# Patient Record
Sex: MALE | Race: WHITE | URBAN - METROPOLITAN AREA
[De-identification: names, ages, dates, MRNs, and addresses within clinical notes are randomized per-mention and may not be internally consistent; named-entity substitution may affect disease eponyms.]

---

## 2017-01-08 ENCOUNTER — EMERGENCY (EMERGENCY)
Facility: HOSPITAL | Age: 57
LOS: 0 days | Discharge: HOME | End: 2017-01-08
Admitting: FAMILY MEDICINE

## 2017-06-27 DIAGNOSIS — I10 ESSENTIAL (PRIMARY) HYPERTENSION: ICD-10-CM

## 2017-06-27 DIAGNOSIS — E78.00 PURE HYPERCHOLESTEROLEMIA, UNSPECIFIED: ICD-10-CM

## 2017-06-27 DIAGNOSIS — M79.671 PAIN IN RIGHT FOOT: ICD-10-CM
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
## 2017-06-27 DIAGNOSIS — Z79.84 LONG TERM (CURRENT) USE OF ORAL HYPOGLYCEMIC DRUGS: ICD-10-CM

## 2017-06-27 DIAGNOSIS — E11.9 TYPE 2 DIABETES MELLITUS WITHOUT COMPLICATIONS: ICD-10-CM

## 2017-06-27 DIAGNOSIS — Z79.82 LONG TERM (CURRENT) USE OF ASPIRIN: ICD-10-CM

## 2017-06-27 DIAGNOSIS — Z79.899 OTHER LONG TERM (CURRENT) DRUG THERAPY: ICD-10-CM
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
## 2019-04-02 ENCOUNTER — INPATIENT (INPATIENT)
Facility: HOSPITAL | Age: 59
LOS: 26 days | Discharge: NURSING, REHAB OR LONG-TERM CARE | End: 2019-04-29
Attending: INTERNAL MEDICINE | Admitting: INTERNAL MEDICINE
Payer: COMMERCIAL

## 2019-04-02 VITALS
RESPIRATION RATE: 19 BRPM | OXYGEN SATURATION: 96 % | SYSTOLIC BLOOD PRESSURE: 224 MMHG | DIASTOLIC BLOOD PRESSURE: 107 MMHG | HEART RATE: 60 BPM

## 2019-04-02 DIAGNOSIS — Z90.89 ACQUIRED ABSENCE OF OTHER ORGANS: Chronic | ICD-10-CM

## 2019-04-02 DIAGNOSIS — S06.360D TRAUMATIC HEMORRHAGE OF CEREBRUM, UNSPECIFIED, WITHOUT LOSS OF CONSCIOUSNESS, SUBSEQUENT ENCOUNTER: ICD-10-CM

## 2019-04-02 LAB
ALBUMIN SERPL ELPH-MCNC: 3.9 G/DL — SIGNIFICANT CHANGE UP (ref 3.5–5.2)
ALP SERPL-CCNC: 94 U/L — SIGNIFICANT CHANGE UP (ref 30–115)
ALT FLD-CCNC: 13 U/L — SIGNIFICANT CHANGE UP (ref 0–41)
ANION GAP SERPL CALC-SCNC: 12 MMOL/L — SIGNIFICANT CHANGE UP (ref 7–14)
APPEARANCE UR: CLEAR — SIGNIFICANT CHANGE UP
APTT BLD: 31.8 SEC — SIGNIFICANT CHANGE UP (ref 27–39.2)
AST SERPL-CCNC: 13 U/L — SIGNIFICANT CHANGE UP (ref 0–41)
BASE EXCESS BLDV CALC-SCNC: 2.2 MMOL/L — HIGH (ref -2–2)
BASOPHILS # BLD AUTO: 0.05 K/UL — SIGNIFICANT CHANGE UP (ref 0–0.2)
BASOPHILS NFR BLD AUTO: 0.7 % — SIGNIFICANT CHANGE UP (ref 0–1)
BILIRUB SERPL-MCNC: <0.2 MG/DL — SIGNIFICANT CHANGE UP (ref 0.2–1.2)
BILIRUB UR-MCNC: NEGATIVE — SIGNIFICANT CHANGE UP
BLD GP AB SCN SERPL QL: SIGNIFICANT CHANGE UP
BUN SERPL-MCNC: 46 MG/DL — HIGH (ref 10–20)
CA-I SERPL-SCNC: 1.11 MMOL/L — LOW (ref 1.12–1.3)
CALCIUM SERPL-MCNC: 8.3 MG/DL — LOW (ref 8.5–10.1)
CHLORIDE SERPL-SCNC: 105 MMOL/L — SIGNIFICANT CHANGE UP (ref 98–110)
CO2 SERPL-SCNC: 26 MMOL/L — SIGNIFICANT CHANGE UP (ref 17–32)
COLOR SPEC: YELLOW — SIGNIFICANT CHANGE UP
CREAT SERPL-MCNC: 2.5 MG/DL — HIGH (ref 0.7–1.5)
DIFF PNL FLD: ABNORMAL
EOSINOPHIL # BLD AUTO: 0.22 K/UL — SIGNIFICANT CHANGE UP (ref 0–0.7)
EOSINOPHIL NFR BLD AUTO: 3.1 % — SIGNIFICANT CHANGE UP (ref 0–8)
GAS PNL BLDV: 143 MMOL/L — SIGNIFICANT CHANGE UP (ref 136–145)
GAS PNL BLDV: SIGNIFICANT CHANGE UP
GLUCOSE SERPL-MCNC: 200 MG/DL — HIGH (ref 70–99)
GLUCOSE UR QL: 100 MG/DL
HCO3 BLDV-SCNC: 30 MMOL/L — HIGH (ref 22–29)
HCT VFR BLD CALC: 42.7 % — SIGNIFICANT CHANGE UP (ref 42–52)
HCT VFR BLDA CALC: 42.9 % — SIGNIFICANT CHANGE UP (ref 34–44)
HGB BLD CALC-MCNC: 14 G/DL — SIGNIFICANT CHANGE UP (ref 14–18)
HGB BLD-MCNC: 13.5 G/DL — LOW (ref 14–18)
HYALINE CASTS # UR AUTO: ABNORMAL /LPF
IMM GRANULOCYTES NFR BLD AUTO: 0.4 % — HIGH (ref 0.1–0.3)
INR BLD: 0.88 RATIO — SIGNIFICANT CHANGE UP (ref 0.65–1.3)
KETONES UR-MCNC: NEGATIVE — SIGNIFICANT CHANGE UP
LACTATE BLDV-MCNC: 1 MMOL/L — SIGNIFICANT CHANGE UP (ref 0.5–1.6)
LEUKOCYTE ESTERASE UR-ACNC: NEGATIVE — SIGNIFICANT CHANGE UP
LYMPHOCYTES # BLD AUTO: 1.23 K/UL — SIGNIFICANT CHANGE UP (ref 1.2–3.4)
LYMPHOCYTES # BLD AUTO: 17.5 % — LOW (ref 20.5–51.1)
MCHC RBC-ENTMCNC: 26.7 PG — LOW (ref 27–31)
MCHC RBC-ENTMCNC: 31.6 G/DL — LOW (ref 32–37)
MCV RBC AUTO: 84.4 FL — SIGNIFICANT CHANGE UP (ref 80–94)
MONOCYTES # BLD AUTO: 0.68 K/UL — HIGH (ref 0.1–0.6)
MONOCYTES NFR BLD AUTO: 9.7 % — HIGH (ref 1.7–9.3)
NEUTROPHILS # BLD AUTO: 4.81 K/UL — SIGNIFICANT CHANGE UP (ref 1.4–6.5)
NEUTROPHILS NFR BLD AUTO: 68.6 % — SIGNIFICANT CHANGE UP (ref 42.2–75.2)
NITRITE UR-MCNC: NEGATIVE — SIGNIFICANT CHANGE UP
NRBC # BLD: 0 /100 WBCS — SIGNIFICANT CHANGE UP (ref 0–0)
PCO2 BLDV: 60 MMHG — HIGH (ref 41–51)
PH BLDV: 7.31 — SIGNIFICANT CHANGE UP (ref 7.26–7.43)
PH UR: 6 — SIGNIFICANT CHANGE UP (ref 5–8)
PLATELET # BLD AUTO: 223 K/UL — SIGNIFICANT CHANGE UP (ref 130–400)
PO2 BLDV: 78 MMHG — HIGH (ref 20–40)
POTASSIUM BLDV-SCNC: 4 MMOL/L — SIGNIFICANT CHANGE UP (ref 3.3–5.6)
POTASSIUM SERPL-MCNC: 4.2 MMOL/L — SIGNIFICANT CHANGE UP (ref 3.5–5)
POTASSIUM SERPL-SCNC: 4.2 MMOL/L — SIGNIFICANT CHANGE UP (ref 3.5–5)
PROT SERPL-MCNC: 6.8 G/DL — SIGNIFICANT CHANGE UP (ref 6–8)
PROT UR-MCNC: >=300 MG/DL
PROTHROM AB SERPL-ACNC: 10.1 SEC — SIGNIFICANT CHANGE UP (ref 9.95–12.87)
RBC # BLD: 5.06 M/UL — SIGNIFICANT CHANGE UP (ref 4.7–6.1)
RBC # FLD: 14.1 % — SIGNIFICANT CHANGE UP (ref 11.5–14.5)
RBC CASTS # UR COMP ASSIST: SIGNIFICANT CHANGE UP /HPF
SAO2 % BLDV: 95 % — SIGNIFICANT CHANGE UP
SODIUM SERPL-SCNC: 143 MMOL/L — SIGNIFICANT CHANGE UP (ref 135–146)
SP GR SPEC: 1.02 — SIGNIFICANT CHANGE UP (ref 1.01–1.03)
TROPONIN T SERPL-MCNC: 0.02 NG/ML — HIGH
TYPE + AB SCN PNL BLD: SIGNIFICANT CHANGE UP
UROBILINOGEN FLD QL: 0.2 MG/DL — SIGNIFICANT CHANGE UP (ref 0.2–0.2)
WBC # BLD: 7.02 K/UL — SIGNIFICANT CHANGE UP (ref 4.8–10.8)
WBC # FLD AUTO: 7.02 K/UL — SIGNIFICANT CHANGE UP (ref 4.8–10.8)

## 2019-04-02 PROCEDURE — ZZZZZ: CPT

## 2019-04-02 PROCEDURE — 93010 ELECTROCARDIOGRAM REPORT: CPT

## 2019-04-02 PROCEDURE — 99291 CRITICAL CARE FIRST HOUR: CPT | Mod: 25

## 2019-04-02 PROCEDURE — 70450 CT HEAD/BRAIN W/O DYE: CPT | Mod: 26

## 2019-04-02 PROCEDURE — 71045 X-RAY EXAM CHEST 1 VIEW: CPT | Mod: 26

## 2019-04-02 PROCEDURE — 36620 INSERTION CATHETER ARTERY: CPT

## 2019-04-02 RX ORDER — CHLORHEXIDINE GLUCONATE 213 G/1000ML
1 SOLUTION TOPICAL EVERY 12 HOURS
Qty: 0 | Refills: 0 | Status: DISCONTINUED | OUTPATIENT
Start: 2019-04-02 | End: 2019-04-29

## 2019-04-02 RX ORDER — ATORVASTATIN CALCIUM 80 MG/1
40 TABLET, FILM COATED ORAL DAILY
Qty: 0 | Refills: 0 | Status: DISCONTINUED | OUTPATIENT
Start: 2019-04-02 | End: 2019-04-29

## 2019-04-02 RX ORDER — PANTOPRAZOLE SODIUM 20 MG/1
40 TABLET, DELAYED RELEASE ORAL
Qty: 0 | Refills: 0 | Status: DISCONTINUED | OUTPATIENT
Start: 2019-04-02 | End: 2019-04-29

## 2019-04-02 RX ORDER — DESMOPRESSIN ACETATE 0.1 MG/1
20 TABLET ORAL ONCE
Qty: 0 | Refills: 0 | Status: COMPLETED | OUTPATIENT
Start: 2019-04-02 | End: 2019-04-02

## 2019-04-02 RX ORDER — DESMOPRESSIN ACETATE 0.1 MG/1
36 TABLET ORAL ONCE
Qty: 0 | Refills: 0 | Status: DISCONTINUED | OUTPATIENT
Start: 2019-04-02 | End: 2019-04-02

## 2019-04-02 RX ORDER — CHLORHEXIDINE GLUCONATE 213 G/1000ML
1 SOLUTION TOPICAL DAILY
Qty: 0 | Refills: 0 | Status: DISCONTINUED | OUTPATIENT
Start: 2019-04-02 | End: 2019-04-13

## 2019-04-02 RX ORDER — NICARDIPINE HYDROCHLORIDE 30 MG/1
5 CAPSULE, EXTENDED RELEASE ORAL
Qty: 40 | Refills: 0 | Status: DISCONTINUED | OUTPATIENT
Start: 2019-04-02 | End: 2019-04-03

## 2019-04-02 RX ADMIN — DESMOPRESSIN ACETATE 220 MICROGRAM(S): 0.1 TABLET ORAL at 23:06

## 2019-04-02 RX ADMIN — NICARDIPINE HYDROCHLORIDE 25 MG/HR: 30 CAPSULE, EXTENDED RELEASE ORAL at 21:55

## 2019-04-02 NOTE — CONSULT NOTE ADULT - PROBLEM SELECTOR PROBLEM 1
Intraparenchymal hematoma of brain without loss of consciousness, unspecified laterality, subsequent encounter

## 2019-04-02 NOTE — ED PROVIDER NOTE - ATTENDING CONTRIBUTION TO CARE
59 year old male, pmhx of htn, dld, dm, comes as a pre arrival stroke code notification, patient hypertensive with rue weakness and speech difficulty, patient, was a stroke code, patient taken immediately to ct scan, patient is protecting airway, patient found to have hypertension and a + ICH on ct, patient on a daily asa. Neurology and NSX consulted, patient will be given platelets and ddavp, consented for blood products. Patient had A-Line placed, given cardene for BP monitoring,. Patient remains aphasic, not moving RUE. Patient endorsed to the ICU, patient accepted.

## 2019-04-02 NOTE — CONSULT NOTE ADULT - SUBJECTIVE AND OBJECTIVE BOX
Neurology Consult    Patient is a 59y old  Male who presents with a chief complaint of Aphasia and Right side weakness Stroke Code    HPI: This is a 60 y/o M with hx of HTN, DM, CAD, Sleep Apnea, last seen Normal at 3pm, was found at 7pm after wife heard a loud bang upstairs. At this time pt was unresponsive describe as Aphasic and had some right side weakness. EMS was called BS at that time was 224. Upon arrival to ED pt was HTN with SBP in the 200's, with Non rebreather on. Aphasic and not following commands.  NIH 15. HCT shows Hemmorhagic 2.5X3 component within  Left BG/Ext capsule with 2-3mm shift.      PAST MEDICAL & SURGICAL HISTORY:      FAMILY HISTORY:      Social History: (-) x 3    Allergies    No Known Allergies    Intolerances        MEDICATIONS  (STANDING):    MEDICATIONS  (PRN):      Review of systems:    Constitutional: No fever, weight loss or fatigue    Eyes: No eye pain or discharge  ENMT:  No difficulty hearing; No sinus or throat pain  Neck: No pain or stiffness  Respiratory: No cough, wheezing, chills or hemoptysis  Cardiovascular: No chest pain, palpitations, shortness of breath, dyspnea on exertion  Gastrointestinal: No abdominal pain, nausea, vomiting or hematemesis; No diarrhea or constipation.   Genitourinary: No dysuria, frequency, hematuria or incontinence  Neurological: As per HPI  Skin: No rashes or lesions   Endocrine: No heat or cold intolerance; No hair loss  Musculoskeletal: No joint pain or swelling  Psychiatric: No depression, anxiety, mood swings  Heme/Lymph: No easy bruising or bleeding gums    Vital Signs Last 24 Hrs  T(C): --  T(F): --  HR: 65 (2019 20:37) (60 - 75)  BP: 174/76 (2019 20:37) (154/82 - 224/107)  BP(mean): --  RR: 23 (2019 20:37) (18 - 26)  SpO2: 98% (2019 20:37) (96% - 99%)    Neurologic Examination: Awake Alert Focus on Left side Tracks examiners  Nonverbal NFC APhasic + right side Neglect Mild left gaze deviation at rest that is able to easily overcome  Spontaneously antigravity on the Left LUE5/5 LLE 5/5  R LE not antigravity to commands - withdraws RLE sponatneously due to neglect  No movement in RUE  + idania sensory lost to PP and lt touch on Right side      NIHSS  LOC:  0   QUES:  2   COMM:1     VF: 0    GAZE:  1   RUE: 3    RLE: 1-2    LUE: 0    LLE:  0   SENS: 2    LAN   SPEECH:  0   ATAXIA: 0    NEGLECT:  2   FACE:1    NIHSS on admission:          NIHSS yesterday:    0      NIHSS today:15      Current Functional status:          NIHSS:    15      m-RS:0          Labs:   CBC Full  -  ( 2019 19:45 )  WBC Count : 7.02 K/uL  RBC Count : 5.06 M/uL  Hemoglobin : 13.5 g/dL  Hematocrit : 42.7 %  Platelet Count - Automated : 223 K/uL  Mean Cell Volume : 84.4 fL  Mean Cell Hemoglobin : 26.7 pg  Mean Cell Hemoglobin Concentration : 31.6 g/dL  Auto Neutrophil # : 4.81 K/uL  Auto Lymphocyte # : 1.23 K/uL  Auto Monocyte # : 0.68 K/uL  Auto Eosinophil # : 0.22 K/uL  Auto Basophil # : 0.05 K/uL  Auto Neutrophil % : 68.6 %  Auto Lymphocyte % : 17.5 %  Auto Monocyte % : 9.7 %  Auto Eosinophil % : 3.1 %  Auto Basophil % : 0.7 %    04-    143  |  105  |  46<H>  ----------------------------<  200<H>  4.2   |  26  |  2.5<H>    Ca    8.3<L>      2019 19:45    TPro  6.8  /  Alb  3.9  /  TBili  <0.2  /  DBili  x   /  AST  13  /  ALT  13  /  AlkPhos  94  04-02    LIVER FUNCTIONS - ( 2019 19:45 )  Alb: 3.9 g/dL / Pro: 6.8 g/dL / ALK PHOS: 94 U/L / ALT: 13 U/L / AST: 13 U/L / GGT: x           PT/INR - ( 2019 19:45 )   PT: 10.10 sec;   INR: 0.88 ratio         PTT - ( 2019 19:45 )  PTT:31.8 sec                    Stroke Topography: Hemmorrhagic left basal ganglia/caudateext capsule stroke        CT Brain Stroke Protocol:   EXAM:  CT BRAIN STROKE PROTOCOL        PROCEDURE DATE:  2019      NTERPRETATION:  Clinical History / Reason for exam:  Stroke Code.   Aphasic and right-sided weakness.    Technique:  Multiple contiguous axial CT images of the brainwere   obtained from base to vertex without administration of intravenous   contrast.      Comparison:  No comparisons are available.    Findings:      Ventricular system: Age-appropriate.    Brain parenchyma: Acute left frontotemporal intraparenchymal hemorrhage   with associated effacement of the left lateral ventricle.    Mass effect/Midline shift: 2 mm right midline shift.     Paranasal sinuses and mastoid air cells: Unremarkable.    Osseous structures: Unremarkable.    Impression:      Acute left frontotemporal intraparenchymal hemorrhage with associated   effacement of the left lateral ventricle and 2 mm right midline shift.        Dr. Matt Campbell discussed preliminary findings with JANA CHÁVEZ PA   on 2019 8:12 PM with readback.              MATT CAMPBELL M.D., RESIDENT RADIOLOGIST  This document has been electronically signed.  ALEX WHIPPLE M.D., ATTENDING RADIOLOGIST  This document has been electronically signed. 2019  8:18PM             (19 @ 20:01)          Pertinent Medical History/Social History/Family History/other:     Social History: []  Drug Use: []   Alcohol Use: []   Tobacco Use:  [] Other:      Cerebrovascular Risk Factors:[] prior ischemic stroke  [] Afib  [x]CAD  [x]HTN  []DLD  [x]DM []PVD          MEDICATIONS  (STANDING):      Last 24 hour events:none Neurology Consult    Patient is a 59y old  Male who presents with a chief complaint of Aphasia and Right side weakness Stroke Code    HPI: This is a 58 y/o M with hx of HTN, DM, CAD, Sleep Apnea, last seen Normal at 3pm, was found at 7pm after wife heard a loud bang upstairs. At this time pt was unresponsive describe as Aphasic and had some right side weakness. EMS was called BS at that time was 224. Upon arrival to ED pt was HTN with SBP in the 200's, with Non rebreather on. Aphasic and not following commands.  NIH 15. HCT shows Hemmorhagic 2.5X3 component within  Left BG/Ext capsule with 2-3mm shift.  ICH score 1pt =13.5%  GCS12  Age less than 80  13.8 ml =ICH less than 30cc  No IVH Not infratentorial      PAST MEDICAL & SURGICAL HISTORY:      FAMILY HISTORY:      Social History: (-) x 3    Allergies    No Known Allergies    Intolerances        MEDICATIONS  (STANDING):    MEDICATIONS  (PRN):      Review of systems:    Constitutional: No fever, weight loss or fatigue    Eyes: No eye pain or discharge  ENMT:  No difficulty hearing; No sinus or throat pain  Neck: No pain or stiffness  Respiratory: No cough, wheezing, chills or hemoptysis  Cardiovascular: No chest pain, palpitations, shortness of breath, dyspnea on exertion  Gastrointestinal: No abdominal pain, nausea, vomiting or hematemesis; No diarrhea or constipation.   Genitourinary: No dysuria, frequency, hematuria or incontinence  Neurological: As per HPI  Skin: No rashes or lesions   Endocrine: No heat or cold intolerance; No hair loss  Musculoskeletal: No joint pain or swelling  Psychiatric: No depression, anxiety, mood swings  Heme/Lymph: No easy bruising or bleeding gums    Vital Signs Last 24 Hrs  T(C): --  T(F): --  HR: 65 (2019 20:37) (60 - 75)  BP: 174/76 (2019 20:37) (154/82 - 224/107)  BP(mean): --  RR: 23 (2019 20:37) (18 - 26)  SpO2: 98% (2019 20:37) (96% - 99%)    Neurologic Examination: Awake Alert Focus on Left side Tracks examiners  Nonverbal NFC APhasic + right side Neglect Mild left gaze deviation at rest that is able to easily overcome  Spontaneously antigravity on the Left LUE5/5 LLE 5/5  R LE not antigravity to commands - withdraws RLE sponatneously due to neglect  No movement in RUE  + idania sensory lost to PP and lt touch on Right side      NIHSS  LOC:  0   QUES:  2   COMM:1     VF: 0    GAZE:  1   RUE: 3    RLE: 1-2    LUE: 0    LLE:  0   SENS: 2    LAN   SPEECH:  0   ATAXIA: 0    NEGLECT:  2   FACE:1    NIHSS on admission:          NIHSS yesterday:    0      NIHSS today:15      Current Functional status:          NIHSS:    15      m-RS:0          Labs:   CBC Full  -  ( 2019 19:45 )  WBC Count : 7.02 K/uL  RBC Count : 5.06 M/uL  Hemoglobin : 13.5 g/dL  Hematocrit : 42.7 %  Platelet Count - Automated : 223 K/uL  Mean Cell Volume : 84.4 fL  Mean Cell Hemoglobin : 26.7 pg  Mean Cell Hemoglobin Concentration : 31.6 g/dL  Auto Neutrophil # : 4.81 K/uL  Auto Lymphocyte # : 1.23 K/uL  Auto Monocyte # : 0.68 K/uL  Auto Eosinophil # : 0.22 K/uL  Auto Basophil # : 0.05 K/uL  Auto Neutrophil % : 68.6 %  Auto Lymphocyte % : 17.5 %  Auto Monocyte % : 9.7 %  Auto Eosinophil % : 3.1 %  Auto Basophil % : 0.7 %        143  |  105  |  46<H>  ----------------------------<  200<H>  4.2   |  26  |  2.5<H>    Ca    8.3<L>      2019 19:45    TPro  6.8  /  Alb  3.9  /  TBili  <0.2  /  DBili  x   /  AST  13  /  ALT  13  /  AlkPhos  94  -    LIVER FUNCTIONS - ( 2019 19:45 )  Alb: 3.9 g/dL / Pro: 6.8 g/dL / ALK PHOS: 94 U/L / ALT: 13 U/L / AST: 13 U/L / GGT: x           PT/INR - ( 2019 19:45 )   PT: 10.10 sec;   INR: 0.88 ratio         PTT - ( 2019 19:45 )  PTT:31.8 sec                    Stroke Topography: Hemmorrhagic left basal ganglia/caudateext capsule stroke        CT Brain Stroke Protocol:   EXAM:  CT BRAIN STROKE PROTOCOL        PROCEDURE DATE:  2019      NTERPRETATION:  Clinical History / Reason for exam:  Stroke Code.   Aphasic and right-sided weakness.    Technique:  Multiple contiguous axial CT images of the brainwere   obtained from base to vertex without administration of intravenous   contrast.      Comparison:  No comparisons are available.    Findings:      Ventricular system: Age-appropriate.    Brain parenchyma: Acute left frontotemporal intraparenchymal hemorrhage   with associated effacement of the left lateral ventricle.    Mass effect/Midline shift: 2 mm right midline shift.     Paranasal sinuses and mastoid air cells: Unremarkable.    Osseous structures: Unremarkable.    Impression:      Acute left frontotemporal intraparenchymal hemorrhage with associated   effacement of the left lateral ventricle and 2 mm right midline shift.        Dr. Matt Campbell discussed preliminary findings with JANA CHÁVEZ PA   on 2019 8:12 PM with readback.              MATT CAMPBELL M.D., RESIDENT RADIOLOGIST  This document has been electronically signed.  ALEX WHIPPLE M.D., ATTENDING RADIOLOGIST  This document has been electronically signed. 2019  8:18PM             (19 @ 20:01)          Pertinent Medical History/Social History/Family History/other:     Social History: []  Drug Use: []   Alcohol Use: []   Tobacco Use:  [] Other:      Cerebrovascular Risk Factors:[] prior ischemic stroke  [] Afib  [x]CAD  [x]HTN  []DLD  [x]DM []PVD          MEDICATIONS  (STANDING):      Last 24 hour events:none

## 2019-04-02 NOTE — H&P ADULT - HISTORY OF PRESENT ILLNESS
58 y/o M with hx of HTN, DM, CAD, Sleep Apnea, BIBEMS for altered mental status ans aphasia. . At this time pt was unresponsive describe as Aphasic and had some right side weakness.    In the ED stroke code was called and NIH SS was 15. His BP was in 2 and right sided weakness. The CT scan showed the left intra parenchymal bleed with the extend into the ventricle associated with the midline shift. Pt received platelets and DDAVp for the aspirin reversal. The blood pressure was in 200s . Pt had jhonatan and on nicardin drip for the close bp control. 60 y/o M with hx of HTN, DM, CAD, Sleep Apnea BIBEMS for altered mental status and aphasia. . At this time pt was unresponsive describe as Aphasic and had some right side weakness. He was not following the commands so wife called the EMS.     In the ED stroke code was called and NIH SS was 15. His BP was in 2 and right sided weakness. The CT scan showed the left intra parenchymal bleed with the extend into the ventricle associated with the midline shift. Pt received platelets and DDAVP for the aspirin reversal. The blood pressure was in 200s . Pt had jhonatan and on nicardin drip for the close bp control. 60 y/o M with hx of HTN, DM, CAD, Sleep Apnea BIBEMS for altered mental status and aphasia. At presentation patient was found to be aphasic and some right side weakness. He was not following the commands so wife called the EMS. He was completely fine at 3:00 pm today. Wife herd the noise upstairs and found him on the ground unresponsive. So she called an EMS.     In the ED stroke code was called and NIH SS was 15 with right sided weakness. The CT scan showed the left intra parenchymal bleed with the extend into the ventricle associated with the midline shift. Pt received platelets and DDAVP for the aspirin reversal. The blood pressure was in 200s . Pt had jhonatan and on nicardin drip for the close bp control.

## 2019-04-02 NOTE — CONSULT NOTE ADULT - PROBLEM SELECTOR RECOMMENDATION 9
Admit to MICU with q 1 hour neuro checks. If pt has any neurological changes obtain repeat CT Brain immediately and notify Neurosurgery. Otherwise obtain a CT Brain in AM tomorrow  May give platelet transfusion and/or DDAVP  Keep pt SBP less than 140  HOB at 45 degrees  No Acute neurosurgical intervention at this time  Case d/w Attending, agrees with above plan

## 2019-04-02 NOTE — ED ADULT NURSE NOTE - OBJECTIVE STATEMENT
patient, BIBA for aphasia and right sided weakness onset today. Pts wife heard a "thud" upstairs-she found him on the ground aphasic and unable to move his right side.  pt was last seen normal around 3 pm. stroke coke called

## 2019-04-02 NOTE — ED PROVIDER NOTE - OBJECTIVE STATEMENT
60 y/o M, h/o Dm, HTN, CAD-on ASA, ZE, BIB EMS for aphasia and R sided weakness onset today. Pts wife heard a "thud" upstairs-she found him on the ground aphasic and unable to move his R side. upon EMS arrival pts finger stick was in the 200's. pt was last seen normal around 3 pm. pt was stroke code with neuro NP at bedside. 58 y/o M, h/o Dm, HTN, CAD-on ASA, ZE, BIB EMS for aphasia and R sided weakness onset today. Pts wife heard a "thud" upstairs-she found him on the ground aphasic and unable to move his R side around 7pm. upon EMS arrival pts finger stick was in the 200's. pt was last seen normal around 3 pm. pt was stroke code with neuro NP at bedside. history obtained from family and EMS

## 2019-04-02 NOTE — H&P ADULT - NSHPLABSRESULTS_GEN_ALL_CORE
13.5   7.02  )-----------( 223      ( 02 Apr 2019 19:45 )             42.7       04-02    143  |  105  |  46<H>  ----------------------------<  200<H>  4.2   |  26  |  2.5<H>    Ca    8.3<L>      02 Apr 2019 19:45    TPro  6.8  /  Alb  3.9  /  TBili  <0.2  /  DBili  x   /  AST  13  /  ALT  13  /  AlkPhos  94  04-02        < from: CT Brain Stroke Protocol (04.02.19 @ 20:01) >    Acuteleft frontotemporal intraparenchymal hemorrhage with associated   effacement of the left lateral ventricle and 2 mm right midline shift.    < end of copied text >

## 2019-04-02 NOTE — ED PROVIDER NOTE - PROGRESS NOTE DETAILS
neuro NP at bedside upon pts arrival. pt hypertensive per neuro-a-line, stabilize BP via cardizine drip, and consult neuro surg paged neuro surg per neuro surg PA- PA [place]-transfuse 2 units of platelets and DDAVP I have personally evaluated the patient myself and agree with fellow's plan.

## 2019-04-02 NOTE — ED PROVIDER NOTE - PHYSICAL EXAMINATION
VITAL SIGNS: I have reviewed nursing notes and confirm.  CONSTITUTIONAL: Well-developed; well-nourished; in no acute distress. obese  SKIN: Skin exam is warm and dry, <2 sec cap refill  HEAD: Normocephalic; atraumatic.  EYES: PERRL, EOM intact; normal conjunctiva.  ENT: MMM; airway clear.   NECK: Supple; non tender.  CARD: RRR, 2+ dp pulses  RESP: No wheezes, rales or rhonchi,   ABD: soft non tender.   EXT: Normal ROM. No edema.  NEURO: Alert, aphasic, R sided weakness, responds to pain,   PSYCH: Cooperative, appropriate.

## 2019-04-02 NOTE — H&P ADULT - NSHPPHYSICALEXAM_GEN_ALL_CORE
T(C): 36.1 (04-02-19 @ 23:06), Max: 36.1 (04-02-19 @ 21:56)  HR: 70 (04-02-19 @ 23:06) (60 - 97)  BP: 155/67 (04-02-19 @ 21:33) (154/82 - 224/107)  RR: 23 (04-02-19 @ 23:06) (18 - 26)  SpO2: 98% (04-02-19 @ 23:06) (95% - 99%)    PHYSICAL EXAM:  GENERAL: Morbidly obese   HEAD:  Atraumatic, Normocephalic  CHEST/LUNG: CTABL No wheezing or Rhonchi   HEART: Regular rate and rhythm; No murmurs, rubs, or gallops  ABDOMEN: Soft, Nontender, Nondistended; Bowel sounds present  EXTREMITIES:  Chronic lower extremity swelling   PSYCH: AAOx3  NEUROLOGY: 4/5 power on both extremities  Sensation loss on the right side   - Plantar upwards bilaterally   SKIN: No rashes or lesions

## 2019-04-02 NOTE — ED PROVIDER NOTE - CLINICAL SUMMARY MEDICAL DECISION MAKING FREE TEXT BOX
I personally evaluated the patient. I reviewed the Resident’s or Physician Assistant’s note (as assigned above), and agree with the findings and plan except as documented in my note. Endorsed to the icu, deterioration of neurological status in the ED.

## 2019-04-02 NOTE — H&P ADULT - ASSESSMENT
58 yo M with PMH of DM, HTN, comes to the ED for the stroke.    # Left sided Frontotemporal bleed with intraparenchymal hemorrhagic associated with left ventricular bleed   - Neurochecks q1h   - repeat CT scan tomorrow in the morning  - BP control with the Nicardin   - Keep SBP < 140   - Neuro surgery and neurology follow up   - NPO  - PT/OT  - Speech and swallow evaluation     # DM  - Insulin sliding scale    # DVT ppx  - SCD    # GI ppx  - Protonix 60 yo M with PMH of DM, HTN, comes to the ED for the stroke.    # Left sided Frontotemporal bleed with intraparenchymal hemorrhagic associated with left ventricular bleed   - Neurochecks q1h   - repeat CT scan tomorrow in the morning  - BP control with the Nicardin   - Keep SBP < 140   - Neuro surgery and neurology follow up   - NPO  - PT/OT  - Speech and swallow evaluation   - Hold Aspirin     # DM  - Lantus 15 units daily as pt is NPO  - Start Lispro once feeding start    # HTN  - On Nicardin drip   - Hold on Cardizem and Hyzaar     # DLD  - Lipitor     # DVT ppx  - SCD    # GI ppx  - Protonix

## 2019-04-02 NOTE — ED PROCEDURE NOTE - CPROC ED ARTER LINE DETAIL1
Using sterile technique, the correct location was identified, and a needle was inserted into the artery (specify in FT)./Line was sutured in place./Connected to a pressurized flush line./Positive blood return was obtained via the catheter.

## 2019-04-02 NOTE — CONSULT NOTE ADULT - ASSESSMENT
Assessment:  This is a 59y Male with h/o HTN,IDDM,CAD with new HTN left Intraparenchymal Hemorrhage of the Brain    Plan:  Please Start Cardene gtt at 5 - Titrate as need for goal SBP below 140         Pt Know to take ASA today _ please consult Neurosurgery  to determine need for Reversal agents or plts         Neuro Checks q 1H - will need ICU level Monitoring for the next 24-48H - monitor for progression of bleed/Hydro or worsening MS         IV tylenol for pain ( No narcotics) only         Keep HOB at 30 degrees         Repeat HCT in the Next 12-24H - sooner if Mental Status Changes... Ie increased lethargy, Change in NIH, Nausea/Vomiting,   -     Please call Neurology for any acute change in MS      04-02-19 @ 20:40

## 2019-04-03 LAB
ANION GAP SERPL CALC-SCNC: 13 MMOL/L — SIGNIFICANT CHANGE UP (ref 7–14)
BASE EXCESS BLDA CALC-SCNC: 1 MMOL/L — SIGNIFICANT CHANGE UP (ref -2–2)
BASOPHILS # BLD AUTO: 0.03 K/UL — SIGNIFICANT CHANGE UP (ref 0–0.2)
BASOPHILS NFR BLD AUTO: 0.4 % — SIGNIFICANT CHANGE UP (ref 0–1)
BUN SERPL-MCNC: 44 MG/DL — HIGH (ref 10–20)
CALCIUM SERPL-MCNC: 7.8 MG/DL — LOW (ref 8.5–10.1)
CHLORIDE SERPL-SCNC: 106 MMOL/L — SIGNIFICANT CHANGE UP (ref 98–110)
CHOLEST SERPL-MCNC: 184 MG/DL — SIGNIFICANT CHANGE UP (ref 100–200)
CO2 SERPL-SCNC: 25 MMOL/L — SIGNIFICANT CHANGE UP (ref 17–32)
CREAT SERPL-MCNC: 2.4 MG/DL — HIGH (ref 0.7–1.5)
CULTURE RESULTS: SIGNIFICANT CHANGE UP
EOSINOPHIL # BLD AUTO: 0.08 K/UL — SIGNIFICANT CHANGE UP (ref 0–0.7)
EOSINOPHIL NFR BLD AUTO: 1.2 % — SIGNIFICANT CHANGE UP (ref 0–8)
GAS PNL BLDA: SIGNIFICANT CHANGE UP
GLUCOSE BLDC GLUCOMTR-MCNC: 164 MG/DL — HIGH (ref 70–99)
GLUCOSE SERPL-MCNC: 204 MG/DL — HIGH (ref 70–99)
HCO3 BLDA-SCNC: 29 MMOL/L — HIGH (ref 23–27)
HCT VFR BLD CALC: 37.4 % — LOW (ref 42–52)
HDLC SERPL-MCNC: 38 MG/DL — LOW
HGB BLD-MCNC: 11.7 G/DL — LOW (ref 14–18)
HOROWITZ INDEX BLDA+IHG-RTO: 100 — SIGNIFICANT CHANGE UP
IMM GRANULOCYTES NFR BLD AUTO: 0.3 % — SIGNIFICANT CHANGE UP (ref 0.1–0.3)
LIPID PNL WITH DIRECT LDL SERPL: 116 MG/DL — SIGNIFICANT CHANGE UP (ref 4–129)
LYMPHOCYTES # BLD AUTO: 0.54 K/UL — LOW (ref 1.2–3.4)
LYMPHOCYTES # BLD AUTO: 7.9 % — LOW (ref 20.5–51.1)
MCHC RBC-ENTMCNC: 26.5 PG — LOW (ref 27–31)
MCHC RBC-ENTMCNC: 31.3 G/DL — LOW (ref 32–37)
MCV RBC AUTO: 84.8 FL — SIGNIFICANT CHANGE UP (ref 80–94)
MONOCYTES # BLD AUTO: 0.49 K/UL — SIGNIFICANT CHANGE UP (ref 0.1–0.6)
MONOCYTES NFR BLD AUTO: 7.1 % — SIGNIFICANT CHANGE UP (ref 1.7–9.3)
NEUTROPHILS # BLD AUTO: 5.71 K/UL — SIGNIFICANT CHANGE UP (ref 1.4–6.5)
NEUTROPHILS NFR BLD AUTO: 83.1 % — HIGH (ref 42.2–75.2)
NRBC # BLD: 0 /100 WBCS — SIGNIFICANT CHANGE UP (ref 0–0)
PCO2 BLDA: 64 MMHG — CRITICAL HIGH (ref 38–42)
PH BLDA: 7.27 — LOW (ref 7.38–7.42)
PLATELET # BLD AUTO: 238 K/UL — SIGNIFICANT CHANGE UP (ref 130–400)
PO2 BLDA: 207 MMHG — HIGH (ref 78–95)
POTASSIUM SERPL-MCNC: 4.6 MMOL/L — SIGNIFICANT CHANGE UP (ref 3.5–5)
POTASSIUM SERPL-SCNC: 4.6 MMOL/L — SIGNIFICANT CHANGE UP (ref 3.5–5)
RBC # BLD: 4.41 M/UL — LOW (ref 4.7–6.1)
RBC # FLD: 14.2 % — SIGNIFICANT CHANGE UP (ref 11.5–14.5)
SAO2 % BLDA: 99 % — HIGH (ref 94–98)
SODIUM SERPL-SCNC: 144 MMOL/L — SIGNIFICANT CHANGE UP (ref 135–146)
SPECIMEN SOURCE: SIGNIFICANT CHANGE UP
TOTAL CHOLESTEROL/HDL RATIO MEASUREMENT: 4.8 RATIO — SIGNIFICANT CHANGE UP (ref 4–5.5)
TRIGL SERPL-MCNC: 228 MG/DL — HIGH (ref 10–149)
TROPONIN T SERPL-MCNC: 0.03 NG/ML — CRITICAL HIGH
WBC # BLD: 6.87 K/UL — SIGNIFICANT CHANGE UP (ref 4.8–10.8)
WBC # FLD AUTO: 6.87 K/UL — SIGNIFICANT CHANGE UP (ref 4.8–10.8)

## 2019-04-03 PROCEDURE — 76775 US EXAM ABDO BACK WALL LIM: CPT | Mod: 26

## 2019-04-03 PROCEDURE — 71045 X-RAY EXAM CHEST 1 VIEW: CPT | Mod: 26

## 2019-04-03 PROCEDURE — 70450 CT HEAD/BRAIN W/O DYE: CPT | Mod: 26

## 2019-04-03 PROCEDURE — 99221 1ST HOSP IP/OBS SF/LOW 40: CPT

## 2019-04-03 RX ORDER — FUROSEMIDE 40 MG
40 TABLET ORAL ONCE
Qty: 0 | Refills: 0 | Status: DISCONTINUED | OUTPATIENT
Start: 2019-04-03 | End: 2019-04-03

## 2019-04-03 RX ORDER — LABETALOL HCL 100 MG
0.5 TABLET ORAL
Qty: 200 | Refills: 0 | Status: DISCONTINUED | OUTPATIENT
Start: 2019-04-03 | End: 2019-04-04

## 2019-04-03 RX ORDER — LABETALOL HCL 100 MG
0.5 TABLET ORAL
Qty: 200 | Refills: 0 | Status: DISCONTINUED | OUTPATIENT
Start: 2019-04-03 | End: 2019-04-03

## 2019-04-03 RX ORDER — NICARDIPINE HYDROCHLORIDE 30 MG/1
5 CAPSULE, EXTENDED RELEASE ORAL
Qty: 40 | Refills: 0 | Status: DISCONTINUED | OUTPATIENT
Start: 2019-04-03 | End: 2019-04-07

## 2019-04-03 RX ORDER — HYDRALAZINE HCL 50 MG
10 TABLET ORAL ONCE
Qty: 0 | Refills: 0 | Status: COMPLETED | OUTPATIENT
Start: 2019-04-03 | End: 2019-04-03

## 2019-04-03 RX ADMIN — CHLORHEXIDINE GLUCONATE 1 APPLICATION(S): 213 SOLUTION TOPICAL at 05:07

## 2019-04-03 RX ADMIN — Medication 10 MILLIGRAM(S): at 16:50

## 2019-04-03 RX ADMIN — NICARDIPINE HYDROCHLORIDE 25 MG/HR: 30 CAPSULE, EXTENDED RELEASE ORAL at 06:48

## 2019-04-03 RX ADMIN — NICARDIPINE HYDROCHLORIDE 25 MG/HR: 30 CAPSULE, EXTENDED RELEASE ORAL at 00:39

## 2019-04-03 RX ADMIN — Medication 30 MG/MIN: at 19:00

## 2019-04-03 NOTE — CONSULT NOTE ADULT - ASSESSMENT
IMPRESSION:    Hypertensive emergency  Intraparenchymal hemorrhage  Acute hypercapnic respiratory failure with hypoxemia  ZE not on CPAP  DESI? vs CKD      PLAN:    CNS: no sedatives, neurosurgery and neurology follow up    HEENT:  Oral care    PULMONARY:  HOB @ 45 degrees, oxygen to keep pulse ox>92%, lasix 40 IV x 1, BPAP 12/6 prn and qhs    CARDIOVASCULAR: I<O, cardene drip, keep SBP<140, echo, serial cardiac enzymes    GI: GI prophylaxis   NPO speech adn swallow eval    RENAL:  F/u  lytes.  Correct as needed. accurate I/O, renal sono, urine lytes    INFECTIOUS DISEASE: no abx, f/u cultures    HEMATOLOGICAL:  DVT prophylaxis - compression stocking, s/p DDAVP and 2unit platelet    ENDOCRINE:  Follow up FS.  Insulin protocol if needed.    MUSCULOSKELETAL: bedrest    CODE STATUS: FULL CODE    DISPOSITION: Pt requires monitoring in the MICU IMPRESSION:    Hypertensive emergency  Intraparenchymal hemorrhage  Acute hypercapnic respiratory failure with hypoxemia likely second to stroke/central sleep apnea  ZE not on CPAP  DESI? vs CKD      PLAN:    CNS: no sedatives, neurosurgery and neurology follow up, repeat CT head    HEENT:  Oral care    PULMONARY:  HOB @ 45 degrees, oxygen to keep pulse ox>92%, lasix 40 IV x 1, BPAP 12/6 prn and qhs    CARDIOVASCULAR: I<O, cardene drip, keep SBP<140, echo, serial cardiac enzymes    GI: GI prophylaxis   NPO speech adn swallow eval    RENAL:  F/u  lytes.  Correct as needed. accurate I/O, renal sono, urine lytes    INFECTIOUS DISEASE: no abx, f/u cultures    HEMATOLOGICAL:  DVT prophylaxis - compression stocking, s/p DDAVP and 2unit platelet    ENDOCRINE:  Follow up FS.  Insulin protocol if needed.    MUSCULOSKELETAL: bedrest    CODE STATUS: FULL CODE    DISPOSITION: Pt requires monitoring in the MICU IMPRESSION:    Intraparenchymal hemorrhage  Acute on chronic hypercapnic respiratory failure  Probable ZE +/- OHS  DESI? vs CKD      PLAN:    CNS: no sedatives, neurosurgery and neurology follow up, repeat CT head.  FU MS. [place] checks    HEENT:  Oral care    PULMONARY:  HOB @ 45 degrees, oxygen to keep pulse ox>90%, NIV for now and during sleep.  AVAPS  IPAP max 20 IPAP min 16 PEEP 8.      CARDIOVASCULAR:  I=O, BP control.      GI: GI prophylaxis   NPO speech and swallow eval    RENAL:  F/u  lytes.  Correct as needed. accurate I/O, renal Sono    INFECTIOUS DISEASE: no abx, f/u cultures    HEMATOLOGICAL:  DVT prophylaxis - compression stocking,    ENDOCRINE:  Follow up FS.  Insulin protocol if needed.    MUSCULOSKELETAL: bedrest    CODE STATUS: FULL CODE    DISPOSITION: Pt requires monitoring in the MICU

## 2019-04-03 NOTE — SWALLOW BEDSIDE ASSESSMENT ADULT - COMMENTS
dysphagia evaluation attempted. pt received lethargic, on venti mask. re consult upon increased arousal. SLP to follow up.

## 2019-04-03 NOTE — PROGRESS NOTE ADULT - SUBJECTIVE AND OBJECTIVE BOX
GRIS TIDWELL  MRN-[id number]      Current issues: 58y/o M BIBEMS last night, CT showing IPH. Pt seen and examined at bedside. Step-daughter at bedside, states that at time of incident, pt woke from sleep and went to the bathroom and was found down. States that pt was moving all extremities in the ED last night.     HPI:  60 y/o M with hx of HTN, DM, CAD, Sleep Apnea BIBEMS for altered mental status and aphasia. At presentation patient was found to be aphasic and some right side weakness. He was not following the commands so wife called the EMS. He was completely fine at 3:00 pm today. Wife herd the noise upstairs and found him on the ground unresponsive. So she called an EMS.     In the ED stroke code was called and NIH SS was 15 with right sided weakness. The CT scan showed the left intra parenchymal bleed with the extend into the ventricle associated with the midline shift. Pt received platelets and DDAVP for the aspirin reversal. The blood pressure was in 200s . Pt had jhonatan and on nicardin drip for the close bp control. (2019 22:24)      Allergies    No Known Allergies      MEDICATIONS  (STANDING):  atorvastatin Oral Tab/Cap - Peds 40 milliGRAM(s) Oral daily  chlorhexidine 2% Cloths 1 Application(s) Topical daily  chlorhexidine 4% Liquid 1 Application(s) Topical every 12 hours  niCARdipine Infusion 5 mG/Hr (25 mL/Hr) IV Continuous <Continuous>  pantoprazole    Tablet 40 milliGRAM(s) Oral before breakfast    MEDICATIONS  (PRN):    Vital Signs Last 24 Hrs  T(C): 36.1 (2019 04:23), Max: 36.1 (2019 21:56)  T(F): 97 (2019 04:23), Max: 97 (2019 21:56)  HR: 74 (2019 10:45) (60 - 97)  BP: 130/45 (2019 23:14) (130/45 - 224/107)  BP(mean): --  RR: 18 (2019 07:30) (17 - 26)  SpO2: 96% (2019 07:30) (94% - 99%)    I&O's Detail    04-    144  |  106  |  44<H>  ----------------------------<  204<H>  4.6   |  25  |  2.4<H>    Ca    7.8<L>      2019 08:04    TPro  6.8  /  Alb  3.9  /  TBili  <0.2  /  DBili  x   /  AST  13  /  ALT  13  /  AlkPhos  94  -                          11.7   6.87  )-----------( 238      ( 2019 08:04 )             37.4     RADIOLOGY    < from: CT Head No Cont (19 @ 08:31) >    IMPRESSION:     1.  When compared to previous study dated 2019 there is stable left   basal ganglia intraparenchymal hematoma likely hypertensive bleed.    2.  Follow-up as clinically indicated.      Exam:  AAO to Person, Place, . Verbal function intact,  tongue midline, facial motions symmetric  PERRLA, EOMI  Pronator Drift: slight right drift   Motor: MAEx4, 4/5 strength RUE, 5/5 strength LUE and b/l LE       Plan:  - Continue medical management  - Neuro checks q1h  - Maintain BP, keep normotensive   - Don't let pt become hyponatremic  - Will d/w attending.               Home Medications:  aspirin 81 mg oral tablet: 1 tab(s) orally once a day (2019 23:42)  Cardizem  mg/24 hours oral capsule, extended release: 1 cap(s) orally once a day (2019 23:42)  Flomax 0.4 mg oral capsule: 1 cap(s) orally once a day (2019 23:42)  glyBURIDE micronized 6 mg oral tablet: 1 tab(s) orally once a day (2019 23:42)  Hyzaar 100 mg-25 mg oral tablet: 1 tab(s) orally once a day (2019 23:42)  Lantus 100 units/mL subcutaneous solution: 30 milligram(s) subcutaneous once a day (at bedtime) (2019 23:42)  Lipitor 40 mg oral tablet: 1 tab(s) orally once a day (2019 23:42)  meloxicam 15 mg oral tablet: 1 tab(s) orally once a day (2019 23:42)    PAST MEDICAL & SURGICAL HISTORY:  Gout  Morbidly obese  Gout  Hypertension  Diabetes mellitus  S/P tonsillectomy

## 2019-04-03 NOTE — CONSULT NOTE ADULT - SUBJECTIVE AND OBJECTIVE BOX
Patient is a 59y old  Male who presents with a chief complaint of Right sided weakness (2019 22:24)      HPI:    60 y/o M with hx of HTN, DM, CAD, Sleep Apnea BIBEMS for altered mental status and aphasia. At presentation patient was found to be aphasic and some right side weakness. He was not following the commands so wife called the EMS. He was completely fine at 3:00 pm today. Wife herd the noise upstairs and found him on the ground unresponsive. So she called an EMS.     In the ED stroke code was called and NIH SS was 15 with right sided weakness. The CT scan showed the left intra parenchymal bleed with the extend into the ventricle associated with the midline shift. Pt received 2 unit platelets and DDAVP for the aspirin reversal. The blood pressure was in 200s . Pt had jhonatan and on nicardipine drip for the close bp control. (2019 22:24)      PAST MEDICAL & SURGICAL HISTORY:  Gout  Morbidly obese  Gout  Hypertension  Diabetes mellitus  S/P tonsillectomy      SOCIAL HX:   Smoking    no                     ETOH    no                           FAMILY HISTORY:  FH: CABG (coronary artery bypass surgery)  FH: stroke  :  No known cardiovacular family hisotry     ROS:  See HPI     Allergies    No Known Allergies    Intolerances    PHYSICAL EXAM    ICU Vital Signs Last 24 Hrs  T(C): 36.1 (2019 04:23), Max: 36.1 (2019 21:56)  T(F): 97 (2019 04:23), Max: 97 (2019 21:56)  HR: 69 (2019 06:21) (60 - 97)  BP: 130/45 (2019 23:14) (130/45 - 224/107)  ABP: 153/65 (2019 06:21) (120/60 - 212/76)  RR: 18 (2019 06:21) (18 - 26)  SpO2: 96% (2019 06:33) (94% - 99%)      General: In NAD   HEENT:  TIMMY              Lymphatic system: No cervical LN   Lungs: Bilateral BS CTA no wheeze no rhonchi  Cardiovascular: Regular  Gastrointestinal: Soft, Positive BS  Musculoskeletal: No clubbing.  Moves all extremities.  Full range of motion mild pitting edema b/l  Skin: Warm.  Intact  Neurological: dysarthria, 4/5 strength on right, 5/5 on left, alert and awake to verbal stimuli, a/ox2        LABS:                          13.5   7.02  )-----------( 223      ( 2019 19:45 )             42.7                                                   143  |  105  |  46<H>  ----------------------------<  200<H>  4.2   |  26  |  2.5<H>    Ca    8.3<L>      2019 19:45    TPro  6.8  /  Alb  3.9  /  TBili  <0.2  /  DBili  x   /  AST  13  /  ALT  13  /  AlkPhos  94        PT/INR - ( 2019 19:45 )   PT: 10.10 sec;   INR: 0.88 ratio       < from: CT Brain Stroke Protocol (19 @ 20:01) >  Impression:      Acuteleft frontotemporal intraparenchymal hemorrhage with associated   effacement of the left lateral ventricle and 2 mm right midline shift.        Dr. Matt Sandoval discussed preliminary findings with JANA CHÁVEZ PA   on 2019 8:12 PM with readback.      < end of copied text >    PTT - ( 2019 19:45 )  PTT:31.8 sec                                       Urinalysis Basic - ( 2019 21:57 )    Color: Yellow / Appearance: Clear / S.020 / pH: x  Gluc: x / Ketone: Negative  / Bili: Negative / Urobili: 0.2 mg/dL   Blood: x / Protein: >=300 mg/dL / Nitrite: Negative   Leuk Esterase: Negative / RBC: 1-2 /HPF / WBC x   Sq Epi: x / Non Sq Epi: x / Bacteria: x        CARDIAC MARKERS ( 2019 19:45 )  x     / 0.02 ng/mL / x     / x     / x                                                LIVER FUNCTIONS - ( 2019 19:45 )  Alb: 3.9 g/dL / Pro: 6.8 g/dL / ALK PHOS: 94 U/L / ALT: 13 U/L / AST: 13 U/L / GGT: x                                                                                                                                   ABG - ( 2019 06:05 )  pH, Arterial: 7.27  pH, Blood: x     /  pCO2: 64    /  pO2: 207   / HCO3: 29    / Base Excess: 1.0   /  SaO2: 99          MEDICATIONS  (STANDING):  atorvastatin Oral Tab/Cap - Peds 40 milliGRAM(s) Oral daily  chlorhexidine 2% Cloths 1 Application(s) Topical daily  chlorhexidine 4% Liquid 1 Application(s) Topical every 12 hours  niCARdipine Infusion 5 mG/Hr (25 mL/Hr) IV Continuous <Continuous>  pantoprazole    Tablet 40 milliGRAM(s) Oral before breakfast    MEDICATIONS  (PRN):

## 2019-04-03 NOTE — ED ADULT NURSE REASSESSMENT NOTE - NS ED NURSE REASSESS COMMENT FT1
patient at bedside has right sided weakness from stroke that improved. patient has no drift to right upper extremities at this time. patient has + ROM of all extremities at this time. However patient has periods of confusion. MD Khanna on call #[phone] made aware. as per md no intervention at this time, ct to be done in the morning. Vitals wnl. will continue to assess and monitor

## 2019-04-03 NOTE — PHYSICAL THERAPY INITIAL EVALUATION ADULT - SPECIFY REASON(S)
1119 am Will hold PT at this time , currently placed on BIPAP (50%)  due to desaturation; spoke at length with stepdaughter at bedside regarding PT plan of care, Will f/u.

## 2019-04-04 LAB
ALBUMIN SERPL ELPH-MCNC: 3.7 G/DL — SIGNIFICANT CHANGE UP (ref 3.5–5.2)
ALP SERPL-CCNC: 85 U/L — SIGNIFICANT CHANGE UP (ref 30–115)
ALT FLD-CCNC: 12 U/L — SIGNIFICANT CHANGE UP (ref 0–41)
ANION GAP SERPL CALC-SCNC: 14 MMOL/L — SIGNIFICANT CHANGE UP (ref 7–14)
ANION GAP SERPL CALC-SCNC: 16 MMOL/L — HIGH (ref 7–14)
APPEARANCE UR: ABNORMAL
AST SERPL-CCNC: 13 U/L — SIGNIFICANT CHANGE UP (ref 0–41)
BASE EXCESS BLDA CALC-SCNC: -0.1 MMOL/L — SIGNIFICANT CHANGE UP (ref -2–2)
BASE EXCESS BLDA CALC-SCNC: 1.4 MMOL/L — SIGNIFICANT CHANGE UP (ref -2–2)
BASOPHILS # BLD AUTO: 0.03 K/UL — SIGNIFICANT CHANGE UP (ref 0–0.2)
BASOPHILS NFR BLD AUTO: 0.3 % — SIGNIFICANT CHANGE UP (ref 0–1)
BILIRUB SERPL-MCNC: 0.4 MG/DL — SIGNIFICANT CHANGE UP (ref 0.2–1.2)
BILIRUB UR-MCNC: ABNORMAL
BUN SERPL-MCNC: 53 MG/DL — HIGH (ref 10–20)
BUN SERPL-MCNC: 62 MG/DL — CRITICAL HIGH (ref 10–20)
CALCIUM SERPL-MCNC: 8.2 MG/DL — LOW (ref 8.5–10.1)
CALCIUM SERPL-MCNC: 8.5 MG/DL — SIGNIFICANT CHANGE UP (ref 8.5–10.1)
CHLORIDE SERPL-SCNC: 103 MMOL/L — SIGNIFICANT CHANGE UP (ref 98–110)
CHLORIDE SERPL-SCNC: 105 MMOL/L — SIGNIFICANT CHANGE UP (ref 98–110)
CO2 SERPL-SCNC: 23 MMOL/L — SIGNIFICANT CHANGE UP (ref 17–32)
CO2 SERPL-SCNC: 25 MMOL/L — SIGNIFICANT CHANGE UP (ref 17–32)
COLOR SPEC: YELLOW — SIGNIFICANT CHANGE UP
CREAT SERPL-MCNC: 3.1 MG/DL — HIGH (ref 0.7–1.5)
CREAT SERPL-MCNC: 3.7 MG/DL — HIGH (ref 0.7–1.5)
DIFF PNL FLD: ABNORMAL
EOSINOPHIL # BLD AUTO: 0.07 K/UL — SIGNIFICANT CHANGE UP (ref 0–0.7)
EOSINOPHIL NFR BLD AUTO: 0.8 % — SIGNIFICANT CHANGE UP (ref 0–8)
EPI CELLS # UR: ABNORMAL /HPF
GAS PNL BLDA: SIGNIFICANT CHANGE UP
GLUCOSE BLDC GLUCOMTR-MCNC: 212 MG/DL — HIGH (ref 70–99)
GLUCOSE BLDC GLUCOMTR-MCNC: 229 MG/DL — HIGH (ref 70–99)
GLUCOSE BLDC GLUCOMTR-MCNC: 248 MG/DL — HIGH (ref 70–99)
GLUCOSE SERPL-MCNC: 221 MG/DL — HIGH (ref 70–99)
GLUCOSE SERPL-MCNC: 267 MG/DL — HIGH (ref 70–99)
GLUCOSE UR QL: 100 MG/DL
HCO3 BLDA-SCNC: 26 MMOL/L — SIGNIFICANT CHANGE UP (ref 21–29)
HCO3 BLDA-SCNC: 27 MMOL/L — SIGNIFICANT CHANGE UP (ref 23–27)
HCT VFR BLD CALC: 37.6 % — LOW (ref 42–52)
HGB BLD-MCNC: 11.6 G/DL — LOW (ref 14–18)
HOROWITZ INDEX BLDA+IHG-RTO: 50 — SIGNIFICANT CHANGE UP
HOROWITZ INDEX BLDA+IHG-RTO: 50 — SIGNIFICANT CHANGE UP
IMM GRANULOCYTES NFR BLD AUTO: 0.2 % — SIGNIFICANT CHANGE UP (ref 0.1–0.3)
KETONES UR-MCNC: NEGATIVE — SIGNIFICANT CHANGE UP
LEUKOCYTE ESTERASE UR-ACNC: NEGATIVE — SIGNIFICANT CHANGE UP
LYMPHOCYTES # BLD AUTO: 0.7 K/UL — LOW (ref 1.2–3.4)
LYMPHOCYTES # BLD AUTO: 8 % — LOW (ref 20.5–51.1)
MCHC RBC-ENTMCNC: 26.2 PG — LOW (ref 27–31)
MCHC RBC-ENTMCNC: 30.9 G/DL — LOW (ref 32–37)
MCV RBC AUTO: 84.9 FL — SIGNIFICANT CHANGE UP (ref 80–94)
MONOCYTES # BLD AUTO: 0.61 K/UL — HIGH (ref 0.1–0.6)
MONOCYTES NFR BLD AUTO: 6.9 % — SIGNIFICANT CHANGE UP (ref 1.7–9.3)
NEUTROPHILS # BLD AUTO: 7.35 K/UL — HIGH (ref 1.4–6.5)
NEUTROPHILS NFR BLD AUTO: 83.8 % — HIGH (ref 42.2–75.2)
NITRITE UR-MCNC: NEGATIVE — SIGNIFICANT CHANGE UP
NRBC # BLD: 0 /100 WBCS — SIGNIFICANT CHANGE UP (ref 0–0)
NT-PROBNP SERPL-SCNC: 751 PG/ML — HIGH (ref 0–300)
PCO2 BLDA: 46 MMHG — HIGH (ref 38–42)
PCO2 BLDA: 48 MMHG — HIGH (ref 38–42)
PH BLDA: 7.36 — LOW (ref 7.38–7.42)
PH BLDA: 7.36 — LOW (ref 7.38–7.42)
PH UR: 5.5 — SIGNIFICANT CHANGE UP (ref 5–8)
PLATELET # BLD AUTO: 235 K/UL — SIGNIFICANT CHANGE UP (ref 130–400)
PO2 BLDA: 58 MMHG — LOW (ref 78–95)
PO2 BLDA: 87 MMHG — SIGNIFICANT CHANGE UP (ref 78–95)
POTASSIUM SERPL-MCNC: 4.3 MMOL/L — SIGNIFICANT CHANGE UP (ref 3.5–5)
POTASSIUM SERPL-MCNC: 4.3 MMOL/L — SIGNIFICANT CHANGE UP (ref 3.5–5)
POTASSIUM SERPL-SCNC: 4.3 MMOL/L — SIGNIFICANT CHANGE UP (ref 3.5–5)
POTASSIUM SERPL-SCNC: 4.3 MMOL/L — SIGNIFICANT CHANGE UP (ref 3.5–5)
PROT SERPL-MCNC: 6.4 G/DL — SIGNIFICANT CHANGE UP (ref 6–8)
PROT UR-MCNC: >=300 MG/DL
RBC # BLD: 4.43 M/UL — LOW (ref 4.7–6.1)
RBC # FLD: 14.3 % — SIGNIFICANT CHANGE UP (ref 11.5–14.5)
SAO2 % BLDA: 92 % — LOW (ref 94–98)
SAO2 % BLDA: 96 % — SIGNIFICANT CHANGE UP (ref 92–96)
SODIUM SERPL-SCNC: 142 MMOL/L — SIGNIFICANT CHANGE UP (ref 135–146)
SODIUM SERPL-SCNC: 144 MMOL/L — SIGNIFICANT CHANGE UP (ref 135–146)
SP GR SPEC: 1.02 — SIGNIFICANT CHANGE UP (ref 1.01–1.03)
TROPONIN T SERPL-MCNC: 0.07 NG/ML — CRITICAL HIGH
UROBILINOGEN FLD QL: 1 MG/DL (ref 0.2–0.2)
WBC # BLD: 8.78 K/UL — SIGNIFICANT CHANGE UP (ref 4.8–10.8)
WBC # FLD AUTO: 8.78 K/UL — SIGNIFICANT CHANGE UP (ref 4.8–10.8)

## 2019-04-04 PROCEDURE — 71045 X-RAY EXAM CHEST 1 VIEW: CPT | Mod: 26

## 2019-04-04 PROCEDURE — 93306 TTE W/DOPPLER COMPLETE: CPT | Mod: 26

## 2019-04-04 PROCEDURE — 99231 SBSQ HOSP IP/OBS SF/LOW 25: CPT

## 2019-04-04 RX ORDER — INSULIN LISPRO 100/ML
5 VIAL (ML) SUBCUTANEOUS
Qty: 0 | Refills: 0 | Status: DISCONTINUED | OUTPATIENT
Start: 2019-04-04 | End: 2019-04-09

## 2019-04-04 RX ORDER — DEXTROSE 50 % IN WATER 50 %
25 SYRINGE (ML) INTRAVENOUS ONCE
Qty: 0 | Refills: 0 | Status: DISCONTINUED | OUTPATIENT
Start: 2019-04-04 | End: 2019-04-14

## 2019-04-04 RX ORDER — INSULIN GLARGINE 100 [IU]/ML
16 INJECTION, SOLUTION SUBCUTANEOUS EVERY MORNING
Qty: 0 | Refills: 0 | Status: DISCONTINUED | OUTPATIENT
Start: 2019-04-04 | End: 2019-04-06

## 2019-04-04 RX ORDER — INFLUENZA VIRUS VACCINE 15; 15; 15; 15 UG/.5ML; UG/.5ML; UG/.5ML; UG/.5ML
0.5 SUSPENSION INTRAMUSCULAR ONCE
Qty: 0 | Refills: 0 | Status: COMPLETED | OUTPATIENT
Start: 2019-04-04 | End: 2019-04-04

## 2019-04-04 RX ORDER — DEXTROSE 50 % IN WATER 50 %
12.5 SYRINGE (ML) INTRAVENOUS ONCE
Qty: 0 | Refills: 0 | Status: DISCONTINUED | OUTPATIENT
Start: 2019-04-04 | End: 2019-04-14

## 2019-04-04 RX ORDER — DEXTROSE 50 % IN WATER 50 %
15 SYRINGE (ML) INTRAVENOUS ONCE
Qty: 0 | Refills: 0 | Status: DISCONTINUED | OUTPATIENT
Start: 2019-04-04 | End: 2019-04-14

## 2019-04-04 RX ORDER — FUROSEMIDE 40 MG
60 TABLET ORAL ONCE
Qty: 0 | Refills: 0 | Status: COMPLETED | OUTPATIENT
Start: 2019-04-04 | End: 2019-04-04

## 2019-04-04 RX ORDER — HYDRALAZINE HCL 50 MG
25 TABLET ORAL EVERY 6 HOURS
Qty: 0 | Refills: 0 | Status: DISCONTINUED | OUTPATIENT
Start: 2019-04-04 | End: 2019-04-05

## 2019-04-04 RX ORDER — CHOLECALCIFEROL (VITAMIN D3) 125 MCG
2000 CAPSULE ORAL DAILY
Qty: 0 | Refills: 0 | Status: DISCONTINUED | OUTPATIENT
Start: 2019-04-04 | End: 2019-04-29

## 2019-04-04 RX ORDER — GLUCAGON INJECTION, SOLUTION 0.5 MG/.1ML
1 INJECTION, SOLUTION SUBCUTANEOUS ONCE
Qty: 0 | Refills: 0 | Status: DISCONTINUED | OUTPATIENT
Start: 2019-04-04 | End: 2019-04-14

## 2019-04-04 RX ORDER — FUROSEMIDE 40 MG
80 TABLET ORAL ONCE
Qty: 0 | Refills: 0 | Status: COMPLETED | OUTPATIENT
Start: 2019-04-04 | End: 2019-04-04

## 2019-04-04 RX ORDER — INSULIN LISPRO 100/ML
VIAL (ML) SUBCUTANEOUS
Qty: 0 | Refills: 0 | Status: DISCONTINUED | OUTPATIENT
Start: 2019-04-04 | End: 2019-04-12

## 2019-04-04 RX ORDER — SODIUM CHLORIDE 9 MG/ML
1000 INJECTION, SOLUTION INTRAVENOUS
Qty: 0 | Refills: 0 | Status: DISCONTINUED | OUTPATIENT
Start: 2019-04-04 | End: 2019-04-14

## 2019-04-04 RX ORDER — SODIUM CHLORIDE 9 MG/ML
250 INJECTION INTRAMUSCULAR; INTRAVENOUS; SUBCUTANEOUS ONCE
Qty: 0 | Refills: 0 | Status: COMPLETED | OUTPATIENT
Start: 2019-04-04 | End: 2019-04-04

## 2019-04-04 RX ORDER — ACETAMINOPHEN 500 MG
650 TABLET ORAL EVERY 6 HOURS
Qty: 0 | Refills: 0 | Status: DISCONTINUED | OUTPATIENT
Start: 2019-04-04 | End: 2019-04-29

## 2019-04-04 RX ORDER — LABETALOL HCL 100 MG
200 TABLET ORAL
Qty: 0 | Refills: 0 | Status: DISCONTINUED | OUTPATIENT
Start: 2019-04-04 | End: 2019-04-05

## 2019-04-04 RX ORDER — INSULIN LISPRO 100/ML
6 VIAL (ML) SUBCUTANEOUS ONCE
Qty: 0 | Refills: 0 | Status: COMPLETED | OUTPATIENT
Start: 2019-04-04 | End: 2019-04-04

## 2019-04-04 RX ORDER — HEPARIN SODIUM 5000 [USP'U]/ML
5000 INJECTION INTRAVENOUS; SUBCUTANEOUS EVERY 8 HOURS
Qty: 0 | Refills: 0 | Status: DISCONTINUED | OUTPATIENT
Start: 2019-04-04 | End: 2019-04-13

## 2019-04-04 RX ORDER — HYDRALAZINE HCL 50 MG
50 TABLET ORAL EVERY 8 HOURS
Qty: 0 | Refills: 0 | Status: DISCONTINUED | OUTPATIENT
Start: 2019-04-04 | End: 2019-04-04

## 2019-04-04 RX ADMIN — Medication 5 UNIT(S): at 17:32

## 2019-04-04 RX ADMIN — Medication 60 MILLIGRAM(S): at 11:15

## 2019-04-04 RX ADMIN — SODIUM CHLORIDE 500 MILLILITER(S): 9 INJECTION INTRAMUSCULAR; INTRAVENOUS; SUBCUTANEOUS at 17:33

## 2019-04-04 RX ADMIN — Medication 2000 UNIT(S): at 13:06

## 2019-04-04 RX ADMIN — ATORVASTATIN CALCIUM 40 MILLIGRAM(S): 80 TABLET, FILM COATED ORAL at 21:29

## 2019-04-04 RX ADMIN — Medication 200 MILLIGRAM(S): at 11:15

## 2019-04-04 RX ADMIN — Medication 80 MILLIGRAM(S): at 23:22

## 2019-04-04 RX ADMIN — PANTOPRAZOLE SODIUM 40 MILLIGRAM(S): 20 TABLET, DELAYED RELEASE ORAL at 06:44

## 2019-04-04 RX ADMIN — Medication 25 MILLIGRAM(S): at 17:32

## 2019-04-04 RX ADMIN — CHLORHEXIDINE GLUCONATE 1 APPLICATION(S): 213 SOLUTION TOPICAL at 06:44

## 2019-04-04 RX ADMIN — Medication 200 MILLIGRAM(S): at 17:32

## 2019-04-04 RX ADMIN — HEPARIN SODIUM 5000 UNIT(S): 5000 INJECTION INTRAVENOUS; SUBCUTANEOUS at 21:28

## 2019-04-04 RX ADMIN — Medication 2: at 17:31

## 2019-04-04 NOTE — SWALLOW BEDSIDE ASSESSMENT ADULT - CONSISTENCIES ADMINISTERED
3oz/nectar thick thiago/Alisonoz 2oz/soft solid thin not trialed 2' gen weakness, suspected delayed swallow

## 2019-04-04 NOTE — CONSULT NOTE ADULT - SUBJECTIVE AND OBJECTIVE BOX
NEPHROLOGY CONSULTATION NOTE    58 y/o M with hx of HTN, DM, CAD, Sleep Apnea BIBEMS on  for altered mental status and aphasia. At presentation patient was found to be aphasic and some right side weakness. He was not following the commands so wife called the EMS.     In the ED stroke code was called and NIH SS was 15 with right sided weakness. The CT scan showed the left intra parenchymal bleed with the extend into the ventricle associated with the midline shift. Pt received platelets and DDAVP for the aspirin reversal. The blood pressure was in 200s . Pt had a line and on nicardipine drip for the close bp control.     seen by neurosurg no surgical intervention  Cr when presented was 2.4. Baseline unavailable but family does report was told kidney number was elevated"  Has longstanding diabetes, proteinuria on UA,    Today Cr acutely lilian to 3.1 UOP has dropped  no response to 60 mg IV lasix    Hx obtained from records confirmed w/ family at bedside PT is a bt confused and has been since CVA     CXR w/  bilateral lower lobe   opacities. No evidence of pneumothorax.      PAST MEDICAL & SURGICAL HISTORY:  Gout  Morbidly obese  Gout  Hypertension  Diabetes mellitus  S/P tonsillectomy    Allergies:  No Known Allergies    Home Medications Reviewed  Hospital Medications:   MEDICATIONS  (STANDING):  atorvastatin Oral Tab/Cap - Peds 40 milliGRAM(s) Oral daily  chlorhexidine 2% Cloths 1 Application(s) Topical daily  chlorhexidine 4% Liquid 1 Application(s) Topical every 12 hours  cholecalciferol 2000 Unit(s) Oral daily  dextrose 5%. 1000 milliLiter(s) (50 mL/Hr) IV Continuous <Continuous>  dextrose 50% Injectable 12.5 Gram(s) IV Push once  dextrose 50% Injectable 25 Gram(s) IV Push once  dextrose 50% Injectable 25 Gram(s) IV Push once  insulin glargine Injectable (LANTUS) 16 Unit(s) SubCutaneous every morning  insulin lispro (HumaLOG) corrective regimen sliding scale   SubCutaneous three times a day before meals  insulin lispro Injectable (HumaLOG) 5 Unit(s) SubCutaneous three times a day before meals  labetalol 200 milliGRAM(s) Oral two times a day  niCARdipine Infusion 5 mG/Hr (25 mL/Hr) IV Continuous <Continuous>  pantoprazole    Tablet 40 milliGRAM(s) Oral before breakfast  sodium chloride 0.9% Bolus 250 milliLiter(s) IV Bolus once      SOCIAL HISTORY:  Denies ETOH,Smoking,   FAMILY HISTORY:  FH: CABG (coronary artery bypass surgery)  FH: stroke        REVIEW OF SYSTEMS:   unable to obtain    VITALS:  T(F): 97.6 (19 @ 04:00), Max: 99.1 (19 @ 20:00)  HR: 58 (19 @ 11:30)  BP: 122/57 (19 @ 11:00)  RR: 16 (19 @ 13:00)  SpO2: 93% (19 @ 11:30)     @ 07:  -   @ 07:00  --------------------------------------------------------  IN: 1179 mL / OUT: 650 mL / NET: 529 mL     @ 07: @ 12:36  --------------------------------------------------------  IN: 435 mL / OUT: 0 mL / NET: 435 mL            I&O's Detail    2019 07:  -  2019 07:00  --------------------------------------------------------  IN:    Labetalol Infusion: 54 mL    niCARdipine Infusion: 600 mL    niCARdipine Infusion: 525 mL  Total IN: 1179 mL    OUT:    Voided: 650 mL  Total OUT: 650 mL    Total NET: 529 mL      2019 07:  -  2019 12:36  --------------------------------------------------------  IN:    Labetalol Infusion: 60 mL    niCARdipine Infusion: 375 mL  Total IN: 435 mL    OUT:  Total OUT: 0 mL    Total NET: 435 mL            PHYSICAL EXAM:  Constitutional: mild resp distress,   HEENT: anicteric sclera, oropharynx clear, MMM  Neck: No JVD  Respiratory: CTAB, no wheezes, rales or rhonchi  Cardiovascular: S1, S2, RRR  Gastrointestinal: BS+, soft, NT/ND  Extremities: No cyanosis or clubbing. No peripheral edema  Neurological: awake and alert, but diffculty answering questions some speech slurring   Psychiatric: Normal mood, normal affect  : No CVA tenderness. No khan.   Skin: No rashes  Vascular Access:    LABS:      144  |  105  |  53<H>  ----------------------------<  221<H>  4.3   |  25  |  3.1<H>    Ca    8.2<L>      2019 04:20    TPro  6.4  /  Alb  3.7  /  TBili  0.4  /  DBili      /  AST  13  /  ALT  12  /  AlkPhos  85      Creatinine Trend: 3.1 <--, 2.4 <--, 2.5 <--                        11.6   8.78  )-----------( 235      ( 2019 04:20 )             37.6     Urine Studies:  Urinalysis Basic - ( 2019 21:57 )    Color: Yellow / Appearance: Clear / S.020 / pH:   Gluc:  / Ketone: Negative  / Bili: Negative / Urobili: 0.2 mg/dL   Blood:  / Protein: >=300 mg/dL / Nitrite: Negative   Leuk Esterase: Negative / RBC: 1-2 /HPF / WBC    Sq Epi:  / Non Sq Epi:  / Bacteria:                 RADIOLOGY & ADDITIONAL STUDIES:

## 2019-04-04 NOTE — PROGRESS NOTE ADULT - SUBJECTIVE AND OBJECTIVE BOX
Patient is a 59y old  Male who presents with a chief complaint of Right sided weakness (2019 04:48)        Over Night Events:  Improved MS.  Follows simple commands.  Tolerated NIV last night.  On 5 liters         ROS:  See HPI    PHYSICAL EXAM    ICU Vital Signs Last 24 Hrs  T(C): 36.4 (2019 04:00), Max: 37.3 (2019 20:00)  T(F): 97.6 (2019 04:00), Max: 99.1 (2019 20:00)  HR: 52 (2019 09:15) (52 - 76)  BP: 112/47 (2019 09:00) (92/49 - 153/54)  BP(mean): 60 (2019 09:00) (54 - 108)  ABP: 138/70 (2019 09:15) (106/60 - 180/66)  ABP(mean): 90 (2019 09:15) (68 - 104)  RR: 16 (2019 13:00) (16 - 16)  SpO2: 94% (2019 09:15) (90% - 100%)      General: In NAD   HEENT: TIMMY             Lymphatic system: No cervical LN   Lungs: Bilateral BS  Cardiovascular: Regular   Gastrointestinal: Soft, Positive BS  Extremities: No clubbing.  Moves extremities.  Full Range of motion   Skin: Warm, intact  Neurological: No motor deficit       19 @ 07:  -  19 @ 07:00  --------------------------------------------------------  IN:    Labetalol Infusion: 54 mL    niCARdipine Infusion: 600 mL    niCARdipine Infusion: 525 mL  Total IN: 1179 mL    OUT:    Voided: 650 mL  Total OUT: 650 mL    Total NET: 529 mL      19 @ 07:  -  19 @ 09:24  --------------------------------------------------------  IN:    Labetalol Infusion: 60 mL    niCARdipine Infusion: 150 mL  Total IN: 210 mL    OUT:  Total OUT: 0 mL    Total NET: 210 mL          LABS:                            11.6   8.78  )-----------( 235      ( 2019 04:20 )             37.6                                                   144  |  105  |  53<H>  ----------------------------<  221<H>  4.3   |  25  |  3.1<H>      2019 04:20    144    |  105    |  53<H>  ----------------------------<  221<H>  4.3     |  25     |  3.1<H>  2019 08:04    144    |  106    |  44<H>  ----------------------------<  204<H>  4.6     |  25     |  2.4<H>    Ca    8.2<L>      2019 04:20  Ca    7.8<L>      2019 08:04    TPro  6.4    /  Alb  3.7    /  TBili  0.4    /  DBili  x      /  AST  13     /  ALT  12     /  AlkPhos  85     2019 04:20  TPro  6.8    /  Alb  3.9    /  TBili  <0.2   /  DBili  x      /  AST  13     /  ALT  13     /  AlkPhos  94     2019 19:45      Ca    8.2<L>      2019 04:20    TPro  6.4  /  Alb  3.7  /  TBili  0.4  /  DBili  x   /  AST  13  /  ALT  12  /  AlkPhos  85  04-04      PT/INR - ( 2019 19:45 )   PT: 10.10 sec;   INR: 0.88 ratio         PTT - ( 2019 19:45 )  PTT:31.8 sec                                       Urinalysis Basic - ( 2019 21:57 )    Color: Yellow / Appearance: Clear / S.020 / pH: x  Gluc: x / Ketone: Negative  / Bili: Negative / Urobili: 0.2 mg/dL   Blood: x / Protein: >=300 mg/dL / Nitrite: Negative   Leuk Esterase: Negative / RBC: 1-2 /HPF / WBC x   Sq Epi: x / Non Sq Epi: x / Bacteria: x        CARDIAC MARKERS ( 2019 11:42 )  x     / 0.03 ng/mL / x     / x     / x      CARDIAC MARKERS ( 2019 19:45 )  x     / 0.02 ng/mL / x     / x     / x                                                LIVER FUNCTIONS - ( 2019 04:20 )  Alb: 3.7 g/dL / Pro: 6.4 g/dL / ALK PHOS: 85 U/L / ALT: 12 U/L / AST: 13 U/L / GGT: x                                                  Culture - Urine (collected 2019 21:57)  Source: .Urine Clean Catch (Midstream)  Final Report (2019 21:59):    <10,000 CFU/mL Normal Urogenital Natali                                                                                       ABG - ( 2019 07:30 )  pH, Arterial: 7.36  pH, Blood: x     /  pCO2: 48    /  pO2: 58    / HCO3: 27    / Base Excess: 1.4   /  SaO2: 92                  MEDICATIONS  (STANDING):  atorvastatin Oral Tab/Cap - Peds 40 milliGRAM(s) Oral daily  chlorhexidine 2% Cloths 1 Application(s) Topical daily  chlorhexidine 4% Liquid 1 Application(s) Topical every 12 hours  labetalol Infusion 0.5 mG/Min (30 mL/Hr) IV Continuous <Continuous>  niCARdipine Infusion 5 mG/Hr (25 mL/Hr) IV Continuous <Continuous>  pantoprazole    Tablet 40 milliGRAM(s) Oral before breakfast    MEDICATIONS  (PRN):      Xrays:   Vascular congestion                                                                                  ECHO

## 2019-04-04 NOTE — PROGRESS NOTE ADULT - SUBJECTIVE AND OBJECTIVE BOX
Subjective: 59yMale with a pmhx of INTRAPARENCHYMAL HEMORRHAGE OF BRAIN  ^CVA  FH: CABG (coronary artery bypass surgery)  FH: stroke  Handoff  MEWS Score  Gout  Morbidly obese  Gout  Hypertension  Diabetes mellitus  Intraparenchymal hemorrhage of brain  Intraparenchymal hematoma of brain without loss of consciousness, unspecified laterality, subsequent encounter  S/P tonsillectomy  CVA  90+    s/p right side weakness with aphasia, with left intraparenchymal hemorrhage    PT seen and examined at [facility].e PT  denies headache, dizziness or blurry vision. PT on cardene drip for HTN.    Allergies    No Known Allergies    Intolerances        Vital Signs Last 24 Hrs  T(C): 36.4 (2019 04:00), Max: 37.3 (2019 20:00)  T(F): 97.6 (2019 04:00), Max: 99.1 (2019 20:00)  HR: 52 (2019 09:15) (52 - 76)  BP: 112/47 (2019 09:00) (92/49 - 153/54)  BP(mean): 60 (2019 09:00) (54 - 108)  RR: 16 (2019 13:00) (16 - 16)  SpO2: 94% (2019 09:15) (90% - 100%)      atorvastatin Oral Tab/Cap - Peds 40 milliGRAM(s) Oral daily  chlorhexidine 2% Cloths 1 Application(s) Topical daily  chlorhexidine 4% Liquid 1 Application(s) Topical every 12 hours  cholecalciferol 2000 Unit(s) Oral daily  furosemide   Injectable 60 milliGRAM(s) IV Push once  labetalol 200 milliGRAM(s) Oral two times a day  niCARdipine Infusion 5 mG/Hr IV Continuous <Continuous>  pantoprazole    Tablet 40 milliGRAM(s) Oral before breakfast        19 @ 07:  -  19 @ 07:00  --------------------------------------------------------  IN: 1179 mL / OUT: 650 mL / NET: 529 mL    19 @ 07:01  -  19 @ 11:13  --------------------------------------------------------  IN: 210 mL / OUT: 0 mL / NET: 210 mL          Exam:  AAO to Person, Place, . Verbal function intact,  tongue midline, facial motions symmetric  PERRLA, EOMI  Pronator Drift: slight right drift   Motor: MAEx4, 4/5 strength RUE, 5/5 strength LUE and b/l LE           CBC Full  -  ( 2019 04:20 )  WBC Count : 8.78 K/uL  RBC Count : 4.43 M/uL  Hemoglobin : 11.6 g/dL  Hematocrit : 37.6 %  Platelet Count - Automated : 235 K/uL  Mean Cell Volume : 84.9 fL  Mean Cell Hemoglobin : 26.2 pg  Mean Cell Hemoglobin Concentration : 30.9 g/dL  Auto Neutrophil # : 7.35 K/uL  Auto Lymphocyte # : 0.70 K/uL  Auto Monocyte # : 0.61 K/uL  Auto Eosinophil # : 0.07 K/uL  Auto Basophil # : 0.03 K/uL  Auto Neutrophil % : 83.8 %  Auto Lymphocyte % : 8.0 %  Auto Monocyte % : 6.9 %  Auto Eosinophil % : 0.8 %  Auto Basophil % : 0.3 %        144  |  105  |  53<H>  ----------------------------<  221<H>  4.3   |  25  |  3.1<H>    Ca    8.2<L>      2019 04:20    TPro  6.4  /  Alb  3.7  /  TBili  0.4  /  DBili  x   /  AST  13  /  ALT  12  /  AlkPhos  85  04-04    PT/INR - ( 2019 19:45 )   PT: 10.10 sec;   INR: 0.88 ratio         PTT - ( 2019 19:45 )  PTT:31.8 sec      Imaging:  < from: Xray Chest 1 View- PORTABLE-Routine (19 @ 05:36) >  Impression:      No significant interval changes in the pulmonary findings.      JORDI CHURCHILL M.D., ATTENDING RADIOLOGIST  This document has been electronically signed. 2019 10:40AM    < end of copied text >      < from: CT Head No Cont (19 @ 08:31) >    IMPRESSION:     1.  When compared to previous study dated 2019 there is stable left   basal ganglia intraparenchymal hematoma likely hypertensive bleed.    2.  Follow-up as clinically indicated.        SARAH STEPHENS M.D., ATTENDING RADIOLOGIST  This document has been electronically signed. Apr  3 2019  9:47AM    < end of copied text >        < from: CT Head No Cont (19 @ 21:58) >    IMPRESSION:    1.  Overall unchanged left basal ganglia intraparenchymal hematoma,   measuring approximately 3.9 cm in longest dimension.     2.  Stable 2 mm left to right midline shift.           LAMONT DON M.D., RESIDENT RADIOLOGIST  This document has been electronically signed.  SARAH STEPHENS M.D., ATTENDING RADIOLOGIST  This document has been electronically signed. 2019  8:21AM        < end of copied text >      Assessment/Plan: as above:  ok for baby ASA in 7-10 days  no acute neurosurgical intervention  ok for hep sub q  rpeeat head ct if change in mental status  follow up with DR. Ruth as outpatient in 10-14 days   d/w attending Subjective: 59yMale with a pmhx of INTRAPARENCHYMAL HEMORRHAGE OF BRAIN  ^CVA  FH: CABG (coronary artery bypass surgery)  FH: stroke  Handoff  MEWS Score  Gout  Morbidly obese  Gout  Hypertension  Diabetes mellitus  Intraparenchymal hemorrhage of brain  Intraparenchymal hematoma of brain without loss of consciousness, unspecified laterality, subsequent encounter  S/P tonsillectomy  CVA  90+    s/p right side weakness with aphasia, with left intraparenchymal hemorrhage    PT seen and examined at [facility].e PT  denies headache, dizziness or blurry vision. PT on cardene drip for HTN.    Allergies    No Known Allergies    Intolerances        Vital Signs Last 24 Hrs  T(C): 36.4 (2019 04:00), Max: 37.3 (2019 20:00)  T(F): 97.6 (2019 04:00), Max: 99.1 (2019 20:00)  HR: 52 (2019 09:15) (52 - 76)  BP: 112/47 (2019 09:00) (92/49 - 153/54)  BP(mean): 60 (2019 09:00) (54 - 108)  RR: 16 (2019 13:00) (16 - 16)  SpO2: 94% (2019 09:15) (90% - 100%)      atorvastatin Oral Tab/Cap - Peds 40 milliGRAM(s) Oral daily  chlorhexidine 2% Cloths 1 Application(s) Topical daily  chlorhexidine 4% Liquid 1 Application(s) Topical every 12 hours  cholecalciferol 2000 Unit(s) Oral daily  furosemide   Injectable 60 milliGRAM(s) IV Push once  labetalol 200 milliGRAM(s) Oral two times a day  niCARdipine Infusion 5 mG/Hr IV Continuous <Continuous>  pantoprazole    Tablet 40 milliGRAM(s) Oral before breakfast        19 @ 07:  -  19 @ 07:00  --------------------------------------------------------  IN: 1179 mL / OUT: 650 mL / NET: 529 mL    19 @ 07:01  -  19 @ 11:13  --------------------------------------------------------  IN: 210 mL / OUT: 0 mL / NET: 210 mL          Exam:  AAO to Person, Place, . Verbal function intact,  tongue midline, facial motions symmetric  PERRLA, EOMI  Pronator Drift: slight right drift   Motor: MAEx4, 4/5 strength RUE, 5/5 strength LUE and b/l LE           CBC Full  -  ( 2019 04:20 )  WBC Count : 8.78 K/uL  RBC Count : 4.43 M/uL  Hemoglobin : 11.6 g/dL  Hematocrit : 37.6 %  Platelet Count - Automated : 235 K/uL  Mean Cell Volume : 84.9 fL  Mean Cell Hemoglobin : 26.2 pg  Mean Cell Hemoglobin Concentration : 30.9 g/dL  Auto Neutrophil # : 7.35 K/uL  Auto Lymphocyte # : 0.70 K/uL  Auto Monocyte # : 0.61 K/uL  Auto Eosinophil # : 0.07 K/uL  Auto Basophil # : 0.03 K/uL  Auto Neutrophil % : 83.8 %  Auto Lymphocyte % : 8.0 %  Auto Monocyte % : 6.9 %  Auto Eosinophil % : 0.8 %  Auto Basophil % : 0.3 %        144  |  105  |  53<H>  ----------------------------<  221<H>  4.3   |  25  |  3.1<H>    Ca    8.2<L>      2019 04:20    TPro  6.4  /  Alb  3.7  /  TBili  0.4  /  DBili  x   /  AST  13  /  ALT  12  /  AlkPhos  85  04-04    PT/INR - ( 2019 19:45 )   PT: 10.10 sec;   INR: 0.88 ratio         PTT - ( 2019 19:45 )  PTT:31.8 sec      Imaging:  < from: Xray Chest 1 View- PORTABLE-Routine (19 @ 05:36) >  Impression:      No significant interval changes in the pulmonary findings.      JORDI CHURCHILL M.D., ATTENDING RADIOLOGIST  This document has been electronically signed. 2019 10:40AM    < end of copied text >      < from: CT Head No Cont (19 @ 08:31) >    IMPRESSION:     1.  When compared to previous study dated 2019 there is stable left   basal ganglia intraparenchymal hematoma likely hypertensive bleed.    2.  Follow-up as clinically indicated.        SARAH STEPHENS M.D., ATTENDING RADIOLOGIST  This document has been electronically signed. Apr  3 2019  9:47AM    < end of copied text >        < from: CT Head No Cont (19 @ 21:58) >    IMPRESSION:    1.  Overall unchanged left basal ganglia intraparenchymal hematoma,   measuring approximately 3.9 cm in longest dimension.     2.  Stable 2 mm left to right midline shift.           LAMONT DON M.D., RESIDENT RADIOLOGIST  This document has been electronically signed.  SARAH STEPHENS M.D., ATTENDING RADIOLOGIST  This document has been electronically signed. 2019  8:21AM        < end of copied text >      Assessment/Plan: as above:  ok for baby ASA in 7 days  no acute neurosurgical intervention  ok for hep sub q  rpeeat head ct if change in mental status  follow up with DR. Ruth as outpatient in 10-14 days   d/w attending

## 2019-04-04 NOTE — CONSULT NOTE ADULT - SUBJECTIVE AND OBJECTIVE BOX
Neurology Follow up note      Name  GRIS TIDWELL    HPI:  58 y/o M with hx of HTN, DM, CAD, Sleep Apnea BIBEMS for altered mental status and aphasia. At presentation patient was found to be aphasic and some right side weakness. He was not following the commands so wife called the EMS. He was completely fine at 3:00 pm today. Wife herd the noise upstairs and found him on the ground unresponsive. So she called an EMS.     In the ED stroke code was called and NIH SS was 15 with right sided weakness. The CT scan showed the left intra parenchymal bleed with the extend into the ventricle associated with the midline shift. Pt received platelets and DDAVP for the aspirin reversal. The blood pressure was in 200s . Pt had jhonatan and on nicardin drip for the close bp control. (02 Apr 2019 22:24)      Interval History -          Vital Signs Last 24 Hrs  T(C): 36.7 (04 Apr 2019 00:00), Max: 37.3 (03 Apr 2019 20:00)  T(F): 98 (04 Apr 2019 00:00), Max: 99.1 (03 Apr 2019 20:00)  HR: 62 (04 Apr 2019 03:15) (56 - 76)  BP: 111/46 (04 Apr 2019 03:00) (92/49 - 153/54)  BP(mean): 59 (04 Apr 2019 03:00) (54 - 108)  RR: 16 (03 Apr 2019 13:00) (16 - 19)  SpO2: 96% (04 Apr 2019 03:15) (90% - 100%)          Neurological Exam:   Mental status; Pt on Cpap  Awakens to voice  PERRLA FSC   Motor: Antigravity in UE's RUE 4/5 LUE 5/5  RLE 5/5 LLE 5/5  Sensory; grossly intact to light touch                       Medications  atorvastatin Oral Tab/Cap - Peds 40 milliGRAM(s) Oral daily  chlorhexidine 2% Cloths 1 Application(s) Topical daily  chlorhexidine 4% Liquid 1 Application(s) Topical every 12 hours  labetalol Infusion 0.5 mG/Min IV Continuous <Continuous>  niCARdipine Infusion 5 mG/Hr IV Continuous <Continuous>  pantoprazole    Tablet 40 milliGRAM(s) Oral before breakfast      Lab      Radiology

## 2019-04-04 NOTE — PROGRESS NOTE ADULT - ASSESSMENT
Mr. Cannon is a 58 yo male patient with PMH of DM, HTN, comes to the EDaltered mental status and aphasia. At presentation patient was found to be aphasic and some right side weakness    # Left sided Frontotemporal bleed with intraparenchymal hemorrhagic associated with left ventricular bleed   - Neurochecks q1h   - repeat CT scan tomorrow in the morning  - BP control with the Nicardin   - Keep SBP < 140   - Neuro surgery and neurology follow up   - NPO  - PT/OT  - Speech and swallow evaluation   - Hold Aspirin     # DM  - Lantus 15 units daily as pt is NPO  - Start Lispro once feeding start    # HTN  - On Nicardin drip   - Hold on Cardizem and Hyzaar     # DLD  - Lipitor     # DVT ppx  - SCD    # GI ppx  - Protonix Mr. Cannon is a 60 yo male patient with PMH of DM, HTN, comes to the ED on 4/3 with altered mental status and aphasia. At presentation patient was found to be aphasic and minimal right side weakness. In ED NIHSS was 15    # Left sided Frontotemporal bleed with intraparenchymal hemorrhagic associated with left ventricular bleed   - Evaluated by neurology and neurosurgery: medical management, no acute neurosurgical intervention.  - Neurosurgery: ok for baby ASA in 7-10 days, ok for hep sub q  - CT scan on 4/3: Overall unchanged left basal ganglia intraparenchymal hematoma,   measuring approximately 3.9 cm in longest dimension with Stable 2 mm left to right midline shift.     # Hypertensive emergency  - SBP still > 140 with maximal dose of nicardipine drip  - Labetalol added overnight with control of BP  - Will discontinue labetalol drip today and start labetalol oral in addition to one dose of lasix 60 mg IV daily.  - TTE pending, and CHEETAH pending    # DESI versus CKD?  - No previous history of known kidney disease  - Creatinine 3.1, patient oligiuric  - Will insert khan catheter for accurate In/out.  - Will consult nephrology service    # DM  - Lantus 15 units daily as pt is NPO  - Will start Lispro as feeding started    # DLD  - Lipitor     # DVT ppx  - SCD    # GI ppx  - Protonix

## 2019-04-04 NOTE — SWALLOW BEDSIDE ASSESSMENT ADULT - COMMENTS
Pt seen by  yesterday 4/3 w/ reccs for NPO 2' compromised respiratory status. Pt on O2 nasal cannula today started on po diet per MD, RN reported coughing/choking w/ thin liquids +toleration w/o overt s/s of penetration/aspiration w/ puree +toleration w/o overt s/s of penetration/aspiration w/ soft

## 2019-04-04 NOTE — PROGRESS NOTE ADULT - ATTENDING COMMENTS
Pt seen, history reviewed. Follow up CT scan stable. Restart ASA in 1 week. Follow up with me as outpatient

## 2019-04-04 NOTE — PROGRESS NOTE ADULT - ASSESSMENT
IMPRESSION:    Intraparenchymal hemorrhage  Acute on chronic hypercapnic respiratory failure improved   Probable ZE +/- OHS  DESI      PLAN:    CNS: no sedatives, neurosurgery and neurology follow up,     HEENT:  Oral care    PULMONARY:  HOB @ 45 degrees, oxygen to keep pulse ox>90%, NIV for now and during sleep.  AVAPS  IPAP max 20 IPAP min 16 PEEP 8.      CARDIOVASCULAR:  I=O, BP control.  Cardene.  Labetolol oral.  LAsix 60 mg once.  BNP.  ECHO.  Cheetah HD monitoring     GI: GI prophylaxis   speech and swallow eval    RENAL:  F/u  lytes.  Correct as needed. accurate I/O, renal Sono.  Renal Eval.      INFECTIOUS DISEASE: no abx, f/u cultures    HEMATOLOGICAL:  DVT prophylaxis - compression stocking,    ENDOCRINE:  Follow up FS.  Insulin protocol if needed.    MUSCULOSKELETAL: bedrest    CODE STATUS: FULL CODE    DISPOSITION: Pt requires monitoring in the MICU

## 2019-04-04 NOTE — CONSULT NOTE ADULT - ASSESSMENT
58 yo male patient with PMH of DM, HTN, Likely underlying CKD (Cr was 2.4 when presented) presented on 4/2 with AMS  and aphasia.found w/ Left sided Frontotemporal bleed with intraparenchymal hemorrhagic associated with left ventricular bleed     - Evaluated by neurology and neurosurgery: medical management, no acute neurosurgical intervention.  - Neurosurgery: ok for baby ASA in 7-10 days, ok for hep sub q  - CT scan on 4/3: Overall unchanged left basal ganglia intraparenchymal hematoma,   measuring approximately 3.9 cm in longest dimension with Stable 2 mm left to right midline shift.      Hypertensive emergency  -  BP improved w/ nicardipine   - TTE pending, and CHEETAH pending    Now w /DESI on likely CKD    ? Hypertensive urgency vs acute drop in BP     - No acute need for RRT  - oliguria noted, minimal response to IV lasix  planned for fluid challenge   - UA when presented w/ minimal blood >300 protein (?due to diabetes)  - repeat UA check urine prot cr ratio  -send SPEP UPEP serum IF   - If +hematuria send ANCa, antiGBM, Domenica C3 C4         will follow

## 2019-04-04 NOTE — CONSULT NOTE ADULT - ASSESSMENT
IMP: This is a 59y Male with h/o HTN,IDDM,CAD with new HTN left Intraparenchymal Hemorrhage of the Brain            Plan: Neuro checks q 1  keep SBP below 140  follow up HCT in next 24-48H  Cont current care IMP: This is a 59y Male with h/o HTN,IDDM,CAD with new HTN left Intraparenchymal Hemorrhage of the Brain            Plan: Neuro checks q 1  keep SBP below 140  follow up HCT in next 24-48H  If worsening neurological status then repeat CTH STAT and recall neurosurgery  Cont current care

## 2019-04-04 NOTE — PROGRESS NOTE ADULT - SUBJECTIVE AND OBJECTIVE BOX
SUBJECTIVE:    Patient is a 59y old Male who presents with a chief complaint of Right sided weakness (2019 11:12)    Currently admitted to medicine with the primary diagnosis of Intraparenchymal hemorrhage of brain     Today is hospital day 2d. This morning he is resting comfortably in bed and reports no new issues or overnight events. On BiPaP overnight. On nasal canula today with prolonged expiratory phase. He is alert to place, and person but not to time. Patient was hypertensive overnight, with maximal dose of nicardipine, thus labetolol drips was added. Moving better his right side today.    PAST MEDICAL & SURGICAL HISTORY  Gout  Morbidly obese  Gout  Hypertension  Diabetes mellitus  S/P tonsillectomy    SOCIAL HISTORY:  Negative for smoking/alcohol/drug use.     ALLERGIES:  No Known Allergies    MEDICATIONS:  STANDING MEDICATIONS  atorvastatin Oral Tab/Cap - Peds 40 milliGRAM(s) Oral daily  chlorhexidine 2% Cloths 1 Application(s) Topical daily  chlorhexidine 4% Liquid 1 Application(s) Topical every 12 hours  cholecalciferol 2000 Unit(s) Oral daily  dextrose 5%. 1000 milliLiter(s) IV Continuous <Continuous>  dextrose 50% Injectable 12.5 Gram(s) IV Push once  dextrose 50% Injectable 25 Gram(s) IV Push once  dextrose 50% Injectable 25 Gram(s) IV Push once  insulin glargine Injectable (LANTUS) 16 Unit(s) SubCutaneous every morning  insulin lispro (HumaLOG) corrective regimen sliding scale   SubCutaneous three times a day before meals  insulin lispro Injectable (HumaLOG) 5 Unit(s) SubCutaneous three times a day before meals  labetalol 200 milliGRAM(s) Oral two times a day  niCARdipine Infusion 5 mG/Hr IV Continuous <Continuous>  pantoprazole    Tablet 40 milliGRAM(s) Oral before breakfast  sodium chloride 0.9% Bolus 250 milliLiter(s) IV Bolus once    PRN MEDICATIONS  acetaminophen   Tablet .. 650 milliGRAM(s) Oral every 6 hours PRN  dextrose 40% Gel 15 Gram(s) Oral once PRN  glucagon  Injectable 1 milliGRAM(s) IntraMuscular once PRN    VITALS:   T(F): 97.6  HR: 58  BP: 122/57  RR: 16  SpO2: 93%    LABS:                        11.6   8.78  )-----------( 235      ( 2019 04:20 )             37.6     04-04    144  |  105  |  53<H>  ----------------------------<  221<H>  4.3   |  25  |  3.1<H>    Ca    8.2<L>      2019 04:20    TPro  6.4  /  Alb  3.7  /  TBili  0.4  /  DBili  x   /  AST  13  /  ALT  12  /  AlkPhos  85      PT/INR - ( 2019 19:45 )   PT: 10.10 sec;   INR: 0.88 ratio         PTT - ( 2019 19:45 )  PTT:31.8 sec  Urinalysis Basic - ( 2019 21:57 )    Color: Yellow / Appearance: Clear / S.020 / pH: x  Gluc: x / Ketone: Negative  / Bili: Negative / Urobili: 0.2 mg/dL   Blood: x / Protein: >=300 mg/dL / Nitrite: Negative   Leuk Esterase: Negative / RBC: 1-2 /HPF / WBC x   Sq Epi: x / Non Sq Epi: x / Bacteria: x      ABG - ( 2019 07:30 )  pH, Arterial: 7.36  pH, Blood: x     /  pCO2: 48    /  pO2: 58    / HCO3: 27    / Base Excess: 1.4   /  SaO2: 92                Troponin T, Serum: 0.03 ng/mL <HH> (19 @ 11:42)      Culture - Urine (collected 2019 21:57)  Source: .Urine Clean Catch (Midstream)  Final Report (2019 21:59):    <10,000 CFU/mL Normal Urogenital Natali      CARDIAC MARKERS ( 2019 11:42 )  x     / 0.03 ng/mL / x     / x     / x      CARDIAC MARKERS ( 2019 19:45 )  x     / 0.02 ng/mL / x     / x     / x          RADIOLOGY:    PHYSICAL EXAM:  GEN: No acute distress, awake, obese patient, on nasal canula  LUNGS: Decrease bilateral air entry  HEART: S1/S2 present. RRR.   ABD: Soft, non-tender, non-distended. Bowel sounds present  EXT: Mild edema. skin Intact, onychomycosis  NEURO: AAOX3    Intravenous access:   NG tube:   [place] Catheter:   Indwelling Urethral Catheter:     Connect To:  Straight Drainage/Sears    Indication:  Urinary Retention / Obstruction (19 @ 09:37) (not performed) [active]  Indwelling Urethral Catheter:     Connect To:  Leg Bag    Indication:  Urine Output Monitoring in Critically Ill (19 @ 11:16) (not performed) [active]

## 2019-04-05 LAB
ALBUMIN SERPL ELPH-MCNC: 3.6 G/DL — SIGNIFICANT CHANGE UP (ref 3.5–5.2)
ALP SERPL-CCNC: 77 U/L — SIGNIFICANT CHANGE UP (ref 30–115)
ALT FLD-CCNC: 10 U/L — SIGNIFICANT CHANGE UP (ref 0–41)
ANION GAP SERPL CALC-SCNC: 12 MMOL/L — SIGNIFICANT CHANGE UP (ref 7–14)
ANION GAP SERPL CALC-SCNC: 14 MMOL/L — SIGNIFICANT CHANGE UP (ref 7–14)
AST SERPL-CCNC: 10 U/L — SIGNIFICANT CHANGE UP (ref 0–41)
BILIRUB DIRECT SERPL-MCNC: <0.2 MG/DL — SIGNIFICANT CHANGE UP (ref 0–0.2)
BILIRUB INDIRECT FLD-MCNC: >0.3 MG/DL — SIGNIFICANT CHANGE UP (ref 0.2–1.2)
BILIRUB SERPL-MCNC: 0.5 MG/DL — SIGNIFICANT CHANGE UP (ref 0.2–1.2)
BUN SERPL-MCNC: 64 MG/DL — CRITICAL HIGH (ref 10–20)
BUN SERPL-MCNC: 69 MG/DL — CRITICAL HIGH (ref 10–20)
CALCIUM SERPL-MCNC: 8.2 MG/DL — LOW (ref 8.5–10.1)
CALCIUM SERPL-MCNC: 8.4 MG/DL — LOW (ref 8.5–10.1)
CHLORIDE SERPL-SCNC: 103 MMOL/L — SIGNIFICANT CHANGE UP (ref 98–110)
CHLORIDE SERPL-SCNC: 105 MMOL/L — SIGNIFICANT CHANGE UP (ref 98–110)
CK SERPL-CCNC: 297 U/L — HIGH (ref 0–225)
CO2 SERPL-SCNC: 24 MMOL/L — SIGNIFICANT CHANGE UP (ref 17–32)
CO2 SERPL-SCNC: 24 MMOL/L — SIGNIFICANT CHANGE UP (ref 17–32)
CREAT ?TM UR-MCNC: 249 MG/DL — SIGNIFICANT CHANGE UP
CREAT ?TM UR-MCNC: 378 MG/DL — SIGNIFICANT CHANGE UP
CREAT SERPL-MCNC: 3.9 MG/DL — HIGH (ref 0.7–1.5)
CREAT SERPL-MCNC: 4 MG/DL — HIGH (ref 0.7–1.5)
GLUCOSE BLDC GLUCOMTR-MCNC: 167 MG/DL — HIGH (ref 70–99)
GLUCOSE BLDC GLUCOMTR-MCNC: 211 MG/DL — HIGH (ref 70–99)
GLUCOSE BLDC GLUCOMTR-MCNC: 230 MG/DL — HIGH (ref 70–99)
GLUCOSE BLDC GLUCOMTR-MCNC: 266 MG/DL — HIGH (ref 70–99)
GLUCOSE BLDC GLUCOMTR-MCNC: 271 MG/DL — HIGH (ref 70–99)
GLUCOSE SERPL-MCNC: 208 MG/DL — HIGH (ref 70–99)
GLUCOSE SERPL-MCNC: 260 MG/DL — HIGH (ref 70–99)
HCT VFR BLD CALC: 35.6 % — LOW (ref 42–52)
HGB BLD-MCNC: 11.1 G/DL — LOW (ref 14–18)
MAGNESIUM SERPL-MCNC: 2 MG/DL — SIGNIFICANT CHANGE UP (ref 1.8–2.4)
MCHC RBC-ENTMCNC: 26.3 PG — LOW (ref 27–31)
MCHC RBC-ENTMCNC: 31.2 G/DL — LOW (ref 32–37)
MCV RBC AUTO: 84.4 FL — SIGNIFICANT CHANGE UP (ref 80–94)
NRBC # BLD: 0 /100 WBCS — SIGNIFICANT CHANGE UP (ref 0–0)
PHOSPHATE SERPL-MCNC: 4.4 MG/DL — SIGNIFICANT CHANGE UP (ref 2.1–4.9)
PLATELET # BLD AUTO: 207 K/UL — SIGNIFICANT CHANGE UP (ref 130–400)
POTASSIUM SERPL-MCNC: 4.2 MMOL/L — SIGNIFICANT CHANGE UP (ref 3.5–5)
POTASSIUM SERPL-MCNC: 4.3 MMOL/L — SIGNIFICANT CHANGE UP (ref 3.5–5)
POTASSIUM SERPL-SCNC: 4.2 MMOL/L — SIGNIFICANT CHANGE UP (ref 3.5–5)
POTASSIUM SERPL-SCNC: 4.3 MMOL/L — SIGNIFICANT CHANGE UP (ref 3.5–5)
PROT ?TM UR-MCNC: >564 MG/DLG/24H — SIGNIFICANT CHANGE UP
PROT SERPL-MCNC: 6.2 G/DL — SIGNIFICANT CHANGE UP (ref 6–8)
PROT SERPL-MCNC: 6.4 G/DL — SIGNIFICANT CHANGE UP (ref 6–8.3)
PROT SERPL-MCNC: 6.4 G/DL — SIGNIFICANT CHANGE UP (ref 6–8.3)
PROT/CREAT UR-RTO: >1.5 RATIO — HIGH (ref 0–0.2)
RBC # BLD: 4.22 M/UL — LOW (ref 4.7–6.1)
RBC # FLD: 14.6 % — HIGH (ref 11.5–14.5)
SODIUM SERPL-SCNC: 139 MMOL/L — SIGNIFICANT CHANGE UP (ref 135–146)
SODIUM SERPL-SCNC: 143 MMOL/L — SIGNIFICANT CHANGE UP (ref 135–146)
TROPONIN T SERPL-MCNC: 0.16 NG/ML — CRITICAL HIGH
URATE UR-MCNC: 17.1 MG/DL — SIGNIFICANT CHANGE UP
UUN UR-MCNC: 596 MG/DL — SIGNIFICANT CHANGE UP
WBC # BLD: 9.52 K/UL — SIGNIFICANT CHANGE UP (ref 4.8–10.8)
WBC # FLD AUTO: 9.52 K/UL — SIGNIFICANT CHANGE UP (ref 4.8–10.8)

## 2019-04-05 PROCEDURE — 93010 ELECTROCARDIOGRAM REPORT: CPT

## 2019-04-05 RX ORDER — AMLODIPINE BESYLATE 2.5 MG/1
5 TABLET ORAL DAILY
Qty: 0 | Refills: 0 | Status: DISCONTINUED | OUTPATIENT
Start: 2019-04-05 | End: 2019-04-06

## 2019-04-05 RX ORDER — HYDRALAZINE HCL 50 MG
100 TABLET ORAL THREE TIMES A DAY
Qty: 0 | Refills: 0 | Status: DISCONTINUED | OUTPATIENT
Start: 2019-04-05 | End: 2019-04-06

## 2019-04-05 RX ORDER — HYDRALAZINE HCL 50 MG
50 TABLET ORAL THREE TIMES A DAY
Qty: 0 | Refills: 0 | Status: DISCONTINUED | OUTPATIENT
Start: 2019-04-05 | End: 2019-04-05

## 2019-04-05 RX ORDER — LABETALOL HCL 100 MG
200 TABLET ORAL THREE TIMES A DAY
Qty: 0 | Refills: 0 | Status: DISCONTINUED | OUTPATIENT
Start: 2019-04-05 | End: 2019-04-06

## 2019-04-05 RX ORDER — HYDRALAZINE HCL 50 MG
10 TABLET ORAL ONCE
Qty: 0 | Refills: 0 | Status: COMPLETED | OUTPATIENT
Start: 2019-04-05 | End: 2019-04-05

## 2019-04-05 RX ADMIN — Medication 10 MILLIGRAM(S): at 00:30

## 2019-04-05 RX ADMIN — Medication 6 UNIT(S): at 00:02

## 2019-04-05 RX ADMIN — PANTOPRAZOLE SODIUM 40 MILLIGRAM(S): 20 TABLET, DELAYED RELEASE ORAL at 06:35

## 2019-04-05 RX ADMIN — Medication 5 UNIT(S): at 08:50

## 2019-04-05 RX ADMIN — Medication 2: at 08:49

## 2019-04-05 RX ADMIN — Medication 200 MILLIGRAM(S): at 05:51

## 2019-04-05 RX ADMIN — Medication 100 MILLIGRAM(S): at 21:21

## 2019-04-05 RX ADMIN — CHLORHEXIDINE GLUCONATE 1 APPLICATION(S): 213 SOLUTION TOPICAL at 05:51

## 2019-04-05 RX ADMIN — Medication 25 MILLIGRAM(S): at 05:51

## 2019-04-05 RX ADMIN — Medication 3: at 16:22

## 2019-04-05 RX ADMIN — Medication 5 UNIT(S): at 12:00

## 2019-04-05 RX ADMIN — Medication 200 MILLIGRAM(S): at 13:26

## 2019-04-05 RX ADMIN — Medication 2000 UNIT(S): at 13:26

## 2019-04-05 RX ADMIN — HEPARIN SODIUM 5000 UNIT(S): 5000 INJECTION INTRAVENOUS; SUBCUTANEOUS at 21:23

## 2019-04-05 RX ADMIN — NICARDIPINE HYDROCHLORIDE 25 MG/HR: 30 CAPSULE, EXTENDED RELEASE ORAL at 21:19

## 2019-04-05 RX ADMIN — HEPARIN SODIUM 5000 UNIT(S): 5000 INJECTION INTRAVENOUS; SUBCUTANEOUS at 05:51

## 2019-04-05 RX ADMIN — Medication 200 MILLIGRAM(S): at 21:22

## 2019-04-05 RX ADMIN — Medication 50 MILLIGRAM(S): at 13:26

## 2019-04-05 RX ADMIN — INSULIN GLARGINE 16 UNIT(S): 100 INJECTION, SOLUTION SUBCUTANEOUS at 08:49

## 2019-04-05 RX ADMIN — NICARDIPINE HYDROCHLORIDE 25 MG/HR: 30 CAPSULE, EXTENDED RELEASE ORAL at 16:00

## 2019-04-05 RX ADMIN — ATORVASTATIN CALCIUM 40 MILLIGRAM(S): 80 TABLET, FILM COATED ORAL at 21:20

## 2019-04-05 RX ADMIN — Medication 5 UNIT(S): at 16:21

## 2019-04-05 RX ADMIN — HEPARIN SODIUM 5000 UNIT(S): 5000 INJECTION INTRAVENOUS; SUBCUTANEOUS at 13:27

## 2019-04-05 RX ADMIN — Medication 3: at 11:59

## 2019-04-05 NOTE — PROGRESS NOTE ADULT - ASSESSMENT
Mr. Cannon is a 58 yo male patient with PMH of DM, HTN, comes to the ED on 4/3 with altered mental status and aphasia. At presentation patient was found to be aphasic and minimal right side weakness. In ED NIHSS was 15    # Left sided Frontotemporal bleed with intraparenchymal hemorrhagic associated with left ventricular bleed   - Evaluated by neurology and neurosurgery: medical management, no acute neurosurgical intervention.  - Neurosurgery: ok for baby ASA in 7-10 days, ok for hep sub q  - CT scan on 4/3: Overall unchanged left basal ganglia intraparenchymal hematoma, measuring approximately 3.9 cm in longest dimension with Stable 2 mm left to right midline shift.     # Hypertensive emergency  - SBP still > 140 with maximal dose of nicardipine drip  - Labetalol added overnight with control of BP  - Labetalol drip stopped on 4/5 and labetalol oral was started in addition to one dose of lasix 60 mg IV daily. and hydralazine  - TTE: EF: 58%, mild pulmonary hypertension and CHEETAH showed no fluid responsiveness.   - On 4/5 will increase labetalol dose to 200 mg Q 8 hours and hydralazine dose to 50 mg PO Q 8 hours. Will also start amlodipine 5 mg PO daily. Will titrate down nicardipine dose.    # DESI on top of CKD  - Baseline creatinine 1.9 in October 2018  - Worsening creatinine of 4, with 700 cc urine output per 24 hours after 2 doses of lasix  - Proteinuria  - Nephrology service following, no need for urgent dialysis today  - Urine output 30cc/hour at this time  - Mild elevation of CK: 295    # DM  - Lantus 15 units daily as pt is NPO  - Will start Lispro as feeding started    # DLD  - Lipitor     # DVT ppx  - Heparin 5000 u sc Q 8 hours after neurosurgery approval    # GI ppx  - Protonix

## 2019-04-05 NOTE — PROGRESS NOTE ADULT - SUBJECTIVE AND OBJECTIVE BOX
SUBJECTIVE:    Patient is a 59y old Male who presents with a chief complaint of Right sided weakness (2019 11:12)    Currently admitted to medicine with the primary diagnosis of Intraparenchymal hemorrhage of brain     Today is hospital day 3d. This morning he is resting comfortably in bed and reports no new issues or overnight events. On BiPaP overnight. On nasal canula today with prolonged expiratory phase. He is alert to place, and person but not to time. Patient was still hypertensive overnight, on nicardipine, and oral labetolol and hydralazine.    PAST MEDICAL & SURGICAL HISTORY  Gout  Morbidly obese  Gout  Hypertension  Diabetes mellitus  S/P tonsillectomy    SOCIAL HISTORY:  Negative for smoking/alcohol/drug use.     ALLERGIES:  No Known Allergies    MEDICATIONS:  STANDING MEDICATIONS  atorvastatin Oral Tab/Cap - Peds 40 milliGRAM(s) Oral daily  chlorhexidine 2% Cloths 1 Application(s) Topical daily  chlorhexidine 4% Liquid 1 Application(s) Topical every 12 hours  cholecalciferol 2000 Unit(s) Oral daily  dextrose 5%. 1000 milliLiter(s) IV Continuous <Continuous>  dextrose 50% Injectable 12.5 Gram(s) IV Push once  dextrose 50% Injectable 25 Gram(s) IV Push once  dextrose 50% Injectable 25 Gram(s) IV Push once  insulin glargine Injectable (LANTUS) 16 Unit(s) SubCutaneous every morning  insulin lispro (HumaLOG) corrective regimen sliding scale   SubCutaneous three times a day before meals  insulin lispro Injectable (HumaLOG) 5 Unit(s) SubCutaneous three times a day before meals  labetalol 200 milliGRAM(s) Oral two times a day  niCARdipine Infusion 5 mG/Hr IV Continuous <Continuous>  pantoprazole    Tablet 40 milliGRAM(s) Oral before breakfast  sodium chloride 0.9% Bolus 250 milliLiter(s) IV Bolus once    PRN MEDICATIONS  acetaminophen   Tablet .. 650 milliGRAM(s) Oral every 6 hours PRN  dextrose 40% Gel 15 Gram(s) Oral once PRN  glucagon  Injectable 1 milliGRAM(s) IntraMuscular once PRN    VITALS:   T(F): 97.6  HR: 58  BP: 122/57  RR: 16  SpO2: 93%    LABS:                        11.6   8.78  )-----------( 235      ( 2019 04:20 )             37.6     04-04    144  |  105  |  53<H>  ----------------------------<  221<H>  4.3   |  25  |  3.1<H>    Ca    8.2<L>      2019 04:20    TPro  6.4  /  Alb  3.7  /  TBili  0.4  /  DBili  x   /  AST  13  /  ALT  12  /  AlkPhos  85  04-04    PT/INR - ( 2019 19:45 )   PT: 10.10 sec;   INR: 0.88 ratio         PTT - ( 2019 19:45 )  PTT:31.8 sec  Urinalysis Basic - ( 2019 21:57 )    Color: Yellow / Appearance: Clear / S.020 / pH: x  Gluc: x / Ketone: Negative  / Bili: Negative / Urobili: 0.2 mg/dL   Blood: x / Protein: >=300 mg/dL / Nitrite: Negative   Leuk Esterase: Negative / RBC: 1-2 /HPF / WBC x   Sq Epi: x / Non Sq Epi: x / Bacteria: x      ABG - ( 2019 07:30 )  pH, Arterial: 7.36  pH, Blood: x     /  pCO2: 48    /  pO2: 58    / HCO3: 27    / Base Excess: 1.4   /  SaO2: 92                Troponin T, Serum: 0.03 ng/mL <HH> (19 @ 11:42)      Culture - Urine (collected 2019 21:57)  Source: .Urine Clean Catch (Midstream)  Final Report (2019 21:59):    <10,000 CFU/mL Normal Urogenital Natali      CARDIAC MARKERS ( 2019 11:42 )  x     / 0.03 ng/mL / x     / x     / x      CARDIAC MARKERS ( 2019 19:45 )  x     / 0.02 ng/mL / x     / x     / x          RADIOLOGY:    PHYSICAL EXAM:  GEN: No acute distress, awake, obese patient, on nasal canula  LUNGS: Decrease bilateral air entry  HEART: S1/S2 present. RRR.   ABD: Soft, non-tender, non-distended. Bowel sounds present  EXT: Mild edema. skin Intact, onychomycosis  NEURO: AAOX3    Intravenous access:   NG tube:   [place] Catheter:   Indwelling Urethral Catheter:     Connect To:  Straight Drainage/[place]    Indication:  Urinary Retention / Obstruction (19 @ 09:37) (not performed) [active]  Indwelling Urethral Catheter:     Connect To:  Leg Bag    Indication:  Urine Output Monitoring in Critically Ill (19 @ 11:16) (not performed) [active]

## 2019-04-05 NOTE — PROGRESS NOTE ADULT - ASSESSMENT
58 yo male patient with PMH of DM, HTN, Likely underlying CKD (Cr was 2.4 when presented) presented on 4/2 with AMS and aphasia, found w/ Left sided Frontotemporal bleed with intraparenchymal hemorrhagic associated with left ventricular bleed     - Evaluated by neurology and neurosurgery: medical management, no acute neurosurgical intervention.  - Neurosurgery: ok for baby ASA in 7-10 days, ok for hep sub q  - CT scan on 4/3: Overall unchanged left basal ganglia intraparenchymal hematoma,   measuring approximately 3.9 cm in longest dimension with Stable 2 mm left to right midline shift.     # Hypertensive emergency  - BP improved w/ nicardipine   - EF 58% on TTE. CHEETAH pending      # DESI on likely CKD    ? Hypertensive urgency vs acute drop in BP   - serum creatinine trending up  - s/p lasix 80 mg last night, urine output improved post lasix with ~ 621 cc in last 12 hours.  - No acute need for RRT  - repeat UA noted.  - urine prot cr ratio = 1.5 noted (?due to diabetes)  - Repeat BMP, trend creatinine.  - check urine Na, creatinine, urea  - Avoid nephrotoxins and hypotension  - Trop elevated, can be due to DESI. check CK, trend CE.      will follow 58 yo male patient with PMH of DM, HTN, Likely underlying CKD (Cr was 2.4 when presented) presented on 4/2 with AMS and aphasia, found w/ Left sided Frontotemporal bleed with intraparenchymal hemorrhagic associated with left ventricular bleed     - Evaluated by neurology and neurosurgery: medical management, no acute neurosurgical intervention.  - Neurosurgery: ok for baby ASA in 7-10 days, ok for hep sub q  - CT scan on 4/3: Overall unchanged left basal ganglia intraparenchymal hematoma,   measuring approximately 3.9 cm in longest dimension with Stable 2 mm left to right midline shift.     # Hypertensive emergency  - BP improved w/ nicardipine   - EF 58% on TTE.     # DESI on likely CKD    ? Hypertensive urgency vs acute drop in BP   - serum creatinine trending up  - s/p lasix 80 mg last night, urine output improved post lasix with ~ 621 cc in last 12 hours.  - No acute need for RRT  - repeat UA noted.  - urine prot cr ratio = 1.5 noted (?due to diabetes)  - Repeat BMP, trend creatinine.  - check urine Na, creatinine, urea  - Avoid nephrotoxins and hypotension  - Trop elevated, can be due to DESI. check CK, trend CE.      will follow

## 2019-04-05 NOTE — SWALLOW BEDSIDE ASSESSMENT ADULT - ORAL PHASE
Delayed oral transit time Decreased anterior-posterior movement of the bolus/Delayed oral transit time Within functional limits

## 2019-04-05 NOTE — SWALLOW BEDSIDE ASSESSMENT ADULT - COMMENTS
dysphagia evaluation conducted bedside. pt received alert, responsive. nasal inner cannula in place. generalized weakness and tachypnea observed. RN Julio C gregory. mild oral dysphagia with no overt s/s of aspiration vs penetration moderate oral dysphagia with no overt s/s of aspiration vs penetration suspected pharyngeal dysphagia

## 2019-04-05 NOTE — PROGRESS NOTE ADULT - ASSESSMENT
IMPRESSION:    Intraparenchymal hemorrhage  Acute on chronic hypercapnic respiratory failure improved   Probable ZE +/- OHS  DESI      PLAN:    CNS: no sedatives, neurosurgery and neurology follow up,     HEENT:  Oral care    PULMONARY:  HOB @ 45 degrees, oxygen to keep pulse ox>90%, NIV for now and during sleep.  AVAPS  IPAP max 20 IPAP min 16 PEEP 8.      CARDIOVASCULAR:  I=O, BP control.  Cardene.  Labetolol oral.  LAsix 60 mg once.    GI: GI prophylaxis  Feeding per  speech and swallow eval    RENAL:  F/u  lytes.  Correct as needed. accurate I/O, FU with renal     INFECTIOUS DISEASE: no abx, f/u cultures    HEMATOLOGICAL:  DVT prophylaxis - compression stocking,    ENDOCRINE:  Follow up FS.  Insulin protocol if needed.    MUSCULOSKELETAL: bedrest    CODE STATUS: FULL CODE    DISPOSITION: Pt requires monitoring in the MICU    DW wife at the bed side

## 2019-04-05 NOTE — PROGRESS NOTE ADULT - ATTENDING COMMENTS
Pt seen and examined  More awake alert today  UOP improved  no longer oliguric  Cr still rising, but hopefully w/ increase UOP sign of DESI improvoment  no need for RRT  will follow

## 2019-04-05 NOTE — PROGRESS NOTE ADULT - SUBJECTIVE AND OBJECTIVE BOX
Patient is a 59y old  Male who presents with a chief complaint of Right sided weakness (2019 08:52)        Over Night Events:  On Cardene drip.  Improved MS.  Tolerated NIV         ROS:  See HPI    PHYSICAL EXAM    ICU Vital Signs Last 24 Hrs  T(C): 36.8 (2019 08:00), Max: 37.1 (2019 20:00)  T(F): 98.3 (2019 08:00), Max: 98.8 (2019 20:00)  HR: 64 (2019 10:00) (54 - 74)  BP: 119/58 (2019 08:00) (108/49 - 174/65)  BP(mean): 79 (2019 08:00) (67 - 98)  ABP: 150/58 (2019 10:00) (112/54 - 170/66)  ABP(mean): 78 (2019 10:00) (64 - 98)  RR: --  SpO2: 95% (2019 10:00) (91% - 100%)      General: In NAD   HEENT: TIMMY             Lymphatic system: No cervical LN   Lungs: Bilateral BS  Cardiovascular: Regular   Gastrointestinal: Soft, Positive BS  Extremities: No clubbing.  Moves extremities.  Full Range of motion   Skin: Warm, intact  Neurological: No motor deficit       19 @ 07:  -  19 @ 07:00  --------------------------------------------------------  IN:    Labetalol Infusion: 60 mL    niCARdipine Infusion: 1535 mL    Oral Fluid: 490 mL  Total IN: 2085 mL    OUT:    Ureteral Catheter: 776 mL  Total OUT: 776 mL    Total NET: 1309 mL      19 @ 07:01  -  19 @ 10:14  --------------------------------------------------------  IN:    niCARdipine Infusion: 175 mL  Total IN: 175 mL    OUT:    Ureteral Catheter: 125 mL  Total OUT: 125 mL    Total NET: 50 mL          LABS:                            11.1   9.52  )-----------( 207      ( 2019 04:00 )             35.6                                                   143  |  105  |  64<HH>  ----------------------------<  208<H>  4.2   |  24  |  4.0<H>    2019 04:00    143    |  105    |  64<HH>  ----------------------------<  208<H>  4.2     |  24     |  4.0<H>  2019 19:50    142    |  103    |  62<HH>  ----------------------------<  267<H>  4.3     |  23     |  3.7<H>    Ca    8.4<L>      2019 04:00  Ca    8.5        2019 19:50  Mg     2.0       2019 04:00    TPro  6.2    /  Alb  3.6    /  TBili  0.5    /  DBili  <0.2   /  AST  10     /  ALT  10     /  AlkPhos  77     2019 04:00  TPro  6.4    /  Alb  3.7    /  TBili  0.4    /  DBili  x      /  AST  13     /  ALT  12     /  AlkPhos  85     2019 04:20      Ca    8.4<L>      2019 04:00  Mg     2.0         TPro  6.2  /  Alb  3.6  /  TBili  0.5  /  DBili  <0.2  /  AST  10  /  ALT  10  /  AlkPhos  77                                               Urinalysis Basic - ( 2019 16:34 )    Color: Yellow / Appearance: Cloudy / S.025 / pH: x  Gluc: x / Ketone: Negative  / Bili: Small / Urobili: 1.0 mg/dL   Blood: x / Protein: >=300 mg/dL / Nitrite: Negative   Leuk Esterase: Negative / RBC: x / WBC x   Sq Epi: x / Non Sq Epi: Occasional /HPF / Bacteria: x        CARDIAC MARKERS ( 2019 04:00 )  x     / 0.16 ng/mL / x     / x     / x      CARDIAC MARKERS ( 2019 11:51 )  x     / 0.07 ng/mL / x     / x     / x      CARDIAC MARKERS ( 2019 11:42 )  x     / 0.03 ng/mL / x     / x     / x                                                LIVER FUNCTIONS - ( 2019 04:00 )  Alb: 3.6 g/dL / Pro: 6.2 g/dL / ALK PHOS: 77 U/L / ALT: 10 U/L / AST: 10 U/L / GGT: x                                                  Culture - Urine (collected 2019 21:57)  Source: .Urine Clean Catch (Midstream)  Final Report (2019 21:59):    <10,000 CFU/mL Normal Urogenital Natali                                                                                       ABG - ( 2019 23:36 )  pH, Arterial: 7.36  pH, Blood: x     /  pCO2: 46    /  pO2: 87    / HCO3: 26    / Base Excess: -0.1  /  SaO2: 96                  MEDICATIONS  (STANDING):  atorvastatin Oral Tab/Cap - Peds 40 milliGRAM(s) Oral daily  chlorhexidine 2% Cloths 1 Application(s) Topical daily  chlorhexidine 4% Liquid 1 Application(s) Topical every 12 hours  cholecalciferol 2000 Unit(s) Oral daily  dextrose 5%. 1000 milliLiter(s) (50 mL/Hr) IV Continuous <Continuous>  dextrose 50% Injectable 12.5 Gram(s) IV Push once  dextrose 50% Injectable 25 Gram(s) IV Push once  dextrose 50% Injectable 25 Gram(s) IV Push once  heparin  Injectable 5000 Unit(s) SubCutaneous every 8 hours  hydrALAZINE 25 milliGRAM(s) Oral every 6 hours  influenza   Vaccine 0.5 milliLiter(s) IntraMuscular once  insulin glargine Injectable (LANTUS) 16 Unit(s) SubCutaneous every morning  insulin lispro (HumaLOG) corrective regimen sliding scale   SubCutaneous three times a day before meals  insulin lispro Injectable (HumaLOG) 5 Unit(s) SubCutaneous three times a day before meals  labetalol 200 milliGRAM(s) Oral two times a day  niCARdipine Infusion 5 mG/Hr (25 mL/Hr) IV Continuous <Continuous>  pantoprazole    Tablet 40 milliGRAM(s) Oral before breakfast    MEDICATIONS  (PRN):  acetaminophen   Tablet .. 650 milliGRAM(s) Oral every 6 hours PRN Mild Pain (1 - 3)  dextrose 40% Gel 15 Gram(s) Oral once PRN Blood Glucose LESS THAN 70 milliGRAM(s)/deciliter  glucagon  Injectable 1 milliGRAM(s) IntraMuscular once PRN Glucose LESS THAN 70 milligrams/deciliter      Xrays:                                                                                     ECHO

## 2019-04-05 NOTE — PROGRESS NOTE ADULT - SUBJECTIVE AND OBJECTIVE BOX
Nephrology progress note    Patient is seen and examined, events over the last 24 h noted.  SOB improved.  No new complaints.    Allergies:  No Known Allergies    Hospital Medications:   MEDICATIONS  (STANDING):  atorvastatin Oral Tab/Cap - Peds 40 milliGRAM(s) Oral daily  chlorhexidine 2% Cloths 1 Application(s) Topical daily  chlorhexidine 4% Liquid 1 Application(s) Topical every 12 hours  cholecalciferol 2000 Unit(s) Oral daily  dextrose 5%. 1000 milliLiter(s) (50 mL/Hr) IV Continuous <Continuous>  heparin  Injectable 5000 Unit(s) SubCutaneous every 8 hours  hydrALAZINE 25 milliGRAM(s) Oral every 6 hours  influenza   Vaccine 0.5 milliLiter(s) IntraMuscular once  insulin glargine Injectable (LANTUS) 16 Unit(s) SubCutaneous every morning  insulin lispro (HumaLOG) corrective regimen sliding scale   SubCutaneous three times a day before meals  insulin lispro Injectable (HumaLOG) 5 Unit(s) SubCutaneous three times a day before meals  labetalol 200 milliGRAM(s) Oral two times a day  niCARdipine Infusion 5 mG/Hr (25 mL/Hr) IV Continuous <Continuous>  pantoprazole    Tablet 40 milliGRAM(s) Oral before breakfast        VITALS:  T(F): 98.8 (19 @ 04:00), Max: 98.8 (19 @ 20:00)  HR: 68 (19 @ 07:00)  BP: 121/60 (19 @ 07:00)  RR: --  SpO2: 93% (19 @ 07:00)  Wt(kg): --     @ 07:  -   @ 07:00  --------------------------------------------------------  IN: 1179 mL / OUT: 650 mL / NET: 529 mL     @ 07:01  -   @ 07:00  --------------------------------------------------------  IN: 2085 mL / OUT: 776 mL / NET: 1309 mL          PHYSICAL EXAM:  Constitutional: NAD  Respiratory: CTAB, fine b/l basal rhonchi  Cardiovascular: S1, S2, RRR  Gastrointestinal: BS+, soft, NT/ND  Extremities: No peripheral edema  :  + khan.       LABS:      143  |  105  |  64<HH>  ----------------------------<  208<H>  4.2   |  24  |  4.0<H>    Creatinine Trend: 4.0<--, 3.7<--, 3.1<--, 2.4<--, 2.5<--    Ca    8.4<L>      2019 04:00  Mg     2.0         TPro  6.2  /  Alb  3.6  /  TBili  0.5  /  DBili  <0.2  /  AST  10  /  ALT  10  /  AlkPhos  77                            11.1   9.52  )-----------( 207      ( 2019 04:00 )             35.6     Troponin T, Serum: 0.16: Critical value: ng/mL (19 @ 04:00)    Blood Gas Profile - Arterial (19 @ 23:36)    pH, Arterial: 7.36: PT ON AVAPS P MAX 20/ P MIN 16/ /RR 14/FIO2 50%/EPAP 8    pCO2, Arterial: 46 mmHg    pO2, Arterial: 87 mmHg    HCO3, Arterial: 26 mmoL/L    Base Excess, Arterial: -0.1 mmoL/L    Oxygen Saturation, Arterial: 96 %    FIO2, Arterial: 50      Urine Studies:  Urinalysis Basic - ( 2019 16:34 )    Color: Yellow / Appearance: Cloudy / S.025 / pH:   Gluc:  / Ketone: Negative  / Bili: Small / Urobili: 1.0 mg/dL   Blood:  / Protein: >=300 mg/dL / Nitrite: Negative   Leuk Esterase: Negative / RBC:  / WBC    Sq Epi:  / Non Sq Epi: Occasional /HPF / Bacteria:       Protein/Creatinine Ratio Calculation: >1.5 Ratio ( @ 16:34)  Creatinine, Random Urine: 378 mg/dL ( @ 16:34)    RADIOLOGY & ADDITIONAL STUDIES:  < from: Xray Chest 1 View- PORTABLE-Routine (19 @ 05:36) >  Impression:      No significant interval changes in the pulmonary findings.      < end of copied text >    < from: Transthoracic Echocardiogram (19 @ 11:34) >  Summary:   1. LV Ejection Fraction by Castro's Method with a biplane EF of 58 %.   2. Mildly increased LV wall thickness.   3. Mild tricuspid regurgitation.   4. Mildly thickened and calcified aortic leaflets. Mild aortic stenosis.   5. Estimated pulmonary artery systolic pressure is 42.0 mmHg assuming a   right atrial pressure of 5 mmHg, which is consistent with mild pulmonary   hypertension.    < end of copied text >    < from: US Renal (19 @ 10:59) >  Impression:     Limited examination secondary to patient's condition and body habitus.    No hydronephrosis or calculus on either side.    2.4 cm left renal hypodensity may reflect a cyst but is limited in   characterization on this study. Consideration can be given to a follow-up   examination or dedicated CT or MRI at later time.    < end of copied text >

## 2019-04-06 LAB
ALBUMIN SERPL ELPH-MCNC: 3.4 G/DL — LOW (ref 3.5–5.2)
ALP SERPL-CCNC: 73 U/L — SIGNIFICANT CHANGE UP (ref 30–115)
ALT FLD-CCNC: 10 U/L — SIGNIFICANT CHANGE UP (ref 0–41)
ANION GAP SERPL CALC-SCNC: 16 MMOL/L — HIGH (ref 7–14)
APPEARANCE UR: ABNORMAL
AST SERPL-CCNC: 9 U/L — SIGNIFICANT CHANGE UP (ref 0–41)
BILIRUB DIRECT SERPL-MCNC: 0.2 MG/DL — SIGNIFICANT CHANGE UP (ref 0–0.2)
BILIRUB INDIRECT FLD-MCNC: 0.3 MG/DL — SIGNIFICANT CHANGE UP (ref 0.2–1.2)
BILIRUB SERPL-MCNC: 0.5 MG/DL — SIGNIFICANT CHANGE UP (ref 0.2–1.2)
BILIRUB UR-MCNC: NEGATIVE — SIGNIFICANT CHANGE UP
BUN SERPL-MCNC: 75 MG/DL — CRITICAL HIGH (ref 10–20)
CALCIUM SERPL-MCNC: 8.4 MG/DL — LOW (ref 8.5–10.1)
CHLORIDE SERPL-SCNC: 104 MMOL/L — SIGNIFICANT CHANGE UP (ref 98–110)
CO2 SERPL-SCNC: 22 MMOL/L — SIGNIFICANT CHANGE UP (ref 17–32)
COLOR SPEC: YELLOW — SIGNIFICANT CHANGE UP
CREAT SERPL-MCNC: 3.5 MG/DL — HIGH (ref 0.7–1.5)
DIFF PNL FLD: NEGATIVE — SIGNIFICANT CHANGE UP
EPI CELLS # UR: ABNORMAL /HPF
GLUCOSE BLDC GLUCOMTR-MCNC: 227 MG/DL — HIGH (ref 70–99)
GLUCOSE BLDC GLUCOMTR-MCNC: 230 MG/DL — HIGH (ref 70–99)
GLUCOSE BLDC GLUCOMTR-MCNC: 235 MG/DL — HIGH (ref 70–99)
GLUCOSE BLDC GLUCOMTR-MCNC: 285 MG/DL — HIGH (ref 70–99)
GLUCOSE SERPL-MCNC: 248 MG/DL — HIGH (ref 70–99)
GLUCOSE UR QL: 100 MG/DL
HCT VFR BLD CALC: 35.5 % — LOW (ref 42–52)
HGB BLD-MCNC: 10.9 G/DL — LOW (ref 14–18)
KETONES UR-MCNC: NEGATIVE — SIGNIFICANT CHANGE UP
LEUKOCYTE ESTERASE UR-ACNC: ABNORMAL
MAGNESIUM SERPL-MCNC: 2.1 MG/DL — SIGNIFICANT CHANGE UP (ref 1.8–2.4)
MCHC RBC-ENTMCNC: 26.3 PG — LOW (ref 27–31)
MCHC RBC-ENTMCNC: 30.7 G/DL — LOW (ref 32–37)
MCV RBC AUTO: 85.5 FL — SIGNIFICANT CHANGE UP (ref 80–94)
NITRITE UR-MCNC: NEGATIVE — SIGNIFICANT CHANGE UP
NRBC # BLD: 0 /100 WBCS — SIGNIFICANT CHANGE UP (ref 0–0)
PH UR: 5.5 — SIGNIFICANT CHANGE UP (ref 5–8)
PHOSPHATE SERPL-MCNC: 4.4 MG/DL — SIGNIFICANT CHANGE UP (ref 2.1–4.9)
PLATELET # BLD AUTO: 204 K/UL — SIGNIFICANT CHANGE UP (ref 130–400)
POTASSIUM SERPL-MCNC: 4.5 MMOL/L — SIGNIFICANT CHANGE UP (ref 3.5–5)
POTASSIUM SERPL-SCNC: 4.5 MMOL/L — SIGNIFICANT CHANGE UP (ref 3.5–5)
PROT SERPL-MCNC: 5.9 G/DL — LOW (ref 6–8)
PROT UR-MCNC: 100 MG/DL
RBC # BLD: 4.15 M/UL — LOW (ref 4.7–6.1)
RBC # FLD: 14.6 % — HIGH (ref 11.5–14.5)
SODIUM SERPL-SCNC: 142 MMOL/L — SIGNIFICANT CHANGE UP (ref 135–146)
SP GR SPEC: 1.02 — SIGNIFICANT CHANGE UP (ref 1.01–1.03)
UROBILINOGEN FLD QL: 1 MG/DL (ref 0.2–0.2)
WBC # BLD: 9.45 K/UL — SIGNIFICANT CHANGE UP (ref 4.8–10.8)
WBC # FLD AUTO: 9.45 K/UL — SIGNIFICANT CHANGE UP (ref 4.8–10.8)
WBC UR QL: ABNORMAL /HPF

## 2019-04-06 PROCEDURE — 71045 X-RAY EXAM CHEST 1 VIEW: CPT | Mod: 26

## 2019-04-06 PROCEDURE — 93970 EXTREMITY STUDY: CPT | Mod: 26

## 2019-04-06 PROCEDURE — 70450 CT HEAD/BRAIN W/O DYE: CPT | Mod: 26

## 2019-04-06 PROCEDURE — 73610 X-RAY EXAM OF ANKLE: CPT | Mod: 26,RT

## 2019-04-06 RX ORDER — CEFEPIME 1 G/1
1000 INJECTION, POWDER, FOR SOLUTION INTRAMUSCULAR; INTRAVENOUS EVERY 24 HOURS
Qty: 0 | Refills: 0 | Status: DISCONTINUED | OUTPATIENT
Start: 2019-04-06 | End: 2019-04-17

## 2019-04-06 RX ORDER — AMLODIPINE BESYLATE 2.5 MG/1
10 TABLET ORAL DAILY
Qty: 0 | Refills: 0 | Status: DISCONTINUED | OUTPATIENT
Start: 2019-04-06 | End: 2019-04-23

## 2019-04-06 RX ORDER — HYDRALAZINE HCL 50 MG
10 TABLET ORAL ONCE
Qty: 0 | Refills: 0 | Status: COMPLETED | OUTPATIENT
Start: 2019-04-06 | End: 2019-04-06

## 2019-04-06 RX ORDER — INSULIN GLARGINE 100 [IU]/ML
18 INJECTION, SOLUTION SUBCUTANEOUS EVERY MORNING
Qty: 0 | Refills: 0 | Status: DISCONTINUED | OUTPATIENT
Start: 2019-04-07 | End: 2019-04-09

## 2019-04-06 RX ORDER — LABETALOL HCL 100 MG
300 TABLET ORAL EVERY 8 HOURS
Qty: 0 | Refills: 0 | Status: DISCONTINUED | OUTPATIENT
Start: 2019-04-06 | End: 2019-04-18

## 2019-04-06 RX ORDER — HYDRALAZINE HCL 50 MG
100 TABLET ORAL THREE TIMES A DAY
Qty: 0 | Refills: 0 | Status: DISCONTINUED | OUTPATIENT
Start: 2019-04-06 | End: 2019-04-07

## 2019-04-06 RX ORDER — FUROSEMIDE 40 MG
60 TABLET ORAL ONCE
Qty: 0 | Refills: 0 | Status: COMPLETED | OUTPATIENT
Start: 2019-04-06 | End: 2019-04-06

## 2019-04-06 RX ORDER — ACETAMINOPHEN 500 MG
650 TABLET ORAL EVERY 6 HOURS
Qty: 0 | Refills: 0 | Status: DISCONTINUED | OUTPATIENT
Start: 2019-04-06 | End: 2019-04-06

## 2019-04-06 RX ORDER — INSULIN GLARGINE 100 [IU]/ML
18 INJECTION, SOLUTION SUBCUTANEOUS EVERY MORNING
Qty: 0 | Refills: 0 | Status: DISCONTINUED | OUTPATIENT
Start: 2019-04-06 | End: 2019-04-06

## 2019-04-06 RX ADMIN — PANTOPRAZOLE SODIUM 40 MILLIGRAM(S): 20 TABLET, DELAYED RELEASE ORAL at 06:02

## 2019-04-06 RX ADMIN — Medication 100 MILLIGRAM(S): at 13:20

## 2019-04-06 RX ADMIN — CHLORHEXIDINE GLUCONATE 1 APPLICATION(S): 213 SOLUTION TOPICAL at 06:01

## 2019-04-06 RX ADMIN — HEPARIN SODIUM 5000 UNIT(S): 5000 INJECTION INTRAVENOUS; SUBCUTANEOUS at 06:01

## 2019-04-06 RX ADMIN — HEPARIN SODIUM 5000 UNIT(S): 5000 INJECTION INTRAVENOUS; SUBCUTANEOUS at 22:19

## 2019-04-06 RX ADMIN — Medication 100 MILLIGRAM(S): at 03:18

## 2019-04-06 RX ADMIN — Medication 3: at 16:59

## 2019-04-06 RX ADMIN — INSULIN GLARGINE 16 UNIT(S): 100 INJECTION, SOLUTION SUBCUTANEOUS at 10:09

## 2019-04-06 RX ADMIN — Medication 650 MILLIGRAM(S): at 22:20

## 2019-04-06 RX ADMIN — AMLODIPINE BESYLATE 10 MILLIGRAM(S): 2.5 TABLET ORAL at 04:16

## 2019-04-06 RX ADMIN — CEFEPIME 100 MILLIGRAM(S): 1 INJECTION, POWDER, FOR SOLUTION INTRAMUSCULAR; INTRAVENOUS at 22:17

## 2019-04-06 RX ADMIN — Medication 100 MILLIGRAM(S): at 22:19

## 2019-04-06 RX ADMIN — Medication 300 MILLIGRAM(S): at 22:20

## 2019-04-06 RX ADMIN — Medication 30 MILLILITER(S): at 04:16

## 2019-04-06 RX ADMIN — Medication 60 MILLIGRAM(S): at 14:18

## 2019-04-06 RX ADMIN — NICARDIPINE HYDROCHLORIDE 25 MG/HR: 30 CAPSULE, EXTENDED RELEASE ORAL at 17:03

## 2019-04-06 RX ADMIN — Medication 5 UNIT(S): at 11:54

## 2019-04-06 RX ADMIN — Medication 5 UNIT(S): at 06:01

## 2019-04-06 RX ADMIN — ATORVASTATIN CALCIUM 40 MILLIGRAM(S): 80 TABLET, FILM COATED ORAL at 22:18

## 2019-04-06 RX ADMIN — Medication 200 MILLIGRAM(S): at 03:18

## 2019-04-06 RX ADMIN — Medication 300 MILLIGRAM(S): at 14:10

## 2019-04-06 RX ADMIN — Medication 2000 UNIT(S): at 11:50

## 2019-04-06 RX ADMIN — Medication 5 UNIT(S): at 16:58

## 2019-04-06 RX ADMIN — Medication 0.1 MILLIGRAM(S): at 17:45

## 2019-04-06 RX ADMIN — Medication 10 MILLIGRAM(S): at 04:15

## 2019-04-06 RX ADMIN — Medication 2: at 06:01

## 2019-04-06 RX ADMIN — HEPARIN SODIUM 5000 UNIT(S): 5000 INJECTION INTRAVENOUS; SUBCUTANEOUS at 13:20

## 2019-04-06 RX ADMIN — Medication 2: at 11:54

## 2019-04-06 NOTE — PROGRESS NOTE ADULT - SUBJECTIVE AND OBJECTIVE BOX
seen and examined  no distress  on cardene drip       Standing Inpatient Medications  amLODIPine   Tablet 10 milliGRAM(s) Oral daily  atorvastatin Oral Tab/Cap - Peds 40 milliGRAM(s) Oral daily  chlorhexidine 2% Cloths 1 Application(s) Topical daily  chlorhexidine 4% Liquid 1 Application(s) Topical every 12 hours  cholecalciferol 2000 Unit(s) Oral daily  dextrose 5%. 1000 milliLiter(s) IV Continuous <Continuous>  dextrose 50% Injectable 12.5 Gram(s) IV Push once  dextrose 50% Injectable 25 Gram(s) IV Push once  dextrose 50% Injectable 25 Gram(s) IV Push once  heparin  Injectable 5000 Unit(s) SubCutaneous every 8 hours  hydrALAZINE 100 milliGRAM(s) Oral three times a day  influenza   Vaccine 0.5 milliLiter(s) IntraMuscular once  insulin glargine Injectable (LANTUS) 16 Unit(s) SubCutaneous every morning  insulin lispro (HumaLOG) corrective regimen sliding scale   SubCutaneous three times a day before meals  insulin lispro Injectable (HumaLOG) 5 Unit(s) SubCutaneous three times a day before meals  labetalol 200 milliGRAM(s) Oral three times a day  niCARdipine Infusion 5 mG/Hr IV Continuous <Continuous>  pantoprazole    Tablet 40 milliGRAM(s) Oral before breakfast    PRN Inpatient Medications  acetaminophen   Tablet .. 650 milliGRAM(s) Oral every 6 hours PRN  aluminum hydroxide/magnesium hydroxide/simethicone Suspension 30 milliLiter(s) Oral every 6 hours PRN  dextrose 40% Gel 15 Gram(s) Oral once PRN  glucagon  Injectable 1 milliGRAM(s) IntraMuscular once PRN          VITALS/PHYSICAL EXAM  --------------------------------------------------------------------------------  T(C): 38.3 (04-06-19 @ 04:00), Max: 38.3 (04-06-19 @ 04:00)  HR: 90 (04-06-19 @ 07:00) (62 - 102)  BP: 102/45 (04-05-19 @ 13:30) (102/45 - 119/58)  RR: 35 (04-06-19 @ 07:00) (21 - 37)  SpO2: 94% (04-06-19 @ 07:00) (88% - 99%)  Wt(kg): --        04-05-19 @ 07:01  -  04-06-19 @ 07:00  --------------------------------------------------------  IN: 1735 mL / OUT: 955 mL / NET: 780 mL      Physical Exam:  	Gen: NAD  	Pulm: decrease BS  B/L  	CV:  S1S2; no rub  	Abd: +distended  	: erin   	LE: edema      LABS/STUDIES  --------------------------------------------------------------------------------              10.9   9.45  >-----------<  204      [04-06-19 @ 04:20]              35.5     142  |  104  |  75  ----------------------------<  248      [04-06-19 @ 04:20]  4.5   |  22  |  3.5        Ca     8.4     [04-06-19 @ 04:20]      Mg     2.1     [04-06-19 @ 04:20]      Phos  4.4     [04-06-19 @ 04:20]    TPro  5.9  /  Alb  3.4  /  TBili  0.5  /  DBili  0.2  /  AST  9   /  ALT  10  /  AlkPhos  73  [04-06-19 @ 04:20]        Troponin 0.16      [04-05-19 @ 04:00]        [04-05-19 @ 11:00]    Creatinine Trend:  SCr 3.5 [04-06 @ 04:20]  SCr 3.9 [04-05 @ 11:00]  SCr 4.0 [04-05 @ 04:00]  SCr 3.7 [04-04 @ 19:50]  SCr 3.1 [04-04 @ 04:20]    Urinalysis - [04-04-19 @ 16:34]      Color Yellow / Appearance Cloudy / SG 1.025 / pH 5.5      Gluc 100 / Ketone Negative  / Bili Small / Urobili 1.0       Blood Trace / Protein >=300 / Leuk Est Negative / Nitrite Negative      RBC  / WBC  / Hyaline  / Gran  / Sq Epi  / Non Sq Epi Occasional / Bacteria     Urine Creatinine 249      [04-05-19 @ 11:42]  Urine Protein >564      [04-04-19 @ 16:34]  Urine Urea Nitrogen 596      [04-05-19 @ 11:42]    Lipid: chol 184, , HDL 38,       [04-03-19 @ 08:04]

## 2019-04-06 NOTE — PROGRESS NOTE ADULT - ASSESSMENT
60 yo male patient with PMH of DM, HTN, Likely underlying CKD (Cr was 2.4 when presented) presented on 4/2 with AMS and aphasia, found w/ Left sided Frontotemporal bleed with intraparenchymal hemorrhagic associated with left ventricular bleed  # creatinine trending down  # non oliguric  # taper cardene to d/c  # if BP not at goal, can increase labetalol to 300 q 8  # ph at goal  # No indication for RRT   # will follow

## 2019-04-06 NOTE — PROGRESS NOTE ADULT - SUBJECTIVE AND OBJECTIVE BOX
Patient is a 59y old  Male who presents with a chief complaint of Right sided weakness (2019 07:20)        Over Night Events: Improved MS.  ON Cardene 12.5         ROS:  See HPI    PHYSICAL EXAM    ICU Vital Signs Last 24 Hrs  T(C): 37.4 (2019 08:00), Max: 38.3 (2019 04:00)  T(F): 99.3 (2019 08:00), Max: 100.9 (2019 04:00)  HR: 90 (2019 09:15) (64 - 102)  BP: 98/52 (2019 08:30) (98/52 - 102/45)  BP(mean): 67 (2019 08:30) (59 - 67)  ABP: 142/60 (2019 09:15) (112/72 - 170/68)  ABP(mean): 82 (2019 09:15) (68 - 100)  RR: 35 (2019 09:15) (21 - 37)  SpO2: 94% (2019 09:15) (88% - 99%)      General: In NAD   HEENT: TIMMY             Lymphatic system: No cervical LN   Lungs: Bilateral BS  Cardiovascular: Regular   Gastrointestinal: Soft, Positive BS  Extremities: No clubbing.  Moves extremities.  Full Range of motion   Skin: Warm, intact  Neurological: No motor deficit       19 @ 07:01  -  19 @ 07:00  --------------------------------------------------------  IN:    niCARdipine Infusion: 997.5 mL    Oral Fluid: 800 mL  Total IN: 1797.5 mL    OUT:    Indwelling Catheter - Urethral: 430 mL    Ureteral Catheter: 560 mL  Total OUT: 990 mL    Total NET: 807.5 mL      19 @ 07:01  -  19 @ 09:21  --------------------------------------------------------  IN:    niCARdipine Infusion: 187.5 mL  Total IN: 187.5 mL    OUT:    Indwelling Catheter - Urethral: 95 mL  Total OUT: 95 mL    Total NET: 92.5 mL          LABS:                            10.9   9.45  )-----------( 204      ( 2019 04:20 )             35.5                                               04-    142  |  104  |  75<HH>  ----------------------------<  248<H>  4.5   |  22  |  3.5<H>    Ca    8.4<L>      2019 04:20  Phos  4.4       Mg     2.1     -    TPro  5.9<L>  /  Alb  3.4<L>  /  TBili  0.5  /  DBili  0.2  /  AST  9   /  ALT  10  /  AlkPhos  73                                               Urinalysis Basic - ( 2019 16:34 )    Color: Yellow / Appearance: Cloudy / S.025 / pH: x  Gluc: x / Ketone: Negative  / Bili: Small / Urobili: 1.0 mg/dL   Blood: x / Protein: >=300 mg/dL / Nitrite: Negative   Leuk Esterase: Negative / RBC: x / WBC x   Sq Epi: x / Non Sq Epi: Occasional /HPF / Bacteria: x        CARDIAC MARKERS ( 2019 11:00 )  x     / x     / 297 U/L / x     / x      CARDIAC MARKERS ( 2019 04:00 )  x     / 0.16 ng/mL / x     / x     / x      CARDIAC MARKERS ( 2019 11:51 )  x     / 0.07 ng/mL / x     / x     / x                                                LIVER FUNCTIONS - ( 2019 04:20 )  Alb: 3.4 g/dL / Pro: 5.9 g/dL / ALK PHOS: 73 U/L / ALT: 10 U/L / AST: 9 U/L / GGT: x                                                                                                                                   ABG - ( 2019 23:36 )  pH, Arterial: 7.36  pH, Blood: x     /  pCO2: 46    /  pO2: 87    / HCO3: 26    / Base Excess: -0.1  /  SaO2: 96                  MEDICATIONS  (STANDING):  amLODIPine   Tablet 10 milliGRAM(s) Oral daily  atorvastatin Oral Tab/Cap - Peds 40 milliGRAM(s) Oral daily  chlorhexidine 2% Cloths 1 Application(s) Topical daily  chlorhexidine 4% Liquid 1 Application(s) Topical every 12 hours  cholecalciferol 2000 Unit(s) Oral daily  dextrose 5%. 1000 milliLiter(s) (50 mL/Hr) IV Continuous <Continuous>  dextrose 50% Injectable 12.5 Gram(s) IV Push once  dextrose 50% Injectable 25 Gram(s) IV Push once  dextrose 50% Injectable 25 Gram(s) IV Push once  heparin  Injectable 5000 Unit(s) SubCutaneous every 8 hours  hydrALAZINE 100 milliGRAM(s) Oral three times a day  influenza   Vaccine 0.5 milliLiter(s) IntraMuscular once  insulin glargine Injectable (LANTUS) 16 Unit(s) SubCutaneous every morning  insulin lispro (HumaLOG) corrective regimen sliding scale   SubCutaneous three times a day before meals  insulin lispro Injectable (HumaLOG) 5 Unit(s) SubCutaneous three times a day before meals  labetalol 200 milliGRAM(s) Oral three times a day  niCARdipine Infusion 5 mG/Hr (25 mL/Hr) IV Continuous <Continuous>  pantoprazole    Tablet 40 milliGRAM(s) Oral before breakfast    MEDICATIONS  (PRN):  acetaminophen   Tablet .. 650 milliGRAM(s) Oral every 6 hours PRN Mild Pain (1 - 3)  aluminum hydroxide/magnesium hydroxide/simethicone Suspension 30 milliLiter(s) Oral every 6 hours PRN Dyspepsia  dextrose 40% Gel 15 Gram(s) Oral once PRN Blood Glucose LESS THAN 70 milliGRAM(s)/deciliter  glucagon  Injectable 1 milliGRAM(s) IntraMuscular once PRN Glucose LESS THAN 70 milligrams/deciliter      Xrays:                                                                                     ECHO

## 2019-04-06 NOTE — PROGRESS NOTE ADULT - SUBJECTIVE AND OBJECTIVE BOX
SUBJECTIVE:    Patient is a 59y old Male who presents with a chief complaint of Right sided weakness (2019 09:21)    Currently admitted to medicine with the primary diagnosis of Intraparenchymal hemorrhage of brain     Today is hospital day 4d.     This morning he is resting comfortably in bed and reports no new issues or overnight events. On BiPaP overnight. On nasal canula today with prolonged expiratory phase. He is alert to place, and person but not to time. Patient was still hypertensive overnight, on nicardipine drips, and oral labetolol and hydralazine. He is complaining of right knee and leg pain.     PAST MEDICAL & SURGICAL HISTORY  Gout  Morbidly obese  Gout  Hypertension  Diabetes mellitus  S/P tonsillectomy    SOCIAL HISTORY:  Negative for smoking/alcohol/drug use.     ALLERGIES:  No Known Allergies    MEDICATIONS:  STANDING MEDICATIONS  amLODIPine   Tablet 10 milliGRAM(s) Oral daily  atorvastatin Oral Tab/Cap - Peds 40 milliGRAM(s) Oral daily  chlorhexidine 2% Cloths 1 Application(s) Topical daily  chlorhexidine 4% Liquid 1 Application(s) Topical every 12 hours  cholecalciferol 2000 Unit(s) Oral daily  cloNIDine 0.1 milliGRAM(s) Oral two times a day  dextrose 5%. 1000 milliLiter(s) IV Continuous <Continuous>  dextrose 50% Injectable 12.5 Gram(s) IV Push once  dextrose 50% Injectable 25 Gram(s) IV Push once  dextrose 50% Injectable 25 Gram(s) IV Push once  furosemide   Injectable 60 milliGRAM(s) IV Push once  heparin  Injectable 5000 Unit(s) SubCutaneous every 8 hours  hydrALAZINE 100 milliGRAM(s) Oral three times a day  influenza   Vaccine 0.5 milliLiter(s) IntraMuscular once  insulin glargine Injectable (LANTUS) 16 Unit(s) SubCutaneous every morning  insulin lispro (HumaLOG) corrective regimen sliding scale   SubCutaneous three times a day before meals  insulin lispro Injectable (HumaLOG) 5 Unit(s) SubCutaneous three times a day before meals  labetalol 300 milliGRAM(s) Oral every 8 hours  niCARdipine Infusion 5 mG/Hr IV Continuous <Continuous>  pantoprazole    Tablet 40 milliGRAM(s) Oral before breakfast    PRN MEDICATIONS  acetaminophen   Tablet .. 650 milliGRAM(s) Oral every 6 hours PRN  aluminum hydroxide/magnesium hydroxide/simethicone Suspension 30 milliLiter(s) Oral every 6 hours PRN  dextrose 40% Gel 15 Gram(s) Oral once PRN  glucagon  Injectable 1 milliGRAM(s) IntraMuscular once PRN    VITALS:   T(F): 99.3  HR: 92  BP: 104/55  RR: 33  SpO2: 91%    LABS:                        10.9   9.45  )-----------( 204      ( 2019 04:20 )             35.5     04-06    142  |  104  |  75<HH>  ----------------------------<  248<H>  4.5   |  22  |  3.5<H>    Ca    8.4<L>      2019 04:20  Phos  4.4     04-06  Mg     2.1     04-06    TPro  5.9<L>  /  Alb  3.4<L>  /  TBili  0.5  /  DBili  0.2  /  AST  9   /  ALT  10  /  AlkPhos  73  04-06      Urinalysis Basic - ( 2019 16:34 )    Color: Yellow / Appearance: Cloudy / S.025 / pH: x  Gluc: x / Ketone: Negative  / Bili: Small / Urobili: 1.0 mg/dL   Blood: x / Protein: >=300 mg/dL / Nitrite: Negative   Leuk Esterase: Negative / RBC: x / WBC x   Sq Epi: x / Non Sq Epi: Occasional /HPF / Bacteria: x      ABG - ( 2019 23:36 )  pH, Arterial: 7.36  pH, Blood: x     /  pCO2: 46    /  pO2: 87    / HCO3: 26    / Base Excess: -0.1  /  SaO2: 96                    CARDIAC MARKERS ( 2019 11:00 )  x     / x     / 297 U/L / x     / x      CARDIAC MARKERS ( 2019 04:00 )  x     / 0.16 ng/mL / x     / x     / x          RADIOLOGY:    PHYSICAL EXAM:  GEN: No acute distress, awake, obese patient, on nasal canula  LUNGS: Decrease bilateral air entry  HEART: S1/S2 present. RRR.   ABD: Soft, non-tender, non-distended. Bowel sounds present  EXT: Mild edema. skin Intact, onychomycosis  NEURO: AAOX3

## 2019-04-06 NOTE — PROGRESS NOTE ADULT - ASSESSMENT
Assessment and Plan:   · Assessment		  Mr. Cannon is a 58 yo male patient with PMH of DM, HTN, comes to the ED on 4/3 with altered mental status and aphasia. At presentation patient was found to be aphasic and minimal right side weakness. In ED NIHSS was 15    # Left sided Frontotemporal bleed with intraparenchymal hemorrhagic associated with left ventricular bleed   - Evaluated by neurology and neurosurgery: medical management, no acute neurosurgical intervention.  - Neurosurgery: ok for baby ASA in 7-10 days, ok for hep sub q  - CT scan on 4/3: Overall unchanged left basal ganglia intraparenchymal hematoma, measuring approximately 3.9 cm in longest dimension with Stable 2 mm left to right midline shift.     # Hypertensive emergency  - SBP still > 140 with maximal dose of nicardipine drip  - Labetalol added with control of BP  - Labetalol drip stopped on 4/5 and labetalol oral was started in addition to one dose of lasix 60 mg IV daily. and hydralazine  - TTE: EF: 58%, mild pulmonary hypertension and CHEETAH showed no fluid responsiveness.   - On 4/5 labetalol dose increased to 200 mg Q 8 hours and hydralazine dose to 50 mg PO Q 8 hours. He was also started on amlodipine 10 mg PO daily.   - On 4/6 will increase labetalol dose o 300 mg Q 8 hours. Hydralazine dose increased to 100 mg Q hours. Will add lasix 60 mg IV once today. Will start clonidine 0.1 Q 12 hours. Will continue amlodipine 10 mg PO daily.    # DESI on top of CKD  - Baseline creatinine 1.9 in October 2018  - Worsening creatinine of 4, with 700 cc urine output per 24 hours after 2 doses of lasix  - On 4/6. serum creatinine improved to 3.5 with better urine output 900 cc in the last 24 hours  - Proteinuria  - Nephrology service following, no need for urgent dialysis  - Mild elevation of CK: 295    # Atrial fibrillation  - New onset, no prior history of Afib  - No RVR, rate 60-80  - On labetalol 300 mg PO Q 8 hours  - Full anticoagulation is contraindicated at this time    # Right ankle pain  - severe triny and edema  - will obtain US doppler of right LL  - Will check R ankle 3 view X rays    # DM  - Basal insulin with Lantus 18u s/c daily  - Will start Lispro as feeding started    # DLD  - Lipitor     # DVT ppx  - Heparin 5000 u sc Q 8 hours started after neurosurgery approval.    # GI ppx  - Protonix  - Diet dysphagia 3 as per speech and swallow    # Code status  Full code Assessment and Plan:   · Assessment		  Mr. Cannon is a 58 yo male patient with PMH of DM, HTN, comes to the ED on 4/3 with altered mental status and aphasia. At presentation patient was found to be aphasic and minimal right side weakness. In ED NIHSS was 15    # Left sided Frontotemporal bleed with intraparenchymal hemorrhagic associated with left ventricular bleed   - Evaluated by neurology and neurosurgery: medical management, no acute neurosurgical intervention.  - Neurosurgery: ok for baby ASA in 7-10 days, ok for hep sub q  - CT scan on 4/3: Overall unchanged left basal ganglia intraparenchymal hematoma, measuring approximately 3.9 cm in longest dimension with Stable 2 mm left to right midline shift.     # Hypertensive emergency  - SBP still > 140 with maximal dose of nicardipine drip  - Labetalol added with control of BP  - Labetalol drip stopped on 4/5 and labetalol oral was started in addition to one dose of lasix 60 mg IV daily. and hydralazine  - TTE: EF: 58%, mild pulmonary hypertension and CHEETAH showed no fluid responsiveness.   - On 4/5 labetalol dose increased to 200 mg Q 8 hours and hydralazine dose to 50 mg PO Q 8 hours. He was also started on amlodipine 10 mg PO daily.   - On 4/6 will increase labetalol dose o 300 mg Q 8 hours. Hydralazine dose increased to 100 mg Q hours. Will add lasix 60 mg IV once today. Will start clonidine 0.1 Q 12 hours. Will continue amlodipine 10 mg PO daily.    # DESI on top of CKD  - Baseline creatinine 1.9 in October 2018  - Worsening creatinine of 4, with 700 cc urine output per 24 hours after 2 doses of lasix  - On 4/6. serum creatinine improved to 3.5 with better urine output 900 cc in the last 24 hours  - Proteinuria  - Nephrology service following, no need for urgent dialysis  - Mild elevation of CK: 295    # Atrial fibrillation  - New onset, no prior history of Afib  - No RVR, rate 60-80  - On labetalol 300 mg PO Q 8 hours  - Full anticoagulation is contraindicated at this time    # Nonneutropenic fever  - Possible bibasilar pneumonia  - UA positive, urine culture ordered  - Possible gout attack with right ankle pain  - Pancultures ordered. Will start cefepime 1 g IV daily  # Right ankle pain  - severe triny and edema  - will obtain US doppler of right LL  - Will check R ankle 3 view X rays    # DM  - Basal insulin with Lantus 18u s/c daily  - Will start Lispro as feeding started    # DLD  - Lipitor     # DVT ppx  - Heparin 5000 u sc Q 8 hours started after neurosurgery approval.    # GI ppx  - Protonix  - Diet dysphagia 3 as per speech and swallow    # Code status  Full code

## 2019-04-06 NOTE — PROGRESS NOTE ADULT - ASSESSMENT
IMPRESSION:    Intraparenchymal hemorrhage  Acute on chronic hypercapnic respiratory failure improved   Probable ZE +/- OHS  DESI improving      PLAN:    CNS: no sedatives, neurosurgery and neurology follow up,     HEENT:  Oral care    PULMONARY:  HOB @ 45 degrees, oxygen to keep pulse ox>90%, NIV for now and during sleep.       CARDIOVASCULAR:  I=O, BP control.  Wean Cardene.  Add Clonidine    GI: GI prophylaxis  Feeding per  speech and swallow eval    RENAL:  F/u  lytes.  Correct as needed. accurate I/O, FU with renal     INFECTIOUS DISEASE: Repeat Cultures.  MILLY Austin     HEMATOLOGICAL:  DVT prophylaxis LE duplex stat     ENDOCRINE:  Follow up FS.  Insulin protocol if needed.    MUSCULOSKELETAL: bedrest    CODE STATUS: FULL CODE    DISPOSITION: Pt requires monitoring in the MICU    MILLY Noyola

## 2019-04-07 LAB
ALBUMIN SERPL ELPH-MCNC: 3.3 G/DL — LOW (ref 3.5–5.2)
ALP SERPL-CCNC: 78 U/L — SIGNIFICANT CHANGE UP (ref 30–115)
ALT FLD-CCNC: 12 U/L — SIGNIFICANT CHANGE UP (ref 0–41)
ANION GAP SERPL CALC-SCNC: 16 MMOL/L — HIGH (ref 7–14)
AST SERPL-CCNC: 10 U/L — SIGNIFICANT CHANGE UP (ref 0–41)
BILIRUB DIRECT SERPL-MCNC: 0.3 MG/DL — HIGH (ref 0–0.2)
BILIRUB INDIRECT FLD-MCNC: 0.4 MG/DL — SIGNIFICANT CHANGE UP (ref 0.2–1.2)
BILIRUB SERPL-MCNC: 0.7 MG/DL — SIGNIFICANT CHANGE UP (ref 0.2–1.2)
BUN SERPL-MCNC: 90 MG/DL — CRITICAL HIGH (ref 10–20)
CALCIUM SERPL-MCNC: 8.4 MG/DL — LOW (ref 8.5–10.1)
CHLORIDE SERPL-SCNC: 104 MMOL/L — SIGNIFICANT CHANGE UP (ref 98–110)
CK SERPL-CCNC: 188 U/L — SIGNIFICANT CHANGE UP (ref 0–225)
CO2 SERPL-SCNC: 20 MMOL/L — SIGNIFICANT CHANGE UP (ref 17–32)
CREAT SERPL-MCNC: 3.7 MG/DL — HIGH (ref 0.7–1.5)
CREATININE, URINE RESULT: 367 MG/DL — SIGNIFICANT CHANGE UP
GLUCOSE BLDC GLUCOMTR-MCNC: 248 MG/DL — HIGH (ref 70–99)
GLUCOSE BLDC GLUCOMTR-MCNC: 266 MG/DL — HIGH (ref 70–99)
GLUCOSE BLDC GLUCOMTR-MCNC: 293 MG/DL — HIGH (ref 70–99)
GLUCOSE BLDC GLUCOMTR-MCNC: 309 MG/DL — HIGH (ref 70–99)
GLUCOSE SERPL-MCNC: 262 MG/DL — HIGH (ref 70–99)
HCT VFR BLD CALC: 33.9 % — LOW (ref 42–52)
HGB BLD-MCNC: 10.5 G/DL — LOW (ref 14–18)
MAGNESIUM SERPL-MCNC: 2.3 MG/DL — SIGNIFICANT CHANGE UP (ref 1.8–2.4)
MCHC RBC-ENTMCNC: 26.4 PG — LOW (ref 27–31)
MCHC RBC-ENTMCNC: 31 G/DL — LOW (ref 32–37)
MCV RBC AUTO: 85.4 FL — SIGNIFICANT CHANGE UP (ref 80–94)
MYOGLOBIN UR-MCNC: 53 MCG/L — HIGH
NRBC # BLD: 0 /100 WBCS — SIGNIFICANT CHANGE UP (ref 0–0)
PHOSPHATE SERPL-MCNC: 4.9 MG/DL — SIGNIFICANT CHANGE UP (ref 2.1–4.9)
PLATELET # BLD AUTO: 202 K/UL — SIGNIFICANT CHANGE UP (ref 130–400)
POTASSIUM SERPL-MCNC: 4.5 MMOL/L — SIGNIFICANT CHANGE UP (ref 3.5–5)
POTASSIUM SERPL-SCNC: 4.5 MMOL/L — SIGNIFICANT CHANGE UP (ref 3.5–5)
PROT SERPL-MCNC: 6.3 G/DL — SIGNIFICANT CHANGE UP (ref 6–8)
RBC # BLD: 3.97 M/UL — LOW (ref 4.7–6.1)
RBC # FLD: 14.6 % — HIGH (ref 11.5–14.5)
SODIUM SERPL-SCNC: 140 MMOL/L — SIGNIFICANT CHANGE UP (ref 135–146)
URATE SERPL-MCNC: 9.9 MG/DL — HIGH (ref 3.4–8.8)
WBC # BLD: 8.52 K/UL — SIGNIFICANT CHANGE UP (ref 4.8–10.8)
WBC # FLD AUTO: 8.52 K/UL — SIGNIFICANT CHANGE UP (ref 4.8–10.8)

## 2019-04-07 PROCEDURE — 71045 X-RAY EXAM CHEST 1 VIEW: CPT | Mod: 26

## 2019-04-07 PROCEDURE — 73560 X-RAY EXAM OF KNEE 1 OR 2: CPT | Mod: 26,50

## 2019-04-07 RX ORDER — OXYCODONE AND ACETAMINOPHEN 5; 325 MG/1; MG/1
1 TABLET ORAL EVERY 6 HOURS
Qty: 0 | Refills: 0 | Status: DISCONTINUED | OUTPATIENT
Start: 2019-04-07 | End: 2019-04-09

## 2019-04-07 RX ORDER — HYDRALAZINE HCL 50 MG
100 TABLET ORAL EVERY 6 HOURS
Qty: 0 | Refills: 0 | Status: DISCONTINUED | OUTPATIENT
Start: 2019-04-07 | End: 2019-04-08

## 2019-04-07 RX ADMIN — ATORVASTATIN CALCIUM 40 MILLIGRAM(S): 80 TABLET, FILM COATED ORAL at 22:37

## 2019-04-07 RX ADMIN — Medication 300 MILLIGRAM(S): at 22:36

## 2019-04-07 RX ADMIN — CEFEPIME 100 MILLIGRAM(S): 1 INJECTION, POWDER, FOR SOLUTION INTRAMUSCULAR; INTRAVENOUS at 17:33

## 2019-04-07 RX ADMIN — Medication 2000 UNIT(S): at 12:54

## 2019-04-07 RX ADMIN — Medication 300 MILLIGRAM(S): at 06:01

## 2019-04-07 RX ADMIN — Medication 5 UNIT(S): at 12:51

## 2019-04-07 RX ADMIN — CHLORHEXIDINE GLUCONATE 1 APPLICATION(S): 213 SOLUTION TOPICAL at 06:00

## 2019-04-07 RX ADMIN — Medication 5 UNIT(S): at 10:29

## 2019-04-07 RX ADMIN — Medication 0.1 MILLIGRAM(S): at 06:00

## 2019-04-07 RX ADMIN — HEPARIN SODIUM 5000 UNIT(S): 5000 INJECTION INTRAVENOUS; SUBCUTANEOUS at 06:00

## 2019-04-07 RX ADMIN — Medication 5 UNIT(S): at 16:47

## 2019-04-07 RX ADMIN — Medication 3: at 16:48

## 2019-04-07 RX ADMIN — AMLODIPINE BESYLATE 10 MILLIGRAM(S): 2.5 TABLET ORAL at 06:00

## 2019-04-07 RX ADMIN — Medication 3: at 10:28

## 2019-04-07 RX ADMIN — Medication 100 MILLIGRAM(S): at 06:01

## 2019-04-07 RX ADMIN — PANTOPRAZOLE SODIUM 40 MILLIGRAM(S): 20 TABLET, DELAYED RELEASE ORAL at 06:02

## 2019-04-07 RX ADMIN — Medication 20 MILLIGRAM(S): at 12:54

## 2019-04-07 RX ADMIN — Medication 0.1 MILLIGRAM(S): at 13:32

## 2019-04-07 RX ADMIN — HEPARIN SODIUM 5000 UNIT(S): 5000 INJECTION INTRAVENOUS; SUBCUTANEOUS at 22:37

## 2019-04-07 RX ADMIN — INSULIN GLARGINE 18 UNIT(S): 100 INJECTION, SOLUTION SUBCUTANEOUS at 12:51

## 2019-04-07 RX ADMIN — HEPARIN SODIUM 5000 UNIT(S): 5000 INJECTION INTRAVENOUS; SUBCUTANEOUS at 13:32

## 2019-04-07 RX ADMIN — Medication 100 MILLIGRAM(S): at 22:36

## 2019-04-07 RX ADMIN — Medication 2: at 12:51

## 2019-04-07 RX ADMIN — Medication 0.1 MILLIGRAM(S): at 22:36

## 2019-04-07 RX ADMIN — Medication 300 MILLIGRAM(S): at 13:32

## 2019-04-07 NOTE — PROGRESS NOTE ADULT - SUBJECTIVE AND OBJECTIVE BOX
Nephrology progress note    Patient is seen and examined, events over the last 24 h noted.  No new complaints.    Allergies:  No Known Allergies    Hospital Medications:   MEDICATIONS  (STANDING):  amLODIPine   Tablet 10 milliGRAM(s) Oral daily  atorvastatin Oral Tab/Cap - Peds 40 milliGRAM(s) Oral daily  cefepime   IVPB 1000 milliGRAM(s) IV Intermittent every 24 hours  chlorhexidine 2% Cloths 1 Application(s) Topical daily  chlorhexidine 4% Liquid 1 Application(s) Topical every 12 hours  cholecalciferol 2000 Unit(s) Oral daily  cloNIDine 0.1 milliGRAM(s) Oral three times a day  dextrose 5%. 1000 milliLiter(s) (50 mL/Hr) IV Continuous <Continuous>  heparin  Injectable 5000 Unit(s) SubCutaneous every 8 hours  hydrALAZINE 100 milliGRAM(s) Oral three times a day  influenza   Vaccine 0.5 milliLiter(s) IntraMuscular once  insulin glargine Injectable (LANTUS) 18 Unit(s) SubCutaneous every morning  insulin lispro (HumaLOG) corrective regimen sliding scale   SubCutaneous three times a day before meals  insulin lispro Injectable (HumaLOG) 5 Unit(s) SubCutaneous three times a day before meals  labetalol 300 milliGRAM(s) Oral every 8 hours  pantoprazole    Tablet 40 milliGRAM(s) Oral before breakfast  predniSONE   Tablet 20 milliGRAM(s) Oral daily        VITALS:  T(F): 97.7 (19 @ 08:00), Max: 101.2 (19 @ 15:45)  HR: 80 (19 @ 08:00)  BP: 122/68 (19 @ 08:00)  RR: 30 (19 @ 08:00)  SpO2: 95% (19 @ 08:00)       @ 07:01  -   @ 07:00  --------------------------------------------------------  IN: 1797.5 mL / OUT: 990 mL / NET: 807.5 mL     @ 07:01  -   @ 07:00  --------------------------------------------------------  IN: 1020 mL / OUT: 830 mL / NET: 190 mL     @ 07:01  -   @ 13:17  --------------------------------------------------------  IN: 25 mL / OUT: 175 mL / NET: -150 mL          PHYSICAL EXAM:  Constitutional: NAD  Respiratory: CTAB, no wheezes, rales or rhonchi  Cardiovascular: S1, S2, RRR  Gastrointestinal: BS+, soft, NT/ND  Extremities: minimal peripheral edema  :  No khan.       LABS:      140  |  104  |  90<HH>  ----------------------------<  262<H>  4.5   |  20  |  3.7<H>    Ca    8.4<L>      2019 04:20  Phos  4.9       Mg     2.3         TPro  6.3  /  Alb  3.3<L>  /  TBili  0.7  /  DBili  0.3<H>  /  AST  10  /  ALT  12  /  AlkPhos  78                            10.5   8.52  )-----------( 202      ( 2019 04:20 )             33.9       Urine Studies:  Urinalysis Basic - ( 2019 17:00 )    Color: Yellow / Appearance: Turbid / S.020 / pH:   Gluc:  / Ketone: Negative  / Bili: Negative / Urobili: 1.0 mg/dL   Blood:  / Protein: 100 mg/dL / Nitrite: Negative   Leuk Esterase: Small / RBC:  / WBC 6-10 /HPF   Sq Epi:  / Non Sq Epi: Few /HPF / Bacteria:       Creatinine, Random Urine: 249 mg/dL ( @ 11:42)  Protein/Creatinine Ratio Calculation: >1.5 Ratio ( @ 16:34)  Creatinine, Random Urine: 378 mg/dL ( @ 16:34)    RADIOLOGY & ADDITIONAL STUDIES:

## 2019-04-07 NOTE — PROGRESS NOTE ADULT - ASSESSMENT
60 yo male patient with PMH of DM, HTN, Likely underlying CKD (Cr was 2.4 when presented) presented on 4/2 with AMS and aphasia, found w/ Left sided Frontotemporal bleed with intraparenchymal hemorrhagic associated with left ventricular bleed  # creatinine stabilizing around 3.7  # non oliguric  # off nicardip  # BP at goal, keep current  # ph at goal  # No indication for RRT   # will follow

## 2019-04-07 NOTE — PROVIDER CONTACT NOTE (OTHER) - ASSESSMENT
pt in Atrial fibrillation.modwerate ventricularresponse... asymptomatic..... no known hx of AF
Pt refusing to answer any questions regarding orientation, very nasty and belligerent. When asked his name, where he is, or what the situation is his response it "talk to my , I didn't do nothing wrong". Pt told he's not in trouble RN just wants to assess his mental status, pt still refusing. Very aggressive.

## 2019-04-07 NOTE — PROVIDER CONTACT NOTE (OTHER) - BACKGROUND
Pt was restrained at thie time and restraint check was just done, pt was calm at that time but know to have periods of awakening and restlessness where RN would explain situation and pt would nod

## 2019-04-07 NOTE — PROVIDER CONTACT NOTE (OTHER) - SITUATION
Pt found awake and alert with RTT on his chest. Fentanyl gtt immediately stopped, disconnected, and removed from room. Pt placed on 100%NRB.

## 2019-04-07 NOTE — PROGRESS NOTE ADULT - ASSESSMENT
60 yo M with PMHx of HTN, DM, CAD on aspirin, ZE who was BIBEMS for ams and aphasia. Stroked code called, had NIHSS 15. CTH showed L frontotemporal intraparenchymal bleed with extension into the ventricle and 2mm shift to the R. Admitted to ICU s/p platelets, DDAVP, nicardipine gtt.    #L frontotemporal intraparenchymal bleed with 2mm shift   - no acute neurosurgical intervention  - per neurosurgery: ok for baby ASA in 7-10 days, ok for heparin subQ  - CTH 4/2: L frontotemporal intraparenchymal bleed with extension into the ventricle and 2mm shift to the R  - CTH 4/3: no change  - CTH (repeat #2) 4/3: no change  - CTH 4/6: no change    #Hypertensive emergency  - goal BP < 160  - labetalol 300 mg TID, amlodipine 10 mg daily, hydralazine 100 mg TID  - wean nicardipine gtt  - increase clonidine to 0.1 mg TID    #DESI on CKD  - baseline Cr 1.9 in in 10/2018  - stable Cr 3.7 from 3.5 yesterday  - renal US 4/3: no hydronephrosis, L renal cyst  - per nephro: no need for urgent dialysis    #New onset afib  - rate controlled  - labetalol 300 mg TID  - full anticoagulation is contraindicated at this time given ICH    #Fever  - possible bibasilar pneumonia vs gout attack  - urine cx 4/2: negative  - uric acid 9.9  - f/u cx  - c/w cefepime  - start prednisone 20 mg x 5 days for empiric tx of gout    #R ankle pain/swelling  #Bilateral knee pain  - BLE duplex 4/6: negative  - will obtain xrays of bilateral knees  - ortho consult  - start prednisone 20 mg x 5 days for empiric tx of gout  - avoid NSAIDs for pain given DESI    #DM  - basal insulin with lantus 18u subQ  - lispro    #HLD  - lipitor    #DVT ppx  - heparin 5000u subQ Q8H    #GI ppx  - protonix  - diet: dysphagia 3 as per speech and swallow    #Dispo  - from home  - full code  - MICU

## 2019-04-07 NOTE — PROGRESS NOTE ADULT - SUBJECTIVE AND OBJECTIVE BOX
Patient is a 59y old  Male who presents with a chief complaint of Right sided weakness (2019 13:23)        Over Night Events: NO events.  Follows commands.  LE duplex negative.  Knee pain         ROS:  See HPI    PHYSICAL EXAM    ICU Vital Signs Last 24 Hrs  T(C): 37.3 (2019 00:00), Max: 38.4 (2019 15:45)  T(F): 99.2 (2019 00:00), Max: 101.2 (2019 15:45)  HR: 84 (2019 07:30) (80 - 100)  BP: 133/60 (2019 07:30) (80/59 - 158/67)  BP(mean): 82 (2019 07:30) (61 - 114)  ABP: 138/66 (2019 17:00) (130/66 - 156/74)  ABP(mean): 86 (2019 16:45) (74 - 104)  RR: 31 (2019 07:30) (23 - 41)  SpO2: 93% (2019 07:30) (85% - 99%)      General: In NAD   HEENT: No thrush        Lymphatic system: No cervical LN   Lungs: Bilateral BS  Cardiovascular: Regular   Gastrointestinal: Soft, Positive BS  Extremities: No clubbing.  Moves upper extremities.  Full Range of motion   Skin: Warm, intact  Neurological: MOves UE       19 @ 07:  -  19 @ 07:00  --------------------------------------------------------  IN:    niCARdipine Infusion: 1020 mL  Total IN: 1020 mL    OUT:    Indwelling Catheter - Urethral: 180 mL    Voided: 650 mL  Total OUT: 830 mL    Total NET: 190 mL      19 @ 07:  -  19 @ 09:08  --------------------------------------------------------  IN:    niCARdipine Infusion: 25 mL  Total IN: 25 mL    OUT:    Voided: 175 mL  Total OUT: 175 mL    Total NET: -150 mL          LABS:                            10.5   8.52  )-----------( 202      ( 2019 04:20 )             33.9                                               04-07    140  |  104  |  90<HH>  ----------------------------<  262<H>  4.5   |  20  |  3.7<H>    2019 04:20    140    |  104    |  90<HH>  ----------------------------<  262<H>  4.5     |  20     |  3.7<H>  2019 04:20    142    |  104    |  75<HH>  ----------------------------<  248<H>  4.5     |  22     |  3.5<H>    Ca    8.4<L>      2019 04:20  Ca    8.4<L>      2019 04:20  Phos  4.9       2019 04:20  Phos  4.4       2019 04:20  Mg     2.3       2019 04:20  Mg     2.1       2019 04:20    TPro  6.3    /  Alb  3.3<L>  /  TBili  0.7    /  DBili  0.3<H>  /  AST  10     /  ALT  12     /  AlkPhos  78     2019 04:20  TPro  5.9<L>  /  Alb  3.4<L>  /  TBili  0.5    /  DBili  0.2    /  AST  9      /  ALT  10     /  AlkPhos  73     2019 04:20      Ca    8.4<L>      2019 04:20  Phos  4.9     -  Mg     2.3     -    TPro  6.3  /  Alb  3.3<L>  /  TBili  0.7  /  DBili  0.3<H>  /  AST  10  /  ALT  12  /  AlkPhos  78  04-07                                             Urinalysis Basic - ( 2019 17:00 )    Color: Yellow / Appearance: Turbid / S.020 / pH: x  Gluc: x / Ketone: Negative  / Bili: Negative / Urobili: 1.0 mg/dL   Blood: x / Protein: 100 mg/dL / Nitrite: Negative   Leuk Esterase: Small / RBC: x / WBC 6-10 /HPF   Sq Epi: x / Non Sq Epi: Few /HPF / Bacteria: x        CARDIAC MARKERS ( 2019 04:20 )  x     / x     / 188 U/L / x     / x      CARDIAC MARKERS ( 2019 11:00 )  x     / x     / 297 U/L / x     / x                                                LIVER FUNCTIONS - ( 2019 04:20 )  Alb: 3.3 g/dL / Pro: 6.3 g/dL / ALK PHOS: 78 U/L / ALT: 12 U/L / AST: 10 U/L / GGT: x                                                                                                                                       MEDICATIONS  (STANDING):  amLODIPine   Tablet 10 milliGRAM(s) Oral daily  atorvastatin Oral Tab/Cap - Peds 40 milliGRAM(s) Oral daily  cefepime   IVPB 1000 milliGRAM(s) IV Intermittent every 24 hours  chlorhexidine 2% Cloths 1 Application(s) Topical daily  chlorhexidine 4% Liquid 1 Application(s) Topical every 12 hours  cholecalciferol 2000 Unit(s) Oral daily  cloNIDine 0.1 milliGRAM(s) Oral two times a day  dextrose 5%. 1000 milliLiter(s) (50 mL/Hr) IV Continuous <Continuous>  dextrose 50% Injectable 12.5 Gram(s) IV Push once  dextrose 50% Injectable 25 Gram(s) IV Push once  dextrose 50% Injectable 25 Gram(s) IV Push once  heparin  Injectable 5000 Unit(s) SubCutaneous every 8 hours  hydrALAZINE 100 milliGRAM(s) Oral three times a day  influenza   Vaccine 0.5 milliLiter(s) IntraMuscular once  insulin glargine Injectable (LANTUS) 18 Unit(s) SubCutaneous every morning  insulin lispro (HumaLOG) corrective regimen sliding scale   SubCutaneous three times a day before meals  insulin lispro Injectable (HumaLOG) 5 Unit(s) SubCutaneous three times a day before meals  labetalol 300 milliGRAM(s) Oral every 8 hours  pantoprazole    Tablet 40 milliGRAM(s) Oral before breakfast    MEDICATIONS  (PRN):  acetaminophen   Tablet .. 650 milliGRAM(s) Oral every 6 hours PRN Mild Pain (1 - 3)  aluminum hydroxide/magnesium hydroxide/simethicone Suspension 30 milliLiter(s) Oral every 6 hours PRN Dyspepsia  dextrose 40% Gel 15 Gram(s) Oral once PRN Blood Glucose LESS THAN 70 milliGRAM(s)/deciliter  glucagon  Injectable 1 milliGRAM(s) IntraMuscular once PRN Glucose LESS THAN 70 milligrams/deciliter      Xrays:                                                                                     ECHO

## 2019-04-07 NOTE — PROGRESS NOTE ADULT - SUBJECTIVE AND OBJECTIVE BOX
LENGTH OF HOSPITAL STAY: 5d    CHIEF COMPLAINT:   Patient is a 58 yo M who presents with a chief complaint of R sided weakness.    Overnight became agitated so was given haldol. Yesterday had R ankle pain so had duplex which was negative. Today complaining of bilateral knee pain.    HISTORY OF PRESENTING ILLNESS:   58 yo M with PMHx of HTN, DM, CAD on aspirin, ZE who was BIBEMS for ams and aphasia. He was completely fine at 3pm on day of presentation until the wife heard a nose upstairs and found him on the ground. He was aphasic, R sided weakness, and was not following commands so the wife called EMS. In the ED, stroke code was called and he had NIHSS 15. CTH showed L frontotemporal intraparenchymal bleed with extension into the ventricle and 2mm shift to the R. He received platelets and DDAVP for aspirin reversal. Initial BP was in 200s so a-line was placed and pt started on nicardipine gtt. Admitted to the ICU.     PAST MEDICAL & SURGICAL HISTORY  PAST MEDICAL & SURGICAL HISTORY:  Gout  Morbidly obese  Gout  Hypertension  Diabetes mellitus  S/P tonsillectomy    SOCIAL HISTORY:    ALLERGIES:  No Known Allergies    MEDICATIONS:  STANDING MEDICATIONS  amLODIPine   Tablet 10 milliGRAM(s) Oral daily  atorvastatin Oral Tab/Cap - Peds 40 milliGRAM(s) Oral daily  cefepime   IVPB 1000 milliGRAM(s) IV Intermittent every 24 hours  chlorhexidine 2% Cloths 1 Application(s) Topical daily  chlorhexidine 4% Liquid 1 Application(s) Topical every 12 hours  cholecalciferol 2000 Unit(s) Oral daily  cloNIDine 0.1 milliGRAM(s) Oral three times a day  dextrose 5%. 1000 milliLiter(s) IV Continuous <Continuous>  dextrose 50% Injectable 12.5 Gram(s) IV Push once  dextrose 50% Injectable 25 Gram(s) IV Push once  dextrose 50% Injectable 25 Gram(s) IV Push once  heparin  Injectable 5000 Unit(s) SubCutaneous every 8 hours  hydrALAZINE 100 milliGRAM(s) Oral three times a day  influenza   Vaccine 0.5 milliLiter(s) IntraMuscular once  insulin glargine Injectable (LANTUS) 18 Unit(s) SubCutaneous every morning  insulin lispro (HumaLOG) corrective regimen sliding scale   SubCutaneous three times a day before meals  insulin lispro Injectable (HumaLOG) 5 Unit(s) SubCutaneous three times a day before meals  labetalol 300 milliGRAM(s) Oral every 8 hours  pantoprazole    Tablet 40 milliGRAM(s) Oral before breakfast  predniSONE   Tablet 20 milliGRAM(s) Oral daily    PRN MEDICATIONS  acetaminophen   Tablet .. 650 milliGRAM(s) Oral every 6 hours PRN  aluminum hydroxide/magnesium hydroxide/simethicone Suspension 30 milliLiter(s) Oral every 6 hours PRN  dextrose 40% Gel 15 Gram(s) Oral once PRN  glucagon  Injectable 1 milliGRAM(s) IntraMuscular once PRN  oxyCODONE    5 mG/acetaminophen 325 mG 1 Tablet(s) Oral every 6 hours PRN    VITALS:   T(F): 97.7  HR: 76  BP: 121/66  RR: 29  SpO2: 95%    LABS:                        10.5   8.52  )-----------( 202      ( 2019 04:20 )             33.9     04-07    140  |  104  |  90<HH>  ----------------------------<  262<H>  4.5   |  20  |  3.7<H>    Ca    8.4<L>      2019 04:20  Phos  4.9     -  Mg     2.3     -    TPro  6.3  /  Alb  3.3<L>  /  TBili  0.7  /  DBili  0.3<H>  /  AST  10  /  ALT  12  /  AlkPhos  78  -07      Urinalysis Basic - ( 2019 17:00 )    Color: Yellow / Appearance: Turbid / S.020 / pH: x  Gluc: x / Ketone: Negative  / Bili: Negative / Urobili: 1.0 mg/dL   Blood: x / Protein: 100 mg/dL / Nitrite: Negative   Leuk Esterase: Small / RBC: x / WBC 6-10 /HPF   Sq Epi: x / Non Sq Epi: Few /HPF / Bacteria: x        Creatine Kinase, Serum: 188 U/L (19 @ 04:20)      CARDIAC MARKERS ( 2019 04:20 )  x     / x     / 188 U/L / x     / x          RADIOLOGY:    PHYSICAL EXAM:  GEN: no acute distress  HEENT: moist mucous membranes   LUNGS: clear to auscultation bilaterally   HEART: S1/S2 present, RRR  ABD: soft, nontender, nondistended, bowel sounds present  EXT:  swelling of R ankle  NEURO: AAOX3, moves all extremities

## 2019-04-07 NOTE — PROGRESS NOTE ADULT - ASSESSMENT
IMPRESSION:    Intraparenchymal hemorrhage  Acute on chronic hypercapnic respiratory failure improved   Probable ZE +/- OHS  DESI improving      PLAN:    CNS: no sedatives, neurosurgery and neurology follow up,     HEENT:  Oral care    PULMONARY:  HOB @ 45 degrees, oxygen to keep pulse ox>90%, NIV during sleep.       CARDIOVASCULAR:  I=O, BP control.  Wean Cardene.  Increase Clonidine    GI: GI prophylaxis  Feeding per  speech and swallow eval    RENAL:  F/u  lytes.  Correct as needed. accurate I/O, FU with renal     INFECTIOUS DISEASE: Repeat Cultures.  MILLY Austin     HEMATOLOGICAL:  DVT prophylaxis    ENDOCRINE:  Follow up FS.  Insulin protocol if needed.    MUSCULOSKELETAL: bedrest    CODE STATUS: FULL CODE    DISPOSITION: Pt requires monitoring in the MICU    DW family at bed side

## 2019-04-08 LAB
% ALBUMIN: 52.4 % — SIGNIFICANT CHANGE UP
% ALPHA 1: 6.5 % — SIGNIFICANT CHANGE UP
% ALPHA 2: 13 % — SIGNIFICANT CHANGE UP
% BETA: 14.7 % — SIGNIFICANT CHANGE UP
% GAMMA: 13.4 % — SIGNIFICANT CHANGE UP
ALBUMIN SERPL ELPH-MCNC: 3.1 G/DL — LOW (ref 3.5–5.2)
ALBUMIN SERPL ELPH-MCNC: 3.4 G/DL — LOW (ref 3.6–5.5)
ALBUMIN/GLOB SERPL ELPH: 1.1 RATIO — SIGNIFICANT CHANGE UP
ALP SERPL-CCNC: 81 U/L — SIGNIFICANT CHANGE UP (ref 30–115)
ALPHA1 GLOB SERPL ELPH-MCNC: 0.4 G/DL — SIGNIFICANT CHANGE UP (ref 0.1–0.4)
ALPHA2 GLOB SERPL ELPH-MCNC: 0.8 G/DL — SIGNIFICANT CHANGE UP (ref 0.5–1)
ALT FLD-CCNC: 12 U/L — SIGNIFICANT CHANGE UP (ref 0–41)
ANION GAP SERPL CALC-SCNC: 15 MMOL/L — HIGH (ref 7–14)
AST SERPL-CCNC: 9 U/L — SIGNIFICANT CHANGE UP (ref 0–41)
B-GLOBULIN SERPL ELPH-MCNC: 0.9 G/DL — SIGNIFICANT CHANGE UP (ref 0.5–1)
BILIRUB SERPL-MCNC: 0.4 MG/DL — SIGNIFICANT CHANGE UP (ref 0.2–1.2)
BUN SERPL-MCNC: 99 MG/DL — CRITICAL HIGH (ref 10–20)
CALCIUM SERPL-MCNC: 8.2 MG/DL — LOW (ref 8.5–10.1)
CHLORIDE SERPL-SCNC: 105 MMOL/L — SIGNIFICANT CHANGE UP (ref 98–110)
CO2 SERPL-SCNC: 23 MMOL/L — SIGNIFICANT CHANGE UP (ref 17–32)
CREAT SERPL-MCNC: 3.6 MG/DL — HIGH (ref 0.7–1.5)
ESTIMATED AVERAGE GLUCOSE: 203 MG/DL — HIGH (ref 68–114)
GAMMA GLOBULIN: 0.9 G/DL — SIGNIFICANT CHANGE UP (ref 0.6–1.6)
GLUCOSE BLDC GLUCOMTR-MCNC: 179 MG/DL — HIGH (ref 70–99)
GLUCOSE BLDC GLUCOMTR-MCNC: 225 MG/DL — HIGH (ref 70–99)
GLUCOSE BLDC GLUCOMTR-MCNC: 235 MG/DL — HIGH (ref 70–99)
GLUCOSE BLDC GLUCOMTR-MCNC: 249 MG/DL — HIGH (ref 70–99)
GLUCOSE BLDC GLUCOMTR-MCNC: 251 MG/DL — HIGH (ref 70–99)
GLUCOSE SERPL-MCNC: 259 MG/DL — HIGH (ref 70–99)
HBA1C BLD-MCNC: 8.7 % — HIGH (ref 4–5.6)
HCT VFR BLD CALC: 33.6 % — LOW (ref 42–52)
HGB BLD-MCNC: 10.1 G/DL — LOW (ref 14–18)
INTERPRETATION 24H UR IFE-IMP: SIGNIFICANT CHANGE UP
INTERPRETATION SERPL IFE-IMP: SIGNIFICANT CHANGE UP
M PROTEIN 24H UR ELPH-MRATE: SIGNIFICANT CHANGE UP
MAGNESIUM SERPL-MCNC: 2.6 MG/DL — HIGH (ref 1.8–2.4)
MCHC RBC-ENTMCNC: 26 PG — LOW (ref 27–31)
MCHC RBC-ENTMCNC: 30.1 G/DL — LOW (ref 32–37)
MCV RBC AUTO: 86.6 FL — SIGNIFICANT CHANGE UP (ref 80–94)
NRBC # BLD: 0 /100 WBCS — SIGNIFICANT CHANGE UP (ref 0–0)
PHOSPHATE SERPL-MCNC: 5.8 MG/DL — HIGH (ref 2.1–4.9)
PLATELET # BLD AUTO: 214 K/UL — SIGNIFICANT CHANGE UP (ref 130–400)
POTASSIUM SERPL-MCNC: 4.9 MMOL/L — SIGNIFICANT CHANGE UP (ref 3.5–5)
POTASSIUM SERPL-SCNC: 4.9 MMOL/L — SIGNIFICANT CHANGE UP (ref 3.5–5)
PROT ?TM UR-MCNC: SIGNIFICANT CHANGE UP (ref 0–12)
PROT ?TM UR-MCNC: SIGNIFICANT CHANGE UP MG/DL (ref 0–12)
PROT PATTERN 24H UR ELPH-IMP: SIGNIFICANT CHANGE UP
PROT PATTERN SERPL ELPH-IMP: SIGNIFICANT CHANGE UP
PROT SERPL-MCNC: 6.1 G/DL — SIGNIFICANT CHANGE UP (ref 6–8)
RBC # BLD: 3.88 M/UL — LOW (ref 4.7–6.1)
RBC # FLD: 14.6 % — HIGH (ref 11.5–14.5)
SODIUM SERPL-SCNC: 143 MMOL/L — SIGNIFICANT CHANGE UP (ref 135–146)
URINE CREATININE CALCULATION: SIGNIFICANT CHANGE UP G/24 H (ref 1–2)
WBC # BLD: 7.8 K/UL — SIGNIFICANT CHANGE UP (ref 4.8–10.8)
WBC # FLD AUTO: 7.8 K/UL — SIGNIFICANT CHANGE UP (ref 4.8–10.8)

## 2019-04-08 PROCEDURE — 71045 X-RAY EXAM CHEST 1 VIEW: CPT | Mod: 26

## 2019-04-08 RX ORDER — CALCIUM ACETATE 667 MG
667 TABLET ORAL
Qty: 0 | Refills: 0 | Status: DISCONTINUED | OUTPATIENT
Start: 2019-04-08 | End: 2019-04-29

## 2019-04-08 RX ORDER — FUROSEMIDE 40 MG
40 TABLET ORAL DAILY
Qty: 0 | Refills: 0 | Status: DISCONTINUED | OUTPATIENT
Start: 2019-04-08 | End: 2019-04-13

## 2019-04-08 RX ORDER — HYDRALAZINE HCL 50 MG
100 TABLET ORAL EVERY 8 HOURS
Qty: 0 | Refills: 0 | Status: DISCONTINUED | OUTPATIENT
Start: 2019-04-08 | End: 2019-04-29

## 2019-04-08 RX ADMIN — HEPARIN SODIUM 5000 UNIT(S): 5000 INJECTION INTRAVENOUS; SUBCUTANEOUS at 22:27

## 2019-04-08 RX ADMIN — Medication 5 UNIT(S): at 13:03

## 2019-04-08 RX ADMIN — Medication 0.1 MILLIGRAM(S): at 06:09

## 2019-04-08 RX ADMIN — AMLODIPINE BESYLATE 10 MILLIGRAM(S): 2.5 TABLET ORAL at 06:09

## 2019-04-08 RX ADMIN — CHLORHEXIDINE GLUCONATE 1 APPLICATION(S): 213 SOLUTION TOPICAL at 17:07

## 2019-04-08 RX ADMIN — Medication 5 UNIT(S): at 06:45

## 2019-04-08 RX ADMIN — Medication 5 UNIT(S): at 16:50

## 2019-04-08 RX ADMIN — Medication 667 MILLIGRAM(S): at 17:25

## 2019-04-08 RX ADMIN — INSULIN GLARGINE 18 UNIT(S): 100 INJECTION, SOLUTION SUBCUTANEOUS at 09:17

## 2019-04-08 RX ADMIN — Medication 100 MILLIGRAM(S): at 13:15

## 2019-04-08 RX ADMIN — Medication 2000 UNIT(S): at 13:02

## 2019-04-08 RX ADMIN — Medication 100 MILLIGRAM(S): at 22:30

## 2019-04-08 RX ADMIN — Medication 300 MILLIGRAM(S): at 06:10

## 2019-04-08 RX ADMIN — Medication 0.1 MILLIGRAM(S): at 22:26

## 2019-04-08 RX ADMIN — HEPARIN SODIUM 5000 UNIT(S): 5000 INJECTION INTRAVENOUS; SUBCUTANEOUS at 06:11

## 2019-04-08 RX ADMIN — Medication 20 MILLIGRAM(S): at 06:09

## 2019-04-08 RX ADMIN — Medication 2: at 06:37

## 2019-04-08 RX ADMIN — CEFEPIME 100 MILLIGRAM(S): 1 INJECTION, POWDER, FOR SOLUTION INTRAMUSCULAR; INTRAVENOUS at 17:07

## 2019-04-08 RX ADMIN — Medication 2: at 13:03

## 2019-04-08 RX ADMIN — Medication 300 MILLIGRAM(S): at 13:14

## 2019-04-08 RX ADMIN — Medication 100 MILLIGRAM(S): at 06:09

## 2019-04-08 RX ADMIN — Medication 2: at 16:49

## 2019-04-08 RX ADMIN — ATORVASTATIN CALCIUM 40 MILLIGRAM(S): 80 TABLET, FILM COATED ORAL at 22:26

## 2019-04-08 RX ADMIN — Medication 0.1 MILLIGRAM(S): at 13:15

## 2019-04-08 RX ADMIN — CHLORHEXIDINE GLUCONATE 1 APPLICATION(S): 213 SOLUTION TOPICAL at 06:10

## 2019-04-08 RX ADMIN — PANTOPRAZOLE SODIUM 40 MILLIGRAM(S): 20 TABLET, DELAYED RELEASE ORAL at 06:10

## 2019-04-08 RX ADMIN — Medication 667 MILLIGRAM(S): at 13:14

## 2019-04-08 RX ADMIN — Medication 300 MILLIGRAM(S): at 22:25

## 2019-04-08 RX ADMIN — HEPARIN SODIUM 5000 UNIT(S): 5000 INJECTION INTRAVENOUS; SUBCUTANEOUS at 13:15

## 2019-04-08 RX ADMIN — Medication 40 MILLIGRAM(S): at 13:14

## 2019-04-08 NOTE — PHYSICAL THERAPY INITIAL EVALUATION ADULT - ACTIVE RANGE OF MOTION EXAMINATION, REHAB EVAL
R hip 1/4 AROM, R knee 1/4 AROM 2* to pain / edema, R ankle 1/4 AROM with edema, L hip 1/4 AROM , L knee 1/2 AROM , L ankle 1/4 AROM, B shoulders ~ 90 degrees, elbows 3/4 AROM, digits with edema unable to fully flex

## 2019-04-08 NOTE — CONSULT NOTE ADULT - SUBJECTIVE AND OBJECTIVE BOX
HPI:  60 y/o M with hx of HTN, DM, CAD, Sleep Apnea BIBEMS for altered mental status and aphasia. At presentation patient was found to be aphasic and some right side weakness. He was not following the commands so wife called the EMS. He was completely fine at 3:00 pm today. Wife herd the noise upstairs and found him on the ground unresponsive. So she called an EMS.     In the ED stroke code was called and NIH SS was 15 with right sided weakness. The CT scan showed the left intra parenchymal bleed with the extend into the ventricle associated with the midline shift. Pt received platelets and DDAVP for the aspirin reversal. The blood pressure was in 200s . Pt had jhonatan and on nicardin drip for the close bp control. (2019 22:24)      PAST MEDICAL & SURGICAL HISTORY:  Gout  Morbidly obese  Gout  Hypertension  Diabetes mellitus  S/P tonsillectomy      Hospital Course:    TODAY'S SUBJECTIVE & REVIEW OF SYMPTOMS:     Constitutional WNL   Cardio WNL   Resp WNL   GI WNL  Heme WNL  Endo WNL  Skin WNL  MSK sharon feet pain  Neuro right side weakness  Cognitive WNL  Psych WNL      MEDICATIONS  (STANDING):  amLODIPine   Tablet 10 milliGRAM(s) Oral daily  atorvastatin Oral Tab/Cap - Peds 40 milliGRAM(s) Oral daily  calcium acetate 667 milliGRAM(s) Oral three times a day with meals  cefepime   IVPB 1000 milliGRAM(s) IV Intermittent every 24 hours  chlorhexidine 2% Cloths 1 Application(s) Topical daily  chlorhexidine 4% Liquid 1 Application(s) Topical every 12 hours  cholecalciferol 2000 Unit(s) Oral daily  cloNIDine 0.1 milliGRAM(s) Oral three times a day  dextrose 5%. 1000 milliLiter(s) (50 mL/Hr) IV Continuous <Continuous>  dextrose 50% Injectable 12.5 Gram(s) IV Push once  dextrose 50% Injectable 25 Gram(s) IV Push once  dextrose 50% Injectable 25 Gram(s) IV Push once  furosemide    Tablet 40 milliGRAM(s) Oral daily  heparin  Injectable 5000 Unit(s) SubCutaneous every 8 hours  hydrALAZINE 100 milliGRAM(s) Oral every 8 hours  influenza   Vaccine 0.5 milliLiter(s) IntraMuscular once  insulin glargine Injectable (LANTUS) 18 Unit(s) SubCutaneous every morning  insulin lispro (HumaLOG) corrective regimen sliding scale   SubCutaneous three times a day before meals  insulin lispro Injectable (HumaLOG) 5 Unit(s) SubCutaneous three times a day before meals  labetalol 300 milliGRAM(s) Oral every 8 hours  pantoprazole    Tablet 40 milliGRAM(s) Oral before breakfast  predniSONE   Tablet 20 milliGRAM(s) Oral daily    MEDICATIONS  (PRN):  acetaminophen   Tablet .. 650 milliGRAM(s) Oral every 6 hours PRN Mild Pain (1 - 3)  aluminum hydroxide/magnesium hydroxide/simethicone Suspension 30 milliLiter(s) Oral every 6 hours PRN Dyspepsia  dextrose 40% Gel 15 Gram(s) Oral once PRN Blood Glucose LESS THAN 70 milliGRAM(s)/deciliter  glucagon  Injectable 1 milliGRAM(s) IntraMuscular once PRN Glucose LESS THAN 70 milligrams/deciliter  oxyCODONE    5 mG/acetaminophen 325 mG 1 Tablet(s) Oral every 6 hours PRN Severe Pain (7 - 10)      FAMILY HISTORY:  FH: CABG (coronary artery bypass surgery)  FH: stroke      Allergies    No Known Allergies    Intolerances        SOCIAL HISTORY:    [  ] Etoh  [  ] Smoking  [  ] Substance abuse     Home Environment:  [  ] Home Alone  [ x ] Lives with Family  [  ] Home Health Aid    Dwelling:  [  ] Apartment  [x  ] Private House  [  ] Adult Home  [  ] Skilled Nursing Facility      [  ] Short Term  [  ] Long Term  [ x ] Stairs       Elevator [  ]    FUNCTIONAL STATUS PTA: (Check all that apply)  Ambulation: [ x  ]Independent    [  ] Dependent     [  ] Non-Ambulatory  Assistive Device: [  ] SA Cane  [  ]  Q Cane  [  ] Walker  [  ]  Wheelchair  ADL : [x  ] Independent  [  ]  Dependent       Vital Signs Last 24 Hrs  T(C): 36.7 (2019 15:25), Max: 37.1 (2019 20:00)  T(F): 98 (2019 15:25), Max: 98.8 (2019 20:00)  HR: 71 (2019 15:25) (71 - 108)  BP: 130/72 (2019 15:25) (121/73 - 163/75)  BP(mean): 104 (2019 13:00) (86 - 112)  RR: 27 (2019 15:25) (19 - 37)  SpO2: 98% (2019 15:25) (79% - 100%)      PHYSICAL EXAM: Arousable  GENERAL: NAD, well-groomed, well-developed  HEAD:  Atraumatic, Normocephalic  CHEST/LUNG: Clear   HEART: S1S2+  ABDOMEN: Soft, Nontender  EXTREMITIES:  + tenderness sharon feet    NERVOUS SYSTEM:  Cranial Nerves 2-12 intact [  ] Abnormal  [  ]  ROM: WFL all extremities [  ]  Abnormal [ x ]limited right side / left foot  Motor Strength: WFL all extremities  [  ]  Abnormal [ x ]limited right side / left foot  Sensation: intact to light touch [x  ] Abnormal [  ]  Reflexes: Symmetric [  ]  Abnormal [  ]    FUNCTIONAL STATUS:  Bed Mobility: Independent [  ]  Supervision [  ]  Needs Assistance [ x ]  N/A [  ]  Transfers: Independent [  ]  Supervision [  ]  Needs Assistance [ x ]  N/A [  ]   Ambulation: Independent [  ]  Supervision [  ]  Needs Assistance [  ]  N/A [  ]  ADL: Independent [  ] Requires Assistance [  ] N/A [  ]      LABS:                        10.1   7.80  )-----------( 214      ( 2019 04:49 )             33.6     04-08    143  |  105  |  99<HH>  ----------------------------<  259<H>  4.9   |  23  |  3.6<H>    Ca    8.2<L>      2019 04:49  Phos  5.8     04-08  Mg     2.6     -08    TPro  6.1  /  Alb  3.1<L>  /  TBili  0.4  /  DBili  x   /  AST  9   /  ALT  12  /  AlkPhos  81  04-08      Urinalysis Basic - ( 2019 17:00 )    Color: Yellow / Appearance: Turbid / S.020 / pH: x  Gluc: x / Ketone: Negative  / Bili: Negative / Urobili: 1.0 mg/dL   Blood: x / Protein: 100 mg/dL / Nitrite: Negative   Leuk Esterase: Small / RBC: x / WBC 6-10 /HPF   Sq Epi: x / Non Sq Epi: Few /HPF / Bacteria: x        RADIOLOGY & ADDITIONAL STUDIES:    Assesment:

## 2019-04-08 NOTE — PROGRESS NOTE ADULT - ASSESSMENT
60 yo male patient with PMH of DM, HTN, Likely underlying CKD (Cr was 2.4 when presented) presented on 4/2 with AMS and aphasia, found w/ Left sided Frontotemporal bleed with intraparenchymal hemorrhagic associated with left ventricular bleed  # creatinine stable  # non oliguric  # off cardene   #start lasix 40 q 24   # ph noted, start phoslo 1 tablet po q 8 with meals  # No indication for RRT   # will follow

## 2019-04-08 NOTE — PROGRESS NOTE ADULT - ASSESSMENT
IMPRESSION:    Intraparenchymal hemorrhage  Acute on chronic hypercapnic respiratory failure resolved   Probable ZE +/- OHS  DESI improving      PLAN:    CNS: no sedatives, neurosurgery and neurology follow up,     HEENT:  Oral care    PULMONARY:  HOB @ 45 degrees, oxygen to keep pulse ox>90%, NIV during sleep.       CARDIOVASCULAR:  I=O, BP control.      GI: GI prophylaxis  Feeding per  speech and swallow eval    RENAL:  F/u  lytes.  Correct as needed. accurate I/O, FU with renal     INFECTIOUS DISEASE: Repeat Cultures.      HEMATOLOGICAL:  DVT prophylaxis    ENDOCRINE:  Follow up FS.      MUSCULOSKELETAL: OOB to chair.     CODE STATUS: FULL CODE    DISPOSITION: Transfer to floor

## 2019-04-08 NOTE — CONSULT NOTE ADULT - ASSESSMENT
IMPRESSION: Rehab of L ICH / left hemiparesis / sharon feet gout    PRECAUTIONS: [  ] Cardiac  [  ] Respiratory  [  ] Seizures [  ] Contact Isolation  [  ] Droplet Isolation  [  ] Other    Weight Bearing Status:     RECOMMENDATION:    Out of Bed to Chair     DVT/Decubiti Prophylaxis    REHAB PLAN:     [ x  ] Bedside P/T 3-5 times a week   [ x  ]   Bedside O/T  2-3 times a week             [   ] No Rehab Therapy Indicated                   [ x  ]  Speech Therapy   Conditioning/ROM                                    ADL  Bed Mobility                                               Conditioning/ROM  Transfers                                                     Bed Mobility  Sitting /Standing Balance                         Transfers                                        Gait Training                                               Sitting/Standing Balance  Stair Training [   ]Applicable                    Home equipment Eval                                                                        Splinting  [   ] Only      GOALS:   ADL   [x   ]   Independent                    Transfers  [ x  ] Independent                          Ambulation  [ x  ] Independent     [  x  ] With device                            [   ]  CG                                                         [   ]  CG                                                                  [   ] CG                            [    ] Min A                                                   [   ] Min A                                                              [   ] Min  A          DISCHARGE PLAN:   [ x  ]  Good candidate for Intensive Rehabilitation/Hospital based                                             Will tolerate 3hrs Intensive Rehab Daily                                       [    ]  Short Term Rehab in Skilled Nursing Facility                                       [    ]  Home with Outpatient or  services                                         [    ]  Possible Candidate for Intensive Hospital based Rehab Statement Selected

## 2019-04-08 NOTE — PHYSICAL THERAPY INITIAL EVALUATION ADULT - MANUAL MUSCLE TESTING RESULTS, REHAB EVAL
BLE 2-/5 throughout , BUE: shoulder flexion 4/5, elbow flexion 4/5 decreased  strength B hands 2* to edema

## 2019-04-08 NOTE — PHYSICAL THERAPY INITIAL EVALUATION ADULT - GENERAL OBSERVATIONS, REHAB EVAL
Pt approached for initial PT IE. As per nurse, Pt is on Bipap for respiratory distress. still on hold, PT will follow up.
Pt encountered sitting position at B/S. Hyperventilated, lobar breathing was noted. Pt in hold as per nurse secondary to respiratory distress. PT will follow up.
11:45-12:10 Pt encountered in bed to chair mode in ICU, + tele, family at bedside, condom catheter. Pt with edema to OANH MARIE. RN at bedside. Pt agreeable for PT.. Pt with BLE pain R>L at least 8-9/10 on pain scale. Pt reports improved since yesterday.

## 2019-04-08 NOTE — PROGRESS NOTE ADULT - SUBJECTIVE AND OBJECTIVE BOX
seen and examined  no distress  off cardene drip       Standing Inpatient Medications  amLODIPine   Tablet 10 milliGRAM(s) Oral daily  atorvastatin Oral Tab/Cap - Peds 40 milliGRAM(s) Oral daily  cefepime   IVPB 1000 milliGRAM(s) IV Intermittent every 24 hours  chlorhexidine 2% Cloths 1 Application(s) Topical daily  chlorhexidine 4% Liquid 1 Application(s) Topical every 12 hours  cholecalciferol 2000 Unit(s) Oral daily  cloNIDine 0.1 milliGRAM(s) Oral three times a day  dextrose 5%. 1000 milliLiter(s) IV Continuous <Continuous>  dextrose 50% Injectable 12.5 Gram(s) IV Push once  dextrose 50% Injectable 25 Gram(s) IV Push once  dextrose 50% Injectable 25 Gram(s) IV Push once  heparin  Injectable 5000 Unit(s) SubCutaneous every 8 hours  hydrALAZINE 100 milliGRAM(s) Oral every 8 hours  influenza   Vaccine 0.5 milliLiter(s) IntraMuscular once  insulin glargine Injectable (LANTUS) 18 Unit(s) SubCutaneous every morning  insulin lispro (HumaLOG) corrective regimen sliding scale   SubCutaneous three times a day before meals  insulin lispro Injectable (HumaLOG) 5 Unit(s) SubCutaneous three times a day before meals  labetalol 300 milliGRAM(s) Oral every 8 hours  pantoprazole    Tablet 40 milliGRAM(s) Oral before breakfast  predniSONE   Tablet 20 milliGRAM(s) Oral daily    PRN Inpatient Medications  acetaminophen   Tablet .. 650 milliGRAM(s) Oral every 6 hours PRN  aluminum hydroxide/magnesium hydroxide/simethicone Suspension 30 milliLiter(s) Oral every 6 hours PRN  dextrose 40% Gel 15 Gram(s) Oral once PRN  glucagon  Injectable 1 milliGRAM(s) IntraMuscular once PRN  oxyCODONE    5 mG/acetaminophen 325 mG 1 Tablet(s) Oral every 6 hours PRN        VITALS/PHYSICAL EXAM  --------------------------------------------------------------------------------  T(C): 37.1 (04-07-19 @ 20:00), Max: 37.1 (04-07-19 @ 20:00)  HR: 78 (04-08-19 @ 08:00) (72 - 108)  BP: 143/78 (04-08-19 @ 08:00) (113/56 - 163/75)  RR: 30 (04-08-19 @ 08:00) (20 - 40)  SpO2: 94% (04-08-19 @ 08:00) (79% - 100%)  Wt(kg): --        04-07-19 @ 07:01  -  04-08-19 @ 07:00  --------------------------------------------------------  IN: 25 mL / OUT: 1400 mL / NET: -1375 mL      Physical Exam:  	Gen: NAD  	Pulm: decrease BS  B/L  	CV:  S1S2; no rub  	Abd: +distended  	LE: edema:    LABS/STUDIES  --------------------------------------------------------------------------------              10.1   7.80  >-----------<  214      [04-08-19 @ 04:49]              33.6     143  |  105  |  99  ----------------------------<  259      [04-08-19 @ 04:49]  4.9   |  23  |  3.6        Ca     8.2     [04-08-19 @ 04:49]      Mg     2.6     [04-08-19 @ 04:49]      Phos  5.8     [04-08-19 @ 04:49]    TPro  6.1  /  Alb  3.1  /  TBili  0.4  /  DBili  x   /  AST  9   /  ALT  12  /  AlkPhos  81  [04-08-19 @ 04:49]        Uric acid 9.9      [04-07-19 @ 04:20]        [04-07-19 @ 04:20]    Creatinine Trend:  SCr 3.6 [04-08 @ 04:49]  SCr 3.7 [04-07 @ 04:20]  SCr 3.5 [04-06 @ 04:20]  SCr 3.9 [04-05 @ 11:00]  SCr 4.0 [04-05 @ 04:00]    Urinalysis - [04-06-19 @ 17:00]      Color Yellow / Appearance Turbid / SG 1.020 / pH 5.5      Gluc 100 / Ketone Negative  / Bili Negative / Urobili 1.0       Blood Negative / Protein 100 / Leuk Est Small / Nitrite Negative      RBC  / WBC 6-10 / Hyaline  / Gran  / Sq Epi  / Non Sq Epi Few / Bacteria     Urine Creatinine 249      [04-05-19 @ 11:42]  Urine Protein >564      [04-04-19 @ 16:34]  Urine Urea Nitrogen 596      [04-05-19 @ 11:42]    Lipid: chol 184, , HDL 38,       [04-03-19 @ 08:04]

## 2019-04-08 NOTE — CHART NOTE - NSCHARTNOTEFT_GEN_A_CORE
60 yo M with PMHx of HTN, DM, CAD on aspirin, ZE who was BIBEMS for ams and aphasia. Stroked code called, had NIHSS 15. CTH showed L frontotemporal intraparenchymal bleed with extension into the ventricle and 2mm shift to the R. Admitted to ICU s/p platelets, DDAVP, nicardipine gtt.    #L frontotemporal intraparenchymal bleed with 2mm shift   - no acute neurosurgical intervention  - per neurosurgery: ok for baby ASA in 7-10 days, ok for heparin subQ  - CTH 4/2: L frontotemporal intraparenchymal bleed with extension into the ventricle and 2mm shift to the R  - CTH 4/3: no change  - CTH (repeat #2) 4/3: no change  - CTH 4/6: no change    #Hypertensive emergency  - goal BP < 160  - labetalol 300 mg TID, amlodipine 10 mg daily, hydralazine 100 mg TID  - wean nicardipine gtt  - increase clonidine to 0.1 mg TID    #DESI on CKD  - baseline Cr 1.9 in in 10/2018  - stable Cr 3.7 from 3.5 yesterday  - renal US 4/3: no hydronephrosis, L renal cyst  - per nephro: no need for urgent dialysis  -phoslo per nephro    #New onset afib  - rate controlled  - labetalol 300 mg TID  - full anticoagulation is contraindicated at this time given ICH    #Fever  - possible bibasilar pneumonia vs gout attack  - urine cx 4/2: negative  - uric acid 9.9  - f/u cx  - c/w cefepime  - start prednisone 20 mg x 5 days for empiric tx of gout    #R ankle pain/swelling  #Bilateral knee pain  - BLE duplex 4/6: negative  - will obtain xrays of bilateral knees  - ortho consulted , ankle moderate swelling  - start prednisone 20 mg x 5 days for empiric tx of gout  - avoid NSAIDs for pain given DESI    #DM  - basal insulin with lantus 18u subQ  - lispro    #HLD  - lipitor    #DVT ppx  - heparin 5000u subQ Q8H    #GI ppx  - protonix  - diet: dysphagia 3 as per speech and swallow    #Dispo  - from home  - full code  - transfer to medical floor

## 2019-04-08 NOTE — PROGRESS NOTE ADULT - SUBJECTIVE AND OBJECTIVE BOX
Patient is a 59y old  Male who presents with a chief complaint of Right sided weakness (2019 08:45)        Over Night Events:  LOoks and feels much better.  Tolerated NIV last night        ROS:  See HPI    PHYSICAL EXAM    ICU Vital Signs Last 24 Hrs  T(C): 37.1 (2019 20:00), Max: 37.1 (2019 20:00)  T(F): 98.8 (2019 20:00), Max: 98.8 (2019 20:00)  HR: 78 (2019 08:00) (72 - 108)  BP: 143/78 (2019 08:00) (113/56 - 163/75)  BP(mean): 97 (2019 08:00) (67 - 112)  ABP: --  ABP(mean): --  RR: 30 (2019 08:00) (20 - 40)  SpO2: 94% (2019 08:00) (79% - 100%)      General: In NAD   HEENT: TIMMY             Lymphatic system: No cervical LN   Lungs: Bilateral BS  Cardiovascular: Irregular   Gastrointestinal: Soft, Positive BS  Extremities: No clubbing.  Moves extremities.  Full Range of motion   Skin: Warm, intact  Neurological: No motor deficit       19 @ 07:01  -  19 @ 07:00  --------------------------------------------------------  IN:    niCARdipine Infusion: 25 mL  Total IN: 25 mL    OUT:    Voided: 1400 mL  Total OUT: 1400 mL    Total NET: -1375 mL          LABS:                            10.1   7.80  )-----------( 214      ( 2019 04:49 )             33.6                                               04-08    143  |  105  |  99<HH>  ----------------------------<  259<H>  4.9   |  23  |  3.6<H>    Ca    8.2<L>      2019 04:49  Phos  5.8     04-08  Mg     2.6     04-08    TPro  6.1  /  Alb  3.1<L>  /  TBili  0.4  /  DBili  x   /  AST  9   /  ALT  12  /  AlkPhos  81  04-08                                             Urinalysis Basic - ( 2019 17:00 )    Color: Yellow / Appearance: Turbid / S.020 / pH: x  Gluc: x / Ketone: Negative  / Bili: Negative / Urobili: 1.0 mg/dL   Blood: x / Protein: 100 mg/dL / Nitrite: Negative   Leuk Esterase: Small / RBC: x / WBC 6-10 /HPF   Sq Epi: x / Non Sq Epi: Few /HPF / Bacteria: x        CARDIAC MARKERS ( 2019 04:20 )  x     / x     / 188 U/L / x     / x                                                LIVER FUNCTIONS - ( 2019 04:49 )  Alb: 3.1 g/dL / Pro: 6.1 g/dL / ALK PHOS: 81 U/L / ALT: 12 U/L / AST: 9 U/L / GGT: x                                                  Culture - Blood (collected 2019 16:54)  Source: .Blood None  Preliminary Report (2019 03:01):    No growth to date.                                                                                           MEDICATIONS  (STANDING):  amLODIPine   Tablet 10 milliGRAM(s) Oral daily  atorvastatin Oral Tab/Cap - Peds 40 milliGRAM(s) Oral daily  cefepime   IVPB 1000 milliGRAM(s) IV Intermittent every 24 hours  chlorhexidine 2% Cloths 1 Application(s) Topical daily  chlorhexidine 4% Liquid 1 Application(s) Topical every 12 hours  cholecalciferol 2000 Unit(s) Oral daily  cloNIDine 0.1 milliGRAM(s) Oral three times a day  dextrose 5%. 1000 milliLiter(s) (50 mL/Hr) IV Continuous <Continuous>  dextrose 50% Injectable 12.5 Gram(s) IV Push once  dextrose 50% Injectable 25 Gram(s) IV Push once  dextrose 50% Injectable 25 Gram(s) IV Push once  heparin  Injectable 5000 Unit(s) SubCutaneous every 8 hours  hydrALAZINE 100 milliGRAM(s) Oral every 8 hours  influenza   Vaccine 0.5 milliLiter(s) IntraMuscular once  insulin glargine Injectable (LANTUS) 18 Unit(s) SubCutaneous every morning  insulin lispro (HumaLOG) corrective regimen sliding scale   SubCutaneous three times a day before meals  insulin lispro Injectable (HumaLOG) 5 Unit(s) SubCutaneous three times a day before meals  labetalol 300 milliGRAM(s) Oral every 8 hours  pantoprazole    Tablet 40 milliGRAM(s) Oral before breakfast  predniSONE   Tablet 20 milliGRAM(s) Oral daily    MEDICATIONS  (PRN):  acetaminophen   Tablet .. 650 milliGRAM(s) Oral every 6 hours PRN Mild Pain (1 - 3)  aluminum hydroxide/magnesium hydroxide/simethicone Suspension 30 milliLiter(s) Oral every 6 hours PRN Dyspepsia  dextrose 40% Gel 15 Gram(s) Oral once PRN Blood Glucose LESS THAN 70 milliGRAM(s)/deciliter  glucagon  Injectable 1 milliGRAM(s) IntraMuscular once PRN Glucose LESS THAN 70 milligrams/deciliter  oxyCODONE    5 mG/acetaminophen 325 mG 1 Tablet(s) Oral every 6 hours PRN Severe Pain (7 - 10)      Xrays:                No infiltrate                                                                      ECHO

## 2019-04-08 NOTE — PROVIDER CONTACT NOTE (OTHER) - SITUATION
MD Smalls aware pt has no IV access. RN attempted to gain access but pt is swollen. Requested MD to come to bedside and place with sonosite. Aware pt transfer pending obtaining IV access.

## 2019-04-09 LAB
GLUCOSE BLDC GLUCOMTR-MCNC: 250 MG/DL — HIGH (ref 70–99)
GLUCOSE BLDC GLUCOMTR-MCNC: 277 MG/DL — HIGH (ref 70–99)
GLUCOSE BLDC GLUCOMTR-MCNC: 348 MG/DL — HIGH (ref 70–99)
GLUCOSE BLDC GLUCOMTR-MCNC: 354 MG/DL — HIGH (ref 70–99)

## 2019-04-09 RX ORDER — INSULIN GLARGINE 100 [IU]/ML
21 INJECTION, SOLUTION SUBCUTANEOUS AT BEDTIME
Qty: 0 | Refills: 0 | Status: DISCONTINUED | OUTPATIENT
Start: 2019-04-09 | End: 2019-04-10

## 2019-04-09 RX ORDER — DOCUSATE SODIUM 100 MG
100 CAPSULE ORAL THREE TIMES A DAY
Qty: 0 | Refills: 0 | Status: DISCONTINUED | OUTPATIENT
Start: 2019-04-09 | End: 2019-04-10

## 2019-04-09 RX ORDER — LACTULOSE 10 G/15ML
10 SOLUTION ORAL ONCE
Qty: 0 | Refills: 0 | Status: COMPLETED | OUTPATIENT
Start: 2019-04-09 | End: 2019-04-09

## 2019-04-09 RX ORDER — POLYETHYLENE GLYCOL 3350 17 G/17G
17 POWDER, FOR SOLUTION ORAL ONCE
Qty: 0 | Refills: 0 | Status: COMPLETED | OUTPATIENT
Start: 2019-04-09 | End: 2019-04-09

## 2019-04-09 RX ORDER — INSULIN LISPRO 100/ML
7 VIAL (ML) SUBCUTANEOUS
Qty: 0 | Refills: 0 | Status: DISCONTINUED | OUTPATIENT
Start: 2019-04-09 | End: 2019-04-10

## 2019-04-09 RX ORDER — SENNA PLUS 8.6 MG/1
2 TABLET ORAL AT BEDTIME
Qty: 0 | Refills: 0 | Status: DISCONTINUED | OUTPATIENT
Start: 2019-04-09 | End: 2019-04-29

## 2019-04-09 RX ADMIN — CHLORHEXIDINE GLUCONATE 1 APPLICATION(S): 213 SOLUTION TOPICAL at 17:04

## 2019-04-09 RX ADMIN — Medication 5 UNIT(S): at 07:50

## 2019-04-09 RX ADMIN — LACTULOSE 10 GRAM(S): 10 SOLUTION ORAL at 23:49

## 2019-04-09 RX ADMIN — SENNA PLUS 2 TABLET(S): 8.6 TABLET ORAL at 22:02

## 2019-04-09 RX ADMIN — Medication 2: at 07:49

## 2019-04-09 RX ADMIN — Medication 20 MILLIGRAM(S): at 05:45

## 2019-04-09 RX ADMIN — Medication 100 MILLIGRAM(S): at 05:45

## 2019-04-09 RX ADMIN — Medication 667 MILLIGRAM(S): at 11:59

## 2019-04-09 RX ADMIN — Medication 4: at 17:03

## 2019-04-09 RX ADMIN — Medication 100 MILLIGRAM(S): at 13:36

## 2019-04-09 RX ADMIN — HEPARIN SODIUM 5000 UNIT(S): 5000 INJECTION INTRAVENOUS; SUBCUTANEOUS at 22:01

## 2019-04-09 RX ADMIN — Medication 0.1 MILLIGRAM(S): at 13:37

## 2019-04-09 RX ADMIN — Medication 0.1 MILLIGRAM(S): at 05:36

## 2019-04-09 RX ADMIN — Medication 5: at 11:59

## 2019-04-09 RX ADMIN — Medication 100 MILLIGRAM(S): at 22:02

## 2019-04-09 RX ADMIN — Medication 300 MILLIGRAM(S): at 22:02

## 2019-04-09 RX ADMIN — Medication 2000 UNIT(S): at 11:59

## 2019-04-09 RX ADMIN — ATORVASTATIN CALCIUM 40 MILLIGRAM(S): 80 TABLET, FILM COATED ORAL at 22:02

## 2019-04-09 RX ADMIN — OXYCODONE AND ACETAMINOPHEN 1 TABLET(S): 5; 325 TABLET ORAL at 05:37

## 2019-04-09 RX ADMIN — OXYCODONE AND ACETAMINOPHEN 1 TABLET(S): 5; 325 TABLET ORAL at 06:07

## 2019-04-09 RX ADMIN — Medication 300 MILLIGRAM(S): at 13:37

## 2019-04-09 RX ADMIN — PANTOPRAZOLE SODIUM 40 MILLIGRAM(S): 20 TABLET, DELAYED RELEASE ORAL at 06:28

## 2019-04-09 RX ADMIN — Medication 100 MILLIGRAM(S): at 13:37

## 2019-04-09 RX ADMIN — Medication 0.1 MILLIGRAM(S): at 22:02

## 2019-04-09 RX ADMIN — Medication 100 MILLIGRAM(S): at 05:38

## 2019-04-09 RX ADMIN — INSULIN GLARGINE 21 UNIT(S): 100 INJECTION, SOLUTION SUBCUTANEOUS at 22:02

## 2019-04-09 RX ADMIN — Medication 7 UNIT(S): at 17:04

## 2019-04-09 RX ADMIN — AMLODIPINE BESYLATE 10 MILLIGRAM(S): 2.5 TABLET ORAL at 05:35

## 2019-04-09 RX ADMIN — Medication 5 UNIT(S): at 11:56

## 2019-04-09 RX ADMIN — Medication 300 MILLIGRAM(S): at 05:37

## 2019-04-09 RX ADMIN — Medication 667 MILLIGRAM(S): at 17:04

## 2019-04-09 RX ADMIN — CHLORHEXIDINE GLUCONATE 1 APPLICATION(S): 213 SOLUTION TOPICAL at 05:38

## 2019-04-09 RX ADMIN — Medication 40 MILLIGRAM(S): at 05:36

## 2019-04-09 RX ADMIN — HEPARIN SODIUM 5000 UNIT(S): 5000 INJECTION INTRAVENOUS; SUBCUTANEOUS at 05:36

## 2019-04-09 RX ADMIN — HEPARIN SODIUM 5000 UNIT(S): 5000 INJECTION INTRAVENOUS; SUBCUTANEOUS at 13:37

## 2019-04-09 RX ADMIN — INSULIN GLARGINE 18 UNIT(S): 100 INJECTION, SOLUTION SUBCUTANEOUS at 09:02

## 2019-04-09 RX ADMIN — Medication 667 MILLIGRAM(S): at 07:51

## 2019-04-09 RX ADMIN — POLYETHYLENE GLYCOL 3350 17 GRAM(S): 17 POWDER, FOR SOLUTION ORAL at 05:46

## 2019-04-09 RX ADMIN — CEFEPIME 100 MILLIGRAM(S): 1 INJECTION, POWDER, FOR SOLUTION INTRAMUSCULAR; INTRAVENOUS at 17:01

## 2019-04-09 NOTE — DIETITIAN INITIAL EVALUATION ADULT. - FACTORS AFF FOOD INTAKE
no chewing/swallowing issues at baseline, however now on cut up diet with nectar thick liquids per SLP recs

## 2019-04-09 NOTE — DIETITIAN INITIAL EVALUATION ADULT. - OTHER INFO
RD assessment as LOS. altered mental status and aphasia. CT scan showed the left intra parenchymal bleed with the extend into the ventricle associated with the midline shift. Pt on cut up with nectar thick liquids per SLP recs since 4/4. Pt tolerating diet with reported good appetite/PO intake per stepdaughter and PCA reports. CKD noted- renal following, no need for RRT at this time. Provided step daughter with renal, DM diet ed. NKFA. Step daughter reports pt has not had BM- team made aware.

## 2019-04-09 NOTE — OCCUPATIONAL THERAPY INITIAL EVALUATION ADULT - GENERAL OBSERVATIONS, REHAB EVAL
Pt seen 13:08-13:56 Pt encountered semi saavedra in bed in NAD +O2 via NC, IV lock. Pt spouse and daughter present. Pt agreeable to OT mony.

## 2019-04-09 NOTE — SWALLOW FEES ASSESSMENT ADULT - DIAGNOSTIC IMPRESSIONS
moderate pharyngeal dysphagia for thins. mild pharyngeal dysphagia for Nectar-thickened liquids, puree, and soft consistency. mild oral dysphagia for soft. as pt became more fatigued and dyspneic his pharyngeal function declined. pharyngeal dysphagia is exacerbated by discoordination of respirations and swallows

## 2019-04-09 NOTE — SWALLOW FEES ASSESSMENT ADULT - SLP PERTINENT HISTORY OF CURRENT PROBLEM
58 y/o M with hx of HTN, DM, CAD, Sleep Apnea BIBEMS for altered mental status and aphasia. CT scan showed the left intra parenchymal bleed with the extend into the ventricle associated with the midline shift.

## 2019-04-09 NOTE — SWALLOW FEES ASSESSMENT ADULT - ROSENBEK'S PENETRATION ASPIRATION SCALE
(5) material contacts vocal cords, visible residue remains (penetration) (3) material remains above the vocal cords, visible residue remains (penetration)

## 2019-04-09 NOTE — DIETITIAN INITIAL EVALUATION ADULT. - ENERGY NEEDS
Calories: 1885 kcals/day (25 kcals/kg IBW) for morbidly obese pt w/ CKD  Protein: 68-75 g/day (0.9-1.0 g/kg IBW)- will monitor renal fx and adust prn  Fluids: per LIP

## 2019-04-09 NOTE — PROGRESS NOTE ADULT - ASSESSMENT
58 yo M with PMHx of HTN, DM, CAD on aspirin, ZE who was BIBEMS for ams and aphasia. Stroked code called, had NIHSS 15. CTH showed L frontotemporal intraparenchymal bleed with extension into the ventricle and 2mm shift to the R. Admitted to ICU s/p platelets, DDAVP, nicardipine gtt.    #L frontotemporal intraparenchymal bleed with 2mm shift   - no acute neurosurgical intervention  - per neurosurgery: ok for baby ASA in 7-10 days, ok for heparin subQ  - CTH 4/2: L frontotemporal intraparenchymal bleed with extension into the ventricle and 2mm shift to the R  - CTH 4/3: no change  - CTH (repeat #2) 4/3: no change  - CTH 4/6: no change    #Hypertensive emergency  - goal BP < 160  - labetalol 300 mg TID, amlodipine 10 mg daily, hydralazine 100 mg TID  - wean nicardipine gtt  - increase clonidine to 0.1 mg TID    #DESI on CKD  - baseline Cr 1.9 in in 10/2018  - stable Cr ~3.5  - renal US 4/3: no hydronephrosis, L renal cyst  - per nephro: no need for urgent dialysis  - phoslo per nephro    #New onset afib  - rate controlled  - labetalol 300 mg TID  - full anticoagulation is contraindicated at this time given ICH    #Fever  - possible bibasilar pneumonia vs gout attack  - urine cx 4/2: negative  - uric acid 9.9  - f/u bcx  - c/w cefepime  - start prednisone 20 mg x 5 days for empiric tx of gout    #R ankle pain/swelling  #Bilateral knee pain  - BLE duplex 4/6: negative  - will obtain xrays of bilateral knees  - ortho consulted , ankle moderate swelling  - start prednisone 20 mg x 5 days for empiric tx of gout  - avoid NSAIDs for pain given DESI    #DM  - basal insulin with lantus 18u subQ  - lispro    #HLD  - lipitor    #DVT ppx  - heparin 5000u subQ Q8H    #GI ppx  - protonix  - diet: dysphagia 3 nectar thick w/ 1:1 feed as per speech and swallow    #Dispo  - from home  - full code  - plan for 4A

## 2019-04-09 NOTE — PROGRESS NOTE ADULT - ASSESSMENT
60 yo male patient with PMH of DM, HTN, Likely underlying CKD (Cr was 2.4 when presented) presented on 4/2 with AMS and aphasia, found w/ Left sided Frontotemporal bleed with intraparenchymal hemorrhagic associated with left ventricular bleed  # creatinine stable  # non oliguric  # off cardene   #start lasix 40 q 24   # ph noted, start phoslo 1 tablet po q 8 with meals  # No indication for RRT   # will follow 58 yo male patient with PMH of DM, HTN, Likely underlying CKD (Cr was 2.4 when presented) presented on 4/2 with AMS and aphasia, found w/ Left sided Frontotemporal bleed with intraparenchymal hemorrhagic associated with left ventricular bleed  # creatinine stable  # non oliguric  # off cardene   #continue lasix current follow with volume status / edema lower ext   # ph noted, started on phoslo 1 tablet po q 8 with meals  # No indication for RRT   # will follow

## 2019-04-09 NOTE — SWALLOW FEES ASSESSMENT ADULT - NS SWALLOW FEES REC ASPIR MON
fever/throat clearing/position upright (90Y)/cough/upper respiratory infection/change of breathing pattern/oral hygiene/gurgly voice/pneumonia

## 2019-04-09 NOTE — OCCUPATIONAL THERAPY INITIAL EVALUATION ADULT - PLANNED THERAPY INTERVENTIONS, OT EVAL
fine motor coordination training/balance training/cognitive, visual perceptual/neuromuscular re-education/transfer training/ADL retraining/strengthening

## 2019-04-09 NOTE — SWALLOW FEES ASSESSMENT ADULT - PHARYNGEAL PHASE COMMENTS
moderate pharyngeal dysphagia; pt's respiratory status negatively impacts ability to manage thin liquids mild pharyngeal dysphagia mod pharyngeal dysphagia. improved toleration with small sips mild phayrngeal dysphagia

## 2019-04-09 NOTE — PROGRESS NOTE ADULT - SUBJECTIVE AND OBJECTIVE BOX
Nephrology progress note    Patient is seen and examined, events over the last 24 h noted .    Allergies:  No Known Allergies    Hospital Medications:   MEDICATIONS  (STANDING):  amLODIPine   Tablet 10 milliGRAM(s) Oral daily  atorvastatin Oral Tab/Cap - Peds 40 milliGRAM(s) Oral daily  calcium acetate 667 milliGRAM(s) Oral three times a day with meals  cefepime   IVPB 1000 milliGRAM(s) IV Intermittent every 24 hours  chlorhexidine 2% Cloths 1 Application(s) Topical daily  chlorhexidine 4% Liquid 1 Application(s) Topical every 12 hours  cholecalciferol 2000 Unit(s) Oral daily  cloNIDine 0.1 milliGRAM(s) Oral three times a day  dextrose 5%. 1000 milliLiter(s) (50 mL/Hr) IV Continuous <Continuous>  dextrose 50% Injectable 12.5 Gram(s) IV Push once  dextrose 50% Injectable 25 Gram(s) IV Push once  dextrose 50% Injectable 25 Gram(s) IV Push once  docusate sodium 100 milliGRAM(s) Oral three times a day  furosemide    Tablet 40 milliGRAM(s) Oral daily  heparin  Injectable 5000 Unit(s) SubCutaneous every 8 hours  hydrALAZINE 100 milliGRAM(s) Oral every 8 hours  influenza   Vaccine 0.5 milliLiter(s) IntraMuscular once  insulin glargine Injectable (LANTUS) 21 Unit(s) SubCutaneous at bedtime  insulin lispro (HumaLOG) corrective regimen sliding scale   SubCutaneous three times a day before meals  insulin lispro Injectable (HumaLOG) 7 Unit(s) SubCutaneous three times a day before meals  labetalol 300 milliGRAM(s) Oral every 8 hours  pantoprazole    Tablet 40 milliGRAM(s) Oral before breakfast  predniSONE   Tablet 20 milliGRAM(s) Oral daily  senna 2 Tablet(s) Oral at bedtime        VITALS:  T(F): 97.2 (19 @ 06:38), Max: 98.9 (19 @ 21:22)  HR: 86 (19 @ 06:38)  BP: 138/86 (19 @ 06:38)  RR: 24 (19 @ 06:38)  SpO2: 94% (19 @ 21:22)  Wt(kg): --     @ 07:01  -   @ 07:00  --------------------------------------------------------  IN: 25 mL / OUT: 1400 mL / NET: -1375 mL     @ 07:01  -   @ 07:00  --------------------------------------------------------  IN: 440 mL / OUT: 900 mL / NET: -460 mL     @ 07: @ 12:57  --------------------------------------------------------  IN: 480 mL / OUT: 250 mL / NET: 230 mL      Height (cm): 177.8 ( @ 15:25)    PHYSICAL EXAM:  Constitutional: NAD  HEENT: anicteric sclera, oropharynx clear, MMM  Neck: No JVD  Respiratory: CTAB, no wheezes, rales or rhonchi  Cardiovascular: S1, S2, RRR  Gastrointestinal: BS+, soft, NT/ND  Extremities: No cyanosis or clubbing. No peripheral edema  :  No khan.   Skin: No rashes    LABS:      143  |  105  |  99<HH>  ----------------------------<  259<H>  4.9   |  23  |  3.6<H>  Creatinine Trend: 3.6<--, 3.7<--, 3.5<--, 3.9<--, 4.0<--, 3.7<--  Ca    8.2<L>      2019 04:49  Phos  5.8       Mg     2.6         TPro  6.1  /  Alb  3.1<L>  /  TBili  0.4  /  DBili      /  AST  9   /  ALT  12  /  AlkPhos  81                            10.1   7.80  )-----------( 214      ( 2019 04:49 )             33.6       Urine Studies:  Urinalysis Basic - ( 2019 17:00 )    Color: Yellow / Appearance: Turbid / S.020 / pH:   Gluc:  / Ketone: Negative  / Bili: Negative / Urobili: 1.0 mg/dL   Blood:  / Protein: 100 mg/dL / Nitrite: Negative   Leuk Esterase: Small / RBC:  / WBC 6-10 /HPF   Sq Epi:  / Non Sq Epi: Few /HPF / Bacteria:       Creatinine, Random Urine: 249 mg/dL ( @ 11:42)  Protein/Creatinine Ratio Calculation: >1.5 Ratio ( @ 16:34)  Creatinine, Random Urine: 378 mg/dL ( @ 16:34)    RADIOLOGY & ADDITIONAL STUDIES: Nephrology progress note    Patient is seen and examined, events over the last 24 h noted .  still on oxygen nasal canula     Allergies:  No Known Allergies    Hospital Medications:   MEDICATIONS  (STANDING):  amLODIPine   Tablet 10 milliGRAM(s) Oral daily  atorvastatin Oral Tab/Cap - Peds 40 milliGRAM(s) Oral daily  calcium acetate 667 milliGRAM(s) Oral three times a day with meals  cefepime   IVPB 1000 milliGRAM(s) IV Intermittent every 24 hours  cholecalciferol 2000 Unit(s) Oral daily  cloNIDine 0.1 milliGRAM(s) Oral three times a day  docusate sodium 100 milliGRAM(s) Oral three times a day  furosemide    Tablet 40 milliGRAM(s) Oral daily  heparin  Injectable 5000 Unit(s) SubCutaneous every 8 hours  hydrALAZINE 100 milliGRAM(s) Oral every 8 hours  influenza   Vaccine 0.5 milliLiter(s) IntraMuscular once  insulin glargine Injectable (LANTUS) 21 Unit(s) SubCutaneous at bedtime  insulin lispro (HumaLOG) corrective regimen sliding scale   SubCutaneous three times a day before meals  insulin lispro Injectable (HumaLOG) 7 Unit(s) SubCutaneous three times a day before meals  labetalol 300 milliGRAM(s) Oral every 8 hours  pantoprazole    Tablet 40 milliGRAM(s) Oral before breakfast  predniSONE   Tablet 20 milliGRAM(s) Oral daily  senna 2 Tablet(s) Oral at bedtime        VITALS:  T(F): 97.2 (19 @ 06:38), Max: 98.9 (19 @ 21:22)  HR: 86 (19 @ 06:38)  BP: 138/86 (19 @ 06:38)  RR: 24 (19 @ 06:38)  SpO2: 94% (19 @ 21:22)       @ 07:01  -   @ 07:00  --------------------------------------------------------  IN: 25 mL / OUT: 1400 mL / NET: -1375 mL     @ 07:01  -   @ 07:00  --------------------------------------------------------  IN: 440 mL / OUT: 900 mL / NET: -460 mL     @ 07:01  -   @ 12:57  --------------------------------------------------------  IN: 480 mL / OUT: 250 mL / NET: 230 mL      Height (cm): 177.8 ( @ 15:25)    PHYSICAL EXAM:  Constitutional: NAD, O2 NC   HEENT: anicteric sclera, oropharynx clear, MMM  Neck: No JVD  Respiratory: Crackles at base   Cardiovascular: S1, S2, RRR  Gastrointestinal: BS+, soft, NT/ND  Extremities: No cyanosis or clubbing. plus one edema lower ext   :  No khan.   Skin: No rashes    LABS:      143  |  105  |  99<HH>  ----------------------------<  259<H>  4.9   |  23  |  3.6<H>  Creatinine Trend: 3.6<--, 3.7<--, 3.5<--, 3.9<--, 4.0<--, 3.7<--  Ca    8.2<L>      2019 04:49  Phos  5.8       Mg     2.6         TPro  6.1  /  Alb  3.1<L>  /  TBili  0.4  /  DBili      /  AST  9   /  ALT  12  /  AlkPhos  81                            10.1   7.80  )-----------( 214      ( 2019 04:49 )             33.6       Urine Studies:  Urinalysis Basic - ( 2019 17:00 )    Color: Yellow / Appearance: Turbid / S.020 / pH:   Gluc:  / Ketone: Negative  / Bili: Negative / Urobili: 1.0 mg/dL   Blood:  / Protein: 100 mg/dL / Nitrite: Negative   Leuk Esterase: Small / RBC:  / WBC 6-10 /HPF   Sq Epi:  / Non Sq Epi: Few /HPF / Bacteria:       Creatinine, Random Urine: 249 mg/dL ( @ 11:42)  Protein/Creatinine Ratio Calculation: >1.5 Ratio ( @ 16:34)  Creatinine, Random Urine: 378 mg/dL ( @ 16:34)    RADIOLOGY & ADDITIONAL STUDIES:

## 2019-04-09 NOTE — PROGRESS NOTE ADULT - SUBJECTIVE AND OBJECTIVE BOX
CHIEF COMPLAINT:    Patient is a 59y old Male who presents with a chief complaint of Right sided weakness (09 Apr 2019 12:57)    Currently admitted to medicine with the primary diagnosis of Intraparenchymal hemorrhage of brain     Today is hospital day 7d. This morning he is resting comfortably in bed and reports no new issues or overnight events.     PAST MEDICAL & SURGICAL HISTORY  Gout  Morbidly obese  Gout  Hypertension  Diabetes mellitus  S/P tonsillectomy      ALLERGIES:  No Known Allergies    MEDICATIONS:  STANDING MEDICATIONS  amLODIPine   Tablet 10 milliGRAM(s) Oral daily  atorvastatin Oral Tab/Cap - Peds 40 milliGRAM(s) Oral daily  calcium acetate 667 milliGRAM(s) Oral three times a day with meals  cefepime   IVPB 1000 milliGRAM(s) IV Intermittent every 24 hours  chlorhexidine 2% Cloths 1 Application(s) Topical daily  chlorhexidine 4% Liquid 1 Application(s) Topical every 12 hours  cholecalciferol 2000 Unit(s) Oral daily  cloNIDine 0.1 milliGRAM(s) Oral three times a day  dextrose 5%. 1000 milliLiter(s) IV Continuous <Continuous>  dextrose 50% Injectable 12.5 Gram(s) IV Push once  dextrose 50% Injectable 25 Gram(s) IV Push once  dextrose 50% Injectable 25 Gram(s) IV Push once  docusate sodium 100 milliGRAM(s) Oral three times a day  furosemide    Tablet 40 milliGRAM(s) Oral daily  heparin  Injectable 5000 Unit(s) SubCutaneous every 8 hours  hydrALAZINE 100 milliGRAM(s) Oral every 8 hours  influenza   Vaccine 0.5 milliLiter(s) IntraMuscular once  insulin glargine Injectable (LANTUS) 21 Unit(s) SubCutaneous at bedtime  insulin lispro (HumaLOG) corrective regimen sliding scale   SubCutaneous three times a day before meals  insulin lispro Injectable (HumaLOG) 7 Unit(s) SubCutaneous three times a day before meals  labetalol 300 milliGRAM(s) Oral every 8 hours  pantoprazole    Tablet 40 milliGRAM(s) Oral before breakfast  predniSONE   Tablet 20 milliGRAM(s) Oral daily  senna 2 Tablet(s) Oral at bedtime    PRN MEDICATIONS  acetaminophen   Tablet .. 650 milliGRAM(s) Oral every 6 hours PRN  aluminum hydroxide/magnesium hydroxide/simethicone Suspension 30 milliLiter(s) Oral every 6 hours PRN  dextrose 40% Gel 15 Gram(s) Oral once PRN  glucagon  Injectable 1 milliGRAM(s) IntraMuscular once PRN  oxyCODONE    5 mG/acetaminophen 325 mG 1 Tablet(s) Oral every 6 hours PRN    VITALS:   T(F): 98.3  HR: 83  BP: 146/67  RR: 24  SpO2: 94%    LABS:                        10.1   7.80  )-----------( 214      ( 08 Apr 2019 04:49 )             33.6     04-08    143  |  105  |  99<HH>  ----------------------------<  259<H>  4.9   |  23  |  3.6<H>    Ca    8.2<L>      08 Apr 2019 04:49  Phos  5.8     04-08  Mg     2.6     04-08    TPro  6.1  /  Alb  3.1<L>  /  TBili  0.4  /  DBili  x   /  AST  9   /  ALT  12  /  AlkPhos  81  04-08    Culture - Blood (collected 06 Apr 2019 16:54)  Source: .Blood None  Preliminary Report (08 Apr 2019 03:01):    No growth to date.    RADIOLOGY:    PHYSICAL EXAM:  GEN: no acute distress  HEENT: moist mucous membranes   LUNGS: clear to auscultation bilaterally   HEART: S1/S2 present, RRR  ABD: soft, nontender, nondistended, bowel sounds present  EXT:  swelling of R ankle  NEURO: AAOX3, moves all extremities

## 2019-04-09 NOTE — OCCUPATIONAL THERAPY INITIAL EVALUATION ADULT - RANGE OF MOTION EXAMINATION, UPPER EXTREMITY
AROM right shoulder 0-90, PROM 3/4 range. AROM left shoulder 3/4 range, sharon elbows 3/4 range, sharon wrists 3/4 range, digits unable to fully flex 2* mod edema present

## 2019-04-09 NOTE — SWALLOW FEES ASSESSMENT ADULT - SLP GENERAL OBSERVATIONS
pt awake however lethargic and weak. pt denies SOB however shallow rapid breathing. pt appeared more dyspneic after po trials as well. + cognitive linguistic deficits. limited verbal output

## 2019-04-09 NOTE — OCCUPATIONAL THERAPY INITIAL EVALUATION ADULT - SPECIFY REASON(S)
Pt currently presents as very fatigued with SOB. PT recently completed eval and reports that pt would benefit from rest before OT eval completed. Will follow up in PM today.

## 2019-04-09 NOTE — DIETITIAN INITIAL EVALUATION ADULT. - PERTINENT MEDS FT
heparin, abx, insulin lispro, insulin glargine, docusate, senna, calcium acetate, furosemide, prednisone, amlodipine, cholecalciferol

## 2019-04-10 LAB
ANION GAP SERPL CALC-SCNC: 16 MMOL/L — HIGH (ref 7–14)
BASOPHILS # BLD AUTO: 0.04 K/UL — SIGNIFICANT CHANGE UP (ref 0–0.2)
BASOPHILS NFR BLD AUTO: 0.5 % — SIGNIFICANT CHANGE UP (ref 0–1)
BUN SERPL-MCNC: 107 MG/DL — CRITICAL HIGH (ref 10–20)
CALCIUM SERPL-MCNC: 8.7 MG/DL — SIGNIFICANT CHANGE UP (ref 8.5–10.1)
CHLORIDE SERPL-SCNC: 107 MMOL/L — SIGNIFICANT CHANGE UP (ref 98–110)
CO2 SERPL-SCNC: 23 MMOL/L — SIGNIFICANT CHANGE UP (ref 17–32)
CREAT SERPL-MCNC: 3 MG/DL — HIGH (ref 0.7–1.5)
EOSINOPHIL # BLD AUTO: 0.24 K/UL — SIGNIFICANT CHANGE UP (ref 0–0.7)
EOSINOPHIL NFR BLD AUTO: 3.1 % — SIGNIFICANT CHANGE UP (ref 0–8)
GLUCOSE BLDC GLUCOMTR-MCNC: 230 MG/DL — HIGH (ref 70–99)
GLUCOSE BLDC GLUCOMTR-MCNC: 329 MG/DL — HIGH (ref 70–99)
GLUCOSE BLDC GLUCOMTR-MCNC: 348 MG/DL — HIGH (ref 70–99)
GLUCOSE BLDC GLUCOMTR-MCNC: 381 MG/DL — HIGH (ref 70–99)
GLUCOSE SERPL-MCNC: 240 MG/DL — HIGH (ref 70–99)
HCT VFR BLD CALC: 35.9 % — LOW (ref 42–52)
HGB BLD-MCNC: 10.9 G/DL — LOW (ref 14–18)
IMM GRANULOCYTES NFR BLD AUTO: 0.4 % — HIGH (ref 0.1–0.3)
LYMPHOCYTES # BLD AUTO: 0.58 K/UL — LOW (ref 1.2–3.4)
LYMPHOCYTES # BLD AUTO: 7.4 % — LOW (ref 20.5–51.1)
MAGNESIUM SERPL-MCNC: 2.8 MG/DL — HIGH (ref 1.8–2.4)
MCHC RBC-ENTMCNC: 26.5 PG — LOW (ref 27–31)
MCHC RBC-ENTMCNC: 30.4 G/DL — LOW (ref 32–37)
MCV RBC AUTO: 87.1 FL — SIGNIFICANT CHANGE UP (ref 80–94)
MONOCYTES # BLD AUTO: 0.72 K/UL — HIGH (ref 0.1–0.6)
MONOCYTES NFR BLD AUTO: 9.2 % — SIGNIFICANT CHANGE UP (ref 1.7–9.3)
NEUTROPHILS # BLD AUTO: 6.24 K/UL — SIGNIFICANT CHANGE UP (ref 1.4–6.5)
NEUTROPHILS NFR BLD AUTO: 79.4 % — HIGH (ref 42.2–75.2)
NRBC # BLD: 0 /100 WBCS — SIGNIFICANT CHANGE UP (ref 0–0)
PHOSPHATE SERPL-MCNC: 5.4 MG/DL — HIGH (ref 2.1–4.9)
PLATELET # BLD AUTO: 235 K/UL — SIGNIFICANT CHANGE UP (ref 130–400)
POTASSIUM SERPL-MCNC: 5.3 MMOL/L — HIGH (ref 3.5–5)
POTASSIUM SERPL-SCNC: 5.3 MMOL/L — HIGH (ref 3.5–5)
RBC # BLD: 4.12 M/UL — LOW (ref 4.7–6.1)
RBC # FLD: 14.5 % — SIGNIFICANT CHANGE UP (ref 11.5–14.5)
SODIUM SERPL-SCNC: 146 MMOL/L — SIGNIFICANT CHANGE UP (ref 135–146)
WBC # BLD: 7.85 K/UL — SIGNIFICANT CHANGE UP (ref 4.8–10.8)
WBC # FLD AUTO: 7.85 K/UL — SIGNIFICANT CHANGE UP (ref 4.8–10.8)

## 2019-04-10 PROCEDURE — 71045 X-RAY EXAM CHEST 1 VIEW: CPT | Mod: 26

## 2019-04-10 RX ORDER — INSULIN LISPRO 100/ML
8 VIAL (ML) SUBCUTANEOUS
Qty: 0 | Refills: 0 | Status: DISCONTINUED | OUTPATIENT
Start: 2019-04-10 | End: 2019-04-12

## 2019-04-10 RX ORDER — FUROSEMIDE 40 MG
40 TABLET ORAL ONCE
Qty: 0 | Refills: 0 | Status: COMPLETED | OUTPATIENT
Start: 2019-04-10 | End: 2019-04-10

## 2019-04-10 RX ORDER — INSULIN GLARGINE 100 [IU]/ML
24 INJECTION, SOLUTION SUBCUTANEOUS AT BEDTIME
Qty: 0 | Refills: 0 | Status: DISCONTINUED | OUTPATIENT
Start: 2019-04-10 | End: 2019-04-12

## 2019-04-10 RX ORDER — INSULIN LISPRO 100/ML
8 VIAL (ML) SUBCUTANEOUS ONCE
Qty: 0 | Refills: 0 | Status: COMPLETED | OUTPATIENT
Start: 2019-04-10 | End: 2019-04-10

## 2019-04-10 RX ADMIN — Medication 40 MILLIGRAM(S): at 06:41

## 2019-04-10 RX ADMIN — Medication 20 MILLIGRAM(S): at 06:41

## 2019-04-10 RX ADMIN — Medication 40 MILLIGRAM(S): at 11:06

## 2019-04-10 RX ADMIN — Medication 667 MILLIGRAM(S): at 17:20

## 2019-04-10 RX ADMIN — Medication 50 MILLIGRAM(S): at 21:26

## 2019-04-10 RX ADMIN — ATORVASTATIN CALCIUM 40 MILLIGRAM(S): 80 TABLET, FILM COATED ORAL at 21:27

## 2019-04-10 RX ADMIN — HEPARIN SODIUM 5000 UNIT(S): 5000 INJECTION INTRAVENOUS; SUBCUTANEOUS at 13:33

## 2019-04-10 RX ADMIN — PANTOPRAZOLE SODIUM 40 MILLIGRAM(S): 20 TABLET, DELAYED RELEASE ORAL at 06:41

## 2019-04-10 RX ADMIN — HEPARIN SODIUM 5000 UNIT(S): 5000 INJECTION INTRAVENOUS; SUBCUTANEOUS at 06:42

## 2019-04-10 RX ADMIN — Medication 8 UNIT(S): at 22:28

## 2019-04-10 RX ADMIN — Medication 4: at 12:06

## 2019-04-10 RX ADMIN — Medication 0.1 MILLIGRAM(S): at 06:41

## 2019-04-10 RX ADMIN — Medication 300 MILLIGRAM(S): at 06:41

## 2019-04-10 RX ADMIN — Medication 300 MILLIGRAM(S): at 21:27

## 2019-04-10 RX ADMIN — Medication 667 MILLIGRAM(S): at 08:18

## 2019-04-10 RX ADMIN — Medication 300 MILLIGRAM(S): at 13:33

## 2019-04-10 RX ADMIN — Medication 0.1 MILLIGRAM(S): at 13:33

## 2019-04-10 RX ADMIN — HEPARIN SODIUM 5000 UNIT(S): 5000 INJECTION INTRAVENOUS; SUBCUTANEOUS at 21:28

## 2019-04-10 RX ADMIN — Medication 7 UNIT(S): at 07:54

## 2019-04-10 RX ADMIN — CHLORHEXIDINE GLUCONATE 1 APPLICATION(S): 213 SOLUTION TOPICAL at 06:42

## 2019-04-10 RX ADMIN — CEFEPIME 100 MILLIGRAM(S): 1 INJECTION, POWDER, FOR SOLUTION INTRAMUSCULAR; INTRAVENOUS at 17:20

## 2019-04-10 RX ADMIN — Medication 5: at 17:21

## 2019-04-10 RX ADMIN — INSULIN GLARGINE 24 UNIT(S): 100 INJECTION, SOLUTION SUBCUTANEOUS at 21:41

## 2019-04-10 RX ADMIN — Medication 100 MILLIGRAM(S): at 06:41

## 2019-04-10 RX ADMIN — Medication 100 MILLIGRAM(S): at 13:33

## 2019-04-10 RX ADMIN — AMLODIPINE BESYLATE 10 MILLIGRAM(S): 2.5 TABLET ORAL at 06:41

## 2019-04-10 RX ADMIN — Medication 8 UNIT(S): at 17:20

## 2019-04-10 RX ADMIN — Medication 0.1 MILLIGRAM(S): at 21:28

## 2019-04-10 RX ADMIN — CHLORHEXIDINE GLUCONATE 1 APPLICATION(S): 213 SOLUTION TOPICAL at 17:17

## 2019-04-10 RX ADMIN — Medication 667 MILLIGRAM(S): at 11:18

## 2019-04-10 RX ADMIN — Medication 2: at 07:55

## 2019-04-10 RX ADMIN — SENNA PLUS 2 TABLET(S): 8.6 TABLET ORAL at 21:28

## 2019-04-10 RX ADMIN — Medication 2000 UNIT(S): at 11:16

## 2019-04-10 RX ADMIN — Medication 7 UNIT(S): at 13:02

## 2019-04-10 RX ADMIN — CHLORHEXIDINE GLUCONATE 1 APPLICATION(S): 213 SOLUTION TOPICAL at 11:13

## 2019-04-10 NOTE — PROGRESS NOTE ADULT - SUBJECTIVE AND OBJECTIVE BOX
CHIEF COMPLAINT:    Patient is a 59y old Male who presents with a chief complaint of Right sided weakness (09 Apr 2019 12:57)    Currently admitted to medicine with the primary diagnosis of Intraparenchymal hemorrhage of brain     Today is hospital day 8d. This morning he is resting comfortably in bed and reports no new issues or overnight events.     PAST MEDICAL & SURGICAL HISTORY  Gout  Morbidly obese  Gout  Hypertension  Diabetes mellitus  S/P tonsillectomy      ALLERGIES:  No Known Allergies    MEDICATIONS:  STANDING MEDICATIONS  amLODIPine   Tablet 10 milliGRAM(s) Oral daily  atorvastatin Oral Tab/Cap - Peds 40 milliGRAM(s) Oral daily  calcium acetate 667 milliGRAM(s) Oral three times a day with meals  cefepime   IVPB 1000 milliGRAM(s) IV Intermittent every 24 hours  chlorhexidine 2% Cloths 1 Application(s) Topical daily  chlorhexidine 4% Liquid 1 Application(s) Topical every 12 hours  cholecalciferol 2000 Unit(s) Oral daily  cloNIDine 0.1 milliGRAM(s) Oral three times a day  dextrose 5%. 1000 milliLiter(s) IV Continuous <Continuous>  dextrose 50% Injectable 12.5 Gram(s) IV Push once  dextrose 50% Injectable 25 Gram(s) IV Push once  dextrose 50% Injectable 25 Gram(s) IV Push once  docusate sodium 100 milliGRAM(s) Oral three times a day  furosemide    Tablet 40 milliGRAM(s) Oral daily  heparin  Injectable 5000 Unit(s) SubCutaneous every 8 hours  hydrALAZINE 100 milliGRAM(s) Oral every 8 hours  influenza   Vaccine 0.5 milliLiter(s) IntraMuscular once  insulin glargine Injectable (LANTUS) 21 Unit(s) SubCutaneous at bedtime  insulin lispro (HumaLOG) corrective regimen sliding scale   SubCutaneous three times a day before meals  insulin lispro Injectable (HumaLOG) 7 Unit(s) SubCutaneous three times a day before meals  labetalol 300 milliGRAM(s) Oral every 8 hours  pantoprazole    Tablet 40 milliGRAM(s) Oral before breakfast  predniSONE   Tablet 20 milliGRAM(s) Oral daily  senna 2 Tablet(s) Oral at bedtime    PRN MEDICATIONS  acetaminophen   Tablet .. 650 milliGRAM(s) Oral every 6 hours PRN  aluminum hydroxide/magnesium hydroxide/simethicone Suspension 30 milliLiter(s) Oral every 6 hours PRN  dextrose 40% Gel 15 Gram(s) Oral once PRN  glucagon  Injectable 1 milliGRAM(s) IntraMuscular once PRN  oxyCODONE    5 mG/acetaminophen 325 mG 1 Tablet(s) Oral every 6 hours PRN    VITALS:   T(F): 98  HR: 88  BP: 132/69  RR: 24  SpO2: --    LABS:                        10.9   7.85  )-----------( 235      ( 10 Apr 2019 07:12 )             35.9     04-10    146  |  107  |  107<HH>  ----------------------------<  240<H>  5.3<H>   |  23  |  3.0<H>    Ca    8.7      10 Apr 2019 07:12  Phos  5.4     04-10  Mg     2.8     04-10    Culture - Blood (collected 08 Apr 2019 17:49)  Source: .Blood None  Preliminary Report (10 Apr 2019 02:01):    No growth to date.    RADIOLOGY:      PHYSICAL EXAM:  GEN: No acute distress  PULM: Clear to auscultation bilaterally   CARD: S1/S2 present. RRR.   GI: Soft, non-tender, non-distended. Bowel sounds present  MSK: NC/NC/NE/2+PP/DIAZ  NEURO: AAOX3 CHIEF COMPLAINT:    Patient is a 59y old Male who presents with a chief complaint of Right sided weakness (09 Apr 2019 12:57)    Currently admitted to medicine with the primary diagnosis of Intraparenchymal hemorrhage of brain     Today is hospital day 8d. This morning he is resting comfortably in bed and reports no new issues or overnight events.     PAST MEDICAL & SURGICAL HISTORY  Gout  Morbidly obese  Gout  Hypertension  Diabetes mellitus  S/P tonsillectomy    ALLERGIES:  No Known Allergies    MEDICATIONS:  STANDING MEDICATIONS  amLODIPine   Tablet 10 milliGRAM(s) Oral daily  atorvastatin Oral Tab/Cap - Peds 40 milliGRAM(s) Oral daily  calcium acetate 667 milliGRAM(s) Oral three times a day with meals  cefepime   IVPB 1000 milliGRAM(s) IV Intermittent every 24 hours  chlorhexidine 2% Cloths 1 Application(s) Topical daily  chlorhexidine 4% Liquid 1 Application(s) Topical every 12 hours  cholecalciferol 2000 Unit(s) Oral daily  cloNIDine 0.1 milliGRAM(s) Oral three times a day  dextrose 5%. 1000 milliLiter(s) IV Continuous <Continuous>  dextrose 50% Injectable 12.5 Gram(s) IV Push once  dextrose 50% Injectable 25 Gram(s) IV Push once  dextrose 50% Injectable 25 Gram(s) IV Push once  docusate sodium 100 milliGRAM(s) Oral three times a day  furosemide    Tablet 40 milliGRAM(s) Oral daily  heparin  Injectable 5000 Unit(s) SubCutaneous every 8 hours  hydrALAZINE 100 milliGRAM(s) Oral every 8 hours  influenza   Vaccine 0.5 milliLiter(s) IntraMuscular once  insulin glargine Injectable (LANTUS) 21 Unit(s) SubCutaneous at bedtime  insulin lispro (HumaLOG) corrective regimen sliding scale   SubCutaneous three times a day before meals  insulin lispro Injectable (HumaLOG) 7 Unit(s) SubCutaneous three times a day before meals  labetalol 300 milliGRAM(s) Oral every 8 hours  pantoprazole    Tablet 40 milliGRAM(s) Oral before breakfast  predniSONE   Tablet 20 milliGRAM(s) Oral daily  senna 2 Tablet(s) Oral at bedtime    PRN MEDICATIONS  acetaminophen   Tablet .. 650 milliGRAM(s) Oral every 6 hours PRN  aluminum hydroxide/magnesium hydroxide/simethicone Suspension 30 milliLiter(s) Oral every 6 hours PRN  dextrose 40% Gel 15 Gram(s) Oral once PRN  glucagon  Injectable 1 milliGRAM(s) IntraMuscular once PRN  oxyCODONE    5 mG/acetaminophen 325 mG 1 Tablet(s) Oral every 6 hours PRN    VITALS:   T(F): 98  HR: 88  BP: 132/69  RR: 24  SpO2: --    LABS:                        10.9   7.85  )-----------( 235      ( 10 Apr 2019 07:12 )             35.9     04-10    146  |  107  |  107<HH>  ----------------------------<  240<H>  5.3<H>   |  23  |  3.0<H>    Ca    8.7      10 Apr 2019 07:12  Phos  5.4     04-10  Mg     2.8     04-10    Culture - Blood (collected 08 Apr 2019 17:49)  Source: .Blood None  Preliminary Report (10 Apr 2019 02:01):    No growth to date.    RADIOLOGY:      PHYSICAL EXAM:  GEN: no acute distress. somnolent  HEENT: moist mucous membranes   LUNGS: clear to auscultation bilaterally   HEART: S1/S2 present, RRR  ABD: soft, nontender, nondistended, bowel sounds present  EXT:  swelling of R ankle, 2+   NEURO: AAOX2, moves all extremities CHIEF COMPLAINT:    Patient is a 59y old Male who presents with a chief complaint of Right sided weakness (09 Apr 2019 12:57)    Currently admitted to medicine with the primary diagnosis of Intraparenchymal hemorrhage of brain     Today is hospital day 8d. This morning he is resting comfortably in bed and reports no new issues or overnight events.     PAST MEDICAL & SURGICAL HISTORY  Gout  Morbidly obese  Gout  Hypertension  Diabetes mellitus  S/P tonsillectomy    ALLERGIES:  No Known Allergies    MEDICATIONS:  STANDING MEDICATIONS  amLODIPine   Tablet 10 milliGRAM(s) Oral daily  atorvastatin Oral Tab/Cap - Peds 40 milliGRAM(s) Oral daily  calcium acetate 667 milliGRAM(s) Oral three times a day with meals  cefepime   IVPB 1000 milliGRAM(s) IV Intermittent every 24 hours  chlorhexidine 2% Cloths 1 Application(s) Topical daily  chlorhexidine 4% Liquid 1 Application(s) Topical every 12 hours  cholecalciferol 2000 Unit(s) Oral daily  cloNIDine 0.1 milliGRAM(s) Oral three times a day  dextrose 5%. 1000 milliLiter(s) IV Continuous <Continuous>  dextrose 50% Injectable 12.5 Gram(s) IV Push once  dextrose 50% Injectable 25 Gram(s) IV Push once  dextrose 50% Injectable 25 Gram(s) IV Push once  docusate sodium 100 milliGRAM(s) Oral three times a day  furosemide    Tablet 40 milliGRAM(s) Oral daily  heparin  Injectable 5000 Unit(s) SubCutaneous every 8 hours  hydrALAZINE 100 milliGRAM(s) Oral every 8 hours  influenza   Vaccine 0.5 milliLiter(s) IntraMuscular once  insulin glargine Injectable (LANTUS) 21 Unit(s) SubCutaneous at bedtime  insulin lispro (HumaLOG) corrective regimen sliding scale   SubCutaneous three times a day before meals  insulin lispro Injectable (HumaLOG) 7 Unit(s) SubCutaneous three times a day before meals  labetalol 300 milliGRAM(s) Oral every 8 hours  pantoprazole    Tablet 40 milliGRAM(s) Oral before breakfast  predniSONE   Tablet 20 milliGRAM(s) Oral daily  senna 2 Tablet(s) Oral at bedtime    PRN MEDICATIONS  acetaminophen   Tablet .. 650 milliGRAM(s) Oral every 6 hours PRN  aluminum hydroxide/magnesium hydroxide/simethicone Suspension 30 milliLiter(s) Oral every 6 hours PRN  dextrose 40% Gel 15 Gram(s) Oral once PRN  glucagon  Injectable 1 milliGRAM(s) IntraMuscular once PRN  oxyCODONE    5 mG/acetaminophen 325 mG 1 Tablet(s) Oral every 6 hours PRN    VITALS:   T(F): 98  HR: 88  BP: 132/69  RR: 24  SpO2: --    LABS:                        10.9   7.85  )-----------( 235      ( 10 Apr 2019 07:12 )             35.9     04-10    146  |  107  |  107<HH>  ----------------------------<  240<H>  5.3<H>   |  23  |  3.0<H>    Ca    8.7      10 Apr 2019 07:12  Phos  5.4     04-10  Mg     2.8     04-10    Culture - Blood (collected 08 Apr 2019 17:49)  Source: .Blood None  Preliminary Report (10 Apr 2019 02:01):    No growth to date.    RADIOLOGY:  < from: Transthoracic Echocardiogram (04.04.19 @ 11:34) >   1. LV Ejection Fraction by Castro's Method with a biplane EF of 58 %.   2. Mildly increased LV wall thickness.   3. Mild tricuspid regurgitation.   4. Mildly thickened and calcified aortic leaflets. Mild aortic stenosis.   5. Estimated pulmonary artery systolic pressure is 42.0 mmHg assuming a right atrial pressure of 5 mmHg, which is consistent with mild pulmonary hypertension.  < end of copied text >    < from: CT Head No Cont (04.06.19 @ 22:53) >  1.  Stable left basal ganglia intraparenchymal hematoma measuring up to 3.9 cm. No new areas of hemorrhage.    2.  Unchanged 2 mm left to right midline shift.  < end of copied text >    PHYSICAL EXAM:  GEN: no acute distress. somnolent  HEENT: moist mucous membranes   LUNGS: decreased breath sounds bilaterally  HEART: S1/S2 present, RRR  ABD: soft, nontender, nondistended, bowel sounds present  EXT:  swelling of R ankle, 2+ pitting edema  NEURO: AAOX2, moves all extremities

## 2019-04-10 NOTE — PROGRESS NOTE ADULT - ATTENDING COMMENTS
Pending (specify):  Consults - Cardiology and pulmonary   Pulmonary edema and pleural effusion- S/P Extra Lasix IV today   Family discussion: D/W family and family wanted to be evaluated with pulmonary. Since patient has been following with pulmonary for the ZE management   Disposition: Unknown at this time

## 2019-04-10 NOTE — PROGRESS NOTE ADULT - SUBJECTIVE AND OBJECTIVE BOX
Neurology Follow up note      Name  GRIS TIDWELL    HPI:  58 y/o M with hx of HTN, DM, CAD, Sleep Apnea BIBEMS for altered mental status and aphasia. At presentation patient was found to be aphasic and some right side weakness. He was not following the commands so wife called the EMS. He was completely fine at 3:00 pm today. Wife herd the noise upstairs and found him on the ground unresponsive. So she called an EMS.  In the ED stroke code was called and NIH SS was 15 with right sided weakness. The CT scan showed the left intra parenchymal bleed with the extend into the ventricle associated with the midline shift. Pt received platelets and DDAVP for the aspirin reversal. The blood pressure was in 200s . Pt had jhonatan and started on nicardipine drip for the close bp control. (02 Apr 2019 22:24)  Patient was initially  lethargic in ICU  and has right hemiplegia of RUE and some withdrawal in RLE  requiring vigorous stimulation to arouse. ICH 2; NIH 15, MsR 0. Pt appeared to [facility]; on ASA.       Interval History - Pt just transfered from ICU to floor. Pt currently in stable condition for resolving hemorrhage. Neurosurgery Holding anticoagulation. Several episodes of SOB , found to be in fluid overload. LAsix given with uptrending creatine- Nephrology on board - Cr now improving. HCT 4/6 shows stable hemmorhage- no acute changes.            Vital Signs Last 24 Hrs  T(C): 37.1 (10 Apr 2019 21:41), Max: 37.1 (10 Apr 2019 21:41)  T(F): 98.8 (10 Apr 2019 21:41), Max: 98.8 (10 Apr 2019 21:41)  HR: 88 (10 Apr 2019 21:41) (83 - 88)  BP: 138/83 (10 Apr 2019 21:41) (132/69 - 143/79)  BP(mean): --  RR: 20 (10 Apr 2019 21:41) (20 - 24)  SpO2: --          Neurological Exam:   Awake Alert FSC dysarthric  +mild right drift of RUE and RLE  Sensory; grossly intact  DIAZ X 4  Sensory; grossly intact                  Medications  acetaminophen   Tablet .. 650 milliGRAM(s) Oral every 6 hours PRN  aluminum hydroxide/magnesium hydroxide/simethicone Suspension 30 milliLiter(s) Oral every 6 hours PRN  amLODIPine   Tablet 10 milliGRAM(s) Oral daily  atorvastatin Oral Tab/Cap - Peds 40 milliGRAM(s) Oral daily  calcium acetate 667 milliGRAM(s) Oral three times a day with meals  cefepime   IVPB 1000 milliGRAM(s) IV Intermittent every 24 hours  chlorhexidine 2% Cloths 1 Application(s) Topical daily  chlorhexidine 4% Liquid 1 Application(s) Topical every 12 hours  cholecalciferol 2000 Unit(s) Oral daily  cloNIDine 0.1 milliGRAM(s) Oral three times a day  dextrose 40% Gel 15 Gram(s) Oral once PRN  dextrose 5%. 1000 milliLiter(s) IV Continuous <Continuous>  dextrose 50% Injectable 12.5 Gram(s) IV Push once  dextrose 50% Injectable 25 Gram(s) IV Push once  dextrose 50% Injectable 25 Gram(s) IV Push once  furosemide    Tablet 40 milliGRAM(s) Oral daily  glucagon  Injectable 1 milliGRAM(s) IntraMuscular once PRN  heparin  Injectable 5000 Unit(s) SubCutaneous every 8 hours  hydrALAZINE 100 milliGRAM(s) Oral every 8 hours  influenza   Vaccine 0.5 milliLiter(s) IntraMuscular once  insulin glargine Injectable (LANTUS) 24 Unit(s) SubCutaneous at bedtime  insulin lispro (HumaLOG) corrective regimen sliding scale   SubCutaneous three times a day before meals  insulin lispro Injectable (HumaLOG) 8 Unit(s) SubCutaneous three times a day before meals  labetalol 300 milliGRAM(s) Oral every 8 hours  oxyCODONE    5 mG/acetaminophen 325 mG 1 Tablet(s) Oral every 6 hours PRN  pantoprazole    Tablet 40 milliGRAM(s) Oral before breakfast  predniSONE   Tablet 20 milliGRAM(s) Oral daily  senna 2 Tablet(s) Oral at bedtime      Lab      Radiology Neurology Follow up note      Name  GRIS TIDWELL    HPI:  60 y/o M with hx of HTN, DM, CAD, Sleep Apnea BIBEMS for altered mental status and aphasia. At presentation patient was found to be aphasic and some right side weakness. He was not following the commands so wife called the EMS. He was completely fine at 3:00 pm today. Wife herd the noise upstairs and found him on the ground unresponsive. So she called an EMS.  In the ED stroke code was called and NIH SS was 15 with right sided weakness. The CT scan showed the left intra parenchymal bleed with the extend into the ventricle associated with the midline shift. Pt received platelets and DDAVP for the aspirin reversal. The blood pressure was in 200s . Pt had jhonatan and started on nicardipine drip for the close bp control. (02 Apr 2019 22:24)  Patient was initially  lethargic in ICU  and has right hemiplegia of RUE and some withdrawal in RLE  requiring vigorous stimulation to arouse. ICH 2; NIH 15, MsR 0. Pt appeared to [facility]; on ASA.       Interval History - Pt just transfered from ICU to floor. Pt currently in stable condition for resolving hemorrhage. Neurosurgery Holding anticoagulation. Several episodes of SOB , found to be in fluid overload. LAsix given with uptrending creatine- Nephrology on board - Cr now improving. HCT 4/6 shows stable hemmorhage- no acute changes.            Vital Signs Last 24 Hrs  T(C): 37.1 (10 Apr 2019 21:41), Max: 37.1 (10 Apr 2019 21:41)  T(F): 98.8 (10 Apr 2019 21:41), Max: 98.8 (10 Apr 2019 21:41)  HR: 88 (10 Apr 2019 21:41) (83 - 88)  BP: 138/83 (10 Apr 2019 21:41) (132/69 - 143/79)  BP(mean): --  RR: 20 (10 Apr 2019 21:41) (20 - 24)  SpO2: --          Neurological Exam:   Awake Alert FSC dysarthric  +mild right drift of RUE and RLE  Sensory; grossly intact  DIAZ X 4 Antigravity in UE's b/l  LE's RLE 2/5 can move within plane of bed  LLE 2/5 + contraction seen  Sensory; grossly intact                  Medications  acetaminophen   Tablet .. 650 milliGRAM(s) Oral every 6 hours PRN  aluminum hydroxide/magnesium hydroxide/simethicone Suspension 30 milliLiter(s) Oral every 6 hours PRN  amLODIPine   Tablet 10 milliGRAM(s) Oral daily  atorvastatin Oral Tab/Cap - Peds 40 milliGRAM(s) Oral daily  calcium acetate 667 milliGRAM(s) Oral three times a day with meals  cefepime   IVPB 1000 milliGRAM(s) IV Intermittent every 24 hours  chlorhexidine 2% Cloths 1 Application(s) Topical daily  chlorhexidine 4% Liquid 1 Application(s) Topical every 12 hours  cholecalciferol 2000 Unit(s) Oral daily  cloNIDine 0.1 milliGRAM(s) Oral three times a day  dextrose 40% Gel 15 Gram(s) Oral once PRN  dextrose 5%. 1000 milliLiter(s) IV Continuous <Continuous>  dextrose 50% Injectable 12.5 Gram(s) IV Push once  dextrose 50% Injectable 25 Gram(s) IV Push once  dextrose 50% Injectable 25 Gram(s) IV Push once  furosemide    Tablet 40 milliGRAM(s) Oral daily  glucagon  Injectable 1 milliGRAM(s) IntraMuscular once PRN  heparin  Injectable 5000 Unit(s) SubCutaneous every 8 hours  hydrALAZINE 100 milliGRAM(s) Oral every 8 hours  influenza   Vaccine 0.5 milliLiter(s) IntraMuscular once  insulin glargine Injectable (LANTUS) 24 Unit(s) SubCutaneous at bedtime  insulin lispro (HumaLOG) corrective regimen sliding scale   SubCutaneous three times a day before meals  insulin lispro Injectable (HumaLOG) 8 Unit(s) SubCutaneous three times a day before meals  labetalol 300 milliGRAM(s) Oral every 8 hours  oxyCODONE    5 mG/acetaminophen 325 mG 1 Tablet(s) Oral every 6 hours PRN  pantoprazole    Tablet 40 milliGRAM(s) Oral before breakfast  predniSONE   Tablet 20 milliGRAM(s) Oral daily  senna 2 Tablet(s) Oral at bedtime      Lab      Radiology Neurology Follow up note      Name  GRIS TIDWELL    HPI:  60 y/o M with hx of HTN, DM, CAD, Sleep Apnea BIBEMS for altered mental status and aphasia. At presentation patient was found to be aphasic and some right side weakness. He was not following the commands so wife called the EMS. He was completely fine at 3:00 pm today. Wife herd the noise upstairs and found him on the ground unresponsive. So she called an EMS.  In the ED stroke code was called and NIH SS was 15 with right sided weakness. The CT scan showed the left intra parenchymal bleed with the extend into the ventricle associated with the midline shift. Pt received platelets and DDAVP for the aspirin reversal. The blood pressure was in 200s . Pt had jhonatan and started on nicardipine drip for the close bp control. (02 Apr 2019 22:24)  Patient was initially  lethargic in ICU  and has right hemiplegia of RUE and some withdrawal in RLE  requiring vigorous stimulation to arouse. ICH 2; NIH 15, MsR 0. Pt appeared to have IPH; on ASA.       Interval History - Pt just transfered from ICU to floor. Pt currently in stable condition for resolving hemorrhage. Neurosurgery Holding anticoagulation. Several episodes of SOB , found to be in fluid overload. LAsix given with uptrending creatine- Nephrology on board - Cr now improving. HCT 4/6 shows stable hemmorhage- no acute changes.            Vital Signs Last 24 Hrs  T(C): 37.1 (10 Apr 2019 21:41), Max: 37.1 (10 Apr 2019 21:41)  T(F): 98.8 (10 Apr 2019 21:41), Max: 98.8 (10 Apr 2019 21:41)  HR: 88 (10 Apr 2019 21:41) (83 - 88)  BP: 138/83 (10 Apr 2019 21:41) (132/69 - 143/79)  BP(mean): --  RR: 20 (10 Apr 2019 21:41) (20 - 24)  SpO2: --          Neurological Exam:   Awake Alert FSC dysarthric  +mild right drift of RUE and RLE  Sensory; grossly intact  DIAZ X 4 Antigravity in UE's b/l  LE's RLE 2/5 can move within plane of bed  LLE 2/5 + contraction seen  Sensory; grossly intact                  Medications  acetaminophen   Tablet .. 650 milliGRAM(s) Oral every 6 hours PRN  aluminum hydroxide/magnesium hydroxide/simethicone Suspension 30 milliLiter(s) Oral every 6 hours PRN  amLODIPine   Tablet 10 milliGRAM(s) Oral daily  atorvastatin Oral Tab/Cap - Peds 40 milliGRAM(s) Oral daily  calcium acetate 667 milliGRAM(s) Oral three times a day with meals  cefepime   IVPB 1000 milliGRAM(s) IV Intermittent every 24 hours  chlorhexidine 2% Cloths 1 Application(s) Topical daily  chlorhexidine 4% Liquid 1 Application(s) Topical every 12 hours  cholecalciferol 2000 Unit(s) Oral daily  cloNIDine 0.1 milliGRAM(s) Oral three times a day  dextrose 40% Gel 15 Gram(s) Oral once PRN  dextrose 5%. 1000 milliLiter(s) IV Continuous <Continuous>  dextrose 50% Injectable 12.5 Gram(s) IV Push once  dextrose 50% Injectable 25 Gram(s) IV Push once  dextrose 50% Injectable 25 Gram(s) IV Push once  furosemide    Tablet 40 milliGRAM(s) Oral daily  glucagon  Injectable 1 milliGRAM(s) IntraMuscular once PRN  heparin  Injectable 5000 Unit(s) SubCutaneous every 8 hours  hydrALAZINE 100 milliGRAM(s) Oral every 8 hours  influenza   Vaccine 0.5 milliLiter(s) IntraMuscular once  insulin glargine Injectable (LANTUS) 24 Unit(s) SubCutaneous at bedtime  insulin lispro (HumaLOG) corrective regimen sliding scale   SubCutaneous three times a day before meals  insulin lispro Injectable (HumaLOG) 8 Unit(s) SubCutaneous three times a day before meals  labetalol 300 milliGRAM(s) Oral every 8 hours  oxyCODONE    5 mG/acetaminophen 325 mG 1 Tablet(s) Oral every 6 hours PRN  pantoprazole    Tablet 40 milliGRAM(s) Oral before breakfast  predniSONE   Tablet 20 milliGRAM(s) Oral daily  senna 2 Tablet(s) Oral at bedtime      Lab      Radiology

## 2019-04-10 NOTE — PROGRESS NOTE ADULT - ATTENDING COMMENTS
Patient seen and examined and agree with above except as noted.  Patient stable with no new complaints.    Plan as above

## 2019-04-10 NOTE — PROGRESS NOTE ADULT - ASSESSMENT
58 yo M with PMHx of HTN, DM, CAD on aspirin, ZE who was BIBEMS for ams and aphasia. Stroked code called, had NIHSS 15. CTH showed L frontotemporal intraparenchymal bleed with extension into the ventricle and 2mm shift to the R. Admitted to ICU s/p platelets, DDAVP, nicardipine gtt.    #L frontotemporal intraparenchymal bleed with 2mm shift   - no acute neurosurgical intervention  - per neurosurgery: ok for baby ASA in 7-10 days, ok for heparin subQ  - CTH 4/2: L frontotemporal intraparenchymal bleed with extension into the ventricle and 2mm shift to the R  - CTH 4/3: no change  - CTH (repeat #2) 4/3: no change  - CTH 4/6: no change    #Hypertensive emergency  - goal BP < 160  - labetalol 300 mg TID, amlodipine 10 mg daily, hydralazine 100 mg TID  - wean nicardipine gtt  - increase clonidine to 0.1 mg TID    #HFrEF  - cxr w/ interval blunting of costophrenic angle, hazy opacity  - lasix 40 qd  - will give extra dose of lasix 40 iv and observe  - follow w/ Dr. Man    #DESI on CKD  - baseline Cr 1.9 in in 10/2018  - improving cr  - renal US 4/3: no hydronephrosis, L renal cyst  - per nephro: no need for urgent dialysis  - phoslo per nephro    #New onset afib  - rate controlled  - labetalol 300 mg TID  - full anticoagulation is contraindicated at this time given ICH    #Fever  - possible bibasilar pneumonia vs gout attack  - urine cx 4/2: negative  - blood cx: NTD  - uric acid 9.9  - c/w cefepime  - start prednisone 20 mg x 5 days for empiric tx of gout    #R ankle pain/swelling  #Bilateral knee pain  - BLE duplex 4/6: negative  - will obtain xrays of bilateral knees  - ortho consulted , ankle moderate swelling  - start prednisone 20 mg x 5 days for empiric tx of gout  - avoid NSAIDs for pain given DESI    #DM  - basal insulin with lantus 18u subQ  - lispro    #HLD  - lipitor    #DVT ppx  - heparin 5000u subQ Q8H    #GI ppx  - protonix  - diet: dysphagia 3 nectar thick w/ 1:1 feed as per speech and swallow    #Dispo  - from home  - full code  - plan for 4A

## 2019-04-10 NOTE — PROGRESS NOTE ADULT - SUBJECTIVE AND OBJECTIVE BOX
Nephrology progress note    Patient is seen and examined, events over the last 24 h noted .    Allergies:  No Known Allergies    Hospital Medications:   MEDICATIONS  (STANDING):  amLODIPine   Tablet 10 milliGRAM(s) Oral daily  atorvastatin Oral Tab/Cap - Peds 40 milliGRAM(s) Oral daily  calcium acetate 667 milliGRAM(s) Oral three times a day with meals  cefepime   IVPB 1000 milliGRAM(s) IV Intermittent every 24 hours  chlorhexidine 2% Cloths 1 Application(s) Topical daily  chlorhexidine 4% Liquid 1 Application(s) Topical every 12 hours  cholecalciferol 2000 Unit(s) Oral daily  cloNIDine 0.1 milliGRAM(s) Oral three times a day  docusate sodium 100 milliGRAM(s) Oral three times a day  furosemide    Tablet 40 milliGRAM(s) Oral daily  heparin  Injectable 5000 Unit(s) SubCutaneous every 8 hours  hydrALAZINE 100 milliGRAM(s) Oral every 8 hours  influenza   Vaccine 0.5 milliLiter(s) IntraMuscular once  insulin glargine Injectable (LANTUS) 21 Unit(s) SubCutaneous at bedtime  insulin lispro (HumaLOG) corrective regimen sliding scale   SubCutaneous three times a day before meals  insulin lispro Injectable (HumaLOG) 7 Unit(s) SubCutaneous three times a day before meals  labetalol 300 milliGRAM(s) Oral every 8 hours  pantoprazole    Tablet 40 milliGRAM(s) Oral before breakfast  predniSONE   Tablet 20 milliGRAM(s) Oral daily  senna 2 Tablet(s) Oral at bedtime        VITALS:  T(F): 98 (04-10-19 @ 05:35), Max: 99.3 (19 @ 22:12)  HR: 88 (04-10-19 @ 05:35)  BP: 132/69 (04-10-19 @ 05:35)  RR: 24 (04-10-19 @ 05:35)       @ 07:  -   @ 07:00  --------------------------------------------------------  IN: 440 mL / OUT: 900 mL / NET: -460 mL     @ 07:01  -  04-10 @ 07:00  --------------------------------------------------------  IN: 1570 mL / OUT: 1670 mL / NET: -100 mL    04-10 @ 07:01  -  04-10 @ 10:38  --------------------------------------------------------  IN: 360 mL / OUT: 0 mL / NET: 360 mL          PHYSICAL EXAM:  Constitutional: NAD  HEENT: anicteric sclera, oropharynx clear, MMM  Neck: No JVD  Respiratory: CTAB, no wheezes, rales or rhonchi  Cardiovascular: S1, S2, RRR  Gastrointestinal: BS+, soft, NT/ND  Extremities: No cyanosis or clubbing. No peripheral edema  :  No khan.   Skin: No rashes    LABS:  04-10    146  |  107  |  107<HH>  ----------------------------<  240<H>  5.3<H>   |  23  |  3.0<H>  Creatinine Trend: 3.0<--, 3.6<--, 3.7<--, 3.5<--, 3.9<--, 4.0<--  Ca    8.7      10 Apr 2019 07:12  Phos  5.4     04-10  Mg     2.8     04-10                            10.9   7.85  )-----------( 235      ( 10 Apr 2019 07:12 )             35.9       Urine Studies:  Urinalysis Basic - ( 2019 17:00 )    Color: Yellow / Appearance: Turbid / S.020 / pH:   Gluc:  / Ketone: Negative  / Bili: Negative / Urobili: 1.0 mg/dL   Blood:  / Protein: 100 mg/dL / Nitrite: Negative   Leuk Esterase: Small / RBC:  / WBC 6-10 /HPF   Sq Epi:  / Non Sq Epi: Few /HPF / Bacteria:       Creatinine, Random Urine: 249 mg/dL ( @ 11:42)  Protein/Creatinine Ratio Calculation: >1.5 Ratio ( @ 16:34)  Creatinine, Random Urine: 378 mg/dL ( @ 16:34)    RADIOLOGY & ADDITIONAL STUDIES: Nephrology progress note    Patient is seen and examined, events over the last 24 h noted .  denied SOB   said he was urinating well  No khan catheter     Allergies:  No Known Allergies    Hospital Medications:   MEDICATIONS  (STANDING):  amLODIPine   Tablet 10 milliGRAM(s) Oral daily  atorvastatin Oral Tab/Cap - Peds 40 milliGRAM(s) Oral daily  calcium acetate 667 milliGRAM(s) Oral three times a day with meals  cefepime   IVPB 1000 milliGRAM(s) IV Intermittent every 24 hours  cholecalciferol 2000 Unit(s) Oral daily  cloNIDine 0.1 milliGRAM(s) Oral three times a day  docusate sodium 100 milliGRAM(s) Oral three times a day  furosemide    Tablet 40 milliGRAM(s) Oral daily  heparin  Injectable 5000 Unit(s) SubCutaneous every 8 hours  hydrALAZINE 100 milliGRAM(s) Oral every 8 hours  influenza   Vaccine 0.5 milliLiter(s) IntraMuscular once  insulin glargine Injectable (LANTUS) 21 Unit(s) SubCutaneous at bedtime  insulin lispro (HumaLOG) corrective regimen sliding scale   SubCutaneous three times a day before meals  insulin lispro Injectable (HumaLOG) 7 Unit(s) SubCutaneous three times a day before meals  labetalol 300 milliGRAM(s) Oral every 8 hours  pantoprazole    Tablet 40 milliGRAM(s) Oral before breakfast  predniSONE   Tablet 20 milliGRAM(s) Oral daily  senna 2 Tablet(s) Oral at bedtime        VITALS:  T(F): 98 (04-10-19 @ 05:35), Max: 99.3 (19 @ 22:12)  HR: 88 (04-10-19 @ 05:35)  BP: 132/69 (04-10-19 @ 05:35)  RR: 24 (04-10-19 @ 05:35)       @ 07:  -   @ 07:00  --------------------------------------------------------  IN: 440 mL / OUT: 900 mL / NET: -460 mL     @ 07:01  -  04-10 @ 07:00  --------------------------------------------------------  IN: 1570 mL / OUT: 1670 mL / NET: -100 mL    04-10 @ 07:01  -  04-10 @ 10:38  --------------------------------------------------------  IN: 360 mL / OUT: 0 mL / NET: 360 mL          PHYSICAL EXAM:  Constitutional: NAD  HEENT: anicteric sclera, oropharynx clear, MMM  Neck: No JVD  Respiratory: CTAB, no wheezes, rales or rhonchi  Cardiovascular: S1, S2, RRR  Gastrointestinal: BS+, soft, NT/ND  Extremities: No cyanosis or clubbing. No peripheral edema  :  No khan.   Skin: No rashes    LABS:  04-10    146  |  107  |  107<HH>  ----------------------------<  240<H>  5.3<H>   |  23  |  3.0<H>  Creatinine Trend: 3.0<--, 3.6<--, 3.7<--, 3.5<--, 3.9<--, 4.0<--  Ca    8.7      10 Apr 2019 07:12  Phos  5.4     04-10  Mg     2.8     04-10                            10.9   7.85  )-----------( 235      ( 10 Apr 2019 07:12 )             35.9       Urine Studies:  Urinalysis Basic - ( 2019 17:00 )    Color: Yellow / Appearance: Turbid / S.020 / pH:   Gluc:  / Ketone: Negative  / Bili: Negative / Urobili: 1.0 mg/dL   Blood:  / Protein: 100 mg/dL / Nitrite: Negative   Leuk Esterase: Small / RBC:  / WBC 6-10 /HPF   Sq Epi:  / Non Sq Epi: Few /HPF / Bacteria:       Creatinine, Random Urine: 249 mg/dL ( @ 11:42)  Protein/Creatinine Ratio Calculation: >1.5 Ratio ( @ 16:34)  Creatinine, Random Urine: 378 mg/dL ( @ 16:34)    RADIOLOGY & ADDITIONAL STUDIES:

## 2019-04-10 NOTE — PROGRESS NOTE ADULT - ASSESSMENT
ImP    Plan: Continue Neuro checks q 4H    Keep HOB at 30  Keep SBP below 160  Agree with Neurosurgery to hold anticoagulation - but defer to them for futher management  Can follow up with Dr SMITHA Hong  as outpatient ImP: 58 y/o M with IPH with IVH extension    Plan: Continue Neuro checks q 4H    Keep HOB at 30  Keep SBP below 160  Continue OT/PT/Speech  Agree with Neurosurgery to hold anticoagulation - but defer to them for futher management  Can follow up with Dr SMITHA Hong  as outpatient

## 2019-04-10 NOTE — PROGRESS NOTE ADULT - ASSESSMENT
60 yo male patient with PMH of DM, HTN, Likely underlying CKD (Cr was 2.4 when presented) presented on 4/2 with AMS and aphasia, found w/ Left sided Frontotemporal bleed with intraparenchymal hemorrhagic associated with left ventricular bleed  # creatinine stable  # non oliguric  # off cardene   #continue lasix current follow with volume status / edema lower ext   # ph noted, started on phoslo 1 tablet po q 8 with meals  # No indication for RRT   # will follow 60 yo male patient with PMH of DM, HTN, Likely underlying CKD (Cr was 2.4 when presented) presented on 4/2 with AMS and aphasia, found w/ Left sided Frontotemporal bleed with intraparenchymal hemorrhagic associated with left ventricular bleed  # creatinine better  # non oliguric  # off cardene   #continue lasix current follow with volume status / edema lower ext better  # high BUN due to setroids   # ph noted, started on phoslo 1 tablet po q 8 with meals/ repeat better   # No indication for RRT   # will follow

## 2019-04-11 LAB
ANION GAP SERPL CALC-SCNC: 15 MMOL/L — HIGH (ref 7–14)
BASOPHILS # BLD AUTO: 0.03 K/UL — SIGNIFICANT CHANGE UP (ref 0–0.2)
BASOPHILS NFR BLD AUTO: 0.4 % — SIGNIFICANT CHANGE UP (ref 0–1)
BUN SERPL-MCNC: 110 MG/DL — CRITICAL HIGH (ref 10–20)
CALCIUM SERPL-MCNC: 9 MG/DL — SIGNIFICANT CHANGE UP (ref 8.5–10.1)
CHLORIDE SERPL-SCNC: 106 MMOL/L — SIGNIFICANT CHANGE UP (ref 98–110)
CO2 SERPL-SCNC: 25 MMOL/L — SIGNIFICANT CHANGE UP (ref 17–32)
CREAT SERPL-MCNC: 2.9 MG/DL — HIGH (ref 0.7–1.5)
EOSINOPHIL # BLD AUTO: 0.2 K/UL — SIGNIFICANT CHANGE UP (ref 0–0.7)
EOSINOPHIL NFR BLD AUTO: 2.8 % — SIGNIFICANT CHANGE UP (ref 0–8)
GLUCOSE BLDC GLUCOMTR-MCNC: 264 MG/DL — HIGH (ref 70–99)
GLUCOSE BLDC GLUCOMTR-MCNC: 290 MG/DL — HIGH (ref 70–99)
GLUCOSE BLDC GLUCOMTR-MCNC: 311 MG/DL — HIGH (ref 70–99)
GLUCOSE BLDC GLUCOMTR-MCNC: 314 MG/DL — HIGH (ref 70–99)
GLUCOSE BLDC GLUCOMTR-MCNC: 328 MG/DL — HIGH (ref 70–99)
GLUCOSE SERPL-MCNC: 284 MG/DL — HIGH (ref 70–99)
HCT VFR BLD CALC: 35.2 % — LOW (ref 42–52)
HGB BLD-MCNC: 10.7 G/DL — LOW (ref 14–18)
IMM GRANULOCYTES NFR BLD AUTO: 0.6 % — HIGH (ref 0.1–0.3)
LYMPHOCYTES # BLD AUTO: 0.61 K/UL — LOW (ref 1.2–3.4)
LYMPHOCYTES # BLD AUTO: 8.5 % — LOW (ref 20.5–51.1)
MAGNESIUM SERPL-MCNC: 2.6 MG/DL — HIGH (ref 1.8–2.4)
MCHC RBC-ENTMCNC: 26.7 PG — LOW (ref 27–31)
MCHC RBC-ENTMCNC: 30.4 G/DL — LOW (ref 32–37)
MCV RBC AUTO: 87.8 FL — SIGNIFICANT CHANGE UP (ref 80–94)
MONOCYTES # BLD AUTO: 0.67 K/UL — HIGH (ref 0.1–0.6)
MONOCYTES NFR BLD AUTO: 9.3 % — SIGNIFICANT CHANGE UP (ref 1.7–9.3)
NEUTROPHILS # BLD AUTO: 5.64 K/UL — SIGNIFICANT CHANGE UP (ref 1.4–6.5)
NEUTROPHILS NFR BLD AUTO: 78.4 % — HIGH (ref 42.2–75.2)
NRBC # BLD: 0 /100 WBCS — SIGNIFICANT CHANGE UP (ref 0–0)
PHOSPHATE SERPL-MCNC: 5.5 MG/DL — HIGH (ref 2.1–4.9)
PLATELET # BLD AUTO: 249 K/UL — SIGNIFICANT CHANGE UP (ref 130–400)
POTASSIUM SERPL-MCNC: 5.4 MMOL/L — HIGH (ref 3.5–5)
POTASSIUM SERPL-SCNC: 5.4 MMOL/L — HIGH (ref 3.5–5)
RBC # BLD: 4.01 M/UL — LOW (ref 4.7–6.1)
RBC # FLD: 14.6 % — HIGH (ref 11.5–14.5)
SODIUM SERPL-SCNC: 146 MMOL/L — SIGNIFICANT CHANGE UP (ref 135–146)
WBC # BLD: 7.19 K/UL — SIGNIFICANT CHANGE UP (ref 4.8–10.8)
WBC # FLD AUTO: 7.19 K/UL — SIGNIFICANT CHANGE UP (ref 4.8–10.8)

## 2019-04-11 PROCEDURE — 71045 X-RAY EXAM CHEST 1 VIEW: CPT | Mod: 26

## 2019-04-11 RX ORDER — INSULIN LISPRO 100/ML
12 VIAL (ML) SUBCUTANEOUS ONCE
Qty: 0 | Refills: 0 | Status: COMPLETED | OUTPATIENT
Start: 2019-04-11 | End: 2019-04-11

## 2019-04-11 RX ADMIN — Medication 300 MILLIGRAM(S): at 13:02

## 2019-04-11 RX ADMIN — CHLORHEXIDINE GLUCONATE 1 APPLICATION(S): 213 SOLUTION TOPICAL at 17:17

## 2019-04-11 RX ADMIN — CHLORHEXIDINE GLUCONATE 1 APPLICATION(S): 213 SOLUTION TOPICAL at 06:33

## 2019-04-11 RX ADMIN — Medication 12 UNIT(S): at 21:42

## 2019-04-11 RX ADMIN — Medication 20 MILLIGRAM(S): at 06:32

## 2019-04-11 RX ADMIN — Medication 667 MILLIGRAM(S): at 08:06

## 2019-04-11 RX ADMIN — AMLODIPINE BESYLATE 10 MILLIGRAM(S): 2.5 TABLET ORAL at 06:33

## 2019-04-11 RX ADMIN — CEFEPIME 100 MILLIGRAM(S): 1 INJECTION, POWDER, FOR SOLUTION INTRAMUSCULAR; INTRAVENOUS at 17:16

## 2019-04-11 RX ADMIN — Medication 100 MILLIGRAM(S): at 21:18

## 2019-04-11 RX ADMIN — Medication 40 MILLIGRAM(S): at 06:32

## 2019-04-11 RX ADMIN — SENNA PLUS 2 TABLET(S): 8.6 TABLET ORAL at 21:19

## 2019-04-11 RX ADMIN — Medication 2000 UNIT(S): at 12:34

## 2019-04-11 RX ADMIN — Medication 0.1 MILLIGRAM(S): at 21:18

## 2019-04-11 RX ADMIN — Medication 300 MILLIGRAM(S): at 21:19

## 2019-04-11 RX ADMIN — Medication 3: at 07:55

## 2019-04-11 RX ADMIN — Medication 4: at 12:38

## 2019-04-11 RX ADMIN — Medication 0.1 MILLIGRAM(S): at 06:33

## 2019-04-11 RX ADMIN — Medication 100 MILLIGRAM(S): at 06:32

## 2019-04-11 RX ADMIN — Medication 300 MILLIGRAM(S): at 06:32

## 2019-04-11 RX ADMIN — Medication 8 UNIT(S): at 16:43

## 2019-04-11 RX ADMIN — HEPARIN SODIUM 5000 UNIT(S): 5000 INJECTION INTRAVENOUS; SUBCUTANEOUS at 13:03

## 2019-04-11 RX ADMIN — HEPARIN SODIUM 5000 UNIT(S): 5000 INJECTION INTRAVENOUS; SUBCUTANEOUS at 06:33

## 2019-04-11 RX ADMIN — Medication 667 MILLIGRAM(S): at 12:34

## 2019-04-11 RX ADMIN — Medication 100 MILLIGRAM(S): at 13:02

## 2019-04-11 RX ADMIN — Medication 667 MILLIGRAM(S): at 17:16

## 2019-04-11 RX ADMIN — Medication 4: at 16:43

## 2019-04-11 RX ADMIN — ATORVASTATIN CALCIUM 40 MILLIGRAM(S): 80 TABLET, FILM COATED ORAL at 21:18

## 2019-04-11 RX ADMIN — PANTOPRAZOLE SODIUM 40 MILLIGRAM(S): 20 TABLET, DELAYED RELEASE ORAL at 06:32

## 2019-04-11 RX ADMIN — Medication 8 UNIT(S): at 12:37

## 2019-04-11 RX ADMIN — Medication 0.1 MILLIGRAM(S): at 13:02

## 2019-04-11 RX ADMIN — Medication 8 UNIT(S): at 07:55

## 2019-04-11 RX ADMIN — INSULIN GLARGINE 24 UNIT(S): 100 INJECTION, SOLUTION SUBCUTANEOUS at 21:36

## 2019-04-11 RX ADMIN — HEPARIN SODIUM 5000 UNIT(S): 5000 INJECTION INTRAVENOUS; SUBCUTANEOUS at 21:20

## 2019-04-11 NOTE — CHART NOTE - NSCHARTNOTEFT_GEN_A_CORE
Registered Dietitian Follow-Up     Patient Profile Reviewed                           Yes [X]   No []     Nutrition History Previously Obtained        Yes [X]  No []       Pertinent Subjective Information: Pt on cut up diet with nectar thick liquids, continues with good isabella/PO intake, consuming % of meals per EMR and per stepdaughter. Renal and carb consistent diet added, renal fx stable. Glucose elevated, particularly high while on steroids-  educated step daughter on holding juice.      Pertinent Medical Interventions: 60 y/o M with hx of HTN, DM, CAD, Sleep Apnea BIBEMS for altered mental status and aphasia. CT scan showed the left intra parenchymal bleed with the extend into the ventricle associated with the midline shift.     Diet order: Cut up diet, nectar thick liquids, renal diet, consistent carb diet.     Anthropometrics:  - Ht.  - Wt. no new wt  - %wt change  - BMI  - IBW     Pertinent Lab Data: 4/11: Na 146, K 5.4, , Cr 2.9, Glucose 284, mag 2.6, phos 5.5   - creatinine stable/trending down  - BUN elevated from steroids     Pertinent Meds: heparin, insulin glargine, insulin lispro, senna, furosemide, calcium acetate, furosemide, hydralazine, oxycodone, prednisone, amlodipine, cholecalciferol     Physical Findings:  - Appearance: alert/oriented; 2+ edema to b/l feet and ankles  - GI function: +BM 4/10   - Tubes:  - Oral/Mouth cavity: continues on cut up with nectar thick per SLP recs 4/11   - Skin: intact      Nutrition Requirements  Weight Used: 120 kg/ 75.5 kg IBW     Estimated Needs: Ongoing  Calories: 1885 kcals/day (25 kcals/kg IBW) for morbidly obese pt w/ CKD  Protein: 68-75 g/day (0.9-1.0 g/kg IBW)- will monitor renal fx and adust prn  Fluids: per LIP     Nutrient Intake: PO % meeting needs        [] Previous Nutrition Diagnosis: Altered nutrition related lab values- stable no further interventions            [] Ongoing          [] Resolved     Nutrition Intervention: Meals and snacks     Goal/Expected Outcome: PO 75% over next 7 days, glucose <200 over next 7 days     Indicator/Monitoring: RD to monitor energy intake, nfpf (tolerance), renal profile/lytes, glucose/endocrine    RECS:  1) Continue current diet order

## 2019-04-11 NOTE — PROGRESS NOTE ADULT - ATTENDING COMMENTS
Patient seen and examined independently earlier today. Case discussed with housestaff, nursing, social work, patient, daughter. I agree with most of the resident's note, physical exam, and plan except as below. Patient feels better. No new complaints. On my exam, morbidly obese, AA0x2, +LE edema. Encouraged BiPAP use QHS as pt likely with OHS, ZE. DESI better. Gentle diuresis. Improving gout. D/w daughter in details. High risk pt. Taper off O2.    #Progress Note Handoff  Pending (specify):  PT, medical stability  Pt/Family discussion: Pt/daughter informed and agrees with the current plan  Disposition: ?4A Patient seen and examined independently earlier today. Case discussed with housestaff, nursing, social work, patient, daughter. I agree with most of the resident's note, physical exam, and plan except as below. Patient feels better. No new complaints. On my exam, morbidly obese, AA0x2, +dysarthria, +LE edema, DIAZ, generalized weakness. Encouraged BiPAP use QHS as pt likely with OHS, ZE. DESI better. Gentle diuresis. Improving gout. D/w daughter in details. High risk pt. Taper off O2.    #Progress Note Handoff  Pending (specify):  PT, medical stability  Pt/Family discussion: Pt/daughter informed and agrees with the current plan  Disposition: ?4A

## 2019-04-11 NOTE — PROGRESS NOTE ADULT - SUBJECTIVE AND OBJECTIVE BOX
CHIEF COMPLAINT:    Patient is a 59y old Male who presents with a chief complaint of Right sided weakness (11 Apr 2019 10:58)    Currently admitted to medicine with the primary diagnosis of Intraparenchymal hemorrhage of brain     Today is hospital day 9d. This morning he is resting comfortably in bed and reports no new issues or overnight events.     PAST MEDICAL & SURGICAL HISTORY  Gout  Morbidly obese  Gout  Hypertension  Diabetes mellitus  S/P tonsillectomy      ALLERGIES:  No Known Allergies    MEDICATIONS:  STANDING MEDICATIONS  amLODIPine   Tablet 10 milliGRAM(s) Oral daily  atorvastatin Oral Tab/Cap - Peds 40 milliGRAM(s) Oral daily  calcium acetate 667 milliGRAM(s) Oral three times a day with meals  cefepime   IVPB 1000 milliGRAM(s) IV Intermittent every 24 hours  chlorhexidine 2% Cloths 1 Application(s) Topical daily  chlorhexidine 4% Liquid 1 Application(s) Topical every 12 hours  cholecalciferol 2000 Unit(s) Oral daily  cloNIDine 0.1 milliGRAM(s) Oral three times a day  dextrose 5%. 1000 milliLiter(s) IV Continuous <Continuous>  dextrose 50% Injectable 12.5 Gram(s) IV Push once  dextrose 50% Injectable 25 Gram(s) IV Push once  dextrose 50% Injectable 25 Gram(s) IV Push once  furosemide    Tablet 40 milliGRAM(s) Oral daily  heparin  Injectable 5000 Unit(s) SubCutaneous every 8 hours  hydrALAZINE 100 milliGRAM(s) Oral every 8 hours  influenza   Vaccine 0.5 milliLiter(s) IntraMuscular once  insulin glargine Injectable (LANTUS) 24 Unit(s) SubCutaneous at bedtime  insulin lispro (HumaLOG) corrective regimen sliding scale   SubCutaneous three times a day before meals  insulin lispro Injectable (HumaLOG) 8 Unit(s) SubCutaneous three times a day before meals  labetalol 300 milliGRAM(s) Oral every 8 hours  pantoprazole    Tablet 40 milliGRAM(s) Oral before breakfast  predniSONE   Tablet 20 milliGRAM(s) Oral daily  senna 2 Tablet(s) Oral at bedtime    PRN MEDICATIONS  acetaminophen   Tablet .. 650 milliGRAM(s) Oral every 6 hours PRN  aluminum hydroxide/magnesium hydroxide/simethicone Suspension 30 milliLiter(s) Oral every 6 hours PRN  dextrose 40% Gel 15 Gram(s) Oral once PRN  glucagon  Injectable 1 milliGRAM(s) IntraMuscular once PRN  oxyCODONE    5 mG/acetaminophen 325 mG 1 Tablet(s) Oral every 6 hours PRN    VITALS:   T(F): 97.6  HR: 80  BP: 141/87  RR: 24  SpO2: --    LABS:             10.7   7.19  )-----------( 249      ( 11 Apr 2019 05:46 )             35.2     04-11    146  |  106  |  110<HH>  ----------------------------<  284<H>  5.4<H>   |  25  |  2.9<H>    Ca    9.0      11 Apr 2019 05:46  Phos  5.5     04-11  Mg     2.6     04-11    Culture - Blood (collected 08 Apr 2019 17:49)  Source: .Blood None  Preliminary Report (10 Apr 2019 02:01):    No growth to date.    RADIOLOGY:  < from: Transthoracic Echocardiogram (04.04.19 @ 11:34) >   1. LV Ejection Fraction by Castro's Method with a biplane EF of 58 %.   2. Mildly increased LV wall thickness.   3. Mild tricuspid regurgitation.   4. Mildly thickened and calcified aortic leaflets. Mild aortic stenosis.   5. Estimated pulmonary artery systolic pressure is 42.0 mmHg assuming a right atrial pressure of 5 mmHg, which is consistent with mild pulmonary hypertension.  < end of copied text >    < from: CT Head No Cont (04.06.19 @ 22:53) >  1.  Stable left basal ganglia intraparenchymal hematoma measuring up to 3.9 cm. No new areas of hemorrhage.    2.  Unchanged 2 mm left to right midline shift.  < end of copied text >      PHYSICAL EXAM:  GEN: no acute distress. less somnolent in interval  HEENT: moist mucous membranes   LUNGS: decreased breath sounds bilaterally  HEART: S1/S2 present, RRR  ABD: soft, nontender, nondistended, bowel sounds present  EXT:  swelling of R ankle, improving lower extremity edema  NEURO: AAOX2 (said year is 2018), moves all extremities

## 2019-04-11 NOTE — PROGRESS NOTE ADULT - ASSESSMENT
60 yo male patient with PMH of DM, HTN, Likely underlying CKD (Cr was 2.4 when presented) presented on 4/2 with AMS and aphasia, found w/ Left sided Frontotemporal bleed with intraparenchymal hemorrhagic associated with left ventricular bleed  # creatinine better  # non oliguric  # off cardene   #continue lasix current follow with volume status / edema lower ext better  # high BUN due to setroids   # ph noted, started on phoslo 1 tablet po q 8 with meals/ repeat better   # No indication for RRT   # will follow/ discussed with family and housestaff

## 2019-04-11 NOTE — PROGRESS NOTE ADULT - ASSESSMENT
58 yo M with PMHx of HTN, DM, CAD on aspirin, ZE who was BIBEMS for ams and aphasia. Stroked code called, had NIHSS 15. CTH showed L frontotemporal intraparenchymal bleed with extension into the ventricle and 2mm shift to the R. Admitted to ICU s/p platelets, DDAVP, nicardipine gtt.    #L frontotemporal intraparenchymal bleed with 2mm shift   - no acute neurosurgical intervention  - per neurosurgery: ok for baby ASA in 7-10 days, ok for heparin subQ  - CTH 4/2: L frontotemporal intraparenchymal bleed with extension into the ventricle and 2mm shift to the R  - CTH 4/3: no change  - CTH (repeat #2) 4/3: no change  - CTH 4/6: no change    #Hypertensive emergency  - goal BP < 160  - labetalol 300 mg TID, amlodipine 10 mg daily, hydralazine 100 mg TID  - wean nicardipine gtt  - increase clonidine to 0.1 mg TID    #HFrEF  - cxr w/ interval blunting of costophrenic angle, hazy opacity  - lasix 40 qd  - follows w/ Dr. Man    #DESI on CKD  - baseline Cr 1.9 in in 10/2018  - improving cr  - renal US 4/3: no hydronephrosis, L renal cyst  - per nephro: no need for urgent dialysis  - phoslo per nephro    #New onset afib  - rate controlled  - labetalol 300 mg TID  - full anticoagulation is contraindicated at this time given ICH    #Fever  - possible bibasilar pneumonia vs gout attack  - urine cx 4/2: negative  - blood cx: NTD  - uric acid 9.9  - c/w cefepime  - start prednisone 20 mg x 5 days for empiric tx of gout    #R ankle pain/swelling  #Bilateral knee pain  - BLE duplex 4/6: negative  - will obtain xrays of bilateral knees  - ortho consulted , ankle moderate swelling  - start prednisone 20 mg x 5 days for empiric tx of gout  - avoid NSAIDs for pain given DESI    #DM  - basal insulin with lantus 18u subQ  - lispro    #HLD  - lipitor    #DVT ppx  - heparin 5000u subQ Q8H    #GI ppx  - protonix  - diet: dysphagia 3 nectar thick w/ 1:1 feed as per speech and swallow    #Dispo  - from home  - full code  - plan for 4A 60 yo M with PMHx of HTN, DM, CAD on aspirin, ZE who was BIBEMS for ams and aphasia. Stroked code called, had NIHSS 15. CTH showed L frontotemporal intraparenchymal bleed with extension into the ventricle and 2mm shift to the R. Admitted to ICU s/p platelets, DDAVP, nicardipine gtt.    #L frontotemporal intraparenchymal bleed with 2mm shift   - no acute neurosurgical intervention  - per neurosurgery: ok for baby ASA in 7-10 days, ok for heparin subQ  - CTH 4/2: L frontotemporal intraparenchymal bleed with extension into the ventricle and 2mm shift to the R  - CTH 4/3: no change  - CTH (repeat #2) 4/3: no change  - CTH 4/6: no change    #Hypertensive emergency  - goal BP < 160  - labetalol 300 mg TID, amlodipine 10 mg daily, hydralazine 100 mg TID  - wean nicardipine gtt  - increase clonidine to 0.1 mg TID    #HFpEF  - cxr w/ interval blunting of costophrenic angle, hazy opacity  - lasix 40 qd  - follows w/ Dr. Man    #DESI on CKD3  - baseline Cr 1.9 in in 10/2018  - improving cr  - renal US 4/3: no hydronephrosis, L renal cyst  - per nephro: no need for urgent dialysis  - phoslo per nephro    #New onset afib  - rate controlled  - labetalol 300 mg TID  - full anticoagulation is contraindicated at this time given ICH    #Fever  - possible bibasilar pneumonia vs gout attack  - urine cx 4/2: negative  - blood cx: NTD  - uric acid 9.9  - c/w cefepime  - start prednisone 20 mg x 5 days for empiric tx of gout    #R ankle pain/swelling  #Bilateral knee pain  - BLE duplex 4/6: negative  - will obtain xrays of bilateral knees  - ortho consulted , ankle moderate swelling  - start prednisone 20 mg x 5 days for empiric tx of gout  - avoid NSAIDs for pain given DESI    #DM  - basal insulin with lantus 18u subQ  - lispro    #HLD  - lipitor    #DVT ppx  - heparin 5000u subQ Q8H    #GI ppx  - protonix  - diet: dysphagia 3 nectar thick w/ 1:1 feed as per speech and swallow    #Dispo  - from home  - full code  - plan for 4A

## 2019-04-12 LAB
ANION GAP SERPL CALC-SCNC: 16 MMOL/L — HIGH (ref 7–14)
BASE EXCESS BLDA CALC-SCNC: 0.4 MMOL/L — SIGNIFICANT CHANGE UP (ref -2–2)
BASE EXCESS BLDA CALC-SCNC: 0.8 MMOL/L — SIGNIFICANT CHANGE UP (ref -2–2)
BASE EXCESS BLDA CALC-SCNC: 4.3 MMOL/L — HIGH (ref -2–2)
BASOPHILS # BLD AUTO: 0.03 K/UL — SIGNIFICANT CHANGE UP (ref 0–0.2)
BASOPHILS NFR BLD AUTO: 0.3 % — SIGNIFICANT CHANGE UP (ref 0–1)
BUN SERPL-MCNC: 102 MG/DL — CRITICAL HIGH (ref 10–20)
CALCIUM SERPL-MCNC: 9.3 MG/DL — SIGNIFICANT CHANGE UP (ref 8.5–10.1)
CHLORIDE SERPL-SCNC: 102 MMOL/L — SIGNIFICANT CHANGE UP (ref 98–110)
CO2 SERPL-SCNC: 25 MMOL/L — SIGNIFICANT CHANGE UP (ref 17–32)
CREAT SERPL-MCNC: 2.5 MG/DL — HIGH (ref 0.7–1.5)
CULTURE RESULTS: SIGNIFICANT CHANGE UP
EOSINOPHIL # BLD AUTO: 0.24 K/UL — SIGNIFICANT CHANGE UP (ref 0–0.7)
EOSINOPHIL NFR BLD AUTO: 2.6 % — SIGNIFICANT CHANGE UP (ref 0–8)
GAS PNL BLDA: SIGNIFICANT CHANGE UP
GAS PNL BLDA: SIGNIFICANT CHANGE UP
GLUCOSE BLDC GLUCOMTR-MCNC: 227 MG/DL — HIGH (ref 70–99)
GLUCOSE BLDC GLUCOMTR-MCNC: 229 MG/DL — HIGH (ref 70–99)
GLUCOSE BLDC GLUCOMTR-MCNC: 279 MG/DL — HIGH (ref 70–99)
GLUCOSE BLDC GLUCOMTR-MCNC: 287 MG/DL — HIGH (ref 70–99)
GLUCOSE BLDC GLUCOMTR-MCNC: 299 MG/DL — HIGH (ref 70–99)
GLUCOSE BLDC GLUCOMTR-MCNC: 334 MG/DL — HIGH (ref 70–99)
GLUCOSE BLDC GLUCOMTR-MCNC: 339 MG/DL — HIGH (ref 70–99)
GLUCOSE SERPL-MCNC: 301 MG/DL — HIGH (ref 70–99)
HCO3 BLDA-SCNC: 31 MMOL/L — HIGH (ref 23–27)
HCT VFR BLD CALC: 38.4 % — LOW (ref 42–52)
HGB BLD-MCNC: 11.3 G/DL — LOW (ref 14–18)
HOROWITZ INDEX BLDA+IHG-RTO: 35 — SIGNIFICANT CHANGE UP
HOROWITZ INDEX BLDA+IHG-RTO: 40 — SIGNIFICANT CHANGE UP
IMM GRANULOCYTES NFR BLD AUTO: 0.3 % — SIGNIFICANT CHANGE UP (ref 0.1–0.3)
LYMPHOCYTES # BLD AUTO: 0.39 K/UL — LOW (ref 1.2–3.4)
LYMPHOCYTES # BLD AUTO: 4.2 % — LOW (ref 20.5–51.1)
MAGNESIUM SERPL-MCNC: 2.5 MG/DL — HIGH (ref 1.8–2.4)
MCHC RBC-ENTMCNC: 26.3 PG — LOW (ref 27–31)
MCHC RBC-ENTMCNC: 29.4 G/DL — LOW (ref 32–37)
MCV RBC AUTO: 89.3 FL — SIGNIFICANT CHANGE UP (ref 80–94)
MONOCYTES # BLD AUTO: 0.38 K/UL — SIGNIFICANT CHANGE UP (ref 0.1–0.6)
MONOCYTES NFR BLD AUTO: 4.1 % — SIGNIFICANT CHANGE UP (ref 1.7–9.3)
NEUTROPHILS # BLD AUTO: 8.24 K/UL — HIGH (ref 1.4–6.5)
NEUTROPHILS NFR BLD AUTO: 88.5 % — HIGH (ref 42.2–75.2)
NRBC # BLD: 0 /100 WBCS — SIGNIFICANT CHANGE UP (ref 0–0)
NT-PROBNP SERPL-SCNC: 1203 PG/ML — HIGH (ref 0–300)
PCO2 BLDA: 54 MMHG — HIGH (ref 38–42)
PCO2 BLDA: 54 MMHG — HIGH (ref 38–42)
PCO2 BLDA: 57 MMHG — HIGH (ref 38–42)
PH BLDA: 7.3 — LOW (ref 7.38–7.42)
PH BLDA: 7.32 — LOW (ref 7.38–7.42)
PH BLDA: 7.36 — LOW (ref 7.38–7.42)
PHOSPHATE SERPL-MCNC: 5.5 MG/DL — HIGH (ref 2.1–4.9)
PLATELET # BLD AUTO: 273 K/UL — SIGNIFICANT CHANGE UP (ref 130–400)
PO2 BLDA: 74 MMHG — LOW (ref 78–95)
PO2 BLDA: 79 MMHG — SIGNIFICANT CHANGE UP (ref 78–95)
PO2 BLDA: 86 MMHG — SIGNIFICANT CHANGE UP (ref 78–95)
POTASSIUM SERPL-MCNC: 5.1 MMOL/L — HIGH (ref 3.5–5)
POTASSIUM SERPL-SCNC: 5.1 MMOL/L — HIGH (ref 3.5–5)
RBC # BLD: 4.3 M/UL — LOW (ref 4.7–6.1)
RBC # FLD: 14.3 % — SIGNIFICANT CHANGE UP (ref 11.5–14.5)
SAO2 % BLDA: 94 % — SIGNIFICANT CHANGE UP (ref 94–98)
SAO2 % BLDA: 95 % — SIGNIFICANT CHANGE UP (ref 94–98)
SAO2 % BLDA: 96 % — SIGNIFICANT CHANGE UP (ref 94–98)
SODIUM SERPL-SCNC: 143 MMOL/L — SIGNIFICANT CHANGE UP (ref 135–146)
SPECIMEN SOURCE: SIGNIFICANT CHANGE UP
WBC # BLD: 9.31 K/UL — SIGNIFICANT CHANGE UP (ref 4.8–10.8)
WBC # FLD AUTO: 9.31 K/UL — SIGNIFICANT CHANGE UP (ref 4.8–10.8)

## 2019-04-12 PROCEDURE — 74018 RADEX ABDOMEN 1 VIEW: CPT | Mod: 26

## 2019-04-12 PROCEDURE — 70450 CT HEAD/BRAIN W/O DYE: CPT | Mod: 26

## 2019-04-12 PROCEDURE — 93970 EXTREMITY STUDY: CPT | Mod: 26

## 2019-04-12 PROCEDURE — 71045 X-RAY EXAM CHEST 1 VIEW: CPT | Mod: 26

## 2019-04-12 RX ORDER — HYDRALAZINE HCL 50 MG
10 TABLET ORAL ONCE
Qty: 0 | Refills: 0 | Status: COMPLETED | OUTPATIENT
Start: 2019-04-12 | End: 2019-04-12

## 2019-04-12 RX ORDER — INSULIN LISPRO 100/ML
4 VIAL (ML) SUBCUTANEOUS ONCE
Qty: 0 | Refills: 0 | Status: COMPLETED | OUTPATIENT
Start: 2019-04-12 | End: 2019-04-12

## 2019-04-12 RX ORDER — INSULIN GLARGINE 100 [IU]/ML
10 INJECTION, SOLUTION SUBCUTANEOUS EVERY MORNING
Qty: 0 | Refills: 0 | Status: COMPLETED | OUTPATIENT
Start: 2019-04-12 | End: 2019-04-12

## 2019-04-12 RX ORDER — INSULIN LISPRO 100/ML
10 VIAL (ML) SUBCUTANEOUS
Qty: 0 | Refills: 0 | Status: DISCONTINUED | OUTPATIENT
Start: 2019-04-12 | End: 2019-04-12

## 2019-04-12 RX ORDER — INSULIN LISPRO 100/ML
VIAL (ML) SUBCUTANEOUS
Qty: 0 | Refills: 0 | Status: DISCONTINUED | OUTPATIENT
Start: 2019-04-12 | End: 2019-04-14

## 2019-04-12 RX ORDER — HEPARIN SODIUM 5000 [USP'U]/ML
2200 INJECTION INTRAVENOUS; SUBCUTANEOUS
Qty: 25000 | Refills: 0 | Status: DISCONTINUED | OUTPATIENT
Start: 2019-04-12 | End: 2019-04-14

## 2019-04-12 RX ORDER — INSULIN GLARGINE 100 [IU]/ML
30 INJECTION, SOLUTION SUBCUTANEOUS AT BEDTIME
Qty: 0 | Refills: 0 | Status: DISCONTINUED | OUTPATIENT
Start: 2019-04-12 | End: 2019-04-12

## 2019-04-12 RX ORDER — INSULIN LISPRO 100/ML
11 VIAL (ML) SUBCUTANEOUS
Qty: 0 | Refills: 0 | Status: DISCONTINUED | OUTPATIENT
Start: 2019-04-12 | End: 2019-04-14

## 2019-04-12 RX ORDER — FUROSEMIDE 40 MG
40 TABLET ORAL ONCE
Qty: 0 | Refills: 0 | Status: COMPLETED | OUTPATIENT
Start: 2019-04-12 | End: 2019-04-12

## 2019-04-12 RX ORDER — INSULIN GLARGINE 100 [IU]/ML
33 INJECTION, SOLUTION SUBCUTANEOUS AT BEDTIME
Qty: 0 | Refills: 0 | Status: DISCONTINUED | OUTPATIENT
Start: 2019-04-12 | End: 2019-04-14

## 2019-04-12 RX ADMIN — HEPARIN SODIUM 22 UNIT(S)/HR: 5000 INJECTION INTRAVENOUS; SUBCUTANEOUS at 23:10

## 2019-04-12 RX ADMIN — HEPARIN SODIUM 5000 UNIT(S): 5000 INJECTION INTRAVENOUS; SUBCUTANEOUS at 22:14

## 2019-04-12 RX ADMIN — Medication 4 UNIT(S): at 19:03

## 2019-04-12 RX ADMIN — Medication 8: at 16:31

## 2019-04-12 RX ADMIN — CEFEPIME 100 MILLIGRAM(S): 1 INJECTION, POWDER, FOR SOLUTION INTRAMUSCULAR; INTRAVENOUS at 22:13

## 2019-04-12 RX ADMIN — CHLORHEXIDINE GLUCONATE 1 APPLICATION(S): 213 SOLUTION TOPICAL at 21:12

## 2019-04-12 RX ADMIN — Medication 40 MILLIGRAM(S): at 15:15

## 2019-04-12 RX ADMIN — CHLORHEXIDINE GLUCONATE 1 APPLICATION(S): 213 SOLUTION TOPICAL at 06:12

## 2019-04-12 RX ADMIN — INSULIN GLARGINE 10 UNIT(S): 100 INJECTION, SOLUTION SUBCUTANEOUS at 07:50

## 2019-04-12 RX ADMIN — Medication 40 MILLIGRAM(S): at 06:13

## 2019-04-12 RX ADMIN — Medication 8: at 12:23

## 2019-04-12 RX ADMIN — AMLODIPINE BESYLATE 10 MILLIGRAM(S): 2.5 TABLET ORAL at 06:12

## 2019-04-12 RX ADMIN — Medication 10 UNIT(S): at 07:49

## 2019-04-12 RX ADMIN — CHLORHEXIDINE GLUCONATE 1 APPLICATION(S): 213 SOLUTION TOPICAL at 14:00

## 2019-04-12 RX ADMIN — HEPARIN SODIUM 5000 UNIT(S): 5000 INJECTION INTRAVENOUS; SUBCUTANEOUS at 14:42

## 2019-04-12 RX ADMIN — HEPARIN SODIUM 5000 UNIT(S): 5000 INJECTION INTRAVENOUS; SUBCUTANEOUS at 06:14

## 2019-04-12 RX ADMIN — Medication 300 MILLIGRAM(S): at 06:13

## 2019-04-12 RX ADMIN — Medication 100 MILLIGRAM(S): at 06:13

## 2019-04-12 RX ADMIN — Medication 10 MILLIGRAM(S): at 20:23

## 2019-04-12 RX ADMIN — Medication 650 MILLIGRAM(S): at 19:38

## 2019-04-12 RX ADMIN — Medication 20 MILLIGRAM(S): at 06:12

## 2019-04-12 RX ADMIN — Medication 2000 UNIT(S): at 12:21

## 2019-04-12 RX ADMIN — Medication 4: at 07:49

## 2019-04-12 RX ADMIN — Medication 667 MILLIGRAM(S): at 12:21

## 2019-04-12 RX ADMIN — Medication 667 MILLIGRAM(S): at 07:51

## 2019-04-12 RX ADMIN — Medication 10 MILLIGRAM(S): at 23:11

## 2019-04-12 RX ADMIN — INSULIN GLARGINE 33 UNIT(S): 100 INJECTION, SOLUTION SUBCUTANEOUS at 22:14

## 2019-04-12 RX ADMIN — Medication 10 UNIT(S): at 12:21

## 2019-04-12 RX ADMIN — Medication 0.1 MILLIGRAM(S): at 06:12

## 2019-04-12 RX ADMIN — PANTOPRAZOLE SODIUM 40 MILLIGRAM(S): 20 TABLET, DELAYED RELEASE ORAL at 06:12

## 2019-04-12 NOTE — PROGRESS NOTE ADULT - SUBJECTIVE AND OBJECTIVE BOX
CHIEF COMPLAINT:    Patient is a 59y old Male who presents with a chief complaint of Right sided weakness (12 Apr 2019 10:05)    Currently admitted to medicine with the primary diagnosis of Intraparenchymal hemorrhage of brain     Today is hospital day 10d. This morning he is resting comfortably in bed and reports no new issues or overnight events.     PAST MEDICAL & SURGICAL HISTORY  Gout  Morbidly obese  Gout  Hypertension  Diabetes mellitus  S/P tonsillectomy    ALLERGIES:  No Known Allergies    MEDICATIONS:  STANDING MEDICATIONS  amLODIPine   Tablet 10 milliGRAM(s) Oral daily  atorvastatin Oral Tab/Cap - Peds 40 milliGRAM(s) Oral daily  calcium acetate 667 milliGRAM(s) Oral three times a day with meals  cefepime   IVPB 1000 milliGRAM(s) IV Intermittent every 24 hours  chlorhexidine 2% Cloths 1 Application(s) Topical daily  chlorhexidine 4% Liquid 1 Application(s) Topical every 12 hours  cholecalciferol 2000 Unit(s) Oral daily  cloNIDine 0.1 milliGRAM(s) Oral three times a day  dextrose 5%. 1000 milliLiter(s) IV Continuous <Continuous>  dextrose 50% Injectable 12.5 Gram(s) IV Push once  dextrose 50% Injectable 25 Gram(s) IV Push once  dextrose 50% Injectable 25 Gram(s) IV Push once  furosemide    Tablet 40 milliGRAM(s) Oral daily  heparin  Injectable 5000 Unit(s) SubCutaneous every 8 hours  hydrALAZINE 100 milliGRAM(s) Oral every 8 hours  insulin glargine Injectable (LANTUS) 33 Unit(s) SubCutaneous at bedtime  insulin lispro (HumaLOG) corrective regimen sliding scale   SubCutaneous three times a day before meals  insulin lispro Injectable (HumaLOG) 11 Unit(s) SubCutaneous three times a day before meals  labetalol 300 milliGRAM(s) Oral every 8 hours  pantoprazole    Tablet 40 milliGRAM(s) Oral before breakfast  senna 2 Tablet(s) Oral at bedtime    PRN MEDICATIONS  acetaminophen   Tablet .. 650 milliGRAM(s) Oral every 6 hours PRN  aluminum hydroxide/magnesium hydroxide/simethicone Suspension 30 milliLiter(s) Oral every 6 hours PRN  dextrose 40% Gel 15 Gram(s) Oral once PRN  glucagon  Injectable 1 milliGRAM(s) IntraMuscular once PRN  oxyCODONE    5 mG/acetaminophen 325 mG 1 Tablet(s) Oral every 6 hours PRN    VITALS:   T(F): 97.1  HR: 67  BP: 158/75  RR: 19  SpO2: 95%    LABS:                        11.3   9.31  )-----------( 273      ( 12 Apr 2019 08:52 )             38.4     04-12    143  |  102  |  102<HH>  ----------------------------<  301<H>  5.1<H>   |  25  |  2.5<H>    Ca    9.3      12 Apr 2019 08:52  Phos  5.5     04-12  Mg     2.5     04-12    ABG - ( 12 Apr 2019 12:16 )  pH, Arterial: 7.30  pH, Blood: x     /  pCO2: 57    /  pO2: 74    / HCO3: x     / Base Excess: 0.4   /  SaO2: 94        RADIOLOGY:  < from: Xray Chest 1 View- PORTABLE-Routine (04.12.19 @ 06:18) >  Cardiomegaly. Bilateral opacifications.  Without difference.  < end of copied text >    < from: Transthoracic Echocardiogram (04.04.19 @ 11:34) >   1. LV Ejection Fraction by Castro's Method with a biplane EF of 58 %.   2. Mildly increased LV wall thickness.   3. Mild tricuspid regurgitation.   4. Mildly thickened and calcified aortic leaflets. Mild aortic stenosis.   5. Estimated pulmonary artery systolic pressure is 42.0 mmHg assuming a right atrial pressure of 5 mmHg, which is consistent with mild pulmonary hypertension.  < end of copied text >    < from: CT Head No Cont (04.06.19 @ 22:53) >  1.  Stable left basal ganglia intraparenchymal hematoma measuring up to 3.9 cm. No new areas of hemorrhage.    2.  Unchanged 2 mm left to right midline shift.  < end of copied text >    PHYSICAL EXAM:  GEN: No acute distress. More awake in am, but with episodes of somnolence in interim.  PULM: Clear to auscultation bilaterally   CARD: S1/S2 present. RRR.   GI: Soft, non-tender, non-distended. Bowel sounds present  NEURO: AAOX2 CHIEF COMPLAINT:    Patient is a 59y old Male who presents with a chief complaint of Right sided weakness (12 Apr 2019 10:05)    Currently admitted to medicine with the primary diagnosis of Intraparenchymal hemorrhage of brain     Today is hospital day 10d. This morning he is resting comfortably in bed and reports no new issues or overnight events.     PAST MEDICAL & SURGICAL HISTORY  Gout  Morbidly obese  Gout  Hypertension  Diabetes mellitus  S/P tonsillectomy    ALLERGIES:  No Known Allergies    MEDICATIONS:  STANDING MEDICATIONS  amLODIPine   Tablet 10 milliGRAM(s) Oral daily  atorvastatin Oral Tab/Cap - Peds 40 milliGRAM(s) Oral daily  calcium acetate 667 milliGRAM(s) Oral three times a day with meals  cefepime   IVPB 1000 milliGRAM(s) IV Intermittent every 24 hours  chlorhexidine 2% Cloths 1 Application(s) Topical daily  chlorhexidine 4% Liquid 1 Application(s) Topical every 12 hours  cholecalciferol 2000 Unit(s) Oral daily  cloNIDine 0.1 milliGRAM(s) Oral three times a day  dextrose 5%. 1000 milliLiter(s) IV Continuous <Continuous>  dextrose 50% Injectable 12.5 Gram(s) IV Push once  dextrose 50% Injectable 25 Gram(s) IV Push once  dextrose 50% Injectable 25 Gram(s) IV Push once  furosemide    Tablet 40 milliGRAM(s) Oral daily  heparin  Injectable 5000 Unit(s) SubCutaneous every 8 hours  hydrALAZINE 100 milliGRAM(s) Oral every 8 hours  insulin glargine Injectable (LANTUS) 33 Unit(s) SubCutaneous at bedtime  insulin lispro (HumaLOG) corrective regimen sliding scale   SubCutaneous three times a day before meals  insulin lispro Injectable (HumaLOG) 11 Unit(s) SubCutaneous three times a day before meals  labetalol 300 milliGRAM(s) Oral every 8 hours  pantoprazole    Tablet 40 milliGRAM(s) Oral before breakfast  senna 2 Tablet(s) Oral at bedtime    PRN MEDICATIONS  acetaminophen   Tablet .. 650 milliGRAM(s) Oral every 6 hours PRN  aluminum hydroxide/magnesium hydroxide/simethicone Suspension 30 milliLiter(s) Oral every 6 hours PRN  dextrose 40% Gel 15 Gram(s) Oral once PRN  glucagon  Injectable 1 milliGRAM(s) IntraMuscular once PRN  oxyCODONE    5 mG/acetaminophen 325 mG 1 Tablet(s) Oral every 6 hours PRN    VITALS:   T(F): 97.1  HR: 67  BP: 158/75  RR: 19  SpO2: 95%    LABS:                        11.3   9.31  )-----------( 273      ( 12 Apr 2019 08:52 )             38.4     04-12    143  |  102  |  102<HH>  ----------------------------<  301<H>  5.1<H>   |  25  |  2.5<H>    Ca    9.3      12 Apr 2019 08:52  Phos  5.5     04-12  Mg     2.5     04-12    ABG - ( 12 Apr 2019 12:16 )  pH, Arterial: 7.30  pH, Blood: x     /  pCO2: 57    /  pO2: 74    / HCO3: x     / Base Excess: 0.4   /  SaO2: 94        RADIOLOGY:  < from: Xray Chest 1 View- PORTABLE-Routine (04.12.19 @ 06:18) >  Cardiomegaly. Bilateral opacifications.  Without difference.  < end of copied text >    < from: Transthoracic Echocardiogram (04.04.19 @ 11:34) >   1. LV Ejection Fraction by Castro's Method with a biplane EF of 58 %.   2. Mildly increased LV wall thickness.   3. Mild tricuspid regurgitation.   4. Mildly thickened and calcified aortic leaflets. Mild aortic stenosis.   5. Estimated pulmonary artery systolic pressure is 42.0 mmHg assuming a right atrial pressure of 5 mmHg, which is consistent with mild pulmonary hypertension.  < end of copied text >    < from: CT Head No Cont (04.06.19 @ 22:53) >  1.  Stable left basal ganglia intraparenchymal hematoma measuring up to 3.9 cm. No new areas of hemorrhage.    2.  Unchanged 2 mm left to right midline shift.  < end of copied text >    PHYSICAL EXAM:  GEN: No acute distress. More awake in am, but with episodes of somnolence in interim.  PULM: Decreased BS bilaterally   CARD: S1/S2 present. RRR.   GI: Soft, non-tender, non-distended. Bowel sounds present  MS: b/l edema 2+  NEURO: AAOX2

## 2019-04-12 NOTE — PROGRESS NOTE ADULT - SUBJECTIVE AND OBJECTIVE BOX
Patient is a 59y old  Male who presents with a chief complaint of Right sided weakness (11 Apr 2019 12:20)    SUBJECTIVE:    Recalled per family request   C/o SOB with exertion  Used Biapap HS    REVIEW OF SYSTEMS:  See HPI    PHYSICAL EXAM  Vital Signs Last 24 Hrs  T(C): 35.6 (12 Apr 2019 05:18), Max: 36.6 (11 Apr 2019 13:15)  T(F): 96.1 (12 Apr 2019 05:18), Max: 97.9 (11 Apr 2019 13:15)  HR: 65 (12 Apr 2019 08:10) (64 - 76)  BP: 156/75 (12 Apr 2019 05:18) (156/75 - 159/83)  BP(mean): --  RR: 20 (12 Apr 2019 05:18) (20 - 24)  SpO2: 95% (12 Apr 2019 08:10) (95% - 97%)    General: In NAD, obese   HEENT: TIMMY               Lymphatic system: No Cervical LN    Respiratory: Patrick BS, reduced bilateral   Cardiovascular: Regular  Gastrointestinal: Soft. + BS  Musculoskeletal: No clubbing.  moves all extremities.  Full range of motion    Skin: Warm.  Intact, 3 + pedal edema   Neurological: No motor or sensory deficit      04-11-19 @ 07:01  -  04-12-19 @ 07:00  --------------------------------------------------------  IN:    Oral Fluid: 1080 mL  Total IN: 1080 mL    OUT:    Voided: 1450 mL  Total OUT: 1450 mL    Total NET: -370 mL          LABS:                          10.7   7.19  )-----------( 249      ( 11 Apr 2019 05:46 )             35.2                                               04-11    146  |  106  |  110<HH>  ----------------------------<  284<H>  5.4<H>   |  25  |  2.9<H>    Ca    9.0      11 Apr 2019 05:46  Phos  5.5     04-11  Mg     2.6     04-11                                                                                                                                                                                    MEDICATIONS  (STANDING):  amLODIPine   Tablet 10 milliGRAM(s) Oral daily  atorvastatin Oral Tab/Cap - Peds 40 milliGRAM(s) Oral daily  calcium acetate 667 milliGRAM(s) Oral three times a day with meals  cefepime   IVPB 1000 milliGRAM(s) IV Intermittent every 24 hours  chlorhexidine 2% Cloths 1 Application(s) Topical daily  chlorhexidine 4% Liquid 1 Application(s) Topical every 12 hours  cholecalciferol 2000 Unit(s) Oral daily  cloNIDine 0.1 milliGRAM(s) Oral three times a day  dextrose 5%. 1000 milliLiter(s) (50 mL/Hr) IV Continuous <Continuous>  dextrose 50% Injectable 12.5 Gram(s) IV Push once  dextrose 50% Injectable 25 Gram(s) IV Push once  dextrose 50% Injectable 25 Gram(s) IV Push once  furosemide    Tablet 40 milliGRAM(s) Oral daily  heparin  Injectable 5000 Unit(s) SubCutaneous every 8 hours  hydrALAZINE 100 milliGRAM(s) Oral every 8 hours  insulin glargine Injectable (LANTUS) 30 Unit(s) SubCutaneous at bedtime  insulin lispro (HumaLOG) corrective regimen sliding scale   SubCutaneous three times a day before meals  insulin lispro Injectable (HumaLOG) 10 Unit(s) SubCutaneous three times a day before meals  labetalol 300 milliGRAM(s) Oral every 8 hours  pantoprazole    Tablet 40 milliGRAM(s) Oral before breakfast  predniSONE   Tablet 20 milliGRAM(s) Oral daily  senna 2 Tablet(s) Oral at bedtime    MEDICATIONS  (PRN):  acetaminophen   Tablet .. 650 milliGRAM(s) Oral every 6 hours PRN Mild Pain (1 - 3)  aluminum hydroxide/magnesium hydroxide/simethicone Suspension 30 milliLiter(s) Oral every 6 hours PRN Dyspepsia  dextrose 40% Gel 15 Gram(s) Oral once PRN Blood Glucose LESS THAN 70 milliGRAM(s)/deciliter  glucagon  Injectable 1 milliGRAM(s) IntraMuscular once PRN Glucose LESS THAN 70 milligrams/deciliter  oxyCODONE    5 mG/acetaminophen 325 mG 1 Tablet(s) Oral every 6 hours PRN Severe Pain (7 - 10)    CXR : unchanged, no infiltrate, no effusion

## 2019-04-12 NOTE — PROGRESS NOTE ADULT - ATTENDING COMMENTS
Patient seen and examined independently earlier today. Case discussed with housestaff, nursing, social work, patient, pulm. I agree with most of the resident's note, physical exam, and plan except as below. This am was very somnolent, therefore placed on BiPAP. ABG with some C02 retension. CXR unchanged. DESI better. Pt was following all commands and answering questions appropriately in am while on BiPAP. Around 12 pm, pt himself removed BiPAP. As per RN, was hypoxic, so she put him back on BiPAP. When evaluated by me again, not as responsive as earlier in am. D/w pulm - will repeat ABG, CXR, CTH, LE doppler and possible upgrade to ICU. PGY 1 updated the family. High risk pt. Full code. Based on CTH results might need neuro, NS f/u, ?EEG.    #Progress Note Handoff  Pending (specify):  Clinical improvement_  Pt/Family discussion: Pt/family informed and agrees with the current plan  Disposition: Home______/SNF_______/4A________/To be determined_x_______/Waiting for Auth_____

## 2019-04-12 NOTE — PROGRESS NOTE ADULT - ASSESSMENT
60 yo M with PMHx of HTN, DM, CAD on aspirin, ZE who was BIBEMS for ams and aphasia. Stroked code called, had NIHSS 15. CTH showed L frontotemporal intraparenchymal bleed with extension into the ventricle and 2mm shift to the R. Admitted to ICU s/p platelets, DDAVP, nicardipine gtt.    # Declining respiratory status  - BiPAP qhs and prn  - f/u pulm consult => ordered repeat CTH, repeat CXR, repeat LE duplex    #L frontotemporal intraparenchymal bleed with 2mm shift   - no acute neurosurgical intervention  - per neurosurgery: ok for baby ASA in 7-10 days, ok for heparin subQ  - CTH 4/2: L frontotemporal intraparenchymal bleed with extension into the ventricle and 2mm shift to the R  - CTH 4/3: no change  - CTH (repeat #2) 4/3: no change  - CTH 4/6: no change    #Hypertensive emergency  - goal BP < 160  - labetalol 300 mg TID, amlodipine 10 mg daily, hydralazine 100 mg TID  - weaned off nicardipine gtt  - increase clonidine to 0.1 mg TID    #HFpEF  - cxr w/ interval blunting of costophrenic angle, hazy opacity  - lasix 40 qd; given extra dose today given crackles on exam and declining respiratory status  - follows w/ Dr. Man    #DESI on CKD3  - baseline Cr 1.9 in in 10/2018  - improving cr  - renal US 4/3: no hydronephrosis, L renal cyst  - per nephro: no need for urgent dialysis  - phoslo per nephro    #New onset afib  - rate controlled  - labetalol 300 mg TID  - full anticoagulation is contraindicated at this time given ICH    #Fever. Resolved.  - possible bibasilar pneumonia vs gout attack  - urine cx 4/2: negative  - blood cx: NTD  - uric acid 9.9  - c/w cefepime    #R ankle pain/swelling. resolved.  #Bilateral knee pain. resolved.  - BLE duplex 4/6: negative  - ortho consulted , ankle moderate swelling  - avoid NSAIDs for pain given DESI  - f/u duplex US 4/12    #DM  - uncontrolled, will adjust lantus/lispro accordingly  - lantus/lispro/LSS    #HLD  - lipitor    #DVT ppx  - heparin 5000u subQ Q8H    #GI ppx  - protonix  - diet: dysphagia 3 nectar thick w/ 1:1 feed as per speech and swallow    #Dispo  - from home  - full code  - plan for 4A

## 2019-04-12 NOTE — PROGRESS NOTE ADULT - SUBJECTIVE AND OBJECTIVE BOX
Nephrology progress note    Patient is seen and examined, events over the last 24 h noted.  No new complaints.  On BiPAP    Allergies:  No Known Allergies    Hospital Medications:   MEDICATIONS  (STANDING):  amLODIPine   Tablet 10 milliGRAM(s) Oral daily  atorvastatin Oral Tab/Cap - Peds 40 milliGRAM(s) Oral daily  calcium acetate 667 milliGRAM(s) Oral three times a day with meals  cefepime   IVPB 1000 milliGRAM(s) IV Intermittent every 24 hours  chlorhexidine 2% Cloths 1 Application(s) Topical daily  chlorhexidine 4% Liquid 1 Application(s) Topical every 12 hours  cholecalciferol 2000 Unit(s) Oral daily  cloNIDine 0.1 milliGRAM(s) Oral three times a day  dextrose 5%. 1000 milliLiter(s) (50 mL/Hr) IV Continuous <Continuous>  furosemide    Tablet 40 milliGRAM(s) Oral daily  heparin  Injectable 5000 Unit(s) SubCutaneous every 8 hours  hydrALAZINE 100 milliGRAM(s) Oral every 8 hours  insulin glargine Injectable (LANTUS) 30 Unit(s) SubCutaneous at bedtime  insulin lispro (HumaLOG) corrective regimen sliding scale   SubCutaneous three times a day before meals  insulin lispro Injectable (HumaLOG) 10 Unit(s) SubCutaneous three times a day before meals  labetalol 300 milliGRAM(s) Oral every 8 hours  pantoprazole    Tablet 40 milliGRAM(s) Oral before breakfast  predniSONE   Tablet 20 milliGRAM(s) Oral daily  senna 2 Tablet(s) Oral at bedtime        VITALS:  T(F): 96.1 (19 @ 05:18), Max: 97.9 (19 @ 13:15)  HR: 65 (19 @ 08:10)  BP: 156/75 (19 @ 05:18)  RR: 20 (19 @ 05:18)  SpO2: 95% (19 @ 08:10)      04-10 @ 07:01  -   @ 07:00  --------------------------------------------------------  IN: 840 mL / OUT: 1350 mL / NET: -510 mL    04-11 @ 07:01  -   @ 07:00  --------------------------------------------------------  IN: 1080 mL / OUT: 1450 mL / NET: -370 mL          PHYSICAL EXAM:  Constitutional: NAD  Respiratory: CTAB, no wheezes, rales or rhonchi  Cardiovascular: S1, S2, RRR  Gastrointestinal: BS+, soft, NT/ND  Extremities: 1+ peripheral edema  :  No khan.       LABS:      146  |  106  |  110<HH>  ----------------------------<  284<H>  5.4<H>   |  25  |  2.9<H>    Ca    9.0      2019 05:46  Phos  5.5       Mg     2.5         Creatinine Trend: 2.9<--, 3.0<--, 3.6<--, 3.7<--, 3.5<--, 3.9<--                          11.3   9.31  )-----------( 273      ( 2019 08:52 )             38.4       Urine Studies:  Urinalysis Basic - ( 2019 17:00 )    Color: Yellow / Appearance: Turbid / S.020 / pH:   Gluc:  / Ketone: Negative  / Bili: Negative / Urobili: 1.0 mg/dL   Blood:  / Protein: 100 mg/dL / Nitrite: Negative   Leuk Esterase: Small / RBC:  / WBC 6-10 /HPF   Sq Epi:  / Non Sq Epi: Few /HPF / Bacteria:       Creatinine, Random Urine: 249 mg/dL ( @ 11:42)    RADIOLOGY & ADDITIONAL STUDIES:  < from: Xray Chest 1 View- PORTABLE-Routine (19 @ 06:18) >  Impression:      Cardiomegaly. Bilateral opacifications.    Without difference.    < end of copied text > Nephrology progress note    Patient is seen and examined, events over the last 24 h noted.  No new complaints.  On BiPAP    Allergies:  No Known Allergies    Hospital Medications:   MEDICATIONS  (STANDING):  amLODIPine   Tablet 10 milliGRAM(s) Oral daily  atorvastatin Oral Tab/Cap - Peds 40 milliGRAM(s) Oral daily  calcium acetate 667 milliGRAM(s) Oral three times a day with meals  cefepime   IVPB 1000 milliGRAM(s) IV Intermittent every 24 hours  chlorhexidine 2% Cloths 1 Application(s) Topical daily  chlorhexidine 4% Liquid 1 Application(s) Topical every 12 hours  cholecalciferol 2000 Unit(s) Oral daily  cloNIDine 0.1 milliGRAM(s) Oral three times a day  dextrose 5%. 1000 milliLiter(s) (50 mL/Hr) IV Continuous <Continuous>  furosemide    Tablet 40 milliGRAM(s) Oral daily  heparin  Injectable 5000 Unit(s) SubCutaneous every 8 hours  hydrALAZINE 100 milliGRAM(s) Oral every 8 hours  insulin glargine Injectable (LANTUS) 30 Unit(s) SubCutaneous at bedtime  insulin lispro (HumaLOG) corrective regimen sliding scale   SubCutaneous three times a day before meals  insulin lispro Injectable (HumaLOG) 10 Unit(s) SubCutaneous three times a day before meals  labetalol 300 milliGRAM(s) Oral every 8 hours  pantoprazole    Tablet 40 milliGRAM(s) Oral before breakfast  predniSONE   Tablet 20 milliGRAM(s) Oral daily  senna 2 Tablet(s) Oral at bedtime        VITALS:  T(F): 96.1 (19 @ 05:18), Max: 97.9 (19 @ 13:15)  HR: 65 (19 @ 08:10)  BP: 156/75 (19 @ 05:18)  RR: 20 (19 @ 05:18)  SpO2: 95% (19 @ 08:10)      04-10 @ 07:01  -   @ 07:00  --------------------------------------------------------  IN: 840 mL / OUT: 1350 mL / NET: -510 mL    04-11 @ 07:01  -   @ 07:00  --------------------------------------------------------  IN: 1080 mL / OUT: 1450 mL / NET: -370 mL          PHYSICAL EXAM:  Constitutional: NAD  Respiratory: CTAB, no wheezes, rales or rhonchi  Cardiovascular: S1, S2, RRR  Gastrointestinal: BS+, soft, NT/ND  Extremities: 1+ peripheral edema  :  No khan.       LABS:      146  |  106  |  110<HH>  ----------------------------<  284<H>  5.4<H>   |  25  |  2.9<H>    Ca    9.0      2019 05:46  Phos  5.5       Mg     2.5         Creatinine Trend: 2.9<--, 3.0<--, 3.6<--, 3.7<--, 3.5<--, 3.9<--                          11.3   9.31  )-----------( 273      ( 2019 08:52 )             38.4       Urine Studies:  Urinalysis Basic - ( 2019 17:00 )    Color: Yellow / Appearance: Turbid / S.020 / pH:   Gluc:  / Ketone: Negative  / Bili: Negative / Urobili: 1.0 mg/dL   Blood:  / Protein: 100 mg/dL / Nitrite: Negative   Leuk Esterase: Small / RBC:  / WBC 6-10 /HPF   Sq Epi:  / Non Sq Epi: Few /HPF / Bacteria:       Creatinine, Random Urine: 249 mg/dL ( @ 11:42)    RADIOLOGY & ADDITIONAL STUDIES:  < from: Xray Chest 1 View- PORTABLE-Routine (19 @ 06:18) >  Impression:      Cardiomegaly. Bilateral opacifications.    Without difference.    < end of copied text >    < from: US Renal (19 @ 10:59) >  Limited examination secondary to patient's condition and body habitus.    No hydronephrosis or calculus on either side.    2.4 cm left renal hypodensity may reflect a cyst but is limited in   characterization on this study. Consideration can be given to a follow-up   examination or dedicated CT or MRI at later time.    < end of copied text >

## 2019-04-12 NOTE — PROGRESS NOTE ADULT - ATTENDING COMMENTS
Pt seen examined  Pt with CKD 4, HTN, DM, ICH, COPD/obesity  -creat trending down  - hyperkalemia - low K diet, cont LAsix  - protein 100 SPC ratio likely DN  _ BP needs better control    will follow

## 2019-04-12 NOTE — PROGRESS NOTE ADULT - ASSESSMENT
58 yo male patient with PMH of DM, HTN, Likely underlying CKD (Cr was 2.4 when presented) presented on 4/2 with AMS and aphasia, found w/ Left sided Frontotemporal bleed with intraparenchymal hemorrhagic associated with left ventricular bleed  # creatinine trending down  # non oliguric  # hyperK noted, follow strict low K diet.  # off cardene   # continue lasix current follow with volume status / edema lower ext better  # high BUN due to steroids   # ph noted, on phoslo 1 tablet po q 8 with meals/ repeat better   # No indication for RRT   # BP elevated, increase clonidine to 0.2 q 8hr, monitor BP.  # will follow 60 yo male patient with PMH of DM, HTN, Likely underlying CKD (Cr was 2.4 when presented) presented on 4/2 with AMS and aphasia, found w/ Left sided Frontotemporal bleed with intraparenchymal hemorrhagic associated with left ventricular bleed  # creatinine trending down  # non oliguric  # hyperK noted, follow strict low K diet, 5.1 today  # continue lasix current follow with volume status / still with LE edema, but less  # high BUN due to steroids + diuretics  # ph noted, on phoslo 1 tablet po q 8 with meals/ repeat better   # kidney sono - no hydro, 2.4 cm left renal hypodensity-> will need CT as an OP  # proteinuria 100, check protein/creat ratio-> likely due to diabetes  # No indication for RRT   # BP elevated, increase clonidine to 0.2 q 8hr, monitor BP.  # will follow

## 2019-04-12 NOTE — SWALLOW BEDSIDE ASSESSMENT ADULT - COMMENTS
DAYNA Burger reported pt on CPAP all night and requiring CPAP during the day to maintain adequate O2 sats. DAYNA Burger placed pt on 3L O2 via NC for dysphagia reassessment. Pt sats 92-95% on 3L O2 NC throughout session. +toleration w/o overt s/s penetration/aspiration w/ soft solids.

## 2019-04-12 NOTE — PROGRESS NOTE ADULT - ASSESSMENT
IMPRESSION:    Intraparenchymal hemorrhage  Acute on chronic hypercapnic respiratory failure  Probable ZE +/- OHS  DESI improving  Fluid overload Diastolic CHF     PLAN:    CNS: no sedatives, neurosurgery and neurology follow up,     HEENT:  Oral care    PULMONARY:  HOB @ 45 degrees, oxygen to keep pulse ox>90%, NIV during sleep.       CARDIOVASCULAR:  I=O, BP control.      GI: GI prophylaxis  Feeding per  speech and swallow eval    RENAL:  F/u  lytes.  Correct as needed. accurate I/O, FU with renal, consider increasing diuretics    INFECTIOUS DISEASE: Repeat Cultures.      HEMATOLOGICAL:  DVT prophylaxis    ENDOCRINE:  Follow up FS.      MUSCULOSKELETAL: OOB to chair.     CODE STATUS: FULL CODE    D/w Family at bedside on 4/11 IMPRESSION:    Intraparenchymal hemorrhage  Acute on chronic hypercapnic respiratory failure  Probable ZE +/- OHS  DESI improving      PLAN:    CNS: no sedatives, neurosurgery and neurology follow up,     HEENT:  Oral care    PULMONARY:  HOB @ 45 degrees, oxygen to keep pulse ox>90%, NIV during sleep.       CARDIOVASCULAR:  I=O, BP control.      GI: GI prophylaxis  Feeding per  speech and swallow eval    RENAL:  F/u  lytes.  Correct as needed. accurate I/O, FU with renal, consider increasing diuretics    INFECTIOUS DISEASE: Repeat Cultures.      HEMATOLOGICAL:  DVT prophylaxis    ENDOCRINE:  Follow up FS.      MUSCULOSKELETAL: OOB to chair.     CODE STATUS: FULL CODE    Addendum:  Patient hd some change in MS and worsenign O2.  Was placed on biPAP.  Now better but more lethargic than this am.  Recom:  BiPAP.  CTH stat, CXR and LE duplex STAT.  ICU upgrade  FU with Neuro and NEuroSX  DW house staff

## 2019-04-12 NOTE — CHART NOTE - NSCHARTNOTEFT_GEN_A_CORE
58 yo M with PMHx of HTN, DM, CAD on aspirin, ZE who was BIBEMS for ams and aphasia. Stroked code called, had NIHSS 15. CTH showed L frontotemporal intraparenchymal bleed with extension into the ventricle and 2mm shift to the R. Admitted to ICU s/p platelets, DDAVP, nicardipine. On 3E, this morning patient was less somnolent and able to participate in exam. However, as day progressed, pt respiratory status worsened, requiring BiPAP. In the setting of AMS and acute hypoxic respiratory failure, decision was made to upgrade patient to critical care unit.    # Declining respiratory status  - BiPAP qhs and prn  - CXR stable  - f/u repeat CTH, repeat LE duplex as per pulm  - pt upgraded for critical care    #L frontotemporal intraparenchymal bleed with 2mm shift   - no acute neurosurgical intervention  - per neurosurgery: ok for baby ASA in 7-10 days, ok for heparin subQ  - CTH 4/2: L frontotemporal intraparenchymal bleed with extension into the ventricle and 2mm shift to the R  - CTH 4/3: no change  - CTH (repeat #2) 4/3: no change  - CTH 4/6: no change    #Hypertensive emergency  - goal BP < 160  - labetalol 300 mg TID, amlodipine 10 mg daily, hydralazine 100 mg TID  - weaned off nicardipine gtt  - increase clonidine to 0.1 mg TID    #HFpEF  - cxr w/ interval blunting of costophrenic angle, hazy opacity  - lasix 40 qd; given extra dose today given crackles on exam and declining respiratory status  - follows w/ Dr. Man    #DESI on CKD3  - baseline Cr 1.9 in in 10/2018  - improving cr  - renal US 4/3: no hydronephrosis, L renal cyst  - per nephro: no need for urgent dialysis  - phoslo per nephro    #New onset afib  - rate controlled  - labetalol 300 mg TID  - full anticoagulation is contraindicated at this time given ICH    #Fever. Resolved.  - possible bibasilar pneumonia vs gout attack  - urine cx 4/2: negative  - blood cx: NTD  - uric acid 9.9  - c/w cefepime    #R ankle pain/swelling. resolved.  #Bilateral knee pain. resolved.  - BLE duplex 4/6: negative  - ortho consulted , ankle moderate swelling  - avoid NSAIDs for pain given DESI  - f/u duplex US 4/12    #DM  - uncontrolled, will adjust lantus/lispro accordingly  - lantus/lispro/LSS    #HLD  - lipitor    #DVT ppx  - heparin 5000u subQ Q8H    #GI ppx  - protonix  - diet: dysphagia 3 nectar thick w/ 1:1 feed as per speech and swallow    #Dispo  - from home  - full code

## 2019-04-13 LAB
ANION GAP SERPL CALC-SCNC: 16 MMOL/L — HIGH (ref 7–14)
APTT BLD: 28.6 SEC — SIGNIFICANT CHANGE UP (ref 27–39.2)
APTT BLD: 61.6 SEC — HIGH (ref 27–39.2)
APTT BLD: 78.5 SEC — CRITICAL HIGH (ref 27–39.2)
APTT BLD: 80.3 SEC — CRITICAL HIGH (ref 27–39.2)
BASOPHILS # BLD AUTO: 0.05 K/UL — SIGNIFICANT CHANGE UP (ref 0–0.2)
BASOPHILS NFR BLD AUTO: 0.5 % — SIGNIFICANT CHANGE UP (ref 0–1)
BLD GP AB SCN SERPL QL: SIGNIFICANT CHANGE UP
BUN SERPL-MCNC: 96 MG/DL — CRITICAL HIGH (ref 10–20)
CALCIUM SERPL-MCNC: 9.4 MG/DL — SIGNIFICANT CHANGE UP (ref 8.5–10.1)
CHLORIDE SERPL-SCNC: 105 MMOL/L — SIGNIFICANT CHANGE UP (ref 98–110)
CK MB CFR SERPL CALC: 2.4 NG/ML — SIGNIFICANT CHANGE UP (ref 0.6–6.3)
CK MB CFR SERPL CALC: 2.8 NG/ML — SIGNIFICANT CHANGE UP (ref 0.6–6.3)
CK MB CFR SERPL CALC: 2.8 NG/ML — SIGNIFICANT CHANGE UP (ref 0.6–6.3)
CK SERPL-CCNC: 105 U/L — SIGNIFICANT CHANGE UP (ref 0–225)
CO2 SERPL-SCNC: 26 MMOL/L — SIGNIFICANT CHANGE UP (ref 17–32)
CREAT SERPL-MCNC: 2.1 MG/DL — HIGH (ref 0.7–1.5)
EOSINOPHIL # BLD AUTO: 0.29 K/UL — SIGNIFICANT CHANGE UP (ref 0–0.7)
EOSINOPHIL NFR BLD AUTO: 2.7 % — SIGNIFICANT CHANGE UP (ref 0–8)
GLUCOSE BLDC GLUCOMTR-MCNC: 232 MG/DL — HIGH (ref 70–99)
GLUCOSE BLDC GLUCOMTR-MCNC: 272 MG/DL — HIGH (ref 70–99)
GLUCOSE BLDC GLUCOMTR-MCNC: 279 MG/DL — HIGH (ref 70–99)
GLUCOSE SERPL-MCNC: 276 MG/DL — HIGH (ref 70–99)
HCT VFR BLD CALC: 38.7 % — LOW (ref 42–52)
HCT VFR BLD CALC: 38.8 % — LOW (ref 42–52)
HGB BLD-MCNC: 11.7 G/DL — LOW (ref 14–18)
HGB BLD-MCNC: 11.8 G/DL — LOW (ref 14–18)
IMM GRANULOCYTES NFR BLD AUTO: 0.6 % — HIGH (ref 0.1–0.3)
INR BLD: 1.09 RATIO — SIGNIFICANT CHANGE UP (ref 0.65–1.3)
LYMPHOCYTES # BLD AUTO: 0.69 K/UL — LOW (ref 1.2–3.4)
LYMPHOCYTES # BLD AUTO: 6.4 % — LOW (ref 20.5–51.1)
MAGNESIUM SERPL-MCNC: 2.2 MG/DL — SIGNIFICANT CHANGE UP (ref 1.8–2.4)
MCHC RBC-ENTMCNC: 26.3 PG — LOW (ref 27–31)
MCHC RBC-ENTMCNC: 26.6 PG — LOW (ref 27–31)
MCHC RBC-ENTMCNC: 30.2 G/DL — LOW (ref 32–37)
MCHC RBC-ENTMCNC: 30.4 G/DL — LOW (ref 32–37)
MCV RBC AUTO: 87 FL — SIGNIFICANT CHANGE UP (ref 80–94)
MCV RBC AUTO: 87.6 FL — SIGNIFICANT CHANGE UP (ref 80–94)
MONOCYTES # BLD AUTO: 0.92 K/UL — HIGH (ref 0.1–0.6)
MONOCYTES NFR BLD AUTO: 8.5 % — SIGNIFICANT CHANGE UP (ref 1.7–9.3)
NEUTROPHILS # BLD AUTO: 8.8 K/UL — HIGH (ref 1.4–6.5)
NEUTROPHILS NFR BLD AUTO: 81.3 % — HIGH (ref 42.2–75.2)
NRBC # BLD: 0 /100 WBCS — SIGNIFICANT CHANGE UP (ref 0–0)
NRBC # BLD: 0 /100 WBCS — SIGNIFICANT CHANGE UP (ref 0–0)
NT-PROBNP SERPL-SCNC: 857 PG/ML — HIGH (ref 0–300)
PHOSPHATE SERPL-MCNC: 4.7 MG/DL — SIGNIFICANT CHANGE UP (ref 2.1–4.9)
PLATELET # BLD AUTO: 310 K/UL — SIGNIFICANT CHANGE UP (ref 130–400)
PLATELET # BLD AUTO: 312 K/UL — SIGNIFICANT CHANGE UP (ref 130–400)
POTASSIUM SERPL-MCNC: 5 MMOL/L — SIGNIFICANT CHANGE UP (ref 3.5–5)
POTASSIUM SERPL-SCNC: 5 MMOL/L — SIGNIFICANT CHANGE UP (ref 3.5–5)
PROTHROM AB SERPL-ACNC: 12.5 SEC — SIGNIFICANT CHANGE UP (ref 9.95–12.87)
RBC # BLD: 4.43 M/UL — LOW (ref 4.7–6.1)
RBC # BLD: 4.45 M/UL — LOW (ref 4.7–6.1)
RBC # FLD: 14 % — SIGNIFICANT CHANGE UP (ref 11.5–14.5)
RBC # FLD: 14.1 % — SIGNIFICANT CHANGE UP (ref 11.5–14.5)
SODIUM SERPL-SCNC: 147 MMOL/L — HIGH (ref 135–146)
TROPONIN T SERPL-MCNC: 0.03 NG/ML — CRITICAL HIGH
TROPONIN T SERPL-MCNC: 0.04 NG/ML — CRITICAL HIGH
TYPE + AB SCN PNL BLD: SIGNIFICANT CHANGE UP
WBC # BLD: 10.63 K/UL — SIGNIFICANT CHANGE UP (ref 4.8–10.8)
WBC # BLD: 10.81 K/UL — HIGH (ref 4.8–10.8)
WBC # FLD AUTO: 10.63 K/UL — SIGNIFICANT CHANGE UP (ref 4.8–10.8)
WBC # FLD AUTO: 10.81 K/UL — HIGH (ref 4.8–10.8)

## 2019-04-13 PROCEDURE — 70450 CT HEAD/BRAIN W/O DYE: CPT | Mod: 26

## 2019-04-13 PROCEDURE — 99253 IP/OBS CNSLTJ NEW/EST LOW 45: CPT

## 2019-04-13 PROCEDURE — 71045 X-RAY EXAM CHEST 1 VIEW: CPT | Mod: 26

## 2019-04-13 RX ORDER — FUROSEMIDE 40 MG
40 TABLET ORAL
Qty: 0 | Refills: 0 | Status: DISCONTINUED | OUTPATIENT
Start: 2019-04-13 | End: 2019-04-17

## 2019-04-13 RX ORDER — HYDRALAZINE HCL 50 MG
10 TABLET ORAL ONCE
Qty: 0 | Refills: 0 | Status: COMPLETED | OUTPATIENT
Start: 2019-04-13 | End: 2019-04-13

## 2019-04-13 RX ORDER — NIFEDIPINE 30 MG
30 TABLET, EXTENDED RELEASE 24 HR ORAL ONCE
Qty: 0 | Refills: 0 | Status: COMPLETED | OUTPATIENT
Start: 2019-04-13 | End: 2019-04-13

## 2019-04-13 RX ORDER — LABETALOL HCL 100 MG
10 TABLET ORAL ONCE
Qty: 0 | Refills: 0 | Status: COMPLETED | OUTPATIENT
Start: 2019-04-13 | End: 2019-04-13

## 2019-04-13 RX ORDER — FUROSEMIDE 40 MG
40 TABLET ORAL DAILY
Qty: 0 | Refills: 0 | Status: DISCONTINUED | OUTPATIENT
Start: 2019-04-13 | End: 2019-04-13

## 2019-04-13 RX ADMIN — Medication 300 MILLIGRAM(S): at 21:34

## 2019-04-13 RX ADMIN — CHLORHEXIDINE GLUCONATE 1 APPLICATION(S): 213 SOLUTION TOPICAL at 05:06

## 2019-04-13 RX ADMIN — HEPARIN SODIUM 19 UNIT(S)/HR: 5000 INJECTION INTRAVENOUS; SUBCUTANEOUS at 20:16

## 2019-04-13 RX ADMIN — Medication 11 UNIT(S): at 16:53

## 2019-04-13 RX ADMIN — Medication 11 UNIT(S): at 09:29

## 2019-04-13 RX ADMIN — Medication 6: at 09:29

## 2019-04-13 RX ADMIN — Medication 10 MILLIGRAM(S): at 02:14

## 2019-04-13 RX ADMIN — Medication 100 MILLIGRAM(S): at 14:46

## 2019-04-13 RX ADMIN — Medication 667 MILLIGRAM(S): at 09:39

## 2019-04-13 RX ADMIN — CHLORHEXIDINE GLUCONATE 1 APPLICATION(S): 213 SOLUTION TOPICAL at 18:33

## 2019-04-13 RX ADMIN — Medication 4: at 16:53

## 2019-04-13 RX ADMIN — INSULIN GLARGINE 33 UNIT(S): 100 INJECTION, SOLUTION SUBCUTANEOUS at 21:33

## 2019-04-13 RX ADMIN — Medication 40 MILLIGRAM(S): at 05:06

## 2019-04-13 RX ADMIN — Medication 40 MILLIGRAM(S): at 18:33

## 2019-04-13 RX ADMIN — Medication 10 MILLIGRAM(S): at 03:44

## 2019-04-13 RX ADMIN — Medication 667 MILLIGRAM(S): at 16:53

## 2019-04-13 RX ADMIN — Medication 11 UNIT(S): at 12:27

## 2019-04-13 RX ADMIN — Medication 0.2 MILLIGRAM(S): at 14:46

## 2019-04-13 RX ADMIN — AMLODIPINE BESYLATE 10 MILLIGRAM(S): 2.5 TABLET ORAL at 09:33

## 2019-04-13 RX ADMIN — Medication 667 MILLIGRAM(S): at 12:25

## 2019-04-13 RX ADMIN — Medication 0.1 MILLIGRAM(S): at 09:23

## 2019-04-13 RX ADMIN — Medication 100 MILLIGRAM(S): at 21:32

## 2019-04-13 RX ADMIN — ATORVASTATIN CALCIUM 40 MILLIGRAM(S): 80 TABLET, FILM COATED ORAL at 21:33

## 2019-04-13 RX ADMIN — Medication 6: at 12:27

## 2019-04-13 RX ADMIN — Medication 2000 UNIT(S): at 12:25

## 2019-04-13 RX ADMIN — SENNA PLUS 2 TABLET(S): 8.6 TABLET ORAL at 21:31

## 2019-04-13 RX ADMIN — Medication 0.2 MILLIGRAM(S): at 21:33

## 2019-04-13 RX ADMIN — CEFEPIME 100 MILLIGRAM(S): 1 INJECTION, POWDER, FOR SOLUTION INTRAMUSCULAR; INTRAVENOUS at 16:53

## 2019-04-13 RX ADMIN — Medication 300 MILLIGRAM(S): at 14:46

## 2019-04-13 RX ADMIN — Medication 30 MILLIGRAM(S): at 16:53

## 2019-04-13 NOTE — PROGRESS NOTE ADULT - ASSESSMENT
58 yo male patient with PMH of DM, HTN, Likely underlying CKD (Cr was 2.4 when presented) presented on 4/2 with AMS and aphasia, found w/ Left sided Frontotemporal bleed with intraparenchymal hemorrhagic associated with left ventricular bleed, upgraded to the unit for respiratory failure:  #creatinine trending down   # non oliguric  # increase lasix to 40 q 12   # BP not well controlled, lasix to be increased, if can not take po meds and remains uncontrolled, start cardene drip  # ph at goal, on binders  # will follow

## 2019-04-13 NOTE — PROGRESS NOTE ADULT - ASSESSMENT
IMPRESSION:    Intraparenchymal hemorrhage  Acute on chronic hypercapnic respiratory failure  Probable ZE +/- OHS  DESI improving  DVT ON IV HEPARIN      PLAN:    CNS: no sedatives, neurosurgery and neurology follow up, repeat head ct    HEENT:  Oral care    PULMONARY:  HOB @ 45 degrees, oxygen to keep pulse ox>90%, NIV during sleep.       CARDIOVASCULAR:  I=O, BP control restart po meds    GI: GI prophylaxis  Feeding per  speech and swallow eval    RENAL:  F/u  lytes.  Correct as needed. accurate I/O, FU with renal, lasix    INFECTIOUS DISEASE: Repeat Cultures.  finish course of abx    HEMATOLOGICAL:  DVT prophylaxis    ENDOCRINE:  Follow up FS.      MUSCULOSKELETAL: OOB to chair.     CODE STATUS: FULL CODE    will speak with family

## 2019-04-13 NOTE — PROGRESS NOTE ADULT - ASSESSMENT
60 yo M with PMHx of HTN, DM, CAD on aspirin, ZE who was BIBEMS for ams and aphasia. Stroked code called, had NIHSS 15. CTH showed L frontotemporal intraparenchymal bleed with extension into the ventricle and 2mm shift to the R. Admitted to ICU s/p platelets, DDAVP, nicardipine. On 3E, pt respiratory status worsened, requiring BiPAP. Repeat duplex 4/12 showed new right posterior tibial and peroneal and left gastrocnemius and branch of posterior tibial thrombus. Duplex venous 4/6 was neg for DVT. Neurosurgery cleared pt for anticoagulation. CTA was not performed due to CKD and would not  to anticoagulate.     #Acute on chronic hypercapnic respiratory failure, possible PE  - Venous duplex 4/12 showed new right posterior tibial and peroneal and left gastrocnemius and branch of posterior tibial thrombus.  - CTA lungs (not done due to CKD) and V/Q scan would not  to anticoagulate  - Neurosurgery cleared pt for anticoagulation ([id number] or [id number])  - Vascular rec anticoag ([id number])  - heparin drip, monitor PTT (goal 60-70 due to intraparenchymal bleed)  - Repeat CT head 24 hr after or sooner if neuro change  - Neuro check q1-2 hr  - BiPAP qhs and prn  - CXR perihilar opacity improved. f/u official read  -    #L frontotemporal intraparenchymal bleed with 2mm shift   - no acute neurosurgical intervention  - CTH 4/2: L frontotemporal intraparenchymal bleed with extension into the ventricle and 2mm shift to the R  - CTH 4/3: no change  - CTH (repeat #2) 4/3: no change  - CTH 4/6: no change  - CTH 4/12- Unchanged L basal ganglia intraparenchymal hematoma with mass effect on the left lateral ventricle. Unchanged 2 mm left to right midline shift.  - CTH 4/13 f/u    #Abd distension  Had small stool yesterday and passing gas, no n/v, sometimes epigastric pain  f/u abd XR    #Hypertensive emergency  - goal BP < 160  - labetalol 300 mg TID, amlodipine 10 mg daily, hydralazine 100 mg TID  - weaned off nicardipine gtt  - clonidine to 0.1 mg TID    #HFpEF  - Echo 4/4- EF 58%, mild inc LV wall thick, mild TR, mild AS, mild pulm HTN  - Repeat echo  - cxr w/ interval blunting of costophrenic angle, hazy opacity  - Inc lasix 40 q12h IV for now due to generalized swelling  - follows w/ Dr. Man    #DESI on CKD3  - baseline Cr 1.9 in in 10/2018  - improving cr 2.1  - renal US 4/3: no hydronephrosis, L renal cyst  - per nephro: no need for urgent dialysis  - phoslo per nephro    #New onset afib  - rate controlled  - labetalol 300 mg TID  - heparin drip    #Fever. Resolved.  - possible bibasilar pneumonia vs gout attack  - urine cx 4/2: negative  - blood cx: NTD  - uric acid 9.9  - c/w cefepime (start 4/6)    #DM  - lantus/lispro/LSS    #HLD  - lipitor    #DVT ppx  - heparin drip    #GI ppx  - protonix  - diet: dysphagia 3 nectar thick w/ 1:1 feed as per speech and swallow    #Dispo  - from home  - full code.    Requires ICU monitoring

## 2019-04-13 NOTE — PROGRESS NOTE ADULT - SUBJECTIVE AND OBJECTIVE BOX
seen and examined  upgraded to the unit for respiratory failure  on BIPAP      Standing Inpatient Medications  amLODIPine   Tablet 10 milliGRAM(s) Oral daily  atorvastatin Oral Tab/Cap - Peds 40 milliGRAM(s) Oral daily  calcium acetate 667 milliGRAM(s) Oral three times a day with meals  cefepime   IVPB 1000 milliGRAM(s) IV Intermittent every 24 hours  chlorhexidine 2% Cloths 1 Application(s) Topical daily  chlorhexidine 4% Liquid 1 Application(s) Topical every 12 hours  cholecalciferol 2000 Unit(s) Oral daily  cloNIDine 0.1 milliGRAM(s) Oral three times a day  dextrose 5%. 1000 milliLiter(s) IV Continuous <Continuous>  dextrose 50% Injectable 12.5 Gram(s) IV Push once  dextrose 50% Injectable 25 Gram(s) IV Push once  dextrose 50% Injectable 25 Gram(s) IV Push once  furosemide   Injectable 40 milliGRAM(s) IV Push daily  heparin  Infusion 2200 Unit(s)/Hr IV Continuous <Continuous>  hydrALAZINE 100 milliGRAM(s) Oral every 8 hours  insulin glargine Injectable (LANTUS) 33 Unit(s) SubCutaneous at bedtime  insulin lispro (HumaLOG) corrective regimen sliding scale   SubCutaneous three times a day before meals  insulin lispro Injectable (HumaLOG) 11 Unit(s) SubCutaneous three times a day before meals  labetalol 300 milliGRAM(s) Oral every 8 hours  pantoprazole    Tablet 40 milliGRAM(s) Oral before breakfast  senna 2 Tablet(s) Oral at bedtime    PRN Inpatient Medications  acetaminophen   Tablet .. 650 milliGRAM(s) Oral every 6 hours PRN  aluminum hydroxide/magnesium hydroxide/simethicone Suspension 30 milliLiter(s) Oral every 6 hours PRN  dextrose 40% Gel 15 Gram(s) Oral once PRN  glucagon  Injectable 1 milliGRAM(s) IntraMuscular once PRN  oxyCODONE    5 mG/acetaminophen 325 mG 1 Tablet(s) Oral every 6 hours PRN          VITALS/PHYSICAL EXAM  --------------------------------------------------------------------------------  T(C): 36.2 (04-13-19 @ 04:00), Max: 36.7 (04-13-19 @ 00:00)  HR: 78 (04-13-19 @ 07:00) (64 - 78)  BP: 183/83 (04-13-19 @ 07:00) (158/75 - 183/83)  RR: 20 (04-13-19 @ 07:00) (14 - 27)  SpO2: 97% (04-13-19 @ 07:00) (94% - 99%)  Wt(kg): --  Height (cm): 170.18 (04-12-19 @ 22:00)  Weight (kg): 150.5 (04-12-19 @ 22:00)  BMI (kg/m2): 52 (04-12-19 @ 22:00)  BSA (m2): 2.51 (04-12-19 @ 22:00)      04-12-19 @ 07:01  -  04-13-19 @ 07:00  --------------------------------------------------------  IN: 176 mL / OUT: 5925 mL / NET: -5749 mL      Physical Exam:  	Gen:on bipap  	Pulm:B/L  	CV:  S1S2; no rub  	Abd: +distended  	: shruti   	LE:  edema      LABS/STUDIES  --------------------------------------------------------------------------------              11.7   10.81 >-----------<  310      [04-13-19 @ 05:19]              38.7     147  |  105  |  96  ----------------------------<  276      [04-13-19 @ 05:19]  5.0   |  26  |  2.1        Ca     9.4     [04-13-19 @ 05:19]      Mg     2.2     [04-13-19 @ 05:19]      Phos  4.7     [04-13-19 @ 05:19]      PT/INR: PT 12.50, INR 1.09       [04-12-19 @ 23:20]  PTT: 61.6       [04-13-19 @ 05:19]    Troponin 0.04      [04-13-19 @ 01:23]        [04-13-19 @ 01:23]    Creatinine Trend:  SCr 2.1 [04-13 @ 05:19]  SCr 2.5 [04-12 @ 08:52]  SCr 2.9 [04-11 @ 05:46]  SCr 3.0 [04-10 @ 07:12]  SCr 3.6 [04-08 @ 04:49]    Urinalysis - [04-06-19 @ 17:00]      Color Yellow / Appearance Turbid / SG 1.020 / pH 5.5      Gluc 100 / Ketone Negative  / Bili Negative / Urobili 1.0       Blood Negative / Protein 100 / Leuk Est Small / Nitrite Negative      RBC  / WBC 6-10 / Hyaline  / Gran  / Sq Epi  / Non Sq Epi Few / Bacteria       HbA1c 8.7      [04-08-19 @ 04:49]  Lipid: chol 184, , HDL 38,       [04-03-19 @ 08:04]      Immunofixation Serum:   No Monoclonal Band Identified    Reference Range: None Detected      [04-05-19 @ 04:00]  SPEP Interpretation: Normal Electrophoresis Pattern      [04-05-19 @ 04:00]  Immunofixation Urine: See Note      [04-04-19 @ 16:34]  UPEP Interpretation: See Note      [04-04-19 @ 16:34]

## 2019-04-13 NOTE — PROGRESS NOTE ADULT - SUBJECTIVE AND OBJECTIVE BOX
OVERNIGHT EVENTS: transferred to ccu for worsening SOB, S/P LE doppler DVT, s/p vascular recommend AC, ON 2 L NC, on BIPAP at night    Vital Signs Last 24 Hrs  T(C): 36.2 (13 Apr 2019 04:00), Max: 36.7 (13 Apr 2019 00:00)  T(F): 97.2 (13 Apr 2019 04:00), Max: 98 (13 Apr 2019 00:00)  HR: 78 (13 Apr 2019 07:00) (64 - 78)  BP: 183/83 (13 Apr 2019 07:00) (158/75 - 183/83)  BP(mean): 124 (13 Apr 2019 07:00) (102 - 135)  RR: 20 (13 Apr 2019 07:00) (14 - 27)  SpO2: 97% (13 Apr 2019 07:00) (94% - 99%)    PHYSICAL EXAMINATION:    GENERAL: axox1    HEENT: Head is normocephalic and atraumatic. Extraocular muscles are intact. Mucous membranes are moist.    NECK: Supple.    LUNGS: b/l rhocnhi    HEART: Regular rate and rhythm without murmur.    ABDOMEN: Soft, nontender, and nondistended.      EXTREMITIES: swelling +    NEUROLOGIC: Grossly intact.    SKIN: No ulceration or induration present.      LABS:                        11.7   10.81 )-----------( 310      ( 13 Apr 2019 05:19 )             38.7     04-13    147<H>  |  105  |  96<HH>  ----------------------------<  276<H>  5.0   |  26  |  2.1<H>    Ca    9.4      13 Apr 2019 05:19  Phos  4.7     04-13  Mg     2.2     04-13      PT/INR - ( 12 Apr 2019 23:20 )   PT: 12.50 sec;   INR: 1.09 ratio         PTT - ( 13 Apr 2019 05:19 )  PTT:61.6 sec    ABG - ( 12 Apr 2019 20:27 )  pH, Arterial: 7.36  pH, Blood: x     /  pCO2: 54    /  pO2: 79    / HCO3: 31    / Base Excess: 4.3   /  SaO2: 95                CARDIAC MARKERS ( 13 Apr 2019 01:23 )  x     / 0.04 ng/mL / 105 U/L / x     / 2.8 ng/mL        Serum Pro-Brain Natriuretic Peptide: 857 pg/mL (04-13-19 @ 01:23)  Serum Pro-Brain Natriuretic Peptide: 1203 pg/mL (04-12-19 @ 08:52)            04-12-19 @ 07:01  -  04-13-19 @ 07:00  --------------------------------------------------------  IN: 176 mL / OUT: 5925 mL / NET: -5749 mL        MICROBIOLOGY:      MEDICATIONS  (STANDING):  amLODIPine   Tablet 10 milliGRAM(s) Oral daily  atorvastatin Oral Tab/Cap - Peds 40 milliGRAM(s) Oral daily  calcium acetate 667 milliGRAM(s) Oral three times a day with meals  cefepime   IVPB 1000 milliGRAM(s) IV Intermittent every 24 hours  chlorhexidine 2% Cloths 1 Application(s) Topical daily  chlorhexidine 4% Liquid 1 Application(s) Topical every 12 hours  cholecalciferol 2000 Unit(s) Oral daily  cloNIDine 0.1 milliGRAM(s) Oral three times a day  dextrose 5%. 1000 milliLiter(s) (50 mL/Hr) IV Continuous <Continuous>  dextrose 50% Injectable 12.5 Gram(s) IV Push once  dextrose 50% Injectable 25 Gram(s) IV Push once  dextrose 50% Injectable 25 Gram(s) IV Push once  furosemide   Injectable 40 milliGRAM(s) IV Push daily  heparin  Infusion 2200 Unit(s)/Hr (22 mL/Hr) IV Continuous <Continuous>  hydrALAZINE 100 milliGRAM(s) Oral every 8 hours  insulin glargine Injectable (LANTUS) 33 Unit(s) SubCutaneous at bedtime  insulin lispro (HumaLOG) corrective regimen sliding scale   SubCutaneous three times a day before meals  insulin lispro Injectable (HumaLOG) 11 Unit(s) SubCutaneous three times a day before meals  labetalol 300 milliGRAM(s) Oral every 8 hours  pantoprazole    Tablet 40 milliGRAM(s) Oral before breakfast  senna 2 Tablet(s) Oral at bedtime    MEDICATIONS  (PRN):  acetaminophen   Tablet .. 650 milliGRAM(s) Oral every 6 hours PRN Mild Pain (1 - 3)  aluminum hydroxide/magnesium hydroxide/simethicone Suspension 30 milliLiter(s) Oral every 6 hours PRN Dyspepsia  dextrose 40% Gel 15 Gram(s) Oral once PRN Blood Glucose LESS THAN 70 milliGRAM(s)/deciliter  glucagon  Injectable 1 milliGRAM(s) IntraMuscular once PRN Glucose LESS THAN 70 milligrams/deciliter  oxyCODONE    5 mG/acetaminophen 325 mG 1 Tablet(s) Oral every 6 hours PRN Severe Pain (7 - 10)      RADIOLOGY & ADDITIONAL STUDIES:

## 2019-04-13 NOTE — CONSULT NOTE ADULT - ATTENDING COMMENTS
Seen and examined.  Symptomatic calf vein DVT.  No contraindications to AC.  Rec AC x 3 months with outpt FU
Patient seen and examined and agree with above except as noted.  Patient lethargic this morning and has right hemiplegia of RUE and some withdrawal in RLE  requires vigorous stimulation to arouse    NIHSS 15  ICH Score 2  mrankin 0    Plan as above
Patient seen and examined and agree with above.  Patients initial consult done yesterday this is a followup    Plan as above

## 2019-04-13 NOTE — CONSULT NOTE ADULT - SUBJECTIVE AND OBJECTIVE BOX
HPI:  58 y/o M with hx of HTN, DM, CAD, Sleep Apnea BIBEMS for altered mental status and aphasia. At presentation patient was found to be aphasic and some right side weakness. In the ED stroke code was called and NIH SS was 15 with right sided weakness. The CT scan showed the left intra parenchymal bleed with the extend into the ventricle. Today patient was upgraded to CCU for respiratory distress. Venous duplex was done (report pending) showing right posterior tibial and peroneal and left gastrocnemius and branch of posterior tibial thrombus.      PAST MEDICAL & SURGICAL HISTORY:  Gout  Morbidly obese  Gout  Hypertension  Diabetes mellitus  S/P tonsillectomy        MEDICATIONS  (STANDING):  amLODIPine   Tablet 10 milliGRAM(s) Oral daily  atorvastatin Oral Tab/Cap - Peds 40 milliGRAM(s) Oral daily  calcium acetate 667 milliGRAM(s) Oral three times a day with meals  cefepime   IVPB 1000 milliGRAM(s) IV Intermittent every 24 hours  chlorhexidine 2% Cloths 1 Application(s) Topical daily  chlorhexidine 4% Liquid 1 Application(s) Topical every 12 hours  cholecalciferol 2000 Unit(s) Oral daily  cloNIDine 0.1 milliGRAM(s) Oral three times a day  dextrose 5%. 1000 milliLiter(s) (50 mL/Hr) IV Continuous <Continuous>  dextrose 50% Injectable 12.5 Gram(s) IV Push once  dextrose 50% Injectable 25 Gram(s) IV Push once  dextrose 50% Injectable 25 Gram(s) IV Push once  furosemide    Tablet 40 milliGRAM(s) Oral daily  heparin  Infusion 2200 Unit(s)/Hr (22 mL/Hr) IV Continuous <Continuous>  hydrALAZINE 100 milliGRAM(s) Oral every 8 hours  insulin glargine Injectable (LANTUS) 33 Unit(s) SubCutaneous at bedtime  insulin lispro (HumaLOG) corrective regimen sliding scale   SubCutaneous three times a day before meals  insulin lispro Injectable (HumaLOG) 11 Unit(s) SubCutaneous three times a day before meals  labetalol 300 milliGRAM(s) Oral every 8 hours  pantoprazole    Tablet 40 milliGRAM(s) Oral before breakfast  senna 2 Tablet(s) Oral at bedtime    MEDICATIONS  (PRN):  acetaminophen   Tablet .. 650 milliGRAM(s) Oral every 6 hours PRN Mild Pain (1 - 3)  aluminum hydroxide/magnesium hydroxide/simethicone Suspension 30 milliLiter(s) Oral every 6 hours PRN Dyspepsia  dextrose 40% Gel 15 Gram(s) Oral once PRN Blood Glucose LESS THAN 70 milliGRAM(s)/deciliter  glucagon  Injectable 1 milliGRAM(s) IntraMuscular once PRN Glucose LESS THAN 70 milligrams/deciliter  oxyCODONE    5 mG/acetaminophen 325 mG 1 Tablet(s) Oral every 6 hours PRN Severe Pain (7 - 10)      Allergies  No Known Allergies      REVIEW OF SYSTEMS    [X] A ten-point review of systems was otherwise negative except as noted.  [ ] Due to altered mental status/intubation, subjective information were not able to be obtained from the patient. History was obtained, to the extent possible, from review of the chart and collateral sources of information.      Vital Signs Last 24 Hrs  T(C): 36.7 (13 Apr 2019 00:00), Max: 36.7 (13 Apr 2019 00:00)  T(F): 98 (13 Apr 2019 00:00), Max: 98 (13 Apr 2019 00:00)  HR: 70 (13 Apr 2019 00:00) (64 - 70)  BP: 176/79 (13 Apr 2019 00:00) (156/75 - 180/91)  BP(mean): 122 (13 Apr 2019 00:00) (102 - 135)  RR: 24 (13 Apr 2019 00:00) (14 - 26)  SpO2: 98% (13 Apr 2019 00:00) (94% - 99%)    PHYSICAL EXAM:  GENERAL: A&O, currently on bipap  CHEST/LUNG: b/l breath sounds  HEART: s1, s2  ABDOMEN: Soft, Nontender, Nondistended  EXTREMITIES:  No clubbing or cyanosis, 2+ pitting edema b/l extremities, b/l pulses      LABS:  POCT Blood Glucose.: 227 mg/dL (12 Apr 2019 21:39)  POCT Blood Glucose.: 279 mg/dL (12 Apr 2019 19:35)  POCT Blood Glucose.: 287 mg/dL (12 Apr 2019 18:46)  POCT Blood Glucose.: 339 mg/dL (12 Apr 2019 16:24)  POCT Blood Glucose.: 334 mg/dL (12 Apr 2019 11:43)  POCT Blood Glucose.: 229 mg/dL (12 Apr 2019 07:28)  POCT Blood Glucose.: 299 mg/dL (12 Apr 2019 00:22)                          11.3   9.31  )-----------( 273      ( 12 Apr 2019 08:52 )             38.4       Auto Neutrophil %: 88.5 % (04-12-19 @ 08:52)  Auto Immature Granulocyte %: 0.3 % (04-12-19 @ 08:52)    04-12    143  |  102  |  102<HH>  ----------------------------<  301<H>  5.1<H>   |  25  |  2.5<H>      Calcium, Total Serum: 9.3 mg/dL (04-12-19 @ 08:52)      LFTs:     Blood Gas Arterial, Lactate: 0.6 mmoL/L (04-12-19 @ 20:27)    ABG - ( 12 Apr 2019 20:27 )  pH: 7.36  /  pCO2: 54    /  pO2: 79    / HCO3: 31    / Base Excess: 4.3   /  SaO2: 95              ABG - ( 12 Apr 2019 12:16 )  pH: 7.30  /  pCO2: 57    /  pO2: 74    / HCO3: x     / Base Excess: 0.4   /  SaO2: 94              ABG - ( 12 Apr 2019 09:37 )  pH: 7.32  /  pCO2: 54    /  pO2: 86    / HCO3: x     / Base Excess: 0.8   /  SaO2: 96          Serum Pro-Brain Natriuretic Peptide: 1203 pg/mL (04-12-19 @ 08:52)  Serum Pro-Brain Natriuretic Peptide: 751 pg/mL (04-04-19 @ 11:51)          RADIOLOGY & ADDITIONAL STUDIES:    Venous duplex pending report

## 2019-04-13 NOTE — PROGRESS NOTE ADULT - SUBJECTIVE AND OBJECTIVE BOX
GRIS TIDWELL MRN-[id number]    Hospitalist Note  60yo M with Past Medical History HTN, DM, CAD, and Sleep Apnea admitted for altered mental status and aphasia secondary to an acute intraparenchymal hemorrhage. At presentation patient was found to be aphasic and some right side weakness.     Overnight events/Updates: His clinical course was complicated by acute respiratory failure rule out pulmonary embolism, uncontrolled hypertension, and acute kidney failure, which has recovered from admission.    Vital Signs Last 24 Hrs  T(C): 36.9 (13 Apr 2019 12:00), Max: 37.2 (13 Apr 2019 08:00)  T(F): 98.5 (13 Apr 2019 12:00), Max: 99 (13 Apr 2019 08:00)  HR: 70 (13 Apr 2019 12:00) (64 - 84)  BP: 169/74 (13 Apr 2019 12:00) (158/75 - 183/83)  BP(mean): 112 (13 Apr 2019 12:00) (102 - 147)   RR: 21 (13 Apr 2019 12:00) (14 - 34)  SpO2: 97% (13 Apr 2019 12:00) (93% - 99%)    Physical Examination:  General: AAO x 3  HEENT: PERRLA, EOMI, NC  CV= S1 & S2 appreciated  Lungs= CTA BL  Abdominal Examination= Obese, + BS, Soft, NT/ND  Extremity Examination= No C/C/E    ROS: No chest pain, no shortness of breath.  All other systems reviewed and are within normal limits except for the complaints in the HPI.    MEDICATIONS  (STANDING):  amLODIPine   Tablet 10 milliGRAM(s) Oral daily  atorvastatin Oral Tab/Cap - Peds 40 milliGRAM(s) Oral daily  calcium acetate 667 milliGRAM(s) Oral three times a day with meals  cefepime   IVPB 1000 milliGRAM(s) IV Intermittent every 24 hours  chlorhexidine 2% Cloths 1 Application(s) Topical daily  chlorhexidine 4% Liquid 1 Application(s) Topical every 12 hours  cholecalciferol 2000 Unit(s) Oral daily  cloNIDine 0.2 milliGRAM(s) Oral every 8 hours  dextrose 5%. 1000 milliLiter(s) (50 mL/Hr) IV Continuous <Continuous>  dextrose 50% Injectable 12.5 Gram(s) IV Push once  dextrose 50% Injectable 25 Gram(s) IV Push once  dextrose 50% Injectable 25 Gram(s) IV Push once  furosemide   Injectable 40 milliGRAM(s) IV Push two times a day  heparin  Infusion 2200 Unit(s)/Hr (22 mL/Hr) IV Continuous <Continuous>  hydrALAZINE 100 milliGRAM(s) Oral every 8 hours  insulin glargine Injectable (LANTUS) 33 Unit(s) SubCutaneous at bedtime  insulin lispro (HumaLOG) corrective regimen sliding scale   SubCutaneous three times a day before meals  insulin lispro Injectable (HumaLOG) 11 Unit(s) SubCutaneous three times a day before meals  labetalol 300 milliGRAM(s) Oral every 8 hours  pantoprazole    Tablet 40 milliGRAM(s) Oral before breakfast  senna 2 Tablet(s) Oral at bedtime    MEDICATIONS  (PRN):  acetaminophen   Tablet .. 650 milliGRAM(s) Oral every 6 hours PRN Mild Pain (1 - 3)  aluminum hydroxide/magnesium hydroxide/simethicone Suspension 30 milliLiter(s) Oral every 6 hours PRN Dyspepsia  dextrose 40% Gel 15 Gram(s) Oral once PRN Blood Glucose LESS THAN 70 milliGRAM(s)/deciliter  glucagon  Injectable 1 milliGRAM(s) IntraMuscular once PRN Glucose LESS THAN 70 milligrams/deciliter  oxyCODONE    5 mG/acetaminophen 325 mG 1 Tablet(s) Oral every 6 hours PRN Severe Pain (7 - 10)                            11.7   10.81 )-----------( 310      ( 13 Apr 2019 05:19 )             38.7     04-13    147<H>  |  105  |  96<HH>  ----------------------------<  276<H>  5.0   |  26  |  2.1<H>    Ca    9.4      13 Apr 2019 05:19  Phos  4.7     04-13  Mg     2.2     04-13        Case discussed with housestaff & family  SIRENA Patel [id number]

## 2019-04-13 NOTE — PROGRESS NOTE ADULT - SUBJECTIVE AND OBJECTIVE BOX
SUBJECTIVE:    Patient is a 59y old Male who presents with a chief complaint of Right sided weakness (13 Apr 2019 08:50)    Currently admitted to medicine with the primary diagnosis of Intraparenchymal hemorrhage of brain     Today is hospital day 11d. Overnight, NS cleared pt for anticoagulation and heparin drip started. Pt denied CP, current abd pain, dysuria. had BM yest and passing gas. Sometimes epigastric pain. Not SOB on bipap    PAST MEDICAL & SURGICAL HISTORY  Gout  Morbidly obese  Gout  Hypertension  Diabetes mellitus  S/P tonsillectomy        ALLERGIES:  No Known Allergies    MEDICATIONS:  STANDING MEDICATIONS  amLODIPine   Tablet 10 milliGRAM(s) Oral daily  atorvastatin Oral Tab/Cap - Peds 40 milliGRAM(s) Oral daily  calcium acetate 667 milliGRAM(s) Oral three times a day with meals  cefepime   IVPB 1000 milliGRAM(s) IV Intermittent every 24 hours  chlorhexidine 2% Cloths 1 Application(s) Topical daily  chlorhexidine 4% Liquid 1 Application(s) Topical every 12 hours  cholecalciferol 2000 Unit(s) Oral daily  cloNIDine 0.1 milliGRAM(s) Oral three times a day  dextrose 5%. 1000 milliLiter(s) IV Continuous <Continuous>  dextrose 50% Injectable 12.5 Gram(s) IV Push once  dextrose 50% Injectable 25 Gram(s) IV Push once  dextrose 50% Injectable 25 Gram(s) IV Push once  furosemide   Injectable 40 milliGRAM(s) IV Push two times a day  heparin  Infusion 2200 Unit(s)/Hr IV Continuous <Continuous>  hydrALAZINE 100 milliGRAM(s) Oral every 8 hours  insulin glargine Injectable (LANTUS) 33 Unit(s) SubCutaneous at bedtime  insulin lispro (HumaLOG) corrective regimen sliding scale   SubCutaneous three times a day before meals  insulin lispro Injectable (HumaLOG) 11 Unit(s) SubCutaneous three times a day before meals  labetalol 300 milliGRAM(s) Oral every 8 hours  pantoprazole    Tablet 40 milliGRAM(s) Oral before breakfast  senna 2 Tablet(s) Oral at bedtime    PRN MEDICATIONS  acetaminophen   Tablet .. 650 milliGRAM(s) Oral every 6 hours PRN  aluminum hydroxide/magnesium hydroxide/simethicone Suspension 30 milliLiter(s) Oral every 6 hours PRN  dextrose 40% Gel 15 Gram(s) Oral once PRN  glucagon  Injectable 1 milliGRAM(s) IntraMuscular once PRN  oxyCODONE    5 mG/acetaminophen 325 mG 1 Tablet(s) Oral every 6 hours PRN      Intraparenchymal hemorrhage of brain      VITALS:   T(F): 99  HR: 84  BP: 180/87  RR: 21  SpO2: 96%    LABS:                        11.7   10.81 )-----------( 310      ( 13 Apr 2019 05:19 )             38.7     04-13    147<H>  |  105  |  96<HH>  ----------------------------<  276<H>  5.0   |  26  |  2.1<H>    Ca    9.4      13 Apr 2019 05:19  Phos  4.7     04-13  Mg     2.2     04-13      PT/INR - ( 12 Apr 2019 23:20 )   PT: 12.50 sec;   INR: 1.09 ratio         PTT - ( 13 Apr 2019 05:19 )  PTT:61.6 sec    ABG - ( 12 Apr 2019 20:27 )  pH, Arterial: 7.36  pH, Blood: x     /  pCO2: 54    /  pO2: 79    / HCO3: 31    / Base Excess: 4.3   /  SaO2: 95                Creatine Kinase, Serum: 105 U/L (04-13-19 @ 01:23)  Troponin T, Serum: 0.04 ng/mL <HH> (04-13-19 @ 01:23)      CARDIAC MARKERS ( 13 Apr 2019 01:23 )  x     / 0.04 ng/mL / 105 U/L / x     / 2.8 ng/mL      Culture - Blood (collected 08 Apr 2019 17:49)  Source: .Blood None  Preliminary Report (10 Apr 2019 02:01):    No growth to date.    Culture - Blood (collected 06 Apr 2019 16:54)  Source: .Blood None  Final Report (12 Apr 2019 03:00):    No growth at 5 days.    Culture - Urine (collected 02 Apr 2019 21:57)  Source: .Urine Clean Catch (Midstream)  Final Report (03 Apr 2019 21:59):    <10,000 CFU/mL Normal Urogenital Natali      RADIOLOGY:    < from: CT Head No Cont (04.12.19 @ 17:56) >  Relatively unchanged left basal ganglia intraparenchymal hematoma with   mass effect on the left lateral ventricle.    Stable 2 mm left to right midline shift.    No evidence of new sites of intracranial hemorrhage.    Grey-white differentiation is preserved.    The calvarium is intact.    The visualized paranasal sinuses and bilateral mastoid complexes are   unremarkable. The globes and orbits are unremarkable.    Beam hardening artifact is noted overlying the brain stem and posterior   fossa which is inherent to CT in this location.    Impression:     1.Relatively unchanged left basal ganglia intraparenchymal hematoma with   mass effect on the left lateral ventricle.    2. Unchanged 2 mm left to right midline shift.    < end of copied text >    < from: Transthoracic Echocardiogram (04.04.19 @ 11:34) >   1. LV Ejection Fraction by Castro's Method with a biplane EF of 58 %.   2. Mildly increased LV wall thickness.   3. Mild tricuspid regurgitation.   4. Mildly thickened and calcified aortic leaflets. Mild aortic stenosis.   5. Estimated pulmonary artery systolic pressure is 42.0 mmHg assuming a   right atrial pressure of 5 mmHg, which is consistent with mild pulmonary   hypertension.    < end of copied text >    PHYSICAL EXAM:  GEN: No acute distress.   PULM: Decreased BS bilaterally   CARD: S1/S2 present. RRR.   GI: Soft, non-tender, mildly distended. Bowel sounds present  MS: b/l edema 2+  NEURO: AAOX2 to name and place. R side weaker than left.

## 2019-04-13 NOTE — PROGRESS NOTE ADULT - ASSESSMENT
60yo M with Past Medical History HTN, DM, CAD, and Sleep Apnea admitted for altered mental status and aphasia secondary to an acute intraparenchymal hemorrhage.  His clinical course was complicated by acute respiratory failure rule out pulmonary embolism, uncontrolled hypertension, and acute kidney failure.    Acute respiratory failure rule out PE: continue heparin gtt with target PTT 60-80.  Patient breathing comfortably on supplemental O2 this AM.  AMS secondary to intraparenchymal hemorrhage: Neurosurgery recommendations were reviewed; no contraindications to therapeutic AC.  Continue hourly neuro checks  Uncontrolled Hypertension: Hyzaar was discontinued on admission, which may partially contribute to uncontrolled hypertension.  Continue Labetalol 300mg PO q8h, Hydralazine 100mg PO q8h, Norvasc 10mg PO q24, and increase Clonidine to 0.2mg q8h.  Check renal arterial duplex to exclude JUAN PABLO (accelerated hypertension with DESI).   Acute on chronic kidney failure: renal function has recovered to near baseline (2.1) from peak 4.  Mild hypernatremia and hyperkalemia noted.  Encourage free water intake.  Repeat BMP for tomorrow morning.  DMII: continue Lantus/Lispro and monitor FS with meals.  GI prophylaxis    #Progress Note Handoff    Pending:  Consults:  Tests: Check Renal Arterial Duplex to exclude JUAN PABLO  Test Results:  Other:    Family Discussion: case discussed with brother at bedside  Future Disposition: guarded prognosis on AC

## 2019-04-13 NOTE — CONSULT NOTE ADULT - ASSESSMENT
59 year old male with new right posterior tibial and peroneal and left gastrocnemius and branch of posterior tibial thrombus, admitted for intraparenchymal bleed 4/2, concern for PE, per medical team patient is unstable secondary to respiratory status and DESI so cannot under CT at this time to rule out PE, heparin drip has been started   -continue empiric heparin for suspected PE  -If PE ruled out by CT, okay to stop heparin drip  discussed with fellow Dr. Tabor

## 2019-04-14 LAB
ALBUMIN SERPL ELPH-MCNC: 3.3 G/DL — LOW (ref 3.5–5.2)
ALP SERPL-CCNC: 116 U/L — HIGH (ref 30–115)
ALT FLD-CCNC: 39 U/L — SIGNIFICANT CHANGE UP (ref 0–41)
ANION GAP SERPL CALC-SCNC: 17 MMOL/L — HIGH (ref 7–14)
APTT BLD: 38.8 SEC — SIGNIFICANT CHANGE UP (ref 27–39.2)
APTT BLD: 57.9 SEC — HIGH (ref 27–39.2)
APTT BLD: 64.8 SEC — HIGH (ref 27–39.2)
AST SERPL-CCNC: 19 U/L — SIGNIFICANT CHANGE UP (ref 0–41)
BASOPHILS # BLD AUTO: 0.04 K/UL — SIGNIFICANT CHANGE UP (ref 0–0.2)
BASOPHILS NFR BLD AUTO: 0.4 % — SIGNIFICANT CHANGE UP (ref 0–1)
BILIRUB SERPL-MCNC: 0.4 MG/DL — SIGNIFICANT CHANGE UP (ref 0.2–1.2)
BUN SERPL-MCNC: 93 MG/DL — CRITICAL HIGH (ref 10–20)
CALCIUM SERPL-MCNC: 8.9 MG/DL — SIGNIFICANT CHANGE UP (ref 8.5–10.1)
CHLORIDE SERPL-SCNC: 103 MMOL/L — SIGNIFICANT CHANGE UP (ref 98–110)
CO2 SERPL-SCNC: 26 MMOL/L — SIGNIFICANT CHANGE UP (ref 17–32)
CREAT SERPL-MCNC: 2.2 MG/DL — HIGH (ref 0.7–1.5)
CULTURE RESULTS: SIGNIFICANT CHANGE UP
EOSINOPHIL # BLD AUTO: 0.25 K/UL — SIGNIFICANT CHANGE UP (ref 0–0.7)
EOSINOPHIL NFR BLD AUTO: 2.2 % — SIGNIFICANT CHANGE UP (ref 0–8)
GLUCOSE BLDC GLUCOMTR-MCNC: 213 MG/DL — HIGH (ref 70–99)
GLUCOSE BLDC GLUCOMTR-MCNC: 246 MG/DL — HIGH (ref 70–99)
GLUCOSE BLDC GLUCOMTR-MCNC: 278 MG/DL — HIGH (ref 70–99)
GLUCOSE BLDC GLUCOMTR-MCNC: 307 MG/DL — HIGH (ref 70–99)
GLUCOSE BLDC GLUCOMTR-MCNC: 595 MG/DL — CRITICAL HIGH (ref 70–99)
GLUCOSE SERPL-MCNC: 271 MG/DL — HIGH (ref 70–99)
HCT VFR BLD CALC: 39.9 % — LOW (ref 42–52)
HGB BLD-MCNC: 12.1 G/DL — LOW (ref 14–18)
IMM GRANULOCYTES NFR BLD AUTO: 0.9 % — HIGH (ref 0.1–0.3)
INR BLD: 1.17 RATIO — SIGNIFICANT CHANGE UP (ref 0.65–1.3)
LYMPHOCYTES # BLD AUTO: 0.64 K/UL — LOW (ref 1.2–3.4)
LYMPHOCYTES # BLD AUTO: 5.6 % — LOW (ref 20.5–51.1)
MAGNESIUM SERPL-MCNC: 2.2 MG/DL — SIGNIFICANT CHANGE UP (ref 1.8–2.4)
MCHC RBC-ENTMCNC: 26.5 PG — LOW (ref 27–31)
MCHC RBC-ENTMCNC: 30.3 G/DL — LOW (ref 32–37)
MCV RBC AUTO: 87.3 FL — SIGNIFICANT CHANGE UP (ref 80–94)
MONOCYTES # BLD AUTO: 0.8 K/UL — HIGH (ref 0.1–0.6)
MONOCYTES NFR BLD AUTO: 7 % — SIGNIFICANT CHANGE UP (ref 1.7–9.3)
NEUTROPHILS # BLD AUTO: 9.59 K/UL — HIGH (ref 1.4–6.5)
NEUTROPHILS NFR BLD AUTO: 83.9 % — HIGH (ref 42.2–75.2)
NRBC # BLD: 0 /100 WBCS — SIGNIFICANT CHANGE UP (ref 0–0)
PHOSPHATE SERPL-MCNC: 5.1 MG/DL — HIGH (ref 2.1–4.9)
PLATELET # BLD AUTO: 315 K/UL — SIGNIFICANT CHANGE UP (ref 130–400)
POTASSIUM SERPL-MCNC: 4.9 MMOL/L — SIGNIFICANT CHANGE UP (ref 3.5–5)
POTASSIUM SERPL-SCNC: 4.9 MMOL/L — SIGNIFICANT CHANGE UP (ref 3.5–5)
PROT SERPL-MCNC: 6.7 G/DL — SIGNIFICANT CHANGE UP (ref 6–8)
PROTHROM AB SERPL-ACNC: 13.4 SEC — HIGH (ref 9.95–12.87)
RBC # BLD: 4.57 M/UL — LOW (ref 4.7–6.1)
RBC # FLD: 14.2 % — SIGNIFICANT CHANGE UP (ref 11.5–14.5)
SODIUM SERPL-SCNC: 146 MMOL/L — SIGNIFICANT CHANGE UP (ref 135–146)
SPECIMEN SOURCE: SIGNIFICANT CHANGE UP
URATE SERPL-MCNC: 10.5 MG/DL — HIGH (ref 3.4–8.8)
WBC # BLD: 11.42 K/UL — HIGH (ref 4.8–10.8)
WBC # FLD AUTO: 11.42 K/UL — HIGH (ref 4.8–10.8)

## 2019-04-14 PROCEDURE — 71045 X-RAY EXAM CHEST 1 VIEW: CPT | Mod: 26

## 2019-04-14 PROCEDURE — 93979 VASCULAR STUDY: CPT | Mod: 26

## 2019-04-14 RX ORDER — DEXTROSE 50 % IN WATER 50 %
25 SYRINGE (ML) INTRAVENOUS ONCE
Qty: 0 | Refills: 0 | Status: DISCONTINUED | OUTPATIENT
Start: 2019-04-14 | End: 2019-04-29

## 2019-04-14 RX ORDER — INSULIN LISPRO 100/ML
VIAL (ML) SUBCUTANEOUS
Qty: 0 | Refills: 0 | Status: DISCONTINUED | OUTPATIENT
Start: 2019-04-14 | End: 2019-04-29

## 2019-04-14 RX ORDER — SODIUM CHLORIDE 9 MG/ML
1000 INJECTION, SOLUTION INTRAVENOUS
Qty: 0 | Refills: 0 | Status: DISCONTINUED | OUTPATIENT
Start: 2019-04-14 | End: 2019-04-29

## 2019-04-14 RX ORDER — INSULIN LISPRO 100/ML
15 VIAL (ML) SUBCUTANEOUS
Qty: 0 | Refills: 0 | Status: DISCONTINUED | OUTPATIENT
Start: 2019-04-14 | End: 2019-04-16

## 2019-04-14 RX ORDER — DEXTROSE 50 % IN WATER 50 %
12.5 SYRINGE (ML) INTRAVENOUS ONCE
Qty: 0 | Refills: 0 | Status: DISCONTINUED | OUTPATIENT
Start: 2019-04-14 | End: 2019-04-29

## 2019-04-14 RX ORDER — GLUCAGON INJECTION, SOLUTION 0.5 MG/.1ML
1 INJECTION, SOLUTION SUBCUTANEOUS ONCE
Qty: 0 | Refills: 0 | Status: DISCONTINUED | OUTPATIENT
Start: 2019-04-14 | End: 2019-04-29

## 2019-04-14 RX ORDER — INSULIN GLARGINE 100 [IU]/ML
45 INJECTION, SOLUTION SUBCUTANEOUS AT BEDTIME
Qty: 0 | Refills: 0 | Status: DISCONTINUED | OUTPATIENT
Start: 2019-04-14 | End: 2019-04-16

## 2019-04-14 RX ORDER — DEXTROSE 50 % IN WATER 50 %
15 SYRINGE (ML) INTRAVENOUS ONCE
Qty: 0 | Refills: 0 | Status: DISCONTINUED | OUTPATIENT
Start: 2019-04-14 | End: 2019-04-29

## 2019-04-14 RX ORDER — HEPARIN SODIUM 5000 [USP'U]/ML
1700 INJECTION INTRAVENOUS; SUBCUTANEOUS
Qty: 25000 | Refills: 0 | Status: DISCONTINUED | OUTPATIENT
Start: 2019-04-14 | End: 2019-04-16

## 2019-04-14 RX ADMIN — CEFEPIME 100 MILLIGRAM(S): 1 INJECTION, POWDER, FOR SOLUTION INTRAMUSCULAR; INTRAVENOUS at 17:10

## 2019-04-14 RX ADMIN — PANTOPRAZOLE SODIUM 40 MILLIGRAM(S): 20 TABLET, DELAYED RELEASE ORAL at 06:10

## 2019-04-14 RX ADMIN — CHLORHEXIDINE GLUCONATE 1 APPLICATION(S): 213 SOLUTION TOPICAL at 17:03

## 2019-04-14 RX ADMIN — Medication 667 MILLIGRAM(S): at 17:03

## 2019-04-14 RX ADMIN — CHLORHEXIDINE GLUCONATE 1 APPLICATION(S): 213 SOLUTION TOPICAL at 06:05

## 2019-04-14 RX ADMIN — Medication 300 MILLIGRAM(S): at 21:52

## 2019-04-14 RX ADMIN — Medication 100 MILLIGRAM(S): at 14:47

## 2019-04-14 RX ADMIN — Medication 15 UNIT(S): at 16:37

## 2019-04-14 RX ADMIN — Medication 40 MILLIGRAM(S): at 17:47

## 2019-04-14 RX ADMIN — INSULIN GLARGINE 45 UNIT(S): 100 INJECTION, SOLUTION SUBCUTANEOUS at 21:53

## 2019-04-14 RX ADMIN — AMLODIPINE BESYLATE 10 MILLIGRAM(S): 2.5 TABLET ORAL at 06:05

## 2019-04-14 RX ADMIN — Medication 2000 UNIT(S): at 12:34

## 2019-04-14 RX ADMIN — Medication 11 UNIT(S): at 08:07

## 2019-04-14 RX ADMIN — Medication 300 MILLIGRAM(S): at 14:47

## 2019-04-14 RX ADMIN — HEPARIN SODIUM 17 UNIT(S)/HR: 5000 INJECTION INTRAVENOUS; SUBCUTANEOUS at 19:19

## 2019-04-14 RX ADMIN — Medication 100 MILLIGRAM(S): at 21:52

## 2019-04-14 RX ADMIN — Medication 11 UNIT(S): at 12:31

## 2019-04-14 RX ADMIN — Medication 0.2 MILLIGRAM(S): at 22:39

## 2019-04-14 RX ADMIN — Medication 0.2 MILLIGRAM(S): at 14:47

## 2019-04-14 RX ADMIN — Medication 100 MILLIGRAM(S): at 06:07

## 2019-04-14 RX ADMIN — Medication 2: at 16:36

## 2019-04-14 RX ADMIN — Medication 667 MILLIGRAM(S): at 08:30

## 2019-04-14 RX ADMIN — Medication 300 MILLIGRAM(S): at 06:07

## 2019-04-14 RX ADMIN — Medication 667 MILLIGRAM(S): at 12:35

## 2019-04-14 RX ADMIN — Medication 0.2 MILLIGRAM(S): at 06:06

## 2019-04-14 RX ADMIN — ATORVASTATIN CALCIUM 40 MILLIGRAM(S): 80 TABLET, FILM COATED ORAL at 21:52

## 2019-04-14 RX ADMIN — Medication 8: at 08:06

## 2019-04-14 RX ADMIN — Medication 40 MILLIGRAM(S): at 06:09

## 2019-04-14 RX ADMIN — Medication 12: at 12:28

## 2019-04-14 NOTE — PROGRESS NOTE ADULT - SUBJECTIVE AND OBJECTIVE BOX
OVERNIGHT EVENTS: events noted, AVAPS overnight, NC during day, 2 l nc    Vital Signs Last 24 Hrs  T(C): 36.9 (14 Apr 2019 04:00), Max: 37.1 (13 Apr 2019 20:00)  T(F): 98.4 (14 Apr 2019 04:00), Max: 98.7 (13 Apr 2019 20:00)  HR: 64 (14 Apr 2019 06:00) (60 - 84)  BP: 173/77 (14 Apr 2019 06:00) (142/67 - 183/78)  BP(mean): 123 (14 Apr 2019 06:00) (93 - 123)  RR: 20 (14 Apr 2019 06:00) (20 - 34)  SpO2: 97% (14 Apr 2019 06:00) (93% - 98%)    PHYSICAL EXAMINATION:    GENERAL: AXOX2    HEENT: Head is normocephalic and atraumatic. Extraocular muscles are intact. Mucous membranes are moist.    NECK: Supple.    LUNGS: DEC BS BOTH BASES    HEART: Regular rate and rhythm without murmur.    ABDOMEN: Soft, nontender, and nondistended.      EXTREMITIES: SWELLING +    NEUROLOGIC: Grossly intact.    SKIN: No ulceration or induration present.      LABS:                        12.1   11.42 )-----------( 315      ( 14 Apr 2019 04:45 )             39.9     04-14    146  |  103  |  93<HH>  ----------------------------<  271<H>  4.9   |  26  |  2.2<H>    Ca    8.9      14 Apr 2019 04:45  Phos  5.1     04-14  Mg     2.2     04-14    TPro  6.7  /  Alb  3.3<L>  /  TBili  0.4  /  DBili  x   /  AST  19  /  ALT  39  /  AlkPhos  116<H>  04-14    PT/INR - ( 14 Apr 2019 04:45 )   PT: 13.40 sec;   INR: 1.17 ratio         PTT - ( 14 Apr 2019 04:45 )  PTT:57.9 sec    ABG - ( 12 Apr 2019 20:27 )  pH, Arterial: 7.36  pH, Blood: x     /  pCO2: 54    /  pO2: 79    / HCO3: 31    / Base Excess: 4.3   /  SaO2: 95                CARDIAC MARKERS ( 13 Apr 2019 11:22 )  x     / 0.03 ng/mL / x     / x     / 2.4 ng/mL  CARDIAC MARKERS ( 13 Apr 2019 01:23 )  x     / 0.04 ng/mL / 105 U/L / x     / 2.8 ng/mL        Serum Pro-Brain Natriuretic Peptide: 857 pg/mL (04-13-19 @ 01:23)  Serum Pro-Brain Natriuretic Peptide: 1203 pg/mL (04-12-19 @ 08:52)            04-13-19 @ 07:01  -  04-14-19 @ 07:00  --------------------------------------------------------  IN: 1371 mL / OUT: 5010 mL / NET: -3639 mL        MICROBIOLOGY:      MEDICATIONS  (STANDING):  amLODIPine   Tablet 10 milliGRAM(s) Oral daily  atorvastatin Oral Tab/Cap - Peds 40 milliGRAM(s) Oral daily  calcium acetate 667 milliGRAM(s) Oral three times a day with meals  cefepime   IVPB 1000 milliGRAM(s) IV Intermittent every 24 hours  chlorhexidine 4% Liquid 1 Application(s) Topical every 12 hours  cholecalciferol 2000 Unit(s) Oral daily  cloNIDine 0.2 milliGRAM(s) Oral every 8 hours  dextrose 5%. 1000 milliLiter(s) (50 mL/Hr) IV Continuous <Continuous>  dextrose 50% Injectable 12.5 Gram(s) IV Push once  dextrose 50% Injectable 25 Gram(s) IV Push once  dextrose 50% Injectable 25 Gram(s) IV Push once  furosemide   Injectable 40 milliGRAM(s) IV Push two times a day  heparin  Infusion 1700 Unit(s)/Hr (17 mL/Hr) IV Continuous <Continuous>  hydrALAZINE 100 milliGRAM(s) Oral every 8 hours  insulin glargine Injectable (LANTUS) 33 Unit(s) SubCutaneous at bedtime  insulin lispro (HumaLOG) corrective regimen sliding scale   SubCutaneous three times a day before meals  insulin lispro Injectable (HumaLOG) 11 Unit(s) SubCutaneous three times a day before meals  labetalol 300 milliGRAM(s) Oral every 8 hours  pantoprazole    Tablet 40 milliGRAM(s) Oral before breakfast  senna 2 Tablet(s) Oral at bedtime    MEDICATIONS  (PRN):  acetaminophen   Tablet .. 650 milliGRAM(s) Oral every 6 hours PRN Mild Pain (1 - 3)  aluminum hydroxide/magnesium hydroxide/simethicone Suspension 30 milliLiter(s) Oral every 6 hours PRN Dyspepsia  dextrose 40% Gel 15 Gram(s) Oral once PRN Blood Glucose LESS THAN 70 milliGRAM(s)/deciliter  glucagon  Injectable 1 milliGRAM(s) IntraMuscular once PRN Glucose LESS THAN 70 milligrams/deciliter  oxyCODONE    5 mG/acetaminophen 325 mG 1 Tablet(s) Oral every 6 hours PRN Severe Pain (7 - 10)      RADIOLOGY & ADDITIONAL STUDIES:

## 2019-04-14 NOTE — PROGRESS NOTE ADULT - SUBJECTIVE AND OBJECTIVE BOX
SUBJECTIVE:    Patient is a 59y old Male who presents with a chief complaint of Right sided weakness (13 Apr 2019 12:59)    Overnight Eevents:  Patient was seen at bed side this morning  no acute events during the night. Patient tolerating NC.  He denies worsening sob, fever chest pain or chills.     PAST MEDICAL & SURGICAL HISTORY  Gout  Morbidly obese  Gout  Hypertension  Diabetes mellitus  S/P tonsillectomy    SOCIAL HISTORY:  Negative for smoking/alcohol/drug use.     ALLERGIES:  No Known Allergies    MEDICATIONS:  STANDING MEDICATIONS  amLODIPine   Tablet 10 milliGRAM(s) Oral daily  atorvastatin Oral Tab/Cap - Peds 40 milliGRAM(s) Oral daily  calcium acetate 667 milliGRAM(s) Oral three times a day with meals  cefepime   IVPB 1000 milliGRAM(s) IV Intermittent every 24 hours  chlorhexidine 4% Liquid 1 Application(s) Topical every 12 hours  cholecalciferol 2000 Unit(s) Oral daily  cloNIDine 0.2 milliGRAM(s) Oral every 8 hours  heparin  Infusion 1700 Unit(s)/Hr IV Continuous <Continuous>  hydrALAZINE 100 milliGRAM(s) Oral every 8 hours  insulin glargine Injectable (LANTUS) 33 Unit(s) SubCutaneous at bedtime  insulin lispro (HumaLOG) corrective regimen sliding scale   SubCutaneous three times a day before meals  insulin lispro Injectable (HumaLOG) 11 Unit(s) SubCutaneous three times a day before meals  labetalol 300 milliGRAM(s) Oral every 8 hours  pantoprazole    Tablet 40 milliGRAM(s) Oral before breakfast  senna 2 Tablet(s) Oral at bedtime    PRN MEDICATIONS  acetaminophen   Tablet .. 650 milliGRAM(s) Oral every 6 hours PRN  aluminum hydroxide/magnesium hydroxide/simethicone Suspension 30 milliLiter(s) Oral every 6 hours PRN  dextrose 40% Gel 15 Gram(s) Oral once PRN  glucagon  Injectable 1 milliGRAM(s) IntraMuscular once PRN  oxyCODONE    5 mG/acetaminophen 325 mG 1 Tablet(s) Oral every 6 hours PRN    VITALS:   T(F): 98.5  HR: 72  BP: 153/75  RR: 27  SpO2: 98%    PHYSICAL EXAM:  . General: NAD Obese  Male   . HEENT  · Respiratory: Breath Sounds equal & clear to  auscultation, no accessory muscle use  · Cardiovascular: Regular rate & rhythm, normal S1, S2; no murmurs, gallops or rubs; no S3, S4  · Gastrointestinal	Soft, non-tender, no hepatosplenomegaly, normal bowel sounds  · Genitourinary: Normal genitalia; no lesions; no discharge  · Extremities:	No cyanosis, clubbing or edema  · Skin no macular rashs, no lacerations.   . Neuro: Residual right sided defects from hemorraghic stroke     LABS:                        11.7   10.81 )-----------( 310      ( 13 Apr 2019 05:19 )             38.7     04-13    147<H>  |  105  |  96<HH>  ----------------------------<  276<H>  5.0   |  26  |  2.1<H>    Ca    9.4      13 Apr 2019 05:19  Phos  4.7     04-13  Mg     2.2     04-13      PT/INR - ( 12 Apr 2019 23:20 )   PT: 12.50 sec;   INR: 1.09 ratio         PTT - ( 14 Apr 2019 01:01 )  PTT:64.8 sec    ABG - ( 12 Apr 2019 20:27 )  pH, Arterial: 7.36  pH, Blood: x     /  pCO2: 54    /  pO2: 79    / HCO3: 31    / Base Excess: 4.3   /  SaO2: 95                Troponin T, Serum: 0.03 ng/mL <HH> (04-13-19 @ 11:22)      CARDIAC MARKERS ( 13 Apr 2019 11:22 )  x     / 0.03 ng/mL / x     / x     / 2.4 ng/mL  CARDIAC MARKERS ( 13 Apr 2019 01:23 )  x     / 0.04 ng/mL / 105 U/L / x     / 2.8 ng/mL          04-12-19 @ 07:01  -  04-13-19 @ 07:00  --------------------------------------------------------  IN: 176 mL / OUT: 5925 mL / NET: -5749 mL    04-13-19 @ 07:01  -  04-14-19 @ 02:53  --------------------------------------------------------  IN: 1252 mL / OUT: 3610 mL / NET: -2358 mL          Radiology:    Repeat CT - official Read pending       < from: Transthoracic Echocardiogram (04.13.19 @ 08:42) >  Summary:   1. Left ventricular ejection fraction, by visual estimation, is 55 to   60%.   2. Normal global left ventricular systolic function.   3. Mild mitral valve regurgitation.   4. Mild thickening and calcification of the anterior mitral valve   leaflet.   5. Moderate aortic valve thickening with normal leaflet opening.    < end of copied text >

## 2019-04-14 NOTE — PROGRESS NOTE ADULT - ASSESSMENT
58yo M with Past Medical History HTN, DM, CAD, and Sleep Apnea admitted for altered mental status and aphasia secondary to an acute intraparenchymal hemorrhage.  His clinical course was complicated by acute respiratory failure rule out pulmonary embolism, uncontrolled hypertension, and acute kidney failure.    Acute respiratory failure rule out PE: continue heparin gtt with target PTT 60-70.  Continue supplemental O2 via NC  AMS secondary to intraparenchymal hemorrhage: Follow-up results from repeat Head CT (completed 4/13). Continue hourly neuro checks  Uncontrolled Hypertension: Hyzaar was discontinued on admission, which may partially contribute to uncontrolled hypertension.  Continue Labetalol 300mg PO q8h, Hydralazine 100mg PO q8h, Norvasc 10mg PO q24, and Clonidine 0.2mg q8h.  Blood pressure has stabilized over the past 24hrs.  Follow-up results from renal AA duplex  Acute on chronic kidney failure: renal function stabilized at 2.2.  Hyperkalemia has resolved.  Hypernatremia remains unchanged.  Continue to trend BMP  DMII: continue Lantus/Lispro and monitor FS with meals.  GI prophylaxis    #Progress Note Handoff    Pending:  Consults:  Tests: Follow-up results from CT head and renal AA duplex  Test Results:  Other:    Family Discussion:   Future Disposition: guarded prognosis/full code

## 2019-04-14 NOTE — PROGRESS NOTE ADULT - SUBJECTIVE AND OBJECTIVE BOX
GRIS TIDWELL MRN-[id number]    Hospitalist Note  60yo M with Past Medical History HTN, DM, CAD, and Sleep Apnea admitted for altered mental status and aphasia secondary to an acute intraparenchymal hemorrhage.    Overnight events/Updates: His clinical course was complicated by acute respiratory failure rule out pulmonary embolism, uncontrolled hypertension, and acute kidney failure (resolved).  The patient was upgraded to the CCU for acute dyspnea secondary to suspected pulmonary embolism.  CTA was not performed due to acute renal failure; the pt was started on empiric treatment with IV heparin (approved by neurosurgery).  Repeat CT Head results are pending.    Vital Signs Last 24 Hrs  T(C): 36.9 (14 Apr 2019 08:00), Max: 37.1 (13 Apr 2019 20:00)  T(F): 98.4 (14 Apr 2019 08:00), Max: 98.7 (13 Apr 2019 20:00)  HR: 66 (14 Apr 2019 08:00) (60 - 78)  BP: 119/76 (14 Apr 2019 08:00) (119/76 - 183/78)  BP(mean): 104 (14 Apr 2019 08:00) (93 - 123)  RR: 18 (14 Apr 2019 08:00) (18 - 34)  SpO2: 97% (14 Apr 2019 08:00) (93% - 98%)    Physical Examination:  General: AAO x 3; more interactive  HEENT: PERRLA, EOMI  CV= S1 & S2 appreciated  Lungs= No wheezes or rales  Abdominal Examination= + BS, Obese, Soft, NT/ND  Extremity Examination= mild peripheral edema    ROS: No chest pain, no shortness of breath.  All other systems reviewed and are within normal limits except for the complaints in the HPI.    MEDICATIONS  (STANDING):  amLODIPine   Tablet 10 milliGRAM(s) Oral daily  atorvastatin Oral Tab/Cap - Peds 40 milliGRAM(s) Oral daily  calcium acetate 667 milliGRAM(s) Oral three times a day with meals  cefepime   IVPB 1000 milliGRAM(s) IV Intermittent every 24 hours  chlorhexidine 4% Liquid 1 Application(s) Topical every 12 hours  cholecalciferol 2000 Unit(s) Oral daily  cloNIDine 0.2 milliGRAM(s) Oral every 8 hours  dextrose 5%. 1000 milliLiter(s) (50 mL/Hr) IV Continuous <Continuous>  dextrose 50% Injectable 12.5 Gram(s) IV Push once  dextrose 50% Injectable 25 Gram(s) IV Push once  dextrose 50% Injectable 25 Gram(s) IV Push once  furosemide   Injectable 40 milliGRAM(s) IV Push two times a day  heparin  Infusion 1700 Unit(s)/Hr (17 mL/Hr) IV Continuous <Continuous>  hydrALAZINE 100 milliGRAM(s) Oral every 8 hours  insulin glargine Injectable (LANTUS) 33 Unit(s) SubCutaneous at bedtime  insulin lispro (HumaLOG) corrective regimen sliding scale   SubCutaneous three times a day before meals  insulin lispro Injectable (HumaLOG) 11 Unit(s) SubCutaneous three times a day before meals  labetalol 300 milliGRAM(s) Oral every 8 hours  pantoprazole    Tablet 40 milliGRAM(s) Oral before breakfast  senna 2 Tablet(s) Oral at bedtime    MEDICATIONS  (PRN):  acetaminophen   Tablet .. 650 milliGRAM(s) Oral every 6 hours PRN Mild Pain (1 - 3)  aluminum hydroxide/magnesium hydroxide/simethicone Suspension 30 milliLiter(s) Oral every 6 hours PRN Dyspepsia  dextrose 40% Gel 15 Gram(s) Oral once PRN Blood Glucose LESS THAN 70 milliGRAM(s)/deciliter  glucagon  Injectable 1 milliGRAM(s) IntraMuscular once PRN Glucose LESS THAN 70 milligrams/deciliter  oxyCODONE    5 mG/acetaminophen 325 mG 1 Tablet(s) Oral every 6 hours PRN Severe Pain (7 - 10)                            12.1   11.42 )-----------( 315      ( 14 Apr 2019 04:45 )             39.9     04-14    146  |  103  |  93<HH>  ----------------------------<  271<H>  4.9   |  26  |  2.2<H>    Ca    8.9      14 Apr 2019 04:45  Phos  5.1     04-14  Mg     2.2     04-14    TPro  6.7  /  Alb  3.3<L>  /  TBili  0.4  /  DBili  x   /  AST  19  /  ALT  39  /  AlkPhos  116<H>  04-14      Case discussed with housestaff & family  MARIELLECed Patel [id number]

## 2019-04-14 NOTE — PROGRESS NOTE ADULT - ASSESSMENT
58 yo M with PMHx of HTN, DM, CAD on aspirin, ZE who was BIBEMS for ams and aphasia. Stroked code called, had NIHSS 15. CTH showed L frontotemporal intraparenchymal bleed with extension into the ventricle and 2mm shift to the R. Admitted to ICU s/p platelets, DDAVP, nicardipine. On 3E, pt respiratory status worsened, requiring BiPAP. Repeat duplex 4/12 showed new right posterior tibial and peroneal and left gastrocnemius and branch of posterior tibial thrombus. Duplex venous 4/6 was neg for DVT. Neurosurgery cleared pt for anticoagulation. CTA was not performed due to CKD and would not  to anticoagulate.     Today's Events: C/w current treatment      IMPRESSION   Intraparenchymal hemorrhage- currently Stable   Acute on chronic hypercapnic respiratory failure  Probable ZE +/- OHS  DESI improving  DVT ON IV HEPARIN      Plan   #Acute on chronic hypercapnic respiratory failure, possible PE  - Venous duplex 4/12 showed new right posterior tibial and peroneal and left gastrocnemius and branch of posterior tibial thrombus.  - CTA lungs (not done due to CKD) and V/Q scan would not  to anticoagulate  - Neurosurgery cleared pt for anticoagulation ([id number] or [id number])  - Vascular rec anticoag ([id number])  - heparin drip, monitor PTT (goal 60-70 due to intraparenchymal bleed) currently at goal   - f/u Repeat CT done on 04/13 for official read   - Neuro check q1-2 hr  - BiPAP qhs and prn  - CXR perihilar opacity improved. f/u official read      #L frontotemporal intraparenchymal bleed with 2mm shift   - no acute neurosurgical intervention  - CTH 4/2: L frontotemporal intraparenchymal bleed with extension into the ventricle and 2mm shift to the R  - CTH 4/3: no change  - CTH (repeat #2) 4/3: no change  - CTH 4/6: no change  - CTH 4/12- Unchanged L basal ganglia intraparenchymal hematoma with mass effect on the left lateral ventricle. Unchanged 2 mm left to right midline shift.  - CTH 4/13 f/u    #Abd distension  Had small stool yesterday and passing gas, no n/v, sometimes epigastric pain  - f/u abd XR - f/u official read     #Hypertensive emergency  - goal BP < 160  - labetalol 300 mg TID, amlodipine 10 mg daily, hydralazine 100 mg TID  - clonidine to 0.1 mg TID    #HFpEF  - Echo 4/4- EF 58%, mild inc LV wall thick, mild TR, mild AS, mild pulm HTN  - Repeat echo EF 55% to 60% EF MR, Mild Aortic valve  thinking   - cxr w/ interval blunting of costophrenic angle, hazy opacity  -c/w  40 q12h IV for now due to generalized swelling  - follows w/ Dr. Man    #DESI on CKD3  - baseline Cr 1.9 in in 10/2018  - improving cr 2.1  - renal US 4/3: no hydronephrosis, L renal cyst  - per nephro: no need for urgent dialysis  - phoslo per nephro    #New onset afib  - rate controlled  - labetalol 300 mg TID  - Heparin drip    #Fever. Resolved.  - Possible bibasilar pneumonia vs gout attack  - urine cx 4/2: negative  - blood cx: NTD  - uric acid 9.9  - c/w cefepime (start 4/6)    #DM  - lantus/lispro/LSS    #HLD  - lipitor      Electrolyte Imbalances: Correct as needed  []  Hyponatremia   /   Hypernatremia  []   []  Hypokalemia   /   Hyperkalemia  []   []  Hypochlorhydria   /    Hypochlorhydria  []   []  Hypomagnesemia   /   Hypermagnesemia  []   []  Hypophosphatemia   /   Hyperphosphatemia  []       GI ppx:                                   [] Not indicated   /   [x] Pantoprazole 40mg PO Daily    DVT ppx:  [] Not indicated / [] Heparin 5000mg SubQ / [x] Lovenox 40mg SubQ / [] SCDs    Fluids:  Dysphasia 3   [x] PO  |  [] IVF    Activity:  [X] Assisatnce needed   [X] Increase as Tolerated  /  [] OOB w/ assist  /  [] Bed Rest    BMI:  Height (cm): 170.18 (04-12)  Weight (kg): 150.5 (04-12)  BMI (kg/m2): 52 (04-12)        DISPO:  Patient to be discharged when condition(s) optimized.  [ x] From Home     [ ] NH/SNF   [ ] 4A Rehab  [ ] Detox Clinic  [X] Plan Discussion with patient and/or family.  [X] Discussed Case and Plan with the Medical Attending.    CODE STATUS  [X] FULL   /    [] DNR 60 yo M with PMHx of HTN, DM, CAD on aspirin, ZE who was BIBEMS for ams and aphasia. Stroked code called, had NIHSS 15. CTH showed L frontotemporal intraparenchymal bleed with extension into the ventricle and 2mm shift to the R. Admitted to ICU s/p platelets, DDAVP, nicardipine. On 3E, pt respiratory status worsened, requiring BiPAP. Repeat duplex 4/12 showed new right posterior tibial and peroneal and left gastrocnemius and branch of posterior tibial thrombus. Duplex venous 4/6 was neg for DVT. Neurosurgery cleared pt for anticoagulation. CTA was not performed due to CKD and would not  to anticoagulate.     Today's Events: C/w current treatment, 2 more days of cefepime, OOOBC, f/u NS for DG, f/u CTH official read     IMPRESSION   Intraparenchymal hemorrhage- currently Stable   Acute on chronic hypercapnic respiratory failure  Probable ZE +/- OHS  DESI improving  DVT ON IV HEPARIN      Plan   #Acute on chronic hypercapnic respiratory failure, possible PE  - Venous duplex 4/12 showed new right posterior tibial and peroneal and left gastrocnemius and branch of posterior tibial thrombus.  - CTA lungs (not done due to CKD) and V/Q scan would not  to anticoagulate  - Neurosurgery cleared pt for anticoagulation ([id number] or [id number])  - Vascular rec anticoag ([id number])  - two more days ot cefepime   - heparin drip, monitor PTT (goal 60-70 due to intraparenchymal bleed) currently at goal   - f/u Repeat CT done on 04/13 for official read   - Neuro check q1-2 hr  - BiPAP qhs and prn  - CXR perihilar opacity improved. f/u official read  - f/u End tidal CO2           #L frontotemporal intraparenchymal bleed with 2mm shift   - no acute neurosurgical intervention  - CTH 4/2: L frontotemporal intraparenchymal bleed with extension into the ventricle and 2mm shift to the R  - CTH 4/3: no change  - CTH (repeat #2) 4/3: no change  - CTH 4/6: no change  - CTH 4/12- Unchanged L basal ganglia intraparenchymal hematoma with mass effect on the left lateral ventricle. Unchanged 2 mm left to right midline shift.  - CTH 4/13 f/u  - f/u Neurosurgery  for neruo q checks  if cleared downgrade      #Abd distension  Had small stool yesterday and passing gas, no n/v, sometimes epigastric pain  - f/u abd XR - f/u official read     #Hypertensive emergency  - goal BP < 160  - labetalol 300 mg TID, amlodipine 10 mg daily, hydralazine 100 mg TID  - clonidine to 0.1 mg TID    #HFpEF  - Echo 4/4- EF 58%, mild inc LV wall thick, mild TR, mild AS, mild pulm HTN  - Repeat echo EF 55% to 60% EF MR, Mild Aortic valve  thinking   - cxr w/ interval blunting of costophrenic angle, hazy opacity  -c/w  40 q12h IV for now due to generalized swelling  - follows w/ Dr. Man    #DESI on CKD3  - baseline Cr 1.9 in in 10/2018  - improving cr 2.1  - renal US 4/3: no hydronephrosis, L renal cyst  - per nephro: no need for urgent dialysis  - phoslo per nephro    #New onset afib  - rate controlled  - labetalol 300 mg TID  - Heparin drip    #Fever. Resolved.  - Possible bibasilar pneumonia vs gout attack  - urine cx 4/2: negative  - blood cx: NTD  - uric acid 9.9  - c/w cefepime (start 4/6)    #DM  - lantus/lispro/LSS    #HLD  - lipitor      Electrolyte Imbalances: Correct as needed  []  Hyponatremia   /   Hypernatremia  []   []  Hypokalemia   /   Hyperkalemia  []   []  Hypochlorhydria   /    Hypochlorhydria  []   []  Hypomagnesemia   /   Hypermagnesemia  []   []  Hypophosphatemia   /   Hyperphosphatemia  []       GI ppx:                                   [] Not indicated   /   [x] Pantoprazole 40mg PO Daily    DVT ppx:  [] Not indicated / [] Heparin 5000mg SubQ / [x] Lovenox 40mg SubQ / [] SCDs    Fluids:  Dysphasia 3   [x] PO  |  [] IVF    Activity:  [X] Assisatnce needed   [X] Increase as Tolerated  /  [] OOB w/ assist  /  [] Bed Rest    BMI:  Height (cm): 170.18 (04-12)  Weight (kg): 150.5 (04-12)  BMI (kg/m2): 52 (04-12)        DISPO:  Patient to be discharged when condition(s) optimized.  [ x] From Home     [ ] NH/SNF   [ ] 4A Rehab  [ ] Detox Clinic  [X] Plan Discussion with patient and/or family.  [X] Discussed Case and Plan with the Medical Attending.    CODE STATUS  [X] FULL   /    [] DNR

## 2019-04-14 NOTE — PROGRESS NOTE ADULT - ASSESSMENT
IMPRESSION:    Intraparenchymal hemorrhage  Acute on chronic hypercapnic respiratory failure  Probable ZE +/- OHS  DESI improving  DVT ON IV HEPARIN      PLAN:    CNS: f/up head ct result, neuro f/up    HEENT:  Oral care    PULMONARY:  HOB @ 45 degrees, oxygen to keep pulse ox>90%, ET CO2    CARDIOVASCULAR:  I=O, BP control restart po meds, change lasix to once daily    GI: GI prophylaxis  Feeding per  speech and swallow eval    RENAL:  F/u  lytes.  Correct as needed. accurate I/O, FU with renal, lasix    INFECTIOUS DISEASE: Repeat Cultures.  finish course of abx    HEMATOLOGICAL:  DVT prophylaxis    ENDOCRINE:  Follow up FS.      MUSCULOSKELETAL: OOB to chair.     CODE STATUS: FULL CODE    transfer to floor if cleared by neuro

## 2019-04-14 NOTE — PROGRESS NOTE ADULT - ASSESSMENT
58 yo male patient with PMH of DM, HTN, Likely underlying CKD (Cr was 2.4 when presented) presented on 4/2 with AMS and aphasia, found w/ Left sided Frontotemporal bleed with intraparenchymal hemorrhagic associated with left ventricular bleed, upgraded to the unit for respiratory failure:  # creatinine trending down   # non oliguric  # cont. lasix to 40 q 12  # SOB improved.   # BP noted, keep current for now.  # ph at goal, on binders  # will follow

## 2019-04-14 NOTE — PROGRESS NOTE ADULT - SUBJECTIVE AND OBJECTIVE BOX
Nephrology progress note    Patient is seen and examined, events over the last 24 h noted.  No new complaints.    Allergies:  No Known Allergies    Hospital Medications:   MEDICATIONS  (STANDING):  amLODIPine   Tablet 10 milliGRAM(s) Oral daily  atorvastatin Oral Tab/Cap - Peds 40 milliGRAM(s) Oral daily  calcium acetate 667 milliGRAM(s) Oral three times a day with meals  cefepime   IVPB 1000 milliGRAM(s) IV Intermittent every 24 hours  chlorhexidine 4% Liquid 1 Application(s) Topical every 12 hours  cholecalciferol 2000 Unit(s) Oral daily  cloNIDine 0.2 milliGRAM(s) Oral every 8 hours  dextrose 5%. 1000 milliLiter(s) (50 mL/Hr) IV Continuous <Continuous>  furosemide   Injectable 40 milliGRAM(s) IV Push two times a day  heparin  Infusion 1700 Unit(s)/Hr (17 mL/Hr) IV Continuous <Continuous>  hydrALAZINE 100 milliGRAM(s) Oral every 8 hours  insulin glargine Injectable (LANTUS) 33 Unit(s) SubCutaneous at bedtime  insulin lispro (HumaLOG) corrective regimen sliding scale   SubCutaneous three times a day before meals  insulin lispro Injectable (HumaLOG) 11 Unit(s) SubCutaneous three times a day before meals  labetalol 300 milliGRAM(s) Oral every 8 hours  pantoprazole    Tablet 40 milliGRAM(s) Oral before breakfast  senna 2 Tablet(s) Oral at bedtime        VITALS:  T(F): 98.4 (04-14-19 @ 08:00), Max: 98.7 (04-13-19 @ 20:00)  HR: 66 (04-14-19 @ 14:00)  BP: 158/78 (04-14-19 @ 14:00)  RR: 18 (04-14-19 @ 14:00)  SpO2: 96% (04-14-19 @ 12:00)      04-12 @ 07:01  -  04-13 @ 07:00  --------------------------------------------------------  IN: 176 mL / OUT: 5925 mL / NET: -5749 mL    04-13 @ 07:01 - 04-14 @ 07:00  --------------------------------------------------------  IN: 1371 mL / OUT: 5010 mL / NET: -3639 mL          PHYSICAL EXAM:  Constitutional: NAD  HEENT: anicteric sclera, oropharynx clear, MMM  Neck: No JVD  Respiratory: CTAB, no wheezes, rales or rhonchi  Cardiovascular: S1, S2, RRR  Gastrointestinal: BS+, soft, NT/ND  Extremities: No cyanosis or clubbing. No peripheral edema  :  No khan.   Skin: No rashes    LABS:  04-14    146  |  103  |  93<HH>  ----------------------------<  271<H>  4.9   |  26  |  2.2<H>    Ca    8.9      14 Apr 2019 04:45  Phos  5.1     04-14  Mg     2.2     04-14    TPro  6.7  /  Alb  3.3<L>  /  TBili  0.4  /  DBili      /  AST  19  /  ALT  39  /  AlkPhos  116<H>  04-14                          12.1   11.42 )-----------( 315      ( 14 Apr 2019 04:45 )             39.9       Urine Studies:      RADIOLOGY & ADDITIONAL STUDIES:

## 2019-04-15 LAB
ANION GAP SERPL CALC-SCNC: 16 MMOL/L — HIGH (ref 7–14)
APTT BLD: 28.8 SEC — SIGNIFICANT CHANGE UP (ref 27–39.2)
APTT BLD: 31.1 SEC — SIGNIFICANT CHANGE UP (ref 27–39.2)
APTT BLD: 37.5 SEC — SIGNIFICANT CHANGE UP (ref 27–39.2)
APTT BLD: 44.5 SEC — HIGH (ref 27–39.2)
APTT BLD: 45.1 SEC — HIGH (ref 27–39.2)
BASOPHILS # BLD AUTO: 0.04 K/UL — SIGNIFICANT CHANGE UP (ref 0–0.2)
BASOPHILS NFR BLD AUTO: 0.4 % — SIGNIFICANT CHANGE UP (ref 0–1)
BLD GP AB SCN SERPL QL: SIGNIFICANT CHANGE UP
BUN SERPL-MCNC: 88 MG/DL — CRITICAL HIGH (ref 10–20)
CALCIUM SERPL-MCNC: 8.9 MG/DL — SIGNIFICANT CHANGE UP (ref 8.5–10.1)
CHLORIDE SERPL-SCNC: 100 MMOL/L — SIGNIFICANT CHANGE UP (ref 98–110)
CO2 SERPL-SCNC: 28 MMOL/L — SIGNIFICANT CHANGE UP (ref 17–32)
CREAT SERPL-MCNC: 2.2 MG/DL — HIGH (ref 0.7–1.5)
EOSINOPHIL # BLD AUTO: 0.23 K/UL — SIGNIFICANT CHANGE UP (ref 0–0.7)
EOSINOPHIL NFR BLD AUTO: 2.3 % — SIGNIFICANT CHANGE UP (ref 0–8)
GLUCOSE BLDC GLUCOMTR-MCNC: 221 MG/DL — HIGH (ref 70–99)
GLUCOSE BLDC GLUCOMTR-MCNC: 248 MG/DL — HIGH (ref 70–99)
GLUCOSE BLDC GLUCOMTR-MCNC: 250 MG/DL — HIGH (ref 70–99)
GLUCOSE BLDC GLUCOMTR-MCNC: 309 MG/DL — HIGH (ref 70–99)
GLUCOSE SERPL-MCNC: 227 MG/DL — HIGH (ref 70–99)
HCT VFR BLD CALC: 37.9 % — LOW (ref 42–52)
HGB BLD-MCNC: 11.3 G/DL — LOW (ref 14–18)
IMM GRANULOCYTES NFR BLD AUTO: 0.7 % — HIGH (ref 0.1–0.3)
INR BLD: 1.19 RATIO — SIGNIFICANT CHANGE UP (ref 0.65–1.3)
INR BLD: 1.21 RATIO — SIGNIFICANT CHANGE UP (ref 0.65–1.3)
LYMPHOCYTES # BLD AUTO: 0.78 K/UL — LOW (ref 1.2–3.4)
LYMPHOCYTES # BLD AUTO: 8 % — LOW (ref 20.5–51.1)
MAGNESIUM SERPL-MCNC: 2 MG/DL — SIGNIFICANT CHANGE UP (ref 1.8–2.4)
MCHC RBC-ENTMCNC: 26.1 PG — LOW (ref 27–31)
MCHC RBC-ENTMCNC: 29.8 G/DL — LOW (ref 32–37)
MCV RBC AUTO: 87.5 FL — SIGNIFICANT CHANGE UP (ref 80–94)
MONOCYTES # BLD AUTO: 0.58 K/UL — SIGNIFICANT CHANGE UP (ref 0.1–0.6)
MONOCYTES NFR BLD AUTO: 5.9 % — SIGNIFICANT CHANGE UP (ref 1.7–9.3)
NEUTROPHILS # BLD AUTO: 8.11 K/UL — HIGH (ref 1.4–6.5)
NEUTROPHILS NFR BLD AUTO: 82.7 % — HIGH (ref 42.2–75.2)
NRBC # BLD: 0 /100 WBCS — SIGNIFICANT CHANGE UP (ref 0–0)
PHOSPHATE SERPL-MCNC: 5.3 MG/DL — HIGH (ref 2.1–4.9)
PLATELET # BLD AUTO: 303 K/UL — SIGNIFICANT CHANGE UP (ref 130–400)
POTASSIUM SERPL-MCNC: 4.7 MMOL/L — SIGNIFICANT CHANGE UP (ref 3.5–5)
POTASSIUM SERPL-SCNC: 4.7 MMOL/L — SIGNIFICANT CHANGE UP (ref 3.5–5)
PROTHROM AB SERPL-ACNC: 13.7 SEC — HIGH (ref 9.95–12.87)
PROTHROM AB SERPL-ACNC: 13.9 SEC — HIGH (ref 9.95–12.87)
RBC # BLD: 4.33 M/UL — LOW (ref 4.7–6.1)
RBC # FLD: 14 % — SIGNIFICANT CHANGE UP (ref 11.5–14.5)
SODIUM SERPL-SCNC: 144 MMOL/L — SIGNIFICANT CHANGE UP (ref 135–146)
TYPE + AB SCN PNL BLD: SIGNIFICANT CHANGE UP
WBC # BLD: 9.81 K/UL — SIGNIFICANT CHANGE UP (ref 4.8–10.8)
WBC # FLD AUTO: 9.81 K/UL — SIGNIFICANT CHANGE UP (ref 4.8–10.8)

## 2019-04-15 RX ADMIN — Medication 100 MILLIGRAM(S): at 06:13

## 2019-04-15 RX ADMIN — CEFEPIME 100 MILLIGRAM(S): 1 INJECTION, POWDER, FOR SOLUTION INTRAMUSCULAR; INTRAVENOUS at 18:04

## 2019-04-15 RX ADMIN — AMLODIPINE BESYLATE 10 MILLIGRAM(S): 2.5 TABLET ORAL at 06:13

## 2019-04-15 RX ADMIN — Medication 40 MILLIGRAM(S): at 18:07

## 2019-04-15 RX ADMIN — Medication 2000 UNIT(S): at 12:20

## 2019-04-15 RX ADMIN — PANTOPRAZOLE SODIUM 40 MILLIGRAM(S): 20 TABLET, DELAYED RELEASE ORAL at 09:00

## 2019-04-15 RX ADMIN — Medication 40 MILLIGRAM(S): at 06:14

## 2019-04-15 RX ADMIN — Medication 300 MILLIGRAM(S): at 14:40

## 2019-04-15 RX ADMIN — CHLORHEXIDINE GLUCONATE 1 APPLICATION(S): 213 SOLUTION TOPICAL at 18:08

## 2019-04-15 RX ADMIN — Medication 667 MILLIGRAM(S): at 18:04

## 2019-04-15 RX ADMIN — Medication 2: at 08:59

## 2019-04-15 RX ADMIN — Medication 100 MILLIGRAM(S): at 14:40

## 2019-04-15 RX ADMIN — SENNA PLUS 2 TABLET(S): 8.6 TABLET ORAL at 21:27

## 2019-04-15 RX ADMIN — Medication 0.2 MILLIGRAM(S): at 21:27

## 2019-04-15 RX ADMIN — Medication 15 UNIT(S): at 08:59

## 2019-04-15 RX ADMIN — Medication 300 MILLIGRAM(S): at 06:13

## 2019-04-15 RX ADMIN — ATORVASTATIN CALCIUM 40 MILLIGRAM(S): 80 TABLET, FILM COATED ORAL at 21:26

## 2019-04-15 RX ADMIN — INSULIN GLARGINE 45 UNIT(S): 100 INJECTION, SOLUTION SUBCUTANEOUS at 21:43

## 2019-04-15 RX ADMIN — Medication 15 UNIT(S): at 12:24

## 2019-04-15 RX ADMIN — Medication 20 MILLIGRAM(S): at 18:26

## 2019-04-15 RX ADMIN — Medication 3: at 18:24

## 2019-04-15 RX ADMIN — Medication 4: at 12:24

## 2019-04-15 RX ADMIN — Medication 15 UNIT(S): at 18:24

## 2019-04-15 RX ADMIN — Medication 667 MILLIGRAM(S): at 08:56

## 2019-04-15 RX ADMIN — CHLORHEXIDINE GLUCONATE 1 APPLICATION(S): 213 SOLUTION TOPICAL at 06:13

## 2019-04-15 RX ADMIN — Medication 300 MILLIGRAM(S): at 21:26

## 2019-04-15 RX ADMIN — Medication 0.2 MILLIGRAM(S): at 06:13

## 2019-04-15 RX ADMIN — Medication 0.2 MILLIGRAM(S): at 14:40

## 2019-04-15 RX ADMIN — Medication 100 MILLIGRAM(S): at 21:27

## 2019-04-15 RX ADMIN — HEPARIN SODIUM 19 UNIT(S)/HR: 5000 INJECTION INTRAVENOUS; SUBCUTANEOUS at 01:26

## 2019-04-15 NOTE — PROGRESS NOTE ADULT - SUBJECTIVE AND OBJECTIVE BOX
SUBJECTIVE:    Patient is a 59y old Male who presents with a chief complaint of Right sided weakness (15 Apr 2019 10:22)    Overnight Eevents:  Patient was seen at bed side this morning  he complain of wrist and right toe pain. States that he has a HO of gout.  SOB has improved.  He denies fever  or chills or chest pain.  AAOX2    PAST MEDICAL & SURGICAL HISTORY  Gout  Morbidly obese  Gout  Hypertension  Diabetes mellitus  S/P tonsillectomy    SOCIAL HISTORY:  Negative for smoking/alcohol/drug use.     ALLERGIES:  No Known Allergies    MEDICATIONS:  STANDING MEDICATIONS  amLODIPine   Tablet 10 milliGRAM(s) Oral daily  atorvastatin Oral Tab/Cap - Peds 40 milliGRAM(s) Oral daily  calcium acetate 667 milliGRAM(s) Oral three times a day with meals  cefepime   IVPB 1000 milliGRAM(s) IV Intermittent every 24 hours  chlorhexidine 4% Liquid 1 Application(s) Topical every 12 hours  cholecalciferol 2000 Unit(s) Oral daily  cloNIDine 0.2 milliGRAM(s) Oral every 8 hours  dextrose 5%. 1000 milliLiter(s) IV Continuous <Continuous>  dextrose 50% Injectable 12.5 Gram(s) IV Push once  dextrose 50% Injectable 25 Gram(s) IV Push once  dextrose 50% Injectable 25 Gram(s) IV Push once  furosemide   Injectable 40 milliGRAM(s) IV Push two times a day  heparin  Infusion 1700 Unit(s)/Hr IV Continuous <Continuous>  hydrALAZINE 100 milliGRAM(s) Oral every 8 hours  insulin glargine Injectable (LANTUS) 45 Unit(s) SubCutaneous at bedtime  insulin lispro (HumaLOG) corrective regimen sliding scale   SubCutaneous three times a day before meals  insulin lispro Injectable (HumaLOG) 15 Unit(s) SubCutaneous three times a day before meals  labetalol 300 milliGRAM(s) Oral every 8 hours  pantoprazole    Tablet 40 milliGRAM(s) Oral before breakfast  senna 2 Tablet(s) Oral at bedtime    PRN MEDICATIONS  acetaminophen   Tablet .. 650 milliGRAM(s) Oral every 6 hours PRN  aluminum hydroxide/magnesium hydroxide/simethicone Suspension 30 milliLiter(s) Oral every 6 hours PRN  dextrose 40% Gel 15 Gram(s) Oral once PRN  glucagon  Injectable 1 milliGRAM(s) IntraMuscular once PRN    VITALS:   T(F): 97.9  HR: 64  BP: 128/58  RR: 34  SpO2: 95%    PHYSICAL EXAM:  . General: NAD  . HEENT  · Respiratory: Breath Sounds equal & clear to  auscultation, no accessory muscle use  · Cardiovascular: Regular rate & rhythm, normal S1, S2; no murmurs, gallops or rubs; no S3, S4  · Gastrointestinal	Soft, non-tender, no hepatosplenomegaly, normal bowel sounds  · Genitourinary: Normal genitalia; no lesions; no discharge  · Extremities:	No cyanosis, clubbing, Has +LE edema  · Skin no macular rashs, no lacerations.       Neurologic:  A&O x2  	-- CN: unimpaired visual acuity.  Blink to threat intact B/L.  No gaze palsy.;  Pupils reactive b/l. Face right lower facial droop.  Hearing intact b/L. no tongue deviation; soft palette elevation symmetric; shoulder shrug symmetric b/l, mild word finding deficit.  	-- Motor: Normal bulk and tone throughout. Moving B/L upper extremities:  drift in right Upper extremity  and  minimal movement against gravity in right lower  extremity., in left extremities  can resist against pushing, grossly normal.   	-- Sensory: sensation in tact b/l to light touch  	-- Coordination: no finger to nose dysmetria; no limb ataxia noted during cerebellar exam.  	--  -- Gait: can not stand up unassisted;    LABS:                        11.3   9.81  )-----------( 303      ( 15 Apr 2019 04:34 )             37.9     04-15    144  |  100  |  88<HH>  ----------------------------<  227<H>  4.7   |  28  |  2.2<H>    Ca    8.9      15 Apr 2019 04:34  Phos  5.3     04-15  Mg     2.0     04-15    TPro  6.7  /  Alb  3.3<L>  /  TBili  0.4  /  DBili  x   /  AST  19  /  ALT  39  /  AlkPhos  116<H>  04-14    PT/INR - ( 15 Apr 2019 04:34 )   PT: 13.70 sec;   INR: 1.19 ratio         PTT - ( 15 Apr 2019 04:34 )  PTT:45.1 sec                  04-14-19 @ 07:01  -  04-15-19 @ 07:00  --------------------------------------------------------  IN: 1423 mL / OUT: 3415 mL / NET: -1992 mL    04-15-19 @ 07:01  -  04-15-19 @ 11:36  --------------------------------------------------------  IN: 520 mL / OUT: 450 mL / NET: 70 mL          Radiology:  < from: CT Head No Cont (04.13.19 @ 16:07) >  impression:    No significant changes in the acute/subacute parenchymal hemorrhage   involving the left basal ganglia with small amount of surrounding edema.   Well-demarcated area of decreased attenuation extending to the left   caudate may represent associated acute/subacute infarction. Attention on   follow-up imaging recommended.    Mass effect on the left lateral ventricle without significant ventricular   enlargement. Minimal left-to-right midline shift.    No new acute intracranial hemorrhage.    < end of copied text >      < from: Xray Chest 1 View- PORTABLE-Routine (04.14.19 @ 05:05) >  Impression:    Less prominent perihilar opacities. Stable enlarged cardiac silhouette    < end of copied text > SUBJECTIVE:    Patient is a 59y old Male who presents with a chief complaint of Right sided weakness (15 Apr 2019 10:22)    Overnight Eevents:  Patient was seen at bed side this morning  he complain of wrist and right toe pain. States that he has a HO of gout.  SOB has improved.  He denies fever  or chills or chest pain.  AAOX2    PAST MEDICAL & SURGICAL HISTORY  Gout  Morbidly obese  Gout  Hypertension  Diabetes mellitus  S/P tonsillectomy    SOCIAL HISTORY:  Negative for smoking/alcohol/drug use.     ALLERGIES:  No Known Allergies    MEDICATIONS:  STANDING MEDICATIONS  amLODIPine   Tablet 10 milliGRAM(s) Oral daily  atorvastatin Oral Tab/Cap - Peds 40 milliGRAM(s) Oral daily  calcium acetate 667 milliGRAM(s) Oral three times a day with meals  cefepime   IVPB 1000 milliGRAM(s) IV Intermittent every 24 hours  chlorhexidine 4% Liquid 1 Application(s) Topical every 12 hours  cholecalciferol 2000 Unit(s) Oral daily  cloNIDine 0.2 milliGRAM(s) Oral every 8 hours  dextrose 5%. 1000 milliLiter(s) IV Continuous <Continuous>  dextrose 50% Injectable 12.5 Gram(s) IV Push once  dextrose 50% Injectable 25 Gram(s) IV Push once  dextrose 50% Injectable 25 Gram(s) IV Push once  furosemide   Injectable 40 milliGRAM(s) IV Push two times a day  heparin  Infusion 1700 Unit(s)/Hr IV Continuous <Continuous>  hydrALAZINE 100 milliGRAM(s) Oral every 8 hours  insulin glargine Injectable (LANTUS) 45 Unit(s) SubCutaneous at bedtime  insulin lispro (HumaLOG) corrective regimen sliding scale   SubCutaneous three times a day before meals  insulin lispro Injectable (HumaLOG) 15 Unit(s) SubCutaneous three times a day before meals  labetalol 300 milliGRAM(s) Oral every 8 hours  pantoprazole    Tablet 40 milliGRAM(s) Oral before breakfast  senna 2 Tablet(s) Oral at bedtime    PRN MEDICATIONS  acetaminophen   Tablet .. 650 milliGRAM(s) Oral every 6 hours PRN  aluminum hydroxide/magnesium hydroxide/simethicone Suspension 30 milliLiter(s) Oral every 6 hours PRN  dextrose 40% Gel 15 Gram(s) Oral once PRN  glucagon  Injectable 1 milliGRAM(s) IntraMuscular once PRN    VITALS:   T(F): 97.9  HR: 64  BP: 128/58  RR: 34  SpO2: 95%    PHYSICAL EXAM:  . General: NAD  . HEENT  · Respiratory: Breath Sounds equal & clear to  auscultation, no accessory muscle use  · Cardiovascular: Regular rate & rhythm, normal S1, S2; no murmurs, gallops or rubs; no S3, S4  · Gastrointestinal	Soft, non-tender, no hepatosplenomegaly, normal bowel sounds  · Genitourinary: Normal genitalia; no lesions; no discharge  · Extremities:	No cyanosis, clubbing, Has +LE edema, mild wrist pain  · Skin no macular rashs, no lacerations.       Neurologic:  A&O x2  	-- CN: unimpaired visual acuity.  Blink to threat intact B/L.  No gaze palsy.;  Pupils reactive b/l. Face right lower facial droop.  Hearing intact b/L. no tongue deviation; soft palette elevation symmetric; shoulder shrug symmetric b/l, mild word finding deficit.  	-- Motor: Normal bulk and tone throughout. Moving B/L upper extremities:  drift in right Upper extremity  and  minimal movement against gravity in right lower  extremity., in left extremities  can resist against pushing, grossly normal.   	-- Sensory: sensation in tact b/l to light touch  	-- Coordination: no finger to nose dysmetria; no limb ataxia noted during cerebellar exam.  	--  -- Gait: can not stand up unassisted;    LABS:                        11.3   9.81  )-----------( 303      ( 15 Apr 2019 04:34 )             37.9     04-15    144  |  100  |  88<HH>  ----------------------------<  227<H>  4.7   |  28  |  2.2<H>    Ca    8.9      15 Apr 2019 04:34  Phos  5.3     04-15  Mg     2.0     04-15    TPro  6.7  /  Alb  3.3<L>  /  TBili  0.4  /  DBili  x   /  AST  19  /  ALT  39  /  AlkPhos  116<H>  04-14    PT/INR - ( 15 Apr 2019 04:34 )   PT: 13.70 sec;   INR: 1.19 ratio         PTT - ( 15 Apr 2019 04:34 )  PTT:45.1 sec                  04-14-19 @ 07:01  -  04-15-19 @ 07:00  --------------------------------------------------------  IN: 1423 mL / OUT: 3415 mL / NET: -1992 mL    04-15-19 @ 07:01  -  04-15-19 @ 11:36  --------------------------------------------------------  IN: 520 mL / OUT: 450 mL / NET: 70 mL          Radiology:  < from: CT Head No Cont (04.13.19 @ 16:07) >  impression:    No significant changes in the acute/subacute parenchymal hemorrhage   involving the left basal ganglia with small amount of surrounding edema.   Well-demarcated area of decreased attenuation extending to the left   caudate may represent associated acute/subacute infarction. Attention on   follow-up imaging recommended.    Mass effect on the left lateral ventricle without significant ventricular   enlargement. Minimal left-to-right midline shift.    No new acute intracranial hemorrhage.    < end of copied text >      < from: Xray Chest 1 View- PORTABLE-Routine (04.14.19 @ 05:05) >  Impression:    Less prominent perihilar opacities. Stable enlarged cardiac silhouette    < end of copied text >

## 2019-04-15 NOTE — PROGRESS NOTE ADULT - SUBJECTIVE AND OBJECTIVE BOX
GRIS TIDWELL  59y Male    CHIEF COMPLAINT:    Patient is a 59y old  Male who presents with a chief complaint of Right sided weakness (15 Apr 2019 11:33)      INTERVAL HPI/OVERNIGHT EVENTS:    Patient seen and examined.    ROS: All other systems are negative.    Vital Signs:    T(F): 98.9 (04-15-19 @ 12:00), Max: 98.9 (04-15-19 @ 00:25)  HR: 62 (04-15-19 @ 12:00) (56 - 64)  BP: 143/69 (04-15-19 @ 12:00) (128/58 - 171/82)  RR: 27 (04-15-19 @ 12:00) (18 - 34)  SpO2: 95% (04-15-19 @ 12:00) (94% - 99%)  I&O's Summary    2019 07:01  -  15 Apr 2019 07:00  --------------------------------------------------------  IN: 1423 mL / OUT: 3415 mL / NET: -1992 mL    15 Apr 2019 07:01  -  15 Apr 2019 14:01  --------------------------------------------------------  IN: 520 mL / OUT: 450 mL / NET: 70 mL      Daily     Daily Weight in k.3 (15 Apr 2019 06:00)  CAPILLARY BLOOD GLUCOSE      POCT Blood Glucose.: 309 mg/dL (15 Apr 2019 12:22)  POCT Blood Glucose.: 248 mg/dL (15 Apr 2019 08:26)  POCT Blood Glucose.: 213 mg/dL (2019 21:35)  POCT Blood Glucose.: 246 mg/dL (2019 16:32)  POCT Blood Glucose.: 278 mg/dL (2019 14:28)      PHYSICAL EXAM:    GENERAL:  NAD  SKIN: No rashes or lesions  HENT: Atrumatic. Normocephalic. PERRL. Moist membranes.  NECK: Supple, No JVD. No lymphadenopathy.  PULMONARY: CTA B/L. No wheezing. No rales  CVS: Normal S1, S2. Rate and Rythm are regular. No murmurs.  ABDOMEN/GI: Soft, Nontender, Nondistended; BS present  EXTREMITIES: Peripheral pulses intact. No edema B/L LE.  NEUROLOGIC:  No motor or sensory deficit.  PSYCH: Alert & oriented x 3    Consultant(s) Notes Reviewed:  [x ] YES  [ ] NO  Care Discussed with Consultants/Other Providers [ x] YES  [ ] NO    EKG reviewed  Telemetry reviewed    LABS:                        11.3   9.81  )-----------( 303      ( 15 Apr 2019 04:34 )             37.9     04-15    144  |  100  |  88<HH>  ----------------------------<  227<H>  4.7   |  28  |  2.2<H>    Ca    8.9      15 Apr 2019 04:34  Phos  5.3     04-15  Mg     2.0     04-15    TPro  6.7  /  Alb  3.3<L>  /  TBili  0.4  /  DBili  x   /  AST  19  /  ALT  39  /  AlkPhos  116<H>  -14    PT/INR - ( 15 Apr 2019 04:34 )   PT: 13.70 sec;   INR: 1.19 ratio         PTT - ( 15 Apr 2019 11:42 )  PTT:31.1 sec  Serum Pro-Brain Natriuretic Peptide: 857 pg/mL (19 @ 01:23)  Serum Pro-Brain Natriuretic Peptide: 1203 pg/mL (19 @ 08:52)    Trop 0.03, CKMB 2.4, CK --, 19 @ 11:22  Trop 0.04, CKMB 2.8, , 19 @ 01:23        RADIOLOGY & ADDITIONAL TESTS:      Imaging or report Personally Reviewed:  [ ] YES  [ ] NO    Medications:  Standing  amLODIPine   Tablet 10 milliGRAM(s) Oral daily  atorvastatin Oral Tab/Cap - Peds 40 milliGRAM(s) Oral daily  calcium acetate 667 milliGRAM(s) Oral three times a day with meals  cefepime   IVPB 1000 milliGRAM(s) IV Intermittent every 24 hours  chlorhexidine 4% Liquid 1 Application(s) Topical every 12 hours  cholecalciferol 2000 Unit(s) Oral daily  cloNIDine 0.2 milliGRAM(s) Oral every 8 hours  dextrose 5%. 1000 milliLiter(s) IV Continuous <Continuous>  dextrose 50% Injectable 12.5 Gram(s) IV Push once  dextrose 50% Injectable 25 Gram(s) IV Push once  dextrose 50% Injectable 25 Gram(s) IV Push once  furosemide   Injectable 40 milliGRAM(s) IV Push two times a day  heparin  Infusion 1700 Unit(s)/Hr IV Continuous <Continuous>  hydrALAZINE 100 milliGRAM(s) Oral every 8 hours  insulin glargine Injectable (LANTUS) 45 Unit(s) SubCutaneous at bedtime  insulin lispro (HumaLOG) corrective regimen sliding scale   SubCutaneous three times a day before meals  insulin lispro Injectable (HumaLOG) 15 Unit(s) SubCutaneous three times a day before meals  labetalol 300 milliGRAM(s) Oral every 8 hours  pantoprazole    Tablet 40 milliGRAM(s) Oral before breakfast  senna 2 Tablet(s) Oral at bedtime    PRN Meds  acetaminophen   Tablet .. 650 milliGRAM(s) Oral every 6 hours PRN  aluminum hydroxide/magnesium hydroxide/simethicone Suspension 30 milliLiter(s) Oral every 6 hours PRN  dextrose 40% Gel 15 Gram(s) Oral once PRN  glucagon  Injectable 1 milliGRAM(s) IntraMuscular once PRN      Case discussed with resident    Care discussed with pt/family GRIS TIDWELL  59y Male    CHIEF COMPLAINT:    Patient is a 59y old  Male who presents with a chief complaint of Right sided weakness (15 Apr 2019 11:33)      INTERVAL HPI/OVERNIGHT EVENTS:    Patient seen and examined. C/O pain in the Rt. wrist. No headache. No dizziness. No sob    ROS: All other systems are negative.    Vital Signs:    T(F): 98.9 (04-15-19 @ 12:00), Max: 98.9 (04-15-19 @ 00:25)  HR: 62 (04-15-19 @ 12:00) (56 - 64)  BP: 143/69 (04-15-19 @ 12:00) (128/58 - 171/82)  RR: 27 (04-15-19 @ 12:00) (18 - 34)  SpO2: 95% (04-15-19 @ 12:00) (94% - 99%)  I&O's Summary    2019 07:  -  15 Apr 2019 07:00  --------------------------------------------------------  IN: 1423 mL / OUT: 3415 mL / NET: -1992 mL    15 Apr 2019 07:01  -  15 Apr 2019 14:01  --------------------------------------------------------  IN: 520 mL / OUT: 450 mL / NET: 70 mL      Daily     Daily Weight in k.3 (15 Apr 2019 06:00)  CAPILLARY BLOOD GLUCOSE      POCT Blood Glucose.: 309 mg/dL (15 Apr 2019 12:22)  POCT Blood Glucose.: 248 mg/dL (15 Apr 2019 08:26)  POCT Blood Glucose.: 213 mg/dL (2019 21:35)  POCT Blood Glucose.: 246 mg/dL (2019 16:32)  POCT Blood Glucose.: 278 mg/dL (2019 14:28)      PHYSICAL EXAM:    GENERAL:  NAD  SKIN: No rashes or lesions  HENT: Atrumatic. Normocephalic. PERRL. Moist membranes.  NECK: Supple, No JVD. No lymphadenopathy.  PULMONARY: CTA B/L. No wheezing. No rales  CVS: Normal S1, S2. Rate and Rythm are regular. No murmurs.  ABDOMEN/GI: Soft, Nontender, Nondistended; BS present  EXTREMITIES: Peripheral pulses intact. 1+ pitting edema B/L LE.  NEUROLOGIC:  Rt. sided hemiparesis.   PSYCH: Alert & oriented x 3    Consultant(s) Notes Reviewed:  [x ] YES  [ ] NO  Care Discussed with Consultants/Other Providers [ x] YES  [ ] NO    EKG reviewed  Telemetry reviewed    LABS:                        11.3   9.81  )-----------( 303      ( 15 Apr 2019 04:34 )             37.9     -15    144  |  100  |  88<HH>  ----------------------------<  227<H>   Creatinine Trend: 2.2<--, 2.2<--, 2.1<--, 2.5<--, 2.9<--, 3.0<--  4.7   |  28  |  2.2<H>    Ca    8.9      15 Apr 2019 04:34  Phos  5.3     -15  Mg     2.0     -15    TPro  6.7  /  Alb  3.3<L>  /  TBili  0.4  /  DBili  x   /  AST  19  /  ALT  39  /  AlkPhos  116<H>  14    PT/INR - ( 15 Apr 2019 04:34 )   PT: 13.70 sec;   INR: 1.19 ratio         PTT - ( 15 Apr 2019 11:42 )  PTT:31.1 sec  Serum Pro-Brain Natriuretic Peptide: 857 pg/mL (19 @ 01:23)  Serum Pro-Brain Natriuretic Peptide: 1203 pg/mL (19 @ 08:52)    Trop 0.03, CKMB 2.4, CK --, 19 @ 11:22  Trop 0.04, CKMB 2.8, , 19 @ 01:23        RADIOLOGY & ADDITIONAL TESTS:      Imaging or report Personally Reviewed:  [ ] YES  [ ] NO    Medications:  Standing  amLODIPine   Tablet 10 milliGRAM(s) Oral daily  atorvastatin Oral Tab/Cap - Peds 40 milliGRAM(s) Oral daily  calcium acetate 667 milliGRAM(s) Oral three times a day with meals  cefepime   IVPB 1000 milliGRAM(s) IV Intermittent every 24 hours  chlorhexidine 4% Liquid 1 Application(s) Topical every 12 hours  cholecalciferol 2000 Unit(s) Oral daily  cloNIDine 0.2 milliGRAM(s) Oral every 8 hours  dextrose 5%. 1000 milliLiter(s) IV Continuous <Continuous>  dextrose 50% Injectable 12.5 Gram(s) IV Push once  dextrose 50% Injectable 25 Gram(s) IV Push once  dextrose 50% Injectable 25 Gram(s) IV Push once  furosemide   Injectable 40 milliGRAM(s) IV Push two times a day  heparin  Infusion 1700 Unit(s)/Hr IV Continuous <Continuous>  hydrALAZINE 100 milliGRAM(s) Oral every 8 hours  insulin glargine Injectable (LANTUS) 45 Unit(s) SubCutaneous at bedtime  insulin lispro (HumaLOG) corrective regimen sliding scale   SubCutaneous three times a day before meals  insulin lispro Injectable (HumaLOG) 15 Unit(s) SubCutaneous three times a day before meals  labetalol 300 milliGRAM(s) Oral every 8 hours  pantoprazole    Tablet 40 milliGRAM(s) Oral before breakfast  senna 2 Tablet(s) Oral at bedtime    PRN Meds  acetaminophen   Tablet .. 650 milliGRAM(s) Oral every 6 hours PRN  aluminum hydroxide/magnesium hydroxide/simethicone Suspension 30 milliLiter(s) Oral every 6 hours PRN  dextrose 40% Gel 15 Gram(s) Oral once PRN  glucagon  Injectable 1 milliGRAM(s) IntraMuscular once PRN      Case discussed with resident    Care discussed with pt/family

## 2019-04-15 NOTE — PROGRESS NOTE ADULT - ASSESSMENT
Impression:  Left IPH with right sided deficits. Question of infarct (acute-subacute in left caudate)    Plan:  MRI brain without mandeep when medically stable  anticoag per Neruosurgery/CCU team  continue Statin  keep SBP <160  PT/OT if medically stable Impression:  Left IPH with right sided deficits. Question of infarct (acute-subacute in left caudate)    Plan:  MRI brain without mandeep when medically stable if no contraindication  anticoag per Neruosurgery/CCU team  continue Statin  keep SBP <160  PT/OT if medically stable Impression:  Left IPH with right sided deficits. Question of infarct (acute-subacute in left caudate).  This is likely related to the initial event which was obscured partly by the mass effect from the original IPH.    Plan:  MRI brain without mandeep when medically stable if no contraindication  anticoag per Neruosurgery/CCU team  continue Statin  keep SBP <160  PT/OT if medically stable

## 2019-04-15 NOTE — PROGRESS NOTE ADULT - ASSESSMENT
60yo M with Past Medical History HTN, DM, CAD, and Sleep Apnea admitted for altered mental status and aphasia secondary to an acute intraparenchymal hemorrhage.  His clinical course was complicated by acute respiratory failure rule out pulmonary embolism, uncontrolled hypertension, and acute kidney failure. Repeat duplex 4/12 showed new right posterior tibial and peroneal and left gastrocnemius and branch of posterior tibial thrombus. Duplex venous 4/6 was neg for DVT.     1.	Acute on chronic hypercapnic respiratory failure, possible PE  2.	Lt. Frontotemporal intraparenchymal bleed with 2mm shift   3.	B/L LE DVT  4.	Hypertensive Emergency on admission  5.	Morbid obesity / Possible ZE & OHS  6.	DESI on CKD-3  7.	Ac. HFpEF  8.	New onset A. Fib  9.	DM-2 / DL          PLAN:    ·	Pulm/CC F/U noted. BiPAP at night. Keep POX >90  ·	Repeat head CT on 4/13 was stable  ·	Pt was cleared by Neurosurgery to start on IV heparin. Follow PTT  ·	BP stable on current meds  ·	Cr. stable at 2.2  ·	Cont Lasix 40 mg IVP q 12h  ·	Check I'S and O'S and daily wt.  ·	Monitor FS. Cont Insuline  ·	Cont his other meds. 58yo M with Past Medical History HTN, DM, CAD, and Sleep Apnea admitted for altered mental status and aphasia secondary to an acute intraparenchymal hemorrhage.  His clinical course was complicated by acute respiratory failure rule out pulmonary embolism, uncontrolled hypertension, and acute kidney failure. Repeat duplex 4/12 showed new right posterior tibial and peroneal and left gastrocnemius and branch of posterior tibial thrombus. Duplex venous 4/6 was neg for DVT.     1.	Acute on chronic hypercapnic respiratory failure, possible PE  2.	Lt. Frontotemporal intraparenchymal bleed with 2mm shift   3.	B/L LE DVT  4.	Hypertensive Emergency on admission  5.	Morbid obesity / Possible ZE & OHS  6.	DESI on CKD-3  7.	Ac. HFpEF  8.	New onset A. Fib  9.	DM-2 / DL  10.	Ac. Gout          PLAN:    ·	Pulm/CC F/U noted. BiPAP at night. Keep POX >90  ·	Repeat head CT on 4/13 was stable  ·	Pt was cleared by Neurosurgery to start on IV heparin. Follow PTT  ·	BP stable on current meds  ·	Cr. stable at 2.2  ·	Cont Lasix 40 mg IVP q 12h  ·	Check I'S and O'S and daily wt.  ·	Monitor FS. Cont Insuline  ·	Will cont prednisone 20 mg po daily for gout.  ·	Cont his other meds.  ·	Plan of care D/W the wife on bedside.

## 2019-04-15 NOTE — PROGRESS NOTE ADULT - SUBJECTIVE AND OBJECTIVE BOX
Nephrology progress note    Patient is seen and examined, events over the last 24 h noted .    Allergies:  No Known Allergies    Hospital Medications:   MEDICATIONS  (STANDING):  amLODIPine   Tablet 10 milliGRAM(s) Oral daily  atorvastatin Oral Tab/Cap - Peds 40 milliGRAM(s) Oral daily  calcium acetate 667 milliGRAM(s) Oral three times a day with meals  cefepime   IVPB 1000 milliGRAM(s) IV Intermittent every 24 hours  chlorhexidine 4% Liquid 1 Application(s) Topical every 12 hours  cholecalciferol 2000 Unit(s) Oral daily  cloNIDine 0.2 milliGRAM(s) Oral every 8 hours  dextrose 5%. 1000 milliLiter(s) (50 mL/Hr) IV Continuous <Continuous>  dextrose 50% Injectable 12.5 Gram(s) IV Push once  dextrose 50% Injectable 25 Gram(s) IV Push once  dextrose 50% Injectable 25 Gram(s) IV Push once  furosemide   Injectable 40 milliGRAM(s) IV Push two times a day  heparin  Infusion 1700 Unit(s)/Hr (19 mL/Hr) IV Continuous <Continuous>  hydrALAZINE 100 milliGRAM(s) Oral every 8 hours  insulin glargine Injectable (LANTUS) 45 Unit(s) SubCutaneous at bedtime  insulin lispro (HumaLOG) corrective regimen sliding scale   SubCutaneous three times a day before meals  insulin lispro Injectable (HumaLOG) 15 Unit(s) SubCutaneous three times a day before meals  labetalol 300 milliGRAM(s) Oral every 8 hours  pantoprazole    Tablet 40 milliGRAM(s) Oral before breakfast  senna 2 Tablet(s) Oral at bedtime        VITALS:  T(F): 97.9 (04-15-19 @ 08:00), Max: 98.9 (04-15-19 @ 00:25)  HR: 60 (04-15-19 @ 08:00)  BP: 140/68 (04-15-19 @ 08:00)  RR: 23 (04-15-19 @ 08:00)  SpO2: 95% (04-15-19 @ 08:00)  Wt(kg): --    04-13 @ 07:01  -  04-14 @ 07:00  --------------------------------------------------------  IN: 1371 mL / OUT: 5010 mL / NET: -3639 mL    04-14 @ 07:01  -  04-15 @ 07:00  --------------------------------------------------------  IN: 1423 mL / OUT: 3415 mL / NET: -1992 mL    04-15 @ 07:01  -  04-15 @ 10:22  --------------------------------------------------------  IN: 468 mL / OUT: 125 mL / NET: 343 mL          PHYSICAL EXAM:  Constitutional: NAD  HEENT: anicteric sclera, oropharynx clear, MMM  Neck: No JVD  Respiratory: CTAB, no wheezes, rales or rhonchi  Cardiovascular: S1, S2, RRR  Gastrointestinal: BS+, soft, NT/ND  Extremities: No cyanosis or clubbing. No peripheral edema  :  No khan.   Skin: No rashes    LABS:  04-15    144  |  100  |  88<HH>  ----------------------------<  227<H>  4.7   |  28  |  2.2<H>    Ca    8.9      15 Apr 2019 04:34  Phos  5.3     04-15  Mg     2.0     04-15    TPro  6.7  /  Alb  3.3<L>  /  TBili  0.4  /  DBili      /  AST  19  /  ALT  39  /  AlkPhos  116<H>  04-14                          11.3   9.81  )-----------( 303      ( 15 Apr 2019 04:34 )             37.9       Urine Studies:      RADIOLOGY & ADDITIONAL STUDIES: Nephrology progress note    Patient is seen and examined, events over the last 24 h noted .  no major clinical events    Allergies:  No Known Allergies    Hospital Medications:   MEDICATIONS  (STANDING):  amLODIPine   Tablet 10 milliGRAM(s) Oral daily  atorvastatin Oral Tab/Cap - Peds 40 milliGRAM(s) Oral daily  calcium acetate 667 milliGRAM(s) Oral three times a day with meals  cefepime   IVPB 1000 milliGRAM(s) IV Intermittent every 24 hours  cholecalciferol 2000 Unit(s) Oral daily  cloNIDine 0.2 milliGRAM(s) Oral every 8 hours  furosemide   Injectable 40 milliGRAM(s) IV Push two times a day  heparin  Infusion 1700 Unit(s)/Hr (19 mL/Hr) IV Continuous <Continuous>  hydrALAZINE 100 milliGRAM(s) Oral every 8 hours  insulin glargine Injectable (LANTUS) 45 Unit(s) SubCutaneous at bedtime  insulin lispro (HumaLOG) corrective regimen sliding scale   SubCutaneous three times a day before meals  insulin lispro Injectable (HumaLOG) 15 Unit(s) SubCutaneous three times a day before meals  labetalol 300 milliGRAM(s) Oral every 8 hours  pantoprazole    Tablet 40 milliGRAM(s) Oral before breakfast  senna 2 Tablet(s) Oral at bedtime        VITALS:  T(F): 97.9 (04-15-19 @ 08:00), Max: 98.9 (04-15-19 @ 00:25)  HR: 60 (04-15-19 @ 08:00)  BP: 140/68 (04-15-19 @ 08:00)  RR: 23 (04-15-19 @ 08:00)  SpO2: 95% (04-15-19 @ 08:00)      04-13 @ 07:01  -  04-14 @ 07:00  --------------------------------------------------------  IN: 1371 mL / OUT: 5010 mL / NET: -3639 mL    04-14 @ 07:01  -  04-15 @ 07:00  --------------------------------------------------------  IN: 1423 mL / OUT: 3415 mL / NET: -1992 mL    04-15 @ 07:01  -  04-15 @ 10:22  --------------------------------------------------------  IN: 468 mL / OUT: 125 mL / NET: 343 mL          PHYSICAL EXAM:  Constitutional: lethargic lying in bed   HEENT: anicteric sclera, oropharynx clear, MMM  Neck: No JVD  Respiratory: CTAB, no wheezes, rales or rhonchi  Cardiovascular: S1, S2, RRR  Gastrointestinal: BS+, soft, NT/ND  Extremities: No cyanosis or clubbing. trace peripheral edema   Skin: No rashes    LABS:  04-15    144  |  100  |  88<HH>  ----------------------------<  227<H>  4.7   |  28  |  2.2<H>    Ca    8.9      15 Apr 2019 04:34  Phos  5.3     04-15  Mg     2.0     04-15    TPro  6.7  /  Alb  3.3<L>  /  TBili  0.4  /  DBili      /  AST  19  /  ALT  39  /  AlkPhos  116<H>  04-14                          11.3   9.81  )-----------( 303      ( 15 Apr 2019 04:34 )             37.9       Urine Studies:      RADIOLOGY & ADDITIONAL STUDIES:

## 2019-04-15 NOTE — PROGRESS NOTE ADULT - ATTENDING COMMENTS
Patient seen and examined and agree with above except as noted.  Patient had no new events.  NIHSS 7    Plan and assessment as above

## 2019-04-15 NOTE — SWALLOW BEDSIDE ASSESSMENT ADULT - COMMENTS
Pt became fatigued w/ continuous feeding. Small meals recommended. +toleration w/o overt s/s penetration/aspiration

## 2019-04-15 NOTE — PROGRESS NOTE ADULT - ASSESSMENT
IMPRESSION:    Intraparenchymal hemorrhage  Acute on chronic hypercapnic respiratory failure  Probable ZE +/- OHS  DESI improving  DVT ON IV HEPARIN      PLAN:    CNS: neurosurgery f/u, neurochecks    HEENT:  Oral care    PULMONARY:  HOB @ 45 degrees, oxygen to keep pulse ox>90%, ET CO2    CARDIOVASCULAR:  I=O, BP control restart po meds    GI: GI prophylaxis  Feeding per speech and swallow     RENAL:  F/u  lytes.  Correct as needed. d/c khan     INFECTIOUS DISEASE: Repeat Cultures.  finish course of abx    HEMATOLOGICAL:  DVT prophylaxis, heparin gtt, f/u ptt    ENDOCRINE:  Follow up FS.      MUSCULOSKELETAL: OOB to chair.     CODE STATUS: FULL CODE    transfer to floor if cleared by neuro IMPRESSION:    Intraparenchymal hemorrhage  Acute on chronic hypercapnic respiratory failure/ fluid overload  Probable ZE +/- OHS  DESI improving  DVT ON IV HEPARIN      PLAN:    CNS: neurosurgery f/u, neurochecks    HEENT:  Oral care    PULMONARY:  HOB @ 45 degrees, oxygen to keep pulse ox>90%, ET CO2, repeat cxr, bipap at night    CARDIOVASCULAR:  I=O, change lasix to once daily    GI: GI prophylaxis  Feeding per speech and swallow     RENAL:  F/u  lytes.  Correct as needed. d/c khan     INFECTIOUS DISEASE: Repeat Cultures.  finish course of abx    HEMATOLOGICAL:  DVT prophylaxis, heparin gtt, f/u ptt    ENDOCRINE:  Follow up FS.      MUSCULOSKELETAL: OOB to chair.     CODE STATUS: FULL CODE    transfer to floor if cleared by neuro

## 2019-04-15 NOTE — PROGRESS NOTE ADULT - ASSESSMENT
58 yo male patient with PMH of DM, HTN, Likely underlying CKD (Cr was 2.4 when presented) presented on 4/2 with AMS and aphasia, found w/ Left sided Frontotemporal bleed with intraparenchymal hemorrhagic associated with left ventricular bleed, upgraded to the unit for respiratory failure: 60 yo male patient with PMH of DM, HTN, Likely underlying CKD (Cr was 2.4 when presented) presented on 4/2 with AMS and aphasia, found w/ Left sided Frontotemporal bleed with intraparenchymal hemorrhagic associated with left ventricular bleed, upgraded to the unit for respiratory failure:  #creatinine is at baseline now   # non oliguric  # continue lasix current dose switch to po when able to tolerate  # BP better controlled on labetalol / hydralazine/ lasix  # ph at goal, on binders  # will follow

## 2019-04-15 NOTE — PROGRESS NOTE ADULT - SUBJECTIVE AND OBJECTIVE BOX
CC:HPI: HPI:  58 y/o M with hx of HTN, DM, CAD, Sleep Apnea BIBEMS for altered mental status and aphasia. At presentation patient was found to be aphasic and some right side weakness. He was not following the commands so wife called the EMS. He was completely fine at 3:00 pm today. Wife herd the noise upstairs and found him on the ground unresponsive. So she called an EMS.     In the ED stroke code was called and NIH SS was 15 with right sided weakness. The CT scan showed the left intra parenchymal bleed with the extend into the ventricle associated with the midline shift. Pt received platelets and DDAVP for the aspirin reversal. The blood pressure was in 200s . Pt had jhonatan and on nicardin drip for the close bp control. (02 Apr 2019 22:24)      Neuro Follow-Up;  Pt in CCU for presumed PE, on heparin.   Neuro recalled at family's request due to right sided deficits    Question of left caudate infarct on last CTH    Vital Signs Last 24 Hrs  T(C): 37.2 (15 Apr 2019 16:00), Max: 37.2 (15 Apr 2019 00:25)  T(F): 99 (15 Apr 2019 16:00), Max: 99 (15 Apr 2019 16:00)  HR: 58 (15 Apr 2019 18:00) (56 - 64)  BP: 153/76 (15 Apr 2019 18:00) (118/60 - 171/82)  BP(mean): 110 (15 Apr 2019 18:00) (85 - 113)  RR: 26 (15 Apr 2019 18:00) (20 - 34)  SpO2: 95% (15 Apr 2019 18:00) (95% - 99%)    NIHSS:     LOC a 0b 2c0  Gaze 0  Facial 0  Visual 0  Motor Arm 1 right  Motor Leg 2 right 1 left  Sensory 0  Ataxia 0  Language 0  Dysarthria 1  Extinction 0  Total: 7    Allergies    No Known Allergies    Intolerances      MEDICATIONS  (STANDING):  amLODIPine   Tablet 10 milliGRAM(s) Oral daily  atorvastatin Oral Tab/Cap - Peds 40 milliGRAM(s) Oral daily  calcium acetate 667 milliGRAM(s) Oral three times a day with meals  cefepime   IVPB 1000 milliGRAM(s) IV Intermittent every 24 hours  chlorhexidine 4% Liquid 1 Application(s) Topical every 12 hours  cholecalciferol 2000 Unit(s) Oral daily  cloNIDine 0.2 milliGRAM(s) Oral every 8 hours  dextrose 5%. 1000 milliLiter(s) (50 mL/Hr) IV Continuous <Continuous>  dextrose 50% Injectable 12.5 Gram(s) IV Push once  dextrose 50% Injectable 25 Gram(s) IV Push once  dextrose 50% Injectable 25 Gram(s) IV Push once  furosemide   Injectable 40 milliGRAM(s) IV Push two times a day  heparin  Infusion 1700 Unit(s)/Hr (22 mL/Hr) IV Continuous <Continuous>  hydrALAZINE 100 milliGRAM(s) Oral every 8 hours  insulin glargine Injectable (LANTUS) 45 Unit(s) SubCutaneous at bedtime  insulin lispro (HumaLOG) corrective regimen sliding scale   SubCutaneous three times a day before meals  insulin lispro Injectable (HumaLOG) 15 Unit(s) SubCutaneous three times a day before meals  labetalol 300 milliGRAM(s) Oral every 8 hours  pantoprazole    Tablet 40 milliGRAM(s) Oral before breakfast  predniSONE   Tablet 20 milliGRAM(s) Oral daily  senna 2 Tablet(s) Oral at bedtime    MEDICATIONS  (PRN):  acetaminophen   Tablet .. 650 milliGRAM(s) Oral every 6 hours PRN Mild Pain (1 - 3)  aluminum hydroxide/magnesium hydroxide/simethicone Suspension 30 milliLiter(s) Oral every 6 hours PRN Dyspepsia  dextrose 40% Gel 15 Gram(s) Oral once PRN Blood Glucose LESS THAN 70 milliGRAM(s)/deciliter  glucagon  Injectable 1 milliGRAM(s) IntraMuscular once PRN Glucose LESS THAN 70 milligrams/deciliter      LABS:                        11.3   9.81  )-----------( 303      ( 15 Apr 2019 04:34 )             37.9     04-15    144  |  100  |  88<HH>  ----------------------------<  227<H>  4.7   |  28  |  2.2<H>    Ca    8.9      15 Apr 2019 04:34  Phos  5.3     04-15  Mg     2.0     04-15    TPro  6.7  /  Alb  3.3<L>  /  TBili  0.4  /  DBili  x   /  AST  19  /  ALT  39  /  AlkPhos  116<H>  04-14    PT/INR - ( 15 Apr 2019 04:34 )   PT: 13.70 sec;   INR: 1.19 ratio         PTT - ( 15 Apr 2019 16:30 )  PTT:28.8 sec  Hemoglobin A1C, Whole Blood: 8.7 % (04-08 @ 04:49)        Neuro Imaging:    < from: CT Head No Cont (04.13.19 @ 16:07) >  Final/  impression:    No significant changes in the acute/subacute parenchymal hemorrhage   involving the left basal ganglia with small amount of surrounding edema.   Well-demarcated area of decreased attenuation extending to the left   caudate may represent associated acute/subacute infarction. Attention on   follow-up imaging recommended.    Mass effect on the left lateral ventricle without significant ventricular   enlargement. Minimal left-to-right midline shift.    No new acute intracranial hemorrhage.    < end of copied text >

## 2019-04-15 NOTE — PROGRESS NOTE ADULT - SUBJECTIVE AND OBJECTIVE BOX
Patient is a 59y old  Male who presents with a chief complaint of Right sided weakness (14 Apr 2019 14:41)        SUBJECTIVE: none, stable neuro exam, on heparin gtt      REVIEW OF SYSTEMS:  See HPI    PHYSICAL EXAM  Vital Signs Last 24 Hrs  T(C): 36.6 (15 Apr 2019 08:00), Max: 37.2 (15 Apr 2019 00:25)  T(F): 97.9 (15 Apr 2019 08:00), Max: 98.9 (15 Apr 2019 00:25)  HR: 60 (15 Apr 2019 08:00) (56 - 68)  BP: 140/68 (15 Apr 2019 08:00) (135/74 - 171/82)  BP(mean): 103 (15 Apr 2019 06:00) (91 - 115)  RR: 23 (15 Apr 2019 08:00) (18 - 25)  SpO2: 95% (15 Apr 2019 08:00) (94% - 99%)    General: In NAD  HEENT: TIMMY               Lymphatic system: No Cervical LN    Respiratory: Patrick BS  Cardiovascular: Regular  Gastrointestinal: Soft. + BS  Musculoskeletal: No clubbing.  moves all extremities.  Full range of motion    Skin: Warm.  Intact  Neurological: RUE/RLE weakness       04-14-19 @ 07:01  -  04-15-19 @ 07:00  --------------------------------------------------------  IN:    heparin Infusion: 403 mL    Oral Fluid: 1020 mL  Total IN: 1423 mL    OUT:    Indwelling Catheter - Urethral: 3415 mL  Total OUT: 3415 mL    Total NET: -1992 mL          LABS:                          11.3   9.81  )-----------( 303      ( 15 Apr 2019 04:34 )             37.9                                               04-15    144  |  100  |  88<HH>  ----------------------------<  227<H>  4.7   |  28  |  2.2<H>    Ca    8.9      15 Apr 2019 04:34  Phos  5.3     04-15  Mg     2.0     04-15    TPro  6.7  /  Alb  3.3<L>  /  TBili  0.4  /  DBili  x   /  AST  19  /  ALT  39  /  AlkPhos  116<H>  04-14      PT/INR - ( 15 Apr 2019 04:34 )   PT: 13.70 sec;   INR: 1.19 ratio         PTT - ( 15 Apr 2019 04:34 )  PTT:45.1 sec                                           CARDIAC MARKERS ( 13 Apr 2019 11:22 )  x     / 0.03 ng/mL / x     / x     / 2.4 ng/mL                                            LIVER FUNCTIONS - ( 14 Apr 2019 04:45 )  Alb: 3.3 g/dL / Pro: 6.7 g/dL / ALK PHOS: 116 U/L / ALT: 39 U/L / AST: 19 U/L / GGT: x                                                                                                MEDICATIONS  (STANDING):  amLODIPine   Tablet 10 milliGRAM(s) Oral daily  atorvastatin Oral Tab/Cap - Peds 40 milliGRAM(s) Oral daily  calcium acetate 667 milliGRAM(s) Oral three times a day with meals  cefepime   IVPB 1000 milliGRAM(s) IV Intermittent every 24 hours  chlorhexidine 4% Liquid 1 Application(s) Topical every 12 hours  cholecalciferol 2000 Unit(s) Oral daily  cloNIDine 0.2 milliGRAM(s) Oral every 8 hours  dextrose 5%. 1000 milliLiter(s) (50 mL/Hr) IV Continuous <Continuous>  dextrose 50% Injectable 12.5 Gram(s) IV Push once  dextrose 50% Injectable 25 Gram(s) IV Push once  dextrose 50% Injectable 25 Gram(s) IV Push once  furosemide   Injectable 40 milliGRAM(s) IV Push two times a day  heparin  Infusion 1700 Unit(s)/Hr (19 mL/Hr) IV Continuous <Continuous>  hydrALAZINE 100 milliGRAM(s) Oral every 8 hours  insulin glargine Injectable (LANTUS) 45 Unit(s) SubCutaneous at bedtime  insulin lispro (HumaLOG) corrective regimen sliding scale   SubCutaneous three times a day before meals  insulin lispro Injectable (HumaLOG) 15 Unit(s) SubCutaneous three times a day before meals  labetalol 300 milliGRAM(s) Oral every 8 hours  pantoprazole    Tablet 40 milliGRAM(s) Oral before breakfast  senna 2 Tablet(s) Oral at bedtime    MEDICATIONS  (PRN):  acetaminophen   Tablet .. 650 milliGRAM(s) Oral every 6 hours PRN Mild Pain (1 - 3)  aluminum hydroxide/magnesium hydroxide/simethicone Suspension 30 milliLiter(s) Oral every 6 hours PRN Dyspepsia  dextrose 40% Gel 15 Gram(s) Oral once PRN Blood Glucose LESS THAN 70 milliGRAM(s)/deciliter  glucagon  Injectable 1 milliGRAM(s) IntraMuscular once PRN Glucose LESS THAN 70 milligrams/deciliter Patient is a 59y old  Male who presents with a chief complaint of Right sided weakness (14 Apr 2019 14:41)        SUBJECTIVE: none, stable neuro exam, on heparin gtt, feels better more awake      REVIEW OF SYSTEMS:  See HPI    PHYSICAL EXAM  Vital Signs Last 24 Hrs  T(C): 36.6 (15 Apr 2019 08:00), Max: 37.2 (15 Apr 2019 00:25)  T(F): 97.9 (15 Apr 2019 08:00), Max: 98.9 (15 Apr 2019 00:25)  HR: 60 (15 Apr 2019 08:00) (56 - 68)  BP: 140/68 (15 Apr 2019 08:00) (135/74 - 171/82)  BP(mean): 103 (15 Apr 2019 06:00) (91 - 115)  RR: 23 (15 Apr 2019 08:00) (18 - 25)  SpO2: 95% (15 Apr 2019 08:00) (94% - 99%)    General: In NAD  HEENT: TIMMY               Lymphatic system: No Cervical LN    Respiratory: Patrick BS, dec both abses  Cardiovascular: Regular  Gastrointestinal: Soft. + BS  Musculoskeletal: No clubbing.  moves all extremities.  Full range of motion    Skin: Warm.  Intact  Neurological: RUE/RLE weakness       04-14-19 @ 07:01  -  04-15-19 @ 07:00  --------------------------------------------------------  IN:    heparin Infusion: 403 mL    Oral Fluid: 1020 mL  Total IN: 1423 mL    OUT:    Indwelling Catheter - Urethral: 3415 mL  Total OUT: 3415 mL    Total NET: -1992 mL          LABS:                          11.3   9.81  )-----------( 303      ( 15 Apr 2019 04:34 )             37.9                                               04-15    144  |  100  |  88<HH>  ----------------------------<  227<H>  4.7   |  28  |  2.2<H>    Ca    8.9      15 Apr 2019 04:34  Phos  5.3     04-15  Mg     2.0     04-15    TPro  6.7  /  Alb  3.3<L>  /  TBili  0.4  /  DBili  x   /  AST  19  /  ALT  39  /  AlkPhos  116<H>  04-14      PT/INR - ( 15 Apr 2019 04:34 )   PT: 13.70 sec;   INR: 1.19 ratio         PTT - ( 15 Apr 2019 04:34 )  PTT:45.1 sec                                           CARDIAC MARKERS ( 13 Apr 2019 11:22 )  x     / 0.03 ng/mL / x     / x     / 2.4 ng/mL                                            LIVER FUNCTIONS - ( 14 Apr 2019 04:45 )  Alb: 3.3 g/dL / Pro: 6.7 g/dL / ALK PHOS: 116 U/L / ALT: 39 U/L / AST: 19 U/L / GGT: x                                                                                                MEDICATIONS  (STANDING):  amLODIPine   Tablet 10 milliGRAM(s) Oral daily  atorvastatin Oral Tab/Cap - Peds 40 milliGRAM(s) Oral daily  calcium acetate 667 milliGRAM(s) Oral three times a day with meals  cefepime   IVPB 1000 milliGRAM(s) IV Intermittent every 24 hours  chlorhexidine 4% Liquid 1 Application(s) Topical every 12 hours  cholecalciferol 2000 Unit(s) Oral daily  cloNIDine 0.2 milliGRAM(s) Oral every 8 hours  dextrose 5%. 1000 milliLiter(s) (50 mL/Hr) IV Continuous <Continuous>  dextrose 50% Injectable 12.5 Gram(s) IV Push once  dextrose 50% Injectable 25 Gram(s) IV Push once  dextrose 50% Injectable 25 Gram(s) IV Push once  furosemide   Injectable 40 milliGRAM(s) IV Push two times a day  heparin  Infusion 1700 Unit(s)/Hr (19 mL/Hr) IV Continuous <Continuous>  hydrALAZINE 100 milliGRAM(s) Oral every 8 hours  insulin glargine Injectable (LANTUS) 45 Unit(s) SubCutaneous at bedtime  insulin lispro (HumaLOG) corrective regimen sliding scale   SubCutaneous three times a day before meals  insulin lispro Injectable (HumaLOG) 15 Unit(s) SubCutaneous three times a day before meals  labetalol 300 milliGRAM(s) Oral every 8 hours  pantoprazole    Tablet 40 milliGRAM(s) Oral before breakfast  senna 2 Tablet(s) Oral at bedtime    MEDICATIONS  (PRN):  acetaminophen   Tablet .. 650 milliGRAM(s) Oral every 6 hours PRN Mild Pain (1 - 3)  aluminum hydroxide/magnesium hydroxide/simethicone Suspension 30 milliLiter(s) Oral every 6 hours PRN Dyspepsia  dextrose 40% Gel 15 Gram(s) Oral once PRN Blood Glucose LESS THAN 70 milliGRAM(s)/deciliter  glucagon  Injectable 1 milliGRAM(s) IntraMuscular once PRN Glucose LESS THAN 70 milligrams/deciliter

## 2019-04-15 NOTE — PROGRESS NOTE ADULT - ASSESSMENT
58 yo M with PMHx of HTN, DM, CAD on aspirin, ZE who was BIBEMS for ams and aphasia. Stroked code called, had NIHSS 15. CTH showed L frontotemporal intraparenchymal bleed with extension into the ventricle and 2mm shift to the R. Admitted to ICU s/p platelets, DDAVP, nicardipine. On 3E, pt respiratory status worsened, requiring BiPAP. Repeat duplex 4/12 showed new right posterior tibial and peroneal and left gastrocnemius and branch of posterior tibial thrombus. Duplex venous 4/6 was neg for DVT. Neurosurgery cleared pt for anticoagulation. CTA was not performed due to CKD and would not  to anticoagulate.     Today's Events: C/w current treatment, 2 more days of cefepime, OOBTC, , f/u NS for  1 repeat CT head CTH, 2 Oral AC, 3 decreasing nuero checks.  PT/Rehab. End titdal CO2 stable, f/u ptt 11am    IMPRESSION   Intraparenchymal hemorrhage- currently Stable   Acute on chronic hypercapnic respiratory failure  Probable ZE +/- OHS  DESI improving  DVT ON IV HEPARIN      Plan   #Acute on chronic hypercapnic respiratory failure, possible PE  - Venous duplex 4/12 showed new right posterior tibial and peroneal and left gastrocnemius and branch of posterior tibial thrombus.  - CTA lungs (not done due to CKD) and V/Q scan would not  to anticoagulate  - Neurosurgery cleared pt for anticoagulation (x5885 or [id number]) 04/12  - Vascular rec noted ([id number])  - Last day of cefepime   - heparin drip, monitor PTT (goal 60-70 due to intraparenchymal bleed)   -  Repeat CT done on 04/13  stable   - Neuro check q1-2 hr  - BiPAP qhs and prn  - CXR opacity improved  - c/w  End tidal CO2 monitoring           #L frontotemporal intraparenchymal bleed with 2mm shift   - no acute neurosurgical intervention  - CTH 4/2: L frontotemporal intraparenchymal bleed with extension into the ventricle and 2mm shift to the R  - CTH 4/3: no change  - CTH (repeat #2) 4/3: no change  - CTH 4/6: no change  - CTH 4/12- Unchanged L basal ganglia intraparenchymal hematoma with mass effect on the left lateral ventricle. Unchanged 2 mm left to right midline shift.  - CTH 4/13  Stable   - f/u Neurosurgery  for neruo q checks  if cleared downgrade      #Abd distension  Had small stool yesterday and passing gas, no n/v, sometimes epigastric pain    #Hypertensive emergency  - goal BP < 160  - labetalol 300 mg TID, amlodipine 10 mg daily, hydralazine 100 mg TID  - clonidine to 0.1 mg TID    #HFpEF  - Echo 4/4- EF 58%, mild inc LV wall thick, mild TR, mild AS, mild pulm HTN  - Repeat echo EF 55% to 60% EF MR, Mild Aortic valve  thinking   - cxr w/ interval blunting of costophrenic angle, hazy opacity  -c/w  40 q12h IV for now due to generalized swelling  - follows w/ Dr. Man    #DESI on CKD3  - baseline Cr 1.9 in in 10/2018  - renal US 4/3: no hydronephrosis, L renal cyst  - per nephro: no need for urgent dialysis  - nephro following     #New onset afib  - rate controlled  - labetalol 300 mg TID  - Heparin drip    #Fever. Resolved.  - Possible bibasilar pneumonia vs gout attack  - urine cx 4/2: negative  - blood cx: NTD  - uric acid 9.9  - c/w cefepime (start 4/6) last day today     #DM  - lantus/lispro/LSS    #HLD  - lipitor      Electrolyte Imbalances: Correct as needed  []  Hyponatremia   /   Hypernatremia  []   []  Hypokalemia   /   Hyperkalemia  []   []  Hypochlorhydria   /    Hypochlorhydria  []   []  Hypomagnesemia   /   Hypermagnesemia  []   []  Hypophosphatemia   /   Hyperphosphatemia  []       GI ppx:                                   [] Not indicated   /   [x] Pantoprazole 40mg PO Daily    DVT ppx:  [] Not indicated / [] Heparin 5000mg SubQ / [x] Lovenox 40mg SubQ / [] SCDs    Fluids:  Dysphasia 3   [x] PO  |  [] IVF    Activity:  [X] Assisatnce needed   [X] Increase as Tolerated  /  [] OOB w/ assist  /  [] Bed Rest    BMI:  Height (cm): 170.18 (04-12)  Weight (kg): 150.5 (04-12)  BMI (kg/m2): 52 (04-12)        DISPO:  Patient to be discharged when condition(s) optimized.  [ x] From Home     [ ] NH/SNF   [ ] 4A Rehab  [ ] Detox Clinic  [X] Plan Discussion with patient and/or family.  [X] Discussed Case and Plan with the Medical Attending.    CODE STATUS  [X] FULL   /    [] DNR 60 yo M with PMHx of HTN, DM, CAD on aspirin, ZE who was BIBEMS for ams and aphasia. Stroked code called, had NIHSS 15. CTH showed L frontotemporal intraparenchymal bleed with extension into the ventricle and 2mm shift to the R. Admitted to ICU s/p platelets, DDAVP, nicardipine. On 3E, pt respiratory status worsened, requiring BiPAP. Repeat duplex 4/12 showed new right posterior tibial and peroneal and left gastrocnemius and branch of posterior tibial thrombus. Duplex venous 4/6 was neg for DVT. Neurosurgery cleared pt for anticoagulation. CTA was not performed due to CKD and would not  to anticoagulate.     Today's Events: C/w current treatment, 2 more days of cefepime, OOBTC, , f/u NS for  1 repeat CT head CTH, 2 Oral AC, 3 decreasing nuero checks.  PT/Rehab. End titdal CO2 stable, f/u ptt 11am    IMPRESSION   Intraparenchymal hemorrhage- currently Stable   Acute on chronic hypercapnic respiratory failure  Probable ZE +/- OHS  DESI improving  DVT ON IV HEPARIN  GOUT Flare       Plan   #Acute on chronic hypercapnic respiratory failure, possible PE  - Venous duplex 4/12 showed new right posterior tibial and peroneal and left gastrocnemius and branch of posterior tibial thrombus.  - CTA lungs (not done due to CKD) and V/Q scan would not  to anticoagulate  - Neurosurgery cleared pt for anticoagulation (x5885 or 8580) 04/12  - Vascular rec noted ([id number])  - Last day of cefepime   - heparin drip, monitor PTT (goal 60-70 due to intraparenchymal bleed)   -  Repeat CT done on 04/13  stable   - Neuro check q1-2 hr  - BiPAP qhs and prn  - CXR opacity improved  - c/w  End tidal CO2 monitoring       GOUT FLARE   - starting PO steroid 20mg daily           #L frontotemporal intraparenchymal bleed with 2mm shift   - no acute neurosurgical intervention  - CTH 4/2: L frontotemporal intraparenchymal bleed with extension into the ventricle and 2mm shift to the R  - CTH 4/3: no change  - CTH (repeat #2) 4/3: no change  - CTH 4/6: no change  - CTH 4/12- Unchanged L basal ganglia intraparenchymal hematoma with mass effect on the left lateral ventricle. Unchanged 2 mm left to right midline shift.  - CTH 4/13  Stable   - f/u Neurosurgery  for neruo q checks  if cleared downgrade      #Abd distension  Had small stool yesterday and passing gas, no n/v, sometimes epigastric pain    #Hypertensive emergency  - goal BP < 160  - labetalol 300 mg TID, amlodipine 10 mg daily, hydralazine 100 mg TID  - clonidine to 0.1 mg TID    #HFpEF  - Echo 4/4- EF 58%, mild inc LV wall thick, mild TR, mild AS, mild pulm HTN  - Repeat echo EF 55% to 60% EF MR, Mild Aortic valve  thinking   - cxr w/ interval blunting of costophrenic angle, hazy opacity  -c/w  40 q12h IV for now due to generalized swelling  - follows w/ Dr. Man    #DESI on CKD3  - baseline Cr 1.9 in in 10/2018  - renal US 4/3: no hydronephrosis, L renal cyst  - per nephro: no need for urgent dialysis  - nephro following     #New onset afib  - rate controlled  - labetalol 300 mg TID  - Heparin drip    #Fever. Resolved.  - Possible bibasilar pneumonia vs gout attack  - urine cx 4/2: negative  - blood cx: NTD  - uric acid 9.9  - c/w cefepime (start 4/6) last day today     #DM  - lantus/lispro/LSS    #HLD  - lipitor      Electrolyte Imbalances: Correct as needed  []  Hyponatremia   /   Hypernatremia  []   []  Hypokalemia   /   Hyperkalemia  []   []  Hypochlorhydria   /    Hypochlorhydria  []   []  Hypomagnesemia   /   Hypermagnesemia  []   []  Hypophosphatemia   /   Hyperphosphatemia  []       GI ppx:                                   [] Not indicated   /   [x] Pantoprazole 40mg PO Daily    DVT ppx:  [] Not indicated / [] Heparin 5000mg SubQ / [x] Lovenox 40mg SubQ / [] SCDs    Fluids:  Dysphasia 3   [x] PO  |  [] IVF    Activity:  [X] Assisatnce needed   [X] Increase as Tolerated  /  [] OOB w/ assist  /  [] Bed Rest    BMI:  Height (cm): 170.18 (04-12)  Weight (kg): 150.5 (04-12)  BMI (kg/m2): 52 (04-12)        DISPO:  Patient to be discharged when condition(s) optimized.  [ x] From Home     [ ] NH/SNF   [ ] 4A Rehab  [ ] Detox Clinic  [X] Plan Discussion with patient and/or family.  [X] Discussed Case and Plan with the Medical Attending.    CODE STATUS  [X] FULL   /    [] DNR 60 yo M with PMHx of HTN, DM, CAD on aspirin, ZE who was BIBEMS for ams and aphasia. Stroked code called, had NIHSS 15. CTH showed L frontotemporal intraparenchymal bleed with extension into the ventricle and 2mm shift to the R. Admitted to ICU s/p platelets, DDAVP, nicardipine. On 3E, pt respiratory status worsened, requiring BiPAP. Repeat duplex 4/12 showed new right posterior tibial and peroneal and left gastrocnemius and branch of posterior tibial thrombus. Duplex venous 4/6 was neg for DVT. Neurosurgery cleared pt for anticoagulation. CTA was not performed due to CKD and would not  to anticoagulate.     Today's Events: C/w current treatment, 2 more days of cefepime, OOBTC, , NS   1 no need for repeat CT head CTH, 2 can start Oral AC if benefit outweigh risk of bleed, decreasing nuero checks Q4.  PT/Rehab. End titdal CO2 stable, f/u ptt     IMPRESSION   Intraparenchymal hemorrhage- currently Stable   Acute on chronic hypercapnic respiratory failure  Probable ZE +/- OHS  DESI improving  DVT ON IV HEPARIN  GOUT Flare       Plan   #Acute on chronic hypercapnic respiratory failure, possible PE  - Venous duplex 4/12 showed new right posterior tibial and peroneal and left gastrocnemius and branch of posterior tibial thrombus.  - CTA lungs (not done due to CKD) and V/Q scan would not  to anticoagulate  - Neurosurgery cleared pt for anticoagulation (x5885 or [id number]) 04/12  - Vascular rec noted ([id number])  - Last day of cefepime   - heparin drip, monitor PTT (goal 60-70 due to intraparenchymal bleed)   -  Repeat CT done on 04/13  stable   - Neuro check q1-2 hr  - BiPAP qhs and prn  - CXR opacity improved  - c/w  End tidal CO2 monitoring       GOUT FLARE   - starting PO steroid 20mg daily           #L frontotemporal intraparenchymal bleed with 2mm shift   - no acute neurosurgical intervention  - CTH 4/2: L frontotemporal intraparenchymal bleed with extension into the ventricle and 2mm shift to the R  - CTH 4/3: no change  - CTH (repeat #2) 4/3: no change  - CTH 4/6: no change  - CTH 4/12- Unchanged L basal ganglia intraparenchymal hematoma with mass effect on the left lateral ventricle. Unchanged 2 mm left to right midline shift.  - CTH 4/13  Stable   - f/u Neurosurgery  for neruo q checks  if cleared downgrade      #Abd distension  Had small stool yesterday and passing gas, no n/v, sometimes epigastric pain    #Hypertensive emergency  - goal BP < 160  - labetalol 300 mg TID, amlodipine 10 mg daily, hydralazine 100 mg TID  - clonidine to 0.1 mg TID    #HFpEF  - Echo 4/4- EF 58%, mild inc LV wall thick, mild TR, mild AS, mild pulm HTN  - Repeat echo EF 55% to 60% EF MR, Mild Aortic valve  thinking   - cxr w/ interval blunting of costophrenic angle, hazy opacity  -c/w  40 q12h IV for now due to generalized swelling  - follows w/ Dr. Man    #DESI on CKD3  - baseline Cr 1.9 in in 10/2018  - renal US 4/3: no hydronephrosis, L renal cyst  - per nephro: no need for urgent dialysis  - nephro following     #New onset afib  - rate controlled  - labetalol 300 mg TID  - Heparin drip    #Fever. Resolved.  - Possible bibasilar pneumonia vs gout attack  - urine cx 4/2: negative  - blood cx: NTD  - uric acid 9.9  - c/w cefepime (start 4/6) last day today     #DM  - lantus/lispro/LSS    #HLD  - lipitor      Electrolyte Imbalances: Correct as needed  []  Hyponatremia   /   Hypernatremia  []   []  Hypokalemia   /   Hyperkalemia  []   []  Hypochlorhydria   /    Hypochlorhydria  []   []  Hypomagnesemia   /   Hypermagnesemia  []   []  Hypophosphatemia   /   Hyperphosphatemia  []       GI ppx:                                   [] Not indicated   /   [x] Pantoprazole 40mg PO Daily    DVT ppx:  [] Not indicated / [] Heparin 5000mg SubQ / [x] Lovenox 40mg SubQ / [] SCDs    Fluids:  Dysphasia 3   [x] PO  |  [] IVF    Activity:  [X] Assisatnce needed   [X] Increase as Tolerated  /  [] OOB w/ assist  /  [] Bed Rest    BMI:  Height (cm): 170.18 (04-12)  Weight (kg): 150.5 (04-12)  BMI (kg/m2): 52 (04-12)        DISPO:  Patient to be discharged when condition(s) optimized.  [ x] From Home     [ ] NH/SNF   [ ] 4A Rehab  [ ] Detox Clinic  [X] Plan Discussion with patient and/or family.  [X] Discussed Case and Plan with the Medical Attending.    CODE STATUS  [X] FULL   /    [] DNR

## 2019-04-15 NOTE — SWALLOW BEDSIDE ASSESSMENT ADULT - SWALLOW EVAL: FUNCTIONAL LEVEL AT TIME OF EVAL
Pt transferred to CCU over the weekend d/t acute dyspnea. Repeat CTH showed no changes from previous.

## 2019-04-16 LAB
ANION GAP SERPL CALC-SCNC: 16 MMOL/L — HIGH (ref 7–14)
ANION GAP SERPL CALC-SCNC: 16 MMOL/L — HIGH (ref 7–14)
APTT BLD: 36.3 SEC — SIGNIFICANT CHANGE UP (ref 27–39.2)
APTT BLD: 60.9 SEC — HIGH (ref 27–39.2)
APTT BLD: 73.7 SEC — CRITICAL HIGH (ref 27–39.2)
APTT BLD: 78.7 SEC — CRITICAL HIGH (ref 27–39.2)
B-OH-BUTYR SERPL-SCNC: <0.2 MMOL/L — SIGNIFICANT CHANGE UP
BASOPHILS # BLD AUTO: 0.03 K/UL — SIGNIFICANT CHANGE UP (ref 0–0.2)
BASOPHILS NFR BLD AUTO: 0.2 % — SIGNIFICANT CHANGE UP (ref 0–1)
BUN SERPL-MCNC: 88 MG/DL — CRITICAL HIGH (ref 10–20)
BUN SERPL-MCNC: 89 MG/DL — CRITICAL HIGH (ref 10–20)
CALCIUM SERPL-MCNC: 8.9 MG/DL — SIGNIFICANT CHANGE UP (ref 8.5–10.1)
CALCIUM SERPL-MCNC: 9 MG/DL — SIGNIFICANT CHANGE UP (ref 8.5–10.1)
CHLORIDE SERPL-SCNC: 96 MMOL/L — LOW (ref 98–110)
CHLORIDE SERPL-SCNC: 98 MMOL/L — SIGNIFICANT CHANGE UP (ref 98–110)
CO2 SERPL-SCNC: 28 MMOL/L — SIGNIFICANT CHANGE UP (ref 17–32)
CO2 SERPL-SCNC: 29 MMOL/L — SIGNIFICANT CHANGE UP (ref 17–32)
CREAT SERPL-MCNC: 2.1 MG/DL — HIGH (ref 0.7–1.5)
CREAT SERPL-MCNC: 2.3 MG/DL — HIGH (ref 0.7–1.5)
EOSINOPHIL # BLD AUTO: 0.09 K/UL — SIGNIFICANT CHANGE UP (ref 0–0.7)
EOSINOPHIL NFR BLD AUTO: 0.7 % — SIGNIFICANT CHANGE UP (ref 0–8)
GLUCOSE BLDC GLUCOMTR-MCNC: 251 MG/DL — HIGH (ref 70–99)
GLUCOSE BLDC GLUCOMTR-MCNC: 263 MG/DL — HIGH (ref 70–99)
GLUCOSE BLDC GLUCOMTR-MCNC: 294 MG/DL — HIGH (ref 70–99)
GLUCOSE BLDC GLUCOMTR-MCNC: 315 MG/DL — HIGH (ref 70–99)
GLUCOSE BLDC GLUCOMTR-MCNC: 403 MG/DL — HIGH (ref 70–99)
GLUCOSE BLDC GLUCOMTR-MCNC: 431 MG/DL — HIGH (ref 70–99)
GLUCOSE BLDC GLUCOMTR-MCNC: 455 MG/DL — CRITICAL HIGH (ref 70–99)
GLUCOSE SERPL-MCNC: 356 MG/DL — HIGH (ref 70–99)
GLUCOSE SERPL-MCNC: 459 MG/DL — CRITICAL HIGH (ref 70–99)
HCT VFR BLD CALC: 36.4 % — LOW (ref 42–52)
HGB BLD-MCNC: 11.1 G/DL — LOW (ref 14–18)
IMM GRANULOCYTES NFR BLD AUTO: 0.7 % — HIGH (ref 0.1–0.3)
INR BLD: 1.18 RATIO — SIGNIFICANT CHANGE UP (ref 0.65–1.3)
LYMPHOCYTES # BLD AUTO: 0.74 K/UL — LOW (ref 1.2–3.4)
LYMPHOCYTES # BLD AUTO: 6.1 % — LOW (ref 20.5–51.1)
MAGNESIUM SERPL-MCNC: 2 MG/DL — SIGNIFICANT CHANGE UP (ref 1.8–2.4)
MCHC RBC-ENTMCNC: 26.5 PG — LOW (ref 27–31)
MCHC RBC-ENTMCNC: 30.5 G/DL — LOW (ref 32–37)
MCV RBC AUTO: 86.9 FL — SIGNIFICANT CHANGE UP (ref 80–94)
MONOCYTES # BLD AUTO: 0.53 K/UL — SIGNIFICANT CHANGE UP (ref 0.1–0.6)
MONOCYTES NFR BLD AUTO: 4.4 % — SIGNIFICANT CHANGE UP (ref 1.7–9.3)
NEUTROPHILS # BLD AUTO: 10.64 K/UL — HIGH (ref 1.4–6.5)
NEUTROPHILS NFR BLD AUTO: 87.9 % — HIGH (ref 42.2–75.2)
NRBC # BLD: 0 /100 WBCS — SIGNIFICANT CHANGE UP (ref 0–0)
PHOSPHATE SERPL-MCNC: 5.2 MG/DL — HIGH (ref 2.1–4.9)
PLATELET # BLD AUTO: 325 K/UL — SIGNIFICANT CHANGE UP (ref 130–400)
POTASSIUM SERPL-MCNC: 4.9 MMOL/L — SIGNIFICANT CHANGE UP (ref 3.5–5)
POTASSIUM SERPL-MCNC: 5 MMOL/L — SIGNIFICANT CHANGE UP (ref 3.5–5)
POTASSIUM SERPL-SCNC: 4.9 MMOL/L — SIGNIFICANT CHANGE UP (ref 3.5–5)
POTASSIUM SERPL-SCNC: 5 MMOL/L — SIGNIFICANT CHANGE UP (ref 3.5–5)
PROTHROM AB SERPL-ACNC: 13.6 SEC — HIGH (ref 9.95–12.87)
RBC # BLD: 4.19 M/UL — LOW (ref 4.7–6.1)
RBC # FLD: 13.8 % — SIGNIFICANT CHANGE UP (ref 11.5–14.5)
SODIUM SERPL-SCNC: 141 MMOL/L — SIGNIFICANT CHANGE UP (ref 135–146)
SODIUM SERPL-SCNC: 142 MMOL/L — SIGNIFICANT CHANGE UP (ref 135–146)
WBC # BLD: 12.12 K/UL — HIGH (ref 4.8–10.8)
WBC # FLD AUTO: 12.12 K/UL — HIGH (ref 4.8–10.8)

## 2019-04-16 PROCEDURE — 71045 X-RAY EXAM CHEST 1 VIEW: CPT | Mod: 26

## 2019-04-16 RX ORDER — LACTULOSE 10 G/15ML
15 SOLUTION ORAL
Qty: 0 | Refills: 0 | Status: DISCONTINUED | OUTPATIENT
Start: 2019-04-16 | End: 2019-04-22

## 2019-04-16 RX ORDER — INSULIN GLARGINE 100 [IU]/ML
50 INJECTION, SOLUTION SUBCUTANEOUS AT BEDTIME
Qty: 0 | Refills: 0 | Status: DISCONTINUED | OUTPATIENT
Start: 2019-04-16 | End: 2019-04-17

## 2019-04-16 RX ORDER — INSULIN LISPRO 100/ML
17 VIAL (ML) SUBCUTANEOUS
Qty: 0 | Refills: 0 | Status: DISCONTINUED | OUTPATIENT
Start: 2019-04-16 | End: 2019-04-17

## 2019-04-16 RX ORDER — INSULIN LISPRO 100/ML
15 VIAL (ML) SUBCUTANEOUS ONCE
Qty: 0 | Refills: 0 | Status: COMPLETED | OUTPATIENT
Start: 2019-04-16 | End: 2019-04-16

## 2019-04-16 RX ORDER — FUROSEMIDE 40 MG
40 TABLET ORAL ONCE
Qty: 0 | Refills: 0 | Status: COMPLETED | OUTPATIENT
Start: 2019-04-16 | End: 2019-04-16

## 2019-04-16 RX ORDER — HEPARIN SODIUM 5000 [USP'U]/ML
2000 INJECTION INTRAVENOUS; SUBCUTANEOUS
Qty: 25000 | Refills: 0 | Status: DISCONTINUED | OUTPATIENT
Start: 2019-04-16 | End: 2019-04-16

## 2019-04-16 RX ORDER — HEPARIN SODIUM 5000 [USP'U]/ML
2000 INJECTION INTRAVENOUS; SUBCUTANEOUS
Qty: 25000 | Refills: 0 | Status: DISCONTINUED | OUTPATIENT
Start: 2019-04-16 | End: 2019-04-17

## 2019-04-16 RX ADMIN — Medication 6: at 11:20

## 2019-04-16 RX ADMIN — PANTOPRAZOLE SODIUM 40 MILLIGRAM(S): 20 TABLET, DELAYED RELEASE ORAL at 06:25

## 2019-04-16 RX ADMIN — LACTULOSE 15 GRAM(S): 10 SOLUTION ORAL at 17:39

## 2019-04-16 RX ADMIN — HEPARIN SODIUM 20 UNIT(S)/HR: 5000 INJECTION INTRAVENOUS; SUBCUTANEOUS at 23:14

## 2019-04-16 RX ADMIN — Medication 40 MILLIGRAM(S): at 06:25

## 2019-04-16 RX ADMIN — Medication 4: at 17:48

## 2019-04-16 RX ADMIN — Medication 100 MILLIGRAM(S): at 15:41

## 2019-04-16 RX ADMIN — ATORVASTATIN CALCIUM 40 MILLIGRAM(S): 80 TABLET, FILM COATED ORAL at 21:11

## 2019-04-16 RX ADMIN — CHLORHEXIDINE GLUCONATE 1 APPLICATION(S): 213 SOLUTION TOPICAL at 06:24

## 2019-04-16 RX ADMIN — Medication 667 MILLIGRAM(S): at 17:38

## 2019-04-16 RX ADMIN — Medication 300 MILLIGRAM(S): at 06:25

## 2019-04-16 RX ADMIN — Medication 15 UNIT(S): at 12:45

## 2019-04-16 RX ADMIN — Medication 300 MILLIGRAM(S): at 15:41

## 2019-04-16 RX ADMIN — Medication 0.2 MILLIGRAM(S): at 21:10

## 2019-04-16 RX ADMIN — SENNA PLUS 2 TABLET(S): 8.6 TABLET ORAL at 21:10

## 2019-04-16 RX ADMIN — Medication 40 MILLIGRAM(S): at 11:35

## 2019-04-16 RX ADMIN — Medication 17 UNIT(S): at 17:47

## 2019-04-16 RX ADMIN — Medication 20 MILLIGRAM(S): at 06:25

## 2019-04-16 RX ADMIN — Medication 15 UNIT(S): at 11:19

## 2019-04-16 RX ADMIN — CEFEPIME 100 MILLIGRAM(S): 1 INJECTION, POWDER, FOR SOLUTION INTRAMUSCULAR; INTRAVENOUS at 17:37

## 2019-04-16 RX ADMIN — Medication 0.2 MILLIGRAM(S): at 06:25

## 2019-04-16 RX ADMIN — Medication 2000 UNIT(S): at 11:21

## 2019-04-16 RX ADMIN — HEPARIN SODIUM 20 UNIT(S)/HR: 5000 INJECTION INTRAVENOUS; SUBCUTANEOUS at 17:39

## 2019-04-16 RX ADMIN — Medication 100 MILLIGRAM(S): at 06:25

## 2019-04-16 RX ADMIN — Medication 0.2 MILLIGRAM(S): at 15:41

## 2019-04-16 RX ADMIN — AMLODIPINE BESYLATE 10 MILLIGRAM(S): 2.5 TABLET ORAL at 06:25

## 2019-04-16 RX ADMIN — Medication 100 MILLIGRAM(S): at 21:12

## 2019-04-16 RX ADMIN — Medication 40 MILLIGRAM(S): at 17:38

## 2019-04-16 RX ADMIN — Medication 300 MILLIGRAM(S): at 21:10

## 2019-04-16 RX ADMIN — INSULIN GLARGINE 50 UNIT(S): 100 INJECTION, SOLUTION SUBCUTANEOUS at 23:16

## 2019-04-16 NOTE — CHART NOTE - NSCHARTNOTEFT_GEN_A_CORE
CCU DOWNGRADE NOTE:    59y Male transferred to floor from CCU    Patient is a 59y old Male who presents with a chief complaint of Right sided weakness (16 Apr 2019 13:46)    The patient is currently admitted for the primary diagnosis of Intraparenchymal hemorrhage of brain complicated by acute hypoxic/hypercapnic respiratory failure with Newly found B/l DVT.     The patient was admitted to the unit for (6) Days.        Indwelling vascular catheters: IV     Urinary Catheter:  NONE    Disposition: Guarded     Code Status: FULL    ICU COURSE OF EVENTS:  -------------------------------------------------------------------------------------------  60 yo M with PMHx of HTN, DM, CAD on aspirin, ZE who was BIBEMS for ams and aphasia. Stroked code called, had NIHSS 15. CTH showed L frontotemporal intraparenchymal bleed with extension into the ventricle and 2mm shift to the R. Admitted to ICU s/p platelets, DDAVP, nicardipine. On 3E, pt respiratory status worsened, requiring BiPAP. Repeat duplex 4/12 showed new right posterior tibial and peroneal and left gastrocnemius and branch of posterior tibial thrombus. Duplex venous 4/6 was neg for DVT. Neurosurgery cleared pt for anticoagulation. CTA was not performed due to CKD and would not  to anticoagulate.     Today's Events: Increased Lantus and  lispro,  downgrade to neuro floor.   , DC cefepime, OOBTC, , NS   1 no need for repeat CT head CTH, 2 can start Oral AC if benefit outweigh risk of bleed, decreasing nuero checks Q4.  PT/Rehab f/u ptt     IMPRESSION   Intraparenchymal hemorrhage- currently Stable   Acute on chronic hypercapnic respiratory failure  Probable ZE +/- OHS  DESI improving  DVT ON IV HEPARIN  GOUT Flare       Plan   #Acute on chronic hypercapnic respiratory failure, possible PE  - Venous duplex 4/12 showed new right posterior tibial and peroneal and left gastrocnemius and branch of posterior tibial thrombus.  - CTA lungs (not done due to CKD) and V/Q scan would not  to anticoagulate  - Neurosurgery cleared pt for anticoagulation ([id number] or [id number]) 04/12  - Vascular rec noted ([id number])  - Last day of cefepime   - heparin drip, monitor PTT (goal 60-70 due to intraparenchymal bleed)   -  Repeat CT done on 04/13  stable   - Neuro check q4 hr  - BiPAP qhs and prn  - CXR opacity - likely vascular congestion  Gave 1 extra dose of lasix today.   - DC End tidal CO2 monitoring       GOUT FLARE   - c/w  PO steroid 20mg daily           #L frontotemporal intraparenchymal bleed with 2mm shift   - no acute neurosurgical intervention  - CTH 4/2: L frontotemporal intraparenchymal bleed with extension into the ventricle and 2mm shift to the R  - CTH 4/3: no change  - CTH (repeat #2) 4/3: no change  - CTH 4/6: no change  - CTH 4/12- Unchanged L basal ganglia intraparenchymal hematoma with mass effect on the left lateral ventricle. Unchanged 2 mm left to right midline shift.  - CTH 4/13  Stable   - neuro chest q4   - DG ro neuro Floor or stroke unit   -  NS cleared for AC       #Abd distension  had large bowel Movement today     #Hypertensive emergency  - goal BP < 160  - labetalol 300 mg TID, amlodipine 10 mg daily, hydralazine 100 mg TID  - clonidine to 0.1 mg TID    #HFpEF  - Echo 4/4- EF 58%, mild inc LV wall thick, mild TR, mild AS, mild pulm HTN  - Repeat echo EF 55% to 60% EF MR, Mild Aortic valve  thinking   - cxr w/ interval blunting of costophrenic angle, hazy opacity  -c/w  40 q12h IV for now due to generalized swelling  - follows w/ Dr. Man    #DESI on CKD3  - baseline Cr 1.9 in in 10/2018  - renal US 4/3: no hydronephrosis, L renal cyst  - per nephro: no need for urgent dialysis  - nephro following     #New onset afib  - rate controlled  - labetalol 300 mg TID  - Heparin drip    #Fever. Resolved.  - Possible bibasilar pneumonia vs gout attack  - urine cx 4/2: negative  - blood cx: NTD  - uric acid 9.9  - c/w cefepime (start 4/6) last day today     #DM glucose uncontrolled   - lantus/lispro/LSS    #HLD  - lipitor      Electrolyte Imbalances: Correct as needed  []  Hyponatremia   /   Hypernatremia  []   []  Hypokalemia   /   Hyperkalemia  []   []  Hypochlorhydria   /    Hypochlorhydria  []   []  Hypomagnesemia   /   Hypermagnesemia  []   []  Hypophosphatemia   /   Hyperphosphatemia  []       GI ppx:                                   [] Not indicated   /   [x] Pantoprazole 40mg PO Daily    DVT ppx:  [] Not indicated / [] Heparin 5000mg SubQ / [x] Lovenox 40mg SubQ / [] SCDs    Fluids:  Dysphasia 3   [x] PO  |  [] IVF    Activity:  [X] Assisatnce needed   [X] Increase as Tolerated  /  [] OOB w/ assist  /  [] Bed Rest    BMI:  Height (cm): 170.18 (04-12)  Weight (kg): 150.5 (04-12)  BMI (kg/m2): 52 (04-12)        DISPO:  Patient to be discharged when condition(s) optimized.  [ x] From Home     [ ] NH/SNF   [ ] 4A Rehab  [ ] Detox Clinic  [X] Plan Discussion with patient and/or family.  [X] Discussed Case and Plan with the Medical Attending.    CODE STATUS  [X] FULL   /    [] DNR          -------------------------------------------------------------------------------------------    Current workup in progress:  f/u PTT AT 4PM AND 1130PM      SIGN OUT AT 04-16-19 @ 15:50 GIVEN TO:

## 2019-04-16 NOTE — PROGRESS NOTE ADULT - ASSESSMENT
58yo M with Past Medical History HTN, DM, CAD, and Sleep Apnea admitted for altered mental status and aphasia secondary to an acute intraparenchymal hemorrhage.  His clinical course was complicated by acute respiratory failure rule out pulmonary embolism, uncontrolled hypertension, and acute kidney failure. Repeat duplex 4/12 showed new right posterior tibial and peroneal and left gastrocnemius and branch of posterior tibial thrombus. Duplex venous 4/6 was neg for DVT.     1.	Acute on chronic hypercapnic respiratory failure, possible PE  2.	Lt. Frontotemporal intraparenchymal bleed with 2mm shift   3.	B/L LE DVT  4.	Hypertensive Emergency on admission  5.	Morbid obesity / Possible ZE & OHS  6.	DESI on CKD-3  7.	Ac. HFpEF  8.	New onset A. Fib  9.	DM-2 / DL  10.	Ac. Gout          PLAN:    ·	Neuro F/U noted. Recommended brain MRI  ·	Repeat head CT on 4/13 was stable  ·	Pt was cleared by Neurosurgery to start on IV heparin. Cont IV heparin and follow PTT  ·	BP stable on current meds  ·	Cr. improving. 2.1 today  ·	CXR shows bibasilar opacities and congestion. Cont Lasix 40 mg IVP q 12h  ·	Check I'S and O'S and daily wt.  ·	Monitor FS. Cont Insuline  ·	Will cont prednisone 20 mg po daily for gout.  ·	Cont his other meds.  ·	Plan of care D/W the wife on bedside. 58yo M with Past Medical History HTN, DM, CAD, and Sleep Apnea admitted for altered mental status and aphasia secondary to an acute intraparenchymal hemorrhage.  His clinical course was complicated by acute respiratory failure rule out pulmonary embolism, uncontrolled hypertension, and acute kidney failure. Repeat duplex 4/12 showed new right posterior tibial and peroneal and left gastrocnemius and branch of posterior tibial thrombus. Duplex venous 4/6 was neg for DVT.     1.	Acute on chronic hypercapnic respiratory failure, possible PE  2.	Lt. Frontotemporal intraparenchymal bleed with 2mm shift   3.	B/L LE DVT  4.	Hypertensive Emergency on admission  5.	Morbid obesity / Possible ZE & OHS  6.	DESI on CKD-3  7.	Ac. HFpEF  8.	New onset A. Fib  9.	DM-2 / DL  10.	Ac. Gout          PLAN:    ·	Neuro F/U noted. Recommended brain MRI  ·	Repeat head CT on 4/13 was stable  ·	Pt was cleared by Neurosurgery to start on IV heparin. Cont IV heparin and follow PTT  ·	BP stable on current meds  ·	Cr. improving. 2.1 today  ·	CXR shows bibasilar opacities and congestion. Cont Lasix 40 mg IVP q 12h  ·	Check I'S and O'S and daily wt.  ·	Monitor FS. Increase Lantus to 50 units qhs and Humalog to 17 units pre meals.    ·	Will cont prednisone 20 mg po daily for gout.  ·	Cont his other meds.  ·	Plan of care D/W the daughter on bedside.

## 2019-04-16 NOTE — PROGRESS NOTE ADULT - SUBJECTIVE AND OBJECTIVE BOX
GRIS TIDWELL  59y Male    CHIEF COMPLAINT:    Patient is a 59y old  Male who presents with a chief complaint of Right sided weakness (2019 09:00)      INTERVAL HPI/OVERNIGHT EVENTS:    Patient seen and examined.    ROS: All other systems are negative.    Vital Signs:    T(F): 97.9 (19 @ 12:00), Max: 99 (04-15-19 @ 16:00)  HR: 62 (19 @ 12:00) (56 - 64)  BP: 154/71 (19 @ 12:00) (118/60 - 172/86)  RR: 25 (19 @ 12:00) (20 - 30)  SpO2: 93% (19 @ 12:00) (93% - 97%)  I&O's Summary    15 Apr 2019 07:  -  2019 07:00  --------------------------------------------------------  IN: 1220 mL / OUT: 1965 mL / NET: -745 mL    2019 07:  -  2019 12:31  --------------------------------------------------------  IN: 88 mL / OUT: 650 mL / NET: -562 mL      Daily     Daily Weight in k.9 (2019 06:00)  CAPILLARY BLOOD GLUCOSE      POCT Blood Glucose.: 455 mg/dL (2019 11:16)  POCT Blood Glucose.: 294 mg/dL (2019 07:58)  POCT Blood Glucose.: 250 mg/dL (15 Apr 2019 21:24)  POCT Blood Glucose.: 263 mg/dL (15 Apr 2019 17:13)      PHYSICAL EXAM:    GENERAL:  NAD  SKIN: No rashes or lesions  HENT: Atrumatic. Normocephalic. PERRL. Moist membranes.  NECK: Supple, No JVD. No lymphadenopathy.  PULMONARY: CTA B/L. No wheezing. No rales  CVS: Normal S1, S2. Rate and Rythm are regular. No murmurs.  ABDOMEN/GI: Soft, Nontender, Nondistended; BS present  EXTREMITIES: Peripheral pulses intact. No edema B/L LE.  NEUROLOGIC:  No motor or sensory deficit.  PSYCH: Alert & oriented x 3    Consultant(s) Notes Reviewed:  [x ] YES  [ ] NO  Care Discussed with Consultants/Other Providers [ x] YES  [ ] NO    EKG reviewed  Telemetry reviewed    LABS:                        11.1   12.12 )-----------( 325      ( 2019 04:24 )             36.4     04-16    142  |  98  |  88<HH>  ----------------------------<  356<H>  Creatinine Trend: 2.1<--, 2.2<--, 2.2<--, 2.1<--, 2.5<--, 2.9<--  4.9   |  28  |  2.1<H>    Ca    9.0      2019 04:24  Phos  5.2     04-16  Mg     2.0     04-16      PT/INR - ( 2019 04:24 )   PT: 13.60 sec;   INR: 1.18 ratio         PTT - ( 2019 11:09 )  PTT:73.7 sec  Serum Pro-Brain Natriuretic Peptide: 857 pg/mL (19 @ 01:23)  Serum Pro-Brain Natriuretic Peptide: 1203 pg/mL (19 @ 08:52)          RADIOLOGY & ADDITIONAL TESTS:      Imaging or report Personally Reviewed:  [ ] YES  [ ] NO    Medications:  Standing  amLODIPine   Tablet 10 milliGRAM(s) Oral daily  atorvastatin Oral Tab/Cap - Peds 40 milliGRAM(s) Oral daily  calcium acetate 667 milliGRAM(s) Oral three times a day with meals  cefepime   IVPB 1000 milliGRAM(s) IV Intermittent every 24 hours  chlorhexidine 4% Liquid 1 Application(s) Topical every 12 hours  cholecalciferol 2000 Unit(s) Oral daily  cloNIDine 0.2 milliGRAM(s) Oral every 8 hours  dextrose 5%. 1000 milliLiter(s) IV Continuous <Continuous>  dextrose 50% Injectable 12.5 Gram(s) IV Push once  dextrose 50% Injectable 25 Gram(s) IV Push once  dextrose 50% Injectable 25 Gram(s) IV Push once  furosemide   Injectable 40 milliGRAM(s) IV Push two times a day  heparin  Infusion 1700 Unit(s)/Hr IV Continuous <Continuous>  hydrALAZINE 100 milliGRAM(s) Oral every 8 hours  insulin glargine Injectable (LANTUS) 45 Unit(s) SubCutaneous at bedtime  insulin lispro (HumaLOG) corrective regimen sliding scale   SubCutaneous three times a day before meals  insulin lispro Injectable (HumaLOG) 15 Unit(s) SubCutaneous three times a day before meals  labetalol 300 milliGRAM(s) Oral every 8 hours  lactulose Syrup 15 Gram(s) Oral two times a day  pantoprazole    Tablet 40 milliGRAM(s) Oral before breakfast  predniSONE   Tablet 20 milliGRAM(s) Oral daily  senna 2 Tablet(s) Oral at bedtime    PRN Meds  acetaminophen   Tablet .. 650 milliGRAM(s) Oral every 6 hours PRN  aluminum hydroxide/magnesium hydroxide/simethicone Suspension 30 milliLiter(s) Oral every 6 hours PRN  dextrose 40% Gel 15 Gram(s) Oral once PRN  glucagon  Injectable 1 milliGRAM(s) IntraMuscular once PRN      Case discussed with resident    Care discussed with pt/family DIANN GRIS  59y Male    CHIEF COMPLAINT:    Patient is a 59y old  Male who presents with a chief complaint of Right sided weakness (2019 09:00)      INTERVAL HPI/OVERNIGHT EVENTS:    Patient seen and examined. No headache. No N/V. No dizziness.     ROS: All other systems are negative.    Vital Signs:    T(F): 97.9 (19 @ 12:00), Max: 99 (04-15-19 @ 16:00)  HR: 62 (19 @ 12:00) (56 - 64)  BP: 154/71 (19 @ 12:00) (118/60 - 172/86)  RR: 25 (19 @ 12:00) (20 - 30)  SpO2: 93% (19 @ 12:00) (93% - 97%)  I&O's Summary    15 Apr 2019 07:  -  2019 07:00  --------------------------------------------------------  IN: 1220 mL / OUT: 1965 mL / NET: -745 mL    2019 07:01  -  2019 12:31  --------------------------------------------------------  IN: 88 mL / OUT: 650 mL / NET: -562 mL      Daily     Daily Weight in k.9 (2019 06:00)  CAPILLARY BLOOD GLUCOSE      POCT Blood Glucose.: 455 mg/dL (2019 11:16)  POCT Blood Glucose.: 294 mg/dL (2019 07:58)  POCT Blood Glucose.: 250 mg/dL (15 Apr 2019 21:24)  POCT Blood Glucose.: 263 mg/dL (15 Apr 2019 17:13)      PHYSICAL EXAM:    GENERAL:  NAD  SKIN: No rashes or lesions  HENT: Atraumatic Normocephalic. PERRL. Moist membranes.  NECK: Supple, No JVD. No lymphadenopathy.  PULMONARY: CTA B/L. No wheezing. No rales  CVS: Normal S1, S2. Rate and Rhythm are regular. No murmurs.  ABDOMEN/GI: Soft, Nontender, Nondistended; BS present  EXTREMITIES: Peripheral pulses intact. 1+ pitting edema B/L LE.  NEUROLOGIC:  Rt. sided hemiparesis  PSYCH: Alert & oriented x 3    Consultant(s) Notes Reviewed:  [x ] YES  [ ] NO  Care Discussed with Consultants/Other Providers [ x] YES  [ ] NO    EKG reviewed  Telemetry reviewed    LABS:                        11.1   12.12 )-----------( 325      ( 2019 04:24 )             36.4     04-16    142  |  98  |  88<HH>  ----------------------------<  356<H>  Creatinine Trend: 2.1<--, 2.2<--, 2.2<--, 2.1<--, 2.5<--, 2.9<--  4.9   |  28  |  2.1<H>    Ca    9.0      2019 04:24  Phos  5.2     04-16  Mg     2.0     04-16      PT/INR - ( 2019 04:24 )   PT: 13.60 sec;   INR: 1.18 ratio         PTT - ( 2019 11:09 )  PTT:73.7 sec  Serum Pro-Brain Natriuretic Peptide: 857 pg/mL (19 @ 01:23)  Serum Pro-Brain Natriuretic Peptide: 1203 pg/mL (19 @ 08:52)          RADIOLOGY & ADDITIONAL TESTS:      Imaging or report Personally Reviewed:  [ ] YES  [ ] NO    Medications:  Standing  amLODIPine   Tablet 10 milliGRAM(s) Oral daily  atorvastatin Oral Tab/Cap - Peds 40 milliGRAM(s) Oral daily  calcium acetate 667 milliGRAM(s) Oral three times a day with meals  cefepime   IVPB 1000 milliGRAM(s) IV Intermittent every 24 hours  chlorhexidine 4% Liquid 1 Application(s) Topical every 12 hours  cholecalciferol 2000 Unit(s) Oral daily  cloNIDine 0.2 milliGRAM(s) Oral every 8 hours  dextrose 5%. 1000 milliLiter(s) IV Continuous <Continuous>  dextrose 50% Injectable 12.5 Gram(s) IV Push once  dextrose 50% Injectable 25 Gram(s) IV Push once  dextrose 50% Injectable 25 Gram(s) IV Push once  furosemide   Injectable 40 milliGRAM(s) IV Push two times a day  heparin  Infusion 1700 Unit(s)/Hr IV Continuous <Continuous>  hydrALAZINE 100 milliGRAM(s) Oral every 8 hours  insulin glargine Injectable (LANTUS) 45 Unit(s) SubCutaneous at bedtime  insulin lispro (HumaLOG) corrective regimen sliding scale   SubCutaneous three times a day before meals  insulin lispro Injectable (HumaLOG) 15 Unit(s) SubCutaneous three times a day before meals  labetalol 300 milliGRAM(s) Oral every 8 hours  lactulose Syrup 15 Gram(s) Oral two times a day  pantoprazole    Tablet 40 milliGRAM(s) Oral before breakfast  predniSONE   Tablet 20 milliGRAM(s) Oral daily  senna 2 Tablet(s) Oral at bedtime    PRN Meds  acetaminophen   Tablet .. 650 milliGRAM(s) Oral every 6 hours PRN  aluminum hydroxide/magnesium hydroxide/simethicone Suspension 30 milliLiter(s) Oral every 6 hours PRN  dextrose 40% Gel 15 Gram(s) Oral once PRN  glucagon  Injectable 1 milliGRAM(s) IntraMuscular once PRN      Case discussed with resident    Care discussed with pt/family

## 2019-04-16 NOTE — PROGRESS NOTE ADULT - SUBJECTIVE AND OBJECTIVE BOX
Patient is a 59y old  Male who presents with a chief complaint of Right sided weakness (15 Apr 2019 20:12)        Over Night Events: none. more responsive. tolerating PO diet        ROS:  See HPI    PHYSICAL EXAM    ICU Vital Signs Last 24 Hrs  T(C): 36 (16 Apr 2019 08:00), Max: 37.2 (15 Apr 2019 12:00)  T(F): 96.8 (16 Apr 2019 08:00), Max: 99 (15 Apr 2019 16:00)  HR: 56 (16 Apr 2019 08:00) (56 - 64)  BP: 145/71 (16 Apr 2019 08:00) (118/60 - 172/86)  BP(mean): 102 (16 Apr 2019 08:00) (85 - 135)  ABP: --  ABP(mean): --  RR: 21 (16 Apr 2019 08:00) (20 - 34)  SpO2: 93% (16 Apr 2019 08:00) (93% - 97%)      General: NAD   HEENT: TIMMY             Lymphatic system: No cervical LN   Lungs: Bilateral BS, CTBA   Cardiovascular: Regular   Gastrointestinal: Soft, Positive BS  Extremities: No clubbing.  Moves extremities.  residual RU/lE weakness  Skin: Warm, intact  Neurological: expressive aphasia      04-15-19 @ 07:01  -  04-16-19 @ 07:00  --------------------------------------------------------  IN:    heparin Infusion: 500 mL    Oral Fluid: 720 mL  Total IN: 1220 mL    OUT:    Indwelling Catheter - Urethral: 975 mL    Voided: 990 mL  Total OUT: 1965 mL    Total NET: -745 mL          LABS:                            11.1   12.12 )-----------( 325      ( 16 Apr 2019 04:24 )             36.4                                               04-16    142  |  98  |  88<HH>  ----------------------------<  356<H>  4.9   |  28  |  2.1<H>    Ca    9.0      16 Apr 2019 04:24  Phos  5.2     04-16  Mg     2.0     04-16        PT/INR - ( 16 Apr 2019 04:24 )   PT: 13.60 sec;   INR: 1.18 ratio         PTT - ( 16 Apr 2019 04:24 )  PTT:78.7 sec                                                                                                                                                                                                                   MEDICATIONS  (STANDING):  amLODIPine   Tablet 10 milliGRAM(s) Oral daily  atorvastatin Oral Tab/Cap - Peds 40 milliGRAM(s) Oral daily  calcium acetate 667 milliGRAM(s) Oral three times a day with meals  cefepime   IVPB 1000 milliGRAM(s) IV Intermittent every 24 hours  chlorhexidine 4% Liquid 1 Application(s) Topical every 12 hours  cholecalciferol 2000 Unit(s) Oral daily  cloNIDine 0.2 milliGRAM(s) Oral every 8 hours  dextrose 5%. 1000 milliLiter(s) (50 mL/Hr) IV Continuous <Continuous>  dextrose 50% Injectable 12.5 Gram(s) IV Push once  dextrose 50% Injectable 25 Gram(s) IV Push once  dextrose 50% Injectable 25 Gram(s) IV Push once  furosemide   Injectable 40 milliGRAM(s) IV Push two times a day  heparin  Infusion 1700 Unit(s)/Hr (22 mL/Hr) IV Continuous <Continuous>  hydrALAZINE 100 milliGRAM(s) Oral every 8 hours  insulin glargine Injectable (LANTUS) 45 Unit(s) SubCutaneous at bedtime  insulin lispro (HumaLOG) corrective regimen sliding scale   SubCutaneous three times a day before meals  insulin lispro Injectable (HumaLOG) 15 Unit(s) SubCutaneous three times a day before meals  labetalol 300 milliGRAM(s) Oral every 8 hours  pantoprazole    Tablet 40 milliGRAM(s) Oral before breakfast  predniSONE   Tablet 20 milliGRAM(s) Oral daily  senna 2 Tablet(s) Oral at bedtime    MEDICATIONS  (PRN):  acetaminophen   Tablet .. 650 milliGRAM(s) Oral every 6 hours PRN Mild Pain (1 - 3)  aluminum hydroxide/magnesium hydroxide/simethicone Suspension 30 milliLiter(s) Oral every 6 hours PRN Dyspepsia  dextrose 40% Gel 15 Gram(s) Oral once PRN Blood Glucose LESS THAN 70 milliGRAM(s)/deciliter  glucagon  Injectable 1 milliGRAM(s) IntraMuscular once PRN Glucose LESS THAN 70 milligrams/deciliter      Xrays:     b/l opacities, b/l effusions Patient is a 59y old  Male who presents with a chief complaint of Right sided weakness (15 Apr 2019 20:12)        Over Night Events: none. more responsive. tolerating PO diet, s/p neuro eval        ROS:  See HPI    PHYSICAL EXAM    ICU Vital Signs Last 24 Hrs  T(C): 36 (16 Apr 2019 08:00), Max: 37.2 (15 Apr 2019 12:00)  T(F): 96.8 (16 Apr 2019 08:00), Max: 99 (15 Apr 2019 16:00)  HR: 56 (16 Apr 2019 08:00) (56 - 64)  BP: 145/71 (16 Apr 2019 08:00) (118/60 - 172/86)  BP(mean): 102 (16 Apr 2019 08:00) (85 - 135)  RR: 21 (16 Apr 2019 08:00) (20 - 34)  SpO2: 93% (16 Apr 2019 08:00) (93% - 97%)      General: NAD   HEENT: TIMMY             Lymphatic system: No cervical LN   Lungs: Bilateral BS, CTBA   Cardiovascular: Regular   Gastrointestinal: Soft, Positive BS  Extremities: No clubbing.  Moves extremities.  residual RU/lE weakness  Skin: Warm, intact  Neurological: expressive aphasia      04-15-19 @ 07:01  -  04-16-19 @ 07:00  --------------------------------------------------------  IN:    heparin Infusion: 500 mL    Oral Fluid: 720 mL  Total IN: 1220 mL    OUT:    Indwelling Catheter - Urethral: 975 mL    Voided: 990 mL  Total OUT: 1965 mL    Total NET: -745 mL          LABS:                            11.1   12.12 )-----------( 325      ( 16 Apr 2019 04:24 )             36.4                                               04-16    142  |  98  |  88<HH>  ----------------------------<  356<H>  4.9   |  28  |  2.1<H>    Ca    9.0      16 Apr 2019 04:24  Phos  5.2     04-16  Mg     2.0     04-16        PT/INR - ( 16 Apr 2019 04:24 )   PT: 13.60 sec;   INR: 1.18 ratio         PTT - ( 16 Apr 2019 04:24 )  PTT:78.7 sec                                                                                                                                                                                                                   MEDICATIONS  (STANDING):  amLODIPine   Tablet 10 milliGRAM(s) Oral daily  atorvastatin Oral Tab/Cap - Peds 40 milliGRAM(s) Oral daily  calcium acetate 667 milliGRAM(s) Oral three times a day with meals  cefepime   IVPB 1000 milliGRAM(s) IV Intermittent every 24 hours  chlorhexidine 4% Liquid 1 Application(s) Topical every 12 hours  cholecalciferol 2000 Unit(s) Oral daily  cloNIDine 0.2 milliGRAM(s) Oral every 8 hours  dextrose 5%. 1000 milliLiter(s) (50 mL/Hr) IV Continuous <Continuous>  dextrose 50% Injectable 12.5 Gram(s) IV Push once  dextrose 50% Injectable 25 Gram(s) IV Push once  dextrose 50% Injectable 25 Gram(s) IV Push once  furosemide   Injectable 40 milliGRAM(s) IV Push two times a day  heparin  Infusion 1700 Unit(s)/Hr (22 mL/Hr) IV Continuous <Continuous>  hydrALAZINE 100 milliGRAM(s) Oral every 8 hours  insulin glargine Injectable (LANTUS) 45 Unit(s) SubCutaneous at bedtime  insulin lispro (HumaLOG) corrective regimen sliding scale   SubCutaneous three times a day before meals  insulin lispro Injectable (HumaLOG) 15 Unit(s) SubCutaneous three times a day before meals  labetalol 300 milliGRAM(s) Oral every 8 hours  pantoprazole    Tablet 40 milliGRAM(s) Oral before breakfast  predniSONE   Tablet 20 milliGRAM(s) Oral daily  senna 2 Tablet(s) Oral at bedtime    MEDICATIONS  (PRN):  acetaminophen   Tablet .. 650 milliGRAM(s) Oral every 6 hours PRN Mild Pain (1 - 3)  aluminum hydroxide/magnesium hydroxide/simethicone Suspension 30 milliLiter(s) Oral every 6 hours PRN Dyspepsia  dextrose 40% Gel 15 Gram(s) Oral once PRN Blood Glucose LESS THAN 70 milliGRAM(s)/deciliter  glucagon  Injectable 1 milliGRAM(s) IntraMuscular once PRN Glucose LESS THAN 70 milligrams/deciliter      Xrays:    reviewed

## 2019-04-16 NOTE — PROGRESS NOTE ADULT - SUBJECTIVE AND OBJECTIVE BOX
SUBJECTIVE:    Patient is a 59y old Male who presents with a chief complaint of Right sided weakness (16 Apr 2019 12:30)    Overnight Eevents:  Patient was seen at bed side this morning  there were no acute events during the night. Patient deneis fevers chills  worsening sob or chest pain. CAM ICU negative.             PAST MEDICAL & SURGICAL HISTORY  Gout  Morbidly obese  Gout  Hypertension  Diabetes mellitus  S/P tonsillectomy    SOCIAL HISTORY:  Negative for smoking/alcohol/drug use.     ALLERGIES:  No Known Allergies    MEDICATIONS:  STANDING MEDICATIONS  amLODIPine   Tablet 10 milliGRAM(s) Oral daily  atorvastatin Oral Tab/Cap - Peds 40 milliGRAM(s) Oral daily  calcium acetate 667 milliGRAM(s) Oral three times a day with meals  cefepime   IVPB 1000 milliGRAM(s) IV Intermittent every 24 hours  chlorhexidine 4% Liquid 1 Application(s) Topical every 12 hours  cholecalciferol 2000 Unit(s) Oral daily  cloNIDine 0.2 milliGRAM(s) Oral every 8 hours  dextrose 5%. 1000 milliLiter(s) IV Continuous <Continuous>  dextrose 50% Injectable 12.5 Gram(s) IV Push once  dextrose 50% Injectable 25 Gram(s) IV Push once  dextrose 50% Injectable 25 Gram(s) IV Push once  furosemide   Injectable 40 milliGRAM(s) IV Push two times a day  heparin  Infusion 1700 Unit(s)/Hr IV Continuous <Continuous>  hydrALAZINE 100 milliGRAM(s) Oral every 8 hours  insulin glargine Injectable (LANTUS) 50 Unit(s) SubCutaneous at bedtime  insulin lispro (HumaLOG) corrective regimen sliding scale   SubCutaneous three times a day before meals  insulin lispro Injectable (HumaLOG) 17 Unit(s) SubCutaneous three times a day before meals  labetalol 300 milliGRAM(s) Oral every 8 hours  lactulose Syrup 15 Gram(s) Oral two times a day  pantoprazole    Tablet 40 milliGRAM(s) Oral before breakfast  predniSONE   Tablet 20 milliGRAM(s) Oral daily  senna 2 Tablet(s) Oral at bedtime    PRN MEDICATIONS  acetaminophen   Tablet .. 650 milliGRAM(s) Oral every 6 hours PRN  aluminum hydroxide/magnesium hydroxide/simethicone Suspension 30 milliLiter(s) Oral every 6 hours PRN  dextrose 40% Gel 15 Gram(s) Oral once PRN  glucagon  Injectable 1 milliGRAM(s) IntraMuscular once PRN    VITALS:   T(F): 97.9  HR: 62  BP: 154/71  RR: 25  SpO2: 93%    PHYSICAL EXAM:  . General: NAD  . HEENT  · Respiratory: Breath Sounds equal & clear to  auscultation, no accessory muscle use  · Cardiovascular: Regular rate & rhythm, normal S1, S2; no murmurs, gallops or rubs; no S3, S4  · Gastrointestinal Soft, non-tender, no hepatosplenomegaly, normal bowel sounds  · Genitourinary: Normal genitalia; no lesions; no discharge  · Extremities:	No cyanosis, clubbing, Has +LE edema, mild wrist pain  · Skin no macular rashs, no lacerations.       Neurologic:  A&O x2  	-- CN: unimpaired visual acuity.  Blink to threat intact B/L.  No gaze palsy.;  Pupils reactive b/l. Face right lower facial droop.  Hearing intact b/L. no tongue deviation; soft palette elevation symmetric; shoulder shrug symmetric b/l, mild word finding deficit.  	-- Motor: Normal bulk and tone throughout. Moving B/L upper extremities:  drift in right Upper extremity  and  minimal movement against gravity in right lower  extremity., in left extremities  can resist against pushing, grossly normal.   	-- Sensory: sensation in tact b/l to light touch  	-- Coordination: no finger to nose dysmetria; no limb ataxia noted during cerebellar exam.  	--  -- Gait: can not stand up unassisted;      LABS:                        11.1   12.12 )-----------( 325      ( 16 Apr 2019 04:24 )             36.4     04-16    142  |  98  |  88<HH>  ----------------------------<  356<H>  4.9   |  28  |  2.1<H>    Ca    9.0      16 Apr 2019 04:24  Phos  5.2     04-16  Mg     2.0     04-16      PT/INR - ( 16 Apr 2019 04:24 )   PT: 13.60 sec;   INR: 1.18 ratio         PTT - ( 16 Apr 2019 11:09 )  PTT:73.7 sec                  04-15-19 @ 07:01  -  04-16-19 @ 07:00  --------------------------------------------------------  IN: 1220 mL / OUT: 1965 mL / NET: -745 mL    04-16-19 @ 07:01  - 04-16-19 @ 13:46  --------------------------------------------------------  IN: 132 mL / OUT: 1050 mL / NET: -918 mL          Radiology:  CXR - shows mild vascular Congestion

## 2019-04-16 NOTE — PROGRESS NOTE ADULT - ASSESSMENT
IMPRESSION:    Intraparenchymal hemorrhage  Acute on chronic hypercapnic respiratory failure/ fluid overload  Probable ZE +/- OHS  DESI improving  DVT ON IV HEPARIN      PLAN:    CNS: neurosurgery f/u, neurochecks q4h    HEENT:  Oral care    PULMONARY:  HOB @ 45 degrees, oxygen to keep pulse ox>90%, bipap at night    CARDIOVASCULAR:  I < O    GI: GI prophylaxis  Feeding per speech and swallow     RENAL:  F/u  lytes.  Correct as needed.     INFECTIOUS DISEASE: Repeat Cultures. d/c abx     HEMATOLOGICAL:  DVT prophylaxis, heparin gtt, f/u ptt    ENDOCRINE:  Follow up FS.      MUSCULOSKELETAL: OOB to chair.     CODE STATUS: FULL CODE    transfer to floor.     PT/rehab c/s IMPRESSION:    Intraparenchymal hemorrhage  Acute on chronic hypercapnic respiratory failure/ fluid overload  Probable ZE +/- OHS  DESI improving  DVT ON IV HEPARIN      PLAN:    CNS: neurosurgery f/u, neurochecks q4h    HEENT:  Oral care    PULMONARY:  HOB @ 45 degrees, oxygen to keep pulse ox>90%, bipap at night, taper prednisone    CARDIOVASCULAR:  I < O    GI: GI prophylaxis  Feeding per speech and swallow     RENAL:  F/u  lytes.  Correct as needed. change lasix to po twice daily    INFECTIOUS DISEASE: Repeat Cultures. d/c abx     HEMATOLOGICAL:  DVT prophylaxis, heparin gtt, f/u ptt    ENDOCRINE:  Follow up FS.      MUSCULOSKELETAL: OOB to chair.     CODE STATUS: FULL CODE    transfer to floor.     PT/rehab c/s

## 2019-04-16 NOTE — PROGRESS NOTE ADULT - ASSESSMENT
58 yo M with PMHx of HTN, DM, CAD on aspirin, ZE who was BIBEMS for ams and aphasia. Stroked code called, had NIHSS 15. CTH showed L frontotemporal intraparenchymal bleed with extension into the ventricle and 2mm shift to the R. Admitted to ICU s/p platelets, DDAVP, nicardipine. On 3E, pt respiratory status worsened, requiring BiPAP. Repeat duplex 4/12 showed new right posterior tibial and peroneal and left gastrocnemius and branch of posterior tibial thrombus. Duplex venous 4/6 was neg for DVT. Neurosurgery cleared pt for anticoagulation. CTA was not performed due to CKD and would not  to anticoagulate.     Today's Events: Increased Lantus and  lispro,  downgrade to neuro floor.   , DC cefepime, OOBTC, , NS   1 no need for repeat CT head CTH, 2 can start Oral AC if benefit outweigh risk of bleed, decreasing nuero checks Q4.  PT/Rehab f/u ptt     IMPRESSION   Intraparenchymal hemorrhage- currently Stable   Acute on chronic hypercapnic respiratory failure  Probable ZE +/- OHS  DESI improving  DVT ON IV HEPARIN  GOUT Flare       Plan   #Acute on chronic hypercapnic respiratory failure, possible PE  - Venous duplex 4/12 showed new right posterior tibial and peroneal and left gastrocnemius and branch of posterior tibial thrombus.  - CTA lungs (not done due to CKD) and V/Q scan would not  to anticoagulate  - Neurosurgery cleared pt for anticoagulation ([id number] or [id number]) 04/12  - Vascular rec noted ([id number])  - Last day of cefepime   - heparin drip, monitor PTT (goal 60-70 due to intraparenchymal bleed)   -  Repeat CT done on 04/13  stable   - Neuro check q4 hr  - BiPAP qhs and prn  - CXR opacity - likely vascular congestion  Gave 1 extra dose of lasix today.   - DC End tidal CO2 monitoring       GOUT FLARE   - c/w  PO steroid 20mg daily           #L frontotemporal intraparenchymal bleed with 2mm shift   - no acute neurosurgical intervention  - CTH 4/2: L frontotemporal intraparenchymal bleed with extension into the ventricle and 2mm shift to the R  - CTH 4/3: no change  - CTH (repeat #2) 4/3: no change  - CTH 4/6: no change  - CTH 4/12- Unchanged L basal ganglia intraparenchymal hematoma with mass effect on the left lateral ventricle. Unchanged 2 mm left to right midline shift.  - CTH 4/13  Stable   - neuro chest q4   - DG ro neuro Floor or stroke unit   -  NS cleared for AC       #Abd distension  had large bowel Movement today     #Hypertensive emergency  - goal BP < 160  - labetalol 300 mg TID, amlodipine 10 mg daily, hydralazine 100 mg TID  - clonidine to 0.1 mg TID    #HFpEF  - Echo 4/4- EF 58%, mild inc LV wall thick, mild TR, mild AS, mild pulm HTN  - Repeat echo EF 55% to 60% EF MR, Mild Aortic valve  thinking   - cxr w/ interval blunting of costophrenic angle, hazy opacity  -c/w  40 q12h IV for now due to generalized swelling  - follows w/ Dr. Man    #DESI on CKD3  - baseline Cr 1.9 in in 10/2018  - renal US 4/3: no hydronephrosis, L renal cyst  - per nephro: no need for urgent dialysis  - nephro following     #New onset afib  - rate controlled  - labetalol 300 mg TID  - Heparin drip    #Fever. Resolved.  - Possible bibasilar pneumonia vs gout attack  - urine cx 4/2: negative  - blood cx: NTD  - uric acid 9.9  - c/w cefepime (start 4/6) last day today     #DM glucose uncontrolled   - lantus/lispro/LSS    #HLD  - lipitor      Electrolyte Imbalances: Correct as needed  []  Hyponatremia   /   Hypernatremia  []   []  Hypokalemia   /   Hyperkalemia  []   []  Hypochlorhydria   /    Hypochlorhydria  []   []  Hypomagnesemia   /   Hypermagnesemia  []   []  Hypophosphatemia   /   Hyperphosphatemia  []       GI ppx:                                   [] Not indicated   /   [x] Pantoprazole 40mg PO Daily    DVT ppx:  [] Not indicated / [] Heparin 5000mg SubQ / [x] Lovenox 40mg SubQ / [] SCDs    Fluids:  Dysphasia 3   [x] PO  |  [] IVF    Activity:  [X] Assisatnce needed   [X] Increase as Tolerated  /  [] OOB w/ assist  /  [] Bed Rest    BMI:  Height (cm): 170.18 (04-12)  Weight (kg): 150.5 (04-12)  BMI (kg/m2): 52 (04-12)        DISPO:  Patient to be discharged when condition(s) optimized.  [ x] From Home     [ ] NH/SNF   [ ] 4A Rehab  [ ] Detox Clinic  [X] Plan Discussion with patient and/or family.  [X] Discussed Case and Plan with the Medical Attending.    CODE STATUS  [X] FULL   /    [] DNR

## 2019-04-17 LAB
ANION GAP SERPL CALC-SCNC: 15 MMOL/L — HIGH (ref 7–14)
APTT BLD: 40.7 SEC — HIGH (ref 27–39.2)
APTT BLD: 45.3 SEC — HIGH (ref 27–39.2)
APTT BLD: 67.7 SEC — HIGH (ref 27–39.2)
APTT BLD: 89 SEC — CRITICAL HIGH (ref 27–39.2)
BASOPHILS # BLD AUTO: 0.03 K/UL — SIGNIFICANT CHANGE UP (ref 0–0.2)
BASOPHILS NFR BLD AUTO: 0.3 % — SIGNIFICANT CHANGE UP (ref 0–1)
BUN SERPL-MCNC: 92 MG/DL — CRITICAL HIGH (ref 10–20)
CALCIUM SERPL-MCNC: 9.1 MG/DL — SIGNIFICANT CHANGE UP (ref 8.5–10.1)
CHLORIDE SERPL-SCNC: 98 MMOL/L — SIGNIFICANT CHANGE UP (ref 98–110)
CO2 SERPL-SCNC: 30 MMOL/L — SIGNIFICANT CHANGE UP (ref 17–32)
CREAT SERPL-MCNC: 2.4 MG/DL — HIGH (ref 0.7–1.5)
EOSINOPHIL # BLD AUTO: 0.33 K/UL — SIGNIFICANT CHANGE UP (ref 0–0.7)
EOSINOPHIL NFR BLD AUTO: 3.1 % — SIGNIFICANT CHANGE UP (ref 0–8)
GLUCOSE BLDC GLUCOMTR-MCNC: 187 MG/DL — HIGH (ref 70–99)
GLUCOSE BLDC GLUCOMTR-MCNC: 248 MG/DL — HIGH (ref 70–99)
GLUCOSE BLDC GLUCOMTR-MCNC: 309 MG/DL — HIGH (ref 70–99)
GLUCOSE BLDC GLUCOMTR-MCNC: 348 MG/DL — HIGH (ref 70–99)
GLUCOSE SERPL-MCNC: 274 MG/DL — HIGH (ref 70–99)
HCT VFR BLD CALC: 37.3 % — LOW (ref 42–52)
HGB BLD-MCNC: 11.3 G/DL — LOW (ref 14–18)
IMM GRANULOCYTES NFR BLD AUTO: 0.7 % — HIGH (ref 0.1–0.3)
INR BLD: 1.17 RATIO — SIGNIFICANT CHANGE UP (ref 0.65–1.3)
LYMPHOCYTES # BLD AUTO: 0.87 K/UL — LOW (ref 1.2–3.4)
LYMPHOCYTES # BLD AUTO: 8.1 % — LOW (ref 20.5–51.1)
MAGNESIUM SERPL-MCNC: 2 MG/DL — SIGNIFICANT CHANGE UP (ref 1.8–2.4)
MCHC RBC-ENTMCNC: 26.3 PG — LOW (ref 27–31)
MCHC RBC-ENTMCNC: 30.3 G/DL — LOW (ref 32–37)
MCV RBC AUTO: 86.9 FL — SIGNIFICANT CHANGE UP (ref 80–94)
MONOCYTES # BLD AUTO: 0.53 K/UL — SIGNIFICANT CHANGE UP (ref 0.1–0.6)
MONOCYTES NFR BLD AUTO: 4.9 % — SIGNIFICANT CHANGE UP (ref 1.7–9.3)
NEUTROPHILS # BLD AUTO: 8.9 K/UL — HIGH (ref 1.4–6.5)
NEUTROPHILS NFR BLD AUTO: 82.9 % — HIGH (ref 42.2–75.2)
NRBC # BLD: 0 /100 WBCS — SIGNIFICANT CHANGE UP (ref 0–0)
PHOSPHATE SERPL-MCNC: 5.6 MG/DL — HIGH (ref 2.1–4.9)
PLATELET # BLD AUTO: 331 K/UL — SIGNIFICANT CHANGE UP (ref 130–400)
POTASSIUM SERPL-MCNC: 5 MMOL/L — SIGNIFICANT CHANGE UP (ref 3.5–5)
POTASSIUM SERPL-SCNC: 5 MMOL/L — SIGNIFICANT CHANGE UP (ref 3.5–5)
PROTHROM AB SERPL-ACNC: 13.4 SEC — HIGH (ref 9.95–12.87)
RBC # BLD: 4.29 M/UL — LOW (ref 4.7–6.1)
RBC # FLD: 14 % — SIGNIFICANT CHANGE UP (ref 11.5–14.5)
SODIUM SERPL-SCNC: 143 MMOL/L — SIGNIFICANT CHANGE UP (ref 135–146)
WBC # BLD: 10.74 K/UL — SIGNIFICANT CHANGE UP (ref 4.8–10.8)
WBC # FLD AUTO: 10.74 K/UL — SIGNIFICANT CHANGE UP (ref 4.8–10.8)

## 2019-04-17 RX ORDER — WARFARIN SODIUM 2.5 MG/1
5 TABLET ORAL ONCE
Qty: 0 | Refills: 0 | Status: COMPLETED | OUTPATIENT
Start: 2019-04-17 | End: 2019-04-17

## 2019-04-17 RX ORDER — FUROSEMIDE 40 MG
40 TABLET ORAL
Qty: 0 | Refills: 0 | Status: DISCONTINUED | OUTPATIENT
Start: 2019-04-18 | End: 2019-04-22

## 2019-04-17 RX ORDER — HEPARIN SODIUM 5000 [USP'U]/ML
2400 INJECTION INTRAVENOUS; SUBCUTANEOUS
Qty: 25000 | Refills: 0 | Status: DISCONTINUED | OUTPATIENT
Start: 2019-04-17 | End: 2019-04-17

## 2019-04-17 RX ORDER — APIXABAN 2.5 MG/1
10 TABLET, FILM COATED ORAL EVERY 12 HOURS
Qty: 0 | Refills: 0 | Status: DISCONTINUED | OUTPATIENT
Start: 2019-04-17 | End: 2019-04-17

## 2019-04-17 RX ORDER — HEPARIN SODIUM 5000 [USP'U]/ML
2400 INJECTION INTRAVENOUS; SUBCUTANEOUS
Qty: 25000 | Refills: 0 | Status: DISCONTINUED | OUTPATIENT
Start: 2019-04-17 | End: 2019-04-18

## 2019-04-17 RX ORDER — WARFARIN SODIUM 2.5 MG/1
5 TABLET ORAL AT BEDTIME
Qty: 0 | Refills: 0 | Status: DISCONTINUED | OUTPATIENT
Start: 2019-04-17 | End: 2019-04-17

## 2019-04-17 RX ORDER — INSULIN LISPRO 100/ML
19 VIAL (ML) SUBCUTANEOUS
Qty: 0 | Refills: 0 | Status: DISCONTINUED | OUTPATIENT
Start: 2019-04-17 | End: 2019-04-19

## 2019-04-17 RX ORDER — HEPARIN SODIUM 5000 [USP'U]/ML
2300 INJECTION INTRAVENOUS; SUBCUTANEOUS
Qty: 25000 | Refills: 0 | Status: DISCONTINUED | OUTPATIENT
Start: 2019-04-17 | End: 2019-04-17

## 2019-04-17 RX ORDER — INSULIN GLARGINE 100 [IU]/ML
56 INJECTION, SOLUTION SUBCUTANEOUS AT BEDTIME
Qty: 0 | Refills: 0 | Status: DISCONTINUED | OUTPATIENT
Start: 2019-04-17 | End: 2019-04-19

## 2019-04-17 RX ADMIN — WARFARIN SODIUM 5 MILLIGRAM(S): 2.5 TABLET ORAL at 21:49

## 2019-04-17 RX ADMIN — Medication 4: at 12:56

## 2019-04-17 RX ADMIN — Medication 100 MILLIGRAM(S): at 21:47

## 2019-04-17 RX ADMIN — Medication 667 MILLIGRAM(S): at 17:26

## 2019-04-17 RX ADMIN — AMLODIPINE BESYLATE 10 MILLIGRAM(S): 2.5 TABLET ORAL at 06:02

## 2019-04-17 RX ADMIN — HEPARIN SODIUM 24 UNIT(S)/HR: 5000 INJECTION INTRAVENOUS; SUBCUTANEOUS at 15:53

## 2019-04-17 RX ADMIN — Medication 0.2 MILLIGRAM(S): at 13:06

## 2019-04-17 RX ADMIN — Medication 2: at 08:29

## 2019-04-17 RX ADMIN — Medication 0.2 MILLIGRAM(S): at 21:47

## 2019-04-17 RX ADMIN — PANTOPRAZOLE SODIUM 40 MILLIGRAM(S): 20 TABLET, DELAYED RELEASE ORAL at 06:02

## 2019-04-17 RX ADMIN — Medication 19 UNIT(S): at 17:24

## 2019-04-17 RX ADMIN — Medication 100 MILLIGRAM(S): at 13:07

## 2019-04-17 RX ADMIN — ATORVASTATIN CALCIUM 40 MILLIGRAM(S): 80 TABLET, FILM COATED ORAL at 21:48

## 2019-04-17 RX ADMIN — Medication 300 MILLIGRAM(S): at 06:02

## 2019-04-17 RX ADMIN — Medication 2000 UNIT(S): at 13:06

## 2019-04-17 RX ADMIN — Medication 0.2 MILLIGRAM(S): at 06:03

## 2019-04-17 RX ADMIN — Medication 40 MILLIGRAM(S): at 06:02

## 2019-04-17 RX ADMIN — LACTULOSE 15 GRAM(S): 10 SOLUTION ORAL at 06:04

## 2019-04-17 RX ADMIN — Medication 17 UNIT(S): at 12:55

## 2019-04-17 RX ADMIN — Medication 100 MILLIGRAM(S): at 06:03

## 2019-04-17 RX ADMIN — Medication 300 MILLIGRAM(S): at 21:50

## 2019-04-17 RX ADMIN — Medication 667 MILLIGRAM(S): at 08:30

## 2019-04-17 RX ADMIN — Medication 667 MILLIGRAM(S): at 13:05

## 2019-04-17 RX ADMIN — SENNA PLUS 2 TABLET(S): 8.6 TABLET ORAL at 21:47

## 2019-04-17 RX ADMIN — Medication 17 UNIT(S): at 08:29

## 2019-04-17 RX ADMIN — INSULIN GLARGINE 56 UNIT(S): 100 INJECTION, SOLUTION SUBCUTANEOUS at 21:48

## 2019-04-17 RX ADMIN — Medication 20 MILLIGRAM(S): at 06:02

## 2019-04-17 RX ADMIN — Medication 300 MILLIGRAM(S): at 13:08

## 2019-04-17 RX ADMIN — CHLORHEXIDINE GLUCONATE 1 APPLICATION(S): 213 SOLUTION TOPICAL at 17:27

## 2019-04-17 RX ADMIN — Medication 4: at 17:24

## 2019-04-17 RX ADMIN — CHLORHEXIDINE GLUCONATE 1 APPLICATION(S): 213 SOLUTION TOPICAL at 06:02

## 2019-04-17 RX ADMIN — HEPARIN SODIUM 24 UNIT(S)/HR: 5000 INJECTION INTRAVENOUS; SUBCUTANEOUS at 10:18

## 2019-04-17 NOTE — PROGRESS NOTE ADULT - SUBJECTIVE AND OBJECTIVE BOX
GRIS TIDWELL 59y Male  MRN#: [id number]     SUBJECTIVE  Patient is a 59y old Male who presents with a chief complaint of Right sided weakness   Currently admitted to medicine with the primary diagnosis of Intraparenchymal hemorrhage of brain  Today is hospital day 15d, and this morning he reports no overnight events.     OBJECTIVE  PAST MEDICAL & SURGICAL HISTORY  Gout  Morbidly obese  Gout  Hypertension  Diabetes mellitus  S/P tonsillectomy    ALLERGIES:  No Known Allergies    MEDICATIONS:  STANDING MEDICATIONS  amLODIPine   Tablet 10 milliGRAM(s) Oral daily  atorvastatin Oral Tab/Cap - Peds 40 milliGRAM(s) Oral daily  calcium acetate 667 milliGRAM(s) Oral three times a day with meals  chlorhexidine 4% Liquid 1 Application(s) Topical every 12 hours  cholecalciferol 2000 Unit(s) Oral daily  cloNIDine 0.2 milliGRAM(s) Oral every 8 hours  dextrose 5%. 1000 milliLiter(s) IV Continuous <Continuous>  dextrose 50% Injectable 12.5 Gram(s) IV Push once  dextrose 50% Injectable 25 Gram(s) IV Push once  dextrose 50% Injectable 25 Gram(s) IV Push once  heparin  Infusion 2400 Unit(s)/Hr IV Continuous <Continuous>  hydrALAZINE 100 milliGRAM(s) Oral every 8 hours  insulin glargine Injectable (LANTUS) 50 Unit(s) SubCutaneous at bedtime  insulin lispro (HumaLOG) corrective regimen sliding scale   SubCutaneous three times a day before meals  insulin lispro Injectable (HumaLOG) 17 Unit(s) SubCutaneous three times a day before meals  labetalol 300 milliGRAM(s) Oral every 8 hours  lactulose Syrup 15 Gram(s) Oral two times a day  pantoprazole    Tablet 40 milliGRAM(s) Oral before breakfast  predniSONE   Tablet 20 milliGRAM(s) Oral daily  senna 2 Tablet(s) Oral at bedtime  warfarin 5 milliGRAM(s) Oral at bedtime    PRN MEDICATIONS  acetaminophen   Tablet .. 650 milliGRAM(s) Oral every 6 hours PRN  aluminum hydroxide/magnesium hydroxide/simethicone Suspension 30 milliLiter(s) Oral every 6 hours PRN  dextrose 40% Gel 15 Gram(s) Oral once PRN  glucagon  Injectable 1 milliGRAM(s) IntraMuscular once PRN      VITAL SIGNS: Last 24 Hours  T(C): 36.4 (17 Apr 2019 14:18), Max: 37 (17 Apr 2019 10:44)  T(F): 97.6 (17 Apr 2019 14:18), Max: 98.6 (17 Apr 2019 10:44)  HR: 65 (17 Apr 2019 14:18) (60 - 68)  BP: 146/73 (17 Apr 2019 14:18) (133/71 - 156/76)  BP(mean): --  RR: 20 (17 Apr 2019 14:18) (18 - 20)  SpO2: 97% (17 Apr 2019 14:18) (97% - 99%)    LABS:                        11.3   10.74 )-----------( 331      ( 17 Apr 2019 05:55 )             37.3     04-17    143  |  98  |  92<HH>  ----------------------------<  274<H>  5.0   |  30  |  2.4<H>    Ca    9.1      17 Apr 2019 05:55  Phos  5.6     04-17  Mg     2.0     04-17      PT/INR - ( 17 Apr 2019 05:55 )   PT: 13.40 sec;   INR: 1.17 ratio         PTT - ( 17 Apr 2019 05:55 )  PTT:45.3 sec    RADIOLOGY:  < from: CT Head No Cont (04.13.19 @ 16:07) >  impression:    No significant changes in the acute/subacute parenchymal hemorrhage   involving the left basal ganglia with small amount of surrounding edema.   Well-demarcated area of decreased attenuation extending to the left   caudate may represent associated acute/subacute infarction. Attention on   follow-up imaging recommended.    Mass effect on the left lateral ventricle without significant ventricular   enlargement. Minimal left-to-right midline shift.    No new acute intracranial hemorrhage.    < end of copied text >    < from: Transthoracic Echocardiogram (04.13.19 @ 08:42) >  Summary:   1. Left ventricular ejection fraction, by visual estimation, is 55 to   60%.   2. Normal global left ventricular systolic function.   3. Mild mitral valve regurgitation.   4. Mild thickening and calcification of the anterior mitral valve   leaflet.   5. Moderate aortic valve thickening with normal leaflet opening.      < end of copied text >    < from: [facility] Duplex Lower Ext Vein Scan, Bilat (04.12.19 @ 19:40) >  Impression:    Thromboses present in bilateral calf veins: rightposterior tibial and   peroneal vein branches; left gastrocnemius and posterior tibial vein   branches.    These are new findings compared to duplex of 4/7/2019.    < end of copied text >    < from: US Renal (04.03.19 @ 10:59) >  Impression:     Limited examination secondary to patient's condition and body habitus.    No hydronephrosis or calculus on either side.    2.4 cm left renal hypodensity may reflect a cyst but is limited in   characterization on this study. Consideration can be given to a follow-up   examination or dedicated CT or MRI at later time.    < end of copied text >      PHYSICAL EXAM:    GENERAL: NAD, well-developed, AAOx3  HEENT:  Atraumatic, Normocephalic. EOMI, PERRLA, conjunctiva and sclera clear, No JVD  PULMONARY: Clear to auscultation bilaterally; No wheeze  CARDIOVASCULAR: Regular rate and rhythm; No murmurs, rubs, or gallops  GASTROINTESTINAL: Soft, Nontender, Nondistended; Bowel sounds present  MUSCULOSKELETAL:  2+ Peripheral Pulses, No clubbing, cyanosis, or edema  NEUROLOGY: non-focal  SKIN: No rashes or lesions      ADMISSION SUMMARY  Patient is a 59y old Male who presents with a chief complaint of Right sided weakness   Currently admitted to medicine with the primary diagnosis of Intraparenchymal hemorrhage of brain     ASSESSMENT & PLAN    #Acute on chronic hypercapnic respiratory failure, possible PE  - Venous duplex 4/12 showed new right posterior tibial and peroneal and left gastrocnemius and branch of posterior tibial thrombus.  - CTA lungs (not done due to CKD) and V/Q scan would not  to anticoagulate  - Neurosurgery cleared pt for anticoagulation/ 4/12  - Vascular rec noted ([id number])  - Last day of cefepime   - heparin drip, monitor PTT (goal 60-70 due to intraparenchymal bleed)   -  Repeat CT done on 04/13  stable   - Neuro check q4 hr  - BiPAP qhs and prn  - CXR opacity - likely vascular congestion  Gave 1 extra dose of lasix today.   - DC End tidal CO2 monitoring     # Gout Flare  - c/w  PO steroid 20mg daily     #L frontotemporal intraparenchymal bleed with 2mm shift   - no acute neurosurgical intervention  - CTH 4/2: L frontotemporal intraparenchymal bleed with extension into the ventricle and 2mm shift to the R  - CTH 4/3: no change  - CTH (repeat #2) 4/3: no change  - CTH 4/6: no change  - CTH 4/12- Unchanged L basal ganglia intraparenchymal hematoma with mass effect on the left lateral ventricle. Unchanged 2 mm left to right midline shift.  - CTH 4/13  Stable   - neuro chest q4   - DG ro neuro Floor or stroke unit   -  NS cleared for AC       #Abd distension  had large bowel Movement today     #Hypertensive emergency  - goal BP < 160  - labetalol 300 mg TID, amlodipine 10 mg daily, hydralazine 100 mg TID  - clonidine to 0.1 mg TID    #HFpEF  - Echo 4/4- EF 58%, mild inc LV wall thick, mild TR, mild AS, mild pulm HTN  - Repeat echo EF 55% to 60% EF MR, Mild Aortic valve  thinking   - cxr w/ interval blunting of costophrenic angle, hazy opacity  -c/w  40 q12h IV for now due to generalized swelling  - follows w/ Dr. Man    #DESI on CKD3  - baseline Cr 1.9 in in 10/2018  - renal US 4/3: no hydronephrosis, L renal cyst  - per nephro: no need for urgent dialysis  - nephro following     #New onset afib  - rate controlled  - labetalol 300 mg TID  - Heparin drip    #Fever. Resolved.  - Possible bibasilar pneumonia vs gout attack  - urine cx 4/2: negative  - blood cx: NTD  - uric acid 9.9  - c/w cefepime (start 4/6) last day today     #DM glucose uncontrolled   - lantus/lispro/LSS    #HLD  - lipitor GRIS TIDWELL 59y Male  MRN#: [id number]     SUBJECTIVE  Patient is a 59y old Male who presents with a chief complaint of Right sided weakness   Currently admitted to medicine with the primary diagnosis of Intraparenchymal hemorrhage of brain  Today is hospital day 15d, and this morning he reports no overnight events.     OBJECTIVE  PAST MEDICAL & SURGICAL HISTORY  Gout  Morbidly obese  Gout  Hypertension  Diabetes mellitus  S/P tonsillectomy    ALLERGIES:  No Known Allergies    MEDICATIONS:  STANDING MEDICATIONS  amLODIPine   Tablet 10 milliGRAM(s) Oral daily  atorvastatin Oral Tab/Cap - Peds 40 milliGRAM(s) Oral daily  calcium acetate 667 milliGRAM(s) Oral three times a day with meals  chlorhexidine 4% Liquid 1 Application(s) Topical every 12 hours  cholecalciferol 2000 Unit(s) Oral daily  cloNIDine 0.2 milliGRAM(s) Oral every 8 hours  dextrose 5%. 1000 milliLiter(s) IV Continuous <Continuous>  dextrose 50% Injectable 12.5 Gram(s) IV Push once  dextrose 50% Injectable 25 Gram(s) IV Push once  dextrose 50% Injectable 25 Gram(s) IV Push once  heparin  Infusion 2400 Unit(s)/Hr IV Continuous <Continuous>  hydrALAZINE 100 milliGRAM(s) Oral every 8 hours  insulin glargine Injectable (LANTUS) 50 Unit(s) SubCutaneous at bedtime  insulin lispro (HumaLOG) corrective regimen sliding scale   SubCutaneous three times a day before meals  insulin lispro Injectable (HumaLOG) 17 Unit(s) SubCutaneous three times a day before meals  labetalol 300 milliGRAM(s) Oral every 8 hours  lactulose Syrup 15 Gram(s) Oral two times a day  pantoprazole    Tablet 40 milliGRAM(s) Oral before breakfast  predniSONE   Tablet 20 milliGRAM(s) Oral daily  senna 2 Tablet(s) Oral at bedtime  warfarin 5 milliGRAM(s) Oral at bedtime    PRN MEDICATIONS  acetaminophen   Tablet .. 650 milliGRAM(s) Oral every 6 hours PRN  aluminum hydroxide/magnesium hydroxide/simethicone Suspension 30 milliLiter(s) Oral every 6 hours PRN  dextrose 40% Gel 15 Gram(s) Oral once PRN  glucagon  Injectable 1 milliGRAM(s) IntraMuscular once PRN      VITAL SIGNS: Last 24 Hours  T(C): 36.4 (17 Apr 2019 14:18), Max: 37 (17 Apr 2019 10:44)  T(F): 97.6 (17 Apr 2019 14:18), Max: 98.6 (17 Apr 2019 10:44)  HR: 65 (17 Apr 2019 14:18) (60 - 68)  BP: 146/73 (17 Apr 2019 14:18) (133/71 - 156/76)  BP(mean): --  RR: 20 (17 Apr 2019 14:18) (18 - 20)  SpO2: 97% (17 Apr 2019 14:18) (97% - 99%)    LABS:                        11.3   10.74 )-----------( 331      ( 17 Apr 2019 05:55 )             37.3     04-17    143  |  98  |  92<HH>  ----------------------------<  274<H>  5.0   |  30  |  2.4<H>    Ca    9.1      17 Apr 2019 05:55  Phos  5.6     04-17  Mg     2.0     04-17      PT/INR - ( 17 Apr 2019 05:55 )   PT: 13.40 sec;   INR: 1.17 ratio         PTT - ( 17 Apr 2019 05:55 )  PTT:45.3 sec    RADIOLOGY:  < from: CT Head No Cont (04.13.19 @ 16:07) >  impression:    No significant changes in the acute/subacute parenchymal hemorrhage   involving the left basal ganglia with small amount of surrounding edema.   Well-demarcated area of decreased attenuation extending to the left   caudate may represent associated acute/subacute infarction. Attention on   follow-up imaging recommended.    Mass effect on the left lateral ventricle without significant ventricular   enlargement. Minimal left-to-right midline shift.    No new acute intracranial hemorrhage.    < end of copied text >    < from: Transthoracic Echocardiogram (04.13.19 @ 08:42) >  Summary:   1. Left ventricular ejection fraction, by visual estimation, is 55 to   60%.   2. Normal global left ventricular systolic function.   3. Mild mitral valve regurgitation.   4. Mild thickening and calcification of the anterior mitral valve   leaflet.   5. Moderate aortic valve thickening with normal leaflet opening.      < end of copied text >    < from: [facility] Duplex Lower Ext Vein Scan, Bilat (04.12.19 @ 19:40) >  Impression:    Thromboses present in bilateral calf veins: rightposterior tibial and   peroneal vein branches; left gastrocnemius and posterior tibial vein   branches.    These are new findings compared to duplex of 4/7/2019.    < end of copied text >    < from: US Renal (04.03.19 @ 10:59) >  Impression:     Limited examination secondary to patient's condition and body habitus.    No hydronephrosis or calculus on either side.    2.4 cm left renal hypodensity may reflect a cyst but is limited in   characterization on this study. Consideration can be given to a follow-up   examination or dedicated CT or MRI at later time.    < end of copied text >      PHYSICAL EXAM:    GENERAL: NAD, well-developed, AAOx3  HEENT:  Atraumatic, Normocephalic. EOMI, PERRLA, conjunctiva and sclera clear, No JVD  PULMONARY: Clear to auscultation bilaterally; No wheeze  CARDIOVASCULAR: Regular rate and rhythm; No murmurs, rubs, or gallops  GASTROINTESTINAL: Soft, Nontender, Nondistended; Bowel sounds present  MUSCULOSKELETAL:  2+ Peripheral Pulses, b/l LE Edema present  NEUROLOGY: Right UE power 3/5, Right LE 2/5, LLE 2/5. No sensory deficits. Speech is slurred  SKIN: No rashes or lesions      ADMISSION SUMMARY  Patient is a 59y old Male who presents with a chief complaint of Right sided weakness   Currently admitted to medicine with the primary diagnosis of Intraparenchymal hemorrhage of brain     ASSESSMENT & PLAN    #LLE DVT and Acute on chronic hypercapnic respiratory failure, possible PE  - Venous duplex 4/12 showed new right posterior tibial and peroneal and left gastrocnemius and branch of posterior tibial thrombus.  - CTA lungs (not done due to CKD) and V/Q scan would not  to anticoagulate  - Neurosurgery cleared pt for anticoagulation 4/12  - heparin drip, monitor PTT (goal 60-70 due to intraparenchymal bleed). Coumadin 5mg ordered for tonight.   - Not started on eliquis because of DESI, creatinine 2.4 today  - BiPAP qhs and prn. Saturating at 97 on 3l    #L basal ganglia intraparenchymal bleed   - no acute neurosurgical intervention  - CTH 4/2: L frontotemporal intraparenchymal bleed with extension into the ventricle and 2mm shift to the R  - Repeat CTH on 4/3, 4/6, 4/12, 4/13 stable   - Having right UE and LE weakness  - Neurology Suspecting caudate nucleus infarct on top of bleed. MR brain performed today. f/u read  - Maintain SBP<160   - Neuro checks q4h  - PT to evaluate the patient  -Speech and swallow eval done. Recommended dysphagia 3 diet. Need speech therapy for expressive aphasia    #Hypertensive emergency  - goal BP < 160  - labetalol 300 mg TID, amlodipine 10 mg daily, hydralazine 100 mg TID, clonidine 0.2mg q8, lasix 40mg PO bid    #Acute on chronic HFpEF  - Echo EF 55% to 60% EF MR, Mild Aortic valve  thinking   - cxr w/ interval blunting of costophrenic angle, hazy opacity  - follows w/ Dr. Man  - On lasix 40mg PO q12h    #DESI on CKD3  -Improving  - baseline Cr 1.9 in 10/2018  - renal US 4/3: no hydronephrosis, L renal cyst  - per nephro: no need for urgent dialysis  - nephro following     #New onset afib  - rate controlled  - labetalol 300 mg TID  - Heparin drip bridging to coumadin    #Fever. Resolved.  - Possible bibasilar pneumonia vs gout attack  - urine cx 4/2: negative  - blood cx: NTD  - uric acid 9.9  - Completed 10 days course of cefepime    # Gout Flare  - c/w  PO steroid 20mg daily     #DM glucose uncontrolled   -Increased lantus to 56u, lispro 19u TID    #Dyslipidemia  - lipitor    #DVT ppx: heparin gtt  #GI ppx: protonix   #Dispo: Need PT eval. Still acute  #Full code

## 2019-04-17 NOTE — PROGRESS NOTE ADULT - SUBJECTIVE AND OBJECTIVE BOX
Patient seen and examined at bedside , patient on BiPAP overnight is resting comfortably. He denies any new complaints. Right sided weakness is persistent. No acute overnight events. No acute events on cardiac monitor        PMH  Gout  Morbidly obese  Gout  Hypertension  Diabetes mellitus        Pertinent Medical History/Social History/Family History/other:     Social History: []  Drug Use: []   Alcohol Use: []   Tobacco Use:  [] Other:      Cerebrovascular Risk Factors:[] prior ischemic stroke  [] Afib  []CAD  [X]HTN  []DLD  [X]DM []PVD      Stroke Workup:    Cardiac Rhythm(Tele/Holter) & Duration:  16 days                 Events: none    Cardiac Structure:(TTE/ISMA +/-): < from: Transthoracic Echocardiogram (19 @ 08:42) >  Summary:   1. Left ventricular ejection fraction, by visual estimation, is 55 to   60%.   2. Normal global left ventricular systolic function.   3. Mild mitral valve regurgitation.   4. Mild thickening and calcification of the anterior mitral valve   leaflet.   5. Moderate aortic valve thickening with normal leaflet opening.    < end of copied text >    LAB  Lipid Profile in AM (19 @ 08:04)    Total Cholesterol/HDL Ratio Measurement: 4.8 Ratio    Cholesterol, Serum: 184 mg/dL    Triglycerides, Serum: 228 mg/dL    HDL Cholesterol, Serum: 38: HDL Levels >/= 60 mg/dL are considered beneficial and a "negative" risk  factor.  Effective 08/15/2018: New reference range and interpretive comment. mg/dL    Direct LDL: 116: LDL Cholesterol (mg/dL) --- Interpretive Comment (for adults 18 and over)      Home Medications:  aspirin 81 mg oral tablet: 1 tab(s) orally once a day (2019 23:42)  Cardizem  mg/24 hours oral capsule, extended release: 1 cap(s) orally once a day (2019 23:42)  Flomax 0.4 mg oral capsule: 1 cap(s) orally once a day (2019 23:42)  glyBURIDE micronized 6 mg oral tablet: 1 tab(s) orally once a day (2019 23:42)  Hyzaar 100 mg-25 mg oral tablet: 1 tab(s) orally once a day (2019 23:42)  Lantus 100 units/mL subcutaneous solution: 30 milligram(s) subcutaneous once a day (at bedtime) (2019 23:42)  Lipitor 40 mg oral tablet: 1 tab(s) orally once a day (2019 23:42)  meloxicam 15 mg oral tablet: 1 tab(s) orally once a day (2019 23:42)      MEDICATIONS  (STANDING):  amLODIPine   Tablet 10 milliGRAM(s) Oral daily  atorvastatin Oral Tab/Cap - Peds 40 milliGRAM(s) Oral daily  calcium acetate 667 milliGRAM(s) Oral three times a day with meals  cefepime   IVPB 1000 milliGRAM(s) IV Intermittent every 24 hours  chlorhexidine 4% Liquid 1 Application(s) Topical every 12 hours  cholecalciferol 2000 Unit(s) Oral daily  cloNIDine 0.2 milliGRAM(s) Oral every 8 hours  dextrose 5%. 1000 milliLiter(s) (50 mL/Hr) IV Continuous <Continuous>  dextrose 50% Injectable 12.5 Gram(s) IV Push once  dextrose 50% Injectable 25 Gram(s) IV Push once  dextrose 50% Injectable 25 Gram(s) IV Push once  furosemide   Injectable 40 milliGRAM(s) IV Push two times a day  heparin  Infusion 2300 Unit(s)/Hr (23 mL/Hr) IV Continuous <Continuous>  hydrALAZINE 100 milliGRAM(s) Oral every 8 hours  insulin glargine Injectable (LANTUS) 50 Unit(s) SubCutaneous at bedtime  insulin lispro (HumaLOG) corrective regimen sliding scale   SubCutaneous three times a day before meals  insulin lispro Injectable (HumaLOG) 17 Unit(s) SubCutaneous three times a day before meals  labetalol 300 milliGRAM(s) Oral every 8 hours  lactulose Syrup 15 Gram(s) Oral two times a day  pantoprazole    Tablet 40 milliGRAM(s) Oral before breakfast  predniSONE   Tablet 20 milliGRAM(s) Oral daily  senna 2 Tablet(s) Oral at bedtime      Last 24 hour events:none    Physical Exam:  Patient a/o x 3 speech is fluent  cn: intact  power: LUE 5/5  LLE 4+/5             RUE 4+/5 prox 4-/5 distal  RLE 3/5 (right arm and leg drift)  ftn hks: unable on the right intact on the left  sensory: intact  gait: deferred    Vital Signs Last 24 Hrs  T(C): 36.2 (2019 02:00), Max: 36.6 (2019 12:00)  T(F): 97.2 (2019 02:00), Max: 97.9 (2019 12:00)  HR: 68 (2019 02:00) (56 - 68)  BP: 146/68 (2019 02:00) (140/68 - 172/86)  BP(mean): 106 (2019 14:00) (88 - 135)  RR: 20 (2019 02:00) (18 - 30)  SpO2: 98% (2019 02:00) (93% - 99%)    NIHSS  LOC:  0   QUES: 0    COMM: 0     VF:  0   GAZE: 0    RUE: 2    RLE:  2   LUE:  0   LLE:  0   SENS:  0   LAN    SPEECH: 1    ATAXIA:0     NEGLECT:  0   FACE:0    NIHSS today: 5      PT/OT/Speech/Rehab/other: pending     m-RS: 4

## 2019-04-17 NOTE — SPEECH LANGUAGE PATHOLOGY EVALUATION - SLP ORIENTATION
person, , state, city independently; utilized white board independently to be oriented to date and year; pt unaware of his situation person, , state, city independently; independently referred to calendar in the room to be oriented to date and year; pt not oriented to event.

## 2019-04-17 NOTE — SPEECH LANGUAGE PATHOLOGY EVALUATION - SLP CONVERSATIONAL SPEECH
pt presented w/ increased difficulty w/ word finding during connected speech tasks, w/ no more than 2-3 utterances per idea. pt required extra time to convey ideas during cookie theft picture.

## 2019-04-17 NOTE — PROGRESS NOTE ADULT - ASSESSMENT
IMPRESSION:    Intraparenchymal hemorrhage  Acute on chronic hypercapnic respiratory failure/ fluid overload  Probable ZE +/- OHS  DESI improving  DVT ON IV HEPARIN      PLAN:    CNS: neurosurgery f/u, neurochecks     HEENT:  Oral care    PULMONARY:  HOB @ 45 degrees, oxygen to keep pulse ox>90%, bipap at nigh and as needed    CARDIOVASCULAR:  I < O    GI: GI prophylaxis  Feeding per speech and swallow     RENAL:  F/u  lytes.  Correct as needed. change lasix to po twice daily    INFECTIOUS DISEASE: Repeat Cultures. d/c abx     HEMATOLOGICAL:  DVT prophylaxis, heparin gtt, f/u ptt switch to eliquis if cleared by neurosx    ENDOCRINE:  Follow up FS.      MUSCULOSKELETAL: OOB to chair.       PT/rehab c/s

## 2019-04-17 NOTE — SPEECH LANGUAGE PATHOLOGY EVALUATION - SLP GENERAL OBSERVATIONS
Pt received laying down in bed, alert and oriented to self w/ O2 via NC in place. Pt repositioned upright for evaluation.

## 2019-04-17 NOTE — PROGRESS NOTE ADULT - ASSESSMENT
60 y/o M with hx of HTN, DM, CAD, Sleep Apnea BIBEMS for altered mental status and aphasia, initial CT scan showed the left intra parenchymal bleed with the extension into the ventricle associated with the midline shift. He was then noted to have  right sided weakness and ? left caudate infarct.    Plan  Follow up pending MRI brain  continue Lipitor 80mg  keep SBP <160  PT/OT /rehab  Neuro checks q 4 hrs 60 y/o M with hx of HTN, DM, CAD, Sleep Apnea BIBEMS for altered mental status and aphasia, initial CT scan showed the left intra parenchymal bleed with the extension into the ventricle associated with the midline shift. He was then noted to have  right sided weakness and ? left caudate infarct  and had right leg DVT and is currently on heparin drip for that.        Plan  Follow up pending MRI brain  continue Lipitor 80mg  keep SBP <160  PT/OT /rehab  Neuro checks q 4 hrs  Monitor PTT closely  Call for acute change in neuro status. 60 y/o M with hx of HTN, DM, CAD, Sleep Apnea BIBEMS for altered mental status and aphasia, initial CT scan showed the left intra parenchymal bleed with the extension into the ventricle associated with the midline shift. He was then noted to have  right sided weakness and ? left caudate infarct  and had right leg DVT and is currently on heparin drip for that.        Plan  Follow up pending MRI brain  continue Lipitor 80mg  keep SBP <160  PT/OT /rehab  Neuro checks q 4 hrs  Monitor PTT closely while on heparin drip  Call for acute change in neuro status.

## 2019-04-17 NOTE — SPEECH LANGUAGE PATHOLOGY EVALUATION - SLP PERTINENT HISTORY OF CURRENT PROBLEM
60 y/o M with hx of HTN, DM, CAD, Sleep Apnea BIBEMS for altered mental status and aphasia. CT scan showed the left intra parenchymal bleed with the extend into the ventricle associated with the midline shift.

## 2019-04-17 NOTE — SPEECH LANGUAGE PATHOLOGY EVALUATION - SLP PATIENT/FAMILY GOALS STATEMENT
Pt reported having "trouble getting the words out sometimes." to improve word finding skills; pt reported having "trouble getting his words out."

## 2019-04-17 NOTE — SPEECH LANGUAGE PATHOLOGY EVALUATION - DESCRIBE:
pt provided w/ 2 objects and was able to identify one object out of the two properly pt provided w/ 2 pictures and was able to identify one object out of the two properly basic and complex

## 2019-04-17 NOTE — PROGRESS NOTE ADULT - SUBJECTIVE AND OBJECTIVE BOX
OVERNIGHT EVENTS: events noted, transferred to stroke unit    Vital Signs Last 24 Hrs  T(C): 37 (17 Apr 2019 10:44), Max: 37 (17 Apr 2019 10:44)  T(F): 98.6 (17 Apr 2019 10:44), Max: 98.6 (17 Apr 2019 10:44)  HR: 65 (17 Apr 2019 10:44) (60 - 68)  BP: 133/71 (17 Apr 2019 10:44) (133/71 - 156/76)  BP(mean): 106 (16 Apr 2019 14:00) (96 - 106)  RR: 20 (17 Apr 2019 10:44) (18 - 25)  SpO2: 97% (17 Apr 2019 10:44) (93% - 99%)    PHYSICAL EXAMINATION:    GENERAL: The patient is awake and alert in no apparent distress.     HEENT: Head is normocephalic and atraumatic. Extraocular muscles are intact. Mucous membranes are moist.    NECK: Supple.    LUNGS: dec bs both base    HEART: Regular rate and rhythm without murmur.    ABDOMEN: Soft, nontender, and nondistended.      EXTREMITIES: Without any cyanosis, clubbing, rash, lesions or edema.    NEUROLOGIC: Grossly intact.    SKIN: No ulceration or induration present.      LABS:                        11.3   10.74 )-----------( 331      ( 17 Apr 2019 05:55 )             37.3     04-17    143  |  98  |  92<HH>  ----------------------------<  274<H>  5.0   |  30  |  2.4<H>    Ca    9.1      17 Apr 2019 05:55  Phos  5.6     04-17  Mg     2.0     04-17      PT/INR - ( 17 Apr 2019 05:55 )   PT: 13.40 sec;   INR: 1.17 ratio         PTT - ( 17 Apr 2019 05:55 )  PTT:45.3 sec                      04-16-19 @ 07:01  -  04-17-19 @ 07:00  --------------------------------------------------------  IN: 132 mL / OUT: 1551 mL / NET: -1419 mL        MICROBIOLOGY:      MEDICATIONS  (STANDING):  amLODIPine   Tablet 10 milliGRAM(s) Oral daily  atorvastatin Oral Tab/Cap - Peds 40 milliGRAM(s) Oral daily  calcium acetate 667 milliGRAM(s) Oral three times a day with meals  cefepime   IVPB 1000 milliGRAM(s) IV Intermittent every 24 hours  chlorhexidine 4% Liquid 1 Application(s) Topical every 12 hours  cholecalciferol 2000 Unit(s) Oral daily  cloNIDine 0.2 milliGRAM(s) Oral every 8 hours  dextrose 5%. 1000 milliLiter(s) (50 mL/Hr) IV Continuous <Continuous>  dextrose 50% Injectable 12.5 Gram(s) IV Push once  dextrose 50% Injectable 25 Gram(s) IV Push once  dextrose 50% Injectable 25 Gram(s) IV Push once  furosemide   Injectable 40 milliGRAM(s) IV Push two times a day  heparin  Infusion 2400 Unit(s)/Hr (24 mL/Hr) IV Continuous <Continuous>  hydrALAZINE 100 milliGRAM(s) Oral every 8 hours  insulin glargine Injectable (LANTUS) 50 Unit(s) SubCutaneous at bedtime  insulin lispro (HumaLOG) corrective regimen sliding scale   SubCutaneous three times a day before meals  insulin lispro Injectable (HumaLOG) 17 Unit(s) SubCutaneous three times a day before meals  labetalol 300 milliGRAM(s) Oral every 8 hours  lactulose Syrup 15 Gram(s) Oral two times a day  pantoprazole    Tablet 40 milliGRAM(s) Oral before breakfast  predniSONE   Tablet 20 milliGRAM(s) Oral daily  senna 2 Tablet(s) Oral at bedtime  warfarin 5 milliGRAM(s) Oral at bedtime    MEDICATIONS  (PRN):  acetaminophen   Tablet .. 650 milliGRAM(s) Oral every 6 hours PRN Mild Pain (1 - 3)  aluminum hydroxide/magnesium hydroxide/simethicone Suspension 30 milliLiter(s) Oral every 6 hours PRN Dyspepsia  dextrose 40% Gel 15 Gram(s) Oral once PRN Blood Glucose LESS THAN 70 milliGRAM(s)/deciliter  glucagon  Injectable 1 milliGRAM(s) IntraMuscular once PRN Glucose LESS THAN 70 milligrams/deciliter      RADIOLOGY & ADDITIONAL STUDIES:

## 2019-04-17 NOTE — PROGRESS NOTE ADULT - ATTENDING COMMENTS
Patient seen and examined and agree with above except as noted.  Patient is doing well, only issue is patient is depressed.  Denies headache dizziness, difficulty swallowing  Exam shows left hemiparesis which is slightly improved.  No dysmetria out of proportion to weakness.    Plan  1. Anticoagulation as per neurosurgery recommendations  2. PT/OT/Rehab  3. Will follow up MRI   4. Call for any questions or changes in exam

## 2019-04-18 LAB
ANION GAP SERPL CALC-SCNC: 17 MMOL/L — HIGH (ref 7–14)
APTT BLD: 82.4 SEC — CRITICAL HIGH (ref 27–39.2)
APTT BLD: 96.9 SEC — CRITICAL HIGH (ref 27–39.2)
BASOPHILS # BLD AUTO: 0.03 K/UL — SIGNIFICANT CHANGE UP (ref 0–0.2)
BASOPHILS NFR BLD AUTO: 0.2 % — SIGNIFICANT CHANGE UP (ref 0–1)
BUN SERPL-MCNC: 87 MG/DL — CRITICAL HIGH (ref 10–20)
CALCIUM SERPL-MCNC: 8.9 MG/DL — SIGNIFICANT CHANGE UP (ref 8.5–10.1)
CHLORIDE SERPL-SCNC: 93 MMOL/L — LOW (ref 98–110)
CK MB CFR SERPL CALC: 3.2 NG/ML — SIGNIFICANT CHANGE UP (ref 0.6–6.3)
CO2 SERPL-SCNC: 28 MMOL/L — SIGNIFICANT CHANGE UP (ref 17–32)
CREAT SERPL-MCNC: 2.2 MG/DL — HIGH (ref 0.7–1.5)
EOSINOPHIL # BLD AUTO: 0.35 K/UL — SIGNIFICANT CHANGE UP (ref 0–0.7)
EOSINOPHIL NFR BLD AUTO: 2.9 % — SIGNIFICANT CHANGE UP (ref 0–8)
GLUCOSE BLDC GLUCOMTR-MCNC: 254 MG/DL — HIGH (ref 70–99)
GLUCOSE BLDC GLUCOMTR-MCNC: 264 MG/DL — HIGH (ref 70–99)
GLUCOSE BLDC GLUCOMTR-MCNC: 309 MG/DL — HIGH (ref 70–99)
GLUCOSE BLDC GLUCOMTR-MCNC: 338 MG/DL — HIGH (ref 70–99)
GLUCOSE SERPL-MCNC: 239 MG/DL — HIGH (ref 70–99)
HCT VFR BLD CALC: 36.5 % — LOW (ref 42–52)
HGB BLD-MCNC: 11.2 G/DL — LOW (ref 14–18)
IMM GRANULOCYTES NFR BLD AUTO: 0.7 % — HIGH (ref 0.1–0.3)
INR BLD: 1.1 RATIO — SIGNIFICANT CHANGE UP (ref 0.65–1.3)
LYMPHOCYTES # BLD AUTO: 0.79 K/UL — LOW (ref 1.2–3.4)
LYMPHOCYTES # BLD AUTO: 6.5 % — LOW (ref 20.5–51.1)
MAGNESIUM SERPL-MCNC: 1.9 MG/DL — SIGNIFICANT CHANGE UP (ref 1.8–2.4)
MCHC RBC-ENTMCNC: 26.7 PG — LOW (ref 27–31)
MCHC RBC-ENTMCNC: 30.7 G/DL — LOW (ref 32–37)
MCV RBC AUTO: 87.1 FL — SIGNIFICANT CHANGE UP (ref 80–94)
MONOCYTES # BLD AUTO: 0.76 K/UL — HIGH (ref 0.1–0.6)
MONOCYTES NFR BLD AUTO: 6.3 % — SIGNIFICANT CHANGE UP (ref 1.7–9.3)
NEUTROPHILS # BLD AUTO: 10.11 K/UL — HIGH (ref 1.4–6.5)
NEUTROPHILS NFR BLD AUTO: 83.4 % — HIGH (ref 42.2–75.2)
NRBC # BLD: 0 /100 WBCS — SIGNIFICANT CHANGE UP (ref 0–0)
PHOSPHATE SERPL-MCNC: 5.1 MG/DL — HIGH (ref 2.1–4.9)
PLATELET # BLD AUTO: 224 K/UL — SIGNIFICANT CHANGE UP (ref 130–400)
POTASSIUM SERPL-MCNC: 4.5 MMOL/L — SIGNIFICANT CHANGE UP (ref 3.5–5)
POTASSIUM SERPL-SCNC: 4.5 MMOL/L — SIGNIFICANT CHANGE UP (ref 3.5–5)
PROTHROM AB SERPL-ACNC: 12.6 SEC — SIGNIFICANT CHANGE UP (ref 9.95–12.87)
RBC # BLD: 4.19 M/UL — LOW (ref 4.7–6.1)
RBC # FLD: 13.9 % — SIGNIFICANT CHANGE UP (ref 11.5–14.5)
SODIUM SERPL-SCNC: 138 MMOL/L — SIGNIFICANT CHANGE UP (ref 135–146)
TROPONIN T SERPL-MCNC: 0.02 NG/ML — HIGH
WBC # BLD: 12.12 K/UL — HIGH (ref 4.8–10.8)
WBC # FLD AUTO: 12.12 K/UL — HIGH (ref 4.8–10.8)

## 2019-04-18 PROCEDURE — 70551 MRI BRAIN STEM W/O DYE: CPT | Mod: 26

## 2019-04-18 PROCEDURE — 71045 X-RAY EXAM CHEST 1 VIEW: CPT | Mod: 26

## 2019-04-18 PROCEDURE — 93010 ELECTROCARDIOGRAM REPORT: CPT

## 2019-04-18 RX ORDER — APIXABAN 2.5 MG/1
10 TABLET, FILM COATED ORAL EVERY 12 HOURS
Qty: 0 | Refills: 0 | Status: COMPLETED | OUTPATIENT
Start: 2019-04-18 | End: 2019-04-25

## 2019-04-18 RX ORDER — LABETALOL HCL 100 MG
200 TABLET ORAL THREE TIMES A DAY
Qty: 0 | Refills: 0 | Status: DISCONTINUED | OUTPATIENT
Start: 2019-04-18 | End: 2019-04-29

## 2019-04-18 RX ORDER — ASPIRIN/CALCIUM CARB/MAGNESIUM 324 MG
81 TABLET ORAL DAILY
Qty: 0 | Refills: 0 | Status: DISCONTINUED | OUTPATIENT
Start: 2019-04-18 | End: 2019-04-29

## 2019-04-18 RX ADMIN — Medication 19 UNIT(S): at 12:04

## 2019-04-18 RX ADMIN — Medication 4: at 12:05

## 2019-04-18 RX ADMIN — CHLORHEXIDINE GLUCONATE 1 APPLICATION(S): 213 SOLUTION TOPICAL at 05:54

## 2019-04-18 RX ADMIN — Medication 40 MILLIGRAM(S): at 17:20

## 2019-04-18 RX ADMIN — Medication 667 MILLIGRAM(S): at 08:16

## 2019-04-18 RX ADMIN — Medication 100 MILLIGRAM(S): at 05:57

## 2019-04-18 RX ADMIN — Medication 0.2 MILLIGRAM(S): at 05:58

## 2019-04-18 RX ADMIN — SENNA PLUS 2 TABLET(S): 8.6 TABLET ORAL at 21:46

## 2019-04-18 RX ADMIN — Medication 300 MILLIGRAM(S): at 05:58

## 2019-04-18 RX ADMIN — CHLORHEXIDINE GLUCONATE 1 APPLICATION(S): 213 SOLUTION TOPICAL at 17:20

## 2019-04-18 RX ADMIN — Medication 100 MILLIGRAM(S): at 13:51

## 2019-04-18 RX ADMIN — Medication 19 UNIT(S): at 17:16

## 2019-04-18 RX ADMIN — LACTULOSE 15 GRAM(S): 10 SOLUTION ORAL at 17:21

## 2019-04-18 RX ADMIN — Medication 81 MILLIGRAM(S): at 13:57

## 2019-04-18 RX ADMIN — APIXABAN 10 MILLIGRAM(S): 2.5 TABLET, FILM COATED ORAL at 13:57

## 2019-04-18 RX ADMIN — Medication 667 MILLIGRAM(S): at 17:18

## 2019-04-18 RX ADMIN — Medication 0.3 MILLIGRAM(S): at 13:52

## 2019-04-18 RX ADMIN — PANTOPRAZOLE SODIUM 40 MILLIGRAM(S): 20 TABLET, DELAYED RELEASE ORAL at 06:00

## 2019-04-18 RX ADMIN — Medication 2000 UNIT(S): at 12:07

## 2019-04-18 RX ADMIN — Medication 0.3 MILLIGRAM(S): at 21:48

## 2019-04-18 RX ADMIN — Medication 3: at 08:15

## 2019-04-18 RX ADMIN — Medication 100 MILLIGRAM(S): at 21:46

## 2019-04-18 RX ADMIN — Medication 200 MILLIGRAM(S): at 21:46

## 2019-04-18 RX ADMIN — AMLODIPINE BESYLATE 10 MILLIGRAM(S): 2.5 TABLET ORAL at 05:58

## 2019-04-18 RX ADMIN — INSULIN GLARGINE 56 UNIT(S): 100 INJECTION, SOLUTION SUBCUTANEOUS at 21:47

## 2019-04-18 RX ADMIN — Medication 20 MILLIGRAM(S): at 05:58

## 2019-04-18 RX ADMIN — Medication 667 MILLIGRAM(S): at 12:06

## 2019-04-18 RX ADMIN — ATORVASTATIN CALCIUM 40 MILLIGRAM(S): 80 TABLET, FILM COATED ORAL at 21:46

## 2019-04-18 RX ADMIN — LACTULOSE 15 GRAM(S): 10 SOLUTION ORAL at 05:58

## 2019-04-18 RX ADMIN — Medication 19 UNIT(S): at 08:15

## 2019-04-18 RX ADMIN — Medication 3: at 17:16

## 2019-04-18 RX ADMIN — Medication 40 MILLIGRAM(S): at 06:00

## 2019-04-18 NOTE — PROGRESS NOTE ADULT - ATTENDING COMMENTS
Patient seen and examined and agree with above except as noted.  Patient having some SOB this am and heart burn.  He has some weakness in RLE and some myoclonic movements in the RUE.  NIHSS 4    Plan as above (order REEG and if any worsening repeat CTH STAT and call Stroke team STAT).

## 2019-04-18 NOTE — PROGRESS NOTE ADULT - SUBJECTIVE AND OBJECTIVE BOX
Neurology Follow up note      Name  GRIS TIDWELL    HPI:  60 y/o M with hx of HTN, DM, CAD, Sleep Apnea BIBEMS for altered mental status and aphasia. At presentation patient was found to be aphasic and some right side weakness. He was not following the commands so wife called the EMS. He was completely fine at 3:00 pm today. Wife herd the noise upstairs and found him on the ground unresponsive. So she called an EMS.     In the ED stroke code was called and NIH SS was 15 with right sided weakness. The CT scan showed the left intra parenchymal bleed with the extend into the ventricle associated with the midline shift. Pt received platelets and DDAVP for the aspirin reversal. The blood pressure was in 200s . Pt had jhonatan and on nicardin drip for the close bp control. (2019 22:24)      Interval History - Now with LLE DVT on heparin gtt.  Now on Bipap overnight. Not therapeurtic as of yet.          Vital Signs Last 24 Hrs  T(C): 36.2 (2019 05:24), Max: 37 (2019 10:44)  T(F): 97.1 (2019 05:24), Max: 98.6 (2019 10:44)  HR: 55 (2019 05:24) (55 - 65)  BP: 169/81 (2019 05:24) (133/71 - 174/80)  BP(mean): --  RR: 20 (2019 05:24) (20 - 20)  SpO2: 97% (2019 05:24) (97% - 97%)          Neurological Exam:   Mental status: Awake Alert Oriented Mildly Slurred Speech   Right UE power 3/5, Right LE 2/5, LLE 2/5.   No sensory deficits.                 NIHSS  LOC:  0   QUES: 0    COMM: 0     VF:  0   GAZE: 0    RUE: 2    RLE:  2   LUE:  0   LLE:  0   SENS:  0   LAN    SPEECH: 1    ATAXIA:0     NEGLECT:  0   FACE:0    NIHSS today: 5              Medications  acetaminophen   Tablet .. 650 milliGRAM(s) Oral every 6 hours PRN  aluminum hydroxide/magnesium hydroxide/simethicone Suspension 30 milliLiter(s) Oral every 6 hours PRN  amLODIPine   Tablet 10 milliGRAM(s) Oral daily  atorvastatin Oral Tab/Cap - Peds 40 milliGRAM(s) Oral daily  calcium acetate 667 milliGRAM(s) Oral three times a day with meals  chlorhexidine 4% Liquid 1 Application(s) Topical every 12 hours  cholecalciferol 2000 Unit(s) Oral daily  cloNIDine 0.2 milliGRAM(s) Oral every 8 hours  dextrose 40% Gel 15 Gram(s) Oral once PRN  dextrose 5%. 1000 milliLiter(s) IV Continuous <Continuous>  dextrose 50% Injectable 12.5 Gram(s) IV Push once  dextrose 50% Injectable 25 Gram(s) IV Push once  dextrose 50% Injectable 25 Gram(s) IV Push once  furosemide    Tablet 40 milliGRAM(s) Oral two times a day  glucagon  Injectable 1 milliGRAM(s) IntraMuscular once PRN  heparin  Infusion 2400 Unit(s)/Hr IV Continuous <Continuous>  hydrALAZINE 100 milliGRAM(s) Oral every 8 hours  insulin glargine Injectable (LANTUS) 56 Unit(s) SubCutaneous at bedtime  insulin lispro (HumaLOG) corrective regimen sliding scale   SubCutaneous three times a day before meals  insulin lispro Injectable (HumaLOG) 19 Unit(s) SubCutaneous three times a day with meals  labetalol 300 milliGRAM(s) Oral every 8 hours  lactulose Syrup 15 Gram(s) Oral two times a day  pantoprazole    Tablet 40 milliGRAM(s) Oral before breakfast  predniSONE   Tablet 20 milliGRAM(s) Oral daily  senna 2 Tablet(s) Oral at bedtime      Lab      Radiology

## 2019-04-18 NOTE — CHART NOTE - NSCHARTNOTEFT_GEN_A_CORE
Pt continues on cut up diet/nectar thick, consistent carb, renal diet with good PO intake per RN (consuming % of meals today per EMR). DESI on CKD improving/stable. Glucose elevated, insulin regimen being adjusted. +BM 4/17. Skin intact. No nutrition interventions. RD to f/u in 7 days

## 2019-04-18 NOTE — PROGRESS NOTE ADULT - ASSESSMENT
IMP: 60 y/o M s/p ICH at left BG with IVH ext - right side weakness now with LLE DVT on heaparin gtt    Plan:  1. Anticoagulation as per neurosurgery recommendations  2. PT/OT/Rehab  3. Will follow up with imaging - MRI still pendiing  Neuro checks PRN  ANy acute changes in MS - Obtain STAT HCT IMP: 58 y/o M s/p ICH at left BG with IVH ext - right side weakness now with LLE DVT on heaparin gtt    Plan:  1. Anticoagulation as per neurosurgery recommendations  2. PT/OT/Rehab  3. Will follow up with imaging - MRI still pendiing  Neuro checks q4 hours  ANy acute changes in MS - Obtain STAT HCT

## 2019-04-18 NOTE — PROGRESS NOTE ADULT - SUBJECTIVE AND OBJECTIVE BOX
GRIS TIDWELL 59y Male  MRN#: [id number]     SUBJECTIVE  Patient is a 59y old Male who presents with a chief complaint of Right sided weakness   Currently admitted to medicine with the primary diagnosis of Intraparenchymal hemorrhage of brain  Today is hospital day 16d, and this morning he reports no overnight events.     OBJECTIVE  PAST MEDICAL & SURGICAL HISTORY  Gout  Morbidly obese  Gout  Hypertension  Diabetes mellitus  S/P tonsillectomy    ALLERGIES:  No Known Allergies    MEDICATIONS:  STANDING MEDICATIONS  amLODIPine   Tablet 10 milliGRAM(s) Oral daily  apixaban 10 milliGRAM(s) Oral every 12 hours  aspirin  chewable 81 milliGRAM(s) Oral daily  atorvastatin Oral Tab/Cap - Peds 40 milliGRAM(s) Oral daily  calcium acetate 667 milliGRAM(s) Oral three times a day with meals  chlorhexidine 4% Liquid 1 Application(s) Topical every 12 hours  cholecalciferol 2000 Unit(s) Oral daily  cloNIDine 0.3 milliGRAM(s) Oral every 8 hours  dextrose 5%. 1000 milliLiter(s) IV Continuous <Continuous>  dextrose 50% Injectable 12.5 Gram(s) IV Push once  dextrose 50% Injectable 25 Gram(s) IV Push once  dextrose 50% Injectable 25 Gram(s) IV Push once  furosemide    Tablet 40 milliGRAM(s) Oral two times a day  hydrALAZINE 100 milliGRAM(s) Oral every 8 hours  insulin glargine Injectable (LANTUS) 56 Unit(s) SubCutaneous at bedtime  insulin lispro (HumaLOG) corrective regimen sliding scale   SubCutaneous three times a day before meals  insulin lispro Injectable (HumaLOG) 19 Unit(s) SubCutaneous three times a day with meals  lactulose Syrup 15 Gram(s) Oral two times a day  pantoprazole    Tablet 40 milliGRAM(s) Oral before breakfast  senna 2 Tablet(s) Oral at bedtime    PRN MEDICATIONS  acetaminophen   Tablet .. 650 milliGRAM(s) Oral every 6 hours PRN  aluminum hydroxide/magnesium hydroxide/simethicone Suspension 30 milliLiter(s) Oral every 6 hours PRN  dextrose 40% Gel 15 Gram(s) Oral once PRN  glucagon  Injectable 1 milliGRAM(s) IntraMuscular once PRN      VITAL SIGNS: Last 24 Hours  T(C): 36.2 (18 Apr 2019 10:27), Max: 36.4 (17 Apr 2019 14:18)  T(F): 97.2 (18 Apr 2019 10:27), Max: 97.6 (17 Apr 2019 14:18)  HR: 58 (18 Apr 2019 10:27) (55 - 65)  BP: 139/88 (18 Apr 2019 10:27) (138/63 - 174/80)  BP(mean): --  RR: 20 (18 Apr 2019 10:27) (20 - 20)  SpO2: 98% (18 Apr 2019 10:27) (97% - 98%)    LABS:                        11.2   12.12 )-----------( 224      ( 18 Apr 2019 05:18 )             36.5     04-18    138  |  93<L>  |  87<HH>  ----------------------------<  239<H>  4.5   |  28  |  2.2<H>    Ca    8.9      18 Apr 2019 05:18  Phos  5.1     04-18  Mg     1.9     04-18      PT/INR - ( 18 Apr 2019 05:18 )   PT: 12.60 sec;   INR: 1.10 ratio         PTT - ( 18 Apr 2019 05:18 )  PTT:96.9 sec    RADIOLOGY:  < from: CT Head No Cont (04.13.19 @ 16:07) >  impression:    No significant changes in the acute/subacute parenchymal hemorrhage   involving the left basal ganglia with small amount of surrounding edema.   Well-demarcated area of decreased attenuation extending to the left   caudate may represent associated acute/subacute infarction. Attention on   follow-up imaging recommended.    Mass effect on the left lateral ventricle without significant ventricular   enlargement. Minimal left-to-right midline shift.    No new acute intracranial hemorrhage.    < end of copied text >    < from: Transthoracic Echocardiogram (04.13.19 @ 08:42) >  Summary:   1. Left ventricular ejection fraction, by visual estimation, is 55 to   60%.   2. Normal global left ventricular systolic function.   3. Mild mitral valve regurgitation.   4. Mild thickening and calcification of the anterior mitral valve   leaflet.   5. Moderate aortic valve thickening with normal leaflet opening.      < end of copied text >    < from: [facility] Duplex Lower Ext Vein Scan, Bilat (04.12.19 @ 19:40) >  Impression:    Thromboses present in bilateral calf veins: right posterior tibial and   peroneal vein branches; left gastrocnemius and posterior tibial vein   branches.    These are new findings compared to duplex of 4/7/2019.    < end of copied text >    < from: US Renal (04.03.19 @ 10:59) >  Impression:     Limited examination secondary to patient's condition and body habitus.    No hydronephrosis or calculus on either side.    2.4 cm left renal hypodensity may reflect a cyst but is limited in   characterization on this study. Consideration can be given to a follow-up   examination or dedicated CT or MRI at later time.    < end of copied text >    PHYSICAL EXAM:    GENERAL: NAD, well-developed, AAOx3  HEENT:  Atraumatic, Normocephalic. EOMI, PERRLA, conjunctiva and sclera clear, No JVD  PULMONARY: Clear to auscultation bilaterally; No wheeze  CARDIOVASCULAR: Regular rate and rhythm; No murmurs, rubs, or gallops  GASTROINTESTINAL: Soft, Nontender, Nondistended; Bowel sounds present  MUSCULOSKELETAL:  2+ Peripheral Pulses, No clubbing, cyanosis, or edema  NEUROLOGY: non-focal  SKIN: No rashes or lesions      ADMISSION SUMMARY  Patient is a 59y old Male who presents with a chief complaint of Right sided weakness   Currently admitted to medicine with the primary diagnosis of Intraparenchymal hemorrhage of brain    ASSESSMENT & PLAN    #LLE and RLE DVT and Acute on chronic hypercapnic respiratory failure, possible PE  - Venous duplex 4/12 showed new right posterior tibial and peroneal and left gastrocnemius and branch of posterior tibial thrombus.  - CTA lungs (not done due to CKD) and V/Q scan would not  to anticoagulate  - Neurosurgery cleared pt for anticoagulation 4/12  - Started on aspirin and eliquis  - BiPAP qhs and prn. Patient was in respiratory distress this am, was saturating at 95%on 3l. Tachypnea improved with BiPAP    #L basal ganglia intraparenchymal bleed   - no acute neurosurgical intervention  - CTH 4/2: L frontotemporal intraparenchymal bleed with extension into the ventricle and 2mm shift to the R  - Repeat CTH on 4/3, 4/6, 4/12, 4/13 stable   - Having right UE and LE weakness  - Neurology Suspecting caudate nucleus infarct on top of bleed. MR brain to be performed today  - Maintain SBP<160   - Neuro checks q4h  - PT to evaluate the patient  - Speech and swallow eval done. Recommended dysphagia 3 diet. Need speech therapy for expressive aphasia    #Hypertensive emergency  - goal BP < 160  - amlodipine 10 mg daily, hydralazine 100 mg TID, lasix 40mg PO bid, increased clonidine to 0.3mg q8h, decreased labetalol to 200mg q8h     #Acute on chronic HFpEF  - Echo EF 55% to 60% EF MR, Mild Aortic valve  thinking   - cxr w/ interval blunting of costophrenic angle, hazy opacity  - follows w/ Dr. Man  - On lasix 40mg PO q12h    #DESI on CKD3  -Improving  - baseline Cr 1.9 in 10/2018  - renal US 4/3: no hydronephrosis, L renal cyst  - nephro following     #New onset afib  - rate controlled  - labetalol 200 mg TID  - On eliquis    #Fever. Resolved.  - Possible bibasilar pneumonia vs gout attack  - urine cx 4/2: negative  - blood cx: NTD  - uric acid 9.9  - Completed 10 days course of cefepime    # Gout Flare  - d/c steroid today    #DM glucose uncontrolled   -Increased lantus to 56u, lispro 19u TID    #Dyslipidemia  - lipitor    #DVT ppx: Eliquis  #GI ppx: protonix   #Dispo: Need PT eval. Still acute  #Full code GRIS TIDWELL 59y Male  MRN#: [id number]     SUBJECTIVE  Patient is a 59y old Male who presents with a chief complaint of Right sided weakness   Currently admitted to medicine with the primary diagnosis of Intraparenchymal hemorrhage of brain  Today is hospital day 16d, and this morning he reports no overnight events.     OBJECTIVE  PAST MEDICAL & SURGICAL HISTORY  Gout  Morbidly obese  Gout  Hypertension  Diabetes mellitus  S/P tonsillectomy    ALLERGIES:  No Known Allergies    MEDICATIONS:  STANDING MEDICATIONS  amLODIPine   Tablet 10 milliGRAM(s) Oral daily  apixaban 10 milliGRAM(s) Oral every 12 hours  aspirin  chewable 81 milliGRAM(s) Oral daily  atorvastatin Oral Tab/Cap - Peds 40 milliGRAM(s) Oral daily  calcium acetate 667 milliGRAM(s) Oral three times a day with meals  chlorhexidine 4% Liquid 1 Application(s) Topical every 12 hours  cholecalciferol 2000 Unit(s) Oral daily  cloNIDine 0.3 milliGRAM(s) Oral every 8 hours  dextrose 5%. 1000 milliLiter(s) IV Continuous <Continuous>  dextrose 50% Injectable 12.5 Gram(s) IV Push once  dextrose 50% Injectable 25 Gram(s) IV Push once  dextrose 50% Injectable 25 Gram(s) IV Push once  furosemide    Tablet 40 milliGRAM(s) Oral two times a day  hydrALAZINE 100 milliGRAM(s) Oral every 8 hours  insulin glargine Injectable (LANTUS) 56 Unit(s) SubCutaneous at bedtime  insulin lispro (HumaLOG) corrective regimen sliding scale   SubCutaneous three times a day before meals  insulin lispro Injectable (HumaLOG) 19 Unit(s) SubCutaneous three times a day with meals  lactulose Syrup 15 Gram(s) Oral two times a day  pantoprazole    Tablet 40 milliGRAM(s) Oral before breakfast  senna 2 Tablet(s) Oral at bedtime    PRN MEDICATIONS  acetaminophen   Tablet .. 650 milliGRAM(s) Oral every 6 hours PRN  aluminum hydroxide/magnesium hydroxide/simethicone Suspension 30 milliLiter(s) Oral every 6 hours PRN  dextrose 40% Gel 15 Gram(s) Oral once PRN  glucagon  Injectable 1 milliGRAM(s) IntraMuscular once PRN      VITAL SIGNS: Last 24 Hours  T(C): 36.2 (18 Apr 2019 10:27), Max: 36.4 (17 Apr 2019 14:18)  T(F): 97.2 (18 Apr 2019 10:27), Max: 97.6 (17 Apr 2019 14:18)  HR: 58 (18 Apr 2019 10:27) (55 - 65)  BP: 139/88 (18 Apr 2019 10:27) (138/63 - 174/80)  BP(mean): --  RR: 20 (18 Apr 2019 10:27) (20 - 20)  SpO2: 98% (18 Apr 2019 10:27) (97% - 98%)    LABS:                        11.2   12.12 )-----------( 224      ( 18 Apr 2019 05:18 )             36.5     04-18    138  |  93<L>  |  87<HH>  ----------------------------<  239<H>  4.5   |  28  |  2.2<H>    Ca    8.9      18 Apr 2019 05:18  Phos  5.1     04-18  Mg     1.9     04-18      PT/INR - ( 18 Apr 2019 05:18 )   PT: 12.60 sec;   INR: 1.10 ratio         PTT - ( 18 Apr 2019 05:18 )  PTT:96.9 sec    RADIOLOGY:  < from: CT Head No Cont (04.13.19 @ 16:07) >  impression:    No significant changes in the acute/subacute parenchymal hemorrhage   involving the left basal ganglia with small amount of surrounding edema.   Well-demarcated area of decreased attenuation extending to the left   caudate may represent associated acute/subacute infarction. Attention on   follow-up imaging recommended.    Mass effect on the left lateral ventricle without significant ventricular   enlargement. Minimal left-to-right midline shift.    No new acute intracranial hemorrhage.    < end of copied text >    < from: Transthoracic Echocardiogram (04.13.19 @ 08:42) >  Summary:   1. Left ventricular ejection fraction, by visual estimation, is 55 to   60%.   2. Normal global left ventricular systolic function.   3. Mild mitral valve regurgitation.   4. Mild thickening and calcification of the anterior mitral valve   leaflet.   5. Moderate aortic valve thickening with normal leaflet opening.      < end of copied text >    < from: [facility] Duplex Lower Ext Vein Scan, Bilat (04.12.19 @ 19:40) >  Impression:    Thromboses present in bilateral calf veins: right posterior tibial and   peroneal vein branches; left gastrocnemius and posterior tibial vein   branches.    These are new findings compared to duplex of 4/7/2019.    < end of copied text >    < from: US Renal (04.03.19 @ 10:59) >  Impression:     Limited examination secondary to patient's condition and body habitus.    No hydronephrosis or calculus on either side.    2.4 cm left renal hypodensity may reflect a cyst but is limited in   characterization on this study. Consideration can be given to a follow-up   examination or dedicated CT or MRI at later time.    < end of copied text >    PHYSICAL EXAM:    GENERAL: NAD, well-developed, AAOx3  HEENT:  Atraumatic, Normocephalic. EOMI, PERRLA, conjunctiva and sclera clear, No JVD  PULMONARY: Clear to auscultation bilaterally; Crackles at b/l bases  CARDIOVASCULAR: Regular rate and rhythm; No murmurs, rubs, or gallops  GASTROINTESTINAL: Soft, Nontender, Nondistended; Bowel sounds present  MUSCULOSKELETAL:  2+ Peripheral Pulses, No clubbing, cyanosis, or edema  NEUROLOGY: Speech is slurred. RUE and RLE weakness, 2/5 in RLE and 3/5 in RUE. Intact sensation.   SKIN: No rashes or lesions      ADMISSION SUMMARY  Patient is a 59y old Male who presents with a chief complaint of Right sided weakness   Currently admitted to medicine with the primary diagnosis of Intraparenchymal hemorrhage of brain    ASSESSMENT & PLAN  #Chest pain  EKG normal  f/u cardiac enzymes for 11am     #LLE and RLE DVT and Acute on chronic hypercapnic respiratory failure, possible PE  - Venous duplex 4/12 showed new right posterior tibial and peroneal and left gastrocnemius and branch of posterior tibial thrombus.  - CTA lungs (not done due to CKD) and V/Q scan would not  to anticoagulate  - Neurosurgery cleared pt for anticoagulation 4/12  - Started on aspirin and eliquis  - BiPAP qhs and prn. Patient was in respiratory distress this am, was saturating at 95%on 3l. Tachypnea improved with BiPAP    #L basal ganglia intraparenchymal bleed   - no acute neurosurgical intervention  - CTH 4/2: L frontotemporal intraparenchymal bleed with extension into the ventricle and 2mm shift to the R  - Repeat CTH on 4/3, 4/6, 4/12, 4/13 stable   - Having right UE and LE weakness. Power 2/5 in RUE, 3/5 in RLE  - Neurology Suspecting caudate nucleus infarct on top of bleed. MR brain to be performed today  - Maintain SBP<160   - Neuro checks q4h  - PT to evaluate the patient  - Speech and swallow eval done. Recommended dysphagia 3 diet. Need speech therapy for expressive aphasia    #Hypertensive emergency  - goal SBP < 160  - amlodipine 10 mg daily, hydralazine 100 mg TID, lasix 40mg PO bid, increased clonidine to 0.3mg q8h, decreased labetalol to 200mg q8h     #Acute on chronic HFpEF  - Echo EF 55% to 60% EF MR, Mild Aortic valve  thinking   - cxr w/ interval blunting of costophrenic angle, hazy opacity  - follows w/ Dr. Man  - On lasix 40mg PO q12h    #DESI on CKD3  - Improving  - baseline Cr 1.9 in 10/2018  - renal US 4/3: no hydronephrosis, L renal cyst  - nephro following     #New onset afib  - rate controlled  - labetalol 200 mg TID  - On eliquis    #Fever. Resolved.  - Possible bibasilar pneumonia vs gout attack  - urine cx 4/2: negative  - blood cx: NTD  - uric acid 9.9  - Completed 10 days course of cefepime    # Gout Flare  - d/c steroid today    #DM glucose uncontrolled   -Increased lantus to 56u, lispro 19u TID    #Dyslipidemia  - lipitor    #DVT ppx: Eliquis  #GI ppx: protonix   #Dispo: Need PT eval. Still acute  #Full code GRIS TIDWELL 59y Male  MRN#: [id number]     SUBJECTIVE  Patient is a 59y old Male who presents with a chief complaint of Right sided weakness   Currently admitted to medicine with the primary diagnosis of Intraparenchymal hemorrhage of brain  Today is hospital day 16d, and this morning he reports no overnight events.     OBJECTIVE  PAST MEDICAL & SURGICAL HISTORY  Gout  Morbidly obese  Gout  Hypertension  Diabetes mellitus  S/P tonsillectomy    ALLERGIES:  No Known Allergies    MEDICATIONS:  STANDING MEDICATIONS  amLODIPine   Tablet 10 milliGRAM(s) Oral daily  apixaban 10 milliGRAM(s) Oral every 12 hours  aspirin  chewable 81 milliGRAM(s) Oral daily  atorvastatin Oral Tab/Cap - Peds 40 milliGRAM(s) Oral daily  calcium acetate 667 milliGRAM(s) Oral three times a day with meals  chlorhexidine 4% Liquid 1 Application(s) Topical every 12 hours  cholecalciferol 2000 Unit(s) Oral daily  cloNIDine 0.3 milliGRAM(s) Oral every 8 hours  dextrose 5%. 1000 milliLiter(s) IV Continuous <Continuous>  dextrose 50% Injectable 12.5 Gram(s) IV Push once  dextrose 50% Injectable 25 Gram(s) IV Push once  dextrose 50% Injectable 25 Gram(s) IV Push once  furosemide    Tablet 40 milliGRAM(s) Oral two times a day  hydrALAZINE 100 milliGRAM(s) Oral every 8 hours  insulin glargine Injectable (LANTUS) 56 Unit(s) SubCutaneous at bedtime  insulin lispro (HumaLOG) corrective regimen sliding scale   SubCutaneous three times a day before meals  insulin lispro Injectable (HumaLOG) 19 Unit(s) SubCutaneous three times a day with meals  lactulose Syrup 15 Gram(s) Oral two times a day  pantoprazole    Tablet 40 milliGRAM(s) Oral before breakfast  senna 2 Tablet(s) Oral at bedtime    PRN MEDICATIONS  acetaminophen   Tablet .. 650 milliGRAM(s) Oral every 6 hours PRN  aluminum hydroxide/magnesium hydroxide/simethicone Suspension 30 milliLiter(s) Oral every 6 hours PRN  dextrose 40% Gel 15 Gram(s) Oral once PRN  glucagon  Injectable 1 milliGRAM(s) IntraMuscular once PRN      VITAL SIGNS: Last 24 Hours  T(C): 36.2 (18 Apr 2019 10:27), Max: 36.4 (17 Apr 2019 14:18)  T(F): 97.2 (18 Apr 2019 10:27), Max: 97.6 (17 Apr 2019 14:18)  HR: 58 (18 Apr 2019 10:27) (55 - 65)  BP: 139/88 (18 Apr 2019 10:27) (138/63 - 174/80  RR: 20 (18 Apr 2019 10:27) (20 - 20)  SpO2: 98% (18 Apr 2019 10:27) (97% - 98%)    LABS:                        11.2   12.12 )-----------( 224      ( 18 Apr 2019 05:18 )             36.5     04-18    138  |  93<L>  |  87<HH>  ----------------------------<  239<H>  4.5   |  28  |  2.2<H>    Ca    8.9      18 Apr 2019 05:18  Phos  5.1     04-18  Mg     1.9     04-18      PT/INR - ( 18 Apr 2019 05:18 )   PT: 12.60 sec;   INR: 1.10 ratio         PTT - ( 18 Apr 2019 05:18 )  PTT:96.9 sec    RADIOLOGY:  < from: CT Head No Cont (04.13.19 @ 16:07) >  impression:    No significant changes in the acute/subacute parenchymal hemorrhage   involving the left basal ganglia with small amount of surrounding edema.   Well-demarcated area of decreased attenuation extending to the left   caudate may represent associated acute/subacute infarction. Attention on   follow-up imaging recommended.    Mass effect on the left lateral ventricle without significant ventricular   enlargement. Minimal left-to-right midline shift.    No new acute intracranial hemorrhage.    < end of copied text >    < from: Transthoracic Echocardiogram (04.13.19 @ 08:42) >  Summary:   1. Left ventricular ejection fraction, by visual estimation, is 55 to   60%.   2. Normal global left ventricular systolic function.   3. Mild mitral valve regurgitation.   4. Mild thickening and calcification of the anterior mitral valve   leaflet.   5. Moderate aortic valve thickening with normal leaflet opening.      < end of copied text >    < from: [facility] Duplex Lower Ext Vein Scan, Bilat (04.12.19 @ 19:40) >  Impression:    Thromboses present in bilateral calf veins: right posterior tibial and   peroneal vein branches; left gastrocnemius and posterior tibial vein   branches.    These are new findings compared to duplex of 4/7/2019.    < end of copied text >    < from: US Renal (04.03.19 @ 10:59) >  Impression:     Limited examination secondary to patient's condition and body habitus.    No hydronephrosis or calculus on either side.    2.4 cm left renal hypodensity may reflect a cyst but is limited in   characterization on this study. Consideration can be given to a follow-up   examination or dedicated CT or MRI at later time.    < end of copied text >    PHYSICAL EXAM:    GENERAL: NAD, well-developed, AAOx3  HEENT:  Atraumatic, Normocephalic. EOMI, PERRLA, conjunctiva and sclera clear, No JVD  PULMONARY: Clear to auscultation bilaterally; Crackles at b/l bases  CARDIOVASCULAR: Regular rate and rhythm; No murmurs, rubs, or gallops  GASTROINTESTINAL: Soft, Nontender, Nondistended; Bowel sounds present  MUSCULOSKELETAL:  2+ Peripheral Pulses, No clubbing, cyanosis, or edema  NEUROLOGY: Speech is slurred. RUE and RLE weakness, 2/5 in RLE and 3/5 in RUE. Intact sensation.   SKIN: No rashes or lesions      ADMISSION SUMMARY  Patient is a 59y old Male who presents with a chief complaint of Right sided weakness   Currently admitted to medicine with the primary diagnosis of Intraparenchymal hemorrhage of brain    ASSESSMENT & PLAN  #Chest pain  EKG normal  f/u cardiac enzymes for 11am     #LLE and RLE DVT and Acute on chronic hypercapnic respiratory failure, possible PE  - Venous duplex 4/12 showed new right posterior tibial and peroneal and left gastrocnemius and branch of posterior tibial thrombus.  - CTA lungs (not done due to CKD) and V/Q scan would not  to anticoagulate  - Neurosurgery cleared pt for anticoagulation 4/12  - Started on aspirin and eliquis  - BiPAP qhs and prn. Patient was in respiratory distress this am, was saturating at 95%on 3l. Tachypnea improved with BiPAP    #L basal ganglia intraparenchymal bleed   - no acute neurosurgical intervention  - CTH 4/2: L frontotemporal intraparenchymal bleed with extension into the ventricle and 2mm shift to the R  - Repeat CTH on 4/3, 4/6, 4/12, 4/13 stable   - Having right UE and LE weakness. Power 2/5 in RUE, 3/5 in RLE  - Neurology Suspecting caudate nucleus infarct on top of bleed. MR brain to be performed today  - Maintain SBP<160   - Neuro checks q4h  - PT to evaluate the patient  - Speech and swallow eval done. Recommended dysphagia 3 diet. Need speech therapy for expressive aphasia    #Hypertensive emergency  - goal SBP < 160  - amlodipine 10 mg daily, hydralazine 100 mg TID, lasix 40mg PO bid, increased clonidine to 0.3mg q8h, decreased labetalol to 200mg q8h     #Acute on chronic HFpEF  - Echo EF 55% to 60% EF MR, Mild Aortic valve  thinking   - cxr w/ interval blunting of costophrenic angle, hazy opacity  - follows w/ Dr. Man  - On lasix 40mg PO q12h    #DESI on CKD3  - Improving  - baseline Cr 1.9 in 10/2018  - renal US 4/3: no hydronephrosis, L renal cyst  - nephro following     #New onset afib  - rate controlled  - labetalol 200 mg TID  - On eliquis    #Fever. Resolved.  - Possible bibasilar pneumonia vs gout attack  - urine cx 4/2: negative  - blood cx: NTD  - uric acid 9.9  - Completed 10 days course of cefepime    # Gout Flare  - d/c steroid today    #DM glucose uncontrolled   -Increased lantus to 56u, lispro 19u TID    #Dyslipidemia  - lipitor    #DVT ppx: Eliquis  #GI ppx: protonix   #Dispo: Need PT eval. Still acute  #Full code

## 2019-04-18 NOTE — PROGRESS NOTE ADULT - ATTENDING COMMENTS
#Progress Note Handoff    Pending (specify):  Eliquis started today . Monitor for bleeding. BP better controlled now.   Family discussion: D/W Family and also completed the Disability documents for the patient   Disposition: Unknown at this time

## 2019-04-18 NOTE — PROGRESS NOTE ADULT - ASSESSMENT
IMPRESSION:    Intraparenchymal hemorrhage  Acute on chronic hypercapnic respiratory failure  Probable ZE +/- OHS  DESI improving  Acute DVT, Possible PE     PLAN:    CNS: no sedatives, neurology follow up,     HEENT:  Oral care    PULMONARY:  HOB @ 45 degrees, oxygen to keep pulse ox>90%, NIV during sleep and prn. DC prednisone     CARDIOVASCULAR:  I=O, BP control as per neuro target, Lasix po 40 bid     GI: GI prophylaxis  Feeding per  speech and swallow eval    RENAL:  F/u  lytes.  Correct as needed. accurate I/O, FU with renal,    INFECTIOUS DISEASE:     HEMATOLOGICAL:  IV heparin. Eliquis when cleared by neuro and neurosurg     ENDOCRINE:  Follow up FS.      MUSCULOSKELETAL: OOB to chair.     CODE STATUS: FULL CODE IMPRESSION:    Intraparenchymal hemorrhage  Chronic hypercapnic respiratory failure  Probable ZE +/- OHS  DESI improving  Acute DVT, Possible PE     PLAN:    CNS: no sedatives, neurology follow up,     HEENT:  Oral care    PULMONARY:  HOB @ 45 degrees, oxygen to keep pulse ox>90%, NIV during sleep and prn. DC prednisone     CARDIOVASCULAR:  I=O, BP control as per neuro target    GI: GI prophylaxis  Feeding per  speech and swallow eval    RENAL:  F/u  lytes.  Correct as needed. accurate I/O, FU with renal,    INFECTIOUS DISEASE:     HEMATOLOGICAL:  IV heparin. Eliquis when cleared by neuro and neurosurg     ENDOCRINE:  Follow up FS.      MUSCULOSKELETAL: OOB to chair.     CODE STATUS: FULL CODE

## 2019-04-18 NOTE — PROGRESS NOTE ADULT - SUBJECTIVE AND OBJECTIVE BOX
Patient is a 59y old  Male who presents with a chief complaint of Right sided weakness (18 Apr 2019 06:23)    SUBJECTIVE:    Currently on Bipap  Responds appropriately. Able to hold a conversation. Denies any complaints     REVIEW OF SYSTEMS:  See HPI    PHYSICAL EXAM  Vital Signs Last 24 Hrs  T(C): 36.2 (18 Apr 2019 05:24), Max: 37 (17 Apr 2019 10:44)  T(F): 97.1 (18 Apr 2019 05:24), Max: 98.6 (17 Apr 2019 10:44)  HR: 55 (18 Apr 2019 05:24) (55 - 65)  BP: 169/81 (18 Apr 2019 05:24) (133/71 - 174/80)  BP(mean): --  RR: 20 (18 Apr 2019 05:24) (20 - 20)  SpO2: 97% (18 Apr 2019 05:24) (97% - 97%)    General: In NAD  HEENT: TIMMY               Lymphatic system: No Cervical LN    Respiratory: Patrick BS  Cardiovascular: Regular  Gastrointestinal: Soft. + BS  Musculoskeletal: No clubbing.  moves all extremities.  Full range of motion    Skin: Warm.  Intact 3 + pedal edema   Neurological: No motor or sensory deficit      04-17-19 @ 07:01  -  04-18-19 @ 07:00  --------------------------------------------------------  IN:    Oral Fluid: 900 mL  Total IN: 900 mL    OUT:    Voided: 840 mL  Total OUT: 840 mL    Total NET: 60 mL    LABS:                          11.2   12.12 )-----------( 224      ( 18 Apr 2019 05:18 )             36.5                                               04-18    138  |  93<L>  |  87<HH>  ----------------------------<  239<H>  4.5   |  28  |  2.2<H>    Ca    8.9      18 Apr 2019 05:18  Phos  5.1     04-18  Mg     1.9     04-18        PT/INR - ( 18 Apr 2019 05:18 )   PT: 12.60 sec;   INR: 1.10 ratio         PTT - ( 18 Apr 2019 05:18 )  PTT:96.9 sec                                             MEDICATIONS  (STANDING):  amLODIPine   Tablet 10 milliGRAM(s) Oral daily  atorvastatin Oral Tab/Cap - Peds 40 milliGRAM(s) Oral daily  calcium acetate 667 milliGRAM(s) Oral three times a day with meals  chlorhexidine 4% Liquid 1 Application(s) Topical every 12 hours  cholecalciferol 2000 Unit(s) Oral daily  cloNIDine 0.3 milliGRAM(s) Oral every 8 hours  dextrose 5%. 1000 milliLiter(s) (50 mL/Hr) IV Continuous <Continuous>  dextrose 50% Injectable 12.5 Gram(s) IV Push once  dextrose 50% Injectable 25 Gram(s) IV Push once  dextrose 50% Injectable 25 Gram(s) IV Push once  furosemide    Tablet 40 milliGRAM(s) Oral two times a day  heparin  Infusion 2400 Unit(s)/Hr (21.5 mL/Hr) IV Continuous <Continuous>  hydrALAZINE 100 milliGRAM(s) Oral every 8 hours  insulin glargine Injectable (LANTUS) 56 Unit(s) SubCutaneous at bedtime  insulin lispro (HumaLOG) corrective regimen sliding scale   SubCutaneous three times a day before meals  insulin lispro Injectable (HumaLOG) 19 Unit(s) SubCutaneous three times a day with meals  labetalol 300 milliGRAM(s) Oral every 8 hours  lactulose Syrup 15 Gram(s) Oral two times a day  pantoprazole    Tablet 40 milliGRAM(s) Oral before breakfast  predniSONE   Tablet 20 milliGRAM(s) Oral daily  senna 2 Tablet(s) Oral at bedtime    MEDICATIONS  (PRN):  acetaminophen   Tablet .. 650 milliGRAM(s) Oral every 6 hours PRN Mild Pain (1 - 3)  aluminum hydroxide/magnesium hydroxide/simethicone Suspension 30 milliLiter(s) Oral every 6 hours PRN Dyspepsia  dextrose 40% Gel 15 Gram(s) Oral once PRN Blood Glucose LESS THAN 70 milliGRAM(s)/deciliter  glucagon  Injectable 1 milliGRAM(s) IntraMuscular once PRN Glucose LESS THAN 70 milligrams/deciliter    Final/   impression:     No significant changes in the acute/subacute parenchymal hemorrhage   involving the left basal ganglia with small amount of surrounding edema.   Well-demarcated area of decreased attenuation extending to the left caudate   may represent associated acute/subacute infarction. Attention on follow-up   imaging recommended.     Mass effect on the left lateral ventricle without significant ventricular   enlargement. Minimal left-to-right midline shift.     No new acute intracranial hemorrhage.       CXR : bilateral vascular congestion

## 2019-04-19 LAB
ANION GAP SERPL CALC-SCNC: 13 MMOL/L — SIGNIFICANT CHANGE UP (ref 7–14)
APTT BLD: 30.7 SEC — SIGNIFICANT CHANGE UP (ref 27–39.2)
BASOPHILS # BLD AUTO: 0.02 K/UL — SIGNIFICANT CHANGE UP (ref 0–0.2)
BASOPHILS NFR BLD AUTO: 0.2 % — SIGNIFICANT CHANGE UP (ref 0–1)
BLD GP AB SCN SERPL QL: SIGNIFICANT CHANGE UP
BUN SERPL-MCNC: 85 MG/DL — CRITICAL HIGH (ref 10–20)
CALCIUM SERPL-MCNC: 9 MG/DL — SIGNIFICANT CHANGE UP (ref 8.5–10.1)
CHLORIDE SERPL-SCNC: 93 MMOL/L — LOW (ref 98–110)
CO2 SERPL-SCNC: 30 MMOL/L — SIGNIFICANT CHANGE UP (ref 17–32)
CREAT SERPL-MCNC: 2.1 MG/DL — HIGH (ref 0.7–1.5)
EOSINOPHIL # BLD AUTO: 0.23 K/UL — SIGNIFICANT CHANGE UP (ref 0–0.7)
EOSINOPHIL NFR BLD AUTO: 2.1 % — SIGNIFICANT CHANGE UP (ref 0–8)
GLUCOSE BLDC GLUCOMTR-MCNC: 141 MG/DL — HIGH (ref 70–99)
GLUCOSE BLDC GLUCOMTR-MCNC: 155 MG/DL — HIGH (ref 70–99)
GLUCOSE BLDC GLUCOMTR-MCNC: 166 MG/DL — HIGH (ref 70–99)
GLUCOSE BLDC GLUCOMTR-MCNC: 222 MG/DL — HIGH (ref 70–99)
GLUCOSE SERPL-MCNC: 225 MG/DL — HIGH (ref 70–99)
HCT VFR BLD CALC: 34.6 % — LOW (ref 42–52)
HGB BLD-MCNC: 10.7 G/DL — LOW (ref 14–18)
IMM GRANULOCYTES NFR BLD AUTO: 0.7 % — HIGH (ref 0.1–0.3)
INR BLD: 1.16 RATIO — SIGNIFICANT CHANGE UP (ref 0.65–1.3)
LYMPHOCYTES # BLD AUTO: 0.78 K/UL — LOW (ref 1.2–3.4)
LYMPHOCYTES # BLD AUTO: 7 % — LOW (ref 20.5–51.1)
MAGNESIUM SERPL-MCNC: 1.9 MG/DL — SIGNIFICANT CHANGE UP (ref 1.8–2.4)
MCHC RBC-ENTMCNC: 26.6 PG — LOW (ref 27–31)
MCHC RBC-ENTMCNC: 30.9 G/DL — LOW (ref 32–37)
MCV RBC AUTO: 85.9 FL — SIGNIFICANT CHANGE UP (ref 80–94)
MONOCYTES # BLD AUTO: 0.84 K/UL — HIGH (ref 0.1–0.6)
MONOCYTES NFR BLD AUTO: 7.6 % — SIGNIFICANT CHANGE UP (ref 1.7–9.3)
NEUTROPHILS # BLD AUTO: 9.15 K/UL — HIGH (ref 1.4–6.5)
NEUTROPHILS NFR BLD AUTO: 82.4 % — HIGH (ref 42.2–75.2)
NRBC # BLD: 0 /100 WBCS — SIGNIFICANT CHANGE UP (ref 0–0)
PHOSPHATE SERPL-MCNC: 4.9 MG/DL — SIGNIFICANT CHANGE UP (ref 2.1–4.9)
PLATELET # BLD AUTO: 193 K/UL — SIGNIFICANT CHANGE UP (ref 130–400)
POTASSIUM SERPL-MCNC: 4.4 MMOL/L — SIGNIFICANT CHANGE UP (ref 3.5–5)
POTASSIUM SERPL-SCNC: 4.4 MMOL/L — SIGNIFICANT CHANGE UP (ref 3.5–5)
PROTHROM AB SERPL-ACNC: 13.3 SEC — HIGH (ref 9.95–12.87)
RBC # BLD: 4.03 M/UL — LOW (ref 4.7–6.1)
RBC # FLD: 13.6 % — SIGNIFICANT CHANGE UP (ref 11.5–14.5)
SODIUM SERPL-SCNC: 136 MMOL/L — SIGNIFICANT CHANGE UP (ref 135–146)
TYPE + AB SCN PNL BLD: SIGNIFICANT CHANGE UP
WBC # BLD: 11.1 K/UL — HIGH (ref 4.8–10.8)
WBC # FLD AUTO: 11.1 K/UL — HIGH (ref 4.8–10.8)

## 2019-04-19 PROCEDURE — 70544 MR ANGIOGRAPHY HEAD W/O DYE: CPT | Mod: 26

## 2019-04-19 RX ORDER — INSULIN GLARGINE 100 [IU]/ML
60 INJECTION, SOLUTION SUBCUTANEOUS AT BEDTIME
Qty: 0 | Refills: 0 | Status: DISCONTINUED | OUTPATIENT
Start: 2019-04-19 | End: 2019-04-22

## 2019-04-19 RX ORDER — INSULIN LISPRO 100/ML
20 VIAL (ML) SUBCUTANEOUS
Qty: 0 | Refills: 0 | Status: DISCONTINUED | OUTPATIENT
Start: 2019-04-19 | End: 2019-04-22

## 2019-04-19 RX ADMIN — LACTULOSE 15 GRAM(S): 10 SOLUTION ORAL at 18:47

## 2019-04-19 RX ADMIN — Medication 1: at 11:23

## 2019-04-19 RX ADMIN — CHLORHEXIDINE GLUCONATE 1 APPLICATION(S): 213 SOLUTION TOPICAL at 18:47

## 2019-04-19 RX ADMIN — SENNA PLUS 2 TABLET(S): 8.6 TABLET ORAL at 21:04

## 2019-04-19 RX ADMIN — ATORVASTATIN CALCIUM 40 MILLIGRAM(S): 80 TABLET, FILM COATED ORAL at 21:04

## 2019-04-19 RX ADMIN — Medication 200 MILLIGRAM(S): at 05:50

## 2019-04-19 RX ADMIN — LACTULOSE 15 GRAM(S): 10 SOLUTION ORAL at 05:51

## 2019-04-19 RX ADMIN — Medication 667 MILLIGRAM(S): at 11:51

## 2019-04-19 RX ADMIN — Medication 0.3 MILLIGRAM(S): at 15:13

## 2019-04-19 RX ADMIN — Medication 20 UNIT(S): at 18:51

## 2019-04-19 RX ADMIN — Medication 667 MILLIGRAM(S): at 07:43

## 2019-04-19 RX ADMIN — CHLORHEXIDINE GLUCONATE 1 APPLICATION(S): 213 SOLUTION TOPICAL at 05:46

## 2019-04-19 RX ADMIN — Medication 0.3 MILLIGRAM(S): at 21:04

## 2019-04-19 RX ADMIN — Medication 200 MILLIGRAM(S): at 14:00

## 2019-04-19 RX ADMIN — Medication 667 MILLIGRAM(S): at 18:41

## 2019-04-19 RX ADMIN — Medication 40 MILLIGRAM(S): at 18:40

## 2019-04-19 RX ADMIN — Medication 0.3 MILLIGRAM(S): at 05:51

## 2019-04-19 RX ADMIN — Medication 81 MILLIGRAM(S): at 11:22

## 2019-04-19 RX ADMIN — Medication 19 UNIT(S): at 07:44

## 2019-04-19 RX ADMIN — Medication 2000 UNIT(S): at 11:23

## 2019-04-19 RX ADMIN — Medication 100 MILLIGRAM(S): at 21:04

## 2019-04-19 RX ADMIN — AMLODIPINE BESYLATE 10 MILLIGRAM(S): 2.5 TABLET ORAL at 05:48

## 2019-04-19 RX ADMIN — APIXABAN 10 MILLIGRAM(S): 2.5 TABLET, FILM COATED ORAL at 05:49

## 2019-04-19 RX ADMIN — Medication 200 MILLIGRAM(S): at 21:04

## 2019-04-19 RX ADMIN — APIXABAN 10 MILLIGRAM(S): 2.5 TABLET, FILM COATED ORAL at 18:41

## 2019-04-19 RX ADMIN — Medication 100 MILLIGRAM(S): at 05:49

## 2019-04-19 RX ADMIN — PANTOPRAZOLE SODIUM 40 MILLIGRAM(S): 20 TABLET, DELAYED RELEASE ORAL at 05:52

## 2019-04-19 RX ADMIN — Medication 40 MILLIGRAM(S): at 05:49

## 2019-04-19 RX ADMIN — Medication 20 UNIT(S): at 11:51

## 2019-04-19 RX ADMIN — INSULIN GLARGINE 60 UNIT(S): 100 INJECTION, SOLUTION SUBCUTANEOUS at 21:03

## 2019-04-19 RX ADMIN — Medication 2: at 07:43

## 2019-04-19 RX ADMIN — Medication 100 MILLIGRAM(S): at 14:00

## 2019-04-19 NOTE — PROGRESS NOTE ADULT - SUBJECTIVE AND OBJECTIVE BOX
GRIS TIDWELL 59y Male  MRN#: [id number]     SUBJECTIVE  Patient is a 59y old Male who presents with a chief complaint of Right sided weakness   Currently admitted to medicine with the primary diagnosis of Intraparenchymal hemorrhage of brain  Today is hospital day 17d, and this morning he reports no overnight events.     OBJECTIVE  PAST MEDICAL & SURGICAL HISTORY  Gout  Morbidly obese  Gout  Hypertension  Diabetes mellitus  S/P tonsillectomy    ALLERGIES:  No Known Allergies    MEDICATIONS:  STANDING MEDICATIONS  amLODIPine   Tablet 10 milliGRAM(s) Oral daily  apixaban 10 milliGRAM(s) Oral every 12 hours  aspirin  chewable 81 milliGRAM(s) Oral daily  atorvastatin Oral Tab/Cap - Peds 40 milliGRAM(s) Oral daily  calcium acetate 667 milliGRAM(s) Oral three times a day with meals  chlorhexidine 4% Liquid 1 Application(s) Topical every 12 hours  cholecalciferol 2000 Unit(s) Oral daily  cloNIDine 0.3 milliGRAM(s) Oral every 8 hours  dextrose 5%. 1000 milliLiter(s) IV Continuous <Continuous>  dextrose 50% Injectable 12.5 Gram(s) IV Push once  dextrose 50% Injectable 25 Gram(s) IV Push once  dextrose 50% Injectable 25 Gram(s) IV Push once  furosemide    Tablet 40 milliGRAM(s) Oral two times a day  hydrALAZINE 100 milliGRAM(s) Oral every 8 hours  insulin glargine Injectable (LANTUS) 56 Unit(s) SubCutaneous at bedtime  insulin lispro (HumaLOG) corrective regimen sliding scale   SubCutaneous three times a day before meals  insulin lispro Injectable (HumaLOG) 19 Unit(s) SubCutaneous three times a day with meals  labetalol 200 milliGRAM(s) Oral three times a day  lactulose Syrup 15 Gram(s) Oral two times a day  pantoprazole    Tablet 40 milliGRAM(s) Oral before breakfast  senna 2 Tablet(s) Oral at bedtime    PRN MEDICATIONS  acetaminophen   Tablet .. 650 milliGRAM(s) Oral every 6 hours PRN  aluminum hydroxide/magnesium hydroxide/simethicone Suspension 30 milliLiter(s) Oral every 6 hours PRN  dextrose 40% Gel 15 Gram(s) Oral once PRN  glucagon  Injectable 1 milliGRAM(s) IntraMuscular once PRN      VITAL SIGNS: Last 24 Hours  T(C): 35.9 (19 Apr 2019 06:02), Max: 36.8 (18 Apr 2019 14:01)  T(F): 96.6 (19 Apr 2019 06:02), Max: 98.2 (18 Apr 2019 14:01)  HR: 55 (19 Apr 2019 06:02) (55 - 58)  BP: 147/69 (19 Apr 2019 06:02) (139/88 - 174/79)  BP(mean): --  RR: 18 (19 Apr 2019 06:02) (18 - 24)  SpO2: 96% (18 Apr 2019 15:02) (96% - 98%)    LABS:                        10.7   11.10 )-----------( 193      ( 19 Apr 2019 06:08 )             34.6     04-19    136  |  93<L>  |  85<HH>  ----------------------------<  225<H>  4.4   |  30  |  2.1<H>    Ca    9.0      19 Apr 2019 06:08  Phos  4.9     04-19  Mg     1.9     04-19      PT/INR - ( 19 Apr 2019 06:08 )   PT: 13.30 sec;   INR: 1.16 ratio         PTT - ( 19 Apr 2019 06:08 )  PTT:30.7 sec      Troponin T, Serum: 0.02 ng/mL <H> (04-18-19 @ 12:10)      CARDIAC MARKERS ( 18 Apr 2019 12:10 )  x     / 0.02 ng/mL / x     / x     / 3.2 ng/mL      RADIOLOGY:  < from: CT Head No Cont (04.13.19 @ 16:07) >  impression:    No significant changes in the acute/subacute parenchymal hemorrhage   involving the left basal ganglia with small amount of surrounding edema.   Well-demarcated area of decreased attenuation extending to the left   caudate may represent associated acute/subacute infarction. Attention on   follow-up imaging recommended.    Mass effect on the left lateral ventricle without significant ventricular   enlargement. Minimal left-to-right midline shift.    No new acute intracranial hemorrhage.    < end of copied text >    < from: Transthoracic Echocardiogram (04.13.19 @ 08:42) >  Summary:   1. Left ventricular ejection fraction, by visual estimation, is 55 to   60%.   2. Normal global left ventricular systolic function.   3. Mild mitral valve regurgitation.   4. Mild thickening and calcification of the anterior mitral valve   leaflet.   5. Moderate aortic valve thickening with normal leaflet opening.      < end of copied text >    < from: [facility] Duplex Lower Ext Vein Scan, Bilat (04.12.19 @ 19:40) >  Impression:    Thromboses present in bilateral calf veins: right posterior tibial and   peroneal vein branches; left gastrocnemius and posterior tibial vein   branches.    These are new findings compared to duplex of 4/7/2019.    < end of copied text >    < from: US Renal (04.03.19 @ 10:59) >  Impression:     Limited examination secondary to patient's condition and body habitus.    No hydronephrosis or calculus on either side.    2.4 cm left renal hypodensity may reflect a cyst but is limited in   characterization on this study. Consideration can be given to a follow-up   examination or dedicated CT or MRI at later time.        PHYSICAL EXAM:    GENERAL: NAD, well-developed, AAOx3  HEENT:  Atraumatic, Normocephalic. EOMI, PERRLA, conjunctiva and sclera clear, No JVD  PULMONARY: Clear to auscultation bilaterally; No wheeze  CARDIOVASCULAR: Regular rate and rhythm; No murmurs, rubs, or gallops  GASTROINTESTINAL: Soft, Nontender, Nondistended; Bowel sounds present  MUSCULOSKELETAL:  2+ Peripheral Pulses, No clubbing, cyanosis, or edema  NEUROLOGY: non-focal  SKIN: No rashes or lesions      ADMISSION SUMMARY  Patient is a 59y old Male who presents with a chief complaint of Right sided weakness   Currently admitted to medicine with the primary diagnosis of Intraparenchymal hemorrhage of brain      ASSESSMENT & PLAN    #L basal ganglia intraparenchymal bleed   - no acute neurosurgical intervention  - CTH 4/2: L frontotemporal intraparenchymal bleed with extension into the ventricle and 2mm shift to the R  - Repeat CTH on 4/3, 4/6, 4/12, 4/13 stable   - Having right UE and LE weakness. Power 2/5 in RUE, 3/5 in RLE  - Neurology Suspecting caudate nucleus infarct on top of bleed. MR brain performed. Pending read  - Maintain SBP<160   - Neuro checks PRN  - PT to evaluate the patient  - Speech and swallow eval done. Recommended dysphagia 3 diet. Need speech therapy for expressive aphasia    #LLE and RLE DVT and Acute on chronic hypercapnic respiratory failure, possible PE  - Venous duplex 4/12 showed new right posterior tibial and peroneal and left gastrocnemius and branch of posterior tibial thrombus.  - CTA lungs (not done due to CKD) and V/Q scan would not  to anticoagulate  - Neurosurgery cleared pt for anticoagulation 4/12  - Started on aspirin and eliquis  - Saturating well on 3l Oxygen. Will wean off oxygen    #Hypertensive emergency  - goal SBP < 160  - amlodipine 10 mg daily, hydralazine 100 mg TID, lasix 40mg PO bid, increased clonidine to 0.3mg q8h, decreased labetalol to 200mg q8h     #Acute on chronic HFpEF  - Echo EF 55% to 60% EF MR, Mild Aortic valve  thinking   - cxr w/ interval blunting of costophrenic angle, hazy opacity  - follows w/ Dr. Man  - On lasix 40mg PO q12h    #DESI on CKD3  - Improving  - baseline Cr 1.9 in 10/2018  - renal US 4/3: no hydronephrosis, L renal cyst  - nephro following     #New onset afib  - rate controlled  - labetalol 200 mg TID  - On eliquis    #Fever. Resolved.  - Possible bibasilar pneumonia vs gout attack  - urine cx 4/2: negative  - blood cx: NTD  - uric acid 9.9  - Completed 10 days course of cefepime    # Gout Flare  - d/c steroid today    #Chest pain :Resolved  EKG normal  Trops normal     #DM glucose uncontrolled   -Increased lantus to 56u, lispro 19u TID    #Dyslipidemia  - lipitor    #DVT ppx: Eliquis  #GI ppx: protonix   #Dispo: Need PT eval. Still acute  #Full code GRIS TIDWELL 59y Male  MRN#: [id number]     SUBJECTIVE  Patient is a 59y old Male who presents with a chief complaint of Right sided weakness   Currently admitted to medicine with the primary diagnosis of Intraparenchymal hemorrhage of brain      OBJECTIVE  PAST MEDICAL & SURGICAL HISTORY  Gout  Morbidly obese  Gout  Hypertension  Diabetes mellitus  S/P tonsillectomy    ALLERGIES:  No Known Allergies    MEDICATIONS:  STANDING MEDICATIONS  amLODIPine   Tablet 10 milliGRAM(s) Oral daily  apixaban 10 milliGRAM(s) Oral every 12 hours  aspirin  chewable 81 milliGRAM(s) Oral daily  atorvastatin Oral Tab/Cap - Peds 40 milliGRAM(s) Oral daily  calcium acetate 667 milliGRAM(s) Oral three times a day with meals  chlorhexidine 4% Liquid 1 Application(s) Topical every 12 hours  cholecalciferol 2000 Unit(s) Oral daily  cloNIDine 0.3 milliGRAM(s) Oral every 8 hours  dextrose 5%. 1000 milliLiter(s) IV Continuous <Continuous>  dextrose 50% Injectable 12.5 Gram(s) IV Push once  dextrose 50% Injectable 25 Gram(s) IV Push once  dextrose 50% Injectable 25 Gram(s) IV Push once  furosemide    Tablet 40 milliGRAM(s) Oral two times a day  hydrALAZINE 100 milliGRAM(s) Oral every 8 hours  insulin glargine Injectable (LANTUS) 56 Unit(s) SubCutaneous at bedtime  insulin lispro (HumaLOG) corrective regimen sliding scale   SubCutaneous three times a day before meals  insulin lispro Injectable (HumaLOG) 19 Unit(s) SubCutaneous three times a day with meals  labetalol 200 milliGRAM(s) Oral three times a day  lactulose Syrup 15 Gram(s) Oral two times a day  pantoprazole    Tablet 40 milliGRAM(s) Oral before breakfast  senna 2 Tablet(s) Oral at bedtime    PRN MEDICATIONS  acetaminophen   Tablet .. 650 milliGRAM(s) Oral every 6 hours PRN  aluminum hydroxide/magnesium hydroxide/simethicone Suspension 30 milliLiter(s) Oral every 6 hours PRN  dextrose 40% Gel 15 Gram(s) Oral once PRN  glucagon  Injectable 1 milliGRAM(s) IntraMuscular once PRN      VITAL SIGNS: Last 24 Hours  T(C): 35.9 (19 Apr 2019 06:02), Max: 36.8 (18 Apr 2019 14:01)  T(F): 96.6 (19 Apr 2019 06:02), Max: 98.2 (18 Apr 2019 14:01)  HR: 55 (19 Apr 2019 06:02) (55 - 58)  BP: 147/69 (19 Apr 2019 06:02) (139/88 - 174/79)  RR: 18 (19 Apr 2019 06:02) (18 - 24)  SpO2: 96% (18 Apr 2019 15:02) (96% - 98%)    LABS:                        10.7   11.10 )-----------( 193      ( 19 Apr 2019 06:08 )             34.6     04-19    136  |  93<L>  |  85<HH>  ----------------------------<  225<H>  4.4   |  30  |  2.1<H>    Ca    9.0      19 Apr 2019 06:08  Phos  4.9     04-19  Mg     1.9     04-19      PT/INR - ( 19 Apr 2019 06:08 )   PT: 13.30 sec;   INR: 1.16 ratio         PTT - ( 19 Apr 2019 06:08 )  PTT:30.7 sec      Troponin T, Serum: 0.02 ng/mL <H> (04-18-19 @ 12:10)        RADIOLOGY:  < from: CT Head No Cont (04.13.19 @ 16:07) >  impression:    No significant changes in the acute/subacute parenchymal hemorrhage   involving the left basal ganglia with small amount of surrounding edema.   Well-demarcated area of decreased attenuation extending to the left   caudate may represent associated acute/subacute infarction. Attention on   follow-up imaging recommended.    Mass effect on the left lateral ventricle without significant ventricular   enlargement. Minimal left-to-right midline shift.    No new acute intracranial hemorrhage.    < end of copied text >    < from: Transthoracic Echocardiogram (04.13.19 @ 08:42) >  Summary:   1. Left ventricular ejection fraction, by visual estimation, is 55 to   60%.   2. Normal global left ventricular systolic function.   3. Mild mitral valve regurgitation.   4. Mild thickening and calcification of the anterior mitral valve   leaflet.   5. Moderate aortic valve thickening with normal leaflet opening.      < end of copied text >    < from: [facility] Duplex Lower Ext Vein Scan, Bilat (04.12.19 @ 19:40) >  Impression:    Thromboses present in bilateral calf veins: right posterior tibial and   peroneal vein branches; left gastrocnemius and posterior tibial vein   branches.    These are new findings compared to duplex of 4/7/2019.    < end of copied text >    < from: US Renal (04.03.19 @ 10:59) >  Impression:     Limited examination secondary to patient's condition and body habitus.    No hydronephrosis or calculus on either side.    2.4 cm left renal hypodensity may reflect a cyst but is limited in   characterization on this study. Consideration can be given to a follow-up   examination or dedicated CT or MRI at later time.        PHYSICAL EXAM:    GENERAL: NAD, well-developed, AAOx3  HEENT:  Atraumatic, Normocephalic. EOMI, PERRLA, conjunctiva and sclera clear, No JVD  PULMONARY: Clear to auscultation bilaterally; No wheeze  CARDIOVASCULAR: Regular rate and rhythm; No murmurs, rubs, or gallops  GASTROINTESTINAL: Soft, Nontender, Nondistended; Bowel sounds present  MUSCULOSKELETAL:  2+ Peripheral Pulses, No clubbing, cyanosis, or edema  NEUROLOGY: non-focal  SKIN: No rashes or lesions      ADMISSION SUMMARY  Patient is a 59y old Male who presents with a chief complaint of Right sided weakness   Currently admitted to medicine with the primary diagnosis of Intraparenchymal hemorrhage of brain      ASSESSMENT & PLAN    #L basal ganglia intraparenchymal bleed   - no acute neurosurgical intervention  - CTH 4/2: L frontotemporal intraparenchymal bleed with extension into the ventricle and 2mm shift to the R  - Repeat CTH on 4/3, 4/6, 4/12, 4/13 stable   - Having right UE and LE weakness. Power 2/5 in RUE, 3/5 in RLE  - Neurology Suspecting caudate nucleus infarct on top of bleed. MR brain performed. Pending read  - Maintain SBP<160   - Neuro checks PRN  - PT to evaluate the patient  - Speech and swallow eval done. Recommended dysphagia 3 diet. Need speech therapy for expressive aphasia    #LLE and RLE DVT and Acute on chronic hypercapnic respiratory failure, possible PE  - Venous duplex 4/12 showed new right posterior tibial and peroneal and left gastrocnemius and branch of posterior tibial thrombus.  - CTA lungs (not done due to CKD) and V/Q scan would not  to anticoagulate  - Neurosurgery cleared pt for anticoagulation 4/12  - Started on aspirin and eliquis  - Saturating well on 3l Oxygen. Will wean off oxygen    #Hypertensive emergency  - goal SBP < 160  - amlodipine 10 mg daily, hydralazine 100 mg TID, lasix 40mg PO bid, increased clonidine to 0.3mg q8h, decreased labetalol to 200mg q8h    - BP well today with this regimen.     #Acute on chronic HFpEF  - Echo EF 55% to 60% EF MR, Mild Aortic valve  thinking   - cxr w/ interval blunting of costophrenic angle, hazy opacity  - follows w/ Dr. Man  - On lasix 40mg PO q12h    #DESI on CKD3  - Improving 2.1 now   - baseline Cr 1.9 in 10/2018  - renal US 4/3: no hydronephrosis, L renal cyst  - nephro following     #New onset afib  - Rate controlled  - labetalol 200 mg TID  - On eliquis    #Fever. Resolved.  - Possible bibasilar pneumonia vs gout attack  - urine cx 4/2: negative  - blood cx: NTD  - uric acid 9.9  - Completed 10 days course of cefepime    # Gout Flare  - d/c steroid today    #Chest pain :Resolved  EKG normal  Trops normal     #DM glucose uncontrolled   -Increased lantus to 56u, lispro 19u TID    #Dyslipidemia  - lipitor    #DVT ppx: Eliquis  #GI ppx: protonix   #Dispo: Need PT eval. Still acute  #Full code GRIS TIDWELL 59y Male  MRN#: [id number]     SUBJECTIVE  Patient is a 59y old Male who presents with a chief complaint of Right sided weakness   Currently admitted to medicine with the primary diagnosis of Intraparenchymal hemorrhage of brain      OBJECTIVE  PAST MEDICAL & SURGICAL HISTORY  Gout  Morbidly obese  Gout  Hypertension  Diabetes mellitus  S/P tonsillectomy    ALLERGIES:  No Known Allergies    MEDICATIONS:  STANDING MEDICATIONS  amLODIPine   Tablet 10 milliGRAM(s) Oral daily  apixaban 10 milliGRAM(s) Oral every 12 hours  aspirin  chewable 81 milliGRAM(s) Oral daily  atorvastatin Oral Tab/Cap - Peds 40 milliGRAM(s) Oral daily  calcium acetate 667 milliGRAM(s) Oral three times a day with meals  chlorhexidine 4% Liquid 1 Application(s) Topical every 12 hours  cholecalciferol 2000 Unit(s) Oral daily  cloNIDine 0.3 milliGRAM(s) Oral every 8 hours  dextrose 5%. 1000 milliLiter(s) IV Continuous <Continuous>  dextrose 50% Injectable 12.5 Gram(s) IV Push once  dextrose 50% Injectable 25 Gram(s) IV Push once  dextrose 50% Injectable 25 Gram(s) IV Push once  furosemide    Tablet 40 milliGRAM(s) Oral two times a day  hydrALAZINE 100 milliGRAM(s) Oral every 8 hours  insulin glargine Injectable (LANTUS) 56 Unit(s) SubCutaneous at bedtime  insulin lispro (HumaLOG) corrective regimen sliding scale   SubCutaneous three times a day before meals  insulin lispro Injectable (HumaLOG) 19 Unit(s) SubCutaneous three times a day with meals  labetalol 200 milliGRAM(s) Oral three times a day  lactulose Syrup 15 Gram(s) Oral two times a day  pantoprazole    Tablet 40 milliGRAM(s) Oral before breakfast  senna 2 Tablet(s) Oral at bedtime    PRN MEDICATIONS  acetaminophen   Tablet .. 650 milliGRAM(s) Oral every 6 hours PRN  aluminum hydroxide/magnesium hydroxide/simethicone Suspension 30 milliLiter(s) Oral every 6 hours PRN  dextrose 40% Gel 15 Gram(s) Oral once PRN  glucagon  Injectable 1 milliGRAM(s) IntraMuscular once PRN      VITAL SIGNS: Last 24 Hours  T(C): 35.9 (19 Apr 2019 06:02), Max: 36.8 (18 Apr 2019 14:01)  T(F): 96.6 (19 Apr 2019 06:02), Max: 98.2 (18 Apr 2019 14:01)  HR: 55 (19 Apr 2019 06:02) (55 - 58)  BP: 147/69 (19 Apr 2019 06:02) (139/88 - 174/79)  RR: 18 (19 Apr 2019 06:02) (18 - 24)  SpO2: 96% (18 Apr 2019 15:02) (96% - 98%)    LABS:                        10.7   11.10 )-----------( 193      ( 19 Apr 2019 06:08 )             34.6     04-19    136  |  93<L>  |  85<HH>  ----------------------------<  225<H>  4.4   |  30  |  2.1<H>    Ca    9.0      19 Apr 2019 06:08  Phos  4.9     04-19  Mg     1.9     04-19      PT/INR - ( 19 Apr 2019 06:08 )   PT: 13.30 sec;   INR: 1.16 ratio         PTT - ( 19 Apr 2019 06:08 )  PTT:30.7 sec      Troponin T, Serum: 0.02 ng/mL <H> (04-18-19 @ 12:10)        RADIOLOGY:  < from: MR Head No Cont (04.18.19 @ 15:44) >  IMPRESSION:    1.  Motion limited and incomplete exam. The patient was unable to   tolerate the study.    2.  4.5 cm parenchymal hematoma centered in the left basal ganglia with   moderate surrounding edema and mass effect with compression of theleft   frontal horn.    3.  Mild chronic microvascular changes and scattered chronic lacunar   infarcts.    < end of copied text >      < from: CT Head No Cont (04.13.19 @ 16:07) >  impression:    No significant changes in the acute/subacute parenchymal hemorrhage   involving the left basal ganglia with small amount of surrounding edema.   Well-demarcated area of decreased attenuation extending to the left   caudate may represent associated acute/subacute infarction. Attention on   follow-up imaging recommended.    Mass effect on the left lateral ventricle without significant ventricular   enlargement. Minimal left-to-right midline shift.    No new acute intracranial hemorrhage.    < end of copied text >    < from: Transthoracic Echocardiogram (04.13.19 @ 08:42) >  Summary:   1. Left ventricular ejection fraction, by visual estimation, is 55 to   60%.   2. Normal global left ventricular systolic function.   3. Mild mitral valve regurgitation.   4. Mild thickening and calcification of the anterior mitral valve   leaflet.   5. Moderate aortic valve thickening with normal leaflet opening.      < end of copied text >    < from: [facility] Duplex Lower Ext Vein Scan, Bilat (04.12.19 @ 19:40) >  Impression:    Thromboses present in bilateral calf veins: right posterior tibial and   peroneal vein branches; left gastrocnemius and posterior tibial vein   branches.    These are new findings compared to duplex of 4/7/2019.    < end of copied text >    < from: US Renal (04.03.19 @ 10:59) >  Impression:     Limited examination secondary to patient's condition and body habitus.    No hydronephrosis or calculus on either side.    2.4 cm left renal hypodensity may reflect a cyst but is limited in   characterization on this study. Consideration can be given to a follow-up   examination or dedicated CT or MRI at later time.        PHYSICAL EXAM:    GENERAL: NAD, well-developed, AAOx3  HEENT:  Atraumatic, Normocephalic. EOMI, PERRLA, conjunctiva and sclera clear, No JVD  PULMONARY: Clear to auscultation bilaterally; No wheeze  CARDIOVASCULAR: Regular rate and rhythm; No murmurs, rubs, or gallops  GASTROINTESTINAL: Soft, Nontender, Nondistended; Bowel sounds present  MUSCULOSKELETAL:  2+ Peripheral Pulses, No clubbing, cyanosis, or edema  NEUROLOGY: non-focal  SKIN: No rashes or lesions      ADMISSION SUMMARY  Patient is a 59y old Male who presents with a chief complaint of Right sided weakness   Currently admitted to medicine with the primary diagnosis of Intraparenchymal hemorrhage of brain      ASSESSMENT & PLAN    #L basal ganglia intraparenchymal bleed   - no acute neurosurgical intervention  - CTH 4/2: L frontotemporal intraparenchymal bleed with extension into the ventricle and 2mm shift to the R  - Repeat CTH on 4/3, 4/6, 4/12, 4/13 stable   - Having right UE and LE weakness. Power 2/5 in RUE, 3/5 in RLE  - Neurology Suspecting caudate nucleus infarct on top of bleed. MR brain performed showing 4.5cm parenchymal bleed compared to 3.9cm on initial CT head. f/u with neurology  - Maintain SBP<160   - Neuro checks PRN  - PT to evaluate the patient  - Speech and swallow eval done. Recommended dysphagia 3 diet. Need speech therapy for expressive aphasia    #LLE and RLE DVT and Acute on chronic hypercapnic respiratory failure, possible PE  - Venous duplex 4/12 showed new right posterior tibial and peroneal and left gastrocnemius and branch of posterior tibial thrombus.  - CTA lungs (not done due to CKD) and V/Q scan would not  to anticoagulate  - Neurosurgery cleared pt for anticoagulation 4/12  - Started on aspirin and eliquis  - Saturating well on 3l Oxygen. Will wean off oxygen    #Hypertensive emergency  - goal SBP < 160  - amlodipine 10 mg daily, hydralazine 100 mg TID, lasix 40mg PO bid, increased clonidine to 0.3mg q8h, decreased labetalol to 200mg q8h    - BP well today with this regimen.     #Acute on chronic HFpEF  - Echo EF 55% to 60% EF MR, Mild Aortic valve  thinking   - cxr w/ interval blunting of costophrenic angle, hazy opacity  - follows w/ Dr. Man  - On lasix 40mg PO q12h    #DESI on CKD3  - Improving 2.1 now   - baseline Cr 1.9 in 10/2018  - renal US 4/3: no hydronephrosis, L renal cyst  - nephro following     #New onset afib  - Rate controlled  - labetalol 200 mg TID  - On eliquis    #Fever. Resolved.  - Possible bibasilar pneumonia vs gout attack  - urine cx 4/2: negative  - blood cx: NTD  - uric acid 9.9  - Completed 10 days course of cefepime    # Gout Flare  - d/c steroid today    #Chest pain :Resolved  EKG normal  Trops normal     #DM glucose uncontrolled   -Increased lantus to 56u, lispro 19u TID    #Dyslipidemia  - lipitor    #DVT ppx: Eliquis  #GI ppx: protonix   #Dispo: Need PT eval. Still acute  #Full code GRIS TIDWELL 59y Male  MRN#: [id number]     SUBJECTIVE  Patient is a 59y old Male who presents with a chief complaint of Right sided weakness   Currently admitted to medicine with the primary diagnosis of Intraparenchymal hemorrhage of brain      OBJECTIVE  PAST MEDICAL & SURGICAL HISTORY  Gout  Morbidly obese  Gout  Hypertension  Diabetes mellitus  S/P tonsillectomy    ALLERGIES:  No Known Allergies    MEDICATIONS:  STANDING MEDICATIONS  amLODIPine   Tablet 10 milliGRAM(s) Oral daily  apixaban 10 milliGRAM(s) Oral every 12 hours  aspirin  chewable 81 milliGRAM(s) Oral daily  atorvastatin Oral Tab/Cap - Peds 40 milliGRAM(s) Oral daily  calcium acetate 667 milliGRAM(s) Oral three times a day with meals  chlorhexidine 4% Liquid 1 Application(s) Topical every 12 hours  cholecalciferol 2000 Unit(s) Oral daily  cloNIDine 0.3 milliGRAM(s) Oral every 8 hours  dextrose 5%. 1000 milliLiter(s) IV Continuous <Continuous>  dextrose 50% Injectable 12.5 Gram(s) IV Push once  dextrose 50% Injectable 25 Gram(s) IV Push once  dextrose 50% Injectable 25 Gram(s) IV Push once  furosemide    Tablet 40 milliGRAM(s) Oral two times a day  hydrALAZINE 100 milliGRAM(s) Oral every 8 hours  insulin glargine Injectable (LANTUS) 56 Unit(s) SubCutaneous at bedtime  insulin lispro (HumaLOG) corrective regimen sliding scale   SubCutaneous three times a day before meals  insulin lispro Injectable (HumaLOG) 19 Unit(s) SubCutaneous three times a day with meals  labetalol 200 milliGRAM(s) Oral three times a day  lactulose Syrup 15 Gram(s) Oral two times a day  pantoprazole    Tablet 40 milliGRAM(s) Oral before breakfast  senna 2 Tablet(s) Oral at bedtime    PRN MEDICATIONS  acetaminophen   Tablet .. 650 milliGRAM(s) Oral every 6 hours PRN  aluminum hydroxide/magnesium hydroxide/simethicone Suspension 30 milliLiter(s) Oral every 6 hours PRN  dextrose 40% Gel 15 Gram(s) Oral once PRN  glucagon  Injectable 1 milliGRAM(s) IntraMuscular once PRN      VITAL SIGNS: Last 24 Hours  T(C): 35.9 (19 Apr 2019 06:02), Max: 36.8 (18 Apr 2019 14:01)  T(F): 96.6 (19 Apr 2019 06:02), Max: 98.2 (18 Apr 2019 14:01)  HR: 55 (19 Apr 2019 06:02) (55 - 58)  BP: 147/69 (19 Apr 2019 06:02) (139/88 - 174/79)  RR: 18 (19 Apr 2019 06:02) (18 - 24)  SpO2: 96% (18 Apr 2019 15:02) (96% - 98%)    LABS:                        10.7   11.10 )-----------( 193      ( 19 Apr 2019 06:08 )             34.6     04-19    136  |  93<L>  |  85<HH>  ----------------------------<  225<H>  4.4   |  30  |  2.1<H>    Ca    9.0      19 Apr 2019 06:08  Phos  4.9     04-19  Mg     1.9     04-19      PT/INR - ( 19 Apr 2019 06:08 )   PT: 13.30 sec;   INR: 1.16 ratio         PTT - ( 19 Apr 2019 06:08 )  PTT:30.7 sec      Troponin T, Serum: 0.02 ng/mL <H> (04-18-19 @ 12:10)        RADIOLOGY:  < from: MR Head No Cont (04.18.19 @ 15:44) >  IMPRESSION:    1.  Motion limited and incomplete exam. The patient was unable to   tolerate the study.    2.  4.5 cm parenchymal hematoma centered in the left basal ganglia with   moderate surrounding edema and mass effect with compression of theleft   frontal horn.    3.  Mild chronic microvascular changes and scattered chronic lacunar   infarcts.    < end of copied text >      < from: CT Head No Cont (04.13.19 @ 16:07) >  impression:    No significant changes in the acute/subacute parenchymal hemorrhage   involving the left basal ganglia with small amount of surrounding edema.   Well-demarcated area of decreased attenuation extending to the left   caudate may represent associated acute/subacute infarction. Attention on   follow-up imaging recommended.    Mass effect on the left lateral ventricle without significant ventricular   enlargement. Minimal left-to-right midline shift.    No new acute intracranial hemorrhage.    < end of copied text >    < from: Transthoracic Echocardiogram (04.13.19 @ 08:42) >  Summary:   1. Left ventricular ejection fraction, by visual estimation, is 55 to   60%.   2. Normal global left ventricular systolic function.   3. Mild mitral valve regurgitation.   4. Mild thickening and calcification of the anterior mitral valve   leaflet.   5. Moderate aortic valve thickening with normal leaflet opening.      < end of copied text >    < from: [facility] Duplex Lower Ext Vein Scan, Bilat (04.12.19 @ 19:40) >  Impression:    Thromboses present in bilateral calf veins: right posterior tibial and   peroneal vein branches; left gastrocnemius and posterior tibial vein   branches.    These are new findings compared to duplex of 4/7/2019.    < end of copied text >    < from: US Renal (04.03.19 @ 10:59) >  Impression:     Limited examination secondary to patient's condition and body habitus.    No hydronephrosis or calculus on either side.    2.4 cm left renal hypodensity may reflect a cyst but is limited in   characterization on this study. Consideration can be given to a follow-up   examination or dedicated CT or MRI at later time.        PHYSICAL EXAM:    GENERAL: NAD, well-developed, AAOx3  HEENT:  Atraumatic, Normocephalic. EOMI, PERRLA, conjunctiva and sclera clear, No JVD  PULMONARY: Clear to auscultation bilaterally; No wheeze  CARDIOVASCULAR: Regular rate and rhythm; No murmurs, rubs, or gallops  GASTROINTESTINAL: Soft, Nontender, Nondistended; Bowel sounds present  MUSCULOSKELETAL:  2+ Peripheral Pulses, No clubbing, cyanosis, or edema  NEUROLOGY: non-focal  SKIN: No rashes or lesions      ADMISSION SUMMARY  Patient is a 59y old Male who presents with a chief complaint of Right sided weakness   Currently admitted to medicine with the primary diagnosis of Intraparenchymal hemorrhage of brain      ASSESSMENT & PLAN    #L basal ganglia intraparenchymal bleed   - no acute neurosurgical intervention  - CTH 4/2: L frontotemporal intraparenchymal bleed with extension into the ventricle and 2mm shift to the R  - Repeat CTH on 4/3, 4/6, 4/12, 4/13 stable   - Having right UE and LE weakness. Power 2/5 in RUE, 3/5 in RLE  - Neurology Suspecting caudate nucleus infarct on top of bleed. MR brain performed showing 4.5cm parenchymal bleed compared to 3.9cm on initial CT head. f/u with neurology  - Maintain SBP<160   - Neuro checks PRN  - PT to evaluate the patient  - Speech and swallow eval done. Recommended dysphagia 3 diet. Need speech therapy for expressive aphasia    #LLE and RLE DVT and Acute on chronic hypercapnic respiratory failure, possible PE  - Venous duplex 4/12 showed new right posterior tibial and peroneal and left gastrocnemius and branch of posterior tibial thrombus.  - CTA lungs (not done due to CKD) and V/Q scan would not  to anticoagulate  - Neurosurgery cleared pt for anticoagulation 4/12  - Started on aspirin and eliquis  - Saturating well on 3l Oxygen. Will wean off oxygen    #Hypertensive emergency  - goal SBP < 160  - amlodipine 10 mg daily, hydralazine 100 mg TID, lasix 40mg PO bid, increased clonidine to 0.3mg q8h, decreased labetalol to 200mg q8h    - BP well today with this regimen.     #Acute on chronic HFpEF  - Echo EF 55% to 60% EF MR, Mild Aortic valve  thinking   - cxr w/ interval blunting of costophrenic angle, hazy opacity  - follows w/ Dr. Man  - On lasix 40mg PO q12h    #DESI on CKD3  - Improving 2.1 now   - baseline Cr 1.9 in 10/2018  - renal US 4/3: no hydronephrosis, L renal cyst  - nephro following     #New onset afib  - Rate controlled  - labetalol 200 mg TID  - On eliquis    #Fever. Resolved.  - Possible bibasilar pneumonia vs gout attack  - urine cx 4/2: negative  - blood cx: NTD  - uric acid 9.9  - Completed 10 days course of cefepime    # Gout Flare  - d/c steroid     #Chest pain :Resolved  EKG normal  Trops normal     #DM glucose uncontrolled   -Increased lantus to 60u, lispro 20u TID    #Dyslipidemia  - lipitor    #DVT ppx: Eliquis  #GI ppx: protonix   #Dispo: Need PT eval. Still acute  #Full code

## 2019-04-19 NOTE — PROGRESS NOTE ADULT - SUBJECTIVE AND OBJECTIVE BOX
Neurology Follow up note    Name  GRIS TIDWELL    HPI:  58 y/o M with hx of HTN, DM, CAD, Sleep Apnea BIBEMS for altered mental status and aphasia. At presentation patient was found to be aphasic and some right side weakness. He was not following the commands so wife called the EMS. He was completely fine at 3:00 pm today. Wife herd the noise upstairs and found him on the ground unresponsive. So she called an EMS.     In the ED stroke code was called and NIH SS was 15 with right sided weakness. The CT scan showed the left intra parenchymal bleed with the extend into the ventricle associated with the midline shift. Pt received platelets and DDAVP for the aspirin reversal. The blood pressure was in 200s . Pt had jhonatan and on nicardin drip for the close bp control. (02 Apr 2019 22:24)      Interval History - Patient seen and examined and discussed with wife and daughter at bedside about results so far.  No new issues overnight          Vital Signs Last 24 Hrs  T(C): 35.9 (19 Apr 2019 06:02), Max: 36.8 (18 Apr 2019 14:01)  T(F): 96.6 (19 Apr 2019 06:02), Max: 98.2 (18 Apr 2019 14:01)  HR: 55 (19 Apr 2019 06:02) (55 - 58)  BP: 147/69 (19 Apr 2019 06:02) (139/88 - 174/79)  BP(mean): --  RR: 18 (19 Apr 2019 06:02) (18 - 24)  SpO2: 96% (18 Apr 2019 15:02) (96% - 98%)  ICU Vital Signs Last 24 Hrs  T(C): 35.9 (19 Apr 2019 06:02), Max: 36.8 (18 Apr 2019 14:01)  T(F): 96.6 (19 Apr 2019 06:02), Max: 98.2 (18 Apr 2019 14:01)  HR: 55 (19 Apr 2019 06:02) (55 - 58)  BP: 147/69 (19 Apr 2019 06:02) (139/88 - 174/79)  BP(mean): --  ABP: --  ABP(mean): --  RR: 18 (19 Apr 2019 06:02) (18 - 24)  SpO2: 96% (18 Apr 2019 15:02) (96% - 98%)          Neurological Exam:   Patient drowsy with some slight slurred speech, slight right facial  No drift in RUE but has myoclonus still (?asterxsis)  RLE 4/5  Sensory unreliable    NIHSS 4    Medications  acetaminophen   Tablet .. 650 milliGRAM(s) Oral every 6 hours PRN  aluminum hydroxide/magnesium hydroxide/simethicone Suspension 30 milliLiter(s) Oral every 6 hours PRN  amLODIPine   Tablet 10 milliGRAM(s) Oral daily  apixaban 10 milliGRAM(s) Oral every 12 hours  aspirin  chewable 81 milliGRAM(s) Oral daily  atorvastatin Oral Tab/Cap - Peds 40 milliGRAM(s) Oral daily  calcium acetate 667 milliGRAM(s) Oral three times a day with meals  chlorhexidine 4% Liquid 1 Application(s) Topical every 12 hours  cholecalciferol 2000 Unit(s) Oral daily  cloNIDine 0.3 milliGRAM(s) Oral every 8 hours  dextrose 40% Gel 15 Gram(s) Oral once PRN  dextrose 5%. 1000 milliLiter(s) IV Continuous <Continuous>  dextrose 50% Injectable 12.5 Gram(s) IV Push once  dextrose 50% Injectable 25 Gram(s) IV Push once  dextrose 50% Injectable 25 Gram(s) IV Push once  furosemide    Tablet 40 milliGRAM(s) Oral two times a day  glucagon  Injectable 1 milliGRAM(s) IntraMuscular once PRN  hydrALAZINE 100 milliGRAM(s) Oral every 8 hours  insulin glargine Injectable (LANTUS) 60 Unit(s) SubCutaneous at bedtime  insulin lispro (HumaLOG) corrective regimen sliding scale   SubCutaneous three times a day before meals  insulin lispro Injectable (HumaLOG) 20 Unit(s) SubCutaneous three times a day with meals  labetalol 200 milliGRAM(s) Oral three times a day  lactulose Syrup 15 Gram(s) Oral two times a day  pantoprazole    Tablet 40 milliGRAM(s) Oral before breakfast  senna 2 Tablet(s) Oral at bedtime      Lab                        10.7   11.10 )-----------( 193      ( 19 Apr 2019 06:08 )             34.6     04-19    136  |  93<L>  |  85<HH>  ----------------------------<  225<H>  4.4   |  30  |  2.1<H>    Ca    9.0      19 Apr 2019 06:08  Phos  4.9     04-19  Mg     1.9     04-19      CAPILLARY BLOOD GLUCOSE      POCT Blood Glucose.: 222 mg/dL (19 Apr 2019 07:22)  POCT Blood Glucose.: 309 mg/dL (18 Apr 2019 21:38)  POCT Blood Glucose.: 254 mg/dL (18 Apr 2019 16:26)  POCT Blood Glucose.: 338 mg/dL (18 Apr 2019 11:13)      PT/INR - ( 19 Apr 2019 06:08 )   PT: 13.30 sec;   INR: 1.16 ratio         PTT - ( 19 Apr 2019 06:08 )  PTT:30.7 sec    Radiology  < from: CT Head No Cont (04.13.19 @ 16:07) >    INTERPRETATION:  Clinical History / Reason for exam:  Intracranial   Hemorrhage    Technique:  Multiple contiguous axial CT images of the brain were   obtained from base to vertex without administration of intravenous   contrast.      Comparison:  Head CT 4/12/2018.    Final/  impression:    No significant changes in the acute/subacute parenchymal hemorrhage   involving the left basal ganglia with small amount of surrounding edema.   Well-demarcated area of decreased attenuation extending to the left   caudate may represent associated acute/subacute infarction. Attention on   follow-up imaging recommended.    Mass effect on the left lateral ventricle without significant ventricular   enlargement. Minimal left-to-right midline shift.    No new acute intracranial hemorrhage.    < end of copied text >      Assessment- Patient with left IPH with IVH currently improving.  No new events.  On anticoagulation and tolerating without any new side effects.    Plan  1. Keep -140  2. Anticoagulation as per neurosurgery  3. PT/OT/ Rehab  4. Call for any changes in neuro exam  5. Can be downgraded from stroke unit

## 2019-04-19 NOTE — PROGRESS NOTE ADULT - ASSESSMENT
IMPRESSION:    Intraparenchymal hemorrhage  Chronic hypercapnic respiratory failure  Probable ZE +/- OHS  DESI improving  Acute DVT, Possible PE     PLAN:    CNS: no sedatives, neurology follow up,     HEENT:  Oral care    PULMONARY:  HOB @ 45 degrees, oxygen to keep pulse ox>90%, NIV during sleep and prn. DC prednisone     CARDIOVASCULAR:  I=O, BP control as per neuro target    GI: GI prophylaxis  Feeding per  speech and swallow eval    RENAL:  F/u  lytes.  Correct as needed. accurate I/O, FU with renal,    INFECTIOUS DISEASE: NO abx     HEMATOLOGICAL:  IV heparin. Eliquis when cleared by neuro and neurosurg     ENDOCRINE:  Follow up FS.      MUSCULOSKELETAL: OOB to chair.     CODE STATUS: FULL CODE    Recall Prn IMPRESSION:    Intraparenchymal hemorrhage  Chronic hypercapnic respiratory failure  Probable ZE +/- OHS  DESI improving  Acute DVT, Possible PE     PLAN:    CNS: no sedatives, neurology follow up,     HEENT:  Oral care    PULMONARY:  HOB @ 45 degrees, oxygen to keep pulse ox>90%, NIV during sleep and prn. Please setup AVAPS upon DC - same settings.     CARDIOVASCULAR:  I=O, BP control as per neuro target    GI: GI prophylaxis  Feeding per  speech and swallow eval    RENAL:  F/u  lytes.  Correct as needed. accurate I/O, FU with renal,    INFECTIOUS DISEASE: NO abx     HEMATOLOGICAL:  On Eliquis now. Cont Will need AC for atleast 3 months.    ENDOCRINE:  Follow up FS.      MUSCULOSKELETAL: OOB to chair.     CODE STATUS: FULL CODE    Recall Prn   OP follow up. IMPRESSION:    Intraparenchymal hemorrhage  Chronic hypercapnic respiratory failure  Probable ZE +/- OHS  DESI improving  Provoked Acute DVT, Possible PE     PLAN:    CNS: no sedatives, neurology follow up,     HEENT:  Oral care    PULMONARY:  HOB @ 45 degrees, oxygen to keep pulse ox>90%, NIV during sleep and prn. Please setup AVAPS upon DC - same settings.     CARDIOVASCULAR:  I=O, BP control as per neuro target    GI: GI prophylaxis  Feeding per  speech and swallow eval    RENAL:  F/u  lytes.  Correct as needed. accurate I/O, FU with renal,    INFECTIOUS DISEASE: NO abx     HEMATOLOGICAL:  On Eliquis now. Cont Will need AC for atleast 3 months.    ENDOCRINE:  Follow up FS.      MUSCULOSKELETAL: OOB to chair.     CODE STATUS: FULL CODE    Recall Prn   OP follow up.

## 2019-04-19 NOTE — PROGRESS NOTE ADULT - SUBJECTIVE AND OBJECTIVE BOX
Patient is a 59y old  Male who presents with a chief complaint of Right sided weakness (19 Apr 2019 09:56)    SUBJECTIVE:    No overnight events   no complaints     REVIEW OF SYSTEMS:  See HPI    PHYSICAL EXAM  Vital Signs Last 24 Hrs  T(C): 35.9 (19 Apr 2019 06:02), Max: 36.8 (18 Apr 2019 14:01)  T(F): 96.6 (19 Apr 2019 06:02), Max: 98.2 (18 Apr 2019 14:01)  HR: 55 (19 Apr 2019 06:02) (55 - 58)  BP: 147/69 (19 Apr 2019 06:02) (139/88 - 174/79)  BP(mean): --  RR: 18 (19 Apr 2019 06:02) (18 - 24)  SpO2: 96% (18 Apr 2019 15:02) (96% - 98%)    General: In NAD  HEENT: TIMMY               Lymphatic system: No Cervical LN    Respiratory: Patrick BS  Cardiovascular: Regular  Gastrointestinal: Soft. + BS  Musculoskeletal: No clubbing.  moves all extremities.  Full range of motion    Skin: Warm.  Intact  Neurological: No motor or sensory deficit      04-18-19 @ 07:01  -  04-19-19 @ 07:00  --------------------------------------------------------  IN:  Total IN: 0 mL    OUT:    Voided: 400 mL  Total OUT: 400 mL    Total NET: -400 mL          LABS:                          10.7   11.10 )-----------( 193      ( 19 Apr 2019 06:08 )             34.6                                               04-19    136  |  93<L>  |  85<HH>  ----------------------------<  225<H>  4.4   |  30  |  2.1<H>    Ca    9.0      19 Apr 2019 06:08  Phos  4.9     04-19  Mg     1.9     04-19        PT/INR - ( 19 Apr 2019 06:08 )   PT: 13.30 sec;   INR: 1.16 ratio         PTT - ( 19 Apr 2019 06:08 )  PTT:30.7 sec                                           CARDIAC MARKERS ( 18 Apr 2019 12:10 )  x     / 0.02 ng/mL / x     / x     / 3.2 ng/mL                                                                                                                                   MEDICATIONS  (STANDING):  amLODIPine   Tablet 10 milliGRAM(s) Oral daily  apixaban 10 milliGRAM(s) Oral every 12 hours  aspirin  chewable 81 milliGRAM(s) Oral daily  atorvastatin Oral Tab/Cap - Peds 40 milliGRAM(s) Oral daily  calcium acetate 667 milliGRAM(s) Oral three times a day with meals  chlorhexidine 4% Liquid 1 Application(s) Topical every 12 hours  cholecalciferol 2000 Unit(s) Oral daily  cloNIDine 0.3 milliGRAM(s) Oral every 8 hours  dextrose 5%. 1000 milliLiter(s) (50 mL/Hr) IV Continuous <Continuous>  dextrose 50% Injectable 12.5 Gram(s) IV Push once  dextrose 50% Injectable 25 Gram(s) IV Push once  dextrose 50% Injectable 25 Gram(s) IV Push once  furosemide    Tablet 40 milliGRAM(s) Oral two times a day  hydrALAZINE 100 milliGRAM(s) Oral every 8 hours  insulin glargine Injectable (LANTUS) 60 Unit(s) SubCutaneous at bedtime  insulin lispro (HumaLOG) corrective regimen sliding scale   SubCutaneous three times a day before meals  insulin lispro Injectable (HumaLOG) 20 Unit(s) SubCutaneous three times a day with meals  labetalol 200 milliGRAM(s) Oral three times a day  lactulose Syrup 15 Gram(s) Oral two times a day  pantoprazole    Tablet 40 milliGRAM(s) Oral before breakfast  senna 2 Tablet(s) Oral at bedtime    MEDICATIONS  (PRN):  acetaminophen   Tablet .. 650 milliGRAM(s) Oral every 6 hours PRN Mild Pain (1 - 3)  aluminum hydroxide/magnesium hydroxide/simethicone Suspension 30 milliLiter(s) Oral every 6 hours PRN Dyspepsia  dextrose 40% Gel 15 Gram(s) Oral once PRN Blood Glucose LESS THAN 70 milliGRAM(s)/deciliter  glucagon  Injectable 1 milliGRAM(s) IntraMuscular once PRN Glucose LESS THAN 70 milligrams/deciliter

## 2019-04-19 NOTE — PROGRESS NOTE ADULT - ATTENDING COMMENTS
Pending - Neurology. Hematoma increasing in size ? at the basal ganglia. Neurology informed. to follow.   Family discussion: D/W Family at the bed side in details   Disposition: Possible Acute Rehab

## 2019-04-20 LAB
ANION GAP SERPL CALC-SCNC: 15 MMOL/L — HIGH (ref 7–14)
BASOPHILS # BLD AUTO: 0.03 K/UL — SIGNIFICANT CHANGE UP (ref 0–0.2)
BASOPHILS NFR BLD AUTO: 0.2 % — SIGNIFICANT CHANGE UP (ref 0–1)
BUN SERPL-MCNC: 80 MG/DL — CRITICAL HIGH (ref 10–20)
CALCIUM SERPL-MCNC: 8.9 MG/DL — SIGNIFICANT CHANGE UP (ref 8.5–10.1)
CHLORIDE SERPL-SCNC: 94 MMOL/L — LOW (ref 98–110)
CO2 SERPL-SCNC: 28 MMOL/L — SIGNIFICANT CHANGE UP (ref 17–32)
CREAT SERPL-MCNC: 2.2 MG/DL — HIGH (ref 0.7–1.5)
EOSINOPHIL # BLD AUTO: 0.28 K/UL — SIGNIFICANT CHANGE UP (ref 0–0.7)
EOSINOPHIL NFR BLD AUTO: 2.2 % — SIGNIFICANT CHANGE UP (ref 0–8)
GLUCOSE BLDC GLUCOMTR-MCNC: 112 MG/DL — HIGH (ref 70–99)
GLUCOSE BLDC GLUCOMTR-MCNC: 151 MG/DL — HIGH (ref 70–99)
GLUCOSE BLDC GLUCOMTR-MCNC: 202 MG/DL — HIGH (ref 70–99)
GLUCOSE BLDC GLUCOMTR-MCNC: 272 MG/DL — HIGH (ref 70–99)
GLUCOSE SERPL-MCNC: 257 MG/DL — HIGH (ref 70–99)
HCT VFR BLD CALC: 36.4 % — LOW (ref 42–52)
HGB BLD-MCNC: 11.4 G/DL — LOW (ref 14–18)
IMM GRANULOCYTES NFR BLD AUTO: 0.7 % — HIGH (ref 0.1–0.3)
LYMPHOCYTES # BLD AUTO: 0.6 K/UL — LOW (ref 1.2–3.4)
LYMPHOCYTES # BLD AUTO: 4.6 % — LOW (ref 20.5–51.1)
MAGNESIUM SERPL-MCNC: 1.9 MG/DL — SIGNIFICANT CHANGE UP (ref 1.8–2.4)
MCHC RBC-ENTMCNC: 26.9 PG — LOW (ref 27–31)
MCHC RBC-ENTMCNC: 31.3 G/DL — LOW (ref 32–37)
MCV RBC AUTO: 85.8 FL — SIGNIFICANT CHANGE UP (ref 80–94)
MONOCYTES # BLD AUTO: 1.1 K/UL — HIGH (ref 0.1–0.6)
MONOCYTES NFR BLD AUTO: 8.5 % — SIGNIFICANT CHANGE UP (ref 1.7–9.3)
NEUTROPHILS # BLD AUTO: 10.87 K/UL — HIGH (ref 1.4–6.5)
NEUTROPHILS NFR BLD AUTO: 83.8 % — HIGH (ref 42.2–75.2)
NRBC # BLD: 0 /100 WBCS — SIGNIFICANT CHANGE UP (ref 0–0)
PLATELET # BLD AUTO: 201 K/UL — SIGNIFICANT CHANGE UP (ref 130–400)
POTASSIUM SERPL-MCNC: 5.1 MMOL/L — HIGH (ref 3.5–5)
POTASSIUM SERPL-SCNC: 5.1 MMOL/L — HIGH (ref 3.5–5)
RBC # BLD: 4.24 M/UL — LOW (ref 4.7–6.1)
RBC # FLD: 13.9 % — SIGNIFICANT CHANGE UP (ref 11.5–14.5)
SODIUM SERPL-SCNC: 137 MMOL/L — SIGNIFICANT CHANGE UP (ref 135–146)
URATE SERPL-MCNC: 8.8 MG/DL — SIGNIFICANT CHANGE UP (ref 3.4–8.8)
WBC # BLD: 12.97 K/UL — HIGH (ref 4.8–10.8)
WBC # FLD AUTO: 12.97 K/UL — HIGH (ref 4.8–10.8)

## 2019-04-20 PROCEDURE — 71045 X-RAY EXAM CHEST 1 VIEW: CPT | Mod: 26

## 2019-04-20 RX ORDER — CEFEPIME 1 G/1
2000 INJECTION, POWDER, FOR SOLUTION INTRAMUSCULAR; INTRAVENOUS EVERY 12 HOURS
Qty: 0 | Refills: 0 | Status: DISCONTINUED | OUTPATIENT
Start: 2019-04-20 | End: 2019-04-25

## 2019-04-20 RX ORDER — FUROSEMIDE 40 MG
40 TABLET ORAL ONCE
Qty: 0 | Refills: 0 | Status: COMPLETED | OUTPATIENT
Start: 2019-04-20 | End: 2019-04-21

## 2019-04-20 RX ADMIN — Medication 40 MILLIGRAM(S): at 05:28

## 2019-04-20 RX ADMIN — Medication 81 MILLIGRAM(S): at 11:31

## 2019-04-20 RX ADMIN — Medication 200 MILLIGRAM(S): at 22:03

## 2019-04-20 RX ADMIN — Medication 667 MILLIGRAM(S): at 17:30

## 2019-04-20 RX ADMIN — Medication 20 UNIT(S): at 08:00

## 2019-04-20 RX ADMIN — Medication 200 MILLIGRAM(S): at 13:33

## 2019-04-20 RX ADMIN — Medication 1: at 08:02

## 2019-04-20 RX ADMIN — Medication 650 MILLIGRAM(S): at 18:38

## 2019-04-20 RX ADMIN — APIXABAN 10 MILLIGRAM(S): 2.5 TABLET, FILM COATED ORAL at 05:28

## 2019-04-20 RX ADMIN — Medication 100 MILLIGRAM(S): at 13:33

## 2019-04-20 RX ADMIN — CHLORHEXIDINE GLUCONATE 1 APPLICATION(S): 213 SOLUTION TOPICAL at 05:26

## 2019-04-20 RX ADMIN — AMLODIPINE BESYLATE 10 MILLIGRAM(S): 2.5 TABLET ORAL at 05:27

## 2019-04-20 RX ADMIN — Medication 20 UNIT(S): at 17:36

## 2019-04-20 RX ADMIN — Medication 100 MILLIGRAM(S): at 05:28

## 2019-04-20 RX ADMIN — CHLORHEXIDINE GLUCONATE 1 APPLICATION(S): 213 SOLUTION TOPICAL at 17:34

## 2019-04-20 RX ADMIN — SENNA PLUS 2 TABLET(S): 8.6 TABLET ORAL at 22:03

## 2019-04-20 RX ADMIN — Medication 100 MILLIGRAM(S): at 22:03

## 2019-04-20 RX ADMIN — Medication 200 MILLIGRAM(S): at 05:28

## 2019-04-20 RX ADMIN — Medication 20 UNIT(S): at 13:30

## 2019-04-20 RX ADMIN — ATORVASTATIN CALCIUM 40 MILLIGRAM(S): 80 TABLET, FILM COATED ORAL at 22:04

## 2019-04-20 RX ADMIN — Medication 3: at 13:31

## 2019-04-20 RX ADMIN — PANTOPRAZOLE SODIUM 40 MILLIGRAM(S): 20 TABLET, DELAYED RELEASE ORAL at 05:27

## 2019-04-20 RX ADMIN — LACTULOSE 15 GRAM(S): 10 SOLUTION ORAL at 17:30

## 2019-04-20 RX ADMIN — Medication 667 MILLIGRAM(S): at 11:32

## 2019-04-20 RX ADMIN — LACTULOSE 15 GRAM(S): 10 SOLUTION ORAL at 05:30

## 2019-04-20 RX ADMIN — Medication 0.3 MILLIGRAM(S): at 22:03

## 2019-04-20 RX ADMIN — APIXABAN 10 MILLIGRAM(S): 2.5 TABLET, FILM COATED ORAL at 17:29

## 2019-04-20 RX ADMIN — Medication 0.3 MILLIGRAM(S): at 05:27

## 2019-04-20 RX ADMIN — INSULIN GLARGINE 60 UNIT(S): 100 INJECTION, SOLUTION SUBCUTANEOUS at 22:24

## 2019-04-20 RX ADMIN — Medication 2000 UNIT(S): at 11:32

## 2019-04-20 RX ADMIN — Medication 40 MILLIGRAM(S): at 17:30

## 2019-04-20 RX ADMIN — Medication 2: at 17:36

## 2019-04-20 RX ADMIN — Medication 0.3 MILLIGRAM(S): at 13:33

## 2019-04-20 RX ADMIN — Medication 667 MILLIGRAM(S): at 08:00

## 2019-04-20 NOTE — PROGRESS NOTE ADULT - SUBJECTIVE AND OBJECTIVE BOX
GRIS TIDWELL 59y Male  MRN#: [id number]   CODE STATUS:________      SUBJECTIVE  Patient is a 59y old Male who presents with a chief complaint of Right sided weakness   Currently admitted to medicine with the primary diagnosis of Intraparenchymal hemorrhage of brain   Today is hospital day 18d, and this morning he reports no overnight events.     OBJECTIVE  PAST MEDICAL & SURGICAL HISTORY  Gout  Morbidly obese  Gout  Hypertension  Diabetes mellitus  S/P tonsillectomy    ALLERGIES:  No Known Allergies    MEDICATIONS:  STANDING MEDICATIONS  amLODIPine   Tablet 10 milliGRAM(s) Oral daily  apixaban 10 milliGRAM(s) Oral every 12 hours  aspirin  chewable 81 milliGRAM(s) Oral daily  atorvastatin Oral Tab/Cap - Peds 40 milliGRAM(s) Oral daily  calcium acetate 667 milliGRAM(s) Oral three times a day with meals  chlorhexidine 4% Liquid 1 Application(s) Topical every 12 hours  cholecalciferol 2000 Unit(s) Oral daily  cloNIDine 0.3 milliGRAM(s) Oral every 8 hours  dextrose 5%. 1000 milliLiter(s) IV Continuous <Continuous>  dextrose 50% Injectable 12.5 Gram(s) IV Push once  dextrose 50% Injectable 25 Gram(s) IV Push once  dextrose 50% Injectable 25 Gram(s) IV Push once  furosemide    Tablet 40 milliGRAM(s) Oral two times a day  hydrALAZINE 100 milliGRAM(s) Oral every 8 hours  insulin glargine Injectable (LANTUS) 60 Unit(s) SubCutaneous at bedtime  insulin lispro (HumaLOG) corrective regimen sliding scale   SubCutaneous three times a day before meals  insulin lispro Injectable (HumaLOG) 20 Unit(s) SubCutaneous three times a day with meals  labetalol 200 milliGRAM(s) Oral three times a day  lactulose Syrup 15 Gram(s) Oral two times a day  pantoprazole    Tablet 40 milliGRAM(s) Oral before breakfast  senna 2 Tablet(s) Oral at bedtime    PRN MEDICATIONS  acetaminophen   Tablet .. 650 milliGRAM(s) Oral every 6 hours PRN  aluminum hydroxide/magnesium hydroxide/simethicone Suspension 30 milliLiter(s) Oral every 6 hours PRN  dextrose 40% Gel 15 Gram(s) Oral once PRN  glucagon  Injectable 1 milliGRAM(s) IntraMuscular once PRN      VITAL SIGNS: Last 24 Hours  T(C): 38.1 (20 Apr 2019 22:17), Max: 38.4 (20 Apr 2019 18:41)  T(F): 100.5 (20 Apr 2019 22:17), Max: 101.2 (20 Apr 2019 18:41)  HR: 62 (20 Apr 2019 22:17) (61 - 76)  BP: 136/74 (20 Apr 2019 22:17) (136/74 - 159/75)  BP(mean): --  RR: 20 (20 Apr 2019 22:17) (20 - 24)  SpO2: 97% (20 Apr 2019 20:16) (95% - 97%)    LABS:                        11.4   12.97 )-----------( 201      ( 20 Apr 2019 04:30 )             36.4     04-20    137  |  94<L>  |  80<HH>  ----------------------------<  257<H>  5.1<H>   |  28  |  2.2<H>    Ca    8.9      20 Apr 2019 04:30  Phos  4.9     04-19  Mg     1.9     04-20      PT/INR - ( 19 Apr 2019 06:08 )   PT: 13.30 sec;   INR: 1.16 ratio         PTT - ( 19 Apr 2019 06:08 )  PTT:30.7 sec      RADIOLOGY:  < from: MR Angio Head No Cont (04.19.19 @ 18:09) >  IMPRESSION:     1.  Slight asymmetry of the distal left MCA branches which are slightly   decreased in signal flow enhancement and caliber and may be due to   artifact versus vasospasm.    2.  Small/hypoplastic A1 segment of the right anterior cerebral artery.    3.  Otherwise unremarkable MRA of the brain without contrast.     4.  No proximal large vessel occlusions.      < end of copied text >    < from: MR Head No Cont (04.18.19 @ 15:44) >  IMPRESSION:    1.  Motion limited and incomplete exam. The patient was unable to   tolerate the study.    2.  4.5 cm parenchymal hematoma centered in the left basal ganglia with   moderate surrounding edema and mass effect with compression of theleft   frontal horn.    3.  Mild chronic microvascular changes and scattered chronic lacunar   infarcts.    < end of copied text >      < from: CT Head No Cont (04.13.19 @ 16:07) >  impression:    No significant changes in the acute/subacute parenchymal hemorrhage   involving the left basal ganglia with small amount of surrounding edema.   Well-demarcated area of decreased attenuation extending to the left   caudate may represent associated acute/subacute infarction. Attention on   follow-up imaging recommended.    Mass effect on the left lateral ventricle without significant ventricular   enlargement. Minimal left-to-right midline shift.    No new acute intracranial hemorrhage.    < end of copied text >    < from: Transthoracic Echocardiogram (04.13.19 @ 08:42) >  Summary:   1. Left ventricular ejection fraction, by visual estimation, is 55 to   60%.   2. Normal global left ventricular systolic function.   3. Mild mitral valve regurgitation.   4. Mild thickening and calcification of the anterior mitral valve   leaflet.   5. Moderate aortic valve thickening with normal leaflet opening.      < end of copied text >    < from: [facility] Duplex Lower Ext Vein Scan, Bilat (04.12.19 @ 19:40) >  Impression:    Thromboses present in bilateral calf veins: right posterior tibial and   peroneal vein branches; left gastrocnemius and posterior tibial vein   branches.    These are new findings compared to duplex of 4/7/2019.    < end of copied text >    < from: US Renal (04.03.19 @ 10:59) >  Impression:     Limited examination secondary to patient's condition and body habitus.    No hydronephrosis or calculus on either side.    2.4 cm left renal hypodensity may reflect a cyst but is limited in   characterization on this study. Consideration can be given to a follow-up   examination or dedicated CT or MRI at later time.        PHYSICAL EXAM:    GENERAL: NAD, well-developed, AAOx3  HEENT:  Atraumatic, Normocephalic. EOMI, PERRLA, conjunctiva and sclera clear, No JVD  PULMONARY: Clear to auscultation bilaterally; No wheeze  CARDIOVASCULAR: Regular rate and rhythm; No murmurs, rubs, or gallops  GASTROINTESTINAL: Soft, Nontender, Nondistended; Bowel sounds present  MUSCULOSKELETAL:  2+ Peripheral Pulses, No clubbing, cyanosis, or edema  NEUROLOGY: non-focal  SKIN: No rashes or lesions      ADMISSION SUMMARY  Patient is a 59y old Male who presents with a chief complaint of Right sided weakness   Currently admitted to medicine with the primary diagnosis of Intraparenchymal hemorrhage of brain    ASSESSMENT & PLAN    #Fever  -Chest X ray showed right sided pleural effusion. lasix 40mg IV given  -f/u Blood and Urine cultures. Started on cefepime 2gm q8  -Tylenol prn  -BP stable  -Completed 10day course of cefepime for pneumonia    #L basal ganglia intraparenchymal bleed   - no acute neurosurgical intervention  - CTH 4/2: L frontotemporal intraparenchymal bleed with extension into the ventricle and 2mm shift to the R  - Repeat CTH on 4/3, 4/6, 4/12, 4/13 stable   - Having right UE and LE weakness. Power 2/5 in RUE, 3/5 in RLE  - Neurology Suspecting caudate nucleus infarct on top of bleed. MR brain performed showing 4.5cm parenchymal bleed compared to 3.9cm on initial CT head. f/u with neurology  - Maintain SBP<160   - Speech and swallow eval done. Recommended dysphagia 3 diet. Need speech therapy for expressive aphasia    #LLE and RLE DVT and Acute on chronic hypercapnic respiratory failure, possible PE  - Venous duplex 4/12 showed new right posterior tibial and peroneal and left gastrocnemius and branch of posterior tibial thrombus.  - CTA lungs (not done due to CKD) and V/Q scan would not  to anticoagulate  - Neurosurgery cleared pt for anticoagulation 4/12  - Started on aspirin and eliquis  - Saturating well on 3l Oxygen. Will wean off oxygen    #Hypertensive emergency  - goal SBP < 160  - amlodipine 10 mg daily, hydralazine 100 mg TID, lasix 40mg PO bid, increased clonidine to 0.3mg q8h, decreased labetalol to 200mg q8h   - BP well today with this regimen.     #Acute on chronic HFpEF  - Echo EF 55% to 60% EF MR, Mild Aortic valve thinking   - cxr w/ interval blunting of costophrenic angle, hazy opacity  - follows w/ Dr. Man  - On lasix 40mg PO q12h    #DESI on CKD3  - Improving 2.1 now   - baseline Cr 1.9 in 10/2018  - renal US 4/3: no hydronephrosis, L renal cyst  - nephro following     #New onset afib  - Rate controlled  - labetalol 200 mg TID  - On eliquis    # Gout Flare  - d/c steroid     #Chest pain :Resolved  EKG normal  Trops normal     #DM glucose uncontrolled   -Increased lantus to 60u, lispro 20u TID    #Dyslipidemia  - lipitor    #DVT ppx: Eliquis  #GI ppx: protonix   #Dispo: PT eval. Still acute  #Full code

## 2019-04-21 LAB
ANION GAP SERPL CALC-SCNC: 16 MMOL/L — HIGH (ref 7–14)
BASOPHILS # BLD AUTO: 0.03 K/UL — SIGNIFICANT CHANGE UP (ref 0–0.2)
BASOPHILS NFR BLD AUTO: 0.2 % — SIGNIFICANT CHANGE UP (ref 0–1)
BUN SERPL-MCNC: 84 MG/DL — CRITICAL HIGH (ref 10–20)
CALCIUM SERPL-MCNC: 8.6 MG/DL — SIGNIFICANT CHANGE UP (ref 8.5–10.1)
CHLORIDE SERPL-SCNC: 94 MMOL/L — LOW (ref 98–110)
CO2 SERPL-SCNC: 27 MMOL/L — SIGNIFICANT CHANGE UP (ref 17–32)
CREAT SERPL-MCNC: 2.3 MG/DL — HIGH (ref 0.7–1.5)
EOSINOPHIL # BLD AUTO: 0.17 K/UL — SIGNIFICANT CHANGE UP (ref 0–0.7)
EOSINOPHIL NFR BLD AUTO: 1.3 % — SIGNIFICANT CHANGE UP (ref 0–8)
GLUCOSE BLDC GLUCOMTR-MCNC: 185 MG/DL — HIGH (ref 70–99)
GLUCOSE BLDC GLUCOMTR-MCNC: 194 MG/DL — HIGH (ref 70–99)
GLUCOSE BLDC GLUCOMTR-MCNC: 209 MG/DL — HIGH (ref 70–99)
GLUCOSE BLDC GLUCOMTR-MCNC: 217 MG/DL — HIGH (ref 70–99)
GLUCOSE SERPL-MCNC: 166 MG/DL — HIGH (ref 70–99)
HCT VFR BLD CALC: 36 % — LOW (ref 42–52)
HGB BLD-MCNC: 11.2 G/DL — LOW (ref 14–18)
IMM GRANULOCYTES NFR BLD AUTO: 0.7 % — HIGH (ref 0.1–0.3)
LYMPHOCYTES # BLD AUTO: 0.55 K/UL — LOW (ref 1.2–3.4)
LYMPHOCYTES # BLD AUTO: 4.1 % — LOW (ref 20.5–51.1)
MAGNESIUM SERPL-MCNC: 1.9 MG/DL — SIGNIFICANT CHANGE UP (ref 1.8–2.4)
MCHC RBC-ENTMCNC: 26.6 PG — LOW (ref 27–31)
MCHC RBC-ENTMCNC: 31.1 G/DL — LOW (ref 32–37)
MCV RBC AUTO: 85.5 FL — SIGNIFICANT CHANGE UP (ref 80–94)
MONOCYTES # BLD AUTO: 1.16 K/UL — HIGH (ref 0.1–0.6)
MONOCYTES NFR BLD AUTO: 8.6 % — SIGNIFICANT CHANGE UP (ref 1.7–9.3)
NEUTROPHILS # BLD AUTO: 11.45 K/UL — HIGH (ref 1.4–6.5)
NEUTROPHILS NFR BLD AUTO: 85.1 % — HIGH (ref 42.2–75.2)
NRBC # BLD: 0 /100 WBCS — SIGNIFICANT CHANGE UP (ref 0–0)
PLATELET # BLD AUTO: 214 K/UL — SIGNIFICANT CHANGE UP (ref 130–400)
POTASSIUM SERPL-MCNC: 4.7 MMOL/L — SIGNIFICANT CHANGE UP (ref 3.5–5)
POTASSIUM SERPL-SCNC: 4.7 MMOL/L — SIGNIFICANT CHANGE UP (ref 3.5–5)
RBC # BLD: 4.21 M/UL — LOW (ref 4.7–6.1)
RBC # FLD: 14 % — SIGNIFICANT CHANGE UP (ref 11.5–14.5)
SODIUM SERPL-SCNC: 137 MMOL/L — SIGNIFICANT CHANGE UP (ref 135–146)
WBC # BLD: 13.46 K/UL — HIGH (ref 4.8–10.8)
WBC # FLD AUTO: 13.46 K/UL — HIGH (ref 4.8–10.8)

## 2019-04-21 RX ORDER — FUROSEMIDE 40 MG
40 TABLET ORAL ONCE
Qty: 0 | Refills: 0 | Status: COMPLETED | OUTPATIENT
Start: 2019-04-21 | End: 2019-04-21

## 2019-04-21 RX ORDER — TRAMADOL HYDROCHLORIDE 50 MG/1
50 TABLET ORAL ONCE
Qty: 0 | Refills: 0 | Status: DISCONTINUED | OUTPATIENT
Start: 2019-04-21 | End: 2019-04-21

## 2019-04-21 RX ADMIN — Medication 2: at 08:05

## 2019-04-21 RX ADMIN — Medication 200 MILLIGRAM(S): at 21:45

## 2019-04-21 RX ADMIN — LACTULOSE 15 GRAM(S): 10 SOLUTION ORAL at 07:29

## 2019-04-21 RX ADMIN — Medication 1: at 17:30

## 2019-04-21 RX ADMIN — Medication 667 MILLIGRAM(S): at 08:04

## 2019-04-21 RX ADMIN — Medication 40 MILLIGRAM(S): at 17:29

## 2019-04-21 RX ADMIN — Medication 40 MILLIGRAM(S): at 00:51

## 2019-04-21 RX ADMIN — PANTOPRAZOLE SODIUM 40 MILLIGRAM(S): 20 TABLET, DELAYED RELEASE ORAL at 07:29

## 2019-04-21 RX ADMIN — Medication 0.3 MILLIGRAM(S): at 21:46

## 2019-04-21 RX ADMIN — Medication 200 MILLIGRAM(S): at 14:25

## 2019-04-21 RX ADMIN — Medication 40 MILLIGRAM(S): at 07:27

## 2019-04-21 RX ADMIN — TRAMADOL HYDROCHLORIDE 50 MILLIGRAM(S): 50 TABLET ORAL at 11:02

## 2019-04-21 RX ADMIN — SENNA PLUS 2 TABLET(S): 8.6 TABLET ORAL at 21:45

## 2019-04-21 RX ADMIN — CEFEPIME 100 MILLIGRAM(S): 1 INJECTION, POWDER, FOR SOLUTION INTRAMUSCULAR; INTRAVENOUS at 17:29

## 2019-04-21 RX ADMIN — CHLORHEXIDINE GLUCONATE 1 APPLICATION(S): 213 SOLUTION TOPICAL at 07:26

## 2019-04-21 RX ADMIN — Medication 200 MILLIGRAM(S): at 07:29

## 2019-04-21 RX ADMIN — INSULIN GLARGINE 60 UNIT(S): 100 INJECTION, SOLUTION SUBCUTANEOUS at 21:45

## 2019-04-21 RX ADMIN — Medication 81 MILLIGRAM(S): at 11:47

## 2019-04-21 RX ADMIN — Medication 100 MILLIGRAM(S): at 21:45

## 2019-04-21 RX ADMIN — Medication 20 UNIT(S): at 08:05

## 2019-04-21 RX ADMIN — APIXABAN 10 MILLIGRAM(S): 2.5 TABLET, FILM COATED ORAL at 17:28

## 2019-04-21 RX ADMIN — Medication 1: at 12:23

## 2019-04-21 RX ADMIN — AMLODIPINE BESYLATE 10 MILLIGRAM(S): 2.5 TABLET ORAL at 07:25

## 2019-04-21 RX ADMIN — ATORVASTATIN CALCIUM 40 MILLIGRAM(S): 80 TABLET, FILM COATED ORAL at 21:45

## 2019-04-21 RX ADMIN — Medication 0.3 MILLIGRAM(S): at 14:25

## 2019-04-21 RX ADMIN — Medication 20 UNIT(S): at 12:23

## 2019-04-21 RX ADMIN — Medication 2000 UNIT(S): at 11:47

## 2019-04-21 RX ADMIN — APIXABAN 10 MILLIGRAM(S): 2.5 TABLET, FILM COATED ORAL at 07:25

## 2019-04-21 RX ADMIN — Medication 30 MILLIGRAM(S): at 14:31

## 2019-04-21 RX ADMIN — Medication 667 MILLIGRAM(S): at 17:28

## 2019-04-21 RX ADMIN — LACTULOSE 15 GRAM(S): 10 SOLUTION ORAL at 17:30

## 2019-04-21 RX ADMIN — Medication 20 UNIT(S): at 17:29

## 2019-04-21 RX ADMIN — Medication 100 MILLIGRAM(S): at 14:25

## 2019-04-21 RX ADMIN — Medication 100 MILLIGRAM(S): at 07:27

## 2019-04-21 RX ADMIN — CHLORHEXIDINE GLUCONATE 1 APPLICATION(S): 213 SOLUTION TOPICAL at 17:29

## 2019-04-21 RX ADMIN — Medication 40 MILLIGRAM(S): at 18:59

## 2019-04-21 RX ADMIN — Medication 667 MILLIGRAM(S): at 11:47

## 2019-04-21 RX ADMIN — CEFEPIME 100 MILLIGRAM(S): 1 INJECTION, POWDER, FOR SOLUTION INTRAMUSCULAR; INTRAVENOUS at 07:25

## 2019-04-21 RX ADMIN — Medication 0.3 MILLIGRAM(S): at 07:27

## 2019-04-21 NOTE — PROGRESS NOTE ADULT - SUBJECTIVE AND OBJECTIVE BOX
GRIS TIDWELL  59y  Male      Patient is a 59y old  Male who presents with a chief complaint of Right sided weakness (20 Apr 2019 22:44)      INTERVAL HPI/OVERNIGHT EVENTS:  Patient c/o pain in his cordero feet worse on the right big toe.   Vital Signs Last 24 Hrs  T(C): 36.1 (21 Apr 2019 06:32), Max: 38.4 (20 Apr 2019 18:41)  T(F): 96.9 (21 Apr 2019 06:32), Max: 101.2 (20 Apr 2019 18:41)  HR: 65 (21 Apr 2019 06:32) (61 - 76)  BP: 143/68 (21 Apr 2019 06:32) (136/74 - 157/67)  BP(mean): --  RR: 20 (21 Apr 2019 06:32) (20 - 24)  SpO2: 93% (21 Apr 2019 08:00) (93% - 97%)      04-20-19 @ 07:01  -  04-21-19 @ 07:00  --------------------------------------------------------  IN: 240 mL / OUT: 1040 mL / NET: -800 mL    04-21-19 @ 07:01  -  04-21-19 @ 11:55  --------------------------------------------------------  IN: 0 mL / OUT: 300 mL / NET: -300 mL            Consultant(s) Notes Reviewed:  [x ] YES  [ ] NO          MEDICATIONS  (STANDING):  amLODIPine   Tablet 10 milliGRAM(s) Oral daily  apixaban 10 milliGRAM(s) Oral every 12 hours  aspirin  chewable 81 milliGRAM(s) Oral daily  atorvastatin Oral Tab/Cap - Peds 40 milliGRAM(s) Oral daily  calcium acetate 667 milliGRAM(s) Oral three times a day with meals  cefepime   IVPB 2000 milliGRAM(s) IV Intermittent every 12 hours  chlorhexidine 4% Liquid 1 Application(s) Topical every 12 hours  cholecalciferol 2000 Unit(s) Oral daily  cloNIDine 0.3 milliGRAM(s) Oral every 8 hours  dextrose 5%. 1000 milliLiter(s) (50 mL/Hr) IV Continuous <Continuous>  dextrose 50% Injectable 12.5 Gram(s) IV Push once  dextrose 50% Injectable 25 Gram(s) IV Push once  dextrose 50% Injectable 25 Gram(s) IV Push once  furosemide    Tablet 40 milliGRAM(s) Oral two times a day  hydrALAZINE 100 milliGRAM(s) Oral every 8 hours  insulin glargine Injectable (LANTUS) 60 Unit(s) SubCutaneous at bedtime  insulin lispro (HumaLOG) corrective regimen sliding scale   SubCutaneous three times a day before meals  insulin lispro Injectable (HumaLOG) 20 Unit(s) SubCutaneous three times a day with meals  labetalol 200 milliGRAM(s) Oral three times a day  lactulose Syrup 15 Gram(s) Oral two times a day  pantoprazole    Tablet 40 milliGRAM(s) Oral before breakfast  senna 2 Tablet(s) Oral at bedtime    MEDICATIONS  (PRN):  acetaminophen   Tablet .. 650 milliGRAM(s) Oral every 6 hours PRN Mild Pain (1 - 3)  aluminum hydroxide/magnesium hydroxide/simethicone Suspension 30 milliLiter(s) Oral every 6 hours PRN Dyspepsia  dextrose 40% Gel 15 Gram(s) Oral once PRN Blood Glucose LESS THAN 70 milliGRAM(s)/deciliter  glucagon  Injectable 1 milliGRAM(s) IntraMuscular once PRN Glucose LESS THAN 70 milligrams/deciliter      LABS                          11.2   13.46 )-----------( 214      ( 21 Apr 2019 04:30 )             36.0     04-21    137  |  94<L>  |  84<HH>  ----------------------------<  166<H>  4.7   |  27  |  2.3<H>    Ca    8.6      21 Apr 2019 04:30  Mg     1.9     04-21            Lactate Trend        CAPILLARY BLOOD GLUCOSE      POCT Blood Glucose.: 194 mg/dL (21 Apr 2019 11:24)        RADIOLOGY & ADDITIONAL TESTS:    Imaging Personally Reviewed:  [ ] YES  [ ] NO    HEALTH ISSUES - PROBLEM Dx:  Intraparenchymal hematoma of brain without loss of consciousness, unspecified laterality, subsequent encounter: Intraparenchymal hematoma of brain without loss of consciousness, unspecified laterality, subsequent encounter        PHYSICAL EXAM:  GENERAL: NAD, heard hearing. Lethargic. Obese.   HEAD:  Atraumatic, Normocephalic  EYES: EOMI, PERRLA, conjunctiva and sclera clear  NECK: Supple, No JVD  CHEST/LUNG: Clear to auscultation bilaterally; No wheeze  HEART: Regular rate and rhythm; No murmurs, rubs, or gallops  ABDOMEN: Soft, Nontender, Nondistended; Bowel sounds present  EXTREMITIES:  2+ Peripheral Pulses, LE edema 1+, right 1st MTP joint erythema and inflammation with cordero feet edema.   PSYCH: Alert, oriented x3,   NEUROLOGY:   SKIN: No rashes or lesions

## 2019-04-21 NOTE — PROGRESS NOTE ADULT - ASSESSMENT
Patient is a 59y old Male who presents with a chief complaint of Right sided weakness   Currently admitted to medicine with the primary diagnosis of Intraparenchymal hemorrhage of brain    ASSESSMENT & PLAN    Sepsis: Possible gram negative pneumonia vs. Gout flare.   Chest X ray showed bibasilar opacities and right sided pleural effusion. CXR is poor quality, unable to ddetermine if it is new infiltrates.   Patient has worsening leukocytosis, two fevers last night. Also found with new joint pain and erythema in rigth big toe and left big toe. Possible Gout flare.   He started on Cefepime, send Nasal MRSA.   Add Prednisone 30mg daily for possible Gout flare.     Acute hemorrhagic CVA  Intracranial hemorrhage: patient presented with AMS and rigth sided weakness.   CTH 4/2: L frontotemporal intraparenchymal bleed with extension into the ventricle and 2mm shift to the R  MR brain on 4/18 showed 4.5cm parenchymal bleed  in left basal ganglia with surrounding edema, chronic lacunar infarct, compared to 3.9cm on initial CT head.  Patient was started on Eliquis for DVT during this admission. Neurosurgery cleared the patient for anticoagulation.   Keep -140.   Continue PT and OT.   - Speech and swallow eval done. Recommended dysphagia 3 diet.     Acute DVT: can't rule out PE   Venous duplex 4/12 showed new right posterior tibial and peroneal and left gastrocnemius and branch of posterior tibial thrombus.  Neurosurgery cleared pt for anticoagulation 4/12  Continue Eliquis.     Acute on chronic HFpEF  Echo EF 55% to 60% EF MR, Mild Aortic valve thinking   Continue Lasix and Labetalol.   Fluid restriction. May add Metolazone for further diuresis     HTN:   Continue amlodipine 10 mg daily, hydralazine 100 mg TID, lasix 40mg PO bid,  clonidine to 0.3mg q8h and labetalol 200mg q8h     DESI on CKD3  Improving 2.1 now   baseline Cr 1.9 in 10/2018  Renal US 4/3: no hydronephrosis, L renal cyst  Nephro following     New onset Paroxysmal Atrial fibrillation:   Sinus rhythm now  Continue Labetalol and Eliquis.       DM type 2:   FS is better controlled. Continue Lantus and Lispro.       #Progress Note Handoff:  Pending (specify):  Improving fever, neurology follow up, eventually he will need to discharge to acute vs subacute rehab.   Family discussion: with his wife, agrees to start on Prednisone for possible gout flare,   Disposition: likely acute vs subacute rehab once medically stable.

## 2019-04-22 LAB
ANION GAP SERPL CALC-SCNC: 17 MMOL/L — HIGH (ref 7–14)
BASOPHILS # BLD AUTO: 0.02 K/UL — SIGNIFICANT CHANGE UP (ref 0–0.2)
BASOPHILS NFR BLD AUTO: 0.1 % — SIGNIFICANT CHANGE UP (ref 0–1)
BUN SERPL-MCNC: 93 MG/DL — CRITICAL HIGH (ref 10–20)
CALCIUM SERPL-MCNC: 9.2 MG/DL — SIGNIFICANT CHANGE UP (ref 8.5–10.1)
CHLORIDE SERPL-SCNC: 91 MMOL/L — LOW (ref 98–110)
CO2 SERPL-SCNC: 28 MMOL/L — SIGNIFICANT CHANGE UP (ref 17–32)
CREAT SERPL-MCNC: 2.7 MG/DL — HIGH (ref 0.7–1.5)
EOSINOPHIL # BLD AUTO: 0.02 K/UL — SIGNIFICANT CHANGE UP (ref 0–0.7)
EOSINOPHIL NFR BLD AUTO: 0.1 % — SIGNIFICANT CHANGE UP (ref 0–8)
GLUCOSE BLDC GLUCOMTR-MCNC: 256 MG/DL — HIGH (ref 70–99)
GLUCOSE BLDC GLUCOMTR-MCNC: 268 MG/DL — HIGH (ref 70–99)
GLUCOSE BLDC GLUCOMTR-MCNC: 300 MG/DL — HIGH (ref 70–99)
GLUCOSE BLDC GLUCOMTR-MCNC: 313 MG/DL — HIGH (ref 70–99)
GLUCOSE SERPL-MCNC: 296 MG/DL — HIGH (ref 70–99)
HCT VFR BLD CALC: 37.7 % — LOW (ref 42–52)
HGB BLD-MCNC: 11.7 G/DL — LOW (ref 14–18)
IMM GRANULOCYTES NFR BLD AUTO: 0.7 % — HIGH (ref 0.1–0.3)
LYMPHOCYTES # BLD AUTO: 0.48 K/UL — LOW (ref 1.2–3.4)
LYMPHOCYTES # BLD AUTO: 3.3 % — LOW (ref 20.5–51.1)
MAGNESIUM SERPL-MCNC: 2 MG/DL — SIGNIFICANT CHANGE UP (ref 1.8–2.4)
MCHC RBC-ENTMCNC: 26.5 PG — LOW (ref 27–31)
MCHC RBC-ENTMCNC: 31 G/DL — LOW (ref 32–37)
MCV RBC AUTO: 85.5 FL — SIGNIFICANT CHANGE UP (ref 80–94)
MONOCYTES # BLD AUTO: 0.83 K/UL — HIGH (ref 0.1–0.6)
MONOCYTES NFR BLD AUTO: 5.7 % — SIGNIFICANT CHANGE UP (ref 1.7–9.3)
NEUTROPHILS # BLD AUTO: 13.13 K/UL — HIGH (ref 1.4–6.5)
NEUTROPHILS NFR BLD AUTO: 90.1 % — HIGH (ref 42.2–75.2)
NRBC # BLD: 0 /100 WBCS — SIGNIFICANT CHANGE UP (ref 0–0)
PLATELET # BLD AUTO: 239 K/UL — SIGNIFICANT CHANGE UP (ref 130–400)
POTASSIUM SERPL-MCNC: 4.8 MMOL/L — SIGNIFICANT CHANGE UP (ref 3.5–5)
POTASSIUM SERPL-SCNC: 4.8 MMOL/L — SIGNIFICANT CHANGE UP (ref 3.5–5)
RBC # BLD: 4.41 M/UL — LOW (ref 4.7–6.1)
RBC # FLD: 13.8 % — SIGNIFICANT CHANGE UP (ref 11.5–14.5)
SODIUM SERPL-SCNC: 136 MMOL/L — SIGNIFICANT CHANGE UP (ref 135–146)
WBC # BLD: 14.58 K/UL — HIGH (ref 4.8–10.8)
WBC # FLD AUTO: 14.58 K/UL — HIGH (ref 4.8–10.8)

## 2019-04-22 PROCEDURE — 76770 US EXAM ABDO BACK WALL COMP: CPT | Mod: 26

## 2019-04-22 RX ORDER — INSULIN GLARGINE 100 [IU]/ML
64 INJECTION, SOLUTION SUBCUTANEOUS AT BEDTIME
Qty: 0 | Refills: 0 | Status: DISCONTINUED | OUTPATIENT
Start: 2019-04-22 | End: 2019-04-25

## 2019-04-22 RX ORDER — TRAMADOL HYDROCHLORIDE 50 MG/1
50 TABLET ORAL EVERY 8 HOURS
Qty: 0 | Refills: 0 | Status: DISCONTINUED | OUTPATIENT
Start: 2019-04-22 | End: 2019-04-22

## 2019-04-22 RX ORDER — INSULIN LISPRO 100/ML
22 VIAL (ML) SUBCUTANEOUS
Qty: 0 | Refills: 0 | Status: DISCONTINUED | OUTPATIENT
Start: 2019-04-22 | End: 2019-04-25

## 2019-04-22 RX ORDER — DOCUSATE SODIUM 100 MG
100 CAPSULE ORAL DAILY
Qty: 0 | Refills: 0 | Status: DISCONTINUED | OUTPATIENT
Start: 2019-04-22 | End: 2019-04-22

## 2019-04-22 RX ORDER — TAMSULOSIN HYDROCHLORIDE 0.4 MG/1
0.4 CAPSULE ORAL AT BEDTIME
Qty: 0 | Refills: 0 | Status: DISCONTINUED | OUTPATIENT
Start: 2019-04-22 | End: 2019-04-22

## 2019-04-22 RX ORDER — FUROSEMIDE 40 MG
40 TABLET ORAL DAILY
Qty: 0 | Refills: 0 | Status: DISCONTINUED | OUTPATIENT
Start: 2019-04-23 | End: 2019-04-29

## 2019-04-22 RX ADMIN — AMLODIPINE BESYLATE 10 MILLIGRAM(S): 2.5 TABLET ORAL at 06:23

## 2019-04-22 RX ADMIN — LACTULOSE 15 GRAM(S): 10 SOLUTION ORAL at 06:23

## 2019-04-22 RX ADMIN — INSULIN GLARGINE 64 UNIT(S): 100 INJECTION, SOLUTION SUBCUTANEOUS at 22:13

## 2019-04-22 RX ADMIN — Medication 3: at 16:48

## 2019-04-22 RX ADMIN — Medication 0.3 MILLIGRAM(S): at 06:23

## 2019-04-22 RX ADMIN — ATORVASTATIN CALCIUM 40 MILLIGRAM(S): 80 TABLET, FILM COATED ORAL at 21:49

## 2019-04-22 RX ADMIN — Medication 100 MILLIGRAM(S): at 06:23

## 2019-04-22 RX ADMIN — Medication 20 UNIT(S): at 07:57

## 2019-04-22 RX ADMIN — Medication 0.3 MILLIGRAM(S): at 22:12

## 2019-04-22 RX ADMIN — Medication 4: at 11:42

## 2019-04-22 RX ADMIN — Medication 81 MILLIGRAM(S): at 11:42

## 2019-04-22 RX ADMIN — Medication 200 MILLIGRAM(S): at 22:13

## 2019-04-22 RX ADMIN — Medication 0.3 MILLIGRAM(S): at 13:24

## 2019-04-22 RX ADMIN — APIXABAN 10 MILLIGRAM(S): 2.5 TABLET, FILM COATED ORAL at 17:06

## 2019-04-22 RX ADMIN — Medication 667 MILLIGRAM(S): at 07:57

## 2019-04-22 RX ADMIN — CEFEPIME 100 MILLIGRAM(S): 1 INJECTION, POWDER, FOR SOLUTION INTRAMUSCULAR; INTRAVENOUS at 06:24

## 2019-04-22 RX ADMIN — Medication 22 UNIT(S): at 16:48

## 2019-04-22 RX ADMIN — Medication 200 MILLIGRAM(S): at 06:23

## 2019-04-22 RX ADMIN — Medication 100 MILLIGRAM(S): at 22:13

## 2019-04-22 RX ADMIN — Medication 2000 UNIT(S): at 11:42

## 2019-04-22 RX ADMIN — APIXABAN 10 MILLIGRAM(S): 2.5 TABLET, FILM COATED ORAL at 06:22

## 2019-04-22 RX ADMIN — Medication 40 MILLIGRAM(S): at 06:23

## 2019-04-22 RX ADMIN — Medication 667 MILLIGRAM(S): at 11:42

## 2019-04-22 RX ADMIN — Medication 3: at 07:57

## 2019-04-22 RX ADMIN — Medication 667 MILLIGRAM(S): at 16:49

## 2019-04-22 RX ADMIN — Medication 100 MILLIGRAM(S): at 13:24

## 2019-04-22 RX ADMIN — Medication 30 MILLIGRAM(S): at 07:23

## 2019-04-22 RX ADMIN — LACTULOSE 15 GRAM(S): 10 SOLUTION ORAL at 17:06

## 2019-04-22 RX ADMIN — SENNA PLUS 2 TABLET(S): 8.6 TABLET ORAL at 21:50

## 2019-04-22 RX ADMIN — CEFEPIME 100 MILLIGRAM(S): 1 INJECTION, POWDER, FOR SOLUTION INTRAMUSCULAR; INTRAVENOUS at 17:06

## 2019-04-22 RX ADMIN — Medication 20 UNIT(S): at 11:41

## 2019-04-22 RX ADMIN — CHLORHEXIDINE GLUCONATE 1 APPLICATION(S): 213 SOLUTION TOPICAL at 06:24

## 2019-04-22 RX ADMIN — PANTOPRAZOLE SODIUM 40 MILLIGRAM(S): 20 TABLET, DELAYED RELEASE ORAL at 06:23

## 2019-04-22 NOTE — PROGRESS NOTE ADULT - SUBJECTIVE AND OBJECTIVE BOX
GRIS TIDWELL 59y Male  MRN#: [id number]       SUBJECTIVE  Patient is a 59y old Male who presents with a chief complaint of Right sided weakness  Currently admitted to medicine with the primary diagnosis of Intraparenchymal hemorrhage of brain  Today is hospital day 20d, and this morning he reports no overnight events.     OBJECTIVE  PAST MEDICAL & SURGICAL HISTORY  Gout  Morbidly obese  Gout  Hypertension  Diabetes mellitus  S/P tonsillectomy    ALLERGIES:  No Known Allergies    MEDICATIONS:  STANDING MEDICATIONS  amLODIPine   Tablet 10 milliGRAM(s) Oral daily  apixaban 10 milliGRAM(s) Oral every 12 hours  aspirin  chewable 81 milliGRAM(s) Oral daily  atorvastatin Oral Tab/Cap - Peds 40 milliGRAM(s) Oral daily  calcium acetate 667 milliGRAM(s) Oral three times a day with meals  cefepime   IVPB 2000 milliGRAM(s) IV Intermittent every 12 hours  chlorhexidine 4% Liquid 1 Application(s) Topical every 12 hours  cholecalciferol 2000 Unit(s) Oral daily  cloNIDine 0.3 milliGRAM(s) Oral every 8 hours  dextrose 5%. 1000 milliLiter(s) IV Continuous <Continuous>  dextrose 50% Injectable 12.5 Gram(s) IV Push once  dextrose 50% Injectable 25 Gram(s) IV Push once  dextrose 50% Injectable 25 Gram(s) IV Push once  hydrALAZINE 100 milliGRAM(s) Oral every 8 hours  insulin glargine Injectable (LANTUS) 60 Unit(s) SubCutaneous at bedtime  insulin lispro (HumaLOG) corrective regimen sliding scale   SubCutaneous three times a day before meals  insulin lispro Injectable (HumaLOG) 20 Unit(s) SubCutaneous three times a day with meals  labetalol 200 milliGRAM(s) Oral three times a day  lactulose Syrup 15 Gram(s) Oral two times a day  pantoprazole    Tablet 40 milliGRAM(s) Oral before breakfast  predniSONE   Tablet 30 milliGRAM(s) Oral daily  senna 2 Tablet(s) Oral at bedtime    PRN MEDICATIONS  acetaminophen   Tablet .. 650 milliGRAM(s) Oral every 6 hours PRN  aluminum hydroxide/magnesium hydroxide/simethicone Suspension 30 milliLiter(s) Oral every 6 hours PRN  dextrose 40% Gel 15 Gram(s) Oral once PRN  glucagon  Injectable 1 milliGRAM(s) IntraMuscular once PRN  traMADol 50 milliGRAM(s) Oral every 8 hours PRN      VITAL SIGNS: Last 24 Hours  T(C): 36.6 (22 Apr 2019 13:26), Max: 37.5 (21 Apr 2019 14:22)  T(F): 97.9 (22 Apr 2019 13:26), Max: 99.5 (21 Apr 2019 14:22)  HR: 58 (22 Apr 2019 13:26) (58 - 78)  BP: 126/61 (22 Apr 2019 13:26) (126/61 - 139/69)  BP(mean): --  RR: 20 (22 Apr 2019 13:26) (18 - 20)  SpO2: 98% (22 Apr 2019 09:54) (97% - 98%)    LABS:                        11.7   14.58 )-----------( 239      ( 22 Apr 2019 08:09 )             37.7     04-22    136  |  91<L>  |  93<HH>  ----------------------------<  296<H>  4.8   |  28  |  2.7<H>    Ca    9.2      22 Apr 2019 08:09  Mg     2.0     04-22      Culture - Blood (collected 20 Apr 2019 22:23)  Source: .Blood None  Preliminary Report (22 Apr 2019 09:01):    No growth to date.      RADIOLOGY:  < from: MR Angio Head No Cont (04.19.19 @ 18:09) >  IMPRESSION:     1.  Slight asymmetry of the distal left MCA branches which are slightly   decreased in signal flow enhancement and caliber and may be due to   artifact versus vasospasm.    2.  Small/hypoplastic A1 segment of the right anterior cerebral artery.    3.  Otherwise unremarkable MRA of the brain without contrast.     4.  No proximal large vessel occlusions.      < end of copied text >    < from: MR Head No Cont (04.18.19 @ 15:44) >  IMPRESSION:    1.  Motion limited and incomplete exam. The patient was unable to   tolerate the study.    2.  4.5 cm parenchymal hematoma centered in the left basal ganglia with   moderate surrounding edema and mass effect with compression of theleft   frontal horn.    3.  Mild chronic microvascular changes and scattered chronic lacunar   infarcts.    < end of copied text >      < from: CT Head No Cont (04.13.19 @ 16:07) >  impression:    No significant changes in the acute/subacute parenchymal hemorrhage   involving the left basal ganglia with small amount of surrounding edema.   Well-demarcated area of decreased attenuation extending to the left   caudate may represent associated acute/subacute infarction. Attention on   follow-up imaging recommended.    Mass effect on the left lateral ventricle without significant ventricular   enlargement. Minimal left-to-right midline shift.    No new acute intracranial hemorrhage.    < end of copied text >    < from: Transthoracic Echocardiogram (04.13.19 @ 08:42) >  Summary:   1. Left ventricular ejection fraction, by visual estimation, is 55 to   60%.   2. Normal global left ventricular systolic function.   3. Mild mitral valve regurgitation.   4. Mild thickening and calcification of the anterior mitral valve   leaflet.   5. Moderate aortic valve thickening with normal leaflet opening.      < end of copied text >    < from: [facility] Duplex Lower Ext Vein Scan, Bilat (04.12.19 @ 19:40) >  Impression:    Thromboses present in bilateral calf veins: right posterior tibial and   peroneal vein branches; left gastrocnemius and posterior tibial vein   branches.    These are new findings compared to duplex of 4/7/2019.    < end of copied text >    < from: US Renal (04.03.19 @ 10:59) >  Impression:     Limited examination secondary to patient's condition and body habitus.    No hydronephrosis or calculus on either side.    2.4 cm left renal hypodensity may reflect a cyst but is limited in   characterization on this study. Consideration can be given to a follow-up   examination or dedicated CT or MRI at later time.        PHYSICAL EXAM:    GENERAL: NAD, well-developed, AAOx3  HEENT:  Atraumatic, Normocephalic. EOMI, PERRLA, conjunctiva and sclera clear, No JVD  PULMONARY: Crackles present in right lung bases  CARDIOVASCULAR: Regular rate and rhythm; No murmurs, rubs, or gallops  GASTROINTESTINAL: Soft, Nontender, Nondistended; Bowel sounds present  MUSCULOSKELETAL:  2+ Peripheral Pulses, Severe pitting edema present. Swollen right big toe  NEUROLOGY: Power : 2/5 in RLE, 3/5 in LLE, 4/5 in RUE and LUE. No sensory deficits  SKIN: No rashes or lesions      ADMISSION SUMMARY  Patient is a 59y old Male who presents with a chief complaint of Right sided weakness (21 Apr 2019 11:53)  Currently admitted to medicine with the primary diagnosis of Intraparenchymal hemorrhage of brain    ASSESSMENT & PLAN    #Fever  -Chest X ray showed pleural effusion on right. No new opacity  -blood cx negative to date.  -Currently afebrile. Elevated WBC. BP stable. C/w cefepime q12h    #L basal ganglia intraparenchymal bleed   - no acute neurosurgical intervention  - CTH 4/2: L frontotemporal intraparenchymal bleed with extension into the ventricle and 2mm shift to the R  - Repeat CTH on 4/3, 4/6, 4/12, 4/13 stable   - Having right UE and LE weakness. Power 4/5 in RUE, 2/5 in RLE  - Neurology Suspecting caudate nucleus infarct on top of bleed. MR brain performed showing 4.5cm parenchymal bleed compared to 3.9cm on initial CT head.  - Maintain SBP<160   - Speech and swallow eval done. Recommended dysphagia 3 diet. Need speech therapy for expressive aphasia    #LLE and RLE DVT and Acute on chronic hypercapnic respiratory failure, possible PE  - Venous duplex 4/12 showed new right posterior tibial and peroneal and left gastrocnemius and branch of posterior tibial thrombus.  - CTA lungs (not done due to CKD) and V/Q scan would not  to anticoagulate  - Neurosurgery cleared pt for anticoagulation 4/12  - c/w aspirin and eliquis  - Saturating well on 3l Oxygen. Will wean off oxygen    #Hypertensive emergency  - goal SBP < 160  - amlodipine 10 mg daily, hydralazine 100 mg TID, lasix 40mg PO, increased clonidine to 0.3mg q8h, decreased labetalol to 200mg q8h   - BP well today with this regimen.     #Acute on chronic HFpEF  - Echo EF 55% to 60% EF MR, Mild Aortic valve thinking   - cxr w/ interval blunting of costophrenic angle, hazy opacity  - follows w/ Dr. Man  - On lasix 40mg PO q24h    #DESI on CKD3  - Creatinine 2.7  - baseline Cr 1.9 in 10/2018  - renal US 4/3: no hydronephrosis, L renal cyst  - nephro following     #New onset afib  - Rate controlled  - c/w labetalol 200 mg TID  - On eliquis    # Gout Flare  - c/w prednisone 30mg    #Chest pain :Resolved  EKG normal  Trops normal     #DM glucose uncontrolled   -Increased lantus to 64u, lispro 22u TID    #Dyslipidemia  - lipitor    #DVT ppx: Eliquis  #GI ppx: protonix   #Dispo: PT eval. Still acute  #Full code

## 2019-04-22 NOTE — PROGRESS NOTE ADULT - ATTENDING COMMENTS
Pending (specify):  Worsening renal function. Decrease Lasix to 40 mg daily                               PT to work with patient now                               Patient is obese needing assistance   Family discussion: Both daughter and grand daughter   Disposition: Unknown at this time.

## 2019-04-23 LAB
ANION GAP SERPL CALC-SCNC: 17 MMOL/L — HIGH (ref 7–14)
BASOPHILS # BLD AUTO: 0.03 K/UL — SIGNIFICANT CHANGE UP (ref 0–0.2)
BASOPHILS NFR BLD AUTO: 0.2 % — SIGNIFICANT CHANGE UP (ref 0–1)
BUN SERPL-MCNC: 102 MG/DL — CRITICAL HIGH (ref 10–20)
CALCIUM SERPL-MCNC: 8.9 MG/DL — SIGNIFICANT CHANGE UP (ref 8.5–10.1)
CHLORIDE SERPL-SCNC: 92 MMOL/L — LOW (ref 98–110)
CO2 SERPL-SCNC: 25 MMOL/L — SIGNIFICANT CHANGE UP (ref 17–32)
CREAT SERPL-MCNC: 2.6 MG/DL — HIGH (ref 0.7–1.5)
EOSINOPHIL # BLD AUTO: 0.22 K/UL — SIGNIFICANT CHANGE UP (ref 0–0.7)
EOSINOPHIL NFR BLD AUTO: 1.8 % — SIGNIFICANT CHANGE UP (ref 0–8)
GLUCOSE BLDC GLUCOMTR-MCNC: 228 MG/DL — HIGH (ref 70–99)
GLUCOSE BLDC GLUCOMTR-MCNC: 238 MG/DL — HIGH (ref 70–99)
GLUCOSE BLDC GLUCOMTR-MCNC: 263 MG/DL — HIGH (ref 70–99)
GLUCOSE BLDC GLUCOMTR-MCNC: 267 MG/DL — HIGH (ref 70–99)
GLUCOSE SERPL-MCNC: 255 MG/DL — HIGH (ref 70–99)
HCT VFR BLD CALC: 35.1 % — LOW (ref 42–52)
HGB BLD-MCNC: 11 G/DL — LOW (ref 14–18)
IMM GRANULOCYTES NFR BLD AUTO: 0.7 % — HIGH (ref 0.1–0.3)
LYMPHOCYTES # BLD AUTO: 0.77 K/UL — LOW (ref 1.2–3.4)
LYMPHOCYTES # BLD AUTO: 6.2 % — LOW (ref 20.5–51.1)
MAGNESIUM SERPL-MCNC: 2.2 MG/DL — SIGNIFICANT CHANGE UP (ref 1.8–2.4)
MCHC RBC-ENTMCNC: 26.5 PG — LOW (ref 27–31)
MCHC RBC-ENTMCNC: 31.3 G/DL — LOW (ref 32–37)
MCV RBC AUTO: 84.6 FL — SIGNIFICANT CHANGE UP (ref 80–94)
MONOCYTES # BLD AUTO: 0.95 K/UL — HIGH (ref 0.1–0.6)
MONOCYTES NFR BLD AUTO: 7.7 % — SIGNIFICANT CHANGE UP (ref 1.7–9.3)
NEUTROPHILS # BLD AUTO: 10.3 K/UL — HIGH (ref 1.4–6.5)
NEUTROPHILS NFR BLD AUTO: 83.4 % — HIGH (ref 42.2–75.2)
NRBC # BLD: 0 /100 WBCS — SIGNIFICANT CHANGE UP (ref 0–0)
PLATELET # BLD AUTO: 298 K/UL — SIGNIFICANT CHANGE UP (ref 130–400)
POTASSIUM SERPL-MCNC: 4.5 MMOL/L — SIGNIFICANT CHANGE UP (ref 3.5–5)
POTASSIUM SERPL-SCNC: 4.5 MMOL/L — SIGNIFICANT CHANGE UP (ref 3.5–5)
RBC # BLD: 4.15 M/UL — LOW (ref 4.7–6.1)
RBC # FLD: 13.7 % — SIGNIFICANT CHANGE UP (ref 11.5–14.5)
SODIUM SERPL-SCNC: 134 MMOL/L — LOW (ref 135–146)
WBC # BLD: 12.36 K/UL — HIGH (ref 4.8–10.8)
WBC # FLD AUTO: 12.36 K/UL — HIGH (ref 4.8–10.8)

## 2019-04-23 PROCEDURE — 76856 US EXAM PELVIC COMPLETE: CPT | Mod: 26

## 2019-04-23 RX ORDER — LACTULOSE 10 G/15ML
15 SOLUTION ORAL
Qty: 0 | Refills: 0 | Status: COMPLETED | OUTPATIENT
Start: 2019-04-22 | End: 2019-04-28

## 2019-04-23 RX ORDER — AMLODIPINE BESYLATE 2.5 MG/1
5 TABLET ORAL DAILY
Qty: 0 | Refills: 0 | Status: DISCONTINUED | OUTPATIENT
Start: 2019-04-23 | End: 2019-04-29

## 2019-04-23 RX ORDER — TAMSULOSIN HYDROCHLORIDE 0.4 MG/1
0.4 CAPSULE ORAL AT BEDTIME
Qty: 0 | Refills: 0 | Status: DISCONTINUED | OUTPATIENT
Start: 2019-04-23 | End: 2019-04-29

## 2019-04-23 RX ADMIN — CHLORHEXIDINE GLUCONATE 1 APPLICATION(S): 213 SOLUTION TOPICAL at 21:27

## 2019-04-23 RX ADMIN — Medication 0.3 MILLIGRAM(S): at 14:41

## 2019-04-23 RX ADMIN — INSULIN GLARGINE 64 UNIT(S): 100 INJECTION, SOLUTION SUBCUTANEOUS at 21:28

## 2019-04-23 RX ADMIN — Medication 22 UNIT(S): at 08:28

## 2019-04-23 RX ADMIN — ATORVASTATIN CALCIUM 40 MILLIGRAM(S): 80 TABLET, FILM COATED ORAL at 21:27

## 2019-04-23 RX ADMIN — Medication 22 UNIT(S): at 12:41

## 2019-04-23 RX ADMIN — Medication 0.3 MILLIGRAM(S): at 05:59

## 2019-04-23 RX ADMIN — Medication 200 MILLIGRAM(S): at 05:41

## 2019-04-23 RX ADMIN — Medication 200 MILLIGRAM(S): at 21:28

## 2019-04-23 RX ADMIN — Medication 40 MILLIGRAM(S): at 05:38

## 2019-04-23 RX ADMIN — SENNA PLUS 2 TABLET(S): 8.6 TABLET ORAL at 21:28

## 2019-04-23 RX ADMIN — Medication 81 MILLIGRAM(S): at 12:38

## 2019-04-23 RX ADMIN — Medication 100 MILLIGRAM(S): at 05:38

## 2019-04-23 RX ADMIN — Medication 0.3 MILLIGRAM(S): at 21:27

## 2019-04-23 RX ADMIN — Medication 100 MILLIGRAM(S): at 14:41

## 2019-04-23 RX ADMIN — APIXABAN 10 MILLIGRAM(S): 2.5 TABLET, FILM COATED ORAL at 17:57

## 2019-04-23 RX ADMIN — Medication 2000 UNIT(S): at 12:41

## 2019-04-23 RX ADMIN — CEFEPIME 100 MILLIGRAM(S): 1 INJECTION, POWDER, FOR SOLUTION INTRAMUSCULAR; INTRAVENOUS at 05:40

## 2019-04-23 RX ADMIN — Medication 10 MILLIGRAM(S): at 02:28

## 2019-04-23 RX ADMIN — CEFEPIME 100 MILLIGRAM(S): 1 INJECTION, POWDER, FOR SOLUTION INTRAMUSCULAR; INTRAVENOUS at 17:57

## 2019-04-23 RX ADMIN — AMLODIPINE BESYLATE 5 MILLIGRAM(S): 2.5 TABLET ORAL at 14:40

## 2019-04-23 RX ADMIN — PANTOPRAZOLE SODIUM 40 MILLIGRAM(S): 20 TABLET, DELAYED RELEASE ORAL at 06:19

## 2019-04-23 RX ADMIN — Medication 3: at 17:52

## 2019-04-23 RX ADMIN — Medication 22 UNIT(S): at 17:52

## 2019-04-23 RX ADMIN — TAMSULOSIN HYDROCHLORIDE 0.4 MILLIGRAM(S): 0.4 CAPSULE ORAL at 21:28

## 2019-04-23 RX ADMIN — Medication 3: at 08:28

## 2019-04-23 RX ADMIN — AMLODIPINE BESYLATE 10 MILLIGRAM(S): 2.5 TABLET ORAL at 05:40

## 2019-04-23 RX ADMIN — APIXABAN 10 MILLIGRAM(S): 2.5 TABLET, FILM COATED ORAL at 05:38

## 2019-04-23 RX ADMIN — CHLORHEXIDINE GLUCONATE 1 APPLICATION(S): 213 SOLUTION TOPICAL at 05:59

## 2019-04-23 RX ADMIN — Medication 200 MILLIGRAM(S): at 14:41

## 2019-04-23 RX ADMIN — Medication 667 MILLIGRAM(S): at 08:30

## 2019-04-23 RX ADMIN — Medication 2: at 12:38

## 2019-04-23 RX ADMIN — Medication 30 MILLIGRAM(S): at 05:37

## 2019-04-23 RX ADMIN — LACTULOSE 15 GRAM(S): 10 SOLUTION ORAL at 07:02

## 2019-04-23 RX ADMIN — Medication 100 MILLIGRAM(S): at 21:28

## 2019-04-23 RX ADMIN — Medication 667 MILLIGRAM(S): at 12:40

## 2019-04-23 RX ADMIN — Medication 667 MILLIGRAM(S): at 17:57

## 2019-04-23 NOTE — PROGRESS NOTE ADULT - SUBJECTIVE AND OBJECTIVE BOX
GRIS TIDWELL 59y Male  MRN#: [id number]     SUBJECTIVE  Patient is a 59y old Male who presents with a chief complaint of Right sided weakness  Currently admitted to medicine with the primary diagnosis of Intraparenchymal hemorrhage of brain  Today is hospital day 21d, and this morning he reports no overnight events.     OBJECTIVE  PAST MEDICAL & SURGICAL HISTORY  Gout  Morbidly obese  Gout  Hypertension  Diabetes mellitus  S/P tonsillectomy    ALLERGIES:  No Known Allergies    MEDICATIONS:  STANDING MEDICATIONS  amLODIPine   Tablet 5 milliGRAM(s) Oral daily  apixaban 10 milliGRAM(s) Oral every 12 hours  aspirin  chewable 81 milliGRAM(s) Oral daily  atorvastatin Oral Tab/Cap - Peds 40 milliGRAM(s) Oral daily  calcium acetate 667 milliGRAM(s) Oral three times a day with meals  cefepime   IVPB 2000 milliGRAM(s) IV Intermittent every 12 hours  chlorhexidine 4% Liquid 1 Application(s) Topical every 12 hours  cholecalciferol 2000 Unit(s) Oral daily  cloNIDine 0.3 milliGRAM(s) Oral every 8 hours  dextrose 5%. 1000 milliLiter(s) IV Continuous <Continuous>  dextrose 50% Injectable 12.5 Gram(s) IV Push once  dextrose 50% Injectable 25 Gram(s) IV Push once  dextrose 50% Injectable 25 Gram(s) IV Push once  furosemide    Tablet 40 milliGRAM(s) Oral daily  hydrALAZINE 100 milliGRAM(s) Oral every 8 hours  insulin glargine Injectable (LANTUS) 64 Unit(s) SubCutaneous at bedtime  insulin lispro (HumaLOG) corrective regimen sliding scale   SubCutaneous three times a day before meals  insulin lispro Injectable (HumaLOG) 22 Unit(s) SubCutaneous three times a day with meals  labetalol 200 milliGRAM(s) Oral three times a day  pantoprazole    Tablet 40 milliGRAM(s) Oral before breakfast  predniSONE   Tablet 30 milliGRAM(s) Oral daily  senna 2 Tablet(s) Oral at bedtime  tamsulosin 0.4 milliGRAM(s) Oral at bedtime    PRN MEDICATIONS  acetaminophen   Tablet .. 650 milliGRAM(s) Oral every 6 hours PRN  aluminum hydroxide/magnesium hydroxide/simethicone Suspension 30 milliLiter(s) Oral every 6 hours PRN  bisacodyl Suppository 10 milliGRAM(s) Rectal daily PRN  dextrose 40% Gel 15 Gram(s) Oral once PRN  glucagon  Injectable 1 milliGRAM(s) IntraMuscular once PRN  lactulose Syrup 15 Gram(s) Oral every 3 hours PRN  traMADol 50 milliGRAM(s) Oral every 8 hours PRN      VITAL SIGNS: Last 24 Hours  T(C): 36.7 (23 Apr 2019 14:34), Max: 36.7 (23 Apr 2019 14:34)  T(F): 98.1 (23 Apr 2019 14:34), Max: 98.1 (23 Apr 2019 14:34)  HR: 60 (23 Apr 2019 14:34) (50 - 81)  BP: 138/71 (23 Apr 2019 14:34) (130/66 - 142/74)  BP(mean): --  RR: 20 (23 Apr 2019 14:34) (18 - 20)  SpO2: 97% (22 Apr 2019 22:17) (95% - 98%)    LABS:                        11.0   12.36 )-----------( 298      ( 23 Apr 2019 06:34 )             35.1     04-23    134<L>  |  92<L>  |  102<HH>  ----------------------------<  255<H>  4.5   |  25  |  2.6<H>    Ca    8.9      23 Apr 2019 06:34  Mg     2.2     04-23        Culture - Blood (collected 20 Apr 2019 22:23)  Source: .Blood None  Preliminary Report (22 Apr 2019 09:01):    No growth to date.    RADIOLOGY:  < from: MR Angio Head No Cont (04.19.19 @ 18:09) >  IMPRESSION:     1.  Slight asymmetry of the distal left MCA branches which are slightly   decreased in signal flow enhancement and caliber and may be due to   artifact versus vasospasm.    2.  Small/hypoplastic A1 segment of the right anterior cerebral artery.    3.  Otherwise unremarkable MRA of the brain without contrast.     4.  No proximal large vessel occlusions.      < end of copied text >    < from: MR Head No Cont (04.18.19 @ 15:44) >  IMPRESSION:    1.  Motion limited and incomplete exam. The patient was unable to   tolerate the study.    2.  4.5 cm parenchymal hematoma centered in the left basal ganglia with   moderate surrounding edema and mass effect with compression of theleft   frontal horn.    3.  Mild chronic microvascular changes and scattered chronic lacunar   infarcts.    < end of copied text >      < from: CT Head No Cont (04.13.19 @ 16:07) >  impression:    No significant changes in the acute/subacute parenchymal hemorrhage   involving the left basal ganglia with small amount of surrounding edema.   Well-demarcated area of decreased attenuation extending to the left   caudate may represent associated acute/subacute infarction. Attention on   follow-up imaging recommended.    Mass effect on the left lateral ventricle without significant ventricular   enlargement. Minimal left-to-right midline shift.    No new acute intracranial hemorrhage.    < end of copied text >    < from: Transthoracic Echocardiogram (04.13.19 @ 08:42) >  Summary:   1. Left ventricular ejection fraction, by visual estimation, is 55 to   60%.   2. Normal global left ventricular systolic function.   3. Mild mitral valve regurgitation.   4. Mild thickening and calcification of the anterior mitral valve   leaflet.   5. Moderate aortic valve thickening with normal leaflet opening.      < end of copied text >    < from: [facility] Duplex Lower Ext Vein Scan, Bilat (04.12.19 @ 19:40) >  Impression:    Thromboses present in bilateral calf veins: right posterior tibial and   peroneal vein branches; left gastrocnemius and posterior tibial vein   branches.    These are new findings compared to duplex of 4/7/2019.    < end of copied text >    < from: US Renal (04.03.19 @ 10:59) >  Impression:     Limited examination secondary to patient's condition and body habitus.    No hydronephrosis or calculus on either side.    2.4 cm left renal hypodensity may reflect a cyst but is limited in   characterization on this study. Consideration can be given to a follow-up   examination or dedicated CT or MRI at later time.      PHYSICAL EXAM:    GENERAL: NAD, well-developed, AAOx3  HEENT:  Atraumatic, Normocephalic. EOMI, PERRLA, conjunctiva and sclera clear, No JVD  PULMONARY: Clear to auscultation bilaterally; No wheeze  CARDIOVASCULAR: Regular rate and rhythm; No murmurs, rubs, or gallops  GASTROINTESTINAL: Soft, Nontender, Nondistended; Bowel sounds present  MUSCULOSKELETAL:  2+ Peripheral Pulses. b/l LE edema present  NEUROLOGY: power 2/5 in RLE and 3/5 in LLE. 4/5 in b/l UE. No sensory deficits  SKIN: No rashes or lesions      ADMISSION SUMMARY  Patient is a 59y old Male who presents with a chief complaint of Right sided weakness   Currently admitted to medicine with the primary diagnosis of Intraparenchymal hemorrhage of brain    ASSESSMENT & PLAN    #Fever  -Chest X ray showed pleural effusion on right. No new opacity  -blood cx negative to date.  -Currently afebrile. WBC trending down. BP stable. C/w cefepime q12h    #L basal ganglia intraparenchymal bleed   - no acute neurosurgical intervention  - CTH 4/2: L frontotemporal intraparenchymal bleed with extension into the ventricle and 2mm shift to the R  - Repeat CTH on 4/3, 4/6, 4/12, 4/13 stable   - Having right UE and LE weakness. Power 4/5 in RUE, 2/5 in RLE  - Neurology Suspecting caudate nucleus infarct on top of bleed. MR brain performed showing 4.5cm parenchymal bleed compared to 3.9cm on initial CT head.  - Maintain SBP<160   - Speech and swallow eval done. Recommended dysphagia 3 diet. Need speech therapy for expressive aphasia    #LLE and RLE DVT and Acute on chronic hypercapnic respiratory failure, possible PE  - Venous duplex 4/12 showed new right posterior tibial and peroneal and left gastrocnemius and branch of posterior tibial thrombus.  - CTA lungs (not done due to CKD) and V/Q scan would not  to anticoagulate  - Neurosurgery cleared pt for anticoagulation 4/12  - c/w aspirin and eliquis  - Saturating well on 2l Oxygen.     #Hypertensive emergency  - goal SBP < 160  - decreased amlodipine 5 mg daily, hydralazine 100 mg TID, lasix 40mg PO, clonidine 0.3mg q8h,  labetalol 200mg q8h   - BP well today with this regimen.     #Urinary retention  -Renal and bladder US showed pre void of 613cc and post void residual volume of 200cc. Likely 2/2 prostate enlargement and immobilization.   -f/u prostate US. started on flomax    #Acute on chronic HFpEF  - Echo EF 55% to 60% EF MR, Mild Aortic valve thinking   - cxr w/ interval blunting of costophrenic angle, hazy opacity  - follows w/ Dr. Man  - On lasix 40mg PO q24h    #DESI on CKD3  - Creatinine 2.6  - baseline Cr 1.9 in 10/2018  - renal US 4/3: no hydronephrosis, L renal cyst  - nephro following     #New onset afib  - Rate controlled  - c/w labetalol 200 mg TID  - On eliquis    # Gout Flare  - c/w prednisone 30mg    #Chest pain :Resolved  EKG normal  Trops normal     #DM glucose uncontrolled   -Increased lantus to 64u, lispro 22u TID    #Dyslipidemia  - lipitor    #DVT ppx: Eliquis  #GI ppx: protonix   #Dispo: PT eval. Still acute  #Full code

## 2019-04-23 NOTE — PROGRESS NOTE ADULT - SUBJECTIVE AND OBJECTIVE BOX
GRIS TIDWELL 59y Male  MRN#: [id number]       SUBJECTIVE  Patient is a 59y old Male who presents with a chief complaint of Right sided weakness  Currently admitted to medicine with the primary diagnosis of Intraparenchymal hemorrhage of brain  Today patient is out of bed to chair . Making preogress    OBJECTIVE  PAST MEDICAL & SURGICAL HISTORY  Gout  Morbidly obese  Gout  Hypertension  Diabetes mellitus  S/P tonsillectomy    ALLERGIES:  No Known Allergies    MEDICATIONS:  STANDING MEDICATIONS  amLODIPine   Tablet 10 milliGRAM(s) Oral daily  apixaban 10 milliGRAM(s) Oral every 12 hours  aspirin  chewable 81 milliGRAM(s) Oral daily  atorvastatin Oral Tab/Cap - Peds 40 milliGRAM(s) Oral daily  calcium acetate 667 milliGRAM(s) Oral three times a day with meals  cefepime   IVPB 2000 milliGRAM(s) IV Intermittent every 12 hours  chlorhexidine 4% Liquid 1 Application(s) Topical every 12 hours  cholecalciferol 2000 Unit(s) Oral daily  cloNIDine 0.3 milliGRAM(s) Oral every 8 hours  dextrose 5%. 1000 milliLiter(s) IV Continuous <Continuous>  dextrose 50% Injectable 12.5 Gram(s) IV Push once  dextrose 50% Injectable 25 Gram(s) IV Push once  dextrose 50% Injectable 25 Gram(s) IV Push once  hydrALAZINE 100 milliGRAM(s) Oral every 8 hours  insulin glargine Injectable (LANTUS) 60 Unit(s) SubCutaneous at bedtime  insulin lispro (HumaLOG) corrective regimen sliding scale   SubCutaneous three times a day before meals  insulin lispro Injectable (HumaLOG) 20 Unit(s) SubCutaneous three times a day with meals  labetalol 200 milliGRAM(s) Oral three times a day  lactulose Syrup 15 Gram(s) Oral two times a day  pantoprazole    Tablet 40 milliGRAM(s) Oral before breakfast  predniSONE   Tablet 30 milliGRAM(s) Oral daily  senna 2 Tablet(s) Oral at bedtime    PRN MEDICATIONS  acetaminophen   Tablet .. 650 milliGRAM(s) Oral every 6 hours PRN  aluminum hydroxide/magnesium hydroxide/simethicone Suspension 30 milliLiter(s) Oral every 6 hours PRN  dextrose 40% Gel 15 Gram(s) Oral once PRN  glucagon  Injectable 1 milliGRAM(s) IntraMuscular once PRN  traMADol 50 milliGRAM(s) Oral every 8 hours PRN      VITAL SIGNS: Last 24 Hours    T(C): 35.8 (23 Apr 2019 05:27), Max: 36.4 (22 Apr 2019 22:17)  T(F): 96.4 (23 Apr 2019 05:27), Max: 97.5 (22 Apr 2019 22:17)  HR: 56 (23 Apr 2019 05:27) (50 - 81)  BP: 139/67 (23 Apr 2019 05:27) (130/66 - 142/74)  BP(mean): --  RR: 18 (23 Apr 2019 05:27) (18 - 20)  SpO2: 97% (22 Apr 2019 22:17) (95% - 98%)    LABS:                        11.7   14.58 )-----------( 239      ( 22 Apr 2019 08:09 )             37.7     04-22    136  |  91<L>  |  93<HH>  ----------------------------<  296<H>  4.8   |  28  |  2.7<H>    Ca    9.2      22 Apr 2019 08:09  Mg     2.0     04-22      Culture - Blood (collected 20 Apr 2019 22:23)  Source: .Blood None  Preliminary Report (22 Apr 2019 09:01):    No growth to date.      RADIOLOGY:  < from: MR Angio Head No Cont (04.19.19 @ 18:09) >  IMPRESSION:     1.  Slight asymmetry of the distal left MCA branches which are slightly   decreased in signal flow enhancement and caliber and may be due to   artifact versus vasospasm.    2.  Small/hypoplastic A1 segment of the right anterior cerebral artery.    3.  Otherwise unremarkable MRA of the brain without contrast.     4.  No proximal large vessel occlusions.      < end of copied text >    < from: MR Head No Cont (04.18.19 @ 15:44) >  IMPRESSION:    1.  Motion limited and incomplete exam. The patient was unable to   tolerate the study.    2.  4.5 cm parenchymal hematoma centered in the left basal ganglia with   moderate surrounding edema and mass effect with compression of theleft   frontal horn.    3.  Mild chronic microvascular changes and scattered chronic lacunar   infarcts.    < end of copied text >      < from: CT Head No Cont (04.13.19 @ 16:07) >  impression:    No significant changes in the acute/subacute parenchymal hemorrhage   involving the left basal ganglia with small amount of surrounding edema.   Well-demarcated area of decreased attenuation extending to the left   caudate may represent associated acute/subacute infarction. Attention on   follow-up imaging recommended.    Mass effect on the left lateral ventricle without significant ventricular   enlargement. Minimal left-to-right midline shift.    No new acute intracranial hemorrhage.    < end of copied text >    < from: Transthoracic Echocardiogram (04.13.19 @ 08:42) >  Summary:   1. Left ventricular ejection fraction, by visual estimation, is 55 to   60%.   2. Normal global left ventricular systolic function.   3. Mild mitral valve regurgitation.   4. Mild thickening and calcification of the anterior mitral valve   leaflet.   5. Moderate aortic valve thickening with normal leaflet opening.      < end of copied text >    < from: [facility] Duplex Lower Ext Vein Scan, Bilat (04.12.19 @ 19:40) >  Impression:    Thromboses present in bilateral calf veins: right posterior tibial and   peroneal vein branches; left gastrocnemius and posterior tibial vein   branches.    These are new findings compared to duplex of 4/7/2019.    < end of copied text >    < from: US Renal (04.03.19 @ 10:59) >  Impression:     Limited examination secondary to patient's condition and body habitus.    No hydronephrosis or calculus on either side.    2.4 cm left renal hypodensity may reflect a cyst but is limited in   characterization on this study. Consideration can be given to a follow-up   examination or dedicated CT or MRI at later time.        PHYSICAL EXAM:    GENERAL: NAD, well-developed, AAOx3  HEENT:  Atraumatic, Normocephalic. EOMI, PERRLA, conjunctiva and sclera clear, No JVD  PULMONARY: Crackles present in right lung bases  CARDIOVASCULAR: Regular rate and rhythm; No murmurs, rubs, or gallops  GASTROINTESTINAL: Soft, Nontender, Nondistended; Bowel sounds present  MUSCULOSKELETAL:  2+ Peripheral Pulses, Severe pitting edema present. Swollen right big toe  NEUROLOGY: Power : 2/5 in RLE, 3/5 in LLE, 4/5 in RUE and LUE. No sensory deficits  SKIN: No rashes or lesions      ADMISSION SUMMARY  Patient is a 59y old Male who presents with a chief complaint of Right sided weakness (21 Apr 2019 11:53)  Currently admitted to medicine with the primary diagnosis of Intraparenchymal hemorrhage of brain    ASSESSMENT & PLAN    #Fever  -Chest X ray showed pleural effusion on right. No new opacity  -blood cx negative to date.  -Currently afebrile. Elevated WBC. BP stable. C/w cefepime q12h    #L basal ganglia intraparenchymal bleed   - no acute neurosurgical intervention  - CTH 4/2: L frontotemporal intraparenchymal bleed with extension into the ventricle and 2mm shift to the R  - Repeat CTH on 4/3, 4/6, 4/12, 4/13 stable   - Having right UE and LE weakness. Power 4/5 in RUE, 2/5 in RLE  - Neurology Suspecting caudate nucleus infarct on top of bleed. MR brain performed showing 4.5cm parenchymal bleed compared to 3.9cm on initial CT head.  - Maintain SBP<160   - Speech and swallow eval done. Recommended dysphagia 3 diet. Need speech therapy for expressive aphasia    #LLE and RLE DVT and Acute on chronic hypercapnic respiratory failure, possible PE  - Venous duplex 4/12 showed new right posterior tibial and peroneal and left gastrocnemius and branch of posterior tibial thrombus.  - CTA lungs (not done due to CKD) and V/Q scan would not  to anticoagulate  - Neurosurgery cleared pt for anticoagulation 4/12  - c/w aspirin and eliquis  - Saturating well on 3l Oxygen. Will wean off oxygen    #Hypertensive emergency  - goal SBP < 160  - amlodipine 10 mg daily, hydralazine 100 mg TID, lasix 40mg PO, increased clonidine to 0.3mg q8h, decreased labetalol to 200mg q8h   - BP well today with this regimen.     #Acute on chronic HFpEF  - Echo EF 55% to 60% EF MR, Mild Aortic valve thinking   - cxr w/ interval blunting of costophrenic angle, hazy opacity  - follows w/ Dr. Man  - On lasix 40mg PO q24h now   - Leg edema improving     #DESI on CKD3  - Creatinine 2.7 > 2.6 now  - Improving after decreaed dose of the lasix.   - baseline Cr 1.9 in 10/2018  - renal US 4/3: no hydronephrosis, L renal cyst  - nephro following   - Follow up pelvic sonogram to R/O BPH. Patient likely has BPH causing retention of the urine     #New onset afib  - Rate controlled  - c/w labetalol 200 mg TID  - On eliquis    # Gout Flare  - c/w prednisone 30mg    #Chest pain :Resolved  EKG normal  Trops normal     #DM glucose uncontrolled   -Increased lantus to 64u, lispro 22u TID    #Dyslipidemia  - lipitor    #DVT ppx: Eliquis  #GI ppx: protonix   #Dispo: PT eval. Still acute  #Full code

## 2019-04-23 NOTE — PROGRESS NOTE ADULT - ATTENDING COMMENTS
Pending (specify):  Improving  renal function. Decrease Lasix to 40 mg daily                               PT to work with patient now                               Patient is obese needing assistance   Family discussion: Both daughter and grand daughter   Disposition: Unknown at this time. Family request for the Acute rehab is patient fits into the criteria

## 2019-04-24 LAB
ANION GAP SERPL CALC-SCNC: 18 MMOL/L — HIGH (ref 7–14)
BASOPHILS # BLD AUTO: 0.02 K/UL — SIGNIFICANT CHANGE UP (ref 0–0.2)
BASOPHILS NFR BLD AUTO: 0.2 % — SIGNIFICANT CHANGE UP (ref 0–1)
BUN SERPL-MCNC: 110 MG/DL — CRITICAL HIGH (ref 10–20)
CALCIUM SERPL-MCNC: 8.8 MG/DL — SIGNIFICANT CHANGE UP (ref 8.5–10.1)
CHLORIDE SERPL-SCNC: 91 MMOL/L — LOW (ref 98–110)
CO2 SERPL-SCNC: 25 MMOL/L — SIGNIFICANT CHANGE UP (ref 17–32)
CREAT SERPL-MCNC: 2.6 MG/DL — HIGH (ref 0.7–1.5)
EOSINOPHIL # BLD AUTO: 0.23 K/UL — SIGNIFICANT CHANGE UP (ref 0–0.7)
EOSINOPHIL NFR BLD AUTO: 2.1 % — SIGNIFICANT CHANGE UP (ref 0–8)
GLUCOSE BLDC GLUCOMTR-MCNC: 183 MG/DL — HIGH (ref 70–99)
GLUCOSE BLDC GLUCOMTR-MCNC: 271 MG/DL — HIGH (ref 70–99)
GLUCOSE BLDC GLUCOMTR-MCNC: 296 MG/DL — HIGH (ref 70–99)
GLUCOSE BLDC GLUCOMTR-MCNC: 307 MG/DL — HIGH (ref 70–99)
GLUCOSE SERPL-MCNC: 183 MG/DL — HIGH (ref 70–99)
HCT VFR BLD CALC: 32.6 % — LOW (ref 42–52)
HGB BLD-MCNC: 10.3 G/DL — LOW (ref 14–18)
IMM GRANULOCYTES NFR BLD AUTO: 0.5 % — HIGH (ref 0.1–0.3)
LYMPHOCYTES # BLD AUTO: 0.49 K/UL — LOW (ref 1.2–3.4)
LYMPHOCYTES # BLD AUTO: 4.5 % — LOW (ref 20.5–51.1)
MAGNESIUM SERPL-MCNC: 2.1 MG/DL — SIGNIFICANT CHANGE UP (ref 1.8–2.4)
MCHC RBC-ENTMCNC: 26.5 PG — LOW (ref 27–31)
MCHC RBC-ENTMCNC: 31.6 G/DL — LOW (ref 32–37)
MCV RBC AUTO: 84 FL — SIGNIFICANT CHANGE UP (ref 80–94)
MONOCYTES # BLD AUTO: 0.65 K/UL — HIGH (ref 0.1–0.6)
MONOCYTES NFR BLD AUTO: 6 % — SIGNIFICANT CHANGE UP (ref 1.7–9.3)
NEUTROPHILS # BLD AUTO: 9.38 K/UL — HIGH (ref 1.4–6.5)
NEUTROPHILS NFR BLD AUTO: 86.7 % — HIGH (ref 42.2–75.2)
NRBC # BLD: 0 /100 WBCS — SIGNIFICANT CHANGE UP (ref 0–0)
PLATELET # BLD AUTO: 292 K/UL — SIGNIFICANT CHANGE UP (ref 130–400)
POTASSIUM SERPL-MCNC: 4.4 MMOL/L — SIGNIFICANT CHANGE UP (ref 3.5–5)
POTASSIUM SERPL-SCNC: 4.4 MMOL/L — SIGNIFICANT CHANGE UP (ref 3.5–5)
RBC # BLD: 3.88 M/UL — LOW (ref 4.7–6.1)
RBC # FLD: 13.8 % — SIGNIFICANT CHANGE UP (ref 11.5–14.5)
SODIUM SERPL-SCNC: 134 MMOL/L — LOW (ref 135–146)
WBC # BLD: 10.82 K/UL — HIGH (ref 4.8–10.8)
WBC # FLD AUTO: 10.82 K/UL — HIGH (ref 4.8–10.8)

## 2019-04-24 RX ADMIN — Medication 200 MILLIGRAM(S): at 05:37

## 2019-04-24 RX ADMIN — Medication 100 MILLIGRAM(S): at 05:37

## 2019-04-24 RX ADMIN — TAMSULOSIN HYDROCHLORIDE 0.4 MILLIGRAM(S): 0.4 CAPSULE ORAL at 22:00

## 2019-04-24 RX ADMIN — Medication 667 MILLIGRAM(S): at 17:02

## 2019-04-24 RX ADMIN — Medication 0.3 MILLIGRAM(S): at 13:36

## 2019-04-24 RX ADMIN — PANTOPRAZOLE SODIUM 40 MILLIGRAM(S): 20 TABLET, DELAYED RELEASE ORAL at 05:38

## 2019-04-24 RX ADMIN — Medication 200 MILLIGRAM(S): at 13:37

## 2019-04-24 RX ADMIN — Medication 81 MILLIGRAM(S): at 11:23

## 2019-04-24 RX ADMIN — INSULIN GLARGINE 64 UNIT(S): 100 INJECTION, SOLUTION SUBCUTANEOUS at 22:02

## 2019-04-24 RX ADMIN — Medication 40 MILLIGRAM(S): at 05:36

## 2019-04-24 RX ADMIN — Medication 22 UNIT(S): at 17:02

## 2019-04-24 RX ADMIN — APIXABAN 10 MILLIGRAM(S): 2.5 TABLET, FILM COATED ORAL at 05:34

## 2019-04-24 RX ADMIN — Medication 100 MILLIGRAM(S): at 13:37

## 2019-04-24 RX ADMIN — Medication 0.3 MILLIGRAM(S): at 22:02

## 2019-04-24 RX ADMIN — Medication 22 UNIT(S): at 11:59

## 2019-04-24 RX ADMIN — Medication 30 MILLIGRAM(S): at 05:37

## 2019-04-24 RX ADMIN — ATORVASTATIN CALCIUM 40 MILLIGRAM(S): 80 TABLET, FILM COATED ORAL at 22:01

## 2019-04-24 RX ADMIN — CHLORHEXIDINE GLUCONATE 1 APPLICATION(S): 213 SOLUTION TOPICAL at 05:33

## 2019-04-24 RX ADMIN — Medication 22 UNIT(S): at 08:04

## 2019-04-24 RX ADMIN — AMLODIPINE BESYLATE 5 MILLIGRAM(S): 2.5 TABLET ORAL at 05:34

## 2019-04-24 RX ADMIN — Medication 2000 UNIT(S): at 11:22

## 2019-04-24 RX ADMIN — Medication 0.3 MILLIGRAM(S): at 05:35

## 2019-04-24 RX ADMIN — SENNA PLUS 2 TABLET(S): 8.6 TABLET ORAL at 22:01

## 2019-04-24 RX ADMIN — Medication 1: at 08:04

## 2019-04-24 RX ADMIN — Medication 100 MILLIGRAM(S): at 22:01

## 2019-04-24 RX ADMIN — Medication 3: at 17:02

## 2019-04-24 RX ADMIN — APIXABAN 10 MILLIGRAM(S): 2.5 TABLET, FILM COATED ORAL at 17:04

## 2019-04-24 RX ADMIN — CEFEPIME 100 MILLIGRAM(S): 1 INJECTION, POWDER, FOR SOLUTION INTRAMUSCULAR; INTRAVENOUS at 17:51

## 2019-04-24 RX ADMIN — CEFEPIME 100 MILLIGRAM(S): 1 INJECTION, POWDER, FOR SOLUTION INTRAMUSCULAR; INTRAVENOUS at 05:33

## 2019-04-24 RX ADMIN — Medication 667 MILLIGRAM(S): at 08:04

## 2019-04-24 RX ADMIN — Medication 3: at 12:00

## 2019-04-24 RX ADMIN — CHLORHEXIDINE GLUCONATE 1 APPLICATION(S): 213 SOLUTION TOPICAL at 22:01

## 2019-04-24 RX ADMIN — Medication 200 MILLIGRAM(S): at 22:01

## 2019-04-24 RX ADMIN — Medication 667 MILLIGRAM(S): at 11:24

## 2019-04-24 NOTE — PROGRESS NOTE ADULT - SUBJECTIVE AND OBJECTIVE BOX
GRIS TIDWELL 59y Male  MRN#: [id number]       SUBJECTIVE  Patient is a 59y old Male who presents with a chief complaint of Right sided weakness   Currently admitted to medicine with the primary diagnosis of Intraparenchymal hemorrhage of brain  Today is hospital day 22d, and this morning he reports no overnight events.     OBJECTIVE  PAST MEDICAL & SURGICAL HISTORY  Gout  Morbidly obese  Gout  Hypertension  Diabetes mellitus  S/P tonsillectomy    ALLERGIES:  No Known Allergies    MEDICATIONS:  STANDING MEDICATIONS  amLODIPine   Tablet 5 milliGRAM(s) Oral daily  apixaban 10 milliGRAM(s) Oral every 12 hours  aspirin  chewable 81 milliGRAM(s) Oral daily  atorvastatin Oral Tab/Cap - Peds 40 milliGRAM(s) Oral daily  calcium acetate 667 milliGRAM(s) Oral three times a day with meals  cefepime   IVPB 2000 milliGRAM(s) IV Intermittent every 12 hours  chlorhexidine 4% Liquid 1 Application(s) Topical every 12 hours  cholecalciferol 2000 Unit(s) Oral daily  cloNIDine 0.3 milliGRAM(s) Oral every 8 hours  dextrose 5%. 1000 milliLiter(s) IV Continuous <Continuous>  dextrose 50% Injectable 12.5 Gram(s) IV Push once  dextrose 50% Injectable 25 Gram(s) IV Push once  dextrose 50% Injectable 25 Gram(s) IV Push once  furosemide    Tablet 40 milliGRAM(s) Oral daily  hydrALAZINE 100 milliGRAM(s) Oral every 8 hours  insulin glargine Injectable (LANTUS) 64 Unit(s) SubCutaneous at bedtime  insulin lispro (HumaLOG) corrective regimen sliding scale   SubCutaneous three times a day before meals  insulin lispro Injectable (HumaLOG) 22 Unit(s) SubCutaneous three times a day with meals  labetalol 200 milliGRAM(s) Oral three times a day  pantoprazole    Tablet 40 milliGRAM(s) Oral before breakfast  predniSONE   Tablet 30 milliGRAM(s) Oral daily  senna 2 Tablet(s) Oral at bedtime  tamsulosin 0.4 milliGRAM(s) Oral at bedtime    PRN MEDICATIONS  acetaminophen   Tablet .. 650 milliGRAM(s) Oral every 6 hours PRN  aluminum hydroxide/magnesium hydroxide/simethicone Suspension 30 milliLiter(s) Oral every 6 hours PRN  bisacodyl Suppository 10 milliGRAM(s) Rectal daily PRN  dextrose 40% Gel 15 Gram(s) Oral once PRN  glucagon  Injectable 1 milliGRAM(s) IntraMuscular once PRN  lactulose Syrup 15 Gram(s) Oral every 3 hours PRN  traMADol 50 milliGRAM(s) Oral every 8 hours PRN      VITAL SIGNS: Last 24 Hours  T(C): 35.8 (24 Apr 2019 05:53), Max: 36.7 (23 Apr 2019 14:34)  T(F): 96.4 (24 Apr 2019 05:53), Max: 98.1 (23 Apr 2019 14:34)  HR: 55 (24 Apr 2019 05:53) (55 - 60)  BP: 129/67 (24 Apr 2019 05:53) (129/67 - 140/67)  BP(mean): --  RR: 20 (24 Apr 2019 05:53) (20 - 20)  SpO2: --    LABS:                        10.3   10.82 )-----------( 292      ( 24 Apr 2019 08:17 )             32.6     04-24    134<L>  |  91<L>  |  110<HH>  ----------------------------<  183<H>  4.4   |  25  |  2.6<H>    Ca    8.8      24 Apr 2019 08:17  Mg     2.1     04-24      RADIOLOGY:    < from: MR Angio Head No Cont (04.19.19 @ 18:09) >  IMPRESSION:     1.  Slight asymmetry of the distal left MCA branches which are slightly   decreased in signal flow enhancement and caliber and may be due to   artifact versus vasospasm.    2.  Small/hypoplastic A1 segment of the right anterior cerebral artery.    3.  Otherwise unremarkable MRA of the brain without contrast.     4.  No proximal large vessel occlusions.      < end of copied text >    < from: MR Head No Cont (04.18.19 @ 15:44) >  IMPRESSION:    1.  Motion limited and incomplete exam. The patient was unable to   tolerate the study.    2.  4.5 cm parenchymal hematoma centered in the left basal ganglia with   moderate surrounding edema and mass effect with compression of theleft   frontal horn.    3.  Mild chronic microvascular changes and scattered chronic lacunar   infarcts.    < end of copied text >      < from: CT Head No Cont (04.13.19 @ 16:07) >  impression:    No significant changes in the acute/subacute parenchymal hemorrhage   involving the left basal ganglia with small amount of surrounding edema.   Well-demarcated area of decreased attenuation extending to the left   caudate may represent associated acute/subacute infarction. Attention on   follow-up imaging recommended.    Mass effect on the left lateral ventricle without significant ventricular   enlargement. Minimal left-to-right midline shift.    No new acute intracranial hemorrhage.    < end of copied text >    < from: Transthoracic Echocardiogram (04.13.19 @ 08:42) >  Summary:   1. Left ventricular ejection fraction, by visual estimation, is 55 to   60%.   2. Normal global left ventricular systolic function.   3. Mild mitral valve regurgitation.   4. Mild thickening and calcification of the anterior mitral valve   leaflet.   5. Moderate aortic valve thickening with normal leaflet opening.      < end of copied text >    < from: [facility] Duplex Lower Ext Vein Scan, Bilat (04.12.19 @ 19:40) >  Impression:    Thromboses present in bilateral calf veins: right posterior tibial and   peroneal vein branches; left gastrocnemius and posterior tibial vein   branches.    These are new findings compared to duplex of 4/7/2019.    < end of copied text >    < from: US Renal (04.03.19 @ 10:59) >  Impression:     Limited examination secondary to patient's condition and body habitus.    No hydronephrosis or calculus on either side.    2.4 cm left renal hypodensity may reflect a cyst but is limited in   characterization on this study. Consideration can be given to a follow-up   examination or dedicated CT or MRI at later time.      PHYSICAL EXAM:    GENERAL: NAD, well-developed, AAOx3  HEENT:  Atraumatic, Normocephalic. EOMI, PERRLA, conjunctiva and sclera clear, No JVD  PULMONARY: Clear to auscultation bilaterally; No wheeze  CARDIOVASCULAR: Regular rate and rhythm; No murmurs, rubs, or gallops  GASTROINTESTINAL: Soft, Nontender, Nondistended; Bowel sounds present  MUSCULOSKELETAL:  2+ Peripheral Pulses, No clubbing, cyanosis, or edema  NEUROLOGY: non-focal  SKIN: No rashes or lesions      ADMISSION SUMMARY  Patient is a 59y old Male who presents with a chief complaint of Right sided weakness   Currently admitted to medicine with the primary diagnosis of Intraparenchymal hemorrhage of brain      ASSESSMENT & PLAN    #Fever  -Chest X ray showed pleural effusion on right. No new opacity  -blood cx negative to date.  -Currently afebrile. WBC trending down. BP stable. C/w cefepime q12h    #L basal ganglia intraparenchymal bleed   - no acute neurosurgical intervention  - CTH 4/2: L frontotemporal intraparenchymal bleed with extension into the ventricle and 2mm shift to the R  - Repeat CTH on 4/3, 4/6, 4/12, 4/13 stable   - Having right UE and LE weakness. Power 4/5 in RUE, 2/5 in RLE  - Neurology Suspecting caudate nucleus infarct on top of bleed. MR brain performed showing 4.5cm parenchymal bleed compared to 3.9cm on initial CT head.  - Maintain SBP<160   - Speech and swallow eval done. Recommended dysphagia 3 diet. Need speech therapy for expressive aphasia    #LLE and RLE DVT and Acute on chronic hypercapnic respiratory failure, possible PE  - Venous duplex 4/12 showed new right posterior tibial and peroneal and left gastrocnemius and branch of posterior tibial thrombus.  - CTA lungs (not done due to CKD) and V/Q scan would not  to anticoagulate  - Neurosurgery cleared pt for anticoagulation 4/12  - c/w aspirin and eliquis  - Saturating well on 2l Oxygen.     #Hypertensive emergency  - goal SBP < 160  - decreased amlodipine 5 mg daily, hydralazine 100 mg TID, lasix 40mg PO, clonidine 0.3mg q8h,  labetalol 200mg q8h   - BP well controlled with this regimen.     #Urinary retention  -Renal and bladder US showed pre void of 613cc and post void residual volume of 200cc. Likely 2/2 prostate enlargement and immobilization.   -started on flomax    #Acute on chronic HFpEF  - Echo EF 55% to 60% EF MR, Mild Aortic valve thinking   - cxr w/ interval blunting of costophrenic angle, hazy opacity  - follows w/ Dr. Man  -On lasix 40mg PO q24h    #DESI on CKD3  - Creatinine 2.6  - baseline Cr 1.9 in 10/2018  - renal US 4/3: no hydronephrosis, L renal cyst  - nephro following     #New onset afib  - Rate controlled  - c/w labetalol 200 mg TID  - On eliquis    # Gout Flare  - c/w prednisone 30mg    #Chest pain :Resolved  EKG normal  Trops normal     #DM glucose uncontrolled   -Increased lantus to 64u, lispro 22u TID    #Dyslipidemia  - lipitor    #DVT ppx: Eliquis  #GI ppx: protonix   #Dispo: PT eval done. Encourage patient to get out of bed to chair   #Full code

## 2019-04-24 NOTE — PROGRESS NOTE ADULT - ATTENDING COMMENTS
#Progress Note Handoff    Pending (specify):  Prostate sonogram were inconclusive. Patient voiding enough so far. Hold onto khan catheter now   Family discussion: Wife . d/w daily   Disposition: Unknown at this time    Once patient is more functional may go to acute rehab

## 2019-04-25 LAB
ANION GAP SERPL CALC-SCNC: 16 MMOL/L — HIGH (ref 7–14)
BASOPHILS # BLD AUTO: 0.03 K/UL — SIGNIFICANT CHANGE UP (ref 0–0.2)
BASOPHILS NFR BLD AUTO: 0.3 % — SIGNIFICANT CHANGE UP (ref 0–1)
BUN SERPL-MCNC: 113 MG/DL — CRITICAL HIGH (ref 10–20)
CALCIUM SERPL-MCNC: 8.8 MG/DL — SIGNIFICANT CHANGE UP (ref 8.5–10.1)
CHLORIDE SERPL-SCNC: 93 MMOL/L — LOW (ref 98–110)
CO2 SERPL-SCNC: 25 MMOL/L — SIGNIFICANT CHANGE UP (ref 17–32)
CREAT SERPL-MCNC: 2.5 MG/DL — HIGH (ref 0.7–1.5)
EOSINOPHIL # BLD AUTO: 0.25 K/UL — SIGNIFICANT CHANGE UP (ref 0–0.7)
EOSINOPHIL NFR BLD AUTO: 2.2 % — SIGNIFICANT CHANGE UP (ref 0–8)
GLUCOSE BLDC GLUCOMTR-MCNC: 250 MG/DL — HIGH (ref 70–99)
GLUCOSE BLDC GLUCOMTR-MCNC: 268 MG/DL — HIGH (ref 70–99)
GLUCOSE BLDC GLUCOMTR-MCNC: 318 MG/DL — HIGH (ref 70–99)
GLUCOSE BLDC GLUCOMTR-MCNC: 327 MG/DL — HIGH (ref 70–99)
GLUCOSE SERPL-MCNC: 242 MG/DL — HIGH (ref 70–99)
HCT VFR BLD CALC: 34.9 % — LOW (ref 42–52)
HGB BLD-MCNC: 11 G/DL — LOW (ref 14–18)
IMM GRANULOCYTES NFR BLD AUTO: 0.5 % — HIGH (ref 0.1–0.3)
LYMPHOCYTES # BLD AUTO: 0.9 K/UL — LOW (ref 1.2–3.4)
LYMPHOCYTES # BLD AUTO: 7.9 % — LOW (ref 20.5–51.1)
MAGNESIUM SERPL-MCNC: 2.3 MG/DL — SIGNIFICANT CHANGE UP (ref 1.8–2.4)
MCHC RBC-ENTMCNC: 26.6 PG — LOW (ref 27–31)
MCHC RBC-ENTMCNC: 31.5 G/DL — LOW (ref 32–37)
MCV RBC AUTO: 84.5 FL — SIGNIFICANT CHANGE UP (ref 80–94)
MONOCYTES # BLD AUTO: 0.98 K/UL — HIGH (ref 0.1–0.6)
MONOCYTES NFR BLD AUTO: 8.6 % — SIGNIFICANT CHANGE UP (ref 1.7–9.3)
NEUTROPHILS # BLD AUTO: 9.17 K/UL — HIGH (ref 1.4–6.5)
NEUTROPHILS NFR BLD AUTO: 80.5 % — HIGH (ref 42.2–75.2)
NRBC # BLD: 0 /100 WBCS — SIGNIFICANT CHANGE UP (ref 0–0)
PLATELET # BLD AUTO: 277 K/UL — SIGNIFICANT CHANGE UP (ref 130–400)
POTASSIUM SERPL-MCNC: 4.3 MMOL/L — SIGNIFICANT CHANGE UP (ref 3.5–5)
POTASSIUM SERPL-SCNC: 4.3 MMOL/L — SIGNIFICANT CHANGE UP (ref 3.5–5)
RBC # BLD: 4.13 M/UL — LOW (ref 4.7–6.1)
RBC # FLD: 13.6 % — SIGNIFICANT CHANGE UP (ref 11.5–14.5)
SODIUM SERPL-SCNC: 134 MMOL/L — LOW (ref 135–146)
WBC # BLD: 11.39 K/UL — HIGH (ref 4.8–10.8)
WBC # FLD AUTO: 11.39 K/UL — HIGH (ref 4.8–10.8)

## 2019-04-25 RX ORDER — INSULIN GLARGINE 100 [IU]/ML
72 INJECTION, SOLUTION SUBCUTANEOUS AT BEDTIME
Qty: 0 | Refills: 0 | Status: DISCONTINUED | OUTPATIENT
Start: 2019-04-25 | End: 2019-04-29

## 2019-04-25 RX ORDER — INSULIN LISPRO 100/ML
24 VIAL (ML) SUBCUTANEOUS
Qty: 0 | Refills: 0 | Status: DISCONTINUED | OUTPATIENT
Start: 2019-04-25 | End: 2019-04-29

## 2019-04-25 RX ORDER — CEFEPIME 1 G/1
2000 INJECTION, POWDER, FOR SOLUTION INTRAMUSCULAR; INTRAVENOUS EVERY 24 HOURS
Qty: 0 | Refills: 0 | Status: DISCONTINUED | OUTPATIENT
Start: 2019-04-25 | End: 2019-04-27

## 2019-04-25 RX ORDER — LIDOCAINE 4 G/100G
1 CREAM TOPICAL EVERY 24 HOURS
Qty: 0 | Refills: 0 | Status: DISCONTINUED | OUTPATIENT
Start: 2019-04-25 | End: 2019-04-29

## 2019-04-25 RX ADMIN — Medication 100 MILLIGRAM(S): at 05:30

## 2019-04-25 RX ADMIN — Medication 200 MILLIGRAM(S): at 22:17

## 2019-04-25 RX ADMIN — Medication 3: at 08:25

## 2019-04-25 RX ADMIN — Medication 4: at 17:15

## 2019-04-25 RX ADMIN — APIXABAN 10 MILLIGRAM(S): 2.5 TABLET, FILM COATED ORAL at 05:30

## 2019-04-25 RX ADMIN — Medication 2000 UNIT(S): at 12:10

## 2019-04-25 RX ADMIN — CEFEPIME 100 MILLIGRAM(S): 1 INJECTION, POWDER, FOR SOLUTION INTRAMUSCULAR; INTRAVENOUS at 05:31

## 2019-04-25 RX ADMIN — Medication 81 MILLIGRAM(S): at 12:10

## 2019-04-25 RX ADMIN — Medication 200 MILLIGRAM(S): at 05:30

## 2019-04-25 RX ADMIN — Medication 4: at 12:09

## 2019-04-25 RX ADMIN — Medication 22 UNIT(S): at 08:25

## 2019-04-25 RX ADMIN — Medication 0.3 MILLIGRAM(S): at 05:32

## 2019-04-25 RX ADMIN — LIDOCAINE 1 PATCH: 4 CREAM TOPICAL at 20:32

## 2019-04-25 RX ADMIN — PANTOPRAZOLE SODIUM 40 MILLIGRAM(S): 20 TABLET, DELAYED RELEASE ORAL at 05:32

## 2019-04-25 RX ADMIN — INSULIN GLARGINE 72 UNIT(S): 100 INJECTION, SOLUTION SUBCUTANEOUS at 22:18

## 2019-04-25 RX ADMIN — LIDOCAINE 1 PATCH: 4 CREAM TOPICAL at 13:48

## 2019-04-25 RX ADMIN — CEFEPIME 100 MILLIGRAM(S): 1 INJECTION, POWDER, FOR SOLUTION INTRAMUSCULAR; INTRAVENOUS at 12:17

## 2019-04-25 RX ADMIN — Medication 0.3 MILLIGRAM(S): at 15:07

## 2019-04-25 RX ADMIN — Medication 30 MILLIGRAM(S): at 05:31

## 2019-04-25 RX ADMIN — TAMSULOSIN HYDROCHLORIDE 0.4 MILLIGRAM(S): 0.4 CAPSULE ORAL at 22:17

## 2019-04-25 RX ADMIN — Medication 24 UNIT(S): at 17:21

## 2019-04-25 RX ADMIN — SENNA PLUS 2 TABLET(S): 8.6 TABLET ORAL at 22:17

## 2019-04-25 RX ADMIN — Medication 22 UNIT(S): at 12:09

## 2019-04-25 RX ADMIN — Medication 667 MILLIGRAM(S): at 12:09

## 2019-04-25 RX ADMIN — Medication 100 MILLIGRAM(S): at 15:07

## 2019-04-25 RX ADMIN — Medication 667 MILLIGRAM(S): at 08:26

## 2019-04-25 RX ADMIN — Medication 200 MILLIGRAM(S): at 15:07

## 2019-04-25 RX ADMIN — CHLORHEXIDINE GLUCONATE 1 APPLICATION(S): 213 SOLUTION TOPICAL at 05:31

## 2019-04-25 RX ADMIN — ATORVASTATIN CALCIUM 40 MILLIGRAM(S): 80 TABLET, FILM COATED ORAL at 22:18

## 2019-04-25 RX ADMIN — Medication 667 MILLIGRAM(S): at 17:20

## 2019-04-25 RX ADMIN — Medication 0.3 MILLIGRAM(S): at 22:18

## 2019-04-25 RX ADMIN — Medication 40 MILLIGRAM(S): at 05:31

## 2019-04-25 RX ADMIN — Medication 100 MILLIGRAM(S): at 22:17

## 2019-04-25 RX ADMIN — AMLODIPINE BESYLATE 5 MILLIGRAM(S): 2.5 TABLET ORAL at 05:31

## 2019-04-25 NOTE — PROGRESS NOTE ADULT - SUBJECTIVE AND OBJECTIVE BOX
GRIS TIDWELL 59y Male  MRN#: [id number]     SUBJECTIVE  Patient is a 59y old Male who presents with a chief complaint of Right sided weakness   Currently admitted to medicine with the primary diagnosis of Intraparenchymal hemorrhage of brain   Today is hospital day 23d, and this morning he reports no overnight events.     OBJECTIVE  PAST MEDICAL & SURGICAL HISTORY  Gout  Morbidly obese  Gout  Hypertension  Diabetes mellitus  S/P tonsillectomy    ALLERGIES:  No Known Allergies    MEDICATIONS:  STANDING MEDICATIONS  amLODIPine   Tablet 5 milliGRAM(s) Oral daily  aspirin  chewable 81 milliGRAM(s) Oral daily  atorvastatin Oral Tab/Cap - Peds 40 milliGRAM(s) Oral daily  calcium acetate 667 milliGRAM(s) Oral three times a day with meals  cefepime   IVPB 2000 milliGRAM(s) IV Intermittent every 24 hours  chlorhexidine 4% Liquid 1 Application(s) Topical every 12 hours  cholecalciferol 2000 Unit(s) Oral daily  cloNIDine 0.3 milliGRAM(s) Oral every 8 hours  dextrose 5%. 1000 milliLiter(s) IV Continuous <Continuous>  dextrose 50% Injectable 12.5 Gram(s) IV Push once  dextrose 50% Injectable 25 Gram(s) IV Push once  dextrose 50% Injectable 25 Gram(s) IV Push once  furosemide    Tablet 40 milliGRAM(s) Oral daily  hydrALAZINE 100 milliGRAM(s) Oral every 8 hours  insulin glargine Injectable (LANTUS) 64 Unit(s) SubCutaneous at bedtime  insulin lispro (HumaLOG) corrective regimen sliding scale   SubCutaneous three times a day before meals  insulin lispro Injectable (HumaLOG) 22 Unit(s) SubCutaneous three times a day with meals  labetalol 200 milliGRAM(s) Oral three times a day  lidocaine   Patch 1 Patch Transdermal every 24 hours  pantoprazole    Tablet 40 milliGRAM(s) Oral before breakfast  predniSONE   Tablet 30 milliGRAM(s) Oral daily  senna 2 Tablet(s) Oral at bedtime  tamsulosin 0.4 milliGRAM(s) Oral at bedtime    PRN MEDICATIONS  acetaminophen   Tablet .. 650 milliGRAM(s) Oral every 6 hours PRN  aluminum hydroxide/magnesium hydroxide/simethicone Suspension 30 milliLiter(s) Oral every 6 hours PRN  bisacodyl Suppository 10 milliGRAM(s) Rectal daily PRN  dextrose 40% Gel 15 Gram(s) Oral once PRN  glucagon  Injectable 1 milliGRAM(s) IntraMuscular once PRN  lactulose Syrup 15 Gram(s) Oral every 3 hours PRN  traMADol 50 milliGRAM(s) Oral every 8 hours PRN      VITAL SIGNS: Last 24 Hours  T(C): 35.8 (25 Apr 2019 13:12), Max: 37.1 (24 Apr 2019 21:27)  T(F): 96.5 (25 Apr 2019 13:12), Max: 98.7 (24 Apr 2019 21:27)  HR: 62 (25 Apr 2019 13:12) (61 - 97)  BP: 135/64 (25 Apr 2019 13:12) (135/64 - 156/77)  BP(mean): --  RR: 20 (25 Apr 2019 13:12) (16 - 20)  SpO2: 97% (25 Apr 2019 14:25) (97% - 97%)    LABS:                        11.0   11.39 )-----------( 277      ( 25 Apr 2019 06:16 )             34.9     04-25    134<L>  |  93<L>  |  113<HH>  ----------------------------<  242<H>  4.3   |  25  |  2.5<H>    Ca    8.8      25 Apr 2019 06:16  Mg     2.3     04-25      RADIOLOGY:    < from: MR Angio Head No Cont (04.19.19 @ 18:09) >  IMPRESSION:     1.  Slight asymmetry of the distal left MCA branches which are slightly   decreased in signal flow enhancement and caliber and may be due to   artifact versus vasospasm.    2.  Small/hypoplastic A1 segment of the right anterior cerebral artery.    3.  Otherwise unremarkable MRA of the brain without contrast.     4.  No proximal large vessel occlusions.      < end of copied text >    < from: MR Head No Cont (04.18.19 @ 15:44) >  IMPRESSION:    1.  Motion limited and incomplete exam. The patient was unable to   tolerate the study.    2.  4.5 cm parenchymal hematoma centered in the left basal ganglia with   moderate surrounding edema and mass effect with compression of theleft   frontal horn.    3.  Mild chronic microvascular changes and scattered chronic lacunar   infarcts.    < end of copied text >      < from: CT Head No Cont (04.13.19 @ 16:07) >  impression:    No significant changes in the acute/subacute parenchymal hemorrhage   involving the left basal ganglia with small amount of surrounding edema.   Well-demarcated area of decreased attenuation extending to the left   caudate may represent associated acute/subacute infarction. Attention on   follow-up imaging recommended.    Mass effect on the left lateral ventricle without significant ventricular   enlargement. Minimal left-to-right midline shift.    No new acute intracranial hemorrhage.    < end of copied text >    < from: Transthoracic Echocardiogram (04.13.19 @ 08:42) >  Summary:   1. Left ventricular ejection fraction, by visual estimation, is 55 to   60%.   2. Normal global left ventricular systolic function.   3. Mild mitral valve regurgitation.   4. Mild thickening and calcification of the anterior mitral valve   leaflet.   5. Moderate aortic valve thickening with normal leaflet opening.      < end of copied text >    < from: [facility] Duplex Lower Ext Vein Scan, Bilat (04.12.19 @ 19:40) >  Impression:    Thromboses present in bilateral calf veins: right posterior tibial and   peroneal vein branches; left gastrocnemius and posterior tibial vein   branches.    These are new findings compared to duplex of 4/7/2019.    < end of copied text >    < from: US Renal (04.03.19 @ 10:59) >  Impression:     Limited examination secondary to patient's condition and body habitus.    No hydronephrosis or calculus on either side.    2.4 cm left renal hypodensity may reflect a cyst but is limited in   characterization on this study. Consideration can be given to a follow-up   examination or dedicated CT or MRI at later time.      PHYSICAL EXAM:    GENERAL: NAD, well-developed, AAOx3  HEENT:  Atraumatic, Normocephalic. EOMI, PERRLA, conjunctiva and sclera clear, No JVD  PULMONARY: Clear to auscultation bilaterally; No wheeze  CARDIOVASCULAR: Regular rate and rhythm; No murmurs, rubs, or gallops  GASTROINTESTINAL: Soft, Nontender, Nondistended; Bowel sounds present  MUSCULOSKELETAL:  2+ Peripheral Pulses, No clubbing, cyanosis, or edema  NEUROLOGY: Power 4/5 in RUE, 2/5 in RLE. 3/5 in LLE and 4/5 in LUE. no sensory deficits  SKIN: No rashes or lesions      ADMISSION SUMMARY  Patient is a 59y old Male who presents with a chief complaint of Right sided weakness   Currently admitted to medicine with the primary diagnosis of Intraparenchymal hemorrhage of brain    ASSESSMENT & PLAN  #Fever- resolved  -Chest X ray showed pleural effusion on right. No new opacity  -blood cx negative to date.  -Currently afebrile. WBC trending down. BP stable. C/w cefepime q12h (day 6)    #L basal ganglia intraparenchymal bleed   - no acute neurosurgical intervention  - CTH 4/2: L frontotemporal intraparenchymal bleed with extension into the ventricle and 2mm shift to the R  - Repeat CTH on 4/3, 4/6, 4/12, 4/13 stable   - Having right UE and LE weakness. Power 4/5 in RUE, 2/5 in RLE. 3/5 in LLE and 4/5 in LUE  - MR brain performed showing 4.5cm parenchymal bleed compared to 3.9cm on initial CT head. no infarct  - Maintain SBP<160   - Speech and swallow eval done. Recommended dysphagia 3 diet. Need speech therapy for expressive aphasia    #LLE and RLE DVT and Acute on chronic hypercapnic respiratory failure, possible PE  - Venous duplex 4/12 showed new right posterior tibial and peroneal and left gastrocnemius and branch of posterior tibial thrombus.  - CTA lungs (not done due to CKD) and V/Q scan would not  to anticoagulate  - Neurosurgery cleared pt for anticoagulation 4/12  - c/w aspirin and eliquis  - d/c Nasal Cannula today. saturating on room air. no distress    #Hypertensive emergency  - goal SBP < 160  - decreased amlodipine 5 mg daily, hydralazine 100 mg TID, lasix 40mg PO, clonidine 0.3mg q8h,  labetalol 200mg q8h   - BP slightly elevated today. will increase the BP medications if persistently high     #Urinary retention  -Renal and bladder US showed pre void of 613cc and post void residual volume of 200cc. Likely 2/2 prostate enlargement and immobilization.   -started on flomax  -Had bleeding from catheterization. Stopped straight cath. Family is not willing for further straight cath or khan.  -Patient voiding without khan    #Acute on chronic HFpEF  - Echo EF 55% to 60% EF MR, Mild Aortic valve thinking   - cxr w/ interval blunting of costophrenic angle, hazy opacity  - follows w/ Dr. Man  - On lasix 40mg PO q24h    #DESI on CKD3  - Creatinine 2.5  - baseline Cr 1.9 in 10/2018  - renal US 4/3: no hydronephrosis, L renal cyst    #New onset afib  - Rate controlled  - c/w labetalol 200 mg TID  - On eliquis    # Gout Flare  - c/w prednisone 30mg    #Chest pain :Resolved  EKG normal  Trops normal     #DM glucose uncontrolled   -Increased lantus to 64u, lispro 22u TID    #Dyslipidemia  - lipitor    #DVT ppx: Eliquis  #GI ppx: protonix   #Dispo: PT eval done. ambulated with assistive support device  #Full code

## 2019-04-25 NOTE — PROGRESS NOTE ADULT - ATTENDING COMMENTS
Pending (specify):  Patient clinical condition has improved. PT continue to follow.   Family discussion: Daughter today   Disposition: Unknown at this time. Family interested for the acute rehab

## 2019-04-25 NOTE — CHART NOTE - NSCHARTNOTEFT_GEN_A_CORE
· Note Type	Event Note  Dietitian Follow Up      Registered Dietitian Follow-Up     Patient Profile Reviewed                           Yes [X]   No []     Nutrition History Previously Obtained        Yes [X]  No []       Pertinent Subjective Information: Pt on cut up diet with nectar thick liquids, continues with good isabella/PO intake, consuming 100% of meals per EMR. Noted pt on renal diet, however BUN now >100 and possibly exceeding protein needs. Current diet order provides ~75 g pro- will monitor renal fx over next 4 days to assess if pt requires 60 g pro restriction     Pertinent Medical Interventions: 60 y/o M with hx of HTN, DM, CAD, Sleep Apnea BIBEMS for altered mental status and aphasia. CT scan showed the left intra parenchymal bleed with the extend into the ventricle associated with the midline shift.     Diet order: Cut up diet, nectar thick liquids, renal diet, consistent carb diet.     Anthropometrics:  - Ht.  - Wt. no new wt  - %wt change  - BMI  - IBW     Pertinent Lab Data: 4/25: Na 134, K 4.3, , Cr 2.5, Glucose 242    Pertinent Meds: aspirin, insulin glargine, insulin lispro, furosemide, lactulose, prednisone, senna, pantoprazle     Physical Findings:  - Appearance: alert/oriented; 2+ edema to b/l feet and ankles  - GI function: +BM 4/10   - Tubes:  - Oral/Mouth cavity: continues on cut up with nectar thick per SLP recs 4/11   - Skin: intact      Nutrition Requirements  Weight Used: 120 kg/ 75.5 kg IBW     Estimated Needs: Ongoing  Calories: 1885 kcals/day (25 kcals/kg IBW) for morbidly obese pt w/ CKD  Protein: 68-75 g/day (0.9-1.0 g/kg IBW)- will monitor renal fx and adust prn  Fluids: per LIP     Nutrient Intake: %, possibly exceed protein needs     [] Previous Nutrition Diagnosis: Altered nutrition related lab values- stable no further interventions            [] Ongoing          [] Resolved     Nutrition Intervention: Meals and snacks     Goal/Expected Outcome: PO 75% over next 4 days, glucose <200, and BUN <100 over next 4 days     Indicator/Monitoring: RD to monitor energy intake, nfpf (tolerance), renal profile/lytes, glucose/endocrine    RECS:  1) Continue current diet order, rd to f/u in 4 days to see if 60 g pro restriction necessary

## 2019-04-26 LAB
ANION GAP SERPL CALC-SCNC: 15 MMOL/L — HIGH (ref 7–14)
BASOPHILS # BLD AUTO: 0.03 K/UL — SIGNIFICANT CHANGE UP (ref 0–0.2)
BASOPHILS NFR BLD AUTO: 0.3 % — SIGNIFICANT CHANGE UP (ref 0–1)
BUN SERPL-MCNC: 109 MG/DL — CRITICAL HIGH (ref 10–20)
CALCIUM SERPL-MCNC: 9.1 MG/DL — SIGNIFICANT CHANGE UP (ref 8.5–10.1)
CHLORIDE SERPL-SCNC: 92 MMOL/L — LOW (ref 98–110)
CO2 SERPL-SCNC: 28 MMOL/L — SIGNIFICANT CHANGE UP (ref 17–32)
CREAT SERPL-MCNC: 2.4 MG/DL — HIGH (ref 0.7–1.5)
CULTURE RESULTS: SIGNIFICANT CHANGE UP
EOSINOPHIL # BLD AUTO: 0.32 K/UL — SIGNIFICANT CHANGE UP (ref 0–0.7)
EOSINOPHIL NFR BLD AUTO: 2.9 % — SIGNIFICANT CHANGE UP (ref 0–8)
GLUCOSE BLDC GLUCOMTR-MCNC: 219 MG/DL — HIGH (ref 70–99)
GLUCOSE BLDC GLUCOMTR-MCNC: 255 MG/DL — HIGH (ref 70–99)
GLUCOSE BLDC GLUCOMTR-MCNC: 282 MG/DL — HIGH (ref 70–99)
GLUCOSE BLDC GLUCOMTR-MCNC: 306 MG/DL — HIGH (ref 70–99)
GLUCOSE SERPL-MCNC: 231 MG/DL — HIGH (ref 70–99)
HCT VFR BLD CALC: 34.6 % — LOW (ref 42–52)
HGB BLD-MCNC: 10.9 G/DL — LOW (ref 14–18)
IMM GRANULOCYTES NFR BLD AUTO: 0.8 % — HIGH (ref 0.1–0.3)
LYMPHOCYTES # BLD AUTO: 0.85 K/UL — LOW (ref 1.2–3.4)
LYMPHOCYTES # BLD AUTO: 7.7 % — LOW (ref 20.5–51.1)
MAGNESIUM SERPL-MCNC: 2.1 MG/DL — SIGNIFICANT CHANGE UP (ref 1.8–2.4)
MCHC RBC-ENTMCNC: 26.8 PG — LOW (ref 27–31)
MCHC RBC-ENTMCNC: 31.5 G/DL — LOW (ref 32–37)
MCV RBC AUTO: 85 FL — SIGNIFICANT CHANGE UP (ref 80–94)
MONOCYTES # BLD AUTO: 0.92 K/UL — HIGH (ref 0.1–0.6)
MONOCYTES NFR BLD AUTO: 8.3 % — SIGNIFICANT CHANGE UP (ref 1.7–9.3)
NEUTROPHILS # BLD AUTO: 8.87 K/UL — HIGH (ref 1.4–6.5)
NEUTROPHILS NFR BLD AUTO: 80 % — HIGH (ref 42.2–75.2)
NRBC # BLD: 0 /100 WBCS — SIGNIFICANT CHANGE UP (ref 0–0)
PLATELET # BLD AUTO: 295 K/UL — SIGNIFICANT CHANGE UP (ref 130–400)
POTASSIUM SERPL-MCNC: 4.4 MMOL/L — SIGNIFICANT CHANGE UP (ref 3.5–5)
POTASSIUM SERPL-SCNC: 4.4 MMOL/L — SIGNIFICANT CHANGE UP (ref 3.5–5)
RBC # BLD: 4.07 M/UL — LOW (ref 4.7–6.1)
RBC # FLD: 13.5 % — SIGNIFICANT CHANGE UP (ref 11.5–14.5)
SODIUM SERPL-SCNC: 135 MMOL/L — SIGNIFICANT CHANGE UP (ref 135–146)
SPECIMEN SOURCE: SIGNIFICANT CHANGE UP
WBC # BLD: 11.08 K/UL — HIGH (ref 4.8–10.8)
WBC # FLD AUTO: 11.08 K/UL — HIGH (ref 4.8–10.8)

## 2019-04-26 RX ORDER — APIXABAN 2.5 MG/1
5 TABLET, FILM COATED ORAL EVERY 12 HOURS
Qty: 0 | Refills: 0 | Status: DISCONTINUED | OUTPATIENT
Start: 2019-04-26 | End: 2019-04-29

## 2019-04-26 RX ADMIN — Medication 30 MILLIGRAM(S): at 05:38

## 2019-04-26 RX ADMIN — INSULIN GLARGINE 72 UNIT(S): 100 INJECTION, SOLUTION SUBCUTANEOUS at 22:20

## 2019-04-26 RX ADMIN — Medication 2: at 07:54

## 2019-04-26 RX ADMIN — TAMSULOSIN HYDROCHLORIDE 0.4 MILLIGRAM(S): 0.4 CAPSULE ORAL at 21:04

## 2019-04-26 RX ADMIN — APIXABAN 5 MILLIGRAM(S): 2.5 TABLET, FILM COATED ORAL at 16:56

## 2019-04-26 RX ADMIN — Medication 667 MILLIGRAM(S): at 07:54

## 2019-04-26 RX ADMIN — Medication 100 MILLIGRAM(S): at 21:02

## 2019-04-26 RX ADMIN — CHLORHEXIDINE GLUCONATE 1 APPLICATION(S): 213 SOLUTION TOPICAL at 05:40

## 2019-04-26 RX ADMIN — Medication 200 MILLIGRAM(S): at 05:39

## 2019-04-26 RX ADMIN — Medication 0.3 MILLIGRAM(S): at 21:02

## 2019-04-26 RX ADMIN — Medication 100 MILLIGRAM(S): at 05:39

## 2019-04-26 RX ADMIN — Medication 2000 UNIT(S): at 11:35

## 2019-04-26 RX ADMIN — Medication 0.3 MILLIGRAM(S): at 15:28

## 2019-04-26 RX ADMIN — Medication 667 MILLIGRAM(S): at 16:54

## 2019-04-26 RX ADMIN — Medication 3: at 16:55

## 2019-04-26 RX ADMIN — LIDOCAINE 1 PATCH: 4 CREAM TOPICAL at 05:37

## 2019-04-26 RX ADMIN — AMLODIPINE BESYLATE 5 MILLIGRAM(S): 2.5 TABLET ORAL at 05:39

## 2019-04-26 RX ADMIN — Medication 200 MILLIGRAM(S): at 21:04

## 2019-04-26 RX ADMIN — LIDOCAINE 1 PATCH: 4 CREAM TOPICAL at 11:35

## 2019-04-26 RX ADMIN — Medication 24 UNIT(S): at 16:55

## 2019-04-26 RX ADMIN — Medication 81 MILLIGRAM(S): at 11:35

## 2019-04-26 RX ADMIN — CEFEPIME 100 MILLIGRAM(S): 1 INJECTION, POWDER, FOR SOLUTION INTRAMUSCULAR; INTRAVENOUS at 15:28

## 2019-04-26 RX ADMIN — Medication 4: at 11:42

## 2019-04-26 RX ADMIN — Medication 200 MILLIGRAM(S): at 15:29

## 2019-04-26 RX ADMIN — Medication 24 UNIT(S): at 07:54

## 2019-04-26 RX ADMIN — CHLORHEXIDINE GLUCONATE 1 APPLICATION(S): 213 SOLUTION TOPICAL at 21:10

## 2019-04-26 RX ADMIN — Medication 0.3 MILLIGRAM(S): at 05:39

## 2019-04-26 RX ADMIN — Medication 667 MILLIGRAM(S): at 11:35

## 2019-04-26 RX ADMIN — Medication 40 MILLIGRAM(S): at 05:39

## 2019-04-26 RX ADMIN — SENNA PLUS 2 TABLET(S): 8.6 TABLET ORAL at 21:04

## 2019-04-26 RX ADMIN — ATORVASTATIN CALCIUM 40 MILLIGRAM(S): 80 TABLET, FILM COATED ORAL at 21:02

## 2019-04-26 RX ADMIN — Medication 24 UNIT(S): at 11:42

## 2019-04-26 RX ADMIN — PANTOPRAZOLE SODIUM 40 MILLIGRAM(S): 20 TABLET, DELAYED RELEASE ORAL at 06:31

## 2019-04-26 NOTE — PROGRESS NOTE ADULT - SUBJECTIVE AND OBJECTIVE BOX
GRIS TIDWELL 59y Male  MRN#: [id number]   SUBJECTIVE  Patient is a 59y old Male who presents with a chief complaint of Right sided weakness   Currently admitted to medicine with the primary diagnosis of Intraparenchymal hemorrhage of brain  Today is hospital day 24d, and this morning he reports no overnight events.     OBJECTIVE  PAST MEDICAL & SURGICAL HISTORY  Gout  Morbidly obese  Gout  Hypertension  Diabetes mellitus  S/P tonsillectomy    ALLERGIES:  No Known Allergies    MEDICATIONS:  STANDING MEDICATIONS  amLODIPine   Tablet 5 milliGRAM(s) Oral daily  aspirin  chewable 81 milliGRAM(s) Oral daily  atorvastatin Oral Tab/Cap - Peds 40 milliGRAM(s) Oral daily  calcium acetate 667 milliGRAM(s) Oral three times a day with meals  cefepime   IVPB 2000 milliGRAM(s) IV Intermittent every 24 hours  chlorhexidine 4% Liquid 1 Application(s) Topical every 12 hours  cholecalciferol 2000 Unit(s) Oral daily  cloNIDine 0.3 milliGRAM(s) Oral every 8 hours  dextrose 5%. 1000 milliLiter(s) IV Continuous <Continuous>  dextrose 50% Injectable 12.5 Gram(s) IV Push once  dextrose 50% Injectable 25 Gram(s) IV Push once  dextrose 50% Injectable 25 Gram(s) IV Push once  furosemide    Tablet 40 milliGRAM(s) Oral daily  hydrALAZINE 100 milliGRAM(s) Oral every 8 hours  insulin glargine Injectable (LANTUS) 72 Unit(s) SubCutaneous at bedtime  insulin lispro (HumaLOG) corrective regimen sliding scale   SubCutaneous three times a day before meals  insulin lispro Injectable (HumaLOG) 24 Unit(s) SubCutaneous three times a day with meals  labetalol 200 milliGRAM(s) Oral three times a day  lidocaine   Patch 1 Patch Transdermal every 24 hours  pantoprazole    Tablet 40 milliGRAM(s) Oral before breakfast  predniSONE   Tablet 30 milliGRAM(s) Oral daily  senna 2 Tablet(s) Oral at bedtime  tamsulosin 0.4 milliGRAM(s) Oral at bedtime    PRN MEDICATIONS  acetaminophen   Tablet .. 650 milliGRAM(s) Oral every 6 hours PRN  aluminum hydroxide/magnesium hydroxide/simethicone Suspension 30 milliLiter(s) Oral every 6 hours PRN  bisacodyl Suppository 10 milliGRAM(s) Rectal daily PRN  dextrose 40% Gel 15 Gram(s) Oral once PRN  glucagon  Injectable 1 milliGRAM(s) IntraMuscular once PRN  lactulose Syrup 15 Gram(s) Oral every 3 hours PRN  traMADol 50 milliGRAM(s) Oral every 8 hours PRN      VITAL SIGNS: Last 24 Hours  T(C): 36 (26 Apr 2019 13:16), Max: 36.6 (25 Apr 2019 21:49)  T(F): 96.8 (26 Apr 2019 13:16), Max: 97.8 (25 Apr 2019 21:49)  HR: 68 (26 Apr 2019 13:16) (56 - 70)  BP: 122/60 (26 Apr 2019 13:16) (122/60 - 144/70)  BP(mean): --  RR: 210 (26 Apr 2019 13:16) (18 - 210)  SpO2: 97% (26 Apr 2019 05:19) (97% - 97%)    LABS:                        10.9   11.08 )-----------( 295      ( 26 Apr 2019 06:27 )             34.6     04-26    135  |  92<L>  |  109<HH>  ----------------------------<  231<H>  4.4   |  28  |  2.4<H>    Ca    9.1      26 Apr 2019 06:27  Mg     2.1     04-26      RADIOLOGY:  < from: MR Angio Head No Cont (04.19.19 @ 18:09) >  IMPRESSION:     1.  Slight asymmetry of the distal left MCA branches which are slightly   decreased in signal flow enhancement and caliber and may be due to   artifact versus vasospasm.    2.  Small/hypoplastic A1 segment of the right anterior cerebral artery.    3.  Otherwise unremarkable MRA of the brain without contrast.     4.  No proximal large vessel occlusions.      < end of copied text >    < from: MR Head No Cont (04.18.19 @ 15:44) >  IMPRESSION:    1.  Motion limited and incomplete exam. The patient was unable to   tolerate the study.    2.  4.5 cm parenchymal hematoma centered in the left basal ganglia with   moderate surrounding edema and mass effect with compression of theleft   frontal horn.    3.  Mild chronic microvascular changes and scattered chronic lacunar   infarcts.    < end of copied text >      < from: CT Head No Cont (04.13.19 @ 16:07) >  impression:    No significant changes in the acute/subacute parenchymal hemorrhage   involving the left basal ganglia with small amount of surrounding edema.   Well-demarcated area of decreased attenuation extending to the left   caudate may represent associated acute/subacute infarction. Attention on   follow-up imaging recommended.    Mass effect on the left lateral ventricle without significant ventricular   enlargement. Minimal left-to-right midline shift.    No new acute intracranial hemorrhage.    < end of copied text >    < from: Transthoracic Echocardiogram (04.13.19 @ 08:42) >  Summary:   1. Left ventricular ejection fraction, by visual estimation, is 55 to   60%.   2. Normal global left ventricular systolic function.   3. Mild mitral valve regurgitation.   4. Mild thickening and calcification of the anterior mitral valve   leaflet.   5. Moderate aortic valve thickening with normal leaflet opening.      < end of copied text >    < from: [facility] Duplex Lower Ext Vein Scan, Bilat (04.12.19 @ 19:40) >  Impression:    Thromboses present in bilateral calf veins: right posterior tibial and   peroneal vein branches; left gastrocnemius and posterior tibial vein   branches.    These are new findings compared to duplex of 4/7/2019.    < end of copied text >    < from: US Renal (04.03.19 @ 10:59) >  Impression:     Limited examination secondary to patient's condition and body habitus.    No hydronephrosis or calculus on either side.    2.4 cm left renal hypodensity may reflect a cyst but is limited in   characterization on this study. Consideration can be given to a follow-up   examination or dedicated CT or MRI at later time.      PHYSICAL EXAM:    GENERAL: NAD, well-developed, AAOx3  HEENT:  Atraumatic, Normocephalic. EOMI, PERRLA, conjunctiva and sclera clear, No JVD  PULMONARY: Clear to auscultation bilaterally; No wheeze  CARDIOVASCULAR: Regular rate and rhythm; No murmurs, rubs, or gallops  GASTROINTESTINAL: Soft, Nontender, Nondistended; Bowel sounds present  MUSCULOSKELETAL:  2+ Peripheral Pulses, No clubbing, cyanosis, or edema  NEUROLOGY: non-focal  SKIN: No rashes or lesions      ADMISSION SUMMARY  Patient is a 59y old Male who presents with a chief complaint of Right sided weakness   Currently admitted to medicine with the primary diagnosis of Intraparenchymal hemorrhage of brain    ASSESSMENT & PLAN  #Fever- resolved  -Chest X ray showed pleural effusion on right. No new opacity  -blood cx negative to date.  -Currently afebrile. WBC trending down. BP stable. C/w cefepime q12h (day 7). d/c antibiotics on discharge    #L basal ganglia intraparenchymal bleed   - no acute neurosurgical intervention  - CTH 4/2: L frontotemporal intraparenchymal bleed with extension into the ventricle and 2mm shift to the R  - Repeat CTH on 4/3, 4/6, 4/12, 4/13 stable   - Having right UE and LE weakness. Power 4/5 in RUE, 2/5 in RLE. 3/5 in LLE and 4/5 in LUE  - MR brain performed showing 4.5cm parenchymal bleed compared to 3.9cm on initial CT head. no infarct  - Maintain SBP<160   - Speech and swallow eval done. Recommended dysphagia 3 diet. Need speech therapy for expressive aphasia    #LLE and RLE DVT and Acute on chronic hypercapnic respiratory failure, possible PE  - Venous duplex 4/12 showed new right posterior tibial and peroneal and left gastrocnemius and branch of posterior tibial thrombus.  - CTA lungs (not done due to CKD) and V/Q scan would not  to anticoagulate  - Neurosurgery cleared pt for anticoagulation 4/12  - c/w aspirin and eliquis  - saturating at 95% on 2l at rest. Desaturating on ambulation on room air to 89%. Will keep on 2l for now    #Hypertensive emergency  - goal SBP < 160  - c/w amlodipine 5 mg daily, hydralazine 100 mg TID, lasix 40mg PO, clonidine 0.3mg q8h,  labetalol 200mg q8h   - BP well controlled     #Urinary retention  -Renal and bladder US showed pre void of 613cc and post void residual volume of 200cc. Likely 2/2 prostate enlargement and immobilization.   -started on flomax  -Had bleeding from catheterization. Stopped straight cath. Family is not willing for further straight cath or khan.  -Patient voiding without khan    #Acute on chronic HFpEF  - Echo EF 55% to 60% EF MR, Mild Aortic valve thinking   - cxr w/ interval blunting of costophrenic angle, hazy opacity  - follows w/ Dr. Man  - On lasix 40mg PO q24h    #DESI on CKD3  - Creatinine 2.4  - baseline Cr 1.9 in 10/2018  - renal US 4/3: no hydronephrosis, L renal cyst    #New onset afib  - Rate controlled  - c/w labetalol 200 mg TID  - On eliquis    # Gout Flare  - c/w prednisone 30mg. will taper slowly    #Chest pain :Resolved  EKG normal  Trops normal     #DM glucose uncontrolled   -Increased lantus to 64u, lispro 22u TID    #Dyslipidemia  - lipitor    #DVT ppx: Eliquis  #GI ppx: protonix   #Dispo: PT eval done. ambulated with assistive support device  #Full code GRIS TIDWELL 59y Male  MRN#: [id number]   SUBJECTIVE  Patient is a 59y old Male who presents with a chief complaint of Right sided weakness   Currently admitted to medicine with the primary diagnosis of Intraparenchymal hemorrhage of brain  Today is hospital day 24d, and this morning he reports no overnight events.     OBJECTIVE  PAST MEDICAL & SURGICAL HISTORY  Gout  Morbidly obese  Gout  Hypertension  Diabetes mellitus  S/P tonsillectomy    ALLERGIES:  No Known Allergies    MEDICATIONS:  STANDING MEDICATIONS  amLODIPine   Tablet 5 milliGRAM(s) Oral daily  aspirin  chewable 81 milliGRAM(s) Oral daily  atorvastatin Oral Tab/Cap - Peds 40 milliGRAM(s) Oral daily  calcium acetate 667 milliGRAM(s) Oral three times a day with meals  cefepime   IVPB 2000 milliGRAM(s) IV Intermittent every 24 hours  chlorhexidine 4% Liquid 1 Application(s) Topical every 12 hours  cholecalciferol 2000 Unit(s) Oral daily  cloNIDine 0.3 milliGRAM(s) Oral every 8 hours  dextrose 5%. 1000 milliLiter(s) IV Continuous <Continuous>  dextrose 50% Injectable 12.5 Gram(s) IV Push once  dextrose 50% Injectable 25 Gram(s) IV Push once  dextrose 50% Injectable 25 Gram(s) IV Push once  furosemide    Tablet 40 milliGRAM(s) Oral daily  hydrALAZINE 100 milliGRAM(s) Oral every 8 hours  insulin glargine Injectable (LANTUS) 72 Unit(s) SubCutaneous at bedtime  insulin lispro (HumaLOG) corrective regimen sliding scale   SubCutaneous three times a day before meals  insulin lispro Injectable (HumaLOG) 24 Unit(s) SubCutaneous three times a day with meals  labetalol 200 milliGRAM(s) Oral three times a day  lidocaine   Patch 1 Patch Transdermal every 24 hours  pantoprazole    Tablet 40 milliGRAM(s) Oral before breakfast  predniSONE   Tablet 30 milliGRAM(s) Oral daily  senna 2 Tablet(s) Oral at bedtime  tamsulosin 0.4 milliGRAM(s) Oral at bedtime    PRN MEDICATIONS  acetaminophen   Tablet .. 650 milliGRAM(s) Oral every 6 hours PRN  aluminum hydroxide/magnesium hydroxide/simethicone Suspension 30 milliLiter(s) Oral every 6 hours PRN  bisacodyl Suppository 10 milliGRAM(s) Rectal daily PRN  dextrose 40% Gel 15 Gram(s) Oral once PRN  glucagon  Injectable 1 milliGRAM(s) IntraMuscular once PRN  lactulose Syrup 15 Gram(s) Oral every 3 hours PRN  traMADol 50 milliGRAM(s) Oral every 8 hours PRN      VITAL SIGNS: Last 24 Hours  T(C): 36 (26 Apr 2019 13:16), Max: 36.6 (25 Apr 2019 21:49)  T(F): 96.8 (26 Apr 2019 13:16), Max: 97.8 (25 Apr 2019 21:49)  HR: 68 (26 Apr 2019 13:16) (56 - 70)  BP: 122/60 (26 Apr 2019 13:16) (122/60 - 144/70)  BP(mean): --  RR: 210 (26 Apr 2019 13:16) (18 - 210)  SpO2: 97% (26 Apr 2019 05:19) (97% - 97%)    LABS:                        10.9   11.08 )-----------( 295      ( 26 Apr 2019 06:27 )             34.6     04-26    135  |  92<L>  |  109<HH>  ----------------------------<  231<H>  4.4   |  28  |  2.4<H>    Ca    9.1      26 Apr 2019 06:27  Mg     2.1     04-26      RADIOLOGY:  < from: MR Angio Head No Cont (04.19.19 @ 18:09) >  IMPRESSION:     1.  Slight asymmetry of the distal left MCA branches which are slightly   decreased in signal flow enhancement and caliber and may be due to   artifact versus vasospasm.    2.  Small/hypoplastic A1 segment of the right anterior cerebral artery.    3.  Otherwise unremarkable MRA of the brain without contrast.     4.  No proximal large vessel occlusions.      < end of copied text >    < from: MR Head No Cont (04.18.19 @ 15:44) >  IMPRESSION:    1.  Motion limited and incomplete exam. The patient was unable to   tolerate the study.    2.  4.5 cm parenchymal hematoma centered in the left basal ganglia with   moderate surrounding edema and mass effect with compression of theleft   frontal horn.    3.  Mild chronic microvascular changes and scattered chronic lacunar   infarcts.    < end of copied text >      < from: CT Head No Cont (04.13.19 @ 16:07) >  impression:    No significant changes in the acute/subacute parenchymal hemorrhage   involving the left basal ganglia with small amount of surrounding edema.   Well-demarcated area of decreased attenuation extending to the left   caudate may represent associated acute/subacute infarction. Attention on   follow-up imaging recommended.    Mass effect on the left lateral ventricle without significant ventricular   enlargement. Minimal left-to-right midline shift.    No new acute intracranial hemorrhage.    < end of copied text >    < from: Transthoracic Echocardiogram (04.13.19 @ 08:42) >  Summary:   1. Left ventricular ejection fraction, by visual estimation, is 55 to   60%.   2. Normal global left ventricular systolic function.   3. Mild mitral valve regurgitation.   4. Mild thickening and calcification of the anterior mitral valve   leaflet.   5. Moderate aortic valve thickening with normal leaflet opening.      < end of copied text >    < from: [facility] Duplex Lower Ext Vein Scan, Bilat (04.12.19 @ 19:40) >  Impression:    Thromboses present in bilateral calf veins: right posterior tibial and   peroneal vein branches; left gastrocnemius and posterior tibial vein   branches.    These are new findings compared to duplex of 4/7/2019.    < end of copied text >    < from: US Renal (04.03.19 @ 10:59) >  Impression:     Limited examination secondary to patient's condition and body habitus.    No hydronephrosis or calculus on either side.    2.4 cm left renal hypodensity may reflect a cyst but is limited in   characterization on this study. Consideration can be given to a follow-up   examination or dedicated CT or MRI at later time.      PHYSICAL EXAM:    GENERAL: NAD, well-developed, AAOx3  HEENT:  Atraumatic, Normocephalic. EOMI, PERRLA, conjunctiva and sclera clear, No JVD  PULMONARY: Clear to auscultation bilaterally; No wheeze  CARDIOVASCULAR: Regular rate and rhythm; No murmurs, rubs, or gallops  GASTROINTESTINAL: Soft, Nontender, Nondistended; Bowel sounds present  MUSCULOSKELETAL:  2+ Peripheral Pulses, No clubbing, cyanosis, or edema  NEUROLOGY: non-focal  SKIN: No rashes or lesions      ADMISSION SUMMARY  Patient is a 59y old Male who presents with a chief complaint of Right sided weakness   Currently admitted to medicine with the primary diagnosis of Intraparenchymal hemorrhage of brain    ASSESSMENT & PLAN  #Fever- resolved  -Chest X ray showed pleural effusion on right. No new opacity  -blood cx negative to date.  -Currently afebrile. WBC trending down. BP stable. C/w cefepime q12h (day 7). d/c antibiotics on discharge    #L basal ganglia intraparenchymal bleed   - no acute neurosurgical intervention  - CTH 4/2: L frontotemporal intraparenchymal bleed with extension into the ventricle and 2mm shift to the R  - Repeat CTH on 4/3, 4/6, 4/12, 4/13 stable   - Having right UE and LE weakness. Power 4/5 in RUE, 2/5 in RLE. 3/5 in LLE and 4/5 in LUE  - MR brain performed showing 4.5cm parenchymal bleed compared to 3.9cm on initial CT head. no infarct  - Maintain SBP<160   - Speech and swallow eval done. Recommended dysphagia 3 diet. Need speech therapy for expressive aphasia    #LLE and RLE DVT and Acute on chronic hypercapnic respiratory failure, possible PE  - Venous duplex 4/12 showed new right posterior tibial and peroneal and left gastrocnemius and branch of posterior tibial thrombus.  - CTA lungs (not done due to CKD) and V/Q scan would not  to anticoagulate  - Neurosurgery cleared pt for anticoagulation 4/12  - c/w aspirin and eliquis  - saturating at 95% on 2l at rest. Desaturating on ambulation on room air to 89%. Will keep on 2l for now    #Hypertensive emergency  - goal SBP < 160  - c/w amlodipine 5 mg daily, hydralazine 100 mg TID, lasix 40mg PO, clonidine 0.3mg q8h,  labetalol 200mg q8h   - BP well controlled     #Urinary retention  -Renal and bladder US showed pre void of 613cc and post void residual volume of 200cc. Likely 2/2 prostate enlargement and immobilization.   -started on flomax  -Had bleeding from catheterization. Stopped straight cath. Family is not willing for further straight cath or khan.  -Patient voiding without khan    #Acute on chronic HFpEF  - Echo EF 55% to 60% EF MR, Mild Aortic valve thinking   - cxr w/ interval blunting of costophrenic angle, hazy opacity  - follows w/ Dr. Man  - On lasix 40mg PO q24h    #DESI on CKD3  - Creatinine 2.4  - baseline Cr 1.9 in 10/2018  - renal US 4/3: no hydronephrosis, L renal cyst    #New onset afib  - Rate controlled  - c/w labetalol 200 mg TID  - On eliquis    # Gout Flare  - c/w prednisone 30mg. will taper slowly    #Chest pain :Resolved  EKG normal  Trops normal     #DM glucose uncontrolled   -Increased lantus to 64u, lispro 22u TID    #Dyslipidemia  - lipitor    #DVT ppx: Eliquis  #GI ppx: protonix   #Dispo: PT eval done. ambulated with assistive support device  #Full code    Knee pain - As per family patient fell on that knee. MRI knee to follow       Pending (specify):  Patient progressing slowly.   Family discussion: wife and daughter.   Disposition: Possible acute rehab

## 2019-04-27 LAB
ANION GAP SERPL CALC-SCNC: 15 MMOL/L — HIGH (ref 7–14)
BASOPHILS # BLD AUTO: 0.02 K/UL — SIGNIFICANT CHANGE UP (ref 0–0.2)
BASOPHILS NFR BLD AUTO: 0.2 % — SIGNIFICANT CHANGE UP (ref 0–1)
BUN SERPL-MCNC: 106 MG/DL — CRITICAL HIGH (ref 10–20)
CALCIUM SERPL-MCNC: 8.9 MG/DL — SIGNIFICANT CHANGE UP (ref 8.5–10.1)
CHLORIDE SERPL-SCNC: 90 MMOL/L — LOW (ref 98–110)
CO2 SERPL-SCNC: 25 MMOL/L — SIGNIFICANT CHANGE UP (ref 17–32)
CREAT SERPL-MCNC: 2.3 MG/DL — HIGH (ref 0.7–1.5)
EOSINOPHIL # BLD AUTO: 0.29 K/UL — SIGNIFICANT CHANGE UP (ref 0–0.7)
EOSINOPHIL NFR BLD AUTO: 2.3 % — SIGNIFICANT CHANGE UP (ref 0–8)
GLUCOSE BLDC GLUCOMTR-MCNC: 249 MG/DL — HIGH (ref 70–99)
GLUCOSE BLDC GLUCOMTR-MCNC: 269 MG/DL — HIGH (ref 70–99)
GLUCOSE BLDC GLUCOMTR-MCNC: 288 MG/DL — HIGH (ref 70–99)
GLUCOSE BLDC GLUCOMTR-MCNC: 359 MG/DL — HIGH (ref 70–99)
GLUCOSE SERPL-MCNC: 289 MG/DL — HIGH (ref 70–99)
HCT VFR BLD CALC: 36.2 % — LOW (ref 42–52)
HGB BLD-MCNC: 11.3 G/DL — LOW (ref 14–18)
IMM GRANULOCYTES NFR BLD AUTO: 0.5 % — HIGH (ref 0.1–0.3)
LYMPHOCYTES # BLD AUTO: 0.54 K/UL — LOW (ref 1.2–3.4)
LYMPHOCYTES # BLD AUTO: 4.3 % — LOW (ref 20.5–51.1)
MAGNESIUM SERPL-MCNC: 2 MG/DL — SIGNIFICANT CHANGE UP (ref 1.8–2.4)
MCHC RBC-ENTMCNC: 26.2 PG — LOW (ref 27–31)
MCHC RBC-ENTMCNC: 31.2 G/DL — LOW (ref 32–37)
MCV RBC AUTO: 83.8 FL — SIGNIFICANT CHANGE UP (ref 80–94)
MONOCYTES # BLD AUTO: 0.5 K/UL — SIGNIFICANT CHANGE UP (ref 0.1–0.6)
MONOCYTES NFR BLD AUTO: 4 % — SIGNIFICANT CHANGE UP (ref 1.7–9.3)
NEUTROPHILS # BLD AUTO: 11.17 K/UL — HIGH (ref 1.4–6.5)
NEUTROPHILS NFR BLD AUTO: 88.7 % — HIGH (ref 42.2–75.2)
NRBC # BLD: 0 /100 WBCS — SIGNIFICANT CHANGE UP (ref 0–0)
PLATELET # BLD AUTO: 315 K/UL — SIGNIFICANT CHANGE UP (ref 130–400)
POTASSIUM SERPL-MCNC: 4.3 MMOL/L — SIGNIFICANT CHANGE UP (ref 3.5–5)
POTASSIUM SERPL-SCNC: 4.3 MMOL/L — SIGNIFICANT CHANGE UP (ref 3.5–5)
RBC # BLD: 4.32 M/UL — LOW (ref 4.7–6.1)
RBC # FLD: 13.5 % — SIGNIFICANT CHANGE UP (ref 11.5–14.5)
SODIUM SERPL-SCNC: 130 MMOL/L — LOW (ref 135–146)
WBC # BLD: 12.58 K/UL — HIGH (ref 4.8–10.8)
WBC # FLD AUTO: 12.58 K/UL — HIGH (ref 4.8–10.8)

## 2019-04-27 PROCEDURE — 73721 MRI JNT OF LWR EXTRE W/O DYE: CPT | Mod: 26,RT

## 2019-04-27 RX ADMIN — Medication 40 MILLIGRAM(S): at 05:37

## 2019-04-27 RX ADMIN — LIDOCAINE 1 PATCH: 4 CREAM TOPICAL at 12:37

## 2019-04-27 RX ADMIN — LACTULOSE 15 GRAM(S): 10 SOLUTION ORAL at 22:08

## 2019-04-27 RX ADMIN — Medication 24 UNIT(S): at 17:35

## 2019-04-27 RX ADMIN — Medication 200 MILLIGRAM(S): at 13:51

## 2019-04-27 RX ADMIN — Medication 24 UNIT(S): at 12:39

## 2019-04-27 RX ADMIN — Medication 0.3 MILLIGRAM(S): at 13:51

## 2019-04-27 RX ADMIN — Medication 200 MILLIGRAM(S): at 21:45

## 2019-04-27 RX ADMIN — TAMSULOSIN HYDROCHLORIDE 0.4 MILLIGRAM(S): 0.4 CAPSULE ORAL at 21:45

## 2019-04-27 RX ADMIN — Medication 100 MILLIGRAM(S): at 21:44

## 2019-04-27 RX ADMIN — CHLORHEXIDINE GLUCONATE 1 APPLICATION(S): 213 SOLUTION TOPICAL at 05:40

## 2019-04-27 RX ADMIN — Medication 200 MILLIGRAM(S): at 05:37

## 2019-04-27 RX ADMIN — Medication 5: at 17:35

## 2019-04-27 RX ADMIN — CHLORHEXIDINE GLUCONATE 1 APPLICATION(S): 213 SOLUTION TOPICAL at 21:41

## 2019-04-27 RX ADMIN — AMLODIPINE BESYLATE 5 MILLIGRAM(S): 2.5 TABLET ORAL at 05:36

## 2019-04-27 RX ADMIN — Medication 667 MILLIGRAM(S): at 12:37

## 2019-04-27 RX ADMIN — Medication 81 MILLIGRAM(S): at 12:38

## 2019-04-27 RX ADMIN — CEFEPIME 100 MILLIGRAM(S): 1 INJECTION, POWDER, FOR SOLUTION INTRAMUSCULAR; INTRAVENOUS at 12:48

## 2019-04-27 RX ADMIN — ATORVASTATIN CALCIUM 40 MILLIGRAM(S): 80 TABLET, FILM COATED ORAL at 21:43

## 2019-04-27 RX ADMIN — Medication 30 MILLIGRAM(S): at 05:37

## 2019-04-27 RX ADMIN — Medication 24 UNIT(S): at 08:52

## 2019-04-27 RX ADMIN — Medication 667 MILLIGRAM(S): at 17:36

## 2019-04-27 RX ADMIN — Medication 667 MILLIGRAM(S): at 08:52

## 2019-04-27 RX ADMIN — APIXABAN 5 MILLIGRAM(S): 2.5 TABLET, FILM COATED ORAL at 17:36

## 2019-04-27 RX ADMIN — INSULIN GLARGINE 72 UNIT(S): 100 INJECTION, SOLUTION SUBCUTANEOUS at 21:44

## 2019-04-27 RX ADMIN — Medication 0.3 MILLIGRAM(S): at 21:44

## 2019-04-27 RX ADMIN — Medication 0.3 MILLIGRAM(S): at 05:38

## 2019-04-27 RX ADMIN — PANTOPRAZOLE SODIUM 40 MILLIGRAM(S): 20 TABLET, DELAYED RELEASE ORAL at 05:39

## 2019-04-27 RX ADMIN — SENNA PLUS 2 TABLET(S): 8.6 TABLET ORAL at 21:45

## 2019-04-27 RX ADMIN — Medication 2000 UNIT(S): at 12:38

## 2019-04-27 RX ADMIN — LIDOCAINE 1 PATCH: 4 CREAM TOPICAL at 17:37

## 2019-04-27 RX ADMIN — Medication 100 MILLIGRAM(S): at 13:51

## 2019-04-27 RX ADMIN — APIXABAN 5 MILLIGRAM(S): 2.5 TABLET, FILM COATED ORAL at 05:36

## 2019-04-27 RX ADMIN — Medication 100 MILLIGRAM(S): at 05:37

## 2019-04-27 RX ADMIN — Medication 2: at 08:53

## 2019-04-27 RX ADMIN — Medication 3: at 12:39

## 2019-04-27 NOTE — CONSULT NOTE ADULT - CONSULT REASON
Brain Hemorrhage
Left basal ganglia IPH
R KNEE PAIN
bilateral lower extremity thrombus
intraparenchymal bleed
kenyetta
right idania
Stroke Code

## 2019-04-27 NOTE — PROGRESS NOTE ADULT - SUBJECTIVE AND OBJECTIVE BOX
GRIS TIDWELL 59y Male  MRN#: [id number]     SUBJECTIVE  Patient is a 59y old Male who presents with a chief complaint of Right sided weakness   Currently admitted to medicine with the primary diagnosis of Intraparenchymal hemorrhage of brain  Today is hospital day 25d, and this morning he reports no overnight events.       OBJECTIVE  PAST MEDICAL & SURGICAL HISTORY  Gout  Morbidly obese  Gout  Hypertension  Diabetes mellitus  S/P tonsillectomy    ALLERGIES:  No Known Allergies    MEDICATIONS:  STANDING MEDICATIONS  amLODIPine   Tablet 5 milliGRAM(s) Oral daily  apixaban 5 milliGRAM(s) Oral every 12 hours  aspirin  chewable 81 milliGRAM(s) Oral daily  atorvastatin Oral Tab/Cap - Peds 40 milliGRAM(s) Oral daily  calcium acetate 667 milliGRAM(s) Oral three times a day with meals  cefepime   IVPB 2000 milliGRAM(s) IV Intermittent every 24 hours  chlorhexidine 4% Liquid 1 Application(s) Topical every 12 hours  cholecalciferol 2000 Unit(s) Oral daily  cloNIDine 0.3 milliGRAM(s) Oral every 8 hours  dextrose 5%. 1000 milliLiter(s) IV Continuous <Continuous>  dextrose 50% Injectable 12.5 Gram(s) IV Push once  dextrose 50% Injectable 25 Gram(s) IV Push once  dextrose 50% Injectable 25 Gram(s) IV Push once  furosemide    Tablet 40 milliGRAM(s) Oral daily  hydrALAZINE 100 milliGRAM(s) Oral every 8 hours  insulin glargine Injectable (LANTUS) 72 Unit(s) SubCutaneous at bedtime  insulin lispro (HumaLOG) corrective regimen sliding scale   SubCutaneous three times a day before meals  insulin lispro Injectable (HumaLOG) 24 Unit(s) SubCutaneous three times a day with meals  labetalol 200 milliGRAM(s) Oral three times a day  lidocaine   Patch 1 Patch Transdermal every 24 hours  pantoprazole    Tablet 40 milliGRAM(s) Oral before breakfast  senna 2 Tablet(s) Oral at bedtime  tamsulosin 0.4 milliGRAM(s) Oral at bedtime    PRN MEDICATIONS  acetaminophen   Tablet .. 650 milliGRAM(s) Oral every 6 hours PRN  aluminum hydroxide/magnesium hydroxide/simethicone Suspension 30 milliLiter(s) Oral every 6 hours PRN  bisacodyl Suppository 10 milliGRAM(s) Rectal daily PRN  dextrose 40% Gel 15 Gram(s) Oral once PRN  glucagon  Injectable 1 milliGRAM(s) IntraMuscular once PRN  lactulose Syrup 15 Gram(s) Oral every 3 hours PRN  traMADol 50 milliGRAM(s) Oral every 8 hours PRN      VITAL SIGNS: Last 24 Hours  T(C): 35.6 (27 Apr 2019 06:09), Max: 36.1 (26 Apr 2019 21:24)  T(F): 96.1 (27 Apr 2019 06:09), Max: 96.9 (26 Apr 2019 21:24)  HR: 65 (27 Apr 2019 13:53) (56 - 72)  BP: 139/74 (27 Apr 2019 13:53) (139/74 - 153/78)  BP(mean): --  RR: 20 (27 Apr 2019 13:53) (19 - 22)  SpO2: 96% (27 Apr 2019 13:53) (96% - 96%)    LABS:                        11.3   12.58 )-----------( 315      ( 27 Apr 2019 09:27 )             36.2     04-27    130<L>  |  90<L>  |  106<HH>  ----------------------------<  289<H>  4.3   |  25  |  2.3<H>    Ca    8.9      27 Apr 2019 09:27  Mg     2.0     04-27    RADIOLOGY:  < from: MR Knee No Cont, Right (04.27.19 @ 13:43) >  Impression:  1. Vastus lateralis myotendinous strain no drainable hematoma and   adjacent subcutaneous swelling.  2. Tricompartmental moderate osteoarthritis with degenerative medial and   lateral meniscal tears    3.Diffuse elevated signal within the musculature suggestive of central   neuritis pattern    < end of copied text >    < from: MR Angio Head No Cont (04.19.19 @ 18:09) >  IMPRESSION:     1.  Slight asymmetry of the distal left MCA branches which are slightly   decreased in signal flow enhancement and caliber and may be due to   artifact versus vasospasm.    2.  Small/hypoplastic A1 segment of the right anterior cerebral artery.    3.  Otherwise unremarkable MRA of the brain without contrast.     4.  No proximal large vessel occlusions.      < end of copied text >    < from: MR Head No Cont (04.18.19 @ 15:44) >  IMPRESSION:    1.  Motion limited and incomplete exam. The patient was unable to   tolerate the study.    2.  4.5 cm parenchymal hematoma centered in the left basal ganglia with   moderate surrounding edema and mass effect with compression of theleft   frontal horn.    3.  Mild chronic microvascular changes and scattered chronic lacunar   infarcts.    < end of copied text >      < from: CT Head No Cont (04.13.19 @ 16:07) >  impression:    No significant changes in the acute/subacute parenchymal hemorrhage   involving the left basal ganglia with small amount of surrounding edema.   Well-demarcated area of decreased attenuation extending to the left   caudate may represent associated acute/subacute infarction. Attention on   follow-up imaging recommended.    Mass effect on the left lateral ventricle without significant ventricular   enlargement. Minimal left-to-right midline shift.    No new acute intracranial hemorrhage.    < end of copied text >    < from: Transthoracic Echocardiogram (04.13.19 @ 08:42) >  Summary:   1. Left ventricular ejection fraction, by visual estimation, is 55 to   60%.   2. Normal global left ventricular systolic function.   3. Mild mitral valve regurgitation.   4. Mild thickening and calcification of the anterior mitral valve   leaflet.   5. Moderate aortic valve thickening with normal leaflet opening.      < end of copied text >    < from: [facility] Duplex Lower Ext Vein Scan, Bilat (04.12.19 @ 19:40) >  Impression:    Thromboses present in bilateral calf veins: right posterior tibial and   peroneal vein branches; left gastrocnemius and posterior tibial vein   branches.    These are new findings compared to duplex of 4/7/2019.    < end of copied text >    < from: US Renal (04.03.19 @ 10:59) >  Impression:     Limited examination secondary to patient's condition and body habitus.    No hydronephrosis or calculus on either side.    2.4 cm left renal hypodensity may reflect a cyst but is limited in   characterization on this study. Consideration can be given to a follow-up   examination or dedicated CT or MRI at later time.      PHYSICAL EXAM:    GENERAL: NAD, well-developed, AAOx3  HEENT:  Atraumatic, Normocephalic. EOMI, PERRLA, conjunctiva and sclera clear, No JVD  PULMONARY: Clear to auscultation bilaterally; No wheeze  CARDIOVASCULAR: Regular rate and rhythm; No murmurs, rubs, or gallops  GASTROINTESTINAL: Soft, Nontender, Nondistended; Bowel sounds present  MUSCULOSKELETAL:  2+ Peripheral Pulses, No clubbing, cyanosis, or edema  NEUROLOGY: non-focal  SKIN: No rashes or lesions      ADMISSION SUMMARY  Patient is a 59y old Male who presents with a chief complaint of Right sided weakness   Currently admitted to medicine with the primary diagnosis of Intraparenchymal hemorrhage of brain    ASSESSMENT & PLAN    #L basal ganglia intraparenchymal bleed   - no acute neurosurgical intervention  - CTH 4/2: L frontotemporal intraparenchymal bleed with extension into the ventricle and 2mm shift to the R  - Repeat CTH on 4/3, 4/6, 4/12, 4/13 stable   - Having right UE and LE weakness. Power 4/5 in RUE, 2/5 in RLE. 3/5 in LLE and 4/5 in LUE  - MR brain performed showing 4.5cm parenchymal bleed compared to 3.9cm on initial CT head. no infarct  - Maintain SBP<160   - Speech and swallow eval done. Recommended dysphagia 3 diet. Need speech therapy for expressive aphasia    #LLE and RLE DVT and Acute on chronic hypercapnic respiratory failure, possible PE  - Venous duplex 4/12 showed new right posterior tibial and peroneal and left gastrocnemius and branch of posterior tibial thrombus.  - CTA lungs (not done due to CKD) and V/Q scan would not  to anticoagulate  - Neurosurgery cleared pt for anticoagulation 4/12  - c/w aspirin and eliquis  - saturating at 95% on 2l at rest. Desaturating on ambulation on room air to 89%. Will keep on 2l for now    #Hypertensive emergency  - goal SBP < 160  - c/w amlodipine 5 mg daily, hydralazine 100 mg TID, lasix 40mg PO, clonidine 0.3mg q8h,  labetalol 200mg q8h   - BP well controlled     #Right knee pain  -Patient had a fall 2 weeks before coming to hospital.  -c/o knee pain that is limiting his mobility  -MRI showed osteoarthritis with meniscal tear. f/u ortho consult  -Tramadol PRN for pain    #Fever- resolved  -Chest X ray showed pleural effusion on right. No new opacity  -blood cx negative to date.  -Currently afebrile. WBC trending down. BP stable. d/c Cefepime today  -high WBC likely from prednisone    #Urinary retention  -Renal and bladder US showed pre void of 613cc and post void residual volume of 200cc. Likely 2/2 prostate enlargement and immobilization.   -started on flomax  -Had bleeding from catheterization. Stopped straight cath. Family is not willing for further straight cath or khan.  -Patient voiding without khan    #Acute on chronic HFpEF  - Echo EF 55% to 60% EF MR, Mild Aortic valve thinking   - cxr w/ interval blunting of costophrenic angle, hazy opacity  - follows w/ Dr. Man  - On lasix 40mg PO q24h    #DESI on CKD3  - Creatinine 2.3  - baseline Cr 1.9 in 10/2018  - renal US 4/3: no hydronephrosis, L renal cyst    #New onset afib  - Rate controlled  - c/w labetalol 200 mg TID  - On eliquis    # Gout Flare  - c/w prednisone 20mg. Taper slowly    #Chest pain :Resolved  EKG normal  Trops normal     #DM glucose uncontrolled   -Increased lantus to 64u, lispro 22u TID    #Dyslipidemia  - lipitor    #DVT ppx: Eliquis  #GI ppx: protonix   #Dispo: PT eval done. ambulated with assistive support device  #Full code

## 2019-04-27 NOTE — CONSULT NOTE ADULT - CONSULT REQUESTED DATE/TIME
02-Apr-2019 21:11
03-Apr-2019 07:19
04-Apr-2019 04:48
04-Apr-2019 12:36
08-Apr-2019 16:13
13-Apr-2019 00:16
27-Apr-2019
02-Apr-2019 20:28

## 2019-04-27 NOTE — PROGRESS NOTE ADULT - SUBJECTIVE AND OBJECTIVE BOX
GRIS TIDWELL 59y Male  MRN#: [id number]   SUBJECTIVE  Patient is a 59y old Male who presents with a chief complaint of Right sided weakness   Currently admitted to medicine with the primary diagnosis of Intraparenchymal hemorrhage of brain  Today is hospital day 24d, and this morning he reports no overnight events.     OBJECTIVE  PAST MEDICAL & SURGICAL HISTORY  Gout  Morbidly obese  Gout  Hypertension  Diabetes mellitus  S/P tonsillectomy    ALLERGIES:  No Known Allergies    MEDICATIONS:  STANDING MEDICATIONS  amLODIPine   Tablet 5 milliGRAM(s) Oral daily  aspirin  chewable 81 milliGRAM(s) Oral daily  atorvastatin Oral Tab/Cap - Peds 40 milliGRAM(s) Oral daily  calcium acetate 667 milliGRAM(s) Oral three times a day with meals  cefepime   IVPB 2000 milliGRAM(s) IV Intermittent every 24 hours  chlorhexidine 4% Liquid 1 Application(s) Topical every 12 hours  cholecalciferol 2000 Unit(s) Oral daily  cloNIDine 0.3 milliGRAM(s) Oral every 8 hours  dextrose 5%. 1000 milliLiter(s) IV Continuous <Continuous>  dextrose 50% Injectable 12.5 Gram(s) IV Push once  dextrose 50% Injectable 25 Gram(s) IV Push once  dextrose 50% Injectable 25 Gram(s) IV Push once  furosemide    Tablet 40 milliGRAM(s) Oral daily  hydrALAZINE 100 milliGRAM(s) Oral every 8 hours  insulin glargine Injectable (LANTUS) 72 Unit(s) SubCutaneous at bedtime  insulin lispro (HumaLOG) corrective regimen sliding scale   SubCutaneous three times a day before meals  insulin lispro Injectable (HumaLOG) 24 Unit(s) SubCutaneous three times a day with meals  labetalol 200 milliGRAM(s) Oral three times a day  lidocaine   Patch 1 Patch Transdermal every 24 hours  pantoprazole    Tablet 40 milliGRAM(s) Oral before breakfast  predniSONE   Tablet 30 milliGRAM(s) Oral daily  senna 2 Tablet(s) Oral at bedtime  tamsulosin 0.4 milliGRAM(s) Oral at bedtime    PRN MEDICATIONS  acetaminophen   Tablet .. 650 milliGRAM(s) Oral every 6 hours PRN  aluminum hydroxide/magnesium hydroxide/simethicone Suspension 30 milliLiter(s) Oral every 6 hours PRN  bisacodyl Suppository 10 milliGRAM(s) Rectal daily PRN  dextrose 40% Gel 15 Gram(s) Oral once PRN  glucagon  Injectable 1 milliGRAM(s) IntraMuscular once PRN  lactulose Syrup 15 Gram(s) Oral every 3 hours PRN  traMADol 50 milliGRAM(s) Oral every 8 hours PRN      Vital Signs Last 24 Hrs  T(C): 35.6 (27 Apr 2019 06:09), Max: 36.1 (26 Apr 2019 21:24)  T(F): 96.1 (27 Apr 2019 06:09), Max: 96.9 (26 Apr 2019 21:24)  HR: 65 (27 Apr 2019 13:53) (56 - 72)  BP: 139/74 (27 Apr 2019 13:53) (139/74 - 153/78)  RR: 20 (27 Apr 2019 13:53) (19 - 22)  SpO2: 96% (27 Apr 2019 13:53) (96% - 96%)    LABS:                        10.9   11.08 )-----------( 295      ( 26 Apr 2019 06:27 )             34.6     04-26    135  |  92<L>  |  109<HH>  ----------------------------<  231<H>  4.4   |  28  |  2.4<H>    Ca    9.1      26 Apr 2019 06:27  Mg     2.1     04-26      RADIOLOGY:  < from: MR Angio Head No Cont (04.19.19 @ 18:09) >  IMPRESSION:     1.  Slight asymmetry of the distal left MCA branches which are slightly   decreased in signal flow enhancement and caliber and may be due to   artifact versus vasospasm.    2.  Small/hypoplastic A1 segment of the right anterior cerebral artery.    3.  Otherwise unremarkable MRA of the brain without contrast.     4.  No proximal large vessel occlusions.      < end of copied text >    < from: MR Head No Cont (04.18.19 @ 15:44) >  IMPRESSION:    1.  Motion limited and incomplete exam. The patient was unable to   tolerate the study.    2.  4.5 cm parenchymal hematoma centered in the left basal ganglia with   moderate surrounding edema and mass effect with compression of theleft   frontal horn.    3.  Mild chronic microvascular changes and scattered chronic lacunar   infarcts.    < end of copied text >      < from: CT Head No Cont (04.13.19 @ 16:07) >  impression:    No significant changes in the acute/subacute parenchymal hemorrhage   involving the left basal ganglia with small amount of surrounding edema.   Well-demarcated area of decreased attenuation extending to the left   caudate may represent associated acute/subacute infarction. Attention on   follow-up imaging recommended.    Mass effect on the left lateral ventricle without significant ventricular   enlargement. Minimal left-to-right midline shift.    No new acute intracranial hemorrhage.    < end of copied text >    < from: Transthoracic Echocardiogram (04.13.19 @ 08:42) >  Summary:   1. Left ventricular ejection fraction, by visual estimation, is 55 to   60%.   2. Normal global left ventricular systolic function.   3. Mild mitral valve regurgitation.   4. Mild thickening and calcification of the anterior mitral valve   leaflet.   5. Moderate aortic valve thickening with normal leaflet opening.      < end of copied text >    < from: [facility] Duplex Lower Ext Vein Scan, Bilat (04.12.19 @ 19:40) >  Impression:    Thromboses present in bilateral calf veins: right posterior tibial and   peroneal vein branches; left gastrocnemius and posterior tibial vein   branches.    These are new findings compared to duplex of 4/7/2019.    < end of copied text >    < from: US Renal (04.03.19 @ 10:59) >  Impression:     Limited examination secondary to patient's condition and body habitus.    No hydronephrosis or calculus on either side.    2.4 cm left renal hypodensity may reflect a cyst but is limited in   characterization on this study. Consideration can be given to a follow-up   examination or dedicated CT or MRI at later time.      PHYSICAL EXAM:    GENERAL: NAD, well-developed, AAOx3  HEENT:  Atraumatic, Normocephalic. EOMI, PERRLA, conjunctiva and sclera clear, No JVD  PULMONARY: Clear to auscultation bilaterally; No wheeze  CARDIOVASCULAR: Regular rate and rhythm; No murmurs, rubs, or gallops  GASTROINTESTINAL: Soft, Nontender, Nondistended; Bowel sounds present  MUSCULOSKELETAL:  2+ Peripheral Pulses, No clubbing, cyanosis, or edema  NEUROLOGY: non-focal  SKIN: No rashes or lesions      ADMISSION SUMMARY  Patient is a 59y old Male who presents with a chief complaint of Right sided weakness   Currently admitted to medicine with the primary diagnosis of Intraparenchymal hemorrhage of brain    ASSESSMENT & PLAN  #Fever- resolved  -Chest X ray showed pleural effusion on right. No new opacity  -blood cx negative to date.  -Currently afebrile. WBC trending down. BP stable. C/w cefepime q12h (day 7). d/c antibiotics on discharge    #L basal ganglia intraparenchymal bleed   - no acute neurosurgical intervention  - CTH 4/2: L frontotemporal intraparenchymal bleed with extension into the ventricle and 2mm shift to the R  - Repeat CTH on 4/3, 4/6, 4/12, 4/13 stable   - Having right UE and LE weakness. Power 4/5 in RUE, 2/5 in RLE. 3/5 in LLE and 4/5 in LUE  - MR brain performed showing 4.5cm parenchymal bleed compared to 3.9cm on initial CT head. no infarct  - Maintain SBP<160   - Speech and swallow eval done. Recommended dysphagia 3 diet. Need speech therapy for expressive aphasia    #LLE and RLE DVT and Acute on chronic hypercapnic respiratory failure, possible PE  - Venous duplex 4/12 showed new right posterior tibial and peroneal and left gastrocnemius and branch of posterior tibial thrombus.  - CTA lungs (not done due to CKD) and V/Q scan would not  to anticoagulate  - Neurosurgery cleared pt for anticoagulation 4/12  - c/w aspirin and eliquis  - saturating at 95% on 2l at rest. Desaturating on ambulation on room air to 89%. Will keep on 2l for now    #Hypertensive emergency  - goal SBP < 160  - c/w amlodipine 5 mg daily, hydralazine 100 mg TID, lasix 40mg PO, clonidine 0.3mg q8h,  labetalol 200mg q8h   - BP well controlled     #Urinary retention  -Renal and bladder US showed pre void of 613cc and post void residual volume of 200cc. Likely 2/2 prostate enlargement and immobilization.   -started on flomax  -Had bleeding from catheterization. Stopped straight cath. Family is not willing for further straight cath or khan.  -Patient voiding without khan    #Acute on chronic HFpEF  - Echo EF 55% to 60% EF MR, Mild Aortic valve thinking   - cxr w/ interval blunting of costophrenic angle, hazy opacity  - follows w/ Dr. Man  - On lasix 40mg PO q24h    #DESI on CKD3  - Creatinine 2.4  - baseline Cr 1.9 in 10/2018  - renal US 4/3: no hydronephrosis, L renal cyst    #New onset afib  - Rate controlled  - c/w labetalol 200 mg TID  - On eliquis    # Gout Flare  - c/w prednisone 30mg. will taper slowly    #Chest pain :Resolved  EKG normal  Trops normal     #DM glucose uncontrolled   -Increased lantus to 64u, lispro 22u TID    #Dyslipidemia  - lipitor    #DVT ppx: Eliquis  #GI ppx: protonix   #Dispo: PT eval done. ambulated with assistive support device  #Full code    Knee pain - As per family patient fell on that knee. MRI knee to follow       Pending (specify):  Patient progressing slowly. Follow up MRI right knee. Taper down the prednisone. Leukocytosis secondary to the steroids   Family discussion: wife and daughter.   Disposition: Possible acute rehab

## 2019-04-27 NOTE — CONSULT NOTE ADULT - SUBJECTIVE AND OBJECTIVE BOX
ORTHO INPATIENT CONSULT  GRIS TIDWELL    59M presents with atraumatic right knee pain. Patient admitted for 4/2 for intraparenchymal hemorrhage with right sided weakness. Hospital course complicated by gout flare treated with steroids, DESI on CKD, heart failure, hypercapnic respiratory failure. Primary team obtained MRI for evaluation of atraumatic right knee pain with evidence of degenerative meniscal tears. Orthopaedics consulted for evaluation.     Patient and family interviewed. Complaining of right knee pain for the past 1-2 months, worsened over the past few weeks. Denies pain or injury to rest of extremity. He has not tried any conservative management including CSI, or PT.     PMHx + PSx:  See chart    ALL :   NKDA    Social Hx:  Denies alcohol or tobacco use      PE :   RLE :  No open skin or wounds  No joint effusion  NTTP knee  Able to SLR against gravity  Grossly motor and sensory intact  Foot warm and perfused    Imaging reviewed: Tricompartmental knee arthritis, degenerative meniscal tears    A/P  59M with right knee OA:  - No acute ortho intervention at this time  - Patient would benefit from conservative management at this time including PT, daily icing, and NSAIDs (if not contraindicated)  - WBAT RLE  - Medical management per primary team  - Follow-up with Dr. Paola Kaur, as an outpatient, call

## 2019-04-27 NOTE — CONSULT NOTE ADULT - REASON FOR ADMISSION
[facility]
Right sided weakness

## 2019-04-28 LAB
ANION GAP SERPL CALC-SCNC: 16 MMOL/L — HIGH (ref 7–14)
BASOPHILS # BLD AUTO: 0.02 K/UL — SIGNIFICANT CHANGE UP (ref 0–0.2)
BASOPHILS NFR BLD AUTO: 0.2 % — SIGNIFICANT CHANGE UP (ref 0–1)
BUN SERPL-MCNC: 109 MG/DL — CRITICAL HIGH (ref 10–20)
CALCIUM SERPL-MCNC: 8.8 MG/DL — SIGNIFICANT CHANGE UP (ref 8.5–10.1)
CHLORIDE SERPL-SCNC: 96 MMOL/L — LOW (ref 98–110)
CO2 SERPL-SCNC: 24 MMOL/L — SIGNIFICANT CHANGE UP (ref 17–32)
CREAT SERPL-MCNC: 2.3 MG/DL — HIGH (ref 0.7–1.5)
EOSINOPHIL # BLD AUTO: 0.28 K/UL — SIGNIFICANT CHANGE UP (ref 0–0.7)
EOSINOPHIL NFR BLD AUTO: 2.4 % — SIGNIFICANT CHANGE UP (ref 0–8)
GLUCOSE BLDC GLUCOMTR-MCNC: 120 MG/DL — HIGH (ref 70–99)
GLUCOSE BLDC GLUCOMTR-MCNC: 163 MG/DL — HIGH (ref 70–99)
GLUCOSE BLDC GLUCOMTR-MCNC: 172 MG/DL — HIGH (ref 70–99)
GLUCOSE BLDC GLUCOMTR-MCNC: 206 MG/DL — HIGH (ref 70–99)
GLUCOSE SERPL-MCNC: 167 MG/DL — HIGH (ref 70–99)
HCT VFR BLD CALC: 34.1 % — LOW (ref 42–52)
HGB BLD-MCNC: 10.7 G/DL — LOW (ref 14–18)
IMM GRANULOCYTES NFR BLD AUTO: 0.8 % — HIGH (ref 0.1–0.3)
LYMPHOCYTES # BLD AUTO: 1.04 K/UL — LOW (ref 1.2–3.4)
LYMPHOCYTES # BLD AUTO: 8.8 % — LOW (ref 20.5–51.1)
MAGNESIUM SERPL-MCNC: 2 MG/DL — SIGNIFICANT CHANGE UP (ref 1.8–2.4)
MCHC RBC-ENTMCNC: 26.3 PG — LOW (ref 27–31)
MCHC RBC-ENTMCNC: 31.4 G/DL — LOW (ref 32–37)
MCV RBC AUTO: 83.8 FL — SIGNIFICANT CHANGE UP (ref 80–94)
MONOCYTES # BLD AUTO: 0.88 K/UL — HIGH (ref 0.1–0.6)
MONOCYTES NFR BLD AUTO: 7.4 % — SIGNIFICANT CHANGE UP (ref 1.7–9.3)
NEUTROPHILS # BLD AUTO: 9.57 K/UL — HIGH (ref 1.4–6.5)
NEUTROPHILS NFR BLD AUTO: 80.4 % — HIGH (ref 42.2–75.2)
NRBC # BLD: 0 /100 WBCS — SIGNIFICANT CHANGE UP (ref 0–0)
PLATELET # BLD AUTO: 294 K/UL — SIGNIFICANT CHANGE UP (ref 130–400)
POTASSIUM SERPL-MCNC: 4.3 MMOL/L — SIGNIFICANT CHANGE UP (ref 3.5–5)
POTASSIUM SERPL-SCNC: 4.3 MMOL/L — SIGNIFICANT CHANGE UP (ref 3.5–5)
RBC # BLD: 4.07 M/UL — LOW (ref 4.7–6.1)
RBC # FLD: 13.5 % — SIGNIFICANT CHANGE UP (ref 11.5–14.5)
SODIUM SERPL-SCNC: 136 MMOL/L — SIGNIFICANT CHANGE UP (ref 135–146)
WBC # BLD: 11.88 K/UL — HIGH (ref 4.8–10.8)
WBC # FLD AUTO: 11.88 K/UL — HIGH (ref 4.8–10.8)

## 2019-04-28 RX ADMIN — LIDOCAINE 1 PATCH: 4 CREAM TOPICAL at 11:06

## 2019-04-28 RX ADMIN — Medication 2000 UNIT(S): at 11:06

## 2019-04-28 RX ADMIN — AMLODIPINE BESYLATE 5 MILLIGRAM(S): 2.5 TABLET ORAL at 05:55

## 2019-04-28 RX ADMIN — Medication 200 MILLIGRAM(S): at 13:46

## 2019-04-28 RX ADMIN — PANTOPRAZOLE SODIUM 40 MILLIGRAM(S): 20 TABLET, DELAYED RELEASE ORAL at 06:00

## 2019-04-28 RX ADMIN — LACTULOSE 15 GRAM(S): 10 SOLUTION ORAL at 08:29

## 2019-04-28 RX ADMIN — Medication 24 UNIT(S): at 16:50

## 2019-04-28 RX ADMIN — Medication 667 MILLIGRAM(S): at 17:17

## 2019-04-28 RX ADMIN — Medication 667 MILLIGRAM(S): at 11:41

## 2019-04-28 RX ADMIN — Medication 100 MILLIGRAM(S): at 13:46

## 2019-04-28 RX ADMIN — Medication 0.3 MILLIGRAM(S): at 13:46

## 2019-04-28 RX ADMIN — Medication 0.3 MILLIGRAM(S): at 05:54

## 2019-04-28 RX ADMIN — Medication 100 MILLIGRAM(S): at 21:21

## 2019-04-28 RX ADMIN — Medication 81 MILLIGRAM(S): at 11:06

## 2019-04-28 RX ADMIN — SENNA PLUS 2 TABLET(S): 8.6 TABLET ORAL at 21:21

## 2019-04-28 RX ADMIN — TAMSULOSIN HYDROCHLORIDE 0.4 MILLIGRAM(S): 0.4 CAPSULE ORAL at 21:21

## 2019-04-28 RX ADMIN — APIXABAN 5 MILLIGRAM(S): 2.5 TABLET, FILM COATED ORAL at 17:17

## 2019-04-28 RX ADMIN — Medication 100 MILLIGRAM(S): at 05:53

## 2019-04-28 RX ADMIN — Medication 200 MILLIGRAM(S): at 21:21

## 2019-04-28 RX ADMIN — CHLORHEXIDINE GLUCONATE 1 APPLICATION(S): 213 SOLUTION TOPICAL at 17:17

## 2019-04-28 RX ADMIN — Medication 24 UNIT(S): at 11:41

## 2019-04-28 RX ADMIN — Medication 200 MILLIGRAM(S): at 05:54

## 2019-04-28 RX ADMIN — LIDOCAINE 1 PATCH: 4 CREAM TOPICAL at 18:05

## 2019-04-28 RX ADMIN — Medication 0.3 MILLIGRAM(S): at 21:22

## 2019-04-28 RX ADMIN — Medication 40 MILLIGRAM(S): at 05:54

## 2019-04-28 RX ADMIN — INSULIN GLARGINE 72 UNIT(S): 100 INJECTION, SOLUTION SUBCUTANEOUS at 21:22

## 2019-04-28 RX ADMIN — CHLORHEXIDINE GLUCONATE 1 APPLICATION(S): 213 SOLUTION TOPICAL at 05:56

## 2019-04-28 RX ADMIN — Medication 667 MILLIGRAM(S): at 07:49

## 2019-04-28 RX ADMIN — APIXABAN 5 MILLIGRAM(S): 2.5 TABLET, FILM COATED ORAL at 05:54

## 2019-04-28 RX ADMIN — Medication 1: at 07:49

## 2019-04-28 RX ADMIN — Medication 20 MILLIGRAM(S): at 05:55

## 2019-04-28 RX ADMIN — ATORVASTATIN CALCIUM 40 MILLIGRAM(S): 80 TABLET, FILM COATED ORAL at 21:21

## 2019-04-28 RX ADMIN — Medication 24 UNIT(S): at 07:49

## 2019-04-28 RX ADMIN — Medication 10 MILLIGRAM(S): at 08:29

## 2019-04-28 RX ADMIN — Medication 1: at 16:51

## 2019-04-28 NOTE — PROGRESS NOTE ADULT - NSHPATTENDINGPLANDISCUSS_GEN_ALL_CORE
team
House staff
House staff
ICU team
House staff
team
team
House staff
ICU team and house staff
HOuse staff
team
team

## 2019-04-28 NOTE — PROGRESS NOTE ADULT - SUBJECTIVE AND OBJECTIVE BOX
GRIS TIDWELL 59y Male  MRN#: [id number]   SUBJECTIVE  Patient is a 59y old Male who presents with a chief complaint of Right sided weakness   Currently admitted to medicine with the primary diagnosis of Intraparenchymal hemorrhage of brain  This morning he reports no overnight events.     OBJECTIVE  PAST MEDICAL & SURGICAL HISTORY  Gout  Morbidly obese  Gout  Hypertension  Diabetes mellitus  S/P tonsillectomy    ALLERGIES:  No Known Allergies    MEDICATIONS:  STANDING MEDICATIONS  amLODIPine   Tablet 5 milliGRAM(s) Oral daily  aspirin  chewable 81 milliGRAM(s) Oral daily  atorvastatin Oral Tab/Cap - Peds 40 milliGRAM(s) Oral daily  calcium acetate 667 milliGRAM(s) Oral three times a day with meals  cefepime   IVPB 2000 milliGRAM(s) IV Intermittent every 24 hours  chlorhexidine 4% Liquid 1 Application(s) Topical every 12 hours  cholecalciferol 2000 Unit(s) Oral daily  cloNIDine 0.3 milliGRAM(s) Oral every 8 hours  dextrose 5%. 1000 milliLiter(s) IV Continuous <Continuous>  dextrose 50% Injectable 12.5 Gram(s) IV Push once  dextrose 50% Injectable 25 Gram(s) IV Push once  dextrose 50% Injectable 25 Gram(s) IV Push once  furosemide    Tablet 40 milliGRAM(s) Oral daily  hydrALAZINE 100 milliGRAM(s) Oral every 8 hours  insulin glargine Injectable (LANTUS) 72 Unit(s) SubCutaneous at bedtime  insulin lispro (HumaLOG) corrective regimen sliding scale   SubCutaneous three times a day before meals  insulin lispro Injectable (HumaLOG) 24 Unit(s) SubCutaneous three times a day with meals  labetalol 200 milliGRAM(s) Oral three times a day  lidocaine   Patch 1 Patch Transdermal every 24 hours  pantoprazole    Tablet 40 milliGRAM(s) Oral before breakfast  predniSONE   Tablet 30 milliGRAM(s) Oral daily  senna 2 Tablet(s) Oral at bedtime  tamsulosin 0.4 milliGRAM(s) Oral at bedtime    PRN MEDICATIONS  acetaminophen   Tablet .. 650 milliGRAM(s) Oral every 6 hours PRN  aluminum hydroxide/magnesium hydroxide/simethicone Suspension 30 milliLiter(s) Oral every 6 hours PRN  bisacodyl Suppository 10 milliGRAM(s) Rectal daily PRN  dextrose 40% Gel 15 Gram(s) Oral once PRN  glucagon  Injectable 1 milliGRAM(s) IntraMuscular once PRN  lactulose Syrup 15 Gram(s) Oral every 3 hours PRN  traMADol 50 milliGRAM(s) Oral every 8 hours PRN    Vital Signs Last 24 Hrs  T(C): 35.6 (28 Apr 2019 14:00), Max: 35.8 (27 Apr 2019 21:00)  T(F): 96 (28 Apr 2019 14:00), Max: 96.5 (27 Apr 2019 21:00)  HR: 96 (28 Apr 2019 14:00) (55 - 96)  BP: 127/64 (28 Apr 2019 14:00) (127/64 - 144/69)  RR: 20 (28 Apr 2019 14:00) (18 - 20)  SpO2: 94% (27 Apr 2019 21:00) (94% - 94%)    LABS:                        10.9   11.08 )-----------( 295      ( 26 Apr 2019 06:27 )             34.6     04-26    135  |  92<L>  |  109<HH>  ----------------------------<  231<H>  4.4   |  28  |  2.4<H>    Ca    9.1      26 Apr 2019 06:27  Mg     2.1     04-26      RADIOLOGY:  < from: MR Angio Head No Cont (04.19.19 @ 18:09) >  IMPRESSION:     1.  Slight asymmetry of the distal left MCA branches which are slightly   decreased in signal flow enhancement and caliber and may be due to   artifact versus vasospasm.    2.  Small/hypoplastic A1 segment of the right anterior cerebral artery.    3.  Otherwise unremarkable MRA of the brain without contrast.     4.  No proximal large vessel occlusions.      < end of copied text >    < from: MR Head No Cont (04.18.19 @ 15:44) >  IMPRESSION:    1.  Motion limited and incomplete exam. The patient was unable to   tolerate the study.    2.  4.5 cm parenchymal hematoma centered in the left basal ganglia with   moderate surrounding edema and mass effect with compression of theleft   frontal horn.    3.  Mild chronic microvascular changes and scattered chronic lacunar   infarcts.    < end of copied text >      < from: CT Head No Cont (04.13.19 @ 16:07) >  impression:    No significant changes in the acute/subacute parenchymal hemorrhage   involving the left basal ganglia with small amount of surrounding edema.   Well-demarcated area of decreased attenuation extending to the left   caudate may represent associated acute/subacute infarction. Attention on   follow-up imaging recommended.    Mass effect on the left lateral ventricle without significant ventricular   enlargement. Minimal left-to-right midline shift.    No new acute intracranial hemorrhage.    < end of copied text >    < from: Transthoracic Echocardiogram (04.13.19 @ 08:42) >  Summary:   1. Left ventricular ejection fraction, by visual estimation, is 55 to   60%.   2. Normal global left ventricular systolic function.   3. Mild mitral valve regurgitation.   4. Mild thickening and calcification of the anterior mitral valve   leaflet.   5. Moderate aortic valve thickening with normal leaflet opening.      < end of copied text >    < from: VA Duplex Lower Ext Vein Scan, Bilat (04.12.19 @ 19:40) >  Impression:    Thromboses present in bilateral calf veins: right posterior tibial and   peroneal vein branches; left gastrocnemius and posterior tibial vein   branches.    These are new findings compared to duplex of 4/7/2019.    < end of copied text >    < from: US Renal (04.03.19 @ 10:59) >  Impression:     Limited examination secondary to patient's condition and body habitus.    No hydronephrosis or calculus on either side.    2.4 cm left renal hypodensity may reflect a cyst but is limited in   characterization on this study. Consideration can be given to a follow-up   examination or dedicated CT or MRI at later time.      PHYSICAL EXAM:    GENERAL: NAD, well-developed, AAOx3  HEENT:  Atraumatic, Normocephalic. EOMI, PERRLA, conjunctiva and sclera clear, No JVD  PULMONARY: Clear to auscultation bilaterally; No wheeze  CARDIOVASCULAR: Regular rate and rhythm; No murmurs, rubs, or gallops  GASTROINTESTINAL: Soft, Nontender, Nondistended; Bowel sounds present  MUSCULOSKELETAL:  2+ Peripheral Pulses, No clubbing, cyanosis, or edema  NEUROLOGY: non-focal  SKIN: No rashes or lesions      ADMISSION SUMMARY  Patient is a 59y old Male who presents with a chief complaint of Right sided weakness   Currently admitted to medicine with the primary diagnosis of Intraparenchymal hemorrhage of brain    ASSESSMENT & PLAN  #Fever- resolved  -Chest X ray showed pleural effusion on right. No new opacity  -blood cx negative to date.  -Currently afebrile. WBC trending down. BP stable. C/w cefepime q12h (day 7). d/c antibiotics on discharge    #L Basal ganglia intraparenchymal bleed   - no acute neurosurgical intervention  - CTH 4/2: L frontotemporal intraparenchymal bleed with extension into the ventricle and 2mm shift to the R  - Repeat CTH on 4/3, 4/6, 4/12, 4/13 stable   - Having right UE and LE weakness. Power 4/5 in RUE, 2/5 in RLE. 3/5 in LLE and 4/5 in LUE  - MR brain performed showing 4.5cm parenchymal bleed compared to 3.9cm on initial CT head. no infarct  - Maintain SBP<160   - Speech and swallow eval done. Recommended dysphagia 3 diet. Need speech therapy for expressive aphasia    #LLE and RLE DVT and Acute on chronic hypercapnic respiratory failure, possible PE  - Venous duplex 4/12 showed new right posterior tibial and peroneal and left gastrocnemius and branch of posterior tibial thrombus.  - CTA lungs (not done due to CKD) and V/Q scan would not  to anticoagulate  - Neurosurgery cleared pt for anticoagulation 4/12  - c/w aspirin and eliquis  - saturating at 95% on 2l at rest. Desaturating on ambulation on room air to 89%. Will keep on 2l for now    #Hypertensive emergency  - goal SBP < 160  - c/w amlodipine 5 mg daily, hydralazine 100 mg TID, lasix 40mg PO, clonidine 0.3mg q8h,  labetalol 200mg q8h   - BP well controlled     #Urinary retention  -Renal and bladder US showed pre void of 613cc and post void residual volume of 200cc. Likely 2/2 prostate enlargement and immobilization.   -Started on flomax  -Had bleeding from catheterization. Stopped straight cath. Family is not willing for further straight cath or khan.  -Patient voiding without khan    #Acute on chronic HFpEF  - Echo EF 55% to 60% EF MR, Mild Aortic valve thinking   - cxr w/ interval blunting of costophrenic angle, hazy opacity  - follows w/ Dr. Man  - On lasix 40mg PO q24h    #DESI on CKD3  - Creatinine 2.4  - baseline Cr 1.9 in 10/2018  - renal US 4/3: no hydronephrosis, L renal cyst    #New onset afib  - Rate controlled  - c/w labetalol 200 mg TID  - On eliquis    # Gout Flare  - c/w prednisone 30mg. will taper slowly    #Chest pain :Resolved  EKG normal  Trops normal     #DM glucose uncontrolled   -Increased lantus to 64u, lispro 22u TID    #Dyslipidemia  - lipitor    #DVT ppx: Eliquis  #GI ppx: protonix   #Dispo: PT eval done. ambulated with assistive support device  #Full code    Knee pain - As per family patient fell on that knee. MRI knee to follow       Pending (specify):  Patient progressing slowly. Renal function also improved.   Family discussion: wife and daughter.   Disposition: Possible acute rehab     Since patient has developed significant strength, He possible can go to [facility] now

## 2019-04-29 ENCOUNTER — TRANSCRIPTION ENCOUNTER (OUTPATIENT)
Age: 59
End: 2019-04-29

## 2019-04-29 ENCOUNTER — INPATIENT (INPATIENT)
Facility: HOSPITAL | Age: 59
LOS: 29 days | Discharge: HOME HEALTH | End: 2019-05-29
Attending: PHYSICAL MEDICINE & REHABILITATION | Admitting: PHYSICAL MEDICINE & REHABILITATION
Payer: COMMERCIAL

## 2019-04-29 VITALS
SYSTOLIC BLOOD PRESSURE: 121 MMHG | TEMPERATURE: 97 F | RESPIRATION RATE: 18 BRPM | HEART RATE: 59 BPM | DIASTOLIC BLOOD PRESSURE: 58 MMHG

## 2019-04-29 DIAGNOSIS — Z90.89 ACQUIRED ABSENCE OF OTHER ORGANS: Chronic | ICD-10-CM

## 2019-04-29 LAB
ANION GAP SERPL CALC-SCNC: 15 MMOL/L — HIGH (ref 7–14)
BASOPHILS # BLD AUTO: 0.02 K/UL — SIGNIFICANT CHANGE UP (ref 0–0.2)
BASOPHILS NFR BLD AUTO: 0.2 % — SIGNIFICANT CHANGE UP (ref 0–1)
BUN SERPL-MCNC: 100 MG/DL — CRITICAL HIGH (ref 10–20)
CALCIUM SERPL-MCNC: 9.1 MG/DL — SIGNIFICANT CHANGE UP (ref 8.5–10.1)
CHLORIDE SERPL-SCNC: 95 MMOL/L — LOW (ref 98–110)
CO2 SERPL-SCNC: 27 MMOL/L — SIGNIFICANT CHANGE UP (ref 17–32)
CREAT SERPL-MCNC: 2.4 MG/DL — HIGH (ref 0.7–1.5)
EOSINOPHIL # BLD AUTO: 0.33 K/UL — SIGNIFICANT CHANGE UP (ref 0–0.7)
EOSINOPHIL NFR BLD AUTO: 3 % — SIGNIFICANT CHANGE UP (ref 0–8)
GLUCOSE BLDC GLUCOMTR-MCNC: 118 MG/DL — HIGH (ref 70–99)
GLUCOSE BLDC GLUCOMTR-MCNC: 147 MG/DL — HIGH (ref 70–99)
GLUCOSE BLDC GLUCOMTR-MCNC: 199 MG/DL — HIGH (ref 70–99)
GLUCOSE BLDC GLUCOMTR-MCNC: 232 MG/DL — HIGH (ref 70–99)
GLUCOSE SERPL-MCNC: 122 MG/DL — HIGH (ref 70–99)
HCT VFR BLD CALC: 34.7 % — LOW (ref 42–52)
HGB BLD-MCNC: 10.9 G/DL — LOW (ref 14–18)
IMM GRANULOCYTES NFR BLD AUTO: 0.9 % — HIGH (ref 0.1–0.3)
LYMPHOCYTES # BLD AUTO: 1.2 K/UL — SIGNIFICANT CHANGE UP (ref 1.2–3.4)
LYMPHOCYTES # BLD AUTO: 10.8 % — LOW (ref 20.5–51.1)
MAGNESIUM SERPL-MCNC: 1.8 MG/DL — SIGNIFICANT CHANGE UP (ref 1.8–2.4)
MCHC RBC-ENTMCNC: 26.5 PG — LOW (ref 27–31)
MCHC RBC-ENTMCNC: 31.4 G/DL — LOW (ref 32–37)
MCV RBC AUTO: 84.2 FL — SIGNIFICANT CHANGE UP (ref 80–94)
MONOCYTES # BLD AUTO: 0.92 K/UL — HIGH (ref 0.1–0.6)
MONOCYTES NFR BLD AUTO: 8.3 % — SIGNIFICANT CHANGE UP (ref 1.7–9.3)
NEUTROPHILS # BLD AUTO: 8.54 K/UL — HIGH (ref 1.4–6.5)
NEUTROPHILS NFR BLD AUTO: 76.8 % — HIGH (ref 42.2–75.2)
NRBC # BLD: 0 /100 WBCS — SIGNIFICANT CHANGE UP (ref 0–0)
PLATELET # BLD AUTO: 293 K/UL — SIGNIFICANT CHANGE UP (ref 130–400)
POTASSIUM SERPL-MCNC: 4.4 MMOL/L — SIGNIFICANT CHANGE UP (ref 3.5–5)
POTASSIUM SERPL-SCNC: 4.4 MMOL/L — SIGNIFICANT CHANGE UP (ref 3.5–5)
RBC # BLD: 4.12 M/UL — LOW (ref 4.7–6.1)
RBC # FLD: 13.7 % — SIGNIFICANT CHANGE UP (ref 11.5–14.5)
SODIUM SERPL-SCNC: 137 MMOL/L — SIGNIFICANT CHANGE UP (ref 135–146)
WBC # BLD: 11.11 K/UL — HIGH (ref 4.8–10.8)
WBC # FLD AUTO: 11.11 K/UL — HIGH (ref 4.8–10.8)

## 2019-04-29 RX ORDER — DEXTROSE 50 % IN WATER 50 %
25 SYRINGE (ML) INTRAVENOUS ONCE
Qty: 0 | Refills: 0 | Status: DISCONTINUED | OUTPATIENT
Start: 2019-04-29 | End: 2019-05-29

## 2019-04-29 RX ORDER — DEXTROSE 50 % IN WATER 50 %
12.5 SYRINGE (ML) INTRAVENOUS ONCE
Qty: 0 | Refills: 0 | Status: DISCONTINUED | OUTPATIENT
Start: 2019-04-29 | End: 2019-05-29

## 2019-04-29 RX ORDER — INSULIN GLARGINE 100 [IU]/ML
30 INJECTION, SOLUTION SUBCUTANEOUS
Qty: 0 | Refills: 0 | COMMUNITY

## 2019-04-29 RX ORDER — ATORVASTATIN CALCIUM 80 MG/1
40 TABLET, FILM COATED ORAL AT BEDTIME
Qty: 0 | Refills: 0 | Status: DISCONTINUED | OUTPATIENT
Start: 2019-04-29 | End: 2019-05-29

## 2019-04-29 RX ORDER — ASPIRIN/CALCIUM CARB/MAGNESIUM 324 MG
81 TABLET ORAL DAILY
Qty: 0 | Refills: 0 | Status: DISCONTINUED | OUTPATIENT
Start: 2019-04-29 | End: 2019-05-29

## 2019-04-29 RX ORDER — GLYBURIDE 5 MG
1 TABLET ORAL
Qty: 0 | Refills: 0 | COMMUNITY

## 2019-04-29 RX ORDER — APIXABAN 2.5 MG/1
1 TABLET, FILM COATED ORAL
Qty: 0 | Refills: 0 | DISCHARGE
Start: 2019-04-29

## 2019-04-29 RX ORDER — SENNA PLUS 8.6 MG/1
2 TABLET ORAL
Qty: 0 | Refills: 0 | DISCHARGE
Start: 2019-04-29

## 2019-04-29 RX ORDER — LIDOCAINE 4 G/100G
1 CREAM TOPICAL ONCE
Qty: 0 | Refills: 0 | Status: COMPLETED | OUTPATIENT
Start: 2019-04-29 | End: 2019-04-30

## 2019-04-29 RX ORDER — INSULIN LISPRO 100/ML
24 VIAL (ML) SUBCUTANEOUS
Qty: 0 | Refills: 0 | Status: DISCONTINUED | OUTPATIENT
Start: 2019-04-29 | End: 2019-05-15

## 2019-04-29 RX ORDER — PANTOPRAZOLE SODIUM 20 MG/1
40 TABLET, DELAYED RELEASE ORAL
Qty: 0 | Refills: 0 | Status: DISCONTINUED | OUTPATIENT
Start: 2019-04-29 | End: 2019-05-29

## 2019-04-29 RX ORDER — SENNA PLUS 8.6 MG/1
2 TABLET ORAL AT BEDTIME
Qty: 0 | Refills: 0 | Status: DISCONTINUED | OUTPATIENT
Start: 2019-04-29 | End: 2019-05-29

## 2019-04-29 RX ORDER — MELOXICAM 15 MG/1
1 TABLET ORAL
Qty: 0 | Refills: 0 | COMMUNITY

## 2019-04-29 RX ORDER — HYDRALAZINE HCL 50 MG
1 TABLET ORAL
Qty: 0 | Refills: 0 | DISCHARGE
Start: 2019-04-29

## 2019-04-29 RX ORDER — CALCIUM ACETATE 667 MG
667 TABLET ORAL
Qty: 0 | Refills: 0 | Status: DISCONTINUED | OUTPATIENT
Start: 2019-04-29 | End: 2019-05-29

## 2019-04-29 RX ORDER — APIXABAN 2.5 MG/1
5 TABLET, FILM COATED ORAL EVERY 12 HOURS
Qty: 0 | Refills: 0 | Status: DISCONTINUED | OUTPATIENT
Start: 2019-04-29 | End: 2019-05-29

## 2019-04-29 RX ORDER — ASPIRIN/CALCIUM CARB/MAGNESIUM 324 MG
1 TABLET ORAL
Qty: 0 | Refills: 0 | COMMUNITY

## 2019-04-29 RX ORDER — CHOLECALCIFEROL (VITAMIN D3) 125 MCG
3000 CAPSULE ORAL DAILY
Qty: 0 | Refills: 0 | Status: DISCONTINUED | OUTPATIENT
Start: 2019-04-29 | End: 2019-05-29

## 2019-04-29 RX ORDER — FUROSEMIDE 40 MG
40 TABLET ORAL DAILY
Qty: 0 | Refills: 0 | Status: DISCONTINUED | OUTPATIENT
Start: 2019-04-29 | End: 2019-05-29

## 2019-04-29 RX ORDER — ACETAMINOPHEN 500 MG
650 TABLET ORAL EVERY 6 HOURS
Qty: 0 | Refills: 0 | Status: DISCONTINUED | OUTPATIENT
Start: 2019-04-29 | End: 2019-05-29

## 2019-04-29 RX ORDER — FUROSEMIDE 40 MG
1 TABLET ORAL
Qty: 0 | Refills: 0 | DISCHARGE
Start: 2019-04-29

## 2019-04-29 RX ORDER — CHOLECALCIFEROL (VITAMIN D3) 125 MCG
2000 CAPSULE ORAL
Qty: 0 | Refills: 0 | DISCHARGE
Start: 2019-04-29

## 2019-04-29 RX ORDER — HYDRALAZINE HCL 50 MG
100 TABLET ORAL EVERY 8 HOURS
Qty: 0 | Refills: 0 | Status: DISCONTINUED | OUTPATIENT
Start: 2019-04-29 | End: 2019-05-29

## 2019-04-29 RX ORDER — PANTOPRAZOLE SODIUM 20 MG/1
1 TABLET, DELAYED RELEASE ORAL
Qty: 0 | Refills: 0 | DISCHARGE
Start: 2019-04-29

## 2019-04-29 RX ORDER — LABETALOL HCL 100 MG
1 TABLET ORAL
Qty: 0 | Refills: 0 | DISCHARGE
Start: 2019-04-29

## 2019-04-29 RX ORDER — CALCIUM ACETATE 667 MG
1 TABLET ORAL
Qty: 0 | Refills: 0 | DISCHARGE
Start: 2019-04-29

## 2019-04-29 RX ORDER — TRAMADOL HYDROCHLORIDE 50 MG/1
50 TABLET ORAL EVERY 6 HOURS
Qty: 0 | Refills: 0 | Status: DISCONTINUED | OUTPATIENT
Start: 2019-04-29 | End: 2019-05-01

## 2019-04-29 RX ORDER — DILTIAZEM HCL 120 MG
1 CAPSULE, EXT RELEASE 24 HR ORAL
Qty: 0 | Refills: 0 | COMMUNITY

## 2019-04-29 RX ORDER — INSULIN LISPRO 100/ML
24 VIAL (ML) SUBCUTANEOUS
Qty: 0 | Refills: 0 | DISCHARGE
Start: 2019-04-29

## 2019-04-29 RX ORDER — AMLODIPINE BESYLATE 2.5 MG/1
5 TABLET ORAL DAILY
Qty: 0 | Refills: 0 | Status: DISCONTINUED | OUTPATIENT
Start: 2019-04-29 | End: 2019-05-14

## 2019-04-29 RX ORDER — LABETALOL HCL 100 MG
200 TABLET ORAL THREE TIMES A DAY
Qty: 0 | Refills: 0 | Status: DISCONTINUED | OUTPATIENT
Start: 2019-04-29 | End: 2019-05-15

## 2019-04-29 RX ORDER — AMLODIPINE BESYLATE 2.5 MG/1
1 TABLET ORAL
Qty: 0 | Refills: 0 | DISCHARGE
Start: 2019-04-29

## 2019-04-29 RX ORDER — SODIUM CHLORIDE 9 MG/ML
1000 INJECTION, SOLUTION INTRAVENOUS
Qty: 0 | Refills: 0 | Status: DISCONTINUED | OUTPATIENT
Start: 2019-04-29 | End: 2019-05-29

## 2019-04-29 RX ORDER — TRAMADOL HYDROCHLORIDE 50 MG/1
1 TABLET ORAL
Qty: 0 | Refills: 0 | DISCHARGE
Start: 2019-04-29

## 2019-04-29 RX ORDER — TAMSULOSIN HYDROCHLORIDE 0.4 MG/1
0.4 CAPSULE ORAL AT BEDTIME
Qty: 0 | Refills: 0 | Status: DISCONTINUED | OUTPATIENT
Start: 2019-04-29 | End: 2019-05-29

## 2019-04-29 RX ORDER — LIDOCAINE 4 G/100G
1 CREAM TOPICAL
Qty: 0 | Refills: 0 | DISCHARGE
Start: 2019-04-29

## 2019-04-29 RX ORDER — DEXTROSE 50 % IN WATER 50 %
15 SYRINGE (ML) INTRAVENOUS ONCE
Qty: 0 | Refills: 0 | Status: DISCONTINUED | OUTPATIENT
Start: 2019-04-29 | End: 2019-05-29

## 2019-04-29 RX ORDER — LANOLIN ALCOHOL/MO/W.PET/CERES
5 CREAM (GRAM) TOPICAL AT BEDTIME
Qty: 0 | Refills: 0 | Status: DISCONTINUED | OUTPATIENT
Start: 2019-04-29 | End: 2019-05-29

## 2019-04-29 RX ORDER — GLUCAGON INJECTION, SOLUTION 0.5 MG/.1ML
1 INJECTION, SOLUTION SUBCUTANEOUS ONCE
Qty: 0 | Refills: 0 | Status: DISCONTINUED | OUTPATIENT
Start: 2019-04-29 | End: 2019-05-29

## 2019-04-29 RX ORDER — INSULIN LISPRO 100/ML
VIAL (ML) SUBCUTANEOUS
Qty: 0 | Refills: 0 | Status: DISCONTINUED | OUTPATIENT
Start: 2019-04-29 | End: 2019-05-29

## 2019-04-29 RX ORDER — INSULIN GLARGINE 100 [IU]/ML
72 INJECTION, SOLUTION SUBCUTANEOUS AT BEDTIME
Qty: 0 | Refills: 0 | Status: DISCONTINUED | OUTPATIENT
Start: 2019-04-29 | End: 2019-05-15

## 2019-04-29 RX ORDER — MAGNESIUM HYDROXIDE 400 MG/1
30 TABLET, CHEWABLE ORAL DAILY
Qty: 0 | Refills: 0 | Status: DISCONTINUED | OUTPATIENT
Start: 2019-04-29 | End: 2019-05-29

## 2019-04-29 RX ORDER — LOSARTAN/HYDROCHLOROTHIAZIDE 100MG-25MG
1 TABLET ORAL
Qty: 0 | Refills: 0 | COMMUNITY

## 2019-04-29 RX ADMIN — APIXABAN 5 MILLIGRAM(S): 2.5 TABLET, FILM COATED ORAL at 05:35

## 2019-04-29 RX ADMIN — Medication 100 MILLIGRAM(S): at 21:30

## 2019-04-29 RX ADMIN — TAMSULOSIN HYDROCHLORIDE 0.4 MILLIGRAM(S): 0.4 CAPSULE ORAL at 21:30

## 2019-04-29 RX ADMIN — Medication 200 MILLIGRAM(S): at 05:41

## 2019-04-29 RX ADMIN — Medication 40 MILLIGRAM(S): at 05:35

## 2019-04-29 RX ADMIN — Medication 0.3 MILLIGRAM(S): at 14:02

## 2019-04-29 RX ADMIN — ATORVASTATIN CALCIUM 40 MILLIGRAM(S): 80 TABLET, FILM COATED ORAL at 21:29

## 2019-04-29 RX ADMIN — Medication 667 MILLIGRAM(S): at 17:00

## 2019-04-29 RX ADMIN — Medication 667 MILLIGRAM(S): at 07:56

## 2019-04-29 RX ADMIN — AMLODIPINE BESYLATE 5 MILLIGRAM(S): 2.5 TABLET ORAL at 05:35

## 2019-04-29 RX ADMIN — Medication 81 MILLIGRAM(S): at 11:51

## 2019-04-29 RX ADMIN — Medication 2: at 11:59

## 2019-04-29 RX ADMIN — Medication 24 UNIT(S): at 11:59

## 2019-04-29 RX ADMIN — Medication 0.3 MILLIGRAM(S): at 21:31

## 2019-04-29 RX ADMIN — Medication 100 MILLIGRAM(S): at 05:41

## 2019-04-29 RX ADMIN — Medication 0.3 MILLIGRAM(S): at 05:36

## 2019-04-29 RX ADMIN — LIDOCAINE 1 PATCH: 4 CREAM TOPICAL at 11:51

## 2019-04-29 RX ADMIN — Medication 200 MILLIGRAM(S): at 14:01

## 2019-04-29 RX ADMIN — Medication 100 MILLIGRAM(S): at 14:02

## 2019-04-29 RX ADMIN — Medication 667 MILLIGRAM(S): at 11:51

## 2019-04-29 RX ADMIN — APIXABAN 5 MILLIGRAM(S): 2.5 TABLET, FILM COATED ORAL at 17:33

## 2019-04-29 RX ADMIN — Medication 24 UNIT(S): at 07:55

## 2019-04-29 RX ADMIN — PANTOPRAZOLE SODIUM 40 MILLIGRAM(S): 20 TABLET, DELAYED RELEASE ORAL at 05:49

## 2019-04-29 RX ADMIN — Medication 1: at 17:00

## 2019-04-29 RX ADMIN — Medication 200 MILLIGRAM(S): at 21:30

## 2019-04-29 RX ADMIN — Medication 24 UNIT(S): at 17:00

## 2019-04-29 RX ADMIN — Medication 2000 UNIT(S): at 11:51

## 2019-04-29 RX ADMIN — SENNA PLUS 2 TABLET(S): 8.6 TABLET ORAL at 21:30

## 2019-04-29 RX ADMIN — CHLORHEXIDINE GLUCONATE 1 APPLICATION(S): 213 SOLUTION TOPICAL at 05:35

## 2019-04-29 RX ADMIN — INSULIN GLARGINE 72 UNIT(S): 100 INJECTION, SOLUTION SUBCUTANEOUS at 21:46

## 2019-04-29 RX ADMIN — Medication 20 MILLIGRAM(S): at 05:41

## 2019-04-29 NOTE — PROGRESS NOTE ADULT - SUBJECTIVE AND OBJECTIVE BOX
Pt seen and examined independently. No new complaints. Feeling better. Denies SOB.    Gen:NAD, AAOx3  CV: S1 S2  Resp: Decreased BS b/l  GI: NT/ND/S +BS  MS: traced edema b/l  Neuro: LLE 4/5, rest 5/5    Discharge instructions discussed and patient/daughter know when to seek immediate medical attention.  Patient has proper follow up.  All results discussed and patient/daughter aware they may require further work up.  Stressed importance of proper follow up.  Medications prescribed and changes discussed.  All questions and concerns from patient and family addressed. Understanding of instructions verbalized.    Time spent in completing discharge process and coordinating care 45 minutes.

## 2019-04-29 NOTE — DISCHARGE NOTE PROVIDER - CARE PROVIDERS DIRECT ADDRESSES
,[email or web address]

## 2019-04-29 NOTE — DISCHARGE NOTE NURSING/CASE MANAGEMENT/SOCIAL WORK - NSDCDPATPORTLINK_GEN_ALL_CORE
You can access the [facility] Patient Portal, offered by [facility], by registering with the following website: [email or web address]

## 2019-04-29 NOTE — PROGRESS NOTE ADULT - PROVIDER SPECIALTY LIST ADULT
Critical Care
Hospitalist
Internal Medicine
Nephrology
Neurology
Neurosurgery
Pulmonology
CCU
Internal Medicine
Nephrology
Nephrology
CCU
CCU
Critical Care
Hospitalist
MICU

## 2019-04-29 NOTE — SWALLOW BEDSIDE ASSESSMENT ADULT - POSITIONING
upright (90 degrees)

## 2019-04-29 NOTE — SWALLOW BEDSIDE ASSESSMENT ADULT - ASR SWALLOW ASPIRATION MONITOR
gurgly voice/position upright (90Y)/fever/pneumonia/throat clearing/upper respiratory infection/cough
oral hygiene/position upright (90Y)
oral hygiene/position upright (90Y)
position upright (90Y)/fever/throat clearing/cough/upper respiratory infection/gurgly voice/pneumonia
position upright (90Y)/oral hygiene
pneumonia/position upright (90Y)/gurgly voice/throat clearing/upper respiratory infection/cough/fever
position upright (90Y)/oral hygiene

## 2019-04-29 NOTE — SWALLOW BEDSIDE ASSESSMENT ADULT - SLP PERTINENT HISTORY OF CURRENT PROBLEM
58 y/o M with hx of HTN, DM, CAD, Sleep Apnea BIBEMS for altered mental status and aphasia. CT scan showed the left intra parenchymal bleed with the extend into the ventricle associated with the midline shift.
58 y/o M with hx of HTN, DM, CAD, Sleep Apnea BIBEMS for altered mental status and aphasia. CT scan showed the left intra parenchymal bleed with the extend into the ventricle associated with the midline shift.
60 y/o M with hx of HTN, DM, CAD, Sleep Apnea BIBEMS for altered mental status and aphasia. CT scan showed the left intra parenchymal bleed with the extend into the ventricle associated with the midline shift.
58 y/o M with hx of HTN, DM, CAD, Sleep Apnea BIBEMS for altered mental status and aphasia. CT scan showed the left intra parenchymal bleed with the extend into the ventricle associated with the midline shift

## 2019-04-29 NOTE — DISCHARGE NOTE PROVIDER - PROVIDER TOKENS
PROVIDER:[TOKEN:[[id number]],FOLLOWUP:[1 week]],PROVIDER:[TOKEN:[[id number]],FOLLOWUP:[1 week]],PROVIDER:[TOKEN:[[id number]],FOLLOWUP:[1 week]],PROVIDER:[TOKEN:[[id number]],FOLLOWUP:[1 week]],PROVIDER:[TOKEN:[[id number]],FOLLOWUP:[Routine]] PROVIDER:[TOKEN:[[id number]],FOLLOWUP:[1 week]],PROVIDER:[TOKEN:[[id number]],FOLLOWUP:[2 weeks]],PROVIDER:[TOKEN:[[id number]],FOLLOWUP:[2 weeks]],PROVIDER:[TOKEN:[[id number]],FOLLOWUP:[1 week]],PROVIDER:[TOKEN:[[id number]],FOLLOWUP:[1 month]],PROVIDER:[TOKEN:[[id number]],FOLLOWUP:[2 weeks]]

## 2019-04-29 NOTE — DISCHARGE NOTE PROVIDER - NSDCCPCAREPLAN_GEN_ALL_CORE_FT
PRINCIPAL DISCHARGE DIAGNOSIS  Diagnosis: Intraparenchymal hemorrhage of brain  Assessment and Plan of Treatment: Please participate in rehab and follow up with neurology and neurosurgery after discharge. If you experience headaches, blurry vision, weakness, please see doctor immediately.      SECONDARY DISCHARGE DIAGNOSES  Diagnosis: DVT, bilateral lower limbs  Assessment and Plan of Treatment: Please take medication as directed. Follow up with primary doctor after discharge.    Diagnosis: Afib  Assessment and Plan of Treatment: You were started on anticoagulant. Please take as directed and follow up with cardiologist and primary doctor upon discharge.    Diagnosis: Diabetes mellitus  Assessment and Plan of Treatment: Follow up with primary doctor after discharge.    Diagnosis: DESI (acute kidney injury)  Assessment and Plan of Treatment: Please follow up with primary doctor after discharge.    Diagnosis: Osteoarthritis  Assessment and Plan of Treatment: Please participate in PT, apply daily ice packs. Follow up with orthopedist outpatient.    Diagnosis: Urinary retention  Assessment and Plan of Treatment: This was likely due to immobilization and prostate enlargement. Please follow up with primary doctor after discharge. PRINCIPAL DISCHARGE DIAGNOSIS  Diagnosis: Intraparenchymal hemorrhage of brain  Assessment and Plan of Treatment: Please participate in rehab and follow up with neurology and neurosurgery after discharge. If you experience headaches, blurry vision, weakness, please see doctor immediately.      SECONDARY DISCHARGE DIAGNOSES  Diagnosis: Hypertension  Assessment and Plan of Treatment: tight BP control. Please discuss with renal when to restart ARB/ACE.    Diagnosis: Acute hypoxemic respiratory failure  Assessment and Plan of Treatment: suspected ZE. Use CPAP QHS. Arrange for discharge with CPAP if possible. Outpt sleep study.    Diagnosis: Osteoarthritis  Assessment and Plan of Treatment: Please participate in PT, apply daily ice packs. Follow up with orthopedist outpatient.    Diagnosis: Diabetes mellitus  Assessment and Plan of Treatment: Follow up with primary doctor after discharge. Monitor FS.    Diagnosis: Afib  Assessment and Plan of Treatment: You were started on anticoagulant. Please take as directed and follow up with cardiologist and primary doctor upon discharge.    Diagnosis: DESI (acute kidney injury)  Assessment and Plan of Treatment: Please follow up with nephrologist after discharge.    Diagnosis: Urinary retention  Assessment and Plan of Treatment: This was likely due to immobilization and prostate enlargement. Please follow up with primary doctor after discharge.    Diagnosis: DVT, bilateral lower limbs  Assessment and Plan of Treatment: Suspected PE.  Continue anticoagulation PRINCIPAL DISCHARGE DIAGNOSIS  Diagnosis: Intraparenchymal hemorrhage of brain  Assessment and Plan of Treatment: Please participate in rehab and follow up with neurology and neurosurgery after discharge. If you experience headaches, blurry vision, weakness, please see doctor immediately.      SECONDARY DISCHARGE DIAGNOSES  Diagnosis: DVT, bilateral lower limbs  Assessment and Plan of Treatment: Suspected PE.  Continue anticoagulation    Diagnosis: Afib  Assessment and Plan of Treatment: You were started on anticoagulant. Please take as directed and follow up with cardiologist and primary doctor upon discharge.    Diagnosis: Diabetes mellitus  Assessment and Plan of Treatment: Follow up with primary doctor after discharge. Monitor FS.    Diagnosis: DESI (acute kidney injury)  Assessment and Plan of Treatment: Please follow up with nephrologist after discharge.    Diagnosis: Osteoarthritis  Assessment and Plan of Treatment: Please participate in PT, apply daily ice packs. Follow up with orthopedist outpatient.    Diagnosis: Urinary retention  Assessment and Plan of Treatment: This was likely due to immobilization and prostate enlargement. Please follow up with primary doctor after discharge.    Diagnosis: Hypertension  Assessment and Plan of Treatment: tight BP control. Please discuss with renal when to restart ARB/ACE.    Diagnosis: Acute hypoxemic respiratory failure  Assessment and Plan of Treatment: suspected ZE. Use CPAP QHS. Arrange for discharge with CPAP if possible. Outpt sleep study.

## 2019-04-29 NOTE — PROGRESS NOTE ADULT - REASON FOR ADMISSION
Right sided weakness

## 2019-04-29 NOTE — DISCHARGE NOTE NURSING/CASE MANAGEMENT/SOCIAL WORK - NSDCPEPTSTRK_GEN_ALL_CORE
Stroke warning signs and symptoms/Risk factors for stroke/Call 911 for stroke/Prescribed medications/Signs and symptoms of stroke/Stroke support groups for patients, families, and friends/Need for follow up after discharge/Stroke education booklet

## 2019-04-29 NOTE — SWALLOW BEDSIDE ASSESSMENT ADULT - SLP PRECAUTIONS/LIMITATIONS: HEARING
within functional limits

## 2019-04-29 NOTE — SWALLOW BEDSIDE ASSESSMENT ADULT - NS SPL SWALLOW CLINIC TRIAL FT
+toleration w/o overt s/s penetration/aspiration.
+toleration w/ no overt s/s of aspiration vs penetration.
+toleration w/o overt s/s penetration/aspiration w/ use of "straw pinching" by clinician during all trials.
+toleration w/o overt s/s penetration/aspiration w/ use of "straw pinching" by clinician during all trials.
+toleration w/o overt s/s penetration/aspiration.
+toleration w/o overt s/s of penetration/aspiration w/ nectar
+toleration w/ no overt s/s of aspiration vs penetration.

## 2019-04-29 NOTE — DISCHARGE NOTE PROVIDER - CARE PROVIDER_API CALL
Tyler Jack)  EEGEpilepsy; Neurology  [address], [address]  [place] [address]  Phone: [phone]  Fax: [phone]  Follow Up Time: 1 week    Isiah Ruth)  Neurological Surgery  [address], [address]  [place] [address]  Phone: [phone]  Fax: [phone]  Follow Up Time: 1 week    Ludwig Man)  Cardiovascular Disease; Nuclear Cardiology  [address]  [place] [address]  Phone: [phone]  Fax: [phone]  Follow Up Time: 1 week    Kevin Schaefer (DO)  Family Medicine  [address] [place] [address]  Phone: [phone]  Fax: [phone]  Follow Up Time: 1 week    Jose Carlos Lehman)  Orthopaedic Surgery  [address] [place] [address]  Phone: [phone]  Fax: [phone]  Follow Up Time: Routine Tyler Jack)  EEGEpilepsy; Neurology  [address] [facility] [address] [facility], [address]  [place] [address]  Phone: [phone]  Fax: [phone]  Follow Up Time: 1 week    Isiah Ruth)  Neurological Surgery  [address], [address]  [place] [address]  Phone: [phone]  Fax: [phone]  Follow Up Time: 2 weeks    Ludwig Man)  Cardiovascular Disease; Nuclear Cardiology  [address], [address]  [place] [address]  Phone: [phone]  Fax: [phone]  Follow Up Time: 2 weeks    Kevin Schaefer (DO)  Family Medicine  [address] [place] [address]  Phone: [phone]  Fax: [phone]  Follow Up Time: 1 week    Jose Carlos Lehman)  Orthopaedic Surgery  [facility] [place] [address]  Phone: [phone]  Fax: [phone]  Follow Up Time: 1 month    Hiro Miranda)  Internal Medicine; Nephrology  [address] [place] [address]  Phone: [phone]  Fax: [phone]  Follow Up Time: 2 weeks

## 2019-04-29 NOTE — CHART NOTE - NSCHARTNOTEFT_GEN_A_CORE
<<<RESIDENT DISCHARGE NOTE>>>     GRIS TIDWELL  MRN-[id number]    VITAL SIGNS:  T(F): 96.7 (04-29-19 @ 14:21), Max: 97.6 (04-28-19 @ 22:04)  HR: 59 (04-29-19 @ 14:21)  BP: 121/58 (04-29-19 @ 14:21)  SpO2: --      PHYSICAL EXAMINATION:  General: nad  Head & Neck: NCAT  Pulmonary: ctab  Cardiovascular: b/l pitting edema  Gastrointestinal/Abdomen & Pelvis: obese, soft, nt/nd  Neurologic/Motor:AAOx3, nonfocal    TEST RESULTS:                        10.9   11.11 )-----------( 293      ( 29 Apr 2019 06:28 )             34.7       04-29    137  |  95<L>  |  100<HH>  ----------------------------<  122<H>  4.4   |  27  |  2.4<H>    Ca    9.1      29 Apr 2019 06:28  Mg     1.8     04-29        FINAL DISCHARGE INTERVIEW:  Resident(s) Present: (Name:_______Adriana______)    DISCHARGE MEDICATION RECONCILIATION  reviewed with Attending (Name:Ha________)    DISPOSITION:   [  ] Home,    [  ] Home with Visiting Nursing Services,   [    ]  SNF/ NH,    [ x  ] Acute Rehab (4A),   [   ] Other (Specify:_________)

## 2019-04-29 NOTE — CHART NOTE - NSCHARTNOTEFT_GEN_A_CORE
Registered Dietitian Follow-Up     Patient Profile Reviewed                           Yes [X]   No []     Nutrition History Previously Obtained        Yes [X]  No []       Pertinent Subjective Information: Pt on cut up diet with nectar thick liquids, continues with good isabella/PO intake, consuming 100%; requests liquids be upgraded to thins. Speech plans to follow up in rehab tomorrow.      Pertinent Medical Interventions: 60 y/o M with hx of HTN, DM, CAD, Sleep Apnea BIBEMS for altered mental status and aphasia. CT scan showed the left intra parenchymal bleed with the extend into the ventricle associated with the midline shift.     Diet order: Cut up diet, nectar thick liquids, renal diet, consistent carb diet.     Anthropometrics:  - Ht.  - Wt. no new wt  - %wt change  - BMI  - IBW     Pertinent Lab Data: 4/29: Na 137, K 4.4, , Cr 2.4, Glucose 122  - BUN consistently elevated likely 2/2 steroids and diuretics per nephrology note previously   - current diet order provides ~75 g pro (likely an appropriate amount given obesity).     Pertinent Meds: aspirin, insulin glargine, insulin lispro, furosemide, lactulose, prednisone, senna, pantoprazole      Physical Findings:  - Appearance: alert/oriented; 2+ edema to b/l feet and ankles  - GI function: +BM 4/28  - Tubes:  - Oral/Mouth cavity: continues on cut up with nectar thick per SLP recs 4/11   - Skin: intact      Nutrition Requirements  Weight Used: 120 kg/ 75.5 kg IBW     Estimated Needs: Ongoing  Calories: 1885 kcals/day (25 kcals/kg IBW) for morbidly obese pt w/ CKD  Protein: 68-75 g/day (0.9-1.0 g/kg IBW)- will monitor renal fx and adust prn  Fluids: per LIP     Nutrient Intake: %- meeting needs, 60 g pro restriction not warranted (elevated BUN likely partially 2/2 steroids and lasix)     [] Previous Nutrition Diagnosis: Altered nutrition related lab values- stable no further interventions            [] Ongoing          [] Resolved     Nutrition Intervention: Meals and snacks     Goal/Expected Outcome: PO 75% over next 4 days, glucose <200, and BUN <100 over next 7 days     Indicator/Monitoring: RD to monitor energy intake, nfpf (tolerance), renal profile/lytes, glucose/endocrine    RECS:  1) Continue current diet order

## 2019-04-29 NOTE — SWALLOW BEDSIDE ASSESSMENT ADULT - SWALLOW EVAL: RECOMMENDED DIET
NPO
dys 3 w/ nectar
dys 3 w/ nectar
dysphagia 3 w/ nectar thick liquids
dys 3 w/ nectar
dysphagia 3 w/ nectar thick liquids

## 2019-04-29 NOTE — SWALLOW BEDSIDE ASSESSMENT ADULT - SLP GENERAL OBSERVATIONS
Pt received in bed in chair position. Spouse at b/s, pt on O2 nasal cannula, woke to verbal stim
Pt awake and alert throughout whole follow up, oriented to person with O2 via NC in place.
Pt in and out of alertness, oriented to person with O2 via NC in place. Pt's wife present at the bedside.
Pt received in bed in chair position. Spouse at b/s, pt on O2 nasal cannula,
Pt repositioned upright in bed w/ assistance from DAYNA Oliva. Pt w/ O2 via nasal cannula, tolerating 92-97% throughout session. Pt alert and oriented to self. Pt's wife present at bedside.
Pt received in bed in chair position. Spouse at b/s, pt on O2 nasal cannula,
Pt received sitting upright in bedside chair with lunch tray in front of him. Pt tolerating room air w/ no c/o pain.

## 2019-04-29 NOTE — SWALLOW BEDSIDE ASSESSMENT ADULT - SWALLOW EVAL: FEEDING ASSISTANCE
frequent cues/help required
1:1 assistance for feeding/supervision
frequent cues/help required
frequent cues/help required
1:1 assistance for feeding/supervision

## 2019-04-29 NOTE — SWALLOW BEDSIDE ASSESSMENT ADULT - NS ASR SWALLOW FINDINGS DISCUS
Physician/Patient/Family/Nursing
Patient/Family/Pt's wife
Patient/Family/Pt's wife
Patient/Family/Nursing/Physician/RN MD Julio C
Physician/Patient/Family/RN Ashwini/Nursing
DAYNA Bunch MD/Physician/Nursing/Patient/Family
Physician/RN Jennyfer; Dr. Wright/Nursing/Patient

## 2019-04-29 NOTE — DISCHARGE NOTE PROVIDER - HOSPITAL COURSE
58 yo M with PMHx of HTN, DM, CAD on aspirin, ZE who was BIBEMS for ams and aphasia. Stroked code called, had NIHSS 15. CTH showed L frontotemporal intraparenchymal bleed with extension into the ventricle and 2mm shift to the R. Admitted to ICU s/p platelets, DDAVP, nicardipine. Multiple repeat CT head showed stable bleed. While in hospital, pt respiratory status worsened, requiring BiPAP. Repeat duplex 4/12 showed new right posterior tibial and peroneal and left gastrocnemius and branch of posterior tibial thrombus. (Duplex venous 4/6 was neg for DVT.) CTA was not performed due to CKD and V/Q scan not performed as it would not . Neurosurgery cleared pt for anticoagulation. Respiratory status improved and patient is off oxygen. Patient also had DESI but this has resolved to baseline. 60 yo M with PMHx of HTN, DM, CAD on aspirin, ZE who was BIBEMS for ams and aphasia. Stroked code called, had NIHSS 15. CTH showed L frontotemporal intraparenchymal bleed with extension into the ventricle and 2mm shift to the R. Admitted to ICU s/p platelets, DDAVP, nicardipine. Multiple repeat CT head showed stable bleed. While in hospital, pt respiratory status worsened, requiring BiPAP. Repeat duplex 4/12 showed new right posterior tibial and peroneal and left gastrocnemius and branch of posterior tibial thrombus. (Duplex venous 4/6 was neg for DVT.) CTA was not performed due to CKD and V/Q scan not performed as it would not . Neurosurgery cleared pt for anticoagulation. Respiratory status improved and patient is off oxygen. Patient also had DESI on CKD3 but this has resolved to baseline.

## 2019-04-29 NOTE — SWALLOW BEDSIDE ASSESSMENT ADULT - COMMENTS
RN Jennyfer reported that patient has been drinking thin liquids all weekend and he was "doing fine" with it. RN reported patient's wife giving him thin liquids, as well as RN giving thin liquids with meds. SLP educated RN on the completion of a FEES on 4/9/19, patient's dysphagia history including silent penetration (without ejection from the airway), as well as his prolonged compromised respiratory status this hospitalization, putting him at high risk for aspiration with thin liquids. SLP stated that a repeat FEES should be completed before patient is upgraded to thin liquids to ensure no further risk for aspiration/ improved swallow/respiratory coordination. FEES can be completed upon transfer to . +toleration w/o overt s/s penetration/aspiration

## 2019-04-29 NOTE — SWALLOW BEDSIDE ASSESSMENT ADULT - SWALLOW EVAL: CURRENT DIET
dysphagia 3 w/ nectar thick liquids
Dysphagia Diet III Mechanical soft consistency with cut up meats, Nectar-thickened liquids
NPO
reg w/ thin
Dysphagia Diet III Mechanical soft consistency with cut up meats, Nectar-thickened liquids
dysphagia 3 w/ nectar thick liquids

## 2019-04-29 NOTE — SWALLOW BEDSIDE ASSESSMENT ADULT - SWALLOW EVAL: DIAGNOSIS
+mild oral dysphagia for puree and soft +toleration of nectar, puree and soft w/o overt s/s of penetration/aspiration
+toleration of dysphagia 3 w/ nectar thick liquids
+toleration of dysphagia 3 w/ nectar thick liquids while provided w/ cues to utilize swallow strategies (e.g., double swallow after each bite/sip)
+toleration of soft solids and nectar thick liquids w/o overt s/s penetration/aspiration
mild oral dysphagia for Dysphagia Diet III Mechanical soft consistency with cut up meats, moderate oral dysphagia for regular consistency.  +toleration of nectar, with no overt s/s of aspiration vs penetration. min overts post thins.
mild oral dysphagia for Dysphagia Diet III Mechanical soft consistency with cut up meats, moderate oral dysphagia for regular consistency.  +toleration of nectar, with no overt s/s of aspiration vs penetration. min overts post thins.
+toleration of soft solids and nectar thick liquids w/o overt s/s penetration/aspiration. Repeat FEES to be completed upon pt's transition to inpatient rehab floor, d/t hx of moderate pharyngeal dysphagia w/ thin liquids and high risk for aspiration.

## 2019-04-29 NOTE — DISCHARGE NOTE PROVIDER - NSDCFUADDINST_GEN_ALL_CORE_FT
CPAP pressure 8 CPAP 8 at night AVAPS settings:   CPAP 8   AVAPS max pressure 20  AVAPS min pressure 16  Tidal volume 400

## 2019-04-30 PROBLEM — E11.9 TYPE 2 DIABETES MELLITUS WITHOUT COMPLICATIONS: Chronic | Status: ACTIVE | Noted: 2019-04-02

## 2019-04-30 PROBLEM — I10 ESSENTIAL (PRIMARY) HYPERTENSION: Chronic | Status: ACTIVE | Noted: 2019-04-02

## 2019-04-30 PROBLEM — M10.9 GOUT, UNSPECIFIED: Chronic | Status: ACTIVE | Noted: 2019-04-02

## 2019-04-30 PROBLEM — E66.01 MORBID (SEVERE) OBESITY DUE TO EXCESS CALORIES: Chronic | Status: ACTIVE | Noted: 2019-04-02

## 2019-04-30 LAB
GLUCOSE BLDC GLUCOMTR-MCNC: 114 MG/DL — HIGH (ref 70–99)
GLUCOSE BLDC GLUCOMTR-MCNC: 195 MG/DL — HIGH (ref 70–99)
GLUCOSE BLDC GLUCOMTR-MCNC: 224 MG/DL — HIGH (ref 70–99)
GLUCOSE BLDC GLUCOMTR-MCNC: 273 MG/DL — HIGH (ref 70–99)
GLUCOSE BLDC GLUCOMTR-MCNC: 279 MG/DL — HIGH (ref 70–99)

## 2019-04-30 RX ORDER — DOCUSATE SODIUM 100 MG
100 CAPSULE ORAL THREE TIMES A DAY
Qty: 0 | Refills: 0 | Status: DISCONTINUED | OUTPATIENT
Start: 2019-04-30 | End: 2019-05-29

## 2019-04-30 RX ORDER — POLYETHYLENE GLYCOL 3350 17 G/17G
17 POWDER, FOR SOLUTION ORAL AT BEDTIME
Qty: 0 | Refills: 0 | Status: DISCONTINUED | OUTPATIENT
Start: 2019-04-30 | End: 2019-05-29

## 2019-04-30 RX ORDER — PSYLLIUM SEED (WITH DEXTROSE)
1 POWDER (GRAM) ORAL DAILY
Qty: 0 | Refills: 0 | Status: DISCONTINUED | OUTPATIENT
Start: 2019-04-30 | End: 2019-05-29

## 2019-04-30 RX ORDER — CHLORHEXIDINE GLUCONATE 213 G/1000ML
1 SOLUTION TOPICAL EVERY 12 HOURS
Qty: 0 | Refills: 0 | Status: COMPLETED | OUTPATIENT
Start: 2019-04-30 | End: 2019-05-01

## 2019-04-30 RX ADMIN — Medication 0.3 MILLIGRAM(S): at 17:18

## 2019-04-30 RX ADMIN — Medication 40 MILLIGRAM(S): at 06:22

## 2019-04-30 RX ADMIN — Medication 6: at 17:44

## 2019-04-30 RX ADMIN — APIXABAN 5 MILLIGRAM(S): 2.5 TABLET, FILM COATED ORAL at 17:32

## 2019-04-30 RX ADMIN — Medication 100 MILLIGRAM(S): at 17:20

## 2019-04-30 RX ADMIN — Medication 1 PACKET(S): at 12:50

## 2019-04-30 RX ADMIN — POLYETHYLENE GLYCOL 3350 17 GRAM(S): 17 POWDER, FOR SOLUTION ORAL at 21:46

## 2019-04-30 RX ADMIN — Medication 667 MILLIGRAM(S): at 08:37

## 2019-04-30 RX ADMIN — Medication 0.3 MILLIGRAM(S): at 21:49

## 2019-04-30 RX ADMIN — Medication 3000 UNIT(S): at 12:50

## 2019-04-30 RX ADMIN — ATORVASTATIN CALCIUM 40 MILLIGRAM(S): 80 TABLET, FILM COATED ORAL at 21:45

## 2019-04-30 RX ADMIN — LIDOCAINE 1 PATCH: 4 CREAM TOPICAL at 08:37

## 2019-04-30 RX ADMIN — Medication 24 UNIT(S): at 12:15

## 2019-04-30 RX ADMIN — Medication 200 MILLIGRAM(S): at 06:22

## 2019-04-30 RX ADMIN — TAMSULOSIN HYDROCHLORIDE 0.4 MILLIGRAM(S): 0.4 CAPSULE ORAL at 21:45

## 2019-04-30 RX ADMIN — AMLODIPINE BESYLATE 5 MILLIGRAM(S): 2.5 TABLET ORAL at 06:26

## 2019-04-30 RX ADMIN — APIXABAN 5 MILLIGRAM(S): 2.5 TABLET, FILM COATED ORAL at 06:21

## 2019-04-30 RX ADMIN — SENNA PLUS 2 TABLET(S): 8.6 TABLET ORAL at 21:48

## 2019-04-30 RX ADMIN — PANTOPRAZOLE SODIUM 40 MILLIGRAM(S): 20 TABLET, DELAYED RELEASE ORAL at 06:22

## 2019-04-30 RX ADMIN — CHLORHEXIDINE GLUCONATE 1 APPLICATION(S): 213 SOLUTION TOPICAL at 17:52

## 2019-04-30 RX ADMIN — Medication 200 MILLIGRAM(S): at 17:21

## 2019-04-30 RX ADMIN — Medication 100 MILLIGRAM(S): at 06:22

## 2019-04-30 RX ADMIN — Medication 100 MILLIGRAM(S): at 21:45

## 2019-04-30 RX ADMIN — Medication 0.3 MILLIGRAM(S): at 06:26

## 2019-04-30 RX ADMIN — INSULIN GLARGINE 72 UNIT(S): 100 INJECTION, SOLUTION SUBCUTANEOUS at 21:47

## 2019-04-30 RX ADMIN — Medication 24 UNIT(S): at 17:10

## 2019-04-30 RX ADMIN — LIDOCAINE 1 PATCH: 4 CREAM TOPICAL at 21:49

## 2019-04-30 RX ADMIN — Medication 81 MILLIGRAM(S): at 12:48

## 2019-04-30 RX ADMIN — Medication 100 MILLIGRAM(S): at 21:48

## 2019-04-30 RX ADMIN — Medication 24 UNIT(S): at 08:33

## 2019-04-30 RX ADMIN — Medication 4: at 12:15

## 2019-04-30 RX ADMIN — Medication 667 MILLIGRAM(S): at 17:22

## 2019-04-30 RX ADMIN — LIDOCAINE 1 PATCH: 4 CREAM TOPICAL at 17:52

## 2019-04-30 RX ADMIN — Medication 667 MILLIGRAM(S): at 12:50

## 2019-04-30 RX ADMIN — Medication 200 MILLIGRAM(S): at 21:45

## 2019-05-01 DIAGNOSIS — E87.5 HYPERKALEMIA: ICD-10-CM

## 2019-05-01 DIAGNOSIS — E11.22 TYPE 2 DIABETES MELLITUS WITH DIABETIC CHRONIC KIDNEY DISEASE: ICD-10-CM

## 2019-05-01 DIAGNOSIS — I69.251 HEMIPLEGIA AND HEMIPARESIS FOLLOWING OTHER NONTRAUMATIC INTRACRANIAL HEMORRHAGE AFFECTING RIGHT DOMINANT SIDE: ICD-10-CM

## 2019-05-01 DIAGNOSIS — I61.4 NONTRAUMATIC INTRACEREBRAL HEMORRHAGE IN CEREBELLUM: ICD-10-CM

## 2019-05-01 DIAGNOSIS — N17.9 ACUTE KIDNEY FAILURE, UNSPECIFIED: ICD-10-CM

## 2019-05-01 DIAGNOSIS — E11.65 TYPE 2 DIABETES MELLITUS WITH HYPERGLYCEMIA: ICD-10-CM

## 2019-05-01 DIAGNOSIS — E78.5 HYPERLIPIDEMIA, UNSPECIFIED: ICD-10-CM

## 2019-05-01 DIAGNOSIS — R33.9 RETENTION OF URINE, UNSPECIFIED: ICD-10-CM

## 2019-05-01 DIAGNOSIS — J96.22 ACUTE AND CHRONIC RESPIRATORY FAILURE WITH HYPERCAPNIA: ICD-10-CM

## 2019-05-01 DIAGNOSIS — G47.33 OBSTRUCTIVE SLEEP APNEA (ADULT) (PEDIATRIC): ICD-10-CM

## 2019-05-01 DIAGNOSIS — I82.443 ACUTE EMBOLISM AND THROMBOSIS OF TIBIAL VEIN, BILATERAL: ICD-10-CM

## 2019-05-01 DIAGNOSIS — R47.01 APHASIA: ICD-10-CM

## 2019-05-01 DIAGNOSIS — M10.071 IDIOPATHIC GOUT, RIGHT ANKLE AND FOOT: ICD-10-CM

## 2019-05-01 DIAGNOSIS — I48.0 PAROXYSMAL ATRIAL FIBRILLATION: ICD-10-CM

## 2019-05-01 DIAGNOSIS — A41.9 SEPSIS, UNSPECIFIED ORGANISM: ICD-10-CM

## 2019-05-01 DIAGNOSIS — T38.0X5A ADVERSE EFFECT OF GLUCOCORTICOIDS AND SYNTHETIC ANALOGUES, INITIAL ENCOUNTER: ICD-10-CM

## 2019-05-01 DIAGNOSIS — M17.11 UNILATERAL PRIMARY OSTEOARTHRITIS, RIGHT KNEE: ICD-10-CM

## 2019-05-01 DIAGNOSIS — N18.3 CHRONIC KIDNEY DISEASE, STAGE 3 (MODERATE): ICD-10-CM

## 2019-05-01 DIAGNOSIS — Z79.4 LONG TERM (CURRENT) USE OF INSULIN: ICD-10-CM

## 2019-05-01 DIAGNOSIS — I50.31 ACUTE DIASTOLIC (CONGESTIVE) HEART FAILURE: ICD-10-CM

## 2019-05-01 DIAGNOSIS — E66.01 MORBID (SEVERE) OBESITY DUE TO EXCESS CALORIES: ICD-10-CM

## 2019-05-01 DIAGNOSIS — D72.829 ELEVATED WHITE BLOOD CELL COUNT, UNSPECIFIED: ICD-10-CM

## 2019-05-01 DIAGNOSIS — E87.0 HYPEROSMOLALITY AND HYPERNATREMIA: ICD-10-CM

## 2019-05-01 DIAGNOSIS — I13.0 HYPERTENSIVE HEART AND CHRONIC KIDNEY DISEASE WITH HEART FAILURE AND STAGE 1 THROUGH STAGE 4 CHRONIC KIDNEY DISEASE, OR UNSPECIFIED CHRONIC KIDNEY DISEASE: ICD-10-CM

## 2019-05-01 DIAGNOSIS — I16.1 HYPERTENSIVE EMERGENCY: ICD-10-CM

## 2019-05-01 DIAGNOSIS — I82.493 ACUTE EMBOLISM AND THROMBOSIS OF OTHER SPECIFIED DEEP VEIN OF LOWER EXTREMITY, BILATERAL: ICD-10-CM

## 2019-05-01 DIAGNOSIS — R14.0 ABDOMINAL DISTENSION (GASEOUS): ICD-10-CM

## 2019-05-01 DIAGNOSIS — J15.6 PNEUMONIA DUE TO OTHER GRAM-NEGATIVE BACTERIA: ICD-10-CM

## 2019-05-01 LAB
GLUCOSE BLDC GLUCOMTR-MCNC: 153 MG/DL — HIGH (ref 70–99)
GLUCOSE BLDC GLUCOMTR-MCNC: 155 MG/DL — HIGH (ref 70–99)
GLUCOSE BLDC GLUCOMTR-MCNC: 163 MG/DL — HIGH (ref 70–99)
GLUCOSE BLDC GLUCOMTR-MCNC: 200 MG/DL — HIGH (ref 70–99)

## 2019-05-01 RX ORDER — LIDOCAINE 4 G/100G
2 CREAM TOPICAL DAILY
Qty: 0 | Refills: 0 | Status: DISCONTINUED | OUTPATIENT
Start: 2019-05-01 | End: 2019-05-29

## 2019-05-01 RX ADMIN — APIXABAN 5 MILLIGRAM(S): 2.5 TABLET, FILM COATED ORAL at 06:22

## 2019-05-01 RX ADMIN — Medication 200 MILLIGRAM(S): at 06:22

## 2019-05-01 RX ADMIN — PANTOPRAZOLE SODIUM 40 MILLIGRAM(S): 20 TABLET, DELAYED RELEASE ORAL at 06:22

## 2019-05-01 RX ADMIN — Medication 100 MILLIGRAM(S): at 13:13

## 2019-05-01 RX ADMIN — CHLORHEXIDINE GLUCONATE 1 APPLICATION(S): 213 SOLUTION TOPICAL at 06:26

## 2019-05-01 RX ADMIN — Medication 2: at 17:28

## 2019-05-01 RX ADMIN — TAMSULOSIN HYDROCHLORIDE 0.4 MILLIGRAM(S): 0.4 CAPSULE ORAL at 21:39

## 2019-05-01 RX ADMIN — Medication 40 MILLIGRAM(S): at 06:22

## 2019-05-01 RX ADMIN — Medication 2: at 07:45

## 2019-05-01 RX ADMIN — LIDOCAINE 2 PATCH: 4 CREAM TOPICAL at 12:26

## 2019-05-01 RX ADMIN — SENNA PLUS 2 TABLET(S): 8.6 TABLET ORAL at 21:40

## 2019-05-01 RX ADMIN — Medication 200 MILLIGRAM(S): at 13:14

## 2019-05-01 RX ADMIN — Medication 2: at 12:24

## 2019-05-01 RX ADMIN — Medication 24 UNIT(S): at 12:24

## 2019-05-01 RX ADMIN — Medication 667 MILLIGRAM(S): at 17:29

## 2019-05-01 RX ADMIN — Medication 100 MILLIGRAM(S): at 06:22

## 2019-05-01 RX ADMIN — TRAMADOL HYDROCHLORIDE 50 MILLIGRAM(S): 50 TABLET ORAL at 13:04

## 2019-05-01 RX ADMIN — Medication 24 UNIT(S): at 17:28

## 2019-05-01 RX ADMIN — POLYETHYLENE GLYCOL 3350 17 GRAM(S): 17 POWDER, FOR SOLUTION ORAL at 21:41

## 2019-05-01 RX ADMIN — LIDOCAINE 2 PATCH: 4 CREAM TOPICAL at 19:26

## 2019-05-01 RX ADMIN — Medication 100 MILLIGRAM(S): at 13:14

## 2019-05-01 RX ADMIN — AMLODIPINE BESYLATE 5 MILLIGRAM(S): 2.5 TABLET ORAL at 06:22

## 2019-05-01 RX ADMIN — Medication 650 MILLIGRAM(S): at 22:26

## 2019-05-01 RX ADMIN — Medication 81 MILLIGRAM(S): at 12:26

## 2019-05-01 RX ADMIN — Medication 0.3 MILLIGRAM(S): at 06:24

## 2019-05-01 RX ADMIN — Medication 667 MILLIGRAM(S): at 12:26

## 2019-05-01 RX ADMIN — APIXABAN 5 MILLIGRAM(S): 2.5 TABLET, FILM COATED ORAL at 17:29

## 2019-05-01 RX ADMIN — Medication 100 MILLIGRAM(S): at 21:39

## 2019-05-01 RX ADMIN — Medication 3000 UNIT(S): at 12:25

## 2019-05-01 RX ADMIN — Medication 0.3 MILLIGRAM(S): at 21:41

## 2019-05-01 RX ADMIN — Medication 667 MILLIGRAM(S): at 07:45

## 2019-05-01 RX ADMIN — Medication 0.3 MILLIGRAM(S): at 13:21

## 2019-05-01 RX ADMIN — TRAMADOL HYDROCHLORIDE 50 MILLIGRAM(S): 50 TABLET ORAL at 09:20

## 2019-05-01 RX ADMIN — INSULIN GLARGINE 72 UNIT(S): 100 INJECTION, SOLUTION SUBCUTANEOUS at 21:40

## 2019-05-01 RX ADMIN — Medication 200 MILLIGRAM(S): at 21:39

## 2019-05-01 RX ADMIN — ATORVASTATIN CALCIUM 40 MILLIGRAM(S): 80 TABLET, FILM COATED ORAL at 21:39

## 2019-05-01 RX ADMIN — Medication 24 UNIT(S): at 07:45

## 2019-05-01 RX ADMIN — Medication 100 MILLIGRAM(S): at 06:25

## 2019-05-02 LAB
GLUCOSE BLDC GLUCOMTR-MCNC: 135 MG/DL — HIGH (ref 70–99)
GLUCOSE BLDC GLUCOMTR-MCNC: 150 MG/DL — HIGH (ref 70–99)
GLUCOSE BLDC GLUCOMTR-MCNC: 193 MG/DL — HIGH (ref 70–99)
GLUCOSE BLDC GLUCOMTR-MCNC: 211 MG/DL — HIGH (ref 70–99)
GLUCOSE BLDC GLUCOMTR-MCNC: 84 MG/DL — SIGNIFICANT CHANGE UP (ref 70–99)

## 2019-05-02 RX ORDER — LACTULOSE 10 G/15ML
20 SOLUTION ORAL
Qty: 0 | Refills: 0 | Status: DISCONTINUED | OUTPATIENT
Start: 2019-05-02 | End: 2019-05-06

## 2019-05-02 RX ORDER — NYSTATIN CREAM 100000 [USP'U]/G
1 CREAM TOPICAL
Qty: 0 | Refills: 0 | Status: DISCONTINUED | OUTPATIENT
Start: 2019-05-02 | End: 2019-05-29

## 2019-05-02 RX ADMIN — Medication 1000 UNIT(S): at 12:16

## 2019-05-02 RX ADMIN — Medication 0.3 MILLIGRAM(S): at 05:40

## 2019-05-02 RX ADMIN — Medication 100 MILLIGRAM(S): at 05:40

## 2019-05-02 RX ADMIN — PANTOPRAZOLE SODIUM 40 MILLIGRAM(S): 20 TABLET, DELAYED RELEASE ORAL at 06:09

## 2019-05-02 RX ADMIN — Medication 667 MILLIGRAM(S): at 08:20

## 2019-05-02 RX ADMIN — APIXABAN 5 MILLIGRAM(S): 2.5 TABLET, FILM COATED ORAL at 17:19

## 2019-05-02 RX ADMIN — INSULIN GLARGINE 72 UNIT(S): 100 INJECTION, SOLUTION SUBCUTANEOUS at 22:03

## 2019-05-02 RX ADMIN — Medication 0.3 MILLIGRAM(S): at 22:01

## 2019-05-02 RX ADMIN — SENNA PLUS 2 TABLET(S): 8.6 TABLET ORAL at 22:01

## 2019-05-02 RX ADMIN — ATORVASTATIN CALCIUM 40 MILLIGRAM(S): 80 TABLET, FILM COATED ORAL at 22:01

## 2019-05-02 RX ADMIN — Medication 650 MILLIGRAM(S): at 09:21

## 2019-05-02 RX ADMIN — Medication 24 UNIT(S): at 12:17

## 2019-05-02 RX ADMIN — Medication 100 MILLIGRAM(S): at 14:53

## 2019-05-02 RX ADMIN — LIDOCAINE 2 PATCH: 4 CREAM TOPICAL at 19:03

## 2019-05-02 RX ADMIN — AMLODIPINE BESYLATE 5 MILLIGRAM(S): 2.5 TABLET ORAL at 05:40

## 2019-05-02 RX ADMIN — TAMSULOSIN HYDROCHLORIDE 0.4 MILLIGRAM(S): 0.4 CAPSULE ORAL at 22:01

## 2019-05-02 RX ADMIN — Medication 81 MILLIGRAM(S): at 17:13

## 2019-05-02 RX ADMIN — Medication 20 MILLIGRAM(S): at 17:20

## 2019-05-02 RX ADMIN — Medication 100 MILLIGRAM(S): at 22:02

## 2019-05-02 RX ADMIN — Medication 40 MILLIGRAM(S): at 05:40

## 2019-05-02 RX ADMIN — Medication 650 MILLIGRAM(S): at 19:04

## 2019-05-02 RX ADMIN — Medication 650 MILLIGRAM(S): at 17:17

## 2019-05-02 RX ADMIN — Medication 24 UNIT(S): at 17:15

## 2019-05-02 RX ADMIN — Medication 200 MILLIGRAM(S): at 22:01

## 2019-05-02 RX ADMIN — Medication 0.3 MILLIGRAM(S): at 14:52

## 2019-05-02 RX ADMIN — APIXABAN 5 MILLIGRAM(S): 2.5 TABLET, FILM COATED ORAL at 05:40

## 2019-05-02 RX ADMIN — Medication 667 MILLIGRAM(S): at 12:15

## 2019-05-02 RX ADMIN — LIDOCAINE 2 PATCH: 4 CREAM TOPICAL at 12:14

## 2019-05-02 RX ADMIN — Medication 650 MILLIGRAM(S): at 10:00

## 2019-05-02 RX ADMIN — Medication 1 PACKET(S): at 12:19

## 2019-05-02 RX ADMIN — Medication 200 MILLIGRAM(S): at 14:53

## 2019-05-02 RX ADMIN — POLYETHYLENE GLYCOL 3350 17 GRAM(S): 17 POWDER, FOR SOLUTION ORAL at 22:04

## 2019-05-02 RX ADMIN — Medication 667 MILLIGRAM(S): at 17:18

## 2019-05-03 LAB
ANION GAP SERPL CALC-SCNC: 16 MMOL/L — HIGH (ref 7–14)
BUN SERPL-MCNC: 89 MG/DL — CRITICAL HIGH (ref 10–20)
CALCIUM SERPL-MCNC: 9.1 MG/DL — SIGNIFICANT CHANGE UP (ref 8.5–10.1)
CHLORIDE SERPL-SCNC: 93 MMOL/L — LOW (ref 98–110)
CO2 SERPL-SCNC: 26 MMOL/L — SIGNIFICANT CHANGE UP (ref 17–32)
CREAT SERPL-MCNC: 2.3 MG/DL — HIGH (ref 0.7–1.5)
GLUCOSE BLDC GLUCOMTR-MCNC: 164 MG/DL — HIGH (ref 70–99)
GLUCOSE BLDC GLUCOMTR-MCNC: 215 MG/DL — HIGH (ref 70–99)
GLUCOSE BLDC GLUCOMTR-MCNC: 231 MG/DL — HIGH (ref 70–99)
GLUCOSE BLDC GLUCOMTR-MCNC: 285 MG/DL — HIGH (ref 70–99)
GLUCOSE SERPL-MCNC: 165 MG/DL — HIGH (ref 70–99)
HCT VFR BLD CALC: 34.9 % — LOW (ref 42–52)
HGB BLD-MCNC: 11 G/DL — LOW (ref 14–18)
MAGNESIUM SERPL-MCNC: 1.8 MG/DL — SIGNIFICANT CHANGE UP (ref 1.8–2.4)
MCHC RBC-ENTMCNC: 26.6 PG — LOW (ref 27–31)
MCHC RBC-ENTMCNC: 31.5 G/DL — LOW (ref 32–37)
MCV RBC AUTO: 84.5 FL — SIGNIFICANT CHANGE UP (ref 80–94)
NRBC # BLD: 0 /100 WBCS — SIGNIFICANT CHANGE UP (ref 0–0)
PHOSPHATE SERPL-MCNC: 4.9 MG/DL — SIGNIFICANT CHANGE UP (ref 2.1–4.9)
PLATELET # BLD AUTO: 199 K/UL — SIGNIFICANT CHANGE UP (ref 130–400)
POTASSIUM SERPL-MCNC: 5.3 MMOL/L — HIGH (ref 3.5–5)
POTASSIUM SERPL-SCNC: 5.3 MMOL/L — HIGH (ref 3.5–5)
RBC # BLD: 4.13 M/UL — LOW (ref 4.7–6.1)
RBC # FLD: 14.1 % — SIGNIFICANT CHANGE UP (ref 11.5–14.5)
SODIUM SERPL-SCNC: 135 MMOL/L — SIGNIFICANT CHANGE UP (ref 135–146)
URATE SERPL-MCNC: 7.8 MG/DL — SIGNIFICANT CHANGE UP (ref 3.4–8.8)
WBC # BLD: 13.44 K/UL — HIGH (ref 4.8–10.8)
WBC # FLD AUTO: 13.44 K/UL — HIGH (ref 4.8–10.8)

## 2019-05-03 PROCEDURE — 71045 X-RAY EXAM CHEST 1 VIEW: CPT | Mod: 26

## 2019-05-03 PROCEDURE — 70450 CT HEAD/BRAIN W/O DYE: CPT | Mod: 26

## 2019-05-03 RX ADMIN — LIDOCAINE 2 PATCH: 4 CREAM TOPICAL at 12:09

## 2019-05-03 RX ADMIN — Medication 4: at 11:48

## 2019-05-03 RX ADMIN — POLYETHYLENE GLYCOL 3350 17 GRAM(S): 17 POWDER, FOR SOLUTION ORAL at 21:55

## 2019-05-03 RX ADMIN — Medication 200 MILLIGRAM(S): at 06:29

## 2019-05-03 RX ADMIN — SENNA PLUS 2 TABLET(S): 8.6 TABLET ORAL at 21:47

## 2019-05-03 RX ADMIN — Medication 2: at 07:58

## 2019-05-03 RX ADMIN — Medication 0.3 MILLIGRAM(S): at 16:01

## 2019-05-03 RX ADMIN — Medication 200 MILLIGRAM(S): at 16:01

## 2019-05-03 RX ADMIN — LIDOCAINE 2 PATCH: 4 CREAM TOPICAL at 19:42

## 2019-05-03 RX ADMIN — LACTULOSE 20 GRAM(S): 10 SOLUTION ORAL at 09:54

## 2019-05-03 RX ADMIN — Medication 6: at 16:50

## 2019-05-03 RX ADMIN — APIXABAN 5 MILLIGRAM(S): 2.5 TABLET, FILM COATED ORAL at 06:29

## 2019-05-03 RX ADMIN — Medication 3000 UNIT(S): at 12:07

## 2019-05-03 RX ADMIN — Medication 200 MILLIGRAM(S): at 21:47

## 2019-05-03 RX ADMIN — NYSTATIN CREAM 1 APPLICATION(S): 100000 CREAM TOPICAL at 17:59

## 2019-05-03 RX ADMIN — INSULIN GLARGINE 72 UNIT(S): 100 INJECTION, SOLUTION SUBCUTANEOUS at 21:46

## 2019-05-03 RX ADMIN — Medication 0.3 MILLIGRAM(S): at 06:32

## 2019-05-03 RX ADMIN — Medication 667 MILLIGRAM(S): at 08:00

## 2019-05-03 RX ADMIN — AMLODIPINE BESYLATE 5 MILLIGRAM(S): 2.5 TABLET ORAL at 06:29

## 2019-05-03 RX ADMIN — Medication 100 MILLIGRAM(S): at 16:01

## 2019-05-03 RX ADMIN — ATORVASTATIN CALCIUM 40 MILLIGRAM(S): 80 TABLET, FILM COATED ORAL at 21:47

## 2019-05-03 RX ADMIN — Medication 100 MILLIGRAM(S): at 06:31

## 2019-05-03 RX ADMIN — Medication 24 UNIT(S): at 16:51

## 2019-05-03 RX ADMIN — LIDOCAINE 2 PATCH: 4 CREAM TOPICAL at 00:04

## 2019-05-03 RX ADMIN — Medication 667 MILLIGRAM(S): at 16:53

## 2019-05-03 RX ADMIN — NYSTATIN CREAM 1 APPLICATION(S): 100000 CREAM TOPICAL at 06:33

## 2019-05-03 RX ADMIN — Medication 24 UNIT(S): at 07:59

## 2019-05-03 RX ADMIN — Medication 20 MILLIGRAM(S): at 17:58

## 2019-05-03 RX ADMIN — APIXABAN 5 MILLIGRAM(S): 2.5 TABLET, FILM COATED ORAL at 17:58

## 2019-05-03 RX ADMIN — Medication 100 MILLIGRAM(S): at 21:47

## 2019-05-03 RX ADMIN — Medication 1 PACKET(S): at 12:09

## 2019-05-03 RX ADMIN — Medication 40 MILLIGRAM(S): at 06:32

## 2019-05-03 RX ADMIN — TAMSULOSIN HYDROCHLORIDE 0.4 MILLIGRAM(S): 0.4 CAPSULE ORAL at 21:47

## 2019-05-03 RX ADMIN — LIDOCAINE 2 PATCH: 4 CREAM TOPICAL at 23:27

## 2019-05-03 RX ADMIN — Medication 24 UNIT(S): at 11:49

## 2019-05-03 RX ADMIN — PANTOPRAZOLE SODIUM 40 MILLIGRAM(S): 20 TABLET, DELAYED RELEASE ORAL at 06:31

## 2019-05-03 RX ADMIN — Medication 0.3 MILLIGRAM(S): at 21:50

## 2019-05-03 RX ADMIN — Medication 100 MILLIGRAM(S): at 06:30

## 2019-05-03 RX ADMIN — Medication 20 MILLIGRAM(S): at 06:31

## 2019-05-03 RX ADMIN — Medication 81 MILLIGRAM(S): at 12:07

## 2019-05-03 RX ADMIN — Medication 667 MILLIGRAM(S): at 11:50

## 2019-05-04 LAB
ALBUMIN SERPL ELPH-MCNC: 3 G/DL — LOW (ref 3.5–5.2)
ALP SERPL-CCNC: 93 U/L — SIGNIFICANT CHANGE UP (ref 30–115)
ALT FLD-CCNC: 81 U/L — HIGH (ref 0–41)
ANION GAP SERPL CALC-SCNC: 14 MMOL/L — SIGNIFICANT CHANGE UP (ref 7–14)
AST SERPL-CCNC: 46 U/L — HIGH (ref 0–41)
BILIRUB SERPL-MCNC: 0.3 MG/DL — SIGNIFICANT CHANGE UP (ref 0.2–1.2)
BUN SERPL-MCNC: 82 MG/DL — CRITICAL HIGH (ref 10–20)
CALCIUM SERPL-MCNC: 9.1 MG/DL — SIGNIFICANT CHANGE UP (ref 8.5–10.1)
CHLORIDE SERPL-SCNC: 92 MMOL/L — LOW (ref 98–110)
CO2 SERPL-SCNC: 26 MMOL/L — SIGNIFICANT CHANGE UP (ref 17–32)
CREAT SERPL-MCNC: 1.9 MG/DL — HIGH (ref 0.7–1.5)
GLUCOSE BLDC GLUCOMTR-MCNC: 188 MG/DL — HIGH (ref 70–99)
GLUCOSE BLDC GLUCOMTR-MCNC: 196 MG/DL — HIGH (ref 70–99)
GLUCOSE BLDC GLUCOMTR-MCNC: 288 MG/DL — HIGH (ref 70–99)
GLUCOSE BLDC GLUCOMTR-MCNC: 304 MG/DL — HIGH (ref 70–99)
GLUCOSE SERPL-MCNC: 190 MG/DL — HIGH (ref 70–99)
HCT VFR BLD CALC: 33 % — LOW (ref 42–52)
HGB BLD-MCNC: 10.4 G/DL — LOW (ref 14–18)
MCHC RBC-ENTMCNC: 26.4 PG — LOW (ref 27–31)
MCHC RBC-ENTMCNC: 31.5 G/DL — LOW (ref 32–37)
MCV RBC AUTO: 83.8 FL — SIGNIFICANT CHANGE UP (ref 80–94)
NRBC # BLD: 0 /100 WBCS — SIGNIFICANT CHANGE UP (ref 0–0)
NT-PROBNP SERPL-SCNC: 459 PG/ML — HIGH (ref 0–300)
PHOSPHATE SERPL-MCNC: 4.1 MG/DL — SIGNIFICANT CHANGE UP (ref 2.1–4.9)
PLATELET # BLD AUTO: 198 K/UL — SIGNIFICANT CHANGE UP (ref 130–400)
POTASSIUM SERPL-MCNC: 4.5 MMOL/L — SIGNIFICANT CHANGE UP (ref 3.5–5)
POTASSIUM SERPL-SCNC: 4.5 MMOL/L — SIGNIFICANT CHANGE UP (ref 3.5–5)
PROT SERPL-MCNC: 6.3 G/DL — SIGNIFICANT CHANGE UP (ref 6–8)
RBC # BLD: 3.94 M/UL — LOW (ref 4.7–6.1)
RBC # FLD: 13.7 % — SIGNIFICANT CHANGE UP (ref 11.5–14.5)
SODIUM SERPL-SCNC: 132 MMOL/L — LOW (ref 135–146)
WBC # BLD: 13 K/UL — HIGH (ref 4.8–10.8)
WBC # FLD AUTO: 13 K/UL — HIGH (ref 4.8–10.8)

## 2019-05-04 PROCEDURE — 99232 SBSQ HOSP IP/OBS MODERATE 35: CPT

## 2019-05-04 RX ADMIN — MAGNESIUM HYDROXIDE 30 MILLILITER(S): 400 TABLET, CHEWABLE ORAL at 22:55

## 2019-05-04 RX ADMIN — Medication 2: at 08:05

## 2019-05-04 RX ADMIN — LIDOCAINE 2 PATCH: 4 CREAM TOPICAL at 11:47

## 2019-05-04 RX ADMIN — Medication 0.3 MILLIGRAM(S): at 22:46

## 2019-05-04 RX ADMIN — LIDOCAINE 2 PATCH: 4 CREAM TOPICAL at 20:01

## 2019-05-04 RX ADMIN — Medication 667 MILLIGRAM(S): at 17:29

## 2019-05-04 RX ADMIN — LIDOCAINE 2 PATCH: 4 CREAM TOPICAL at 23:18

## 2019-05-04 RX ADMIN — AMLODIPINE BESYLATE 5 MILLIGRAM(S): 2.5 TABLET ORAL at 06:03

## 2019-05-04 RX ADMIN — Medication 100 MILLIGRAM(S): at 22:48

## 2019-05-04 RX ADMIN — Medication 40 MILLIGRAM(S): at 06:03

## 2019-05-04 RX ADMIN — Medication 24 UNIT(S): at 08:04

## 2019-05-04 RX ADMIN — SENNA PLUS 2 TABLET(S): 8.6 TABLET ORAL at 22:46

## 2019-05-04 RX ADMIN — Medication 667 MILLIGRAM(S): at 08:08

## 2019-05-04 RX ADMIN — Medication 100 MILLIGRAM(S): at 06:03

## 2019-05-04 RX ADMIN — Medication 200 MILLIGRAM(S): at 22:48

## 2019-05-04 RX ADMIN — APIXABAN 5 MILLIGRAM(S): 2.5 TABLET, FILM COATED ORAL at 06:03

## 2019-05-04 RX ADMIN — POLYETHYLENE GLYCOL 3350 17 GRAM(S): 17 POWDER, FOR SOLUTION ORAL at 22:49

## 2019-05-04 RX ADMIN — Medication 200 MILLIGRAM(S): at 14:52

## 2019-05-04 RX ADMIN — PANTOPRAZOLE SODIUM 40 MILLIGRAM(S): 20 TABLET, DELAYED RELEASE ORAL at 06:04

## 2019-05-04 RX ADMIN — Medication 1 PACKET(S): at 11:48

## 2019-05-04 RX ADMIN — Medication 667 MILLIGRAM(S): at 11:48

## 2019-05-04 RX ADMIN — Medication 20 MILLIGRAM(S): at 06:04

## 2019-05-04 RX ADMIN — Medication 0.3 MILLIGRAM(S): at 06:03

## 2019-05-04 RX ADMIN — APIXABAN 5 MILLIGRAM(S): 2.5 TABLET, FILM COATED ORAL at 17:28

## 2019-05-04 RX ADMIN — ATORVASTATIN CALCIUM 40 MILLIGRAM(S): 80 TABLET, FILM COATED ORAL at 22:45

## 2019-05-04 RX ADMIN — Medication 8: at 11:56

## 2019-05-04 RX ADMIN — NYSTATIN CREAM 1 APPLICATION(S): 100000 CREAM TOPICAL at 06:08

## 2019-05-04 RX ADMIN — Medication 24 UNIT(S): at 17:26

## 2019-05-04 RX ADMIN — TAMSULOSIN HYDROCHLORIDE 0.4 MILLIGRAM(S): 0.4 CAPSULE ORAL at 22:50

## 2019-05-04 RX ADMIN — Medication 100 MILLIGRAM(S): at 06:04

## 2019-05-04 RX ADMIN — Medication 0.3 MILLIGRAM(S): at 14:52

## 2019-05-04 RX ADMIN — Medication 100 MILLIGRAM(S): at 14:52

## 2019-05-04 RX ADMIN — Medication 81 MILLIGRAM(S): at 11:48

## 2019-05-04 RX ADMIN — Medication 24 UNIT(S): at 11:56

## 2019-05-04 RX ADMIN — Medication 200 MILLIGRAM(S): at 06:04

## 2019-05-04 RX ADMIN — Medication 20 MILLIGRAM(S): at 17:27

## 2019-05-04 RX ADMIN — Medication 3000 UNIT(S): at 11:55

## 2019-05-04 RX ADMIN — INSULIN GLARGINE 72 UNIT(S): 100 INJECTION, SOLUTION SUBCUTANEOUS at 22:51

## 2019-05-04 RX ADMIN — Medication 6: at 17:26

## 2019-05-04 RX ADMIN — NYSTATIN CREAM 1 APPLICATION(S): 100000 CREAM TOPICAL at 17:31

## 2019-05-05 LAB
ALBUMIN SERPL ELPH-MCNC: 3.1 G/DL — LOW (ref 3.5–5.2)
ALP SERPL-CCNC: 95 U/L — SIGNIFICANT CHANGE UP (ref 30–115)
ALT FLD-CCNC: 97 U/L — HIGH (ref 0–41)
ANION GAP SERPL CALC-SCNC: 15 MMOL/L — HIGH (ref 7–14)
APPEARANCE UR: CLEAR — SIGNIFICANT CHANGE UP
AST SERPL-CCNC: 55 U/L — HIGH (ref 0–41)
BILIRUB SERPL-MCNC: 0.2 MG/DL — SIGNIFICANT CHANGE UP (ref 0.2–1.2)
BILIRUB UR-MCNC: NEGATIVE — SIGNIFICANT CHANGE UP
BUN SERPL-MCNC: 85 MG/DL — CRITICAL HIGH (ref 10–20)
CALCIUM SERPL-MCNC: 9.1 MG/DL — SIGNIFICANT CHANGE UP (ref 8.5–10.1)
CHLORIDE SERPL-SCNC: 95 MMOL/L — LOW (ref 98–110)
CO2 SERPL-SCNC: 26 MMOL/L — SIGNIFICANT CHANGE UP (ref 17–32)
COLOR SPEC: YELLOW — SIGNIFICANT CHANGE UP
CREAT SERPL-MCNC: 1.9 MG/DL — HIGH (ref 0.7–1.5)
DIFF PNL FLD: NEGATIVE — SIGNIFICANT CHANGE UP
GLUCOSE BLDC GLUCOMTR-MCNC: 194 MG/DL — HIGH (ref 70–99)
GLUCOSE BLDC GLUCOMTR-MCNC: 197 MG/DL — HIGH (ref 70–99)
GLUCOSE BLDC GLUCOMTR-MCNC: 203 MG/DL — HIGH (ref 70–99)
GLUCOSE BLDC GLUCOMTR-MCNC: 245 MG/DL — HIGH (ref 70–99)
GLUCOSE SERPL-MCNC: 178 MG/DL — HIGH (ref 70–99)
GLUCOSE UR QL: 100 MG/DL
HAV IGM SER-ACNC: SIGNIFICANT CHANGE UP
HBV CORE IGM SER-ACNC: SIGNIFICANT CHANGE UP
HBV SURFACE AG SER-ACNC: SIGNIFICANT CHANGE UP
HCT VFR BLD CALC: 33.5 % — LOW (ref 42–52)
HCV AB S/CO SERPL IA: 0.08 S/CO — SIGNIFICANT CHANGE UP (ref 0–0.99)
HCV AB SERPL-IMP: SIGNIFICANT CHANGE UP
HGB BLD-MCNC: 10.3 G/DL — LOW (ref 14–18)
KETONES UR-MCNC: NEGATIVE — SIGNIFICANT CHANGE UP
LEUKOCYTE ESTERASE UR-ACNC: NEGATIVE — SIGNIFICANT CHANGE UP
MCHC RBC-ENTMCNC: 25.8 PG — LOW (ref 27–31)
MCHC RBC-ENTMCNC: 30.7 G/DL — LOW (ref 32–37)
MCV RBC AUTO: 83.8 FL — SIGNIFICANT CHANGE UP (ref 80–94)
NITRITE UR-MCNC: NEGATIVE — SIGNIFICANT CHANGE UP
NRBC # BLD: 0 /100 WBCS — SIGNIFICANT CHANGE UP (ref 0–0)
PH UR: 6 — SIGNIFICANT CHANGE UP (ref 5–8)
PLATELET # BLD AUTO: 230 K/UL — SIGNIFICANT CHANGE UP (ref 130–400)
POTASSIUM SERPL-MCNC: 4.4 MMOL/L — SIGNIFICANT CHANGE UP (ref 3.5–5)
POTASSIUM SERPL-SCNC: 4.4 MMOL/L — SIGNIFICANT CHANGE UP (ref 3.5–5)
PROT SERPL-MCNC: 6.2 G/DL — SIGNIFICANT CHANGE UP (ref 6–8)
PROT UR-MCNC: 100 MG/DL
RBC # BLD: 4 M/UL — LOW (ref 4.7–6.1)
RBC # FLD: 13.8 % — SIGNIFICANT CHANGE UP (ref 11.5–14.5)
SODIUM SERPL-SCNC: 136 MMOL/L — SIGNIFICANT CHANGE UP (ref 135–146)
SP GR SPEC: 1.01 — SIGNIFICANT CHANGE UP (ref 1.01–1.03)
UROBILINOGEN FLD QL: 0.2 MG/DL — SIGNIFICANT CHANGE UP (ref 0.2–0.2)
WBC # BLD: 10.33 K/UL — SIGNIFICANT CHANGE UP (ref 4.8–10.8)
WBC # FLD AUTO: 10.33 K/UL — SIGNIFICANT CHANGE UP (ref 4.8–10.8)

## 2019-05-05 RX ADMIN — Medication 667 MILLIGRAM(S): at 17:19

## 2019-05-05 RX ADMIN — Medication 100 MILLIGRAM(S): at 21:08

## 2019-05-05 RX ADMIN — Medication 24 UNIT(S): at 17:16

## 2019-05-05 RX ADMIN — Medication 100 MILLIGRAM(S): at 13:48

## 2019-05-05 RX ADMIN — TAMSULOSIN HYDROCHLORIDE 0.4 MILLIGRAM(S): 0.4 CAPSULE ORAL at 21:09

## 2019-05-05 RX ADMIN — Medication 0.3 MILLIGRAM(S): at 21:08

## 2019-05-05 RX ADMIN — Medication 2: at 08:39

## 2019-05-05 RX ADMIN — Medication 200 MILLIGRAM(S): at 05:18

## 2019-05-05 RX ADMIN — ATORVASTATIN CALCIUM 40 MILLIGRAM(S): 80 TABLET, FILM COATED ORAL at 21:08

## 2019-05-05 RX ADMIN — APIXABAN 5 MILLIGRAM(S): 2.5 TABLET, FILM COATED ORAL at 17:20

## 2019-05-05 RX ADMIN — Medication 24 UNIT(S): at 08:38

## 2019-05-05 RX ADMIN — LIDOCAINE 2 PATCH: 4 CREAM TOPICAL at 21:56

## 2019-05-05 RX ADMIN — Medication 1 PACKET(S): at 12:28

## 2019-05-05 RX ADMIN — Medication 4: at 12:23

## 2019-05-05 RX ADMIN — APIXABAN 5 MILLIGRAM(S): 2.5 TABLET, FILM COATED ORAL at 05:18

## 2019-05-05 RX ADMIN — AMLODIPINE BESYLATE 5 MILLIGRAM(S): 2.5 TABLET ORAL at 05:17

## 2019-05-05 RX ADMIN — NYSTATIN CREAM 1 APPLICATION(S): 100000 CREAM TOPICAL at 17:21

## 2019-05-05 RX ADMIN — PANTOPRAZOLE SODIUM 40 MILLIGRAM(S): 20 TABLET, DELAYED RELEASE ORAL at 05:18

## 2019-05-05 RX ADMIN — Medication 100 MILLIGRAM(S): at 21:09

## 2019-05-05 RX ADMIN — Medication 5 MILLIGRAM(S): at 19:45

## 2019-05-05 RX ADMIN — LIDOCAINE 2 PATCH: 4 CREAM TOPICAL at 17:22

## 2019-05-05 RX ADMIN — Medication 100 MILLIGRAM(S): at 05:18

## 2019-05-05 RX ADMIN — Medication 100 MILLIGRAM(S): at 13:49

## 2019-05-05 RX ADMIN — LIDOCAINE 2 PATCH: 4 CREAM TOPICAL at 12:26

## 2019-05-05 RX ADMIN — Medication 81 MILLIGRAM(S): at 12:26

## 2019-05-05 RX ADMIN — Medication 3000 UNIT(S): at 12:26

## 2019-05-05 RX ADMIN — Medication 667 MILLIGRAM(S): at 08:41

## 2019-05-05 RX ADMIN — Medication 24 UNIT(S): at 12:23

## 2019-05-05 RX ADMIN — NYSTATIN CREAM 1 APPLICATION(S): 100000 CREAM TOPICAL at 05:21

## 2019-05-05 RX ADMIN — Medication 0.3 MILLIGRAM(S): at 13:48

## 2019-05-05 RX ADMIN — Medication 20 MILLIGRAM(S): at 05:20

## 2019-05-05 RX ADMIN — Medication 1 ENEMA: at 05:20

## 2019-05-05 RX ADMIN — Medication 0.3 MILLIGRAM(S): at 05:19

## 2019-05-05 RX ADMIN — POLYETHYLENE GLYCOL 3350 17 GRAM(S): 17 POWDER, FOR SOLUTION ORAL at 21:10

## 2019-05-05 RX ADMIN — INSULIN GLARGINE 72 UNIT(S): 100 INJECTION, SOLUTION SUBCUTANEOUS at 21:14

## 2019-05-05 RX ADMIN — Medication 667 MILLIGRAM(S): at 12:26

## 2019-05-05 RX ADMIN — Medication 200 MILLIGRAM(S): at 21:09

## 2019-05-05 RX ADMIN — Medication 40 MILLIGRAM(S): at 05:19

## 2019-05-05 RX ADMIN — Medication 20 MILLIGRAM(S): at 17:19

## 2019-05-05 RX ADMIN — Medication 4: at 17:17

## 2019-05-05 RX ADMIN — SENNA PLUS 2 TABLET(S): 8.6 TABLET ORAL at 21:09

## 2019-05-06 LAB
ANION GAP SERPL CALC-SCNC: 13 MMOL/L — SIGNIFICANT CHANGE UP (ref 7–14)
BASOPHILS # BLD AUTO: 0.01 K/UL — SIGNIFICANT CHANGE UP (ref 0–0.2)
BASOPHILS NFR BLD AUTO: 0.1 % — SIGNIFICANT CHANGE UP (ref 0–1)
BUN SERPL-MCNC: 82 MG/DL — CRITICAL HIGH (ref 10–20)
C3 SERPL-MCNC: 177 MG/DL — HIGH (ref 81–157)
C4 SERPL-MCNC: 46 MG/DL — HIGH (ref 13–39)
CALCIUM SERPL-MCNC: 8.9 MG/DL — SIGNIFICANT CHANGE UP (ref 8.5–10.1)
CHLORIDE SERPL-SCNC: 95 MMOL/L — LOW (ref 98–110)
CO2 SERPL-SCNC: 27 MMOL/L — SIGNIFICANT CHANGE UP (ref 17–32)
CREAT SERPL-MCNC: 1.7 MG/DL — HIGH (ref 0.7–1.5)
EOSINOPHIL # BLD AUTO: 0.02 K/UL — SIGNIFICANT CHANGE UP (ref 0–0.7)
EOSINOPHIL NFR BLD AUTO: 0.2 % — SIGNIFICANT CHANGE UP (ref 0–8)
GLUCOSE BLDC GLUCOMTR-MCNC: 174 MG/DL — HIGH (ref 70–99)
GLUCOSE BLDC GLUCOMTR-MCNC: 194 MG/DL — HIGH (ref 70–99)
GLUCOSE BLDC GLUCOMTR-MCNC: 230 MG/DL — HIGH (ref 70–99)
GLUCOSE BLDC GLUCOMTR-MCNC: 263 MG/DL — HIGH (ref 70–99)
GLUCOSE SERPL-MCNC: 197 MG/DL — HIGH (ref 70–99)
HCT VFR BLD CALC: 34.4 % — LOW (ref 42–52)
HGB BLD-MCNC: 10.9 G/DL — LOW (ref 14–18)
IMM GRANULOCYTES NFR BLD AUTO: 0.5 % — HIGH (ref 0.1–0.3)
LYMPHOCYTES # BLD AUTO: 0.44 K/UL — LOW (ref 1.2–3.4)
LYMPHOCYTES # BLD AUTO: 4.6 % — LOW (ref 20.5–51.1)
MAGNESIUM SERPL-MCNC: 1.9 MG/DL — SIGNIFICANT CHANGE UP (ref 1.8–2.4)
MCHC RBC-ENTMCNC: 26.5 PG — LOW (ref 27–31)
MCHC RBC-ENTMCNC: 31.7 G/DL — LOW (ref 32–37)
MCV RBC AUTO: 83.5 FL — SIGNIFICANT CHANGE UP (ref 80–94)
MONOCYTES # BLD AUTO: 0.47 K/UL — SIGNIFICANT CHANGE UP (ref 0.1–0.6)
MONOCYTES NFR BLD AUTO: 4.9 % — SIGNIFICANT CHANGE UP (ref 1.7–9.3)
NEUTROPHILS # BLD AUTO: 8.52 K/UL — HIGH (ref 1.4–6.5)
NEUTROPHILS NFR BLD AUTO: 89.7 % — HIGH (ref 42.2–75.2)
NRBC # BLD: 0 /100 WBCS — SIGNIFICANT CHANGE UP (ref 0–0)
PLATELET # BLD AUTO: 239 K/UL — SIGNIFICANT CHANGE UP (ref 130–400)
POTASSIUM SERPL-MCNC: 4.5 MMOL/L — SIGNIFICANT CHANGE UP (ref 3.5–5)
POTASSIUM SERPL-SCNC: 4.5 MMOL/L — SIGNIFICANT CHANGE UP (ref 3.5–5)
PROT ?TM UR-MCNC: 89 MG/DL — HIGH (ref 0–12)
RBC # BLD: 4.12 M/UL — LOW (ref 4.7–6.1)
RBC # FLD: 13.9 % — SIGNIFICANT CHANGE UP (ref 11.5–14.5)
SODIUM SERPL-SCNC: 135 MMOL/L — SIGNIFICANT CHANGE UP (ref 135–146)
WBC # BLD: 9.51 K/UL — SIGNIFICANT CHANGE UP (ref 4.8–10.8)
WBC # FLD AUTO: 9.51 K/UL — SIGNIFICANT CHANGE UP (ref 4.8–10.8)

## 2019-05-06 RX ORDER — LACTULOSE 10 G/15ML
10 SOLUTION ORAL
Qty: 0 | Refills: 0 | Status: DISCONTINUED | OUTPATIENT
Start: 2019-05-06 | End: 2019-05-29

## 2019-05-06 RX ADMIN — Medication 1 PACKET(S): at 12:10

## 2019-05-06 RX ADMIN — Medication 100 MILLIGRAM(S): at 06:20

## 2019-05-06 RX ADMIN — ATORVASTATIN CALCIUM 40 MILLIGRAM(S): 80 TABLET, FILM COATED ORAL at 23:13

## 2019-05-06 RX ADMIN — LIDOCAINE 2 PATCH: 4 CREAM TOPICAL at 23:25

## 2019-05-06 RX ADMIN — Medication 200 MILLIGRAM(S): at 15:04

## 2019-05-06 RX ADMIN — Medication 81 MILLIGRAM(S): at 12:08

## 2019-05-06 RX ADMIN — Medication 6: at 12:12

## 2019-05-06 RX ADMIN — LACTULOSE 10 GRAM(S): 10 SOLUTION ORAL at 17:02

## 2019-05-06 RX ADMIN — POLYETHYLENE GLYCOL 3350 17 GRAM(S): 17 POWDER, FOR SOLUTION ORAL at 23:15

## 2019-05-06 RX ADMIN — LIDOCAINE 2 PATCH: 4 CREAM TOPICAL at 12:09

## 2019-05-06 RX ADMIN — Medication 4: at 16:56

## 2019-05-06 RX ADMIN — Medication 200 MILLIGRAM(S): at 06:19

## 2019-05-06 RX ADMIN — TAMSULOSIN HYDROCHLORIDE 0.4 MILLIGRAM(S): 0.4 CAPSULE ORAL at 23:13

## 2019-05-06 RX ADMIN — Medication 24 UNIT(S): at 12:11

## 2019-05-06 RX ADMIN — Medication 0.3 MILLIGRAM(S): at 06:20

## 2019-05-06 RX ADMIN — SENNA PLUS 2 TABLET(S): 8.6 TABLET ORAL at 23:13

## 2019-05-06 RX ADMIN — Medication 667 MILLIGRAM(S): at 12:08

## 2019-05-06 RX ADMIN — Medication 100 MILLIGRAM(S): at 23:13

## 2019-05-06 RX ADMIN — PANTOPRAZOLE SODIUM 40 MILLIGRAM(S): 20 TABLET, DELAYED RELEASE ORAL at 06:19

## 2019-05-06 RX ADMIN — Medication 24 UNIT(S): at 16:58

## 2019-05-06 RX ADMIN — INSULIN GLARGINE 72 UNIT(S): 100 INJECTION, SOLUTION SUBCUTANEOUS at 23:14

## 2019-05-06 RX ADMIN — Medication 667 MILLIGRAM(S): at 16:59

## 2019-05-06 RX ADMIN — Medication 100 MILLIGRAM(S): at 15:03

## 2019-05-06 RX ADMIN — Medication 3000 UNIT(S): at 12:08

## 2019-05-06 RX ADMIN — APIXABAN 5 MILLIGRAM(S): 2.5 TABLET, FILM COATED ORAL at 18:20

## 2019-05-06 RX ADMIN — NYSTATIN CREAM 1 APPLICATION(S): 100000 CREAM TOPICAL at 06:21

## 2019-05-06 RX ADMIN — APIXABAN 5 MILLIGRAM(S): 2.5 TABLET, FILM COATED ORAL at 06:19

## 2019-05-06 RX ADMIN — Medication 40 MILLIGRAM(S): at 06:20

## 2019-05-06 RX ADMIN — NYSTATIN CREAM 1 APPLICATION(S): 100000 CREAM TOPICAL at 23:02

## 2019-05-06 RX ADMIN — Medication 200 MILLIGRAM(S): at 23:14

## 2019-05-06 RX ADMIN — LIDOCAINE 2 PATCH: 4 CREAM TOPICAL at 19:32

## 2019-05-06 RX ADMIN — Medication 100 MILLIGRAM(S): at 15:04

## 2019-05-06 RX ADMIN — Medication 667 MILLIGRAM(S): at 07:50

## 2019-05-06 RX ADMIN — Medication 100 MILLIGRAM(S): at 23:14

## 2019-05-06 RX ADMIN — AMLODIPINE BESYLATE 5 MILLIGRAM(S): 2.5 TABLET ORAL at 06:19

## 2019-05-06 RX ADMIN — Medication 20 MILLIGRAM(S): at 06:20

## 2019-05-06 RX ADMIN — Medication 24 UNIT(S): at 07:49

## 2019-05-06 RX ADMIN — NYSTATIN CREAM 1 APPLICATION(S): 100000 CREAM TOPICAL at 18:20

## 2019-05-06 RX ADMIN — Medication 2: at 07:46

## 2019-05-06 RX ADMIN — Medication 0.3 MILLIGRAM(S): at 23:13

## 2019-05-06 RX ADMIN — Medication 0.3 MILLIGRAM(S): at 15:03

## 2019-05-06 RX ADMIN — Medication 10 MILLIGRAM(S): at 22:17

## 2019-05-07 LAB
ANA TITR SER: NEGATIVE — SIGNIFICANT CHANGE UP
CREATININE, URINE RESULT: 30 MG/DL — SIGNIFICANT CHANGE UP
GLUCOSE BLDC GLUCOMTR-MCNC: 112 MG/DL — HIGH (ref 70–99)
GLUCOSE BLDC GLUCOMTR-MCNC: 178 MG/DL — HIGH (ref 70–99)
GLUCOSE BLDC GLUCOMTR-MCNC: 226 MG/DL — HIGH (ref 70–99)
GLUCOSE BLDC GLUCOMTR-MCNC: 228 MG/DL — HIGH (ref 70–99)

## 2019-05-07 RX ADMIN — Medication 667 MILLIGRAM(S): at 12:03

## 2019-05-07 RX ADMIN — Medication 24 UNIT(S): at 08:25

## 2019-05-07 RX ADMIN — Medication 200 MILLIGRAM(S): at 21:25

## 2019-05-07 RX ADMIN — Medication 100 MILLIGRAM(S): at 13:10

## 2019-05-07 RX ADMIN — AMLODIPINE BESYLATE 5 MILLIGRAM(S): 2.5 TABLET ORAL at 06:27

## 2019-05-07 RX ADMIN — Medication 1 PACKET(S): at 12:05

## 2019-05-07 RX ADMIN — NYSTATIN CREAM 1 APPLICATION(S): 100000 CREAM TOPICAL at 06:27

## 2019-05-07 RX ADMIN — Medication 667 MILLIGRAM(S): at 08:16

## 2019-05-07 RX ADMIN — Medication 24 UNIT(S): at 11:55

## 2019-05-07 RX ADMIN — Medication 4: at 16:23

## 2019-05-07 RX ADMIN — APIXABAN 5 MILLIGRAM(S): 2.5 TABLET, FILM COATED ORAL at 06:26

## 2019-05-07 RX ADMIN — Medication 100 MILLIGRAM(S): at 06:27

## 2019-05-07 RX ADMIN — Medication 100 MILLIGRAM(S): at 06:26

## 2019-05-07 RX ADMIN — NYSTATIN CREAM 1 APPLICATION(S): 100000 CREAM TOPICAL at 17:06

## 2019-05-07 RX ADMIN — Medication 24 UNIT(S): at 16:25

## 2019-05-07 RX ADMIN — INSULIN GLARGINE 72 UNIT(S): 100 INJECTION, SOLUTION SUBCUTANEOUS at 21:25

## 2019-05-07 RX ADMIN — Medication 0.3 MILLIGRAM(S): at 21:25

## 2019-05-07 RX ADMIN — LIDOCAINE 2 PATCH: 4 CREAM TOPICAL at 23:42

## 2019-05-07 RX ADMIN — Medication 0.3 MILLIGRAM(S): at 13:09

## 2019-05-07 RX ADMIN — Medication 2: at 11:55

## 2019-05-07 RX ADMIN — Medication 100 MILLIGRAM(S): at 21:26

## 2019-05-07 RX ADMIN — Medication 200 MILLIGRAM(S): at 06:26

## 2019-05-07 RX ADMIN — SENNA PLUS 2 TABLET(S): 8.6 TABLET ORAL at 21:25

## 2019-05-07 RX ADMIN — Medication 3000 UNIT(S): at 12:04

## 2019-05-07 RX ADMIN — Medication 100 MILLIGRAM(S): at 13:08

## 2019-05-07 RX ADMIN — TAMSULOSIN HYDROCHLORIDE 0.4 MILLIGRAM(S): 0.4 CAPSULE ORAL at 21:25

## 2019-05-07 RX ADMIN — Medication 667 MILLIGRAM(S): at 17:03

## 2019-05-07 RX ADMIN — LIDOCAINE 2 PATCH: 4 CREAM TOPICAL at 12:04

## 2019-05-07 RX ADMIN — Medication 40 MILLIGRAM(S): at 06:27

## 2019-05-07 RX ADMIN — LACTULOSE 10 GRAM(S): 10 SOLUTION ORAL at 06:28

## 2019-05-07 RX ADMIN — ATORVASTATIN CALCIUM 40 MILLIGRAM(S): 80 TABLET, FILM COATED ORAL at 21:25

## 2019-05-07 RX ADMIN — Medication 81 MILLIGRAM(S): at 12:03

## 2019-05-07 RX ADMIN — PANTOPRAZOLE SODIUM 40 MILLIGRAM(S): 20 TABLET, DELAYED RELEASE ORAL at 06:26

## 2019-05-07 RX ADMIN — Medication 20 MILLIGRAM(S): at 06:26

## 2019-05-07 RX ADMIN — Medication 0.3 MILLIGRAM(S): at 06:26

## 2019-05-07 RX ADMIN — APIXABAN 5 MILLIGRAM(S): 2.5 TABLET, FILM COATED ORAL at 17:04

## 2019-05-07 RX ADMIN — Medication 200 MILLIGRAM(S): at 13:08

## 2019-05-08 LAB
AUTO DIFF PNL BLD: NEGATIVE — SIGNIFICANT CHANGE UP
C-ANCA SER-ACNC: NEGATIVE — SIGNIFICANT CHANGE UP
GLUCOSE BLDC GLUCOMTR-MCNC: 126 MG/DL — HIGH (ref 70–99)
GLUCOSE BLDC GLUCOMTR-MCNC: 195 MG/DL — HIGH (ref 70–99)
GLUCOSE BLDC GLUCOMTR-MCNC: 206 MG/DL — HIGH (ref 70–99)
GLUCOSE BLDC GLUCOMTR-MCNC: 85 MG/DL — SIGNIFICANT CHANGE UP (ref 70–99)
P-ANCA SER-ACNC: NEGATIVE — SIGNIFICANT CHANGE UP

## 2019-05-08 PROCEDURE — 73620 X-RAY EXAM OF FOOT: CPT | Mod: 26,RT

## 2019-05-08 RX ADMIN — AMLODIPINE BESYLATE 5 MILLIGRAM(S): 2.5 TABLET ORAL at 06:33

## 2019-05-08 RX ADMIN — Medication 4: at 17:54

## 2019-05-08 RX ADMIN — Medication 24 UNIT(S): at 12:32

## 2019-05-08 RX ADMIN — APIXABAN 5 MILLIGRAM(S): 2.5 TABLET, FILM COATED ORAL at 06:33

## 2019-05-08 RX ADMIN — SENNA PLUS 2 TABLET(S): 8.6 TABLET ORAL at 22:13

## 2019-05-08 RX ADMIN — Medication 667 MILLIGRAM(S): at 08:17

## 2019-05-08 RX ADMIN — Medication 100 MILLIGRAM(S): at 06:32

## 2019-05-08 RX ADMIN — Medication 0.3 MILLIGRAM(S): at 06:31

## 2019-05-08 RX ADMIN — Medication 24 UNIT(S): at 08:15

## 2019-05-08 RX ADMIN — Medication 40 MILLIGRAM(S): at 06:32

## 2019-05-08 RX ADMIN — NYSTATIN CREAM 1 APPLICATION(S): 100000 CREAM TOPICAL at 06:33

## 2019-05-08 RX ADMIN — Medication 100 MILLIGRAM(S): at 22:14

## 2019-05-08 RX ADMIN — PANTOPRAZOLE SODIUM 40 MILLIGRAM(S): 20 TABLET, DELAYED RELEASE ORAL at 06:32

## 2019-05-08 RX ADMIN — Medication 3000 UNIT(S): at 12:24

## 2019-05-08 RX ADMIN — Medication 100 MILLIGRAM(S): at 22:12

## 2019-05-08 RX ADMIN — LACTULOSE 10 GRAM(S): 10 SOLUTION ORAL at 17:56

## 2019-05-08 RX ADMIN — TAMSULOSIN HYDROCHLORIDE 0.4 MILLIGRAM(S): 0.4 CAPSULE ORAL at 22:14

## 2019-05-08 RX ADMIN — ATORVASTATIN CALCIUM 40 MILLIGRAM(S): 80 TABLET, FILM COATED ORAL at 22:12

## 2019-05-08 RX ADMIN — Medication 24 UNIT(S): at 17:54

## 2019-05-08 RX ADMIN — Medication 667 MILLIGRAM(S): at 12:24

## 2019-05-08 RX ADMIN — NYSTATIN CREAM 1 APPLICATION(S): 100000 CREAM TOPICAL at 17:58

## 2019-05-08 RX ADMIN — Medication 200 MILLIGRAM(S): at 06:32

## 2019-05-08 RX ADMIN — Medication 200 MILLIGRAM(S): at 13:15

## 2019-05-08 RX ADMIN — Medication 100 MILLIGRAM(S): at 13:15

## 2019-05-08 RX ADMIN — Medication 81 MILLIGRAM(S): at 12:24

## 2019-05-08 RX ADMIN — Medication 200 MILLIGRAM(S): at 22:13

## 2019-05-08 RX ADMIN — POLYETHYLENE GLYCOL 3350 17 GRAM(S): 17 POWDER, FOR SOLUTION ORAL at 22:14

## 2019-05-08 RX ADMIN — Medication 0.3 MILLIGRAM(S): at 22:13

## 2019-05-08 RX ADMIN — Medication 667 MILLIGRAM(S): at 17:56

## 2019-05-08 RX ADMIN — Medication 0.3 MILLIGRAM(S): at 13:14

## 2019-05-08 RX ADMIN — Medication 1 PACKET(S): at 12:24

## 2019-05-08 RX ADMIN — APIXABAN 5 MILLIGRAM(S): 2.5 TABLET, FILM COATED ORAL at 17:56

## 2019-05-08 RX ADMIN — LIDOCAINE 2 PATCH: 4 CREAM TOPICAL at 12:25

## 2019-05-08 RX ADMIN — Medication 20 MILLIGRAM(S): at 06:32

## 2019-05-09 LAB
% GAMMA, URINE: 10.3 % — SIGNIFICANT CHANGE UP
ALBUMIN 24H MFR UR ELPH: 58 % — SIGNIFICANT CHANGE UP
ALPHA1 GLOB 24H MFR UR ELPH: 14.3 % — SIGNIFICANT CHANGE UP
ALPHA2 GLOB 24H MFR UR ELPH: 6 % — SIGNIFICANT CHANGE UP
B-GLOBULIN 24H MFR UR ELPH: 11.4 % — SIGNIFICANT CHANGE UP
GLUCOSE BLDC GLUCOMTR-MCNC: 159 MG/DL — HIGH (ref 70–99)
GLUCOSE BLDC GLUCOMTR-MCNC: 179 MG/DL — HIGH (ref 70–99)
GLUCOSE BLDC GLUCOMTR-MCNC: 210 MG/DL — HIGH (ref 70–99)
GLUCOSE BLDC GLUCOMTR-MCNC: 253 MG/DL — HIGH (ref 70–99)
INTERPRETATION 24H UR IFE-IMP: SIGNIFICANT CHANGE UP
INTERPRETATION 24H UR IFE-IMP: SIGNIFICANT CHANGE UP
M PROTEIN 24H UR ELPH-MRATE: SIGNIFICANT CHANGE UP
PROT ?TM UR-MCNC: 89 MG/DL — HIGH (ref 0–12)
PROT PATTERN 24H UR ELPH-IMP: SIGNIFICANT CHANGE UP
TOTAL VOLUME - 24 HOUR: SIGNIFICANT CHANGE UP ML
URINE CREATININE CALCULATION: SIGNIFICANT CHANGE UP G/24 H (ref 1–2)

## 2019-05-09 RX ADMIN — Medication 200 MILLIGRAM(S): at 06:30

## 2019-05-09 RX ADMIN — Medication 3000 UNIT(S): at 13:03

## 2019-05-09 RX ADMIN — Medication 2: at 07:54

## 2019-05-09 RX ADMIN — NYSTATIN CREAM 1 APPLICATION(S): 100000 CREAM TOPICAL at 06:33

## 2019-05-09 RX ADMIN — LACTULOSE 10 GRAM(S): 10 SOLUTION ORAL at 06:33

## 2019-05-09 RX ADMIN — APIXABAN 5 MILLIGRAM(S): 2.5 TABLET, FILM COATED ORAL at 06:31

## 2019-05-09 RX ADMIN — Medication 2: at 13:25

## 2019-05-09 RX ADMIN — Medication 667 MILLIGRAM(S): at 07:50

## 2019-05-09 RX ADMIN — Medication 10 MILLIGRAM(S): at 13:21

## 2019-05-09 RX ADMIN — Medication 1 PACKET(S): at 13:26

## 2019-05-09 RX ADMIN — POLYETHYLENE GLYCOL 3350 17 GRAM(S): 17 POWDER, FOR SOLUTION ORAL at 21:28

## 2019-05-09 RX ADMIN — PANTOPRAZOLE SODIUM 40 MILLIGRAM(S): 20 TABLET, DELAYED RELEASE ORAL at 06:31

## 2019-05-09 RX ADMIN — Medication 100 MILLIGRAM(S): at 13:22

## 2019-05-09 RX ADMIN — Medication 81 MILLIGRAM(S): at 13:25

## 2019-05-09 RX ADMIN — Medication 24 UNIT(S): at 13:23

## 2019-05-09 RX ADMIN — NYSTATIN CREAM 1 APPLICATION(S): 100000 CREAM TOPICAL at 17:23

## 2019-05-09 RX ADMIN — Medication 100 MILLIGRAM(S): at 21:27

## 2019-05-09 RX ADMIN — LACTULOSE 10 GRAM(S): 10 SOLUTION ORAL at 17:22

## 2019-05-09 RX ADMIN — AMLODIPINE BESYLATE 5 MILLIGRAM(S): 2.5 TABLET ORAL at 06:31

## 2019-05-09 RX ADMIN — Medication 24 UNIT(S): at 17:19

## 2019-05-09 RX ADMIN — INSULIN GLARGINE 72 UNIT(S): 100 INJECTION, SOLUTION SUBCUTANEOUS at 21:27

## 2019-05-09 RX ADMIN — TAMSULOSIN HYDROCHLORIDE 0.4 MILLIGRAM(S): 0.4 CAPSULE ORAL at 21:27

## 2019-05-09 RX ADMIN — ATORVASTATIN CALCIUM 40 MILLIGRAM(S): 80 TABLET, FILM COATED ORAL at 21:27

## 2019-05-09 RX ADMIN — Medication 40 MILLIGRAM(S): at 06:31

## 2019-05-09 RX ADMIN — Medication 667 MILLIGRAM(S): at 13:03

## 2019-05-09 RX ADMIN — Medication 100 MILLIGRAM(S): at 13:03

## 2019-05-09 RX ADMIN — APIXABAN 5 MILLIGRAM(S): 2.5 TABLET, FILM COATED ORAL at 17:22

## 2019-05-09 RX ADMIN — Medication 0.3 MILLIGRAM(S): at 21:28

## 2019-05-09 RX ADMIN — Medication 0.3 MILLIGRAM(S): at 13:21

## 2019-05-09 RX ADMIN — Medication 200 MILLIGRAM(S): at 21:27

## 2019-05-09 RX ADMIN — Medication 30 MILLIGRAM(S): at 06:30

## 2019-05-09 RX ADMIN — Medication 200 MILLIGRAM(S): at 13:22

## 2019-05-09 RX ADMIN — Medication 0.3 MILLIGRAM(S): at 06:32

## 2019-05-09 RX ADMIN — LIDOCAINE 2 PATCH: 4 CREAM TOPICAL at 13:02

## 2019-05-09 RX ADMIN — SENNA PLUS 2 TABLET(S): 8.6 TABLET ORAL at 21:27

## 2019-05-09 RX ADMIN — Medication 100 MILLIGRAM(S): at 06:32

## 2019-05-09 RX ADMIN — Medication 6: at 17:19

## 2019-05-09 RX ADMIN — Medication 100 MILLIGRAM(S): at 06:30

## 2019-05-09 RX ADMIN — Medication 667 MILLIGRAM(S): at 17:21

## 2019-05-09 RX ADMIN — LIDOCAINE 2 PATCH: 4 CREAM TOPICAL at 01:00

## 2019-05-09 RX ADMIN — Medication 24 UNIT(S): at 07:54

## 2019-05-10 LAB
ALBUMIN SERPL ELPH-MCNC: 3.2 G/DL — LOW (ref 3.5–5.2)
ALP SERPL-CCNC: 89 U/L — SIGNIFICANT CHANGE UP (ref 30–115)
ALT FLD-CCNC: 80 U/L — HIGH (ref 0–41)
ANION GAP SERPL CALC-SCNC: 14 MMOL/L — SIGNIFICANT CHANGE UP (ref 7–14)
AST SERPL-CCNC: 41 U/L — SIGNIFICANT CHANGE UP (ref 0–41)
BILIRUB SERPL-MCNC: 0.3 MG/DL — SIGNIFICANT CHANGE UP (ref 0.2–1.2)
BUN SERPL-MCNC: 65 MG/DL — CRITICAL HIGH (ref 10–20)
CALCIUM SERPL-MCNC: 8.8 MG/DL — SIGNIFICANT CHANGE UP (ref 8.5–10.1)
CHLORIDE SERPL-SCNC: 94 MMOL/L — LOW (ref 98–110)
CO2 SERPL-SCNC: 28 MMOL/L — SIGNIFICANT CHANGE UP (ref 17–32)
CREAT SERPL-MCNC: 1.5 MG/DL — SIGNIFICANT CHANGE UP (ref 0.7–1.5)
GLUCOSE BLDC GLUCOMTR-MCNC: 161 MG/DL — HIGH (ref 70–99)
GLUCOSE BLDC GLUCOMTR-MCNC: 227 MG/DL — HIGH (ref 70–99)
GLUCOSE BLDC GLUCOMTR-MCNC: 229 MG/DL — HIGH (ref 70–99)
GLUCOSE BLDC GLUCOMTR-MCNC: 248 MG/DL — HIGH (ref 70–99)
GLUCOSE SERPL-MCNC: 170 MG/DL — HIGH (ref 70–99)
POTASSIUM SERPL-MCNC: 4.2 MMOL/L — SIGNIFICANT CHANGE UP (ref 3.5–5)
POTASSIUM SERPL-SCNC: 4.2 MMOL/L — SIGNIFICANT CHANGE UP (ref 3.5–5)
PROT SERPL-MCNC: 6.1 G/DL — SIGNIFICANT CHANGE UP (ref 6–8)
SODIUM SERPL-SCNC: 136 MMOL/L — SIGNIFICANT CHANGE UP (ref 135–146)

## 2019-05-10 RX ADMIN — SENNA PLUS 2 TABLET(S): 8.6 TABLET ORAL at 21:46

## 2019-05-10 RX ADMIN — Medication 200 MILLIGRAM(S): at 21:45

## 2019-05-10 RX ADMIN — Medication 100 MILLIGRAM(S): at 05:42

## 2019-05-10 RX ADMIN — LACTULOSE 10 GRAM(S): 10 SOLUTION ORAL at 05:42

## 2019-05-10 RX ADMIN — Medication 100 MILLIGRAM(S): at 21:44

## 2019-05-10 RX ADMIN — ATORVASTATIN CALCIUM 40 MILLIGRAM(S): 80 TABLET, FILM COATED ORAL at 21:40

## 2019-05-10 RX ADMIN — Medication 0.3 MILLIGRAM(S): at 05:43

## 2019-05-10 RX ADMIN — NYSTATIN CREAM 1 APPLICATION(S): 100000 CREAM TOPICAL at 05:44

## 2019-05-10 RX ADMIN — APIXABAN 5 MILLIGRAM(S): 2.5 TABLET, FILM COATED ORAL at 17:11

## 2019-05-10 RX ADMIN — LIDOCAINE 2 PATCH: 4 CREAM TOPICAL at 21:35

## 2019-05-10 RX ADMIN — POLYETHYLENE GLYCOL 3350 17 GRAM(S): 17 POWDER, FOR SOLUTION ORAL at 21:46

## 2019-05-10 RX ADMIN — Medication 0.3 MILLIGRAM(S): at 21:41

## 2019-05-10 RX ADMIN — Medication 4: at 16:40

## 2019-05-10 RX ADMIN — Medication 100 MILLIGRAM(S): at 13:12

## 2019-05-10 RX ADMIN — Medication 24 UNIT(S): at 08:17

## 2019-05-10 RX ADMIN — AMLODIPINE BESYLATE 5 MILLIGRAM(S): 2.5 TABLET ORAL at 05:42

## 2019-05-10 RX ADMIN — Medication 200 MILLIGRAM(S): at 13:13

## 2019-05-10 RX ADMIN — LIDOCAINE 2 PATCH: 4 CREAM TOPICAL at 09:14

## 2019-05-10 RX ADMIN — INSULIN GLARGINE 72 UNIT(S): 100 INJECTION, SOLUTION SUBCUTANEOUS at 21:45

## 2019-05-10 RX ADMIN — Medication 24 UNIT(S): at 11:16

## 2019-05-10 RX ADMIN — Medication 1 PACKET(S): at 11:13

## 2019-05-10 RX ADMIN — Medication 667 MILLIGRAM(S): at 11:14

## 2019-05-10 RX ADMIN — Medication 0.3 MILLIGRAM(S): at 13:14

## 2019-05-10 RX ADMIN — Medication 2: at 08:17

## 2019-05-10 RX ADMIN — PANTOPRAZOLE SODIUM 40 MILLIGRAM(S): 20 TABLET, DELAYED RELEASE ORAL at 06:02

## 2019-05-10 RX ADMIN — Medication 100 MILLIGRAM(S): at 21:45

## 2019-05-10 RX ADMIN — Medication 81 MILLIGRAM(S): at 11:15

## 2019-05-10 RX ADMIN — APIXABAN 5 MILLIGRAM(S): 2.5 TABLET, FILM COATED ORAL at 05:42

## 2019-05-10 RX ADMIN — Medication 30 MILLIGRAM(S): at 05:42

## 2019-05-10 RX ADMIN — Medication 667 MILLIGRAM(S): at 16:41

## 2019-05-10 RX ADMIN — Medication 40 MILLIGRAM(S): at 05:42

## 2019-05-10 RX ADMIN — Medication 24 UNIT(S): at 16:39

## 2019-05-10 RX ADMIN — Medication 3000 UNIT(S): at 11:15

## 2019-05-10 RX ADMIN — TAMSULOSIN HYDROCHLORIDE 0.4 MILLIGRAM(S): 0.4 CAPSULE ORAL at 21:46

## 2019-05-10 RX ADMIN — Medication 4: at 11:15

## 2019-05-10 RX ADMIN — Medication 667 MILLIGRAM(S): at 08:17

## 2019-05-10 RX ADMIN — LACTULOSE 10 GRAM(S): 10 SOLUTION ORAL at 17:10

## 2019-05-11 LAB
GLUCOSE BLDC GLUCOMTR-MCNC: 179 MG/DL — HIGH (ref 70–99)
GLUCOSE BLDC GLUCOMTR-MCNC: 192 MG/DL — HIGH (ref 70–99)
GLUCOSE BLDC GLUCOMTR-MCNC: 210 MG/DL — HIGH (ref 70–99)
GLUCOSE BLDC GLUCOMTR-MCNC: 220 MG/DL — HIGH (ref 70–99)

## 2019-05-11 RX ADMIN — APIXABAN 5 MILLIGRAM(S): 2.5 TABLET, FILM COATED ORAL at 05:36

## 2019-05-11 RX ADMIN — NYSTATIN CREAM 1 APPLICATION(S): 100000 CREAM TOPICAL at 07:55

## 2019-05-11 RX ADMIN — LIDOCAINE 2 PATCH: 4 CREAM TOPICAL at 23:40

## 2019-05-11 RX ADMIN — Medication 200 MILLIGRAM(S): at 05:36

## 2019-05-11 RX ADMIN — SENNA PLUS 2 TABLET(S): 8.6 TABLET ORAL at 21:43

## 2019-05-11 RX ADMIN — LACTULOSE 10 GRAM(S): 10 SOLUTION ORAL at 05:37

## 2019-05-11 RX ADMIN — Medication 0.3 MILLIGRAM(S): at 13:17

## 2019-05-11 RX ADMIN — LIDOCAINE 2 PATCH: 4 CREAM TOPICAL at 11:37

## 2019-05-11 RX ADMIN — Medication 0.3 MILLIGRAM(S): at 21:42

## 2019-05-11 RX ADMIN — APIXABAN 5 MILLIGRAM(S): 2.5 TABLET, FILM COATED ORAL at 16:51

## 2019-05-11 RX ADMIN — Medication 30 MILLIGRAM(S): at 05:37

## 2019-05-11 RX ADMIN — Medication 100 MILLIGRAM(S): at 21:43

## 2019-05-11 RX ADMIN — Medication 200 MILLIGRAM(S): at 13:15

## 2019-05-11 RX ADMIN — Medication 40 MILLIGRAM(S): at 05:37

## 2019-05-11 RX ADMIN — ATORVASTATIN CALCIUM 40 MILLIGRAM(S): 80 TABLET, FILM COATED ORAL at 21:41

## 2019-05-11 RX ADMIN — Medication 100 MILLIGRAM(S): at 21:41

## 2019-05-11 RX ADMIN — Medication 100 MILLIGRAM(S): at 05:37

## 2019-05-11 RX ADMIN — Medication 4: at 16:56

## 2019-05-11 RX ADMIN — Medication 667 MILLIGRAM(S): at 07:42

## 2019-05-11 RX ADMIN — Medication 2: at 07:53

## 2019-05-11 RX ADMIN — Medication 667 MILLIGRAM(S): at 11:35

## 2019-05-11 RX ADMIN — Medication 81 MILLIGRAM(S): at 11:35

## 2019-05-11 RX ADMIN — Medication 4: at 11:33

## 2019-05-11 RX ADMIN — Medication 3000 UNIT(S): at 11:35

## 2019-05-11 RX ADMIN — AMLODIPINE BESYLATE 5 MILLIGRAM(S): 2.5 TABLET ORAL at 05:35

## 2019-05-11 RX ADMIN — Medication 24 UNIT(S): at 11:33

## 2019-05-11 RX ADMIN — Medication 0.3 MILLIGRAM(S): at 05:37

## 2019-05-11 RX ADMIN — PANTOPRAZOLE SODIUM 40 MILLIGRAM(S): 20 TABLET, DELAYED RELEASE ORAL at 06:09

## 2019-05-11 RX ADMIN — Medication 667 MILLIGRAM(S): at 16:51

## 2019-05-11 RX ADMIN — POLYETHYLENE GLYCOL 3350 17 GRAM(S): 17 POWDER, FOR SOLUTION ORAL at 21:43

## 2019-05-11 RX ADMIN — Medication 100 MILLIGRAM(S): at 13:16

## 2019-05-11 RX ADMIN — Medication 24 UNIT(S): at 07:51

## 2019-05-11 RX ADMIN — TAMSULOSIN HYDROCHLORIDE 0.4 MILLIGRAM(S): 0.4 CAPSULE ORAL at 21:41

## 2019-05-11 RX ADMIN — NYSTATIN CREAM 1 APPLICATION(S): 100000 CREAM TOPICAL at 21:45

## 2019-05-11 RX ADMIN — Medication 100 MILLIGRAM(S): at 05:36

## 2019-05-11 RX ADMIN — Medication 100 MILLIGRAM(S): at 13:17

## 2019-05-11 RX ADMIN — Medication 24 UNIT(S): at 16:56

## 2019-05-11 RX ADMIN — INSULIN GLARGINE 72 UNIT(S): 100 INJECTION, SOLUTION SUBCUTANEOUS at 21:40

## 2019-05-12 LAB
GLUCOSE BLDC GLUCOMTR-MCNC: 176 MG/DL — HIGH (ref 70–99)
GLUCOSE BLDC GLUCOMTR-MCNC: 178 MG/DL — HIGH (ref 70–99)
GLUCOSE BLDC GLUCOMTR-MCNC: 268 MG/DL — HIGH (ref 70–99)
GLUCOSE BLDC GLUCOMTR-MCNC: 323 MG/DL — HIGH (ref 70–99)

## 2019-05-12 RX ADMIN — Medication 0.3 MILLIGRAM(S): at 13:02

## 2019-05-12 RX ADMIN — Medication 200 MILLIGRAM(S): at 21:30

## 2019-05-12 RX ADMIN — Medication 100 MILLIGRAM(S): at 21:30

## 2019-05-12 RX ADMIN — AMLODIPINE BESYLATE 5 MILLIGRAM(S): 2.5 TABLET ORAL at 06:28

## 2019-05-12 RX ADMIN — NYSTATIN CREAM 1 APPLICATION(S): 100000 CREAM TOPICAL at 06:31

## 2019-05-12 RX ADMIN — Medication 100 MILLIGRAM(S): at 06:28

## 2019-05-12 RX ADMIN — Medication 24 UNIT(S): at 08:29

## 2019-05-12 RX ADMIN — Medication 200 MILLIGRAM(S): at 13:01

## 2019-05-12 RX ADMIN — APIXABAN 5 MILLIGRAM(S): 2.5 TABLET, FILM COATED ORAL at 17:04

## 2019-05-12 RX ADMIN — Medication 200 MILLIGRAM(S): at 08:29

## 2019-05-12 RX ADMIN — Medication 667 MILLIGRAM(S): at 11:46

## 2019-05-12 RX ADMIN — Medication 24 UNIT(S): at 11:44

## 2019-05-12 RX ADMIN — Medication 667 MILLIGRAM(S): at 16:54

## 2019-05-12 RX ADMIN — Medication 2: at 08:30

## 2019-05-12 RX ADMIN — LIDOCAINE 2 PATCH: 4 CREAM TOPICAL at 11:48

## 2019-05-12 RX ADMIN — PANTOPRAZOLE SODIUM 40 MILLIGRAM(S): 20 TABLET, DELAYED RELEASE ORAL at 06:27

## 2019-05-12 RX ADMIN — Medication 81 MILLIGRAM(S): at 11:46

## 2019-05-12 RX ADMIN — Medication 8: at 16:52

## 2019-05-12 RX ADMIN — NYSTATIN CREAM 1 APPLICATION(S): 100000 CREAM TOPICAL at 21:33

## 2019-05-12 RX ADMIN — Medication 3000 UNIT(S): at 11:46

## 2019-05-12 RX ADMIN — TAMSULOSIN HYDROCHLORIDE 0.4 MILLIGRAM(S): 0.4 CAPSULE ORAL at 21:32

## 2019-05-12 RX ADMIN — Medication 2: at 11:45

## 2019-05-12 RX ADMIN — SENNA PLUS 2 TABLET(S): 8.6 TABLET ORAL at 21:32

## 2019-05-12 RX ADMIN — Medication 0.3 MILLIGRAM(S): at 21:31

## 2019-05-12 RX ADMIN — Medication 667 MILLIGRAM(S): at 11:47

## 2019-05-12 RX ADMIN — Medication 100 MILLIGRAM(S): at 13:02

## 2019-05-12 RX ADMIN — Medication 0.3 MILLIGRAM(S): at 06:30

## 2019-05-12 RX ADMIN — Medication 100 MILLIGRAM(S): at 13:01

## 2019-05-12 RX ADMIN — LIDOCAINE 2 PATCH: 4 CREAM TOPICAL at 23:40

## 2019-05-12 RX ADMIN — Medication 100 MILLIGRAM(S): at 06:27

## 2019-05-12 RX ADMIN — Medication 24 UNIT(S): at 16:51

## 2019-05-12 RX ADMIN — INSULIN GLARGINE 72 UNIT(S): 100 INJECTION, SOLUTION SUBCUTANEOUS at 21:30

## 2019-05-12 RX ADMIN — LIDOCAINE 2 PATCH: 4 CREAM TOPICAL at 19:32

## 2019-05-12 RX ADMIN — APIXABAN 5 MILLIGRAM(S): 2.5 TABLET, FILM COATED ORAL at 06:28

## 2019-05-12 RX ADMIN — Medication 30 MILLIGRAM(S): at 06:29

## 2019-05-12 RX ADMIN — Medication 40 MILLIGRAM(S): at 06:28

## 2019-05-12 RX ADMIN — ATORVASTATIN CALCIUM 40 MILLIGRAM(S): 80 TABLET, FILM COATED ORAL at 21:32

## 2019-05-13 LAB
ANION GAP SERPL CALC-SCNC: 13 MMOL/L — SIGNIFICANT CHANGE UP (ref 7–14)
BASOPHILS # BLD AUTO: 0.02 K/UL — SIGNIFICANT CHANGE UP (ref 0–0.2)
BASOPHILS NFR BLD AUTO: 0.2 % — SIGNIFICANT CHANGE UP (ref 0–1)
BUN SERPL-MCNC: 67 MG/DL — CRITICAL HIGH (ref 10–20)
CALCIUM SERPL-MCNC: 9 MG/DL — SIGNIFICANT CHANGE UP (ref 8.5–10.1)
CHLORIDE SERPL-SCNC: 97 MMOL/L — LOW (ref 98–110)
CO2 SERPL-SCNC: 29 MMOL/L — SIGNIFICANT CHANGE UP (ref 17–32)
CREAT SERPL-MCNC: 1.8 MG/DL — HIGH (ref 0.7–1.5)
EOSINOPHIL # BLD AUTO: 0.16 K/UL — SIGNIFICANT CHANGE UP (ref 0–0.7)
EOSINOPHIL NFR BLD AUTO: 1.7 % — SIGNIFICANT CHANGE UP (ref 0–8)
GLUCOSE BLDC GLUCOMTR-MCNC: 110 MG/DL — HIGH (ref 70–99)
GLUCOSE BLDC GLUCOMTR-MCNC: 142 MG/DL — HIGH (ref 70–99)
GLUCOSE BLDC GLUCOMTR-MCNC: 164 MG/DL — HIGH (ref 70–99)
GLUCOSE BLDC GLUCOMTR-MCNC: 194 MG/DL — HIGH (ref 70–99)
GLUCOSE SERPL-MCNC: 112 MG/DL — HIGH (ref 70–99)
HCT VFR BLD CALC: 33.2 % — LOW (ref 42–52)
HGB BLD-MCNC: 10.5 G/DL — LOW (ref 14–18)
IMM GRANULOCYTES NFR BLD AUTO: 0.8 % — HIGH (ref 0.1–0.3)
LYMPHOCYTES # BLD AUTO: 1.31 K/UL — SIGNIFICANT CHANGE UP (ref 1.2–3.4)
LYMPHOCYTES # BLD AUTO: 14.1 % — LOW (ref 20.5–51.1)
MAGNESIUM SERPL-MCNC: 1.6 MG/DL — LOW (ref 1.8–2.4)
MCHC RBC-ENTMCNC: 26.5 PG — LOW (ref 27–31)
MCHC RBC-ENTMCNC: 31.6 G/DL — LOW (ref 32–37)
MCV RBC AUTO: 83.8 FL — SIGNIFICANT CHANGE UP (ref 80–94)
MONOCYTES # BLD AUTO: 0.72 K/UL — HIGH (ref 0.1–0.6)
MONOCYTES NFR BLD AUTO: 7.8 % — SIGNIFICANT CHANGE UP (ref 1.7–9.3)
NEUTROPHILS # BLD AUTO: 6.98 K/UL — HIGH (ref 1.4–6.5)
NEUTROPHILS NFR BLD AUTO: 75.4 % — HIGH (ref 42.2–75.2)
NRBC # BLD: 0 /100 WBCS — SIGNIFICANT CHANGE UP (ref 0–0)
PLATELET # BLD AUTO: 228 K/UL — SIGNIFICANT CHANGE UP (ref 130–400)
POTASSIUM SERPL-MCNC: 3.9 MMOL/L — SIGNIFICANT CHANGE UP (ref 3.5–5)
POTASSIUM SERPL-SCNC: 3.9 MMOL/L — SIGNIFICANT CHANGE UP (ref 3.5–5)
RBC # BLD: 3.96 M/UL — LOW (ref 4.7–6.1)
RBC # FLD: 13.9 % — SIGNIFICANT CHANGE UP (ref 11.5–14.5)
SODIUM SERPL-SCNC: 139 MMOL/L — SIGNIFICANT CHANGE UP (ref 135–146)
WBC # BLD: 9.26 K/UL — SIGNIFICANT CHANGE UP (ref 4.8–10.8)
WBC # FLD AUTO: 9.26 K/UL — SIGNIFICANT CHANGE UP (ref 4.8–10.8)

## 2019-05-13 RX ORDER — MAGNESIUM OXIDE 400 MG ORAL TABLET 241.3 MG
800 TABLET ORAL ONCE
Refills: 0 | Status: COMPLETED | OUTPATIENT
Start: 2019-05-13 | End: 2019-05-13

## 2019-05-13 RX ADMIN — Medication 24 UNIT(S): at 16:38

## 2019-05-13 RX ADMIN — PANTOPRAZOLE SODIUM 40 MILLIGRAM(S): 20 TABLET, DELAYED RELEASE ORAL at 06:36

## 2019-05-13 RX ADMIN — Medication 667 MILLIGRAM(S): at 11:55

## 2019-05-13 RX ADMIN — TAMSULOSIN HYDROCHLORIDE 0.4 MILLIGRAM(S): 0.4 CAPSULE ORAL at 21:31

## 2019-05-13 RX ADMIN — Medication 100 MILLIGRAM(S): at 21:32

## 2019-05-13 RX ADMIN — Medication 200 MILLIGRAM(S): at 06:39

## 2019-05-13 RX ADMIN — Medication 24 UNIT(S): at 07:58

## 2019-05-13 RX ADMIN — Medication 100 MILLIGRAM(S): at 14:29

## 2019-05-13 RX ADMIN — Medication 0.3 MILLIGRAM(S): at 21:32

## 2019-05-13 RX ADMIN — Medication 667 MILLIGRAM(S): at 16:38

## 2019-05-13 RX ADMIN — Medication 40 MILLIGRAM(S): at 06:36

## 2019-05-13 RX ADMIN — Medication 100 MILLIGRAM(S): at 21:31

## 2019-05-13 RX ADMIN — SENNA PLUS 2 TABLET(S): 8.6 TABLET ORAL at 21:32

## 2019-05-13 RX ADMIN — APIXABAN 5 MILLIGRAM(S): 2.5 TABLET, FILM COATED ORAL at 06:36

## 2019-05-13 RX ADMIN — AMLODIPINE BESYLATE 5 MILLIGRAM(S): 2.5 TABLET ORAL at 06:39

## 2019-05-13 RX ADMIN — Medication 3000 UNIT(S): at 11:54

## 2019-05-13 RX ADMIN — Medication 100 MILLIGRAM(S): at 06:37

## 2019-05-13 RX ADMIN — INSULIN GLARGINE 72 UNIT(S): 100 INJECTION, SOLUTION SUBCUTANEOUS at 21:32

## 2019-05-13 RX ADMIN — Medication 100 MILLIGRAM(S): at 06:36

## 2019-05-13 RX ADMIN — Medication 667 MILLIGRAM(S): at 07:54

## 2019-05-13 RX ADMIN — Medication 0.3 MILLIGRAM(S): at 14:30

## 2019-05-13 RX ADMIN — APIXABAN 5 MILLIGRAM(S): 2.5 TABLET, FILM COATED ORAL at 17:11

## 2019-05-13 RX ADMIN — Medication 24 UNIT(S): at 11:55

## 2019-05-13 RX ADMIN — NYSTATIN CREAM 1 APPLICATION(S): 100000 CREAM TOPICAL at 17:11

## 2019-05-13 RX ADMIN — MAGNESIUM OXIDE 400 MG ORAL TABLET 800 MILLIGRAM(S): 241.3 TABLET ORAL at 11:55

## 2019-05-13 RX ADMIN — Medication 0.3 MILLIGRAM(S): at 06:39

## 2019-05-13 RX ADMIN — NYSTATIN CREAM 1 APPLICATION(S): 100000 CREAM TOPICAL at 06:40

## 2019-05-13 RX ADMIN — Medication 1 PACKET(S): at 11:54

## 2019-05-13 RX ADMIN — POLYETHYLENE GLYCOL 3350 17 GRAM(S): 17 POWDER, FOR SOLUTION ORAL at 21:32

## 2019-05-13 RX ADMIN — Medication 30 MILLIGRAM(S): at 06:37

## 2019-05-13 RX ADMIN — ATORVASTATIN CALCIUM 40 MILLIGRAM(S): 80 TABLET, FILM COATED ORAL at 21:31

## 2019-05-13 RX ADMIN — Medication 81 MILLIGRAM(S): at 11:54

## 2019-05-14 LAB
GLUCOSE BLDC GLUCOMTR-MCNC: 113 MG/DL — HIGH (ref 70–99)
GLUCOSE BLDC GLUCOMTR-MCNC: 188 MG/DL — HIGH (ref 70–99)
GLUCOSE BLDC GLUCOMTR-MCNC: 189 MG/DL — HIGH (ref 70–99)
GLUCOSE BLDC GLUCOMTR-MCNC: 86 MG/DL — SIGNIFICANT CHANGE UP (ref 70–99)
GLUCOSE BLDC GLUCOMTR-MCNC: 97 MG/DL — SIGNIFICANT CHANGE UP (ref 70–99)

## 2019-05-14 RX ORDER — AMLODIPINE BESYLATE 2.5 MG/1
10 TABLET ORAL DAILY
Refills: 0 | Status: DISCONTINUED | OUTPATIENT
Start: 2019-05-15 | End: 2019-05-17

## 2019-05-14 RX ADMIN — Medication 0.3 MILLIGRAM(S): at 12:51

## 2019-05-14 RX ADMIN — NYSTATIN CREAM 1 APPLICATION(S): 100000 CREAM TOPICAL at 17:03

## 2019-05-14 RX ADMIN — Medication 100 MILLIGRAM(S): at 12:51

## 2019-05-14 RX ADMIN — Medication 24 UNIT(S): at 17:02

## 2019-05-14 RX ADMIN — Medication 100 MILLIGRAM(S): at 05:19

## 2019-05-14 RX ADMIN — Medication 30 MILLIGRAM(S): at 05:20

## 2019-05-14 RX ADMIN — Medication 100 MILLIGRAM(S): at 21:50

## 2019-05-14 RX ADMIN — Medication 24 UNIT(S): at 08:14

## 2019-05-14 RX ADMIN — PANTOPRAZOLE SODIUM 40 MILLIGRAM(S): 20 TABLET, DELAYED RELEASE ORAL at 06:16

## 2019-05-14 RX ADMIN — Medication 81 MILLIGRAM(S): at 12:52

## 2019-05-14 RX ADMIN — Medication 24 UNIT(S): at 12:52

## 2019-05-14 RX ADMIN — SENNA PLUS 2 TABLET(S): 8.6 TABLET ORAL at 21:50

## 2019-05-14 RX ADMIN — Medication 0.3 MILLIGRAM(S): at 05:20

## 2019-05-14 RX ADMIN — Medication 200 MILLIGRAM(S): at 12:51

## 2019-05-14 RX ADMIN — APIXABAN 5 MILLIGRAM(S): 2.5 TABLET, FILM COATED ORAL at 05:19

## 2019-05-14 RX ADMIN — AMLODIPINE BESYLATE 5 MILLIGRAM(S): 2.5 TABLET ORAL at 05:20

## 2019-05-14 RX ADMIN — Medication 200 MILLIGRAM(S): at 05:19

## 2019-05-14 RX ADMIN — Medication 200 MILLIGRAM(S): at 21:50

## 2019-05-14 RX ADMIN — Medication 667 MILLIGRAM(S): at 08:14

## 2019-05-14 RX ADMIN — Medication 667 MILLIGRAM(S): at 12:52

## 2019-05-14 RX ADMIN — LACTULOSE 10 GRAM(S): 10 SOLUTION ORAL at 05:20

## 2019-05-14 RX ADMIN — NYSTATIN CREAM 1 APPLICATION(S): 100000 CREAM TOPICAL at 05:21

## 2019-05-14 RX ADMIN — Medication 2: at 08:14

## 2019-05-14 RX ADMIN — TAMSULOSIN HYDROCHLORIDE 0.4 MILLIGRAM(S): 0.4 CAPSULE ORAL at 21:50

## 2019-05-14 RX ADMIN — APIXABAN 5 MILLIGRAM(S): 2.5 TABLET, FILM COATED ORAL at 17:02

## 2019-05-14 RX ADMIN — Medication 667 MILLIGRAM(S): at 17:02

## 2019-05-14 RX ADMIN — ATORVASTATIN CALCIUM 40 MILLIGRAM(S): 80 TABLET, FILM COATED ORAL at 21:50

## 2019-05-14 RX ADMIN — Medication 0.3 MILLIGRAM(S): at 21:55

## 2019-05-14 RX ADMIN — Medication 100 MILLIGRAM(S): at 12:54

## 2019-05-14 RX ADMIN — Medication 40 MILLIGRAM(S): at 05:20

## 2019-05-14 RX ADMIN — Medication 3000 UNIT(S): at 12:52

## 2019-05-14 RX ADMIN — LIDOCAINE 2 PATCH: 4 CREAM TOPICAL at 12:50

## 2019-05-14 RX ADMIN — Medication 2: at 17:03

## 2019-05-15 LAB
GLUCOSE BLDC GLUCOMTR-MCNC: 131 MG/DL — HIGH (ref 70–99)
GLUCOSE BLDC GLUCOMTR-MCNC: 224 MG/DL — HIGH (ref 70–99)
GLUCOSE BLDC GLUCOMTR-MCNC: 243 MG/DL — HIGH (ref 70–99)
GLUCOSE BLDC GLUCOMTR-MCNC: 68 MG/DL — LOW (ref 70–99)

## 2019-05-15 RX ORDER — LABETALOL HCL 100 MG
200 TABLET ORAL THREE TIMES A DAY
Refills: 0 | Status: DISCONTINUED | OUTPATIENT
Start: 2019-05-15 | End: 2019-05-29

## 2019-05-15 RX ORDER — INSULIN GLARGINE 100 [IU]/ML
68 INJECTION, SOLUTION SUBCUTANEOUS AT BEDTIME
Refills: 0 | Status: DISCONTINUED | OUTPATIENT
Start: 2019-05-15 | End: 2019-05-22

## 2019-05-15 RX ORDER — INSULIN LISPRO 100/ML
20 VIAL (ML) SUBCUTANEOUS
Refills: 0 | Status: DISCONTINUED | OUTPATIENT
Start: 2019-05-15 | End: 2019-05-29

## 2019-05-15 RX ADMIN — Medication 100 MILLIGRAM(S): at 12:28

## 2019-05-15 RX ADMIN — APIXABAN 5 MILLIGRAM(S): 2.5 TABLET, FILM COATED ORAL at 06:52

## 2019-05-15 RX ADMIN — Medication 40 MILLIGRAM(S): at 06:52

## 2019-05-15 RX ADMIN — Medication 667 MILLIGRAM(S): at 12:28

## 2019-05-15 RX ADMIN — Medication 0.3 MILLIGRAM(S): at 12:28

## 2019-05-15 RX ADMIN — Medication 667 MILLIGRAM(S): at 17:16

## 2019-05-15 RX ADMIN — Medication 0.3 MILLIGRAM(S): at 06:53

## 2019-05-15 RX ADMIN — Medication 100 MILLIGRAM(S): at 12:29

## 2019-05-15 RX ADMIN — Medication 3000 UNIT(S): at 12:28

## 2019-05-15 RX ADMIN — Medication 4: at 17:18

## 2019-05-15 RX ADMIN — LIDOCAINE 2 PATCH: 4 CREAM TOPICAL at 19:49

## 2019-05-15 RX ADMIN — Medication 20 UNIT(S): at 12:30

## 2019-05-15 RX ADMIN — Medication 100 MILLIGRAM(S): at 22:01

## 2019-05-15 RX ADMIN — Medication 30 MILLIGRAM(S): at 06:52

## 2019-05-15 RX ADMIN — Medication 0.3 MILLIGRAM(S): at 22:03

## 2019-05-15 RX ADMIN — Medication 20 UNIT(S): at 17:18

## 2019-05-15 RX ADMIN — Medication 200 MILLIGRAM(S): at 12:27

## 2019-05-15 RX ADMIN — Medication 200 MILLIGRAM(S): at 06:55

## 2019-05-15 RX ADMIN — Medication 81 MILLIGRAM(S): at 12:28

## 2019-05-15 RX ADMIN — ATORVASTATIN CALCIUM 40 MILLIGRAM(S): 80 TABLET, FILM COATED ORAL at 22:01

## 2019-05-15 RX ADMIN — PANTOPRAZOLE SODIUM 40 MILLIGRAM(S): 20 TABLET, DELAYED RELEASE ORAL at 06:52

## 2019-05-15 RX ADMIN — NYSTATIN CREAM 1 APPLICATION(S): 100000 CREAM TOPICAL at 17:22

## 2019-05-15 RX ADMIN — Medication 667 MILLIGRAM(S): at 07:47

## 2019-05-15 RX ADMIN — Medication 100 MILLIGRAM(S): at 06:52

## 2019-05-15 RX ADMIN — POLYETHYLENE GLYCOL 3350 17 GRAM(S): 17 POWDER, FOR SOLUTION ORAL at 22:02

## 2019-05-15 RX ADMIN — INSULIN GLARGINE 68 UNIT(S): 100 INJECTION, SOLUTION SUBCUTANEOUS at 22:41

## 2019-05-15 RX ADMIN — TAMSULOSIN HYDROCHLORIDE 0.4 MILLIGRAM(S): 0.4 CAPSULE ORAL at 22:01

## 2019-05-15 RX ADMIN — APIXABAN 5 MILLIGRAM(S): 2.5 TABLET, FILM COATED ORAL at 17:16

## 2019-05-15 RX ADMIN — SENNA PLUS 2 TABLET(S): 8.6 TABLET ORAL at 22:01

## 2019-05-15 RX ADMIN — AMLODIPINE BESYLATE 10 MILLIGRAM(S): 2.5 TABLET ORAL at 06:55

## 2019-05-15 RX ADMIN — Medication 4: at 12:30

## 2019-05-15 RX ADMIN — NYSTATIN CREAM 1 APPLICATION(S): 100000 CREAM TOPICAL at 06:54

## 2019-05-15 RX ADMIN — LIDOCAINE 2 PATCH: 4 CREAM TOPICAL at 12:29

## 2019-05-15 RX ADMIN — Medication 200 MILLIGRAM(S): at 22:01

## 2019-05-16 LAB
BASE EXCESS BLDA CALC-SCNC: 8.8 MMOL/L — HIGH (ref -2–2)
GLUCOSE BLDC GLUCOMTR-MCNC: 154 MG/DL — HIGH (ref 70–99)
GLUCOSE BLDC GLUCOMTR-MCNC: 165 MG/DL — HIGH (ref 70–99)
GLUCOSE BLDC GLUCOMTR-MCNC: 166 MG/DL — HIGH (ref 70–99)
GLUCOSE BLDC GLUCOMTR-MCNC: 65 MG/DL — LOW (ref 70–99)
GLUCOSE BLDC GLUCOMTR-MCNC: 84 MG/DL — SIGNIFICANT CHANGE UP (ref 70–99)
HCO3 BLDA-SCNC: 33 MMOL/L — HIGH (ref 23–27)
PCO2 BLDA: 45 MMHG — HIGH (ref 38–42)
PH BLDA: 7.48 — HIGH (ref 7.38–7.42)
PO2 BLDA: 63 MMHG — LOW (ref 78–95)
SAO2 % BLDA: 94 % — SIGNIFICANT CHANGE UP (ref 94–98)

## 2019-05-16 RX ADMIN — SENNA PLUS 2 TABLET(S): 8.6 TABLET ORAL at 21:58

## 2019-05-16 RX ADMIN — Medication 81 MILLIGRAM(S): at 12:16

## 2019-05-16 RX ADMIN — LACTULOSE 10 GRAM(S): 10 SOLUTION ORAL at 06:35

## 2019-05-16 RX ADMIN — Medication 200 MILLIGRAM(S): at 12:18

## 2019-05-16 RX ADMIN — Medication 200 MILLIGRAM(S): at 21:58

## 2019-05-16 RX ADMIN — NYSTATIN CREAM 1 APPLICATION(S): 100000 CREAM TOPICAL at 17:15

## 2019-05-16 RX ADMIN — LIDOCAINE 2 PATCH: 4 CREAM TOPICAL at 12:18

## 2019-05-16 RX ADMIN — Medication 100 MILLIGRAM(S): at 06:34

## 2019-05-16 RX ADMIN — TAMSULOSIN HYDROCHLORIDE 0.4 MILLIGRAM(S): 0.4 CAPSULE ORAL at 21:58

## 2019-05-16 RX ADMIN — Medication 30 MILLIGRAM(S): at 06:45

## 2019-05-16 RX ADMIN — Medication 40 MILLIGRAM(S): at 06:34

## 2019-05-16 RX ADMIN — APIXABAN 5 MILLIGRAM(S): 2.5 TABLET, FILM COATED ORAL at 17:11

## 2019-05-16 RX ADMIN — Medication 667 MILLIGRAM(S): at 12:16

## 2019-05-16 RX ADMIN — Medication 3000 UNIT(S): at 12:16

## 2019-05-16 RX ADMIN — NYSTATIN CREAM 1 APPLICATION(S): 100000 CREAM TOPICAL at 06:41

## 2019-05-16 RX ADMIN — Medication 100 MILLIGRAM(S): at 21:58

## 2019-05-16 RX ADMIN — Medication 200 MILLIGRAM(S): at 06:34

## 2019-05-16 RX ADMIN — Medication 0.3 MILLIGRAM(S): at 22:05

## 2019-05-16 RX ADMIN — Medication 0.3 MILLIGRAM(S): at 06:40

## 2019-05-16 RX ADMIN — INSULIN GLARGINE 68 UNIT(S): 100 INJECTION, SOLUTION SUBCUTANEOUS at 21:59

## 2019-05-16 RX ADMIN — Medication 20 UNIT(S): at 17:08

## 2019-05-16 RX ADMIN — LIDOCAINE 2 PATCH: 4 CREAM TOPICAL at 19:03

## 2019-05-16 RX ADMIN — AMLODIPINE BESYLATE 10 MILLIGRAM(S): 2.5 TABLET ORAL at 06:34

## 2019-05-16 RX ADMIN — Medication 20 UNIT(S): at 08:46

## 2019-05-16 RX ADMIN — Medication 100 MILLIGRAM(S): at 12:16

## 2019-05-16 RX ADMIN — PANTOPRAZOLE SODIUM 40 MILLIGRAM(S): 20 TABLET, DELAYED RELEASE ORAL at 06:34

## 2019-05-16 RX ADMIN — Medication 0.3 MILLIGRAM(S): at 12:17

## 2019-05-16 RX ADMIN — ATORVASTATIN CALCIUM 40 MILLIGRAM(S): 80 TABLET, FILM COATED ORAL at 21:58

## 2019-05-16 RX ADMIN — Medication 667 MILLIGRAM(S): at 17:11

## 2019-05-16 RX ADMIN — Medication 2: at 17:09

## 2019-05-16 RX ADMIN — Medication 667 MILLIGRAM(S): at 08:51

## 2019-05-16 RX ADMIN — APIXABAN 5 MILLIGRAM(S): 2.5 TABLET, FILM COATED ORAL at 06:34

## 2019-05-16 RX ADMIN — LIDOCAINE 2 PATCH: 4 CREAM TOPICAL at 03:35

## 2019-05-17 LAB
GLUCOSE BLDC GLUCOMTR-MCNC: 101 MG/DL — HIGH (ref 70–99)
GLUCOSE BLDC GLUCOMTR-MCNC: 138 MG/DL — HIGH (ref 70–99)
GLUCOSE BLDC GLUCOMTR-MCNC: 142 MG/DL — HIGH (ref 70–99)
GLUCOSE BLDC GLUCOMTR-MCNC: 213 MG/DL — HIGH (ref 70–99)
GLUCOSE BLDC GLUCOMTR-MCNC: 97 MG/DL — SIGNIFICANT CHANGE UP (ref 70–99)

## 2019-05-17 RX ORDER — AMLODIPINE BESYLATE 2.5 MG/1
5 TABLET ORAL DAILY
Refills: 0 | Status: DISCONTINUED | OUTPATIENT
Start: 2019-05-18 | End: 2019-05-29

## 2019-05-17 RX ADMIN — Medication 100 MILLIGRAM(S): at 22:42

## 2019-05-17 RX ADMIN — Medication 667 MILLIGRAM(S): at 08:03

## 2019-05-17 RX ADMIN — TAMSULOSIN HYDROCHLORIDE 0.4 MILLIGRAM(S): 0.4 CAPSULE ORAL at 22:43

## 2019-05-17 RX ADMIN — Medication 3000 UNIT(S): at 12:40

## 2019-05-17 RX ADMIN — APIXABAN 5 MILLIGRAM(S): 2.5 TABLET, FILM COATED ORAL at 06:14

## 2019-05-17 RX ADMIN — Medication 4: at 17:02

## 2019-05-17 RX ADMIN — Medication 100 MILLIGRAM(S): at 06:14

## 2019-05-17 RX ADMIN — Medication 81 MILLIGRAM(S): at 12:40

## 2019-05-17 RX ADMIN — Medication 30 MILLIGRAM(S): at 06:14

## 2019-05-17 RX ADMIN — APIXABAN 5 MILLIGRAM(S): 2.5 TABLET, FILM COATED ORAL at 17:01

## 2019-05-17 RX ADMIN — Medication 200 MILLIGRAM(S): at 06:14

## 2019-05-17 RX ADMIN — Medication 200 MILLIGRAM(S): at 22:43

## 2019-05-17 RX ADMIN — Medication 100 MILLIGRAM(S): at 12:40

## 2019-05-17 RX ADMIN — Medication 667 MILLIGRAM(S): at 12:40

## 2019-05-17 RX ADMIN — Medication 20 UNIT(S): at 17:01

## 2019-05-17 RX ADMIN — Medication 100 MILLIGRAM(S): at 22:40

## 2019-05-17 RX ADMIN — NYSTATIN CREAM 1 APPLICATION(S): 100000 CREAM TOPICAL at 06:23

## 2019-05-17 RX ADMIN — LIDOCAINE 2 PATCH: 4 CREAM TOPICAL at 06:21

## 2019-05-17 RX ADMIN — AMLODIPINE BESYLATE 10 MILLIGRAM(S): 2.5 TABLET ORAL at 06:14

## 2019-05-17 RX ADMIN — Medication 0.3 MILLIGRAM(S): at 22:40

## 2019-05-17 RX ADMIN — Medication 20 UNIT(S): at 08:02

## 2019-05-17 RX ADMIN — NYSTATIN CREAM 1 APPLICATION(S): 100000 CREAM TOPICAL at 17:05

## 2019-05-17 RX ADMIN — INSULIN GLARGINE 68 UNIT(S): 100 INJECTION, SOLUTION SUBCUTANEOUS at 22:42

## 2019-05-17 RX ADMIN — Medication 40 MILLIGRAM(S): at 06:14

## 2019-05-17 RX ADMIN — SENNA PLUS 2 TABLET(S): 8.6 TABLET ORAL at 22:43

## 2019-05-17 RX ADMIN — ATORVASTATIN CALCIUM 40 MILLIGRAM(S): 80 TABLET, FILM COATED ORAL at 22:41

## 2019-05-17 RX ADMIN — Medication 200 MILLIGRAM(S): at 14:23

## 2019-05-17 RX ADMIN — Medication 20 UNIT(S): at 12:36

## 2019-05-17 RX ADMIN — Medication 0.3 MILLIGRAM(S): at 06:14

## 2019-05-17 RX ADMIN — Medication 667 MILLIGRAM(S): at 17:01

## 2019-05-17 RX ADMIN — PANTOPRAZOLE SODIUM 40 MILLIGRAM(S): 20 TABLET, DELAYED RELEASE ORAL at 06:14

## 2019-05-17 RX ADMIN — LIDOCAINE 2 PATCH: 4 CREAM TOPICAL at 14:16

## 2019-05-18 LAB
GLUCOSE BLDC GLUCOMTR-MCNC: 137 MG/DL — HIGH (ref 70–99)
GLUCOSE BLDC GLUCOMTR-MCNC: 207 MG/DL — HIGH (ref 70–99)
GLUCOSE BLDC GLUCOMTR-MCNC: 230 MG/DL — HIGH (ref 70–99)
GLUCOSE BLDC GLUCOMTR-MCNC: 240 MG/DL — HIGH (ref 70–99)

## 2019-05-18 RX ADMIN — Medication 100 MILLIGRAM(S): at 13:08

## 2019-05-18 RX ADMIN — Medication 667 MILLIGRAM(S): at 11:50

## 2019-05-18 RX ADMIN — Medication 100 MILLIGRAM(S): at 06:10

## 2019-05-18 RX ADMIN — AMLODIPINE BESYLATE 5 MILLIGRAM(S): 2.5 TABLET ORAL at 06:10

## 2019-05-18 RX ADMIN — Medication 20 UNIT(S): at 11:50

## 2019-05-18 RX ADMIN — APIXABAN 5 MILLIGRAM(S): 2.5 TABLET, FILM COATED ORAL at 19:48

## 2019-05-18 RX ADMIN — SENNA PLUS 2 TABLET(S): 8.6 TABLET ORAL at 21:12

## 2019-05-18 RX ADMIN — Medication 81 MILLIGRAM(S): at 11:49

## 2019-05-18 RX ADMIN — INSULIN GLARGINE 68 UNIT(S): 100 INJECTION, SOLUTION SUBCUTANEOUS at 21:11

## 2019-05-18 RX ADMIN — APIXABAN 5 MILLIGRAM(S): 2.5 TABLET, FILM COATED ORAL at 06:10

## 2019-05-18 RX ADMIN — Medication 3000 UNIT(S): at 11:49

## 2019-05-18 RX ADMIN — NYSTATIN CREAM 1 APPLICATION(S): 100000 CREAM TOPICAL at 19:45

## 2019-05-18 RX ADMIN — Medication 200 MILLIGRAM(S): at 21:12

## 2019-05-18 RX ADMIN — Medication 200 MILLIGRAM(S): at 13:08

## 2019-05-18 RX ADMIN — Medication 4: at 11:50

## 2019-05-18 RX ADMIN — Medication 40 MILLIGRAM(S): at 06:10

## 2019-05-18 RX ADMIN — NYSTATIN CREAM 1 APPLICATION(S): 100000 CREAM TOPICAL at 06:12

## 2019-05-18 RX ADMIN — Medication 20 UNIT(S): at 08:16

## 2019-05-18 RX ADMIN — Medication 667 MILLIGRAM(S): at 19:46

## 2019-05-18 RX ADMIN — Medication 667 MILLIGRAM(S): at 08:16

## 2019-05-18 RX ADMIN — Medication 0.3 MILLIGRAM(S): at 21:13

## 2019-05-18 RX ADMIN — PANTOPRAZOLE SODIUM 40 MILLIGRAM(S): 20 TABLET, DELAYED RELEASE ORAL at 06:11

## 2019-05-18 RX ADMIN — TAMSULOSIN HYDROCHLORIDE 0.4 MILLIGRAM(S): 0.4 CAPSULE ORAL at 21:12

## 2019-05-18 RX ADMIN — Medication 100 MILLIGRAM(S): at 13:07

## 2019-05-18 RX ADMIN — Medication 100 MILLIGRAM(S): at 21:12

## 2019-05-18 RX ADMIN — ATORVASTATIN CALCIUM 40 MILLIGRAM(S): 80 TABLET, FILM COATED ORAL at 21:12

## 2019-05-18 RX ADMIN — Medication 0.3 MILLIGRAM(S): at 13:08

## 2019-05-18 RX ADMIN — Medication 200 MILLIGRAM(S): at 06:10

## 2019-05-18 RX ADMIN — Medication 30 MILLIGRAM(S): at 06:10

## 2019-05-18 RX ADMIN — Medication 0.3 MILLIGRAM(S): at 06:10

## 2019-05-19 LAB
GLUCOSE BLDC GLUCOMTR-MCNC: 107 MG/DL — HIGH (ref 70–99)
GLUCOSE BLDC GLUCOMTR-MCNC: 144 MG/DL — HIGH (ref 70–99)
GLUCOSE BLDC GLUCOMTR-MCNC: 180 MG/DL — HIGH (ref 70–99)
GLUCOSE BLDC GLUCOMTR-MCNC: 307 MG/DL — HIGH (ref 70–99)

## 2019-05-19 RX ADMIN — Medication 100 MILLIGRAM(S): at 21:55

## 2019-05-19 RX ADMIN — Medication 667 MILLIGRAM(S): at 16:32

## 2019-05-19 RX ADMIN — Medication 0.3 MILLIGRAM(S): at 06:27

## 2019-05-19 RX ADMIN — Medication 667 MILLIGRAM(S): at 12:14

## 2019-05-19 RX ADMIN — Medication 0.3 MILLIGRAM(S): at 13:42

## 2019-05-19 RX ADMIN — Medication 20 UNIT(S): at 12:14

## 2019-05-19 RX ADMIN — APIXABAN 5 MILLIGRAM(S): 2.5 TABLET, FILM COATED ORAL at 17:05

## 2019-05-19 RX ADMIN — Medication 100 MILLIGRAM(S): at 13:42

## 2019-05-19 RX ADMIN — Medication 30 MILLIGRAM(S): at 06:22

## 2019-05-19 RX ADMIN — AMLODIPINE BESYLATE 5 MILLIGRAM(S): 2.5 TABLET ORAL at 06:23

## 2019-05-19 RX ADMIN — Medication 200 MILLIGRAM(S): at 21:55

## 2019-05-19 RX ADMIN — INSULIN GLARGINE 68 UNIT(S): 100 INJECTION, SOLUTION SUBCUTANEOUS at 21:55

## 2019-05-19 RX ADMIN — Medication 667 MILLIGRAM(S): at 08:11

## 2019-05-19 RX ADMIN — Medication 81 MILLIGRAM(S): at 12:14

## 2019-05-19 RX ADMIN — Medication 3000 UNIT(S): at 12:14

## 2019-05-19 RX ADMIN — TAMSULOSIN HYDROCHLORIDE 0.4 MILLIGRAM(S): 0.4 CAPSULE ORAL at 21:55

## 2019-05-19 RX ADMIN — Medication 2: at 08:10

## 2019-05-19 RX ADMIN — Medication 200 MILLIGRAM(S): at 06:22

## 2019-05-19 RX ADMIN — Medication 40 MILLIGRAM(S): at 06:23

## 2019-05-19 RX ADMIN — NYSTATIN CREAM 1 APPLICATION(S): 100000 CREAM TOPICAL at 21:46

## 2019-05-19 RX ADMIN — APIXABAN 5 MILLIGRAM(S): 2.5 TABLET, FILM COATED ORAL at 06:23

## 2019-05-19 RX ADMIN — Medication 0.3 MILLIGRAM(S): at 21:56

## 2019-05-19 RX ADMIN — Medication 200 MILLIGRAM(S): at 13:43

## 2019-05-19 RX ADMIN — Medication 100 MILLIGRAM(S): at 06:23

## 2019-05-19 RX ADMIN — Medication 100 MILLIGRAM(S): at 21:56

## 2019-05-19 RX ADMIN — ATORVASTATIN CALCIUM 40 MILLIGRAM(S): 80 TABLET, FILM COATED ORAL at 21:56

## 2019-05-19 RX ADMIN — Medication 20 UNIT(S): at 16:31

## 2019-05-19 RX ADMIN — NYSTATIN CREAM 1 APPLICATION(S): 100000 CREAM TOPICAL at 06:24

## 2019-05-19 RX ADMIN — PANTOPRAZOLE SODIUM 40 MILLIGRAM(S): 20 TABLET, DELAYED RELEASE ORAL at 06:22

## 2019-05-19 RX ADMIN — SENNA PLUS 2 TABLET(S): 8.6 TABLET ORAL at 21:55

## 2019-05-19 RX ADMIN — Medication 20 UNIT(S): at 08:10

## 2019-05-19 RX ADMIN — Medication 1 PACKET(S): at 12:16

## 2019-05-20 LAB
GLUCOSE BLDC GLUCOMTR-MCNC: 155 MG/DL — HIGH (ref 70–99)
GLUCOSE BLDC GLUCOMTR-MCNC: 168 MG/DL — HIGH (ref 70–99)
GLUCOSE BLDC GLUCOMTR-MCNC: 185 MG/DL — HIGH (ref 70–99)
GLUCOSE BLDC GLUCOMTR-MCNC: 215 MG/DL — HIGH (ref 70–99)

## 2019-05-20 RX ADMIN — Medication 0.2 MILLIGRAM(S): at 21:51

## 2019-05-20 RX ADMIN — AMLODIPINE BESYLATE 5 MILLIGRAM(S): 2.5 TABLET ORAL at 06:12

## 2019-05-20 RX ADMIN — Medication 2: at 17:00

## 2019-05-20 RX ADMIN — Medication 200 MILLIGRAM(S): at 06:13

## 2019-05-20 RX ADMIN — NYSTATIN CREAM 1 APPLICATION(S): 100000 CREAM TOPICAL at 06:15

## 2019-05-20 RX ADMIN — NYSTATIN CREAM 1 APPLICATION(S): 100000 CREAM TOPICAL at 17:38

## 2019-05-20 RX ADMIN — Medication 20 UNIT(S): at 07:51

## 2019-05-20 RX ADMIN — Medication 100 MILLIGRAM(S): at 06:13

## 2019-05-20 RX ADMIN — Medication 2: at 11:40

## 2019-05-20 RX ADMIN — PANTOPRAZOLE SODIUM 40 MILLIGRAM(S): 20 TABLET, DELAYED RELEASE ORAL at 06:13

## 2019-05-20 RX ADMIN — Medication 3000 UNIT(S): at 12:30

## 2019-05-20 RX ADMIN — INSULIN GLARGINE 68 UNIT(S): 100 INJECTION, SOLUTION SUBCUTANEOUS at 21:51

## 2019-05-20 RX ADMIN — Medication 0.2 MILLIGRAM(S): at 15:08

## 2019-05-20 RX ADMIN — Medication 200 MILLIGRAM(S): at 21:51

## 2019-05-20 RX ADMIN — Medication 2: at 07:51

## 2019-05-20 RX ADMIN — APIXABAN 5 MILLIGRAM(S): 2.5 TABLET, FILM COATED ORAL at 17:37

## 2019-05-20 RX ADMIN — Medication 667 MILLIGRAM(S): at 17:34

## 2019-05-20 RX ADMIN — Medication 20 UNIT(S): at 17:00

## 2019-05-20 RX ADMIN — SENNA PLUS 2 TABLET(S): 8.6 TABLET ORAL at 21:51

## 2019-05-20 RX ADMIN — Medication 81 MILLIGRAM(S): at 12:29

## 2019-05-20 RX ADMIN — Medication 200 MILLIGRAM(S): at 15:08

## 2019-05-20 RX ADMIN — Medication 0.3 MILLIGRAM(S): at 06:14

## 2019-05-20 RX ADMIN — Medication 100 MILLIGRAM(S): at 21:51

## 2019-05-20 RX ADMIN — Medication 40 MILLIGRAM(S): at 06:12

## 2019-05-20 RX ADMIN — Medication 20 UNIT(S): at 12:27

## 2019-05-20 RX ADMIN — Medication 667 MILLIGRAM(S): at 12:34

## 2019-05-20 RX ADMIN — TAMSULOSIN HYDROCHLORIDE 0.4 MILLIGRAM(S): 0.4 CAPSULE ORAL at 21:51

## 2019-05-20 RX ADMIN — Medication 100 MILLIGRAM(S): at 15:09

## 2019-05-20 RX ADMIN — APIXABAN 5 MILLIGRAM(S): 2.5 TABLET, FILM COATED ORAL at 06:12

## 2019-05-20 RX ADMIN — Medication 1 PACKET(S): at 12:32

## 2019-05-20 RX ADMIN — ATORVASTATIN CALCIUM 40 MILLIGRAM(S): 80 TABLET, FILM COATED ORAL at 21:51

## 2019-05-20 RX ADMIN — POLYETHYLENE GLYCOL 3350 17 GRAM(S): 17 POWDER, FOR SOLUTION ORAL at 21:49

## 2019-05-20 RX ADMIN — Medication 667 MILLIGRAM(S): at 07:51

## 2019-05-20 RX ADMIN — Medication 30 MILLIGRAM(S): at 06:13

## 2019-05-20 RX ADMIN — Medication 100 MILLIGRAM(S): at 15:08

## 2019-05-21 LAB
ANION GAP SERPL CALC-SCNC: 15 MMOL/L — HIGH (ref 7–14)
BASOPHILS # BLD AUTO: 0.02 K/UL — SIGNIFICANT CHANGE UP (ref 0–0.2)
BASOPHILS NFR BLD AUTO: 0.2 % — SIGNIFICANT CHANGE UP (ref 0–1)
BUN SERPL-MCNC: 47 MG/DL — HIGH (ref 10–20)
CALCIUM SERPL-MCNC: 8.7 MG/DL — SIGNIFICANT CHANGE UP (ref 8.5–10.1)
CHLORIDE SERPL-SCNC: 95 MMOL/L — LOW (ref 98–110)
CO2 SERPL-SCNC: 29 MMOL/L — SIGNIFICANT CHANGE UP (ref 17–32)
CREAT SERPL-MCNC: 1.6 MG/DL — HIGH (ref 0.7–1.5)
EOSINOPHIL # BLD AUTO: 0.16 K/UL — SIGNIFICANT CHANGE UP (ref 0–0.7)
EOSINOPHIL NFR BLD AUTO: 1.7 % — SIGNIFICANT CHANGE UP (ref 0–8)
GLUCOSE BLDC GLUCOMTR-MCNC: 135 MG/DL — HIGH (ref 70–99)
GLUCOSE BLDC GLUCOMTR-MCNC: 154 MG/DL — HIGH (ref 70–99)
GLUCOSE BLDC GLUCOMTR-MCNC: 213 MG/DL — HIGH (ref 70–99)
GLUCOSE BLDC GLUCOMTR-MCNC: 245 MG/DL — HIGH (ref 70–99)
GLUCOSE SERPL-MCNC: 153 MG/DL — HIGH (ref 70–99)
HCT VFR BLD CALC: 33.3 % — LOW (ref 42–52)
HGB BLD-MCNC: 10.8 G/DL — LOW (ref 14–18)
IMM GRANULOCYTES NFR BLD AUTO: 1.1 % — HIGH (ref 0.1–0.3)
LYMPHOCYTES # BLD AUTO: 1.27 K/UL — SIGNIFICANT CHANGE UP (ref 1.2–3.4)
LYMPHOCYTES # BLD AUTO: 13.4 % — LOW (ref 20.5–51.1)
MAGNESIUM SERPL-MCNC: 1.6 MG/DL — LOW (ref 1.8–2.4)
MCHC RBC-ENTMCNC: 27.2 PG — SIGNIFICANT CHANGE UP (ref 27–31)
MCHC RBC-ENTMCNC: 32.4 G/DL — SIGNIFICANT CHANGE UP (ref 32–37)
MCV RBC AUTO: 83.9 FL — SIGNIFICANT CHANGE UP (ref 80–94)
MONOCYTES # BLD AUTO: 0.7 K/UL — HIGH (ref 0.1–0.6)
MONOCYTES NFR BLD AUTO: 7.4 % — SIGNIFICANT CHANGE UP (ref 1.7–9.3)
NEUTROPHILS # BLD AUTO: 7.24 K/UL — HIGH (ref 1.4–6.5)
NEUTROPHILS NFR BLD AUTO: 76.2 % — HIGH (ref 42.2–75.2)
NRBC # BLD: 0 /100 WBCS — SIGNIFICANT CHANGE UP (ref 0–0)
PLATELET # BLD AUTO: 237 K/UL — SIGNIFICANT CHANGE UP (ref 130–400)
POTASSIUM SERPL-MCNC: 3.4 MMOL/L — LOW (ref 3.5–5)
POTASSIUM SERPL-SCNC: 3.4 MMOL/L — LOW (ref 3.5–5)
RBC # BLD: 3.97 M/UL — LOW (ref 4.7–6.1)
RBC # FLD: 14.1 % — SIGNIFICANT CHANGE UP (ref 11.5–14.5)
SODIUM SERPL-SCNC: 139 MMOL/L — SIGNIFICANT CHANGE UP (ref 135–146)
WBC # BLD: 9.49 K/UL — SIGNIFICANT CHANGE UP (ref 4.8–10.8)
WBC # FLD AUTO: 9.49 K/UL — SIGNIFICANT CHANGE UP (ref 4.8–10.8)

## 2019-05-21 RX ORDER — POTASSIUM CHLORIDE 20 MEQ
40 PACKET (EA) ORAL EVERY 4 HOURS
Refills: 0 | Status: COMPLETED | OUTPATIENT
Start: 2019-05-21 | End: 2019-05-21

## 2019-05-21 RX ORDER — MAGNESIUM OXIDE 400 MG ORAL TABLET 241.3 MG
800 TABLET ORAL ONCE
Refills: 0 | Status: COMPLETED | OUTPATIENT
Start: 2019-05-21 | End: 2019-05-21

## 2019-05-21 RX ADMIN — MAGNESIUM OXIDE 400 MG ORAL TABLET 800 MILLIGRAM(S): 241.3 TABLET ORAL at 14:29

## 2019-05-21 RX ADMIN — Medication 100 MILLIGRAM(S): at 14:28

## 2019-05-21 RX ADMIN — LACTULOSE 10 GRAM(S): 10 SOLUTION ORAL at 17:11

## 2019-05-21 RX ADMIN — Medication 20 UNIT(S): at 08:20

## 2019-05-21 RX ADMIN — Medication 1 PACKET(S): at 12:26

## 2019-05-21 RX ADMIN — Medication 200 MILLIGRAM(S): at 06:21

## 2019-05-21 RX ADMIN — NYSTATIN CREAM 1 APPLICATION(S): 100000 CREAM TOPICAL at 17:08

## 2019-05-21 RX ADMIN — Medication 667 MILLIGRAM(S): at 17:10

## 2019-05-21 RX ADMIN — Medication 667 MILLIGRAM(S): at 12:25

## 2019-05-21 RX ADMIN — Medication 100 MILLIGRAM(S): at 21:57

## 2019-05-21 RX ADMIN — Medication 20 UNIT(S): at 17:09

## 2019-05-21 RX ADMIN — Medication 100 MILLIGRAM(S): at 06:21

## 2019-05-21 RX ADMIN — Medication 200 MILLIGRAM(S): at 21:57

## 2019-05-21 RX ADMIN — Medication 4: at 17:10

## 2019-05-21 RX ADMIN — Medication 40 MILLIEQUIVALENT(S): at 14:37

## 2019-05-21 RX ADMIN — INSULIN GLARGINE 68 UNIT(S): 100 INJECTION, SOLUTION SUBCUTANEOUS at 21:57

## 2019-05-21 RX ADMIN — ATORVASTATIN CALCIUM 40 MILLIGRAM(S): 80 TABLET, FILM COATED ORAL at 22:11

## 2019-05-21 RX ADMIN — Medication 100 MILLIGRAM(S): at 14:29

## 2019-05-21 RX ADMIN — APIXABAN 5 MILLIGRAM(S): 2.5 TABLET, FILM COATED ORAL at 18:14

## 2019-05-21 RX ADMIN — Medication 667 MILLIGRAM(S): at 08:24

## 2019-05-21 RX ADMIN — Medication 200 MILLIGRAM(S): at 14:28

## 2019-05-21 RX ADMIN — Medication 40 MILLIEQUIVALENT(S): at 18:14

## 2019-05-21 RX ADMIN — Medication 0.2 MILLIGRAM(S): at 14:28

## 2019-05-21 RX ADMIN — Medication 25 MILLIGRAM(S): at 06:20

## 2019-05-21 RX ADMIN — LIDOCAINE 2 PATCH: 4 CREAM TOPICAL at 12:27

## 2019-05-21 RX ADMIN — SENNA PLUS 2 TABLET(S): 8.6 TABLET ORAL at 21:57

## 2019-05-21 RX ADMIN — APIXABAN 5 MILLIGRAM(S): 2.5 TABLET, FILM COATED ORAL at 06:21

## 2019-05-21 RX ADMIN — Medication 100 MILLIGRAM(S): at 06:22

## 2019-05-21 RX ADMIN — AMLODIPINE BESYLATE 5 MILLIGRAM(S): 2.5 TABLET ORAL at 06:21

## 2019-05-21 RX ADMIN — Medication 3000 UNIT(S): at 12:26

## 2019-05-21 RX ADMIN — LIDOCAINE 2 PATCH: 4 CREAM TOPICAL at 17:16

## 2019-05-21 RX ADMIN — Medication 40 MILLIGRAM(S): at 06:22

## 2019-05-21 RX ADMIN — PANTOPRAZOLE SODIUM 40 MILLIGRAM(S): 20 TABLET, DELAYED RELEASE ORAL at 06:21

## 2019-05-21 RX ADMIN — Medication 0.2 MILLIGRAM(S): at 06:21

## 2019-05-21 RX ADMIN — TAMSULOSIN HYDROCHLORIDE 0.4 MILLIGRAM(S): 0.4 CAPSULE ORAL at 22:11

## 2019-05-21 RX ADMIN — Medication 0.2 MILLIGRAM(S): at 21:57

## 2019-05-21 RX ADMIN — Medication 20 UNIT(S): at 12:21

## 2019-05-21 RX ADMIN — NYSTATIN CREAM 1 APPLICATION(S): 100000 CREAM TOPICAL at 06:10

## 2019-05-21 RX ADMIN — Medication 4: at 12:21

## 2019-05-21 RX ADMIN — Medication 81 MILLIGRAM(S): at 15:14

## 2019-05-21 RX ADMIN — Medication 2: at 08:20

## 2019-05-22 ENCOUNTER — TRANSCRIPTION ENCOUNTER (OUTPATIENT)
Age: 59
End: 2019-05-22

## 2019-05-22 LAB
GLUCOSE BLDC GLUCOMTR-MCNC: 121 MG/DL — HIGH (ref 70–99)
GLUCOSE BLDC GLUCOMTR-MCNC: 148 MG/DL — HIGH (ref 70–99)
GLUCOSE BLDC GLUCOMTR-MCNC: 149 MG/DL — HIGH (ref 70–99)
GLUCOSE BLDC GLUCOMTR-MCNC: 184 MG/DL — HIGH (ref 70–99)
GLUCOSE BLDC GLUCOMTR-MCNC: 267 MG/DL — HIGH (ref 70–99)
GLUCOSE BLDC GLUCOMTR-MCNC: 75 MG/DL — SIGNIFICANT CHANGE UP (ref 70–99)
GLUCOSE BLDC GLUCOMTR-MCNC: 96 MG/DL — SIGNIFICANT CHANGE UP (ref 70–99)

## 2019-05-22 RX ORDER — INSULIN GLARGINE 100 [IU]/ML
50 INJECTION, SOLUTION SUBCUTANEOUS AT BEDTIME
Refills: 0 | Status: DISCONTINUED | OUTPATIENT
Start: 2019-05-22 | End: 2019-05-29

## 2019-05-22 RX ADMIN — Medication 25 MILLIGRAM(S): at 05:21

## 2019-05-22 RX ADMIN — Medication 667 MILLIGRAM(S): at 12:20

## 2019-05-22 RX ADMIN — Medication 0.2 MILLIGRAM(S): at 14:29

## 2019-05-22 RX ADMIN — Medication 6: at 12:32

## 2019-05-22 RX ADMIN — Medication 100 MILLIGRAM(S): at 14:30

## 2019-05-22 RX ADMIN — Medication 81 MILLIGRAM(S): at 12:20

## 2019-05-22 RX ADMIN — PANTOPRAZOLE SODIUM 40 MILLIGRAM(S): 20 TABLET, DELAYED RELEASE ORAL at 05:21

## 2019-05-22 RX ADMIN — Medication 1 PACKET(S): at 12:19

## 2019-05-22 RX ADMIN — LIDOCAINE 2 PATCH: 4 CREAM TOPICAL at 17:53

## 2019-05-22 RX ADMIN — Medication 20 UNIT(S): at 16:35

## 2019-05-22 RX ADMIN — TAMSULOSIN HYDROCHLORIDE 0.4 MILLIGRAM(S): 0.4 CAPSULE ORAL at 21:46

## 2019-05-22 RX ADMIN — AMLODIPINE BESYLATE 5 MILLIGRAM(S): 2.5 TABLET ORAL at 05:22

## 2019-05-22 RX ADMIN — APIXABAN 5 MILLIGRAM(S): 2.5 TABLET, FILM COATED ORAL at 17:55

## 2019-05-22 RX ADMIN — Medication 200 MILLIGRAM(S): at 05:22

## 2019-05-22 RX ADMIN — APIXABAN 5 MILLIGRAM(S): 2.5 TABLET, FILM COATED ORAL at 05:22

## 2019-05-22 RX ADMIN — LIDOCAINE 2 PATCH: 4 CREAM TOPICAL at 12:19

## 2019-05-22 RX ADMIN — Medication 667 MILLIGRAM(S): at 08:31

## 2019-05-22 RX ADMIN — Medication 100 MILLIGRAM(S): at 05:22

## 2019-05-22 RX ADMIN — NYSTATIN CREAM 1 APPLICATION(S): 100000 CREAM TOPICAL at 05:25

## 2019-05-22 RX ADMIN — Medication 100 MILLIGRAM(S): at 21:46

## 2019-05-22 RX ADMIN — Medication 0.2 MILLIGRAM(S): at 05:22

## 2019-05-22 RX ADMIN — Medication 100 MILLIGRAM(S): at 14:29

## 2019-05-22 RX ADMIN — ATORVASTATIN CALCIUM 40 MILLIGRAM(S): 80 TABLET, FILM COATED ORAL at 21:46

## 2019-05-22 RX ADMIN — INSULIN GLARGINE 50 UNIT(S): 100 INJECTION, SOLUTION SUBCUTANEOUS at 22:41

## 2019-05-22 RX ADMIN — SENNA PLUS 2 TABLET(S): 8.6 TABLET ORAL at 21:46

## 2019-05-22 RX ADMIN — Medication 20 UNIT(S): at 12:32

## 2019-05-22 RX ADMIN — Medication 667 MILLIGRAM(S): at 16:35

## 2019-05-22 RX ADMIN — NYSTATIN CREAM 1 APPLICATION(S): 100000 CREAM TOPICAL at 17:55

## 2019-05-22 RX ADMIN — Medication 40 MILLIGRAM(S): at 05:22

## 2019-05-22 RX ADMIN — Medication 100 MILLIGRAM(S): at 21:47

## 2019-05-22 RX ADMIN — Medication 0.2 MILLIGRAM(S): at 21:46

## 2019-05-22 RX ADMIN — LIDOCAINE 2 PATCH: 4 CREAM TOPICAL at 23:16

## 2019-05-22 RX ADMIN — Medication 200 MILLIGRAM(S): at 21:47

## 2019-05-22 RX ADMIN — Medication 20 UNIT(S): at 08:31

## 2019-05-22 RX ADMIN — Medication 3000 UNIT(S): at 12:22

## 2019-05-22 NOTE — DISCHARGE NOTE PROVIDER - NSDCCPTREATMENT_GEN_ALL_CORE_FT
PRINCIPAL PROCEDURE  Procedure: Attended sleep study  Findings and Treatment: Patient will need a sleep study soon after DC. Follow-up with Dr. Soto

## 2019-05-22 NOTE — DISCHARGE NOTE PROVIDER - HOSPITAL COURSE
HPI: 59M PMH of HTN, DM, CAD on aspirin, ZE who was BIBEMS for AMS and aphasia. Stroked code called, had NIHSS 15. CTH showed L frontotemporal intraparenchymal bleed with extension into the ventricle and 2mm shift to the R. Admitted to ICU s/p platelets, DDAVP, nicardipine. Multiple repeat CT head showed stable bleed. While in hospital, pt respiratory status worsened, requiring BiPAP. Repeat duplex 4/12 showed new right posterior tibial and peroneal and left gastrocnemius and branch of posterior tibial thrombus. (Duplex venous 4/6 was neg for DVT.) CTA was not performed due to CKD and V/Q scan not performed as it would not . Neurosurgery cleared pt for anticoagulation. Respiratory status improved and patient is off oxygen. Patient also had DESI on CKD3 but this has resolved to baseline.        Prior functional status: PTA she was independent without assistive device.      On admission:14min on standing frame; T-maxA to total; ADL - dependent        Admission Physical Exam:    Vital signs stable. Afebrile    Gen: alert, NAD    Cardio: RRR    Lungs: clear    Abd: soft, NT    Extremities: pain w/R knee ROM; no edema. PROM wnl.    MP - 4/5 x 4    Neuro AAOx2 not year, speech fluent follows commands    Cranial nerves: CN II-XII grossly intact     Sensation: intact        Hospital Course: The patient was admitted to the acute inpatient rehab unit presenting with a decline in functional status. The patient participated in three hours of multidisciplinary therapy 5-6 days per week. The patient was continued on all home medications or equivalent alternatives as deemed appropriate. The patient received prophylactic anticoagulation medication and was monitored closely with no complications. During the stay on the inpatient unit, the patient showed as much progress as had been anticipated and was cleared for discharge by a multidisciplinary team.        He was started on prednisone for R knee swelling 2/2 meniscus tear/vs gout on MRI and is currently ubdergoing a very slow taper. He was gfound to have DESI on CKD, which improved and he will follow-up as outpatient. He had a new onset L sided weakness, stat CTH ordered and neuro consulted and said to continue with Asa, Plavix, Lipitor. XR foot for pan was unremarkable and improved with increase prednisone dose. Opthalomology was consulted for vision changes. His clonidine was decreased to 0.2mg TID. ordered prednisone taper        His colsultants/follow-ups are as follows:    Pulmonary (Dr. Soto) to set up AVAPs on discharge, same settings    Neuro: Marcie    Neurosurgery: Isiah Ruth    Cardio: Ludwig Man    PMD: Kevin Schaefer    Ortho: [facility]    [facility]        Discharge functional status: ADLs w/supervision; Ambulates 150ft w/RW  w/supervision; 4 steps x3 with 1 handrail; BM/T -  w/supervision;        Discharge disposition: Home w/ home care HPI: 59M PMH of HTN, DM, CAD on aspirin, ZE who was BIBEMS for AMS and aphasia. Stroked code called, had NIHSS 15. CTH showed L frontotemporal intraparenchymal bleed with extension into the ventricle and 2mm shift to the R. Admitted to ICU s/p platelets, DDAVP, nicardipine. Multiple repeat CT head showed stable bleed. While in hospital, pt respiratory status worsened, requiring BiPAP. Repeat duplex 4/12 showed new right posterior tibial and peroneal and left gastrocnemius and branch of posterior tibial thrombus. (Duplex venous 4/6 was neg for DVT.) CTA was not performed due to CKD and V/Q scan not performed as it would not . Neurosurgery cleared pt for anticoagulation. Respiratory status improved and patient is off oxygen. Patient also had DESI on CKD3 but this has resolved to baseline.        Prior functional status: PTA she was independent without assistive device.      On admission:14min on standing frame; T-maxA to total; ADL - dependent        Admission Physical Exam:    Vital signs stable. Afebrile    Gen: alert, NAD    Cardio: RRR    Lungs: clear    Abd: soft, NT    Extremities: pain w/R knee ROM; no edema. PROM wnl.    MP - 4/5 x 4    Neuro AAOx2 not year, speech fluent follows commands    Cranial nerves: CN II-XII grossly intact     Sensation: intact        Hospital Course: The patient was admitted to the acute inpatient rehab unit presenting with a decline in functional status. The patient participated in three hours of multidisciplinary therapy 5-6 days per week. The patient was continued on all home medications or equivalent alternatives as deemed appropriate. The patient received prophylactic anticoagulation medication and was monitored closely with no complications. During the stay on the inpatient unit, the patient showed as much progress as had been anticipated and was cleared for discharge by a multidisciplinary team.        He was started on prednisone for R knee swelling 2/2 meniscus tear/vs gout on MRI. He also had an XR foot for pain was unremarkable and improved with increase prednisone dose.  He is currently undergoing a slow taper. He also was found to have DESI on CKD, which improved and he will follow-up as outpatient. He had a new onset L sided weakness, stat CTH ordered and neuro consulted. Tests were negative and neuro said to continue with Asa, Plavix, Lipitor. Opthalomology was consulted for vision changes. His clonidine was decreased to 0.2mg TID. ordered prednisone taper. Patient also has shortness of breath at night. He was placed on AVAPS in the hospital, however insurance will not cover a home AVAPS machine with his current numbers without an outpatient sleep study performed.         His colsultants/follow-ups are as follows:    Pulmonary (Dr. Soto) to set up AVAPs on discharge, same settings    Neuro: Marcie    Neurosurgery: Isiah Ruth    Cardio: Ludwig Man    PMD: Kevin Schaefer    [facility]    [facility]        Discharge functional status: ADLs w/supervision; Ambulates 150ft w/RW  w/supervision; 4 steps x3 with 1 handrail; BM/T -  w/supervision;        Discharge disposition: Home w/ home care HPI: 59M PMH of HTN, DM, CAD on aspirin, ZE who was BIBEMS for AMS and aphasia. Stroked code called, had NIHSS 15. CTH showed L frontotemporal intraparenchymal bleed with extension into the ventricle and 2mm shift to the R. Admitted to ICU s/p platelets, DDAVP, nicardipine. Multiple repeat CT head showed stable bleed. While in hospital, pt respiratory status worsened, requiring BiPAP. Repeat duplex 4/12 showed new right posterior tibial and peroneal and left gastrocnemius and branch of posterior tibial thrombus. (Duplex venous 4/6 was neg for DVT.) CTA was not performed due to CKD and V/Q scan not performed as it would not . Pt was found to have new onset afib , started on eliquis. Neurosurgery cleared pt for anticoagulation. Respiratory status improved and patient is off oxygen. Patient also had DESI on CKD3 but this has resolved to baseline.        Prior functional status: PTA she was independent without assistive device.      On admission:14min on standing frame; T-maxA to total; ADL - dependent        Admission Physical Exam:    Vital signs stable. Afebrile    Gen: alert, NAD    Cardio: RRR    Lungs: clear    Abd: soft, NT    Extremities: pain w/R knee ROM; no edema. PROM wnl.    MP - 4/5 x 4    Neuro AAOx2 not year, speech fluent follows commands    Cranial nerves: CN II-XII grossly intact     Sensation: intact        Hospital Course: The patient was admitted to the acute inpatient rehab unit presenting with a decline in functional status. The patient participated in three hours of multidisciplinary therapy 5-6 days per week. The patient was continued on all home medications or equivalent alternatives as deemed appropriate. The patient received prophylactic anticoagulation medication and was monitored closely with no complications. During the stay on the inpatient unit, the patient showed as much progress as had been anticipated and was cleared for discharge by a multidisciplinary team.        He was started on prednisone for R knee swelling 2/2 meniscus tear/vs gout on MRI. He also had an XR foot for pain was unremarkable and improved with increase prednisone dose.  He is currently undergoing a slow taper. He also was found to have DESI on CKD, which improved and he will follow-up as outpatient. He had a new onset L sided weakness, stat CTH ordered and neuro consulted. Tests were negative and neuro said to continue with Asa, Plavix, Lipitor. Opthalomology was consulted for vision changes. His clonidine was decreased to 0.2mg TID. Pt to be discharged with prednisone taper for gout flare. Patient also has shortness of breath at night. He was placed on AVAPS in the hospital, however insurance will not cover a home AVAPS machine with his current numbers without an outpatient sleep study performed.         His consultants/follow-ups are as follows:    Pulmonary (Dr. Soto)     Neuro: Marcie    Neurosurgery: Isiah Ruth    Cardio: Ludwig Man    PMD: Kevin Schaefer    [facility]    Nephrology: [facility]        Discharge functional status: ADLs w/supervision; Ambulates 150ft w/RW  w/supervision; 4 steps x3 with 1 handrail; BM/T -  w/supervision;        Discharge disposition: Home w/ home care

## 2019-05-22 NOTE — DISCHARGE NOTE PROVIDER - NSDCCPCAREPLAN_GEN_ALL_CORE_FT
PRINCIPAL DISCHARGE DIAGNOSIS  Diagnosis: ICH (intracerebral hemorrhage)  Assessment and Plan of Treatment: L basal ganglia ICH. Continue outpatient rehab with home healthcare. Take all medications as prescribed and go to all follow-up appointments. PRINCIPAL DISCHARGE DIAGNOSIS  Diagnosis: ICH (intracerebral hemorrhage)  Assessment and Plan of Treatment: L basal ganglia ICH. Continue outpatient rehab with home healthcare. Take all medications as prescribed and go to all follow-up appointments.      SECONDARY DISCHARGE DIAGNOSES  Diagnosis: Gout  Assessment and Plan of Treatment: undergoing a steroid taper. Slowly decrease the steroid dose every 3 days until finished

## 2019-05-22 NOTE — DISCHARGE NOTE PROVIDER - CARE PROVIDERS DIRECT ADDRESSES
,[email or web address],[email or web address],[email or web address]

## 2019-05-22 NOTE — DISCHARGE NOTE PROVIDER - PROVIDER TOKENS
PROVIDER:[TOKEN:[[id number]],FOLLOWUP:[1 month]],PROVIDER:[TOKEN:[[id number]],FOLLOWUP:[1 month]],PROVIDER:[TOKEN:[[id number]],FOLLOWUP:[1 month]],PROVIDER:[TOKEN:[[id number]],FOLLOWUP:[1 week]],PROVIDER:[TOKEN:[[id number]],FOLLOWUP:[1 week]],PROVIDER:[TOKEN:[[id number]],FOLLOWUP:[1 month]]

## 2019-05-22 NOTE — DISCHARGE NOTE PROVIDER - CARE PROVIDER_API CALL
Hiro Miranda)  Internal Medicine; Nephrology  [address] [place] [address]  Phone: [phone]  Fax: [phone]  Follow Up Time: 1 month    Isiah Ruth)  Neurological Surgery  [address] [facility] [facility], [address]  [place] [address]  Phone: [phone]  Fax: [phone]  Follow Up Time: 1 month    Tyler aJck)  EEGEpilepsy; Neurology  [address] [place] [address], [address]  [place] [address]  Phone: [phone]  Fax: [phone]  Follow Up Time: 1 month    Jefferson Lainez)  Critical Care Medicine; Pulmonary Disease; Sleep Medicine  [address] [facility] [address], [place] [address]  Phone: [phone]  Fax: [phone]  Follow Up Time: 1 week    Kevin Schaefer (DO)  Family Medicine  [address] [place] [address]  Phone: [phone]  Fax: [phone]  Follow Up Time: 1 week    Ludwig Man)  Cardiovascular Disease; Nuclear Cardiology  [address] [facility] [address] [facility], [address]  [place] [address]  Phone: [phone]  Fax: [phone]  Follow Up Time: 1 month

## 2019-05-22 NOTE — DISCHARGE NOTE PROVIDER - NSDCFUADDAPPT_GEN_ALL_CORE_FT
Jose Carlos Lehman)  Orthopaedic Surgery  [address] [place] [address]  Phone: [phone]  Fax: [phone]  Follow Up Time: 4 weeks

## 2019-05-23 LAB
BASE EXCESS BLDA CALC-SCNC: 4 MMOL/L — HIGH (ref -2–2)
GAS PNL BLDA: SIGNIFICANT CHANGE UP
GLUCOSE BLDC GLUCOMTR-MCNC: 111 MG/DL — HIGH (ref 70–99)
GLUCOSE BLDC GLUCOMTR-MCNC: 121 MG/DL — HIGH (ref 70–99)
GLUCOSE BLDC GLUCOMTR-MCNC: 148 MG/DL — HIGH (ref 70–99)
GLUCOSE BLDC GLUCOMTR-MCNC: 246 MG/DL — HIGH (ref 70–99)
HCO3 BLDA-SCNC: 28 MMOL/L — HIGH (ref 23–27)
HOROWITZ INDEX BLDA+IHG-RTO: 21 — SIGNIFICANT CHANGE UP
PCO2 BLDA: 40 MMHG — SIGNIFICANT CHANGE UP (ref 38–42)
PH BLDA: 7.45 — HIGH (ref 7.38–7.42)
PO2 BLDA: 70 MMHG — LOW (ref 78–95)
SAO2 % BLDA: 95 % — SIGNIFICANT CHANGE UP (ref 94–98)

## 2019-05-23 RX ORDER — TAMSULOSIN HYDROCHLORIDE 0.4 MG/1
1 CAPSULE ORAL
Qty: 0 | Refills: 0 | DISCHARGE
Start: 2019-05-23

## 2019-05-23 RX ORDER — LIDOCAINE 4 G/100G
2 CREAM TOPICAL
Qty: 0 | Refills: 0 | DISCHARGE
Start: 2019-05-23

## 2019-05-23 RX ORDER — LANOLIN ALCOHOL/MO/W.PET/CERES
1 CREAM (GRAM) TOPICAL
Qty: 0 | Refills: 0 | DISCHARGE
Start: 2019-05-23

## 2019-05-23 RX ORDER — HYDRALAZINE HCL 50 MG
1 TABLET ORAL
Qty: 0 | Refills: 0 | DISCHARGE
Start: 2019-05-23

## 2019-05-23 RX ORDER — FUROSEMIDE 40 MG
1 TABLET ORAL
Qty: 0 | Refills: 0 | DISCHARGE
Start: 2019-05-23

## 2019-05-23 RX ORDER — TAMSULOSIN HYDROCHLORIDE 0.4 MG/1
1 CAPSULE ORAL
Qty: 0 | Refills: 0 | DISCHARGE

## 2019-05-23 RX ORDER — POLYETHYLENE GLYCOL 3350 17 G/17G
17 POWDER, FOR SOLUTION ORAL
Qty: 0 | Refills: 0 | DISCHARGE
Start: 2019-05-23

## 2019-05-23 RX ORDER — AMLODIPINE BESYLATE 2.5 MG/1
1 TABLET ORAL
Qty: 0 | Refills: 0 | DISCHARGE
Start: 2019-05-23

## 2019-05-23 RX ORDER — INSULIN GLARGINE 100 [IU]/ML
50 INJECTION, SOLUTION SUBCUTANEOUS
Qty: 0 | Refills: 0 | DISCHARGE
Start: 2019-05-23

## 2019-05-23 RX ORDER — ACETAMINOPHEN 500 MG
2 TABLET ORAL
Qty: 0 | Refills: 0 | DISCHARGE
Start: 2019-05-23

## 2019-05-23 RX ORDER — INSULIN LISPRO 100/ML
20 VIAL (ML) SUBCUTANEOUS
Qty: 0 | Refills: 0 | DISCHARGE
Start: 2019-05-23

## 2019-05-23 RX ORDER — LABETALOL HCL 100 MG
1 TABLET ORAL
Qty: 0 | Refills: 0 | DISCHARGE
Start: 2019-05-23

## 2019-05-23 RX ORDER — ASPIRIN/CALCIUM CARB/MAGNESIUM 324 MG
1 TABLET ORAL
Qty: 0 | Refills: 0 | DISCHARGE
Start: 2019-05-23

## 2019-05-23 RX ORDER — NYSTATIN CREAM 100000 [USP'U]/G
1 CREAM TOPICAL
Qty: 0 | Refills: 0 | DISCHARGE
Start: 2019-05-23

## 2019-05-23 RX ORDER — PANTOPRAZOLE SODIUM 20 MG/1
1 TABLET, DELAYED RELEASE ORAL
Qty: 0 | Refills: 0 | DISCHARGE
Start: 2019-05-23

## 2019-05-23 RX ORDER — LACTULOSE 10 G/15ML
15 SOLUTION ORAL
Qty: 0 | Refills: 0 | DISCHARGE
Start: 2019-05-23

## 2019-05-23 RX ORDER — APIXABAN 2.5 MG/1
1 TABLET, FILM COATED ORAL
Qty: 0 | Refills: 0 | DISCHARGE
Start: 2019-05-23

## 2019-05-23 RX ORDER — INSULIN GLARGINE 100 [IU]/ML
72 INJECTION, SOLUTION SUBCUTANEOUS
Qty: 0 | Refills: 0 | DISCHARGE

## 2019-05-23 RX ADMIN — Medication 200 MILLIGRAM(S): at 14:25

## 2019-05-23 RX ADMIN — APIXABAN 5 MILLIGRAM(S): 2.5 TABLET, FILM COATED ORAL at 17:28

## 2019-05-23 RX ADMIN — Medication 667 MILLIGRAM(S): at 17:28

## 2019-05-23 RX ADMIN — Medication 0.2 MILLIGRAM(S): at 06:18

## 2019-05-23 RX ADMIN — Medication 100 MILLIGRAM(S): at 14:25

## 2019-05-23 RX ADMIN — NYSTATIN CREAM 1 APPLICATION(S): 100000 CREAM TOPICAL at 17:29

## 2019-05-23 RX ADMIN — Medication 40 MILLIGRAM(S): at 06:18

## 2019-05-23 RX ADMIN — Medication 81 MILLIGRAM(S): at 11:32

## 2019-05-23 RX ADMIN — Medication 0.2 MILLIGRAM(S): at 21:32

## 2019-05-23 RX ADMIN — PANTOPRAZOLE SODIUM 40 MILLIGRAM(S): 20 TABLET, DELAYED RELEASE ORAL at 06:18

## 2019-05-23 RX ADMIN — Medication 100 MILLIGRAM(S): at 21:32

## 2019-05-23 RX ADMIN — Medication 100 MILLIGRAM(S): at 06:18

## 2019-05-23 RX ADMIN — INSULIN GLARGINE 50 UNIT(S): 100 INJECTION, SOLUTION SUBCUTANEOUS at 21:32

## 2019-05-23 RX ADMIN — Medication 20 UNIT(S): at 11:40

## 2019-05-23 RX ADMIN — Medication 667 MILLIGRAM(S): at 11:32

## 2019-05-23 RX ADMIN — Medication 25 MILLIGRAM(S): at 06:18

## 2019-05-23 RX ADMIN — Medication 200 MILLIGRAM(S): at 21:32

## 2019-05-23 RX ADMIN — Medication 4: at 16:57

## 2019-05-23 RX ADMIN — Medication 3000 UNIT(S): at 11:32

## 2019-05-23 RX ADMIN — Medication 20 UNIT(S): at 07:47

## 2019-05-23 RX ADMIN — Medication 200 MILLIGRAM(S): at 06:18

## 2019-05-23 RX ADMIN — Medication 1 PACKET(S): at 11:32

## 2019-05-23 RX ADMIN — Medication 0.2 MILLIGRAM(S): at 14:25

## 2019-05-23 RX ADMIN — Medication 667 MILLIGRAM(S): at 07:47

## 2019-05-23 RX ADMIN — APIXABAN 5 MILLIGRAM(S): 2.5 TABLET, FILM COATED ORAL at 06:18

## 2019-05-23 RX ADMIN — Medication 20 UNIT(S): at 16:57

## 2019-05-23 RX ADMIN — TAMSULOSIN HYDROCHLORIDE 0.4 MILLIGRAM(S): 0.4 CAPSULE ORAL at 21:31

## 2019-05-23 RX ADMIN — Medication 100 MILLIGRAM(S): at 14:24

## 2019-05-23 RX ADMIN — ATORVASTATIN CALCIUM 40 MILLIGRAM(S): 80 TABLET, FILM COATED ORAL at 21:32

## 2019-05-23 RX ADMIN — AMLODIPINE BESYLATE 5 MILLIGRAM(S): 2.5 TABLET ORAL at 06:18

## 2019-05-23 RX ADMIN — SENNA PLUS 2 TABLET(S): 8.6 TABLET ORAL at 21:32

## 2019-05-23 RX ADMIN — NYSTATIN CREAM 1 APPLICATION(S): 100000 CREAM TOPICAL at 06:19

## 2019-05-24 LAB
GLUCOSE BLDC GLUCOMTR-MCNC: 100 MG/DL — HIGH (ref 70–99)
GLUCOSE BLDC GLUCOMTR-MCNC: 114 MG/DL — HIGH (ref 70–99)
GLUCOSE BLDC GLUCOMTR-MCNC: 210 MG/DL — HIGH (ref 70–99)
GLUCOSE BLDC GLUCOMTR-MCNC: 217 MG/DL — HIGH (ref 70–99)

## 2019-05-24 RX ADMIN — POLYETHYLENE GLYCOL 3350 17 GRAM(S): 17 POWDER, FOR SOLUTION ORAL at 23:04

## 2019-05-24 RX ADMIN — SENNA PLUS 2 TABLET(S): 8.6 TABLET ORAL at 23:03

## 2019-05-24 RX ADMIN — ATORVASTATIN CALCIUM 40 MILLIGRAM(S): 80 TABLET, FILM COATED ORAL at 23:03

## 2019-05-24 RX ADMIN — Medication 1 PACKET(S): at 12:25

## 2019-05-24 RX ADMIN — Medication 100 MILLIGRAM(S): at 06:16

## 2019-05-24 RX ADMIN — Medication 20 UNIT(S): at 07:50

## 2019-05-24 RX ADMIN — AMLODIPINE BESYLATE 5 MILLIGRAM(S): 2.5 TABLET ORAL at 06:16

## 2019-05-24 RX ADMIN — APIXABAN 5 MILLIGRAM(S): 2.5 TABLET, FILM COATED ORAL at 06:16

## 2019-05-24 RX ADMIN — Medication 200 MILLIGRAM(S): at 06:16

## 2019-05-24 RX ADMIN — Medication 20 UNIT(S): at 12:22

## 2019-05-24 RX ADMIN — Medication 0.2 MILLIGRAM(S): at 06:16

## 2019-05-24 RX ADMIN — LIDOCAINE 2 PATCH: 4 CREAM TOPICAL at 17:20

## 2019-05-24 RX ADMIN — Medication 667 MILLIGRAM(S): at 07:51

## 2019-05-24 RX ADMIN — Medication 4: at 12:22

## 2019-05-24 RX ADMIN — Medication 3000 UNIT(S): at 12:23

## 2019-05-24 RX ADMIN — NYSTATIN CREAM 1 APPLICATION(S): 100000 CREAM TOPICAL at 06:17

## 2019-05-24 RX ADMIN — Medication 200 MILLIGRAM(S): at 23:02

## 2019-05-24 RX ADMIN — Medication 0.2 MILLIGRAM(S): at 23:03

## 2019-05-24 RX ADMIN — PANTOPRAZOLE SODIUM 40 MILLIGRAM(S): 20 TABLET, DELAYED RELEASE ORAL at 06:16

## 2019-05-24 RX ADMIN — Medication 100 MILLIGRAM(S): at 23:03

## 2019-05-24 RX ADMIN — Medication 100 MILLIGRAM(S): at 23:02

## 2019-05-24 RX ADMIN — Medication 667 MILLIGRAM(S): at 17:15

## 2019-05-24 RX ADMIN — Medication 20 MILLIGRAM(S): at 06:23

## 2019-05-24 RX ADMIN — Medication 81 MILLIGRAM(S): at 12:23

## 2019-05-24 RX ADMIN — Medication 0.2 MILLIGRAM(S): at 12:24

## 2019-05-24 RX ADMIN — Medication 4: at 17:15

## 2019-05-24 RX ADMIN — LIDOCAINE 2 PATCH: 4 CREAM TOPICAL at 12:21

## 2019-05-24 RX ADMIN — APIXABAN 5 MILLIGRAM(S): 2.5 TABLET, FILM COATED ORAL at 17:15

## 2019-05-24 RX ADMIN — Medication 667 MILLIGRAM(S): at 12:23

## 2019-05-24 RX ADMIN — Medication 40 MILLIGRAM(S): at 06:16

## 2019-05-24 RX ADMIN — TAMSULOSIN HYDROCHLORIDE 0.4 MILLIGRAM(S): 0.4 CAPSULE ORAL at 23:02

## 2019-05-24 RX ADMIN — NYSTATIN CREAM 1 APPLICATION(S): 100000 CREAM TOPICAL at 17:19

## 2019-05-24 RX ADMIN — LIDOCAINE 2 PATCH: 4 CREAM TOPICAL at 17:19

## 2019-05-24 RX ADMIN — INSULIN GLARGINE 50 UNIT(S): 100 INJECTION, SOLUTION SUBCUTANEOUS at 23:01

## 2019-05-24 RX ADMIN — Medication 20 UNIT(S): at 17:13

## 2019-05-25 LAB
GLUCOSE BLDC GLUCOMTR-MCNC: 113 MG/DL — HIGH (ref 70–99)
GLUCOSE BLDC GLUCOMTR-MCNC: 134 MG/DL — HIGH (ref 70–99)
GLUCOSE BLDC GLUCOMTR-MCNC: 148 MG/DL — HIGH (ref 70–99)
GLUCOSE BLDC GLUCOMTR-MCNC: 151 MG/DL — HIGH (ref 70–99)

## 2019-05-25 RX ADMIN — Medication 100 MILLIGRAM(S): at 06:30

## 2019-05-25 RX ADMIN — LACTULOSE 10 GRAM(S): 10 SOLUTION ORAL at 06:30

## 2019-05-25 RX ADMIN — Medication 100 MILLIGRAM(S): at 22:10

## 2019-05-25 RX ADMIN — INSULIN GLARGINE 50 UNIT(S): 100 INJECTION, SOLUTION SUBCUTANEOUS at 22:11

## 2019-05-25 RX ADMIN — AMLODIPINE BESYLATE 5 MILLIGRAM(S): 2.5 TABLET ORAL at 06:30

## 2019-05-25 RX ADMIN — Medication 2: at 12:05

## 2019-05-25 RX ADMIN — Medication 200 MILLIGRAM(S): at 13:35

## 2019-05-25 RX ADMIN — Medication 0.2 MILLIGRAM(S): at 22:12

## 2019-05-25 RX ADMIN — Medication 100 MILLIGRAM(S): at 22:11

## 2019-05-25 RX ADMIN — Medication 20 MILLIGRAM(S): at 06:32

## 2019-05-25 RX ADMIN — Medication 20 UNIT(S): at 08:28

## 2019-05-25 RX ADMIN — Medication 81 MILLIGRAM(S): at 12:07

## 2019-05-25 RX ADMIN — TAMSULOSIN HYDROCHLORIDE 0.4 MILLIGRAM(S): 0.4 CAPSULE ORAL at 22:11

## 2019-05-25 RX ADMIN — Medication 100 MILLIGRAM(S): at 06:29

## 2019-05-25 RX ADMIN — Medication 40 MILLIGRAM(S): at 06:29

## 2019-05-25 RX ADMIN — Medication 100 MILLIGRAM(S): at 13:36

## 2019-05-25 RX ADMIN — Medication 0.2 MILLIGRAM(S): at 13:35

## 2019-05-25 RX ADMIN — ATORVASTATIN CALCIUM 40 MILLIGRAM(S): 80 TABLET, FILM COATED ORAL at 22:10

## 2019-05-25 RX ADMIN — Medication 100 MILLIGRAM(S): at 13:35

## 2019-05-25 RX ADMIN — Medication 667 MILLIGRAM(S): at 08:28

## 2019-05-25 RX ADMIN — Medication 200 MILLIGRAM(S): at 22:11

## 2019-05-25 RX ADMIN — Medication 200 MILLIGRAM(S): at 06:30

## 2019-05-25 RX ADMIN — Medication 20 UNIT(S): at 12:05

## 2019-05-25 RX ADMIN — Medication 0.2 MILLIGRAM(S): at 06:29

## 2019-05-25 RX ADMIN — PANTOPRAZOLE SODIUM 40 MILLIGRAM(S): 20 TABLET, DELAYED RELEASE ORAL at 06:31

## 2019-05-25 RX ADMIN — POLYETHYLENE GLYCOL 3350 17 GRAM(S): 17 POWDER, FOR SOLUTION ORAL at 22:13

## 2019-05-25 RX ADMIN — Medication 3000 UNIT(S): at 12:06

## 2019-05-25 RX ADMIN — SENNA PLUS 2 TABLET(S): 8.6 TABLET ORAL at 22:11

## 2019-05-25 RX ADMIN — Medication 667 MILLIGRAM(S): at 12:05

## 2019-05-25 RX ADMIN — NYSTATIN CREAM 1 APPLICATION(S): 100000 CREAM TOPICAL at 17:43

## 2019-05-25 RX ADMIN — APIXABAN 5 MILLIGRAM(S): 2.5 TABLET, FILM COATED ORAL at 17:43

## 2019-05-25 RX ADMIN — LACTULOSE 10 GRAM(S): 10 SOLUTION ORAL at 17:43

## 2019-05-25 RX ADMIN — APIXABAN 5 MILLIGRAM(S): 2.5 TABLET, FILM COATED ORAL at 06:29

## 2019-05-25 RX ADMIN — Medication 667 MILLIGRAM(S): at 16:43

## 2019-05-25 RX ADMIN — Medication 20 UNIT(S): at 16:41

## 2019-05-26 LAB
GLUCOSE BLDC GLUCOMTR-MCNC: 165 MG/DL — HIGH (ref 70–99)
GLUCOSE BLDC GLUCOMTR-MCNC: 226 MG/DL — HIGH (ref 70–99)
GLUCOSE BLDC GLUCOMTR-MCNC: 261 MG/DL — HIGH (ref 70–99)
GLUCOSE BLDC GLUCOMTR-MCNC: 267 MG/DL — HIGH (ref 70–99)

## 2019-05-26 RX ADMIN — SENNA PLUS 2 TABLET(S): 8.6 TABLET ORAL at 21:04

## 2019-05-26 RX ADMIN — TAMSULOSIN HYDROCHLORIDE 0.4 MILLIGRAM(S): 0.4 CAPSULE ORAL at 21:04

## 2019-05-26 RX ADMIN — Medication 667 MILLIGRAM(S): at 08:00

## 2019-05-26 RX ADMIN — Medication 20 UNIT(S): at 08:00

## 2019-05-26 RX ADMIN — Medication 100 MILLIGRAM(S): at 13:05

## 2019-05-26 RX ADMIN — POLYETHYLENE GLYCOL 3350 17 GRAM(S): 17 POWDER, FOR SOLUTION ORAL at 21:04

## 2019-05-26 RX ADMIN — Medication 0.2 MILLIGRAM(S): at 06:46

## 2019-05-26 RX ADMIN — Medication 100 MILLIGRAM(S): at 06:46

## 2019-05-26 RX ADMIN — APIXABAN 5 MILLIGRAM(S): 2.5 TABLET, FILM COATED ORAL at 06:46

## 2019-05-26 RX ADMIN — NYSTATIN CREAM 1 APPLICATION(S): 100000 CREAM TOPICAL at 06:48

## 2019-05-26 RX ADMIN — Medication 667 MILLIGRAM(S): at 11:41

## 2019-05-26 RX ADMIN — INSULIN GLARGINE 50 UNIT(S): 100 INJECTION, SOLUTION SUBCUTANEOUS at 21:04

## 2019-05-26 RX ADMIN — Medication 200 MILLIGRAM(S): at 21:04

## 2019-05-26 RX ADMIN — NYSTATIN CREAM 1 APPLICATION(S): 100000 CREAM TOPICAL at 20:51

## 2019-05-26 RX ADMIN — Medication 2: at 08:00

## 2019-05-26 RX ADMIN — APIXABAN 5 MILLIGRAM(S): 2.5 TABLET, FILM COATED ORAL at 20:52

## 2019-05-26 RX ADMIN — ATORVASTATIN CALCIUM 40 MILLIGRAM(S): 80 TABLET, FILM COATED ORAL at 21:04

## 2019-05-26 RX ADMIN — Medication 100 MILLIGRAM(S): at 21:04

## 2019-05-26 RX ADMIN — Medication 1 PACKET(S): at 11:44

## 2019-05-26 RX ADMIN — LIDOCAINE 2 PATCH: 4 CREAM TOPICAL at 11:43

## 2019-05-26 RX ADMIN — Medication 20 MILLIGRAM(S): at 06:48

## 2019-05-26 RX ADMIN — Medication 40 MILLIGRAM(S): at 06:46

## 2019-05-26 RX ADMIN — Medication 4: at 11:40

## 2019-05-26 RX ADMIN — Medication 200 MILLIGRAM(S): at 06:47

## 2019-05-26 RX ADMIN — LIDOCAINE 2 PATCH: 4 CREAM TOPICAL at 19:45

## 2019-05-26 RX ADMIN — Medication 0.2 MILLIGRAM(S): at 21:04

## 2019-05-26 RX ADMIN — Medication 81 MILLIGRAM(S): at 11:40

## 2019-05-26 RX ADMIN — Medication 6: at 21:50

## 2019-05-26 RX ADMIN — AMLODIPINE BESYLATE 5 MILLIGRAM(S): 2.5 TABLET ORAL at 06:45

## 2019-05-26 RX ADMIN — Medication 200 MILLIGRAM(S): at 13:05

## 2019-05-26 RX ADMIN — Medication 0.2 MILLIGRAM(S): at 13:05

## 2019-05-26 RX ADMIN — Medication 3000 UNIT(S): at 11:40

## 2019-05-26 RX ADMIN — Medication 20 UNIT(S): at 11:40

## 2019-05-26 RX ADMIN — PANTOPRAZOLE SODIUM 40 MILLIGRAM(S): 20 TABLET, DELAYED RELEASE ORAL at 06:46

## 2019-05-27 LAB
ANION GAP SERPL CALC-SCNC: 13 MMOL/L — SIGNIFICANT CHANGE UP (ref 7–14)
BASOPHILS # BLD AUTO: 0.03 K/UL — SIGNIFICANT CHANGE UP (ref 0–0.2)
BASOPHILS NFR BLD AUTO: 0.3 % — SIGNIFICANT CHANGE UP (ref 0–1)
BUN SERPL-MCNC: 47 MG/DL — HIGH (ref 10–20)
CALCIUM SERPL-MCNC: 8.5 MG/DL — SIGNIFICANT CHANGE UP (ref 8.5–10.1)
CHLORIDE SERPL-SCNC: 98 MMOL/L — SIGNIFICANT CHANGE UP (ref 98–110)
CO2 SERPL-SCNC: 27 MMOL/L — SIGNIFICANT CHANGE UP (ref 17–32)
CREAT SERPL-MCNC: 1.9 MG/DL — HIGH (ref 0.7–1.5)
EOSINOPHIL # BLD AUTO: 0.14 K/UL — SIGNIFICANT CHANGE UP (ref 0–0.7)
EOSINOPHIL NFR BLD AUTO: 1.6 % — SIGNIFICANT CHANGE UP (ref 0–8)
GLUCOSE BLDC GLUCOMTR-MCNC: 124 MG/DL — HIGH (ref 70–99)
GLUCOSE BLDC GLUCOMTR-MCNC: 205 MG/DL — HIGH (ref 70–99)
GLUCOSE BLDC GLUCOMTR-MCNC: 273 MG/DL — HIGH (ref 70–99)
GLUCOSE BLDC GLUCOMTR-MCNC: 293 MG/DL — HIGH (ref 70–99)
GLUCOSE SERPL-MCNC: 152 MG/DL — HIGH (ref 70–99)
HCT VFR BLD CALC: 31.4 % — LOW (ref 42–52)
HGB BLD-MCNC: 10 G/DL — LOW (ref 14–18)
IMM GRANULOCYTES NFR BLD AUTO: 0.9 % — HIGH (ref 0.1–0.3)
LYMPHOCYTES # BLD AUTO: 1.18 K/UL — LOW (ref 1.2–3.4)
LYMPHOCYTES # BLD AUTO: 13.7 % — LOW (ref 20.5–51.1)
MAGNESIUM SERPL-MCNC: 1.7 MG/DL — LOW (ref 1.8–2.4)
MCHC RBC-ENTMCNC: 26.7 PG — LOW (ref 27–31)
MCHC RBC-ENTMCNC: 31.8 G/DL — LOW (ref 32–37)
MCV RBC AUTO: 84 FL — SIGNIFICANT CHANGE UP (ref 80–94)
MONOCYTES # BLD AUTO: 0.71 K/UL — HIGH (ref 0.1–0.6)
MONOCYTES NFR BLD AUTO: 8.2 % — SIGNIFICANT CHANGE UP (ref 1.7–9.3)
NEUTROPHILS # BLD AUTO: 6.49 K/UL — SIGNIFICANT CHANGE UP (ref 1.4–6.5)
NEUTROPHILS NFR BLD AUTO: 75.3 % — HIGH (ref 42.2–75.2)
NRBC # BLD: 0 /100 WBCS — SIGNIFICANT CHANGE UP (ref 0–0)
PLATELET # BLD AUTO: 202 K/UL — SIGNIFICANT CHANGE UP (ref 130–400)
POTASSIUM SERPL-MCNC: 4 MMOL/L — SIGNIFICANT CHANGE UP (ref 3.5–5)
POTASSIUM SERPL-SCNC: 4 MMOL/L — SIGNIFICANT CHANGE UP (ref 3.5–5)
RBC # BLD: 3.74 M/UL — LOW (ref 4.7–6.1)
RBC # FLD: 14.2 % — SIGNIFICANT CHANGE UP (ref 11.5–14.5)
SODIUM SERPL-SCNC: 138 MMOL/L — SIGNIFICANT CHANGE UP (ref 135–146)
WBC # BLD: 8.63 K/UL — SIGNIFICANT CHANGE UP (ref 4.8–10.8)
WBC # FLD AUTO: 8.63 K/UL — SIGNIFICANT CHANGE UP (ref 4.8–10.8)

## 2019-05-27 RX ADMIN — Medication 100 MILLIGRAM(S): at 21:44

## 2019-05-27 RX ADMIN — AMLODIPINE BESYLATE 5 MILLIGRAM(S): 2.5 TABLET ORAL at 06:27

## 2019-05-27 RX ADMIN — APIXABAN 5 MILLIGRAM(S): 2.5 TABLET, FILM COATED ORAL at 21:43

## 2019-05-27 RX ADMIN — LIDOCAINE 2 PATCH: 4 CREAM TOPICAL at 00:02

## 2019-05-27 RX ADMIN — SENNA PLUS 2 TABLET(S): 8.6 TABLET ORAL at 21:43

## 2019-05-27 RX ADMIN — Medication 100 MILLIGRAM(S): at 21:43

## 2019-05-27 RX ADMIN — Medication 20 UNIT(S): at 08:06

## 2019-05-27 RX ADMIN — Medication 667 MILLIGRAM(S): at 08:06

## 2019-05-27 RX ADMIN — Medication 20 UNIT(S): at 12:06

## 2019-05-27 RX ADMIN — Medication 100 MILLIGRAM(S): at 06:27

## 2019-05-27 RX ADMIN — NYSTATIN CREAM 1 APPLICATION(S): 100000 CREAM TOPICAL at 06:37

## 2019-05-27 RX ADMIN — Medication 200 MILLIGRAM(S): at 06:28

## 2019-05-27 RX ADMIN — PANTOPRAZOLE SODIUM 40 MILLIGRAM(S): 20 TABLET, DELAYED RELEASE ORAL at 06:30

## 2019-05-27 RX ADMIN — Medication 40 MILLIGRAM(S): at 06:27

## 2019-05-27 RX ADMIN — Medication 100 MILLIGRAM(S): at 13:03

## 2019-05-27 RX ADMIN — Medication 0.2 MILLIGRAM(S): at 13:04

## 2019-05-27 RX ADMIN — Medication 1 PACKET(S): at 12:04

## 2019-05-27 RX ADMIN — APIXABAN 5 MILLIGRAM(S): 2.5 TABLET, FILM COATED ORAL at 06:27

## 2019-05-27 RX ADMIN — Medication 100 MILLIGRAM(S): at 06:28

## 2019-05-27 RX ADMIN — Medication 0.2 MILLIGRAM(S): at 06:27

## 2019-05-27 RX ADMIN — Medication 100 MILLIGRAM(S): at 13:04

## 2019-05-27 RX ADMIN — Medication 81 MILLIGRAM(S): at 12:04

## 2019-05-27 RX ADMIN — INSULIN GLARGINE 50 UNIT(S): 100 INJECTION, SOLUTION SUBCUTANEOUS at 21:44

## 2019-05-27 RX ADMIN — TAMSULOSIN HYDROCHLORIDE 0.4 MILLIGRAM(S): 0.4 CAPSULE ORAL at 21:43

## 2019-05-27 RX ADMIN — Medication 4: at 12:06

## 2019-05-27 RX ADMIN — Medication 200 MILLIGRAM(S): at 21:43

## 2019-05-27 RX ADMIN — Medication 200 MILLIGRAM(S): at 13:04

## 2019-05-27 RX ADMIN — Medication 15 MILLIGRAM(S): at 06:29

## 2019-05-27 RX ADMIN — Medication 667 MILLIGRAM(S): at 12:03

## 2019-05-27 RX ADMIN — ATORVASTATIN CALCIUM 40 MILLIGRAM(S): 80 TABLET, FILM COATED ORAL at 21:43

## 2019-05-27 RX ADMIN — Medication 3000 UNIT(S): at 12:04

## 2019-05-27 RX ADMIN — Medication 0.2 MILLIGRAM(S): at 21:43

## 2019-05-27 RX ADMIN — POLYETHYLENE GLYCOL 3350 17 GRAM(S): 17 POWDER, FOR SOLUTION ORAL at 21:44

## 2019-05-28 LAB
GLUCOSE BLDC GLUCOMTR-MCNC: 119 MG/DL — HIGH (ref 70–99)
GLUCOSE BLDC GLUCOMTR-MCNC: 126 MG/DL — HIGH (ref 70–99)
GLUCOSE BLDC GLUCOMTR-MCNC: 172 MG/DL — HIGH (ref 70–99)
GLUCOSE BLDC GLUCOMTR-MCNC: 208 MG/DL — HIGH (ref 70–99)

## 2019-05-28 RX ORDER — COLCHICINE 0.6 MG
0.6 TABLET ORAL DAILY
Refills: 0 | Status: DISCONTINUED | OUTPATIENT
Start: 2019-05-29 | End: 2019-05-29

## 2019-05-28 RX ORDER — COLCHICINE 0.6 MG
0.6 TABLET ORAL
Refills: 0 | Status: COMPLETED | OUTPATIENT
Start: 2019-05-28 | End: 2019-05-28

## 2019-05-28 RX ORDER — COLCHICINE 0.6 MG
0.6 TABLET ORAL
Refills: 0 | Status: DISCONTINUED | OUTPATIENT
Start: 2019-05-28 | End: 2019-05-28

## 2019-05-28 RX ADMIN — Medication 20 UNIT(S): at 16:53

## 2019-05-28 RX ADMIN — Medication 5 MILLIGRAM(S): at 10:50

## 2019-05-28 RX ADMIN — Medication 100 MILLIGRAM(S): at 05:53

## 2019-05-28 RX ADMIN — Medication 1 PACKET(S): at 12:23

## 2019-05-28 RX ADMIN — Medication 0.2 MILLIGRAM(S): at 05:53

## 2019-05-28 RX ADMIN — Medication 20 UNIT(S): at 08:07

## 2019-05-28 RX ADMIN — Medication 667 MILLIGRAM(S): at 12:23

## 2019-05-28 RX ADMIN — Medication 15 MILLIGRAM(S): at 06:09

## 2019-05-28 RX ADMIN — Medication 4: at 08:06

## 2019-05-28 RX ADMIN — LACTULOSE 10 GRAM(S): 10 SOLUTION ORAL at 05:53

## 2019-05-28 RX ADMIN — POLYETHYLENE GLYCOL 3350 17 GRAM(S): 17 POWDER, FOR SOLUTION ORAL at 21:18

## 2019-05-28 RX ADMIN — NYSTATIN CREAM 1 APPLICATION(S): 100000 CREAM TOPICAL at 05:56

## 2019-05-28 RX ADMIN — Medication 100 MILLIGRAM(S): at 21:17

## 2019-05-28 RX ADMIN — ATORVASTATIN CALCIUM 40 MILLIGRAM(S): 80 TABLET, FILM COATED ORAL at 21:17

## 2019-05-28 RX ADMIN — Medication 0.2 MILLIGRAM(S): at 21:17

## 2019-05-28 RX ADMIN — TAMSULOSIN HYDROCHLORIDE 0.4 MILLIGRAM(S): 0.4 CAPSULE ORAL at 21:17

## 2019-05-28 RX ADMIN — Medication 200 MILLIGRAM(S): at 13:07

## 2019-05-28 RX ADMIN — AMLODIPINE BESYLATE 5 MILLIGRAM(S): 2.5 TABLET ORAL at 05:53

## 2019-05-28 RX ADMIN — INSULIN GLARGINE 50 UNIT(S): 100 INJECTION, SOLUTION SUBCUTANEOUS at 21:18

## 2019-05-28 RX ADMIN — Medication 2: at 12:19

## 2019-05-28 RX ADMIN — Medication 3000 UNIT(S): at 12:24

## 2019-05-28 RX ADMIN — SENNA PLUS 2 TABLET(S): 8.6 TABLET ORAL at 21:17

## 2019-05-28 RX ADMIN — Medication 650 MILLIGRAM(S): at 10:50

## 2019-05-28 RX ADMIN — Medication 667 MILLIGRAM(S): at 08:06

## 2019-05-28 RX ADMIN — Medication 650 MILLIGRAM(S): at 10:51

## 2019-05-28 RX ADMIN — Medication 0.6 MILLIGRAM(S): at 21:17

## 2019-05-28 RX ADMIN — APIXABAN 5 MILLIGRAM(S): 2.5 TABLET, FILM COATED ORAL at 05:53

## 2019-05-28 RX ADMIN — Medication 20 MILLIGRAM(S): at 21:16

## 2019-05-28 RX ADMIN — Medication 81 MILLIGRAM(S): at 12:24

## 2019-05-28 RX ADMIN — NYSTATIN CREAM 1 APPLICATION(S): 100000 CREAM TOPICAL at 17:38

## 2019-05-28 RX ADMIN — Medication 200 MILLIGRAM(S): at 21:17

## 2019-05-28 RX ADMIN — Medication 0.2 MILLIGRAM(S): at 13:07

## 2019-05-28 RX ADMIN — PANTOPRAZOLE SODIUM 40 MILLIGRAM(S): 20 TABLET, DELAYED RELEASE ORAL at 06:09

## 2019-05-28 RX ADMIN — APIXABAN 5 MILLIGRAM(S): 2.5 TABLET, FILM COATED ORAL at 17:41

## 2019-05-28 RX ADMIN — Medication 40 MILLIGRAM(S): at 05:53

## 2019-05-28 RX ADMIN — Medication 20 UNIT(S): at 12:21

## 2019-05-28 RX ADMIN — Medication 100 MILLIGRAM(S): at 13:07

## 2019-05-28 RX ADMIN — Medication 200 MILLIGRAM(S): at 05:53

## 2019-05-28 RX ADMIN — Medication 0.6 MILLIGRAM(S): at 12:23

## 2019-05-28 RX ADMIN — Medication 667 MILLIGRAM(S): at 16:54

## 2019-05-29 ENCOUNTER — TRANSCRIPTION ENCOUNTER (OUTPATIENT)
Age: 59
End: 2019-05-29

## 2019-05-29 LAB
GLUCOSE BLDC GLUCOMTR-MCNC: 239 MG/DL — HIGH (ref 70–99)
GLUCOSE BLDC GLUCOMTR-MCNC: 270 MG/DL — HIGH (ref 70–99)

## 2019-05-29 RX ORDER — COLCHICINE 0.6 MG
1 TABLET ORAL
Qty: 30 | Refills: 0
Start: 2019-05-29

## 2019-05-29 RX ORDER — ATORVASTATIN CALCIUM 80 MG/1
1 TABLET, FILM COATED ORAL
Qty: 0 | Refills: 0 | DISCHARGE

## 2019-05-29 RX ORDER — AMLODIPINE BESYLATE 2.5 MG/1
1 TABLET ORAL
Qty: 30 | Refills: 0
Start: 2019-05-29

## 2019-05-29 RX ORDER — CALCIUM ACETATE 667 MG
1 TABLET ORAL
Qty: 90 | Refills: 0
Start: 2019-05-29

## 2019-05-29 RX ORDER — LABETALOL HCL 100 MG
1 TABLET ORAL
Qty: 90 | Refills: 0
Start: 2019-05-29

## 2019-05-29 RX ORDER — TRAMADOL HYDROCHLORIDE 50 MG/1
1 TABLET ORAL
Qty: 28 | Refills: 0
Start: 2019-05-29

## 2019-05-29 RX ORDER — INSULIN GLARGINE 100 [IU]/ML
50 INJECTION, SOLUTION SUBCUTANEOUS
Qty: 3 | Refills: 0
Start: 2019-05-29

## 2019-05-29 RX ORDER — TAMSULOSIN HYDROCHLORIDE 0.4 MG/1
1 CAPSULE ORAL
Qty: 30 | Refills: 0
Start: 2019-05-29

## 2019-05-29 RX ORDER — ATORVASTATIN CALCIUM 80 MG/1
1 TABLET, FILM COATED ORAL
Qty: 30 | Refills: 0
Start: 2019-05-29

## 2019-05-29 RX ORDER — FUROSEMIDE 40 MG
1 TABLET ORAL
Qty: 30 | Refills: 0
Start: 2019-05-29

## 2019-05-29 RX ORDER — INSULIN LISPRO 100/ML
20 VIAL (ML) SUBCUTANEOUS
Qty: 3 | Refills: 0
Start: 2019-05-29

## 2019-05-29 RX ORDER — LACTULOSE 10 G/15ML
15 SOLUTION ORAL
Qty: 500 | Refills: 0
Start: 2019-05-29

## 2019-05-29 RX ORDER — HYDRALAZINE HCL 50 MG
1 TABLET ORAL
Qty: 90 | Refills: 0
Start: 2019-05-29

## 2019-05-29 RX ORDER — APIXABAN 2.5 MG/1
1 TABLET, FILM COATED ORAL
Qty: 60 | Refills: 0
Start: 2019-05-29

## 2019-05-29 RX ADMIN — Medication 200 MILLIGRAM(S): at 06:00

## 2019-05-29 RX ADMIN — Medication 200 MILLIGRAM(S): at 12:36

## 2019-05-29 RX ADMIN — Medication 0.6 MILLIGRAM(S): at 11:36

## 2019-05-29 RX ADMIN — LACTULOSE 10 GRAM(S): 10 SOLUTION ORAL at 06:00

## 2019-05-29 RX ADMIN — Medication 650 MILLIGRAM(S): at 08:05

## 2019-05-29 RX ADMIN — Medication 667 MILLIGRAM(S): at 11:35

## 2019-05-29 RX ADMIN — Medication 100 MILLIGRAM(S): at 06:00

## 2019-05-29 RX ADMIN — Medication 20 UNIT(S): at 11:43

## 2019-05-29 RX ADMIN — Medication 20 UNIT(S): at 08:03

## 2019-05-29 RX ADMIN — APIXABAN 5 MILLIGRAM(S): 2.5 TABLET, FILM COATED ORAL at 05:59

## 2019-05-29 RX ADMIN — AMLODIPINE BESYLATE 5 MILLIGRAM(S): 2.5 TABLET ORAL at 05:59

## 2019-05-29 RX ADMIN — Medication 0.2 MILLIGRAM(S): at 05:59

## 2019-05-29 RX ADMIN — Medication 40 MILLIGRAM(S): at 06:00

## 2019-05-29 RX ADMIN — Medication 1 PACKET(S): at 11:35

## 2019-05-29 RX ADMIN — Medication 6: at 08:03

## 2019-05-29 RX ADMIN — Medication 20 MILLIGRAM(S): at 05:59

## 2019-05-29 RX ADMIN — Medication 0.2 MILLIGRAM(S): at 12:36

## 2019-05-29 RX ADMIN — Medication 3000 UNIT(S): at 11:39

## 2019-05-29 RX ADMIN — Medication 667 MILLIGRAM(S): at 08:04

## 2019-05-29 RX ADMIN — PANTOPRAZOLE SODIUM 40 MILLIGRAM(S): 20 TABLET, DELAYED RELEASE ORAL at 06:06

## 2019-05-29 RX ADMIN — Medication 4: at 11:42

## 2019-05-29 RX ADMIN — Medication 81 MILLIGRAM(S): at 11:36

## 2019-05-29 RX ADMIN — Medication 100 MILLIGRAM(S): at 12:36

## 2019-05-29 NOTE — DISCHARGE NOTE NURSING/CASE MANAGEMENT/SOCIAL WORK - NSDCPEPTSTRK_GEN_ALL_CORE
Stroke warning signs and symptoms/Signs and symptoms of stroke/Call 911 for stroke/Need for follow up after discharge/Stroke education booklet/Prescribed medications/Risk factors for stroke/Stroke support groups for patients, families, and friends

## 2019-05-29 NOTE — DISCHARGE NOTE NURSING/CASE MANAGEMENT/SOCIAL WORK - NSDCDPATPORTLINK_GEN_ALL_CORE
You can access the [email or web address] Patient Portal, offered by [facility], by registering with the following website: [email or web address]

## 2019-05-29 NOTE — DISCHARGE NOTE NURSING/CASE MANAGEMENT/SOCIAL WORK - NSDCFUADDAPPT_GEN_ALL_CORE_FT
Jose Carlos Lehman)  Orthopaedic Surgery  [address] [place] [address]  Phone: [phone]  Fax: [phone]  Follow Up Time: 4 weeks

## 2019-06-06 DIAGNOSIS — N18.3 CHRONIC KIDNEY DISEASE, STAGE 3 (MODERATE): ICD-10-CM

## 2019-06-06 DIAGNOSIS — I25.10 ATHEROSCLEROTIC HEART DISEASE OF NATIVE CORONARY ARTERY WITHOUT ANGINA PECTORIS: ICD-10-CM

## 2019-06-06 DIAGNOSIS — Z79.01 LONG TERM (CURRENT) USE OF ANTICOAGULANTS: ICD-10-CM

## 2019-06-06 DIAGNOSIS — M25.461 EFFUSION, RIGHT KNEE: ICD-10-CM

## 2019-06-06 DIAGNOSIS — E66.01 MORBID (SEVERE) OBESITY DUE TO EXCESS CALORIES: ICD-10-CM

## 2019-06-06 DIAGNOSIS — I69.251 HEMIPLEGIA AND HEMIPARESIS FOLLOWING OTHER NONTRAUMATIC INTRACRANIAL HEMORRHAGE AFFECTING RIGHT DOMINANT SIDE: ICD-10-CM

## 2019-06-06 DIAGNOSIS — G47.33 OBSTRUCTIVE SLEEP APNEA (ADULT) (PEDIATRIC): ICD-10-CM

## 2019-06-06 DIAGNOSIS — Z99.89 DEPENDENCE ON OTHER ENABLING MACHINES AND DEVICES: ICD-10-CM

## 2019-06-06 DIAGNOSIS — R13.19 OTHER DYSPHAGIA: ICD-10-CM

## 2019-06-06 DIAGNOSIS — M10.9 GOUT, UNSPECIFIED: ICD-10-CM

## 2019-06-06 DIAGNOSIS — Z79.4 LONG TERM (CURRENT) USE OF INSULIN: ICD-10-CM

## 2019-06-06 DIAGNOSIS — Z86.718 PERSONAL HISTORY OF OTHER VENOUS THROMBOSIS AND EMBOLISM: ICD-10-CM

## 2019-06-06 DIAGNOSIS — Z79.82 LONG TERM (CURRENT) USE OF ASPIRIN: ICD-10-CM

## 2019-06-06 DIAGNOSIS — I69.220 APHASIA FOLLOWING OTHER NONTRAUMATIC INTRACRANIAL HEMORRHAGE: ICD-10-CM

## 2019-06-06 DIAGNOSIS — I13.0 HYPERTENSIVE HEART AND CHRONIC KIDNEY DISEASE WITH HEART FAILURE AND STAGE 1 THROUGH STAGE 4 CHRONIC KIDNEY DISEASE, OR UNSPECIFIED CHRONIC KIDNEY DISEASE: ICD-10-CM

## 2019-06-06 DIAGNOSIS — R26.89 OTHER ABNORMALITIES OF GAIT AND MOBILITY: ICD-10-CM

## 2019-06-06 DIAGNOSIS — I50.32 CHRONIC DIASTOLIC (CONGESTIVE) HEART FAILURE: ICD-10-CM

## 2019-06-06 DIAGNOSIS — I69.291 DYSPHAGIA FOLLOWING OTHER NONTRAUMATIC INTRACRANIAL HEMORRHAGE: ICD-10-CM

## 2019-06-06 DIAGNOSIS — E11.22 TYPE 2 DIABETES MELLITUS WITH DIABETIC CHRONIC KIDNEY DISEASE: ICD-10-CM

## 2019-06-06 DIAGNOSIS — I48.91 UNSPECIFIED ATRIAL FIBRILLATION: ICD-10-CM

## 2019-06-06 DIAGNOSIS — I69.219 UNSPECIFIED SYMPTOMS AND SIGNS INVOLVING COGNITIVE FUNCTIONS FOLLOWING OTHER NONTRAUMATIC INTRACRANIAL HEMORRHAGE: ICD-10-CM

## 2019-06-19 PROBLEM — Z00.00 ENCOUNTER FOR PREVENTIVE HEALTH EXAMINATION: Status: ACTIVE | Noted: 2019-06-19

## 2019-06-28 ENCOUNTER — APPOINTMENT (OUTPATIENT)
Dept: NEUROLOGY | Facility: CLINIC | Age: 59
End: 2019-06-28
Payer: COMMERCIAL

## 2019-06-28 VITALS
WEIGHT: 285 LBS | DIASTOLIC BLOOD PRESSURE: 74 MMHG | HEIGHT: 70 IN | HEART RATE: 62 BPM | BODY MASS INDEX: 40.8 KG/M2 | SYSTOLIC BLOOD PRESSURE: 119 MMHG

## 2019-06-28 DIAGNOSIS — Z86.39 PERSONAL HISTORY OF OTHER ENDOCRINE, NUTRITIONAL AND METABOLIC DISEASE: ICD-10-CM

## 2019-06-28 DIAGNOSIS — Z86.79 PERSONAL HISTORY OF OTHER DISEASES OF THE CIRCULATORY SYSTEM: ICD-10-CM

## 2019-06-28 DIAGNOSIS — Z78.9 OTHER SPECIFIED HEALTH STATUS: ICD-10-CM

## 2019-06-28 DIAGNOSIS — Z86.69 PERSONAL HISTORY OF OTHER DISEASES OF THE NERVOUS SYSTEM AND SENSE ORGANS: ICD-10-CM

## 2019-06-28 PROCEDURE — 99242 OFF/OP CONSLTJ NEW/EST SF 20: CPT

## 2019-06-28 RX ORDER — CHOLECALCIFEROL (VITAMIN D3) 50 MCG
50 MCG TABLET ORAL DAILY
Refills: 0 | Status: ACTIVE | COMMUNITY

## 2019-06-28 RX ORDER — LACTULOSE 10 G/15ML
10 SOLUTION ORAL
Refills: 0 | Status: ACTIVE | COMMUNITY

## 2019-06-28 RX ORDER — SENNOSIDES 8.6 MG TABLETS 8.6 MG/1
TABLET ORAL DAILY
Refills: 0 | Status: ACTIVE | COMMUNITY

## 2019-06-28 RX ORDER — CLONIDINE HYDROCHLORIDE 0.2 MG/1
0.2 TABLET ORAL 3 TIMES DAILY
Refills: 0 | Status: ACTIVE | COMMUNITY

## 2019-06-28 RX ORDER — AMLODIPINE BESYLATE 5 MG/1
5 TABLET ORAL DAILY
Refills: 0 | Status: ACTIVE | COMMUNITY

## 2019-06-28 RX ORDER — HYDRALAZINE HYDROCHLORIDE 100 MG/1
100 TABLET ORAL 3 TIMES DAILY
Refills: 0 | Status: ACTIVE | COMMUNITY

## 2019-06-28 RX ORDER — TAMSULOSIN HYDROCHLORIDE 0.4 MG/1
0.4 CAPSULE ORAL DAILY
Refills: 0 | Status: ACTIVE | COMMUNITY

## 2019-06-28 RX ORDER — ATORVASTATIN CALCIUM 40 MG/1
40 TABLET, FILM COATED ORAL DAILY
Refills: 0 | Status: ACTIVE | COMMUNITY

## 2019-06-28 RX ORDER — COLCHICINE 0.6 MG/1
0.6 CAPSULE ORAL DAILY
Refills: 0 | Status: ACTIVE | COMMUNITY

## 2019-06-28 RX ORDER — CALCIUM ACETATE 667 MG/1
667 CAPSULE ORAL 3 TIMES DAILY
Refills: 0 | Status: ACTIVE | COMMUNITY

## 2019-06-28 RX ORDER — TRAMADOL HYDROCHLORIDE 25 MG/1
TABLET, COATED ORAL
Refills: 0 | Status: ACTIVE | COMMUNITY

## 2019-06-28 RX ORDER — ASPIRIN 81 MG
81 TABLET, DELAYED RELEASE (ENTERIC COATED) ORAL DAILY
Refills: 0 | Status: ACTIVE | COMMUNITY

## 2019-06-28 RX ORDER — DULAGLUTIDE 0.75 MG/.5ML
0.75 INJECTION, SOLUTION SUBCUTANEOUS WEEKLY
Refills: 0 | Status: ACTIVE | COMMUNITY

## 2019-06-28 RX ORDER — INSULIN GLARGINE 300 U/ML
INJECTION, SOLUTION SUBCUTANEOUS
Refills: 0 | Status: ACTIVE | COMMUNITY

## 2019-06-28 RX ORDER — LABETALOL HYDROCHLORIDE 200 MG/1
200 TABLET, FILM COATED ORAL 3 TIMES DAILY
Refills: 0 | Status: ACTIVE | COMMUNITY

## 2019-06-28 RX ORDER — APIXABAN 5 MG/1
5 TABLET, FILM COATED ORAL
Refills: 0 | Status: ACTIVE | COMMUNITY

## 2019-07-01 NOTE — PHYSICAL EXAM
[FreeTextEntry1] : Neurologic examination:\par \par He is AAO X3, following complex commands, speaks, names, and repeats with no difficulty. Visual fields are full and extraocular movements are intact. Pupils are round and reactive to light. No facial asymmetry. Tongue, uvula, and palate are midline. On motor examination no drift is detected and his strength is 5/5 in all 4 extremities. His DTR are +2 bilaterally and symmetrically in all extremities. Sensation is intact throughout. Rapid alternating movements and fine finger movements are normal. There is no dysmetria on finger-to-nose and heel-knee-shin. There are no abnormal or extraneous movements. Romberg is negative. Posture is normal. Gait is steady with normal steps, base, arm swing, and turning. He has no difficulty with heel and toe walking as with tandem gait with both eyes open.\par \par Modified Ranking Scale: 0\par \par NIHSS:0

## 2019-07-01 NOTE — DISCUSSION/SUMMARY
[FreeTextEntry1] : Patient is a 59 year old with a past medical history of HTN, DM, CAD, Sleep Apnea presented for altered mental status, right sided weakness and aphasia. In the ED stroke code was called and NIHSS was 15.  The CT scan showed the left intra parenchymal bleed with the extend into the ventricle associated with the midline shift. The systolic blood pressure was in 200s. Overall he feels vastly improved since hospitalization and four weeks of rehab. Family does report some staring at times and overall flat affect. \par Etiology of intracranial hemorrhage likely due to hypertension but MRI brain is warranted post hemorrhage to evaluate for underlying lesion contributing to hemorrhage. \par Plan:\par Blood pressure control.\par Keep a blood pressure diary.\par Rehab physician for post stroke cognitive dysfunction.\par Neuropsychology evaluation for post stroke cognitive dysfunction.\par MRI brain with and without mandeep.\par Routine EEG\par May continue anticoagulation from a neurovascular perspective provided blood pressure is kept less than 140/80.\par Follow up in 4-6 weeks.\par

## 2019-07-01 NOTE — HISTORY OF PRESENT ILLNESS
[FreeTextEntry1] : Patient is a 59 year old with a past medical history of HTN, DM, CAD, Sleep Apnea presented for altered mental status, right sided weakness and aphasia. In the ED stroke code was called and NIHSS was 15.  The CT scan showed the left intra parenchymal bleed with the extend into the ventricle associated with the midline shift. The systolic blood pressure was in 200s. He was found to have new onset Afib and started on AC 3 weeks after admission. He had some medical problems complicating his admission such as respitory distress. He is having difficulty executing tasks, stars into space at times. Even prior to The ICH he suffered from mild depression and developed a flat affect. Overall the patient and family report vast improvement post hospitalization. \par \par

## 2019-07-09 ENCOUNTER — OUTPATIENT (OUTPATIENT)
Dept: OUTPATIENT SERVICES | Facility: HOSPITAL | Age: 59
LOS: 1 days | Discharge: HOME | End: 2019-07-09
Payer: COMMERCIAL

## 2019-07-09 DIAGNOSIS — Z90.89 ACQUIRED ABSENCE OF OTHER ORGANS: Chronic | ICD-10-CM

## 2019-07-09 DIAGNOSIS — I62.9 NONTRAUMATIC INTRACRANIAL HEMORRHAGE, UNSPECIFIED: ICD-10-CM

## 2019-07-09 PROCEDURE — 70553 MRI BRAIN STEM W/O & W/DYE: CPT | Mod: 26

## 2019-07-10 NOTE — PATIENT PROFILE ADULT - NSPROHMDIABETBLDGLCTST_GEN_A_NUR
Patient called upset that the only neuropsychology place that accepts his insurance is in [place] but insurance will only cover a ride service for 25 miles  Wanted to know if there was anyone in [place] who does the test as it would be closer than [place]  Explained that Dr Luba Jay is a [facility] Neuropsychologist who practices in [place], however, his insurance most likely will not allow him to cross state lines; no matter how much closer it is   He stated that he will call insurance to find out if he can see her and then will call and schedule with her if he can before meals and at bedtime

## 2019-07-16 ENCOUNTER — OUTPATIENT (OUTPATIENT)
Dept: OUTPATIENT SERVICES | Facility: HOSPITAL | Age: 59
LOS: 1 days | Discharge: HOME | End: 2019-07-16
Payer: COMMERCIAL

## 2019-07-16 DIAGNOSIS — I62.9 NONTRAUMATIC INTRACRANIAL HEMORRHAGE, UNSPECIFIED: ICD-10-CM

## 2019-07-16 DIAGNOSIS — Z90.89 ACQUIRED ABSENCE OF OTHER ORGANS: Chronic | ICD-10-CM

## 2019-07-16 PROCEDURE — 95819 EEG AWAKE AND ASLEEP: CPT | Mod: 26

## 2019-08-12 ENCOUNTER — APPOINTMENT (OUTPATIENT)
Dept: NEUROLOGY | Facility: CLINIC | Age: 59
End: 2019-08-12
Payer: COMMERCIAL

## 2019-08-12 VITALS
WEIGHT: 288 LBS | HEART RATE: 62 BPM | DIASTOLIC BLOOD PRESSURE: 81 MMHG | BODY MASS INDEX: 41.23 KG/M2 | HEIGHT: 70 IN | SYSTOLIC BLOOD PRESSURE: 149 MMHG

## 2019-08-12 DIAGNOSIS — Z82.0 FAMILY HISTORY OF EPILEPSY AND OTHER DISEASES OF THE NERVOUS SYSTEM: ICD-10-CM

## 2019-08-12 PROCEDURE — 99214 OFFICE O/P EST MOD 30 MIN: CPT

## 2019-08-12 NOTE — REVIEW OF SYSTEMS
[Difficulty Walking] : difficulty walking [Joint Pain] : joint pain [Arthralgias] : arthralgias [Negative] : Endocrine

## 2019-08-12 NOTE — HISTORY OF PRESENT ILLNESS
[FreeTextEntry1] : Mr. Cannon is a 60yo man here for followup for his hemorrhagic stroke.  His prior visit was on 6/28/2019 and since then he has been doing well except for one episode after doing physical therapy in his pool when he slipped and fell down the ladder hitting his right knee. He has been doing PT for his knee and is scheduled in 2 days to follow up with orthopedics and get an MRI brain.\par Cognitive complaints are not any worse and he thinks are slightly better.  His mother developed alzheimers in her ealry 50's and was nursing home bound in her 60's\par No change in medications\par

## 2019-08-12 NOTE — DISCUSSION/SUMMARY
[FreeTextEntry1] : Mr. Cannon is a 58yo man with history of OCH and continues to have cognitive issues which may have started prior to his ICH.  Has h/o alzheimers from his mother at a young age.\par 1. Neuropsych testing\par 2. Continue current meds\par 3. f/u in 6 months

## 2019-08-12 NOTE — PHYSICAL EXAM
[FreeTextEntry1] : He is AAO X3, following complex commands, speaks, names, and repeats with no difficulty. Visual fields are full and extraocular movements are intact. Pupils are round and reactive to light. No facial asymmetry. Tongue, uvula, and palate are midline. On motor examination no drift is detected and his strength is 5/5 in all 4 extremities. His DTR are +2 bilaterally and symmetrically in all extremities. Sensation is intact throughout. Rapid alternating movements and fine finger movements are normal. There is no dysmetria on finger-to-nose and heel-knee-shin. There are no abnormal or extraneous movements. Romberg is negative. Posture is normal. Gait is steady with normal steps, base, arm swing, and turning. He has no difficulty with heel and toe walking as with tandem gait with both eyes open.\par \par Modified Ranking Scale: 0\par \par NIHSS:0. \par

## 2019-09-02 ENCOUNTER — FORM ENCOUNTER (OUTPATIENT)
Age: 59
End: 2019-09-02

## 2019-09-03 ENCOUNTER — OUTPATIENT (OUTPATIENT)
Dept: OUTPATIENT SERVICES | Facility: HOSPITAL | Age: 59
LOS: 1 days | Discharge: HOME | End: 2019-09-03

## 2019-09-03 DIAGNOSIS — Z90.89 ACQUIRED ABSENCE OF OTHER ORGANS: Chronic | ICD-10-CM

## 2019-09-03 DIAGNOSIS — G31.84 MILD COGNITIVE IMPAIRMENT OF UNCERTAIN OR UNKNOWN ETIOLOGY: ICD-10-CM

## 2019-09-19 ENCOUNTER — OUTPATIENT (OUTPATIENT)
Dept: OUTPATIENT SERVICES | Facility: HOSPITAL | Age: 59
LOS: 1 days | Discharge: HOME | End: 2019-09-19

## 2019-09-19 DIAGNOSIS — Z90.89 ACQUIRED ABSENCE OF OTHER ORGANS: Chronic | ICD-10-CM

## 2019-10-17 DIAGNOSIS — R26.89 OTHER ABNORMALITIES OF GAIT AND MOBILITY: ICD-10-CM

## 2019-10-17 DIAGNOSIS — I69.00 UNSPECIFIED SEQUELAE OF NONTRAUMATIC SUBARACHNOID HEMORRHAGE: ICD-10-CM

## 2019-10-17 DIAGNOSIS — G81.91 HEMIPLEGIA, UNSPECIFIED AFFECTING RIGHT DOMINANT SIDE: ICD-10-CM

## 2019-11-29 ENCOUNTER — OUTPATIENT (OUTPATIENT)
Dept: OUTPATIENT SERVICES | Facility: HOSPITAL | Age: 59
LOS: 1 days | Discharge: HOME | End: 2019-11-29

## 2019-11-29 DIAGNOSIS — H90.3 SENSORINEURAL HEARING LOSS, BILATERAL: ICD-10-CM

## 2019-11-29 DIAGNOSIS — Z90.89 ACQUIRED ABSENCE OF OTHER ORGANS: Chronic | ICD-10-CM

## 2020-03-11 ENCOUNTER — APPOINTMENT (OUTPATIENT)
Dept: NEUROLOGY | Facility: CLINIC | Age: 60
End: 2020-03-11
Payer: COMMERCIAL

## 2020-03-11 VITALS
SYSTOLIC BLOOD PRESSURE: 106 MMHG | OXYGEN SATURATION: 97 % | HEART RATE: 65 BPM | BODY MASS INDEX: 39.37 KG/M2 | DIASTOLIC BLOOD PRESSURE: 69 MMHG | HEIGHT: 70 IN | WEIGHT: 275 LBS

## 2020-03-11 PROCEDURE — 99214 OFFICE O/P EST MOD 30 MIN: CPT

## 2020-03-11 RX ORDER — EZETIMIBE 10 MG/1
10 TABLET ORAL DAILY
Refills: 0 | Status: ACTIVE | COMMUNITY

## 2020-03-11 NOTE — DISCUSSION/SUMMARY
[FreeTextEntry1] : Mr. Cannon is a 59yo man with an intracerebral hemorrhage likely related to his hypertension which is currently well controlled.  He also had DVT's and a possible PE and is on Eliquis.  For the decision about anticoagulation I will have to defer to his PMD Dr. Schaefer as he has no neurological indication for A/C\par 1. Continue BP control with SBP < 140\par 2. From neurological standpoint no need for anticoagulation \par 3. RTC in 1 year

## 2020-03-11 NOTE — CONSULT LETTER
[Dear  ___] : Dear  [unfilled], [Courtesy Letter:] : I had the pleasure of seeing your patient, [unfilled], in my office today. [Consult Closing:] : Thank you very much for allowing me to participate in the care of this patient.  If you have any questions, please do not hesitate to contact me. [Sincerely,] : Sincerely, [FreeTextEntry3] : Tyler Shea MD PhD

## 2020-03-11 NOTE — PHYSICAL EXAM
[FreeTextEntry1] : He is AAO X3, following complex commands, speaks, names, and repeats with no difficulty. Visual fields are full and extraocular movements are intact. Pupils are round and reactive to light. No facial asymmetry. Tongue, uvula, and palate are midline. On motor examination no drift is detected and his strength is 5/5 in all 4 extremities. His DTR are +2 bilaterally and symmetrically in all extremities. Sensation is intact throughout. Rapid alternating movements and fine finger movements are normal. There is no dysmetria on finger-to-nose and heel-knee-shin. There are no abnormal or extraneous movements. Romberg is negative. Posture is normal. Gait is steady with normal steps, base, arm swing, and turning. He has no difficulty with heel and toe walking as with tandem gait with both eyes open.\par \par Modified Ranking Scale: 0\par \par NIHSS:0.

## 2021-01-05 ENCOUNTER — APPOINTMENT (OUTPATIENT)
Dept: NEUROLOGY | Facility: CLINIC | Age: 61
End: 2021-01-05

## 2021-02-22 ENCOUNTER — APPOINTMENT (OUTPATIENT)
Dept: NEUROLOGY | Facility: CLINIC | Age: 61
End: 2021-02-22

## 2021-05-20 ENCOUNTER — EMERGENCY (EMERGENCY)
Facility: HOSPITAL | Age: 61
LOS: 0 days | Discharge: HOME | End: 2021-05-20
Attending: EMERGENCY MEDICINE | Admitting: EMERGENCY MEDICINE
Payer: COMMERCIAL

## 2021-05-20 VITALS
DIASTOLIC BLOOD PRESSURE: 88 MMHG | WEIGHT: 279.99 LBS | TEMPERATURE: 97 F | HEIGHT: 67 IN | RESPIRATION RATE: 20 BRPM | OXYGEN SATURATION: 95 % | SYSTOLIC BLOOD PRESSURE: 137 MMHG | HEART RATE: 66 BPM

## 2021-05-20 DIAGNOSIS — Z79.82 LONG TERM (CURRENT) USE OF ASPIRIN: ICD-10-CM

## 2021-05-20 DIAGNOSIS — H11.31 CONJUNCTIVAL HEMORRHAGE, RIGHT EYE: ICD-10-CM

## 2021-05-20 DIAGNOSIS — E66.01 MORBID (SEVERE) OBESITY DUE TO EXCESS CALORIES: ICD-10-CM

## 2021-05-20 DIAGNOSIS — Z79.4 LONG TERM (CURRENT) USE OF INSULIN: ICD-10-CM

## 2021-05-20 DIAGNOSIS — Z90.89 ACQUIRED ABSENCE OF OTHER ORGANS: Chronic | ICD-10-CM

## 2021-05-20 DIAGNOSIS — H57.89 OTHER SPECIFIED DISORDERS OF EYE AND ADNEXA: ICD-10-CM

## 2021-05-20 DIAGNOSIS — E11.9 TYPE 2 DIABETES MELLITUS WITHOUT COMPLICATIONS: ICD-10-CM

## 2021-05-20 DIAGNOSIS — Z79.51 LONG TERM (CURRENT) USE OF INHALED STEROIDS: ICD-10-CM

## 2021-05-20 DIAGNOSIS — Z79.899 OTHER LONG TERM (CURRENT) DRUG THERAPY: ICD-10-CM

## 2021-05-20 DIAGNOSIS — Z87.39 PERSONAL HISTORY OF OTHER DISEASES OF THE MUSCULOSKELETAL SYSTEM AND CONNECTIVE TISSUE: ICD-10-CM

## 2021-05-20 DIAGNOSIS — I10 ESSENTIAL (PRIMARY) HYPERTENSION: ICD-10-CM

## 2021-05-20 PROCEDURE — 99282 EMERGENCY DEPT VISIT SF MDM: CPT

## 2021-05-20 NOTE — ED PROVIDER NOTE - PATIENT PORTAL LINK FT
You can access the FollowMyHealth Patient Portal offered by [facility] by registering at the following website: [email or web address]. By joining [employer]’s FollowMyHealth portal, you will also be able to view your health information using other applications (apps) compatible with our system.

## 2021-05-20 NOTE — ED ADULT TRIAGE NOTE - CHIEF COMPLAINT QUOTE
pt sent to ED by urgent care for right eye redness. as per pt's wife, pt's eye started to get bloodshot yesterday, progressively has gotten worse. denies pain. reports blurry vision to the right eye.   pt takes Eliquis

## 2021-05-20 NOTE — ED PROVIDER NOTE - NSFOLLOWUPINSTRUCTIONS_ED_ALL_ED_FT
Subconjunctival Hemorrhage      Subconjunctival hemorrhage is bleeding that happens between the white part of your eye (sclera) and the clear membrane that covers the outside of your eye (conjunctiva). There are many tiny blood vessels near the surface of your eye. A subconjunctival hemorrhage happens when one or more of these vessels breaks and bleeds, causing a red patch to appear on your eye. This is similar to a bruise.    Depending on the amount of bleeding, the red patch may only cover a small area of your eye or it may cover the entire visible part of the sclera. If a lot of blood collects under the conjunctiva, there may also be swelling. Subconjunctival hemorrhages do not affect your vision or cause pain, but your eye may feel irritated if there is swelling. Subconjunctival hemorrhages usually do not require treatment, and they usually disappear on their own within two weeks.      What are the causes?  This condition may be caused by:  •Mild trauma, such as rubbing your eye too hard.      •Blunt injuries, such as from playing sports or having contact with a deployed airbag.      •Coughing, sneezing, or vomiting.      •Straining, such as when lifting a heavy object.      •High blood pressure.      •Recent eye surgery.      •Diabetes.      •Certain medicines, especially blood thinners (anticoagulants).      •Other conditions, such as eye tumors, bleeding disorders, or blood vessel abnormalities.      Subconjunctival hemorrhages can also happen without an obvious cause.      What are the signs or symptoms?  Symptoms of this condition include:•A bright red or dark red patch on the white part of the eye. The red area may:  •Spread out to cover a larger area of the eye before it goes away.      •Turn brownish-yellow before it goes away.        •Swelling around the eye.      •Mild eye irritation.        How is this diagnosed?  This condition is diagnosed with a physical exam. If your subconjunctival hemorrhage was caused by trauma, your health care provider may refer you to an eye specialist (ophthalmologist) or another specialist to check for other injuries. You may have other tests, including:  •An eye exam.      •A blood pressure check.      •Blood tests to check for bleeding disorders.      If your subconjunctival hemorrhage was caused by trauma, X-rays or a CT scan may be done to check for other injuries.      How is this treated?    Usually, treatment is not needed for this condition. If you have discomfort, your health care provider may recommend eye drops or cold compresses.      Follow these instructions at home:    •Take over-the-counter and prescription medicines only as directed by your health care provider.      •Use eye drops or cold compresses to help with discomfort as directed by your health care provider.      •Avoid activities, things, and environments that may irritate or injure your eye.      •Keep all follow-up visits as told by your health care provider. This is important.        Contact a health care provider if:    •You have pain in your eye.      •The bleeding does not go away within 3 weeks.      •You keep getting new subconjunctival hemorrhages.        Get help right away if:    •Your vision changes or you have difficulty seeing.      •You suddenly develop severe sensitivity to light.      •You develop a severe headache, persistent vomiting, confusion, or abnormal tiredness (lethargy).      •Your eye seems to bulge or protrude from your eye socket.      •You develop unexplained bruises on your body.      •You have unexplained bleeding in another area of your body.        Summary    •Subconjunctival hemorrhage is bleeding that happens between the white part of your eye and the clear membrane that covers the outside of your eye.      •This condition is similar to a bruise.      •Subconjunctival hemorrhages usually do not require treatment, and they usually disappear on their own within two weeks.      •Use eye drops or cold compresses to help with discomfort as directed by your health care provider.      This information is not intended to replace advice given to you by your health care provider. Make sure you discuss any questions you have with your health care provider.

## 2021-05-20 NOTE — ED ADULT NURSE NOTE - OBJECTIVE STATEMENT
Pt presents to ED c/o R eye redness since yesterday. Pt denies injury to eye, pain, or blurry vision. Pt is awake alert and not in any distress.

## 2021-05-20 NOTE — ED PROVIDER NOTE - OBJECTIVE STATEMENT
62 y/o male h/o HTN, DM, gout p/w rt eye redness x yesterday, constant, denies modifying factors, blurred vision, denies recent trauma, paresthesias, focal weakness, fevers, chills, neck stiffness, other visual disturbances or pain, facial swelling, dental pain or other associated complaints at present.     Old chart reviewed.  I have reviewed and agree with the initial nursing note, except as documented in my note.

## 2021-05-20 NOTE — ED PROVIDER NOTE - PHYSICAL EXAMINATION
VSS, awake, alert, non-toxic appearing, VA – 20/20 OU (OD / OS), LLL normal, no periorbital swelling, erythema, ecchymosis, bony deformities or tenderness, no rash or lesions noted to face or nose, no ocular d/c, 260 REGINALD OD, OS SC anicteric and pink, PERRL, EOMI w/o significant pain, no proptosis or chemosis, no hyphema or hypopyon, no lesions, AC grossly clear.

## 2021-08-04 NOTE — ED PROVIDER NOTE - WET READ LAUNCH FT
New PT in the system  Kati  was notified   There are no Wet Read(s) to document. Hemigard Postcare Instructions: The HEMIGARD strips are to remain completely dry for at least 5-7 days.

## 2021-09-23 ENCOUNTER — APPOINTMENT (OUTPATIENT)
Dept: NEUROLOGY | Facility: CLINIC | Age: 61
End: 2021-09-23
Payer: COMMERCIAL

## 2021-09-23 VITALS
HEART RATE: 62 BPM | WEIGHT: 276 LBS | HEIGHT: 70 IN | BODY MASS INDEX: 39.51 KG/M2 | TEMPERATURE: 97 F | SYSTOLIC BLOOD PRESSURE: 130 MMHG | DIASTOLIC BLOOD PRESSURE: 74 MMHG | OXYGEN SATURATION: 98 %

## 2021-09-23 PROCEDURE — 99213 OFFICE O/P EST LOW 20 MIN: CPT

## 2021-09-23 NOTE — DISCUSSION/SUMMARY
[FreeTextEntry1] : Mr. Cannon is a 60yo man with an intracerebral hemorrhage likely related to his hypertension which is currently well controlled.  He also had DVT's and a possible PE and is on Eliquis.  For the decision about anticoagulation I will have to defer to his PMD Dr. Schaefer as he has no neurological indication for A/C\par 1. Continue BP control with SBP < 140\par 2. From neurological standpoint no need for anticoagulation \par 3. RTC in 1 year

## 2021-09-23 NOTE — HISTORY OF PRESENT ILLNESS
[FreeTextEntry1] : Mr. Cannon is a 60yo man here for followup for his hemorrhagic stroke. His prior visit was on 3/11/2021and since then he has been doing well.  He is still on anticoagulation for DVTs found in the hospital however the wife is unsure how long he will need anticoagulation.   The decision of when to start and whether it was safe to be on anticoagulation was my decision.\par He has no new complaints other then fatigue.  He is not exercising and the wife is trying to give him projects in the house to keep him active

## 2021-10-27 ENCOUNTER — APPOINTMENT (OUTPATIENT)
Dept: CARDIOLOGY | Facility: CLINIC | Age: 61
End: 2021-10-27

## 2021-12-08 ENCOUNTER — TRANSCRIPTION ENCOUNTER (OUTPATIENT)
Age: 61
End: 2021-12-08

## 2021-12-10 ENCOUNTER — TRANSCRIPTION ENCOUNTER (OUTPATIENT)
Age: 61
End: 2021-12-10

## 2022-01-01 ENCOUNTER — APPOINTMENT (OUTPATIENT)
Dept: NEUROLOGY | Facility: CLINIC | Age: 62
End: 2022-01-01
Payer: COMMERCIAL

## 2022-01-01 VITALS
SYSTOLIC BLOOD PRESSURE: 128 MMHG | WEIGHT: 276 LBS | DIASTOLIC BLOOD PRESSURE: 79 MMHG | BODY MASS INDEX: 39.51 KG/M2 | HEIGHT: 70 IN | HEART RATE: 59 BPM

## 2022-01-01 DIAGNOSIS — I62.9 NONTRAUMATIC INTRACRANIAL HEMORRHAGE, UNSPECIFIED: ICD-10-CM

## 2022-01-01 PROCEDURE — 99213 OFFICE O/P EST LOW 20 MIN: CPT

## 2022-01-01 RX ORDER — ALFUZOSIN HYDROCHLORIDE 10 MG/1
10 TABLET, EXTENDED RELEASE ORAL
Qty: 90 | Refills: 0 | Status: ACTIVE | COMMUNITY
Start: 2022-06-03

## 2022-01-01 RX ORDER — COVID-19 ANTIGEN TEST
KIT MISCELLANEOUS
Qty: 8 | Refills: 0 | Status: ACTIVE | COMMUNITY
Start: 2022-01-01

## 2022-01-01 RX ORDER — BLOOD SUGAR DIAGNOSTIC
STRIP MISCELLANEOUS
Qty: 300 | Refills: 0 | Status: ACTIVE | COMMUNITY
Start: 2021-11-29

## 2022-01-01 RX ORDER — DULAGLUTIDE 1.5 MG/.5ML
1.5 INJECTION, SOLUTION SUBCUTANEOUS
Qty: 6 | Refills: 0 | Status: ACTIVE | COMMUNITY
Start: 2022-02-26

## 2022-01-08 NOTE — ED ADULT NURSE NOTE - NS ED NOTE ABUSE RESPONSE YN
Requested medication(s) are due for refill today: Yes  Patient has already received a courtesy refill: No  Other reason request has been forwarded to provider: failed protocol
Yes

## 2022-11-02 PROBLEM — I62.9 INTRACRANIAL HEMORRHAGE: Status: ACTIVE | Noted: 2019-06-28

## 2022-11-03 NOTE — PHYSICAL EXAM
[FreeTextEntry1] : MS: Awake, alert, oriented to person, place, situation and time.  Follows commands. \par \par Language: Speech is clear, fluent. No dysarthria. \par \par CNs 2 - 12 intact. EOMI no nystagmus, no diplopia. No facial asymmetry b/l. Tongue midline, normal movements, no atrophy.\par \par Motor: Normal muscle bulk & tone. No noticeable tremor or seizure. No pronator drift. Muscle strength of b/l UE and b/l LE 5/5. Right slow finger tap and foot tap\par \par Sensation: Intact to LT b/l throughout\par \par Cortical: No extinction\par \par Coordination: Intact rapid-alternating movements. No dysmetria to FTN. \par \par DTR: 2+ in biceps, brachioradialis and triceps; 1+ in patellar and ankle; plantars are down b/l\par \par Gait: No postural instability. Normal stance and tandem gait.\par \par General: Alert and oriented to person, place, time, and situation. In no acute distress. Cooperative. Follows commands. \par Eyes: Sclera and conjunctiva were normal and pupils were equal in size, round, reactive to light, with normal accommodation\par ENT: Ears and nose normal in appearance. \par Vascular: No peripheral edema.\par Respiratory: No visible respiratory distress. \par Musculoskeletal: No involuntary movements were seen.\par Skin: Normal skin color and pigmentation.\par

## 2022-11-03 NOTE — HISTORY OF PRESENT ILLNESS
[FreeTextEntry1] : Mr. Cannon is a 63yo man here for followup for his hemorrhagic stroke. He was last seen about a year ago.\par Since last visit, patient is is doing well with with no new medical issues.

## 2022-11-03 NOTE — ASSESSMENT
[FreeTextEntry1] : Mr. Cannon is a 63yo man with a history of intracerebral hemorrhage likely related to his hypertension which is currently well controlled. \par \par 1. Continue BP control with SBP < 140\par 2. From neurological standpoint no need for anticoagulation \par 3. RTC in 1 year

## 2022-11-14 NOTE — ED ADULT TRIAGE NOTE - TEMPERATURE IN CELSIUS (DEGREES C)
- TSH for routine monitoring. Once TSH results, will send prescription for Synthroid.  - Continue Synthroid 100mcg daily, pending TSH results.   36.1

## 2023-01-01 ENCOUNTER — INPATIENT (INPATIENT)
Facility: HOSPITAL | Age: 63
LOS: 12 days | DRG: 64 | End: 2023-06-19
Attending: NEUROLOGICAL SURGERY | Admitting: NEUROLOGICAL SURGERY
Payer: MEDICARE

## 2023-01-01 ENCOUNTER — FORM ENCOUNTER (OUTPATIENT)
Age: 63
End: 2023-01-01

## 2023-01-01 VITALS
DIASTOLIC BLOOD PRESSURE: 100 MMHG | SYSTOLIC BLOOD PRESSURE: 200 MMHG | HEART RATE: 70 BPM | WEIGHT: 285.06 LBS | OXYGEN SATURATION: 96 %

## 2023-01-01 VITALS — OXYGEN SATURATION: 71 % | HEART RATE: 10 BPM

## 2023-01-01 DIAGNOSIS — I61.9 NONTRAUMATIC INTRACEREBRAL HEMORRHAGE, UNSPECIFIED: ICD-10-CM

## 2023-01-01 DIAGNOSIS — A41.9 SEPSIS, UNSPECIFIED ORGANISM: ICD-10-CM

## 2023-01-01 DIAGNOSIS — N17.9 ACUTE KIDNEY FAILURE, UNSPECIFIED: ICD-10-CM

## 2023-01-01 DIAGNOSIS — Z90.89 ACQUIRED ABSENCE OF OTHER ORGANS: Chronic | ICD-10-CM

## 2023-01-01 DIAGNOSIS — J96.00 ACUTE RESPIRATORY FAILURE, UNSPECIFIED WHETHER WITH HYPOXIA OR HYPERCAPNIA: ICD-10-CM

## 2023-01-01 DIAGNOSIS — Z51.5 ENCOUNTER FOR PALLIATIVE CARE: ICD-10-CM

## 2023-01-01 LAB
24R-OH-CALCIDIOL SERPL-MCNC: 19 NG/ML — LOW (ref 30–80)
A1C WITH ESTIMATED AVERAGE GLUCOSE RESULT: 5.9 % — HIGH (ref 4–5.6)
A1C WITH ESTIMATED AVERAGE GLUCOSE RESULT: 6.3 % — HIGH (ref 4–5.6)
ALBUMIN SERPL ELPH-MCNC: 1.6 G/DL — LOW (ref 3.5–5.2)
ALBUMIN SERPL ELPH-MCNC: 1.7 G/DL — LOW (ref 3.5–5.2)
ALBUMIN SERPL ELPH-MCNC: 1.8 G/DL — LOW (ref 3.5–5.2)
ALBUMIN SERPL ELPH-MCNC: 1.8 G/DL — LOW (ref 3.5–5.2)
ALBUMIN SERPL ELPH-MCNC: 2.4 G/DL — LOW (ref 3.5–5.2)
ALBUMIN SERPL ELPH-MCNC: 2.4 G/DL — LOW (ref 3.5–5.2)
ALBUMIN SERPL ELPH-MCNC: 2.5 G/DL — LOW (ref 3.5–5.2)
ALBUMIN SERPL ELPH-MCNC: 2.6 G/DL — LOW (ref 3.5–5.2)
ALBUMIN SERPL ELPH-MCNC: 2.6 G/DL — LOW (ref 3.5–5.2)
ALBUMIN SERPL ELPH-MCNC: 2.7 G/DL — LOW (ref 3.5–5.2)
ALBUMIN SERPL ELPH-MCNC: 3 G/DL — LOW (ref 3.5–5.2)
ALBUMIN SERPL ELPH-MCNC: 3.1 G/DL — LOW (ref 3.5–5.2)
ALBUMIN SERPL ELPH-MCNC: 3.1 G/DL — LOW (ref 3.5–5.2)
ALBUMIN SERPL ELPH-MCNC: 3.2 G/DL — LOW (ref 3.5–5.2)
ALBUMIN SERPL ELPH-MCNC: 3.4 G/DL — LOW (ref 3.5–5.2)
ALBUMIN SERPL ELPH-MCNC: 3.7 G/DL — SIGNIFICANT CHANGE UP (ref 3.5–5.2)
ALBUMIN SERPL ELPH-MCNC: 3.8 G/DL — SIGNIFICANT CHANGE UP (ref 3.5–5.2)
ALBUMIN SERPL ELPH-MCNC: 4.1 G/DL — SIGNIFICANT CHANGE UP (ref 3.5–5.2)
ALP SERPL-CCNC: 100 U/L — SIGNIFICANT CHANGE UP (ref 30–115)
ALP SERPL-CCNC: 139 U/L — HIGH (ref 30–115)
ALP SERPL-CCNC: 159 U/L — HIGH (ref 30–115)
ALP SERPL-CCNC: 165 U/L — HIGH (ref 30–115)
ALP SERPL-CCNC: 188 U/L — HIGH (ref 30–115)
ALP SERPL-CCNC: 202 U/L — HIGH (ref 30–115)
ALP SERPL-CCNC: 209 U/L — HIGH (ref 30–115)
ALP SERPL-CCNC: 46 U/L — SIGNIFICANT CHANGE UP (ref 30–115)
ALP SERPL-CCNC: 47 U/L — SIGNIFICANT CHANGE UP (ref 30–115)
ALP SERPL-CCNC: 47 U/L — SIGNIFICANT CHANGE UP (ref 30–115)
ALP SERPL-CCNC: 50 U/L — SIGNIFICANT CHANGE UP (ref 30–115)
ALP SERPL-CCNC: 50 U/L — SIGNIFICANT CHANGE UP (ref 30–115)
ALP SERPL-CCNC: 55 U/L — SIGNIFICANT CHANGE UP (ref 30–115)
ALP SERPL-CCNC: 58 U/L — SIGNIFICANT CHANGE UP (ref 30–115)
ALP SERPL-CCNC: 59 U/L — SIGNIFICANT CHANGE UP (ref 30–115)
ALP SERPL-CCNC: 65 U/L — SIGNIFICANT CHANGE UP (ref 30–115)
ALP SERPL-CCNC: 73 U/L — SIGNIFICANT CHANGE UP (ref 30–115)
ALP SERPL-CCNC: 77 U/L — SIGNIFICANT CHANGE UP (ref 30–115)
ALP SERPL-CCNC: 82 U/L — SIGNIFICANT CHANGE UP (ref 30–115)
ALP SERPL-CCNC: 95 U/L — SIGNIFICANT CHANGE UP (ref 30–115)
ALT FLD-CCNC: 16 U/L — SIGNIFICANT CHANGE UP (ref 0–41)
ALT FLD-CCNC: 21 U/L — SIGNIFICANT CHANGE UP (ref 0–41)
ALT FLD-CCNC: 24 U/L — SIGNIFICANT CHANGE UP (ref 0–41)
ALT FLD-CCNC: 24 U/L — SIGNIFICANT CHANGE UP (ref 0–41)
ALT FLD-CCNC: 25 U/L — SIGNIFICANT CHANGE UP (ref 0–41)
ALT FLD-CCNC: 25 U/L — SIGNIFICANT CHANGE UP (ref 0–41)
ALT FLD-CCNC: 27 U/L — SIGNIFICANT CHANGE UP (ref 0–41)
ALT FLD-CCNC: 29 U/L — SIGNIFICANT CHANGE UP (ref 0–41)
ALT FLD-CCNC: 309 U/L — HIGH (ref 0–41)
ALT FLD-CCNC: 31 U/L — SIGNIFICANT CHANGE UP (ref 0–41)
ALT FLD-CCNC: 33 U/L — SIGNIFICANT CHANGE UP (ref 0–41)
ALT FLD-CCNC: 33 U/L — SIGNIFICANT CHANGE UP (ref 0–41)
ALT FLD-CCNC: 37 U/L — SIGNIFICANT CHANGE UP (ref 0–41)
ALT FLD-CCNC: 38 U/L — SIGNIFICANT CHANGE UP (ref 0–41)
ALT FLD-CCNC: 39 U/L — SIGNIFICANT CHANGE UP (ref 0–41)
ALT FLD-CCNC: 40 U/L — SIGNIFICANT CHANGE UP (ref 0–41)
ALT FLD-CCNC: 42 U/L — HIGH (ref 0–41)
ALT FLD-CCNC: 43 U/L — HIGH (ref 0–41)
ALT FLD-CCNC: >7000 U/L — HIGH (ref 0–41)
ALT FLD-CCNC: >7000 U/L — HIGH (ref 0–41)
AMMONIA BLD-MCNC: 23 UMOL/L — SIGNIFICANT CHANGE UP (ref 11–55)
AMYLASE P1 CFR SERPL: 43 U/L — SIGNIFICANT CHANGE UP (ref 25–115)
ANION GAP SERPL CALC-SCNC: 10 MMOL/L — SIGNIFICANT CHANGE UP (ref 7–14)
ANION GAP SERPL CALC-SCNC: 11 MMOL/L — SIGNIFICANT CHANGE UP (ref 7–14)
ANION GAP SERPL CALC-SCNC: 12 MMOL/L — SIGNIFICANT CHANGE UP (ref 7–14)
ANION GAP SERPL CALC-SCNC: 13 MMOL/L — SIGNIFICANT CHANGE UP (ref 7–14)
ANION GAP SERPL CALC-SCNC: 14 MMOL/L — SIGNIFICANT CHANGE UP (ref 7–14)
ANION GAP SERPL CALC-SCNC: 15 MMOL/L — HIGH (ref 7–14)
ANION GAP SERPL CALC-SCNC: 16 MMOL/L — HIGH (ref 7–14)
ANION GAP SERPL CALC-SCNC: 16 MMOL/L — HIGH (ref 7–14)
ANION GAP SERPL CALC-SCNC: 17 MMOL/L — HIGH (ref 7–14)
ANION GAP SERPL CALC-SCNC: 18 MMOL/L — HIGH (ref 7–14)
ANION GAP SERPL CALC-SCNC: 21 MMOL/L — HIGH (ref 7–14)
ANION GAP SERPL CALC-SCNC: 23 MMOL/L — HIGH (ref 7–14)
ANION GAP SERPL CALC-SCNC: 24 MMOL/L — HIGH (ref 7–14)
ANION GAP SERPL CALC-SCNC: 29 MMOL/L — HIGH (ref 7–14)
ANISOCYTOSIS BLD QL: SLIGHT — SIGNIFICANT CHANGE UP
ANISOCYTOSIS BLD QL: SLIGHT — SIGNIFICANT CHANGE UP
APPEARANCE UR: ABNORMAL
APTT BLD: 23.9 SEC — CRITICAL LOW (ref 27–39.2)
APTT BLD: 26 SEC — LOW (ref 27–39.2)
APTT BLD: 26.8 SEC — LOW (ref 27–39.2)
APTT BLD: 27.8 SEC — SIGNIFICANT CHANGE UP (ref 27–39.2)
APTT BLD: 29.7 SEC — SIGNIFICANT CHANGE UP (ref 27–39.2)
APTT BLD: 30.3 SEC — SIGNIFICANT CHANGE UP (ref 27–39.2)
APTT BLD: 30.8 SEC — SIGNIFICANT CHANGE UP (ref 27–39.2)
APTT BLD: 37.8 SEC — SIGNIFICANT CHANGE UP (ref 27–39.2)
APTT BLD: 46.5 SEC — HIGH (ref 27–39.2)
AST SERPL-CCNC: 15 U/L — SIGNIFICANT CHANGE UP (ref 0–41)
AST SERPL-CCNC: 19 U/L — SIGNIFICANT CHANGE UP (ref 0–41)
AST SERPL-CCNC: 19 U/L — SIGNIFICANT CHANGE UP (ref 0–41)
AST SERPL-CCNC: 20 U/L — SIGNIFICANT CHANGE UP (ref 0–41)
AST SERPL-CCNC: 23 U/L — SIGNIFICANT CHANGE UP (ref 0–41)
AST SERPL-CCNC: 24 U/L — SIGNIFICANT CHANGE UP (ref 0–41)
AST SERPL-CCNC: 27 U/L — SIGNIFICANT CHANGE UP (ref 0–41)
AST SERPL-CCNC: 30 U/L — SIGNIFICANT CHANGE UP (ref 0–41)
AST SERPL-CCNC: 30 U/L — SIGNIFICANT CHANGE UP (ref 0–41)
AST SERPL-CCNC: 31 U/L — SIGNIFICANT CHANGE UP (ref 0–41)
AST SERPL-CCNC: 332 U/L — HIGH (ref 0–41)
AST SERPL-CCNC: 34 U/L — SIGNIFICANT CHANGE UP (ref 0–41)
AST SERPL-CCNC: 35 U/L — SIGNIFICANT CHANGE UP (ref 0–41)
AST SERPL-CCNC: 5 U/L — SIGNIFICANT CHANGE UP (ref 0–41)
AST SERPL-CCNC: >7000 U/L — SIGNIFICANT CHANGE UP (ref 0–41)
AST SERPL-CCNC: >7000 U/L — SIGNIFICANT CHANGE UP (ref 0–41)
B-OH-BUTYR SERPL-SCNC: <0.2 MMOL/L — SIGNIFICANT CHANGE UP
BACTERIA # UR AUTO: NEGATIVE — SIGNIFICANT CHANGE UP
BASE EXCESS BLDA CALC-SCNC: -2.5 MMOL/L — LOW (ref -2–3)
BASE EXCESS BLDA CALC-SCNC: -3.1 MMOL/L — LOW (ref -2–3)
BASE EXCESS BLDA CALC-SCNC: -3.6 MMOL/L — LOW (ref -2–3)
BASE EXCESS BLDA CALC-SCNC: 0.1 MMOL/L — SIGNIFICANT CHANGE UP (ref -2–3)
BASO STIPL BLD QL SMEAR: PRESENT — SIGNIFICANT CHANGE UP
BASOPHILS # BLD AUTO: 0.02 K/UL — SIGNIFICANT CHANGE UP (ref 0–0.2)
BASOPHILS # BLD AUTO: 0.03 K/UL — SIGNIFICANT CHANGE UP (ref 0–0.2)
BASOPHILS # BLD AUTO: 0.04 K/UL — SIGNIFICANT CHANGE UP (ref 0–0.2)
BASOPHILS # BLD AUTO: 0.04 K/UL — SIGNIFICANT CHANGE UP (ref 0–0.2)
BASOPHILS # BLD AUTO: 0.05 K/UL — SIGNIFICANT CHANGE UP (ref 0–0.2)
BASOPHILS # BLD AUTO: 0.05 K/UL — SIGNIFICANT CHANGE UP (ref 0–0.2)
BASOPHILS # BLD AUTO: 0.06 K/UL — SIGNIFICANT CHANGE UP (ref 0–0.2)
BASOPHILS # BLD AUTO: 0.07 K/UL — SIGNIFICANT CHANGE UP (ref 0–0.2)
BASOPHILS # BLD AUTO: 0.07 K/UL — SIGNIFICANT CHANGE UP (ref 0–0.2)
BASOPHILS # BLD AUTO: 0.08 K/UL — SIGNIFICANT CHANGE UP (ref 0–0.2)
BASOPHILS # BLD AUTO: 0.17 K/UL — SIGNIFICANT CHANGE UP (ref 0–0.2)
BASOPHILS # BLD AUTO: 0.19 K/UL — SIGNIFICANT CHANGE UP (ref 0–0.2)
BASOPHILS # BLD AUTO: 0.41 K/UL — HIGH (ref 0–0.2)
BASOPHILS NFR BLD AUTO: 0.2 % — SIGNIFICANT CHANGE UP (ref 0–1)
BASOPHILS NFR BLD AUTO: 0.3 % — SIGNIFICANT CHANGE UP (ref 0–1)
BASOPHILS NFR BLD AUTO: 0.4 % — SIGNIFICANT CHANGE UP (ref 0–1)
BASOPHILS NFR BLD AUTO: 0.6 % — SIGNIFICANT CHANGE UP (ref 0–1)
BASOPHILS NFR BLD AUTO: 0.8 % — SIGNIFICANT CHANGE UP (ref 0–1)
BASOPHILS NFR BLD AUTO: 0.9 % — SIGNIFICANT CHANGE UP (ref 0–1)
BASOPHILS NFR BLD AUTO: 0.9 % — SIGNIFICANT CHANGE UP (ref 0–1)
BILIRUB SERPL-MCNC: 0.2 MG/DL — SIGNIFICANT CHANGE UP (ref 0.2–1.2)
BILIRUB SERPL-MCNC: 0.3 MG/DL — SIGNIFICANT CHANGE UP (ref 0.2–1.2)
BILIRUB SERPL-MCNC: 0.4 MG/DL — SIGNIFICANT CHANGE UP (ref 0.2–1.2)
BILIRUB SERPL-MCNC: 0.5 MG/DL — SIGNIFICANT CHANGE UP (ref 0.2–1.2)
BILIRUB SERPL-MCNC: 0.8 MG/DL — SIGNIFICANT CHANGE UP (ref 0.2–1.2)
BILIRUB SERPL-MCNC: 0.8 MG/DL — SIGNIFICANT CHANGE UP (ref 0.2–1.2)
BILIRUB SERPL-MCNC: 0.9 MG/DL — SIGNIFICANT CHANGE UP (ref 0.2–1.2)
BILIRUB SERPL-MCNC: 0.9 MG/DL — SIGNIFICANT CHANGE UP (ref 0.2–1.2)
BILIRUB SERPL-MCNC: 1.1 MG/DL — SIGNIFICANT CHANGE UP (ref 0.2–1.2)
BILIRUB SERPL-MCNC: 1.2 MG/DL — SIGNIFICANT CHANGE UP (ref 0.2–1.2)
BILIRUB SERPL-MCNC: 1.2 MG/DL — SIGNIFICANT CHANGE UP (ref 0.2–1.2)
BILIRUB SERPL-MCNC: 1.4 MG/DL — HIGH (ref 0.2–1.2)
BILIRUB SERPL-MCNC: 1.4 MG/DL — HIGH (ref 0.2–1.2)
BILIRUB SERPL-MCNC: 2 MG/DL — HIGH (ref 0.2–1.2)
BILIRUB SERPL-MCNC: 2.1 MG/DL — HIGH (ref 0.2–1.2)
BILIRUB UR-MCNC: NEGATIVE — SIGNIFICANT CHANGE UP
BLD GP AB SCN SERPL QL: SIGNIFICANT CHANGE UP
BUN SERPL-MCNC: 100 MG/DL — CRITICAL HIGH (ref 10–20)
BUN SERPL-MCNC: 101 MG/DL — CRITICAL HIGH (ref 10–20)
BUN SERPL-MCNC: 101 MG/DL — CRITICAL HIGH (ref 10–20)
BUN SERPL-MCNC: 106 MG/DL — CRITICAL HIGH (ref 10–20)
BUN SERPL-MCNC: 106 MG/DL — CRITICAL HIGH (ref 10–20)
BUN SERPL-MCNC: 108 MG/DL — CRITICAL HIGH (ref 10–20)
BUN SERPL-MCNC: 108 MG/DL — CRITICAL HIGH (ref 10–20)
BUN SERPL-MCNC: 110 MG/DL — CRITICAL HIGH (ref 10–20)
BUN SERPL-MCNC: 115 MG/DL — CRITICAL HIGH (ref 10–20)
BUN SERPL-MCNC: 125 MG/DL — CRITICAL HIGH (ref 10–20)
BUN SERPL-MCNC: 127 MG/DL — CRITICAL HIGH (ref 10–20)
BUN SERPL-MCNC: 157 MG/DL — CRITICAL HIGH (ref 10–20)
BUN SERPL-MCNC: 158 MG/DL — CRITICAL HIGH (ref 10–20)
BUN SERPL-MCNC: 54 MG/DL — HIGH (ref 10–20)
BUN SERPL-MCNC: 58 MG/DL — HIGH (ref 10–20)
BUN SERPL-MCNC: 61 MG/DL — CRITICAL HIGH (ref 10–20)
BUN SERPL-MCNC: 65 MG/DL — CRITICAL HIGH (ref 10–20)
BUN SERPL-MCNC: 67 MG/DL — CRITICAL HIGH (ref 10–20)
BUN SERPL-MCNC: 68 MG/DL — CRITICAL HIGH (ref 10–20)
BUN SERPL-MCNC: 68 MG/DL — CRITICAL HIGH (ref 10–20)
BUN SERPL-MCNC: 69 MG/DL — CRITICAL HIGH (ref 10–20)
BUN SERPL-MCNC: 78 MG/DL — CRITICAL HIGH (ref 10–20)
BUN SERPL-MCNC: 82 MG/DL — CRITICAL HIGH (ref 10–20)
BUN SERPL-MCNC: 83 MG/DL — CRITICAL HIGH (ref 10–20)
BUN SERPL-MCNC: 90 MG/DL — CRITICAL HIGH (ref 10–20)
BUN SERPL-MCNC: 95 MG/DL — CRITICAL HIGH (ref 10–20)
BURR CELLS BLD QL SMEAR: PRESENT — SIGNIFICANT CHANGE UP
CALCIUM SERPL-MCNC: 6.4 MG/DL — LOW (ref 8.4–10.5)
CALCIUM SERPL-MCNC: 7 MG/DL — LOW (ref 8.4–10.5)
CALCIUM SERPL-MCNC: 7.2 MG/DL — LOW (ref 8.4–10.5)
CALCIUM SERPL-MCNC: 7.3 MG/DL — LOW (ref 8.4–10.4)
CALCIUM SERPL-MCNC: 7.4 MG/DL — LOW (ref 8.4–10.5)
CALCIUM SERPL-MCNC: 7.4 MG/DL — LOW (ref 8.4–10.5)
CALCIUM SERPL-MCNC: 7.5 MG/DL — LOW (ref 8.4–10.4)
CALCIUM SERPL-MCNC: 7.5 MG/DL — LOW (ref 8.4–10.5)
CALCIUM SERPL-MCNC: 7.6 MG/DL — LOW (ref 8.4–10.5)
CALCIUM SERPL-MCNC: 7.7 MG/DL — LOW (ref 8.4–10.4)
CALCIUM SERPL-MCNC: 7.7 MG/DL — LOW (ref 8.4–10.5)
CALCIUM SERPL-MCNC: 7.7 MG/DL — LOW (ref 8.4–10.5)
CALCIUM SERPL-MCNC: 7.8 MG/DL — LOW (ref 8.4–10.4)
CALCIUM SERPL-MCNC: 7.8 MG/DL — LOW (ref 8.4–10.5)
CALCIUM SERPL-MCNC: 7.9 MG/DL — LOW (ref 8.4–10.4)
CALCIUM SERPL-MCNC: 8 MG/DL — LOW (ref 8.4–10.4)
CALCIUM SERPL-MCNC: 8 MG/DL — LOW (ref 8.4–10.5)
CALCIUM SERPL-MCNC: 8.3 MG/DL — LOW (ref 8.4–10.5)
CALCIUM SERPL-MCNC: 8.4 MG/DL — SIGNIFICANT CHANGE UP (ref 8.4–10.5)
CALCIUM SERPL-MCNC: 8.4 MG/DL — SIGNIFICANT CHANGE UP (ref 8.4–10.5)
CALCIUM SERPL-MCNC: 8.9 MG/DL — SIGNIFICANT CHANGE UP (ref 8.4–10.5)
CHLORIDE SERPL-SCNC: 101 MMOL/L — SIGNIFICANT CHANGE UP (ref 98–110)
CHLORIDE SERPL-SCNC: 102 MMOL/L — SIGNIFICANT CHANGE UP (ref 98–110)
CHLORIDE SERPL-SCNC: 102 MMOL/L — SIGNIFICANT CHANGE UP (ref 98–110)
CHLORIDE SERPL-SCNC: 103 MMOL/L — SIGNIFICANT CHANGE UP (ref 98–110)
CHLORIDE SERPL-SCNC: 104 MMOL/L — SIGNIFICANT CHANGE UP (ref 98–110)
CHLORIDE SERPL-SCNC: 104 MMOL/L — SIGNIFICANT CHANGE UP (ref 98–110)
CHLORIDE SERPL-SCNC: 105 MMOL/L — SIGNIFICANT CHANGE UP (ref 98–110)
CHLORIDE SERPL-SCNC: 106 MMOL/L — SIGNIFICANT CHANGE UP (ref 98–110)
CHLORIDE SERPL-SCNC: 107 MMOL/L — SIGNIFICANT CHANGE UP (ref 98–110)
CHLORIDE SERPL-SCNC: 108 MMOL/L — SIGNIFICANT CHANGE UP (ref 98–110)
CHLORIDE SERPL-SCNC: 109 MMOL/L — SIGNIFICANT CHANGE UP (ref 98–110)
CHLORIDE SERPL-SCNC: 110 MMOL/L — SIGNIFICANT CHANGE UP (ref 98–110)
CHLORIDE SERPL-SCNC: 112 MMOL/L — HIGH (ref 98–110)
CHLORIDE SERPL-SCNC: 113 MMOL/L — HIGH (ref 98–110)
CHLORIDE SERPL-SCNC: 114 MMOL/L — HIGH (ref 98–110)
CHLORIDE SERPL-SCNC: 115 MMOL/L — HIGH (ref 98–110)
CHLORIDE SERPL-SCNC: 115 MMOL/L — HIGH (ref 98–110)
CHLORIDE SERPL-SCNC: 116 MMOL/L — HIGH (ref 98–110)
CHLORIDE SERPL-SCNC: 119 MMOL/L — HIGH (ref 98–110)
CHOLEST SERPL-MCNC: 103 MG/DL — SIGNIFICANT CHANGE UP
CHOLEST SERPL-MCNC: 163 MG/DL — SIGNIFICANT CHANGE UP
CK MB CFR SERPL CALC: 9 NG/ML — HIGH (ref 0.6–6.3)
CK SERPL-CCNC: 1271 U/L — HIGH (ref 0–225)
CO2 SERPL-SCNC: 15 MMOL/L — LOW (ref 17–32)
CO2 SERPL-SCNC: 18 MMOL/L — SIGNIFICANT CHANGE UP (ref 17–32)
CO2 SERPL-SCNC: 19 MMOL/L — SIGNIFICANT CHANGE UP (ref 17–32)
CO2 SERPL-SCNC: 20 MMOL/L — SIGNIFICANT CHANGE UP (ref 17–32)
CO2 SERPL-SCNC: 21 MMOL/L — SIGNIFICANT CHANGE UP (ref 17–32)
CO2 SERPL-SCNC: 22 MMOL/L — SIGNIFICANT CHANGE UP (ref 17–32)
CO2 SERPL-SCNC: 23 MMOL/L — SIGNIFICANT CHANGE UP (ref 17–32)
CO2 SERPL-SCNC: 23 MMOL/L — SIGNIFICANT CHANGE UP (ref 17–32)
CO2 SERPL-SCNC: 24 MMOL/L — SIGNIFICANT CHANGE UP (ref 17–32)
CO2 SERPL-SCNC: 24 MMOL/L — SIGNIFICANT CHANGE UP (ref 17–32)
CO2 SERPL-SCNC: 27 MMOL/L — SIGNIFICANT CHANGE UP (ref 17–32)
CO2 SERPL-SCNC: 8 MMOL/L — CRITICAL LOW (ref 17–32)
COLOR SPEC: SIGNIFICANT CHANGE UP
COLOR SPEC: YELLOW — SIGNIFICANT CHANGE UP
CREAT SERPL-MCNC: 2.8 MG/DL — HIGH (ref 0.7–1.5)
CREAT SERPL-MCNC: 3.1 MG/DL — HIGH (ref 0.7–1.5)
CREAT SERPL-MCNC: 3.1 MG/DL — HIGH (ref 0.7–1.5)
CREAT SERPL-MCNC: 3.2 MG/DL — HIGH (ref 0.7–1.5)
CREAT SERPL-MCNC: 3.5 MG/DL — HIGH (ref 0.7–1.5)
CREAT SERPL-MCNC: 4 MG/DL — HIGH (ref 0.7–1.5)
CREAT SERPL-MCNC: 4.2 MG/DL — CRITICAL HIGH (ref 0.7–1.5)
CREAT SERPL-MCNC: 4.3 MG/DL — CRITICAL HIGH (ref 0.7–1.5)
CREAT SERPL-MCNC: 4.5 MG/DL — CRITICAL HIGH (ref 0.7–1.5)
CREAT SERPL-MCNC: 4.5 MG/DL — CRITICAL HIGH (ref 0.7–1.5)
CREAT SERPL-MCNC: 4.7 MG/DL — CRITICAL HIGH (ref 0.7–1.5)
CREAT SERPL-MCNC: 4.7 MG/DL — CRITICAL HIGH (ref 0.7–1.5)
CREAT SERPL-MCNC: 4.8 MG/DL — CRITICAL HIGH (ref 0.7–1.5)
CREAT SERPL-MCNC: 4.9 MG/DL — CRITICAL HIGH (ref 0.7–1.5)
CREAT SERPL-MCNC: 5.1 MG/DL — CRITICAL HIGH (ref 0.7–1.5)
CREAT SERPL-MCNC: 5.2 MG/DL — CRITICAL HIGH (ref 0.7–1.5)
CREAT SERPL-MCNC: 5.2 MG/DL — CRITICAL HIGH (ref 0.7–1.5)
CREAT SERPL-MCNC: 5.3 MG/DL — CRITICAL HIGH (ref 0.7–1.5)
CREAT SERPL-MCNC: 5.3 MG/DL — CRITICAL HIGH (ref 0.7–1.5)
CREAT SERPL-MCNC: 5.4 MG/DL — CRITICAL HIGH (ref 0.7–1.5)
CREAT SERPL-MCNC: 5.5 MG/DL — CRITICAL HIGH (ref 0.7–1.5)
CREAT SERPL-MCNC: 5.5 MG/DL — CRITICAL HIGH (ref 0.7–1.5)
CREAT SERPL-MCNC: 5.9 MG/DL — CRITICAL HIGH (ref 0.7–1.5)
CREAT SERPL-MCNC: 5.9 MG/DL — CRITICAL HIGH (ref 0.7–1.5)
CULTURE RESULTS: SIGNIFICANT CHANGE UP
DIFF PNL FLD: ABNORMAL
EGFR: 10 ML/MIN/1.73M2 — LOW
EGFR: 10 ML/MIN/1.73M2 — LOW
EGFR: 11 ML/MIN/1.73M2 — LOW
EGFR: 12 ML/MIN/1.73M2 — LOW
EGFR: 13 ML/MIN/1.73M2 — LOW
EGFR: 14 ML/MIN/1.73M2 — LOW
EGFR: 14 ML/MIN/1.73M2 — LOW
EGFR: 15 ML/MIN/1.73M2 — LOW
EGFR: 15 ML/MIN/1.73M2 — LOW
EGFR: 16 ML/MIN/1.73M2 — LOW
EGFR: 19 ML/MIN/1.73M2 — LOW
EGFR: 21 ML/MIN/1.73M2 — LOW
EGFR: 24 ML/MIN/1.73M2 — LOW
ELLIPTOCYTES BLD QL SMEAR: SLIGHT — SIGNIFICANT CHANGE UP
ELLIPTOCYTES BLD QL SMEAR: SLIGHT — SIGNIFICANT CHANGE UP
EOSINOPHIL # BLD AUTO: 0 K/UL — SIGNIFICANT CHANGE UP (ref 0–0.7)
EOSINOPHIL # BLD AUTO: 0 K/UL — SIGNIFICANT CHANGE UP (ref 0–0.7)
EOSINOPHIL # BLD AUTO: 0.01 K/UL — SIGNIFICANT CHANGE UP (ref 0–0.7)
EOSINOPHIL # BLD AUTO: 0.04 K/UL — SIGNIFICANT CHANGE UP (ref 0–0.7)
EOSINOPHIL # BLD AUTO: 0.07 K/UL — SIGNIFICANT CHANGE UP (ref 0–0.7)
EOSINOPHIL # BLD AUTO: 0.07 K/UL — SIGNIFICANT CHANGE UP (ref 0–0.7)
EOSINOPHIL # BLD AUTO: 0.08 K/UL — SIGNIFICANT CHANGE UP (ref 0–0.7)
EOSINOPHIL # BLD AUTO: 0.18 K/UL — SIGNIFICANT CHANGE UP (ref 0–0.7)
EOSINOPHIL # BLD AUTO: 0.23 K/UL — SIGNIFICANT CHANGE UP (ref 0–0.7)
EOSINOPHIL # BLD AUTO: 0.25 K/UL — SIGNIFICANT CHANGE UP (ref 0–0.7)
EOSINOPHIL # BLD AUTO: 0.25 K/UL — SIGNIFICANT CHANGE UP (ref 0–0.7)
EOSINOPHIL # BLD AUTO: 0.26 K/UL — SIGNIFICANT CHANGE UP (ref 0–0.7)
EOSINOPHIL # BLD AUTO: 0.32 K/UL — SIGNIFICANT CHANGE UP (ref 0–0.7)
EOSINOPHIL # BLD AUTO: 0.37 K/UL — SIGNIFICANT CHANGE UP (ref 0–0.7)
EOSINOPHIL # BLD AUTO: 0.4 K/UL — SIGNIFICANT CHANGE UP (ref 0–0.7)
EOSINOPHIL # BLD AUTO: 0.41 K/UL — SIGNIFICANT CHANGE UP (ref 0–0.7)
EOSINOPHIL # BLD AUTO: 0.57 K/UL — SIGNIFICANT CHANGE UP (ref 0–0.7)
EOSINOPHIL # BLD AUTO: 0.59 K/UL — SIGNIFICANT CHANGE UP (ref 0–0.7)
EOSINOPHIL NFR BLD AUTO: 0 % — SIGNIFICANT CHANGE UP (ref 0–8)
EOSINOPHIL NFR BLD AUTO: 0 % — SIGNIFICANT CHANGE UP (ref 0–8)
EOSINOPHIL NFR BLD AUTO: 0.1 % — SIGNIFICANT CHANGE UP (ref 0–8)
EOSINOPHIL NFR BLD AUTO: 0.2 % — SIGNIFICANT CHANGE UP (ref 0–8)
EOSINOPHIL NFR BLD AUTO: 0.2 % — SIGNIFICANT CHANGE UP (ref 0–8)
EOSINOPHIL NFR BLD AUTO: 0.3 % — SIGNIFICANT CHANGE UP (ref 0–8)
EOSINOPHIL NFR BLD AUTO: 0.9 % — SIGNIFICANT CHANGE UP (ref 0–8)
EOSINOPHIL NFR BLD AUTO: 1.5 % — SIGNIFICANT CHANGE UP (ref 0–8)
EOSINOPHIL NFR BLD AUTO: 1.7 % — SIGNIFICANT CHANGE UP (ref 0–8)
EOSINOPHIL NFR BLD AUTO: 1.9 % — SIGNIFICANT CHANGE UP (ref 0–8)
EOSINOPHIL NFR BLD AUTO: 1.9 % — SIGNIFICANT CHANGE UP (ref 0–8)
EOSINOPHIL NFR BLD AUTO: 2.6 % — SIGNIFICANT CHANGE UP (ref 0–8)
EOSINOPHIL NFR BLD AUTO: 3.1 % — SIGNIFICANT CHANGE UP (ref 0–8)
EOSINOPHIL NFR BLD AUTO: 3.2 % — SIGNIFICANT CHANGE UP (ref 0–8)
EOSINOPHIL NFR BLD AUTO: 3.3 % — SIGNIFICANT CHANGE UP (ref 0–8)
EOSINOPHIL NFR BLD AUTO: 3.6 % — SIGNIFICANT CHANGE UP (ref 0–8)
EOSINOPHIL NFR BLD AUTO: 4.5 % — SIGNIFICANT CHANGE UP (ref 0–8)
EOSINOPHIL NFR BLD AUTO: 4.5 % — SIGNIFICANT CHANGE UP (ref 0–8)
EPI CELLS # UR: 1 /HPF — SIGNIFICANT CHANGE UP (ref 0–5)
ESTIMATED AVERAGE GLUCOSE: 123 MG/DL — HIGH (ref 68–114)
ESTIMATED AVERAGE GLUCOSE: 134 MG/DL — HIGH (ref 68–114)
FERRITIN SERPL-MCNC: 231 NG/ML — SIGNIFICANT CHANGE UP (ref 30–400)
FIBRINOGEN PPP-MCNC: 331 MG/DL — SIGNIFICANT CHANGE UP (ref 204.4–570.6)
FIBRINOGEN PPP-MCNC: 393 MG/DL — SIGNIFICANT CHANGE UP (ref 204.4–570.6)
FOLATE SERPL-MCNC: 3.1 NG/ML — LOW
GAS PNL BLDA: SIGNIFICANT CHANGE UP
GLUCOSE BLDC GLUCOMTR-MCNC: 102 MG/DL — HIGH (ref 70–99)
GLUCOSE BLDC GLUCOMTR-MCNC: 108 MG/DL — HIGH (ref 70–99)
GLUCOSE BLDC GLUCOMTR-MCNC: 110 MG/DL — HIGH (ref 70–99)
GLUCOSE BLDC GLUCOMTR-MCNC: 119 MG/DL — HIGH (ref 70–99)
GLUCOSE BLDC GLUCOMTR-MCNC: 138 MG/DL — HIGH (ref 70–99)
GLUCOSE BLDC GLUCOMTR-MCNC: 138 MG/DL — HIGH (ref 70–99)
GLUCOSE BLDC GLUCOMTR-MCNC: 140 MG/DL — HIGH (ref 70–99)
GLUCOSE BLDC GLUCOMTR-MCNC: 142 MG/DL — HIGH (ref 70–99)
GLUCOSE BLDC GLUCOMTR-MCNC: 144 MG/DL — HIGH (ref 70–99)
GLUCOSE BLDC GLUCOMTR-MCNC: 147 MG/DL — HIGH (ref 70–99)
GLUCOSE BLDC GLUCOMTR-MCNC: 149 MG/DL — HIGH (ref 70–99)
GLUCOSE BLDC GLUCOMTR-MCNC: 150 MG/DL — HIGH (ref 70–99)
GLUCOSE BLDC GLUCOMTR-MCNC: 150 MG/DL — HIGH (ref 70–99)
GLUCOSE BLDC GLUCOMTR-MCNC: 153 MG/DL — HIGH (ref 70–99)
GLUCOSE BLDC GLUCOMTR-MCNC: 153 MG/DL — HIGH (ref 70–99)
GLUCOSE BLDC GLUCOMTR-MCNC: 155 MG/DL — HIGH (ref 70–99)
GLUCOSE BLDC GLUCOMTR-MCNC: 156 MG/DL — HIGH (ref 70–99)
GLUCOSE BLDC GLUCOMTR-MCNC: 159 MG/DL — HIGH (ref 70–99)
GLUCOSE BLDC GLUCOMTR-MCNC: 165 MG/DL — HIGH (ref 70–99)
GLUCOSE BLDC GLUCOMTR-MCNC: 166 MG/DL — HIGH (ref 70–99)
GLUCOSE BLDC GLUCOMTR-MCNC: 167 MG/DL — HIGH (ref 70–99)
GLUCOSE BLDC GLUCOMTR-MCNC: 169 MG/DL — HIGH (ref 70–99)
GLUCOSE BLDC GLUCOMTR-MCNC: 169 MG/DL — HIGH (ref 70–99)
GLUCOSE BLDC GLUCOMTR-MCNC: 172 MG/DL — HIGH (ref 70–99)
GLUCOSE BLDC GLUCOMTR-MCNC: 173 MG/DL — HIGH (ref 70–99)
GLUCOSE BLDC GLUCOMTR-MCNC: 174 MG/DL — HIGH (ref 70–99)
GLUCOSE BLDC GLUCOMTR-MCNC: 175 MG/DL — HIGH (ref 70–99)
GLUCOSE BLDC GLUCOMTR-MCNC: 175 MG/DL — HIGH (ref 70–99)
GLUCOSE BLDC GLUCOMTR-MCNC: 176 MG/DL — HIGH (ref 70–99)
GLUCOSE BLDC GLUCOMTR-MCNC: 179 MG/DL — HIGH (ref 70–99)
GLUCOSE BLDC GLUCOMTR-MCNC: 179 MG/DL — HIGH (ref 70–99)
GLUCOSE BLDC GLUCOMTR-MCNC: 180 MG/DL — HIGH (ref 70–99)
GLUCOSE BLDC GLUCOMTR-MCNC: 183 MG/DL — HIGH (ref 70–99)
GLUCOSE BLDC GLUCOMTR-MCNC: 184 MG/DL — HIGH (ref 70–99)
GLUCOSE BLDC GLUCOMTR-MCNC: 186 MG/DL — HIGH (ref 70–99)
GLUCOSE BLDC GLUCOMTR-MCNC: 187 MG/DL — HIGH (ref 70–99)
GLUCOSE BLDC GLUCOMTR-MCNC: 188 MG/DL — HIGH (ref 70–99)
GLUCOSE BLDC GLUCOMTR-MCNC: 188 MG/DL — HIGH (ref 70–99)
GLUCOSE BLDC GLUCOMTR-MCNC: 189 MG/DL — HIGH (ref 70–99)
GLUCOSE BLDC GLUCOMTR-MCNC: 189 MG/DL — HIGH (ref 70–99)
GLUCOSE BLDC GLUCOMTR-MCNC: 192 MG/DL — HIGH (ref 70–99)
GLUCOSE BLDC GLUCOMTR-MCNC: 196 MG/DL — HIGH (ref 70–99)
GLUCOSE BLDC GLUCOMTR-MCNC: 196 MG/DL — HIGH (ref 70–99)
GLUCOSE BLDC GLUCOMTR-MCNC: 197 MG/DL — HIGH (ref 70–99)
GLUCOSE BLDC GLUCOMTR-MCNC: 198 MG/DL — HIGH (ref 70–99)
GLUCOSE BLDC GLUCOMTR-MCNC: 199 MG/DL — HIGH (ref 70–99)
GLUCOSE BLDC GLUCOMTR-MCNC: 199 MG/DL — HIGH (ref 70–99)
GLUCOSE BLDC GLUCOMTR-MCNC: 201 MG/DL — HIGH (ref 70–99)
GLUCOSE BLDC GLUCOMTR-MCNC: 204 MG/DL — HIGH (ref 70–99)
GLUCOSE BLDC GLUCOMTR-MCNC: 213 MG/DL — HIGH (ref 70–99)
GLUCOSE BLDC GLUCOMTR-MCNC: 214 MG/DL — HIGH (ref 70–99)
GLUCOSE BLDC GLUCOMTR-MCNC: 219 MG/DL — HIGH (ref 70–99)
GLUCOSE BLDC GLUCOMTR-MCNC: 220 MG/DL — HIGH (ref 70–99)
GLUCOSE BLDC GLUCOMTR-MCNC: 23 MG/DL — CRITICAL LOW (ref 70–99)
GLUCOSE BLDC GLUCOMTR-MCNC: 230 MG/DL — HIGH (ref 70–99)
GLUCOSE BLDC GLUCOMTR-MCNC: 234 MG/DL — HIGH (ref 70–99)
GLUCOSE BLDC GLUCOMTR-MCNC: 235 MG/DL — HIGH (ref 70–99)
GLUCOSE BLDC GLUCOMTR-MCNC: 237 MG/DL — HIGH (ref 70–99)
GLUCOSE BLDC GLUCOMTR-MCNC: 239 MG/DL — HIGH (ref 70–99)
GLUCOSE BLDC GLUCOMTR-MCNC: 248 MG/DL — HIGH (ref 70–99)
GLUCOSE BLDC GLUCOMTR-MCNC: 25 MG/DL — CRITICAL LOW (ref 70–99)
GLUCOSE BLDC GLUCOMTR-MCNC: 250 MG/DL — HIGH (ref 70–99)
GLUCOSE BLDC GLUCOMTR-MCNC: 251 MG/DL — HIGH (ref 70–99)
GLUCOSE BLDC GLUCOMTR-MCNC: 251 MG/DL — HIGH (ref 70–99)
GLUCOSE BLDC GLUCOMTR-MCNC: 255 MG/DL — HIGH (ref 70–99)
GLUCOSE BLDC GLUCOMTR-MCNC: 260 MG/DL — HIGH (ref 70–99)
GLUCOSE BLDC GLUCOMTR-MCNC: 267 MG/DL — HIGH (ref 70–99)
GLUCOSE BLDC GLUCOMTR-MCNC: 268 MG/DL — HIGH (ref 70–99)
GLUCOSE BLDC GLUCOMTR-MCNC: 269 MG/DL — HIGH (ref 70–99)
GLUCOSE BLDC GLUCOMTR-MCNC: 273 MG/DL — HIGH (ref 70–99)
GLUCOSE BLDC GLUCOMTR-MCNC: 286 MG/DL — HIGH (ref 70–99)
GLUCOSE BLDC GLUCOMTR-MCNC: 288 MG/DL — HIGH (ref 70–99)
GLUCOSE BLDC GLUCOMTR-MCNC: 295 MG/DL — HIGH (ref 70–99)
GLUCOSE BLDC GLUCOMTR-MCNC: 296 MG/DL — HIGH (ref 70–99)
GLUCOSE BLDC GLUCOMTR-MCNC: 331 MG/DL — HIGH (ref 70–99)
GLUCOSE BLDC GLUCOMTR-MCNC: 333 MG/DL — HIGH (ref 70–99)
GLUCOSE BLDC GLUCOMTR-MCNC: 350 MG/DL — HIGH (ref 70–99)
GLUCOSE BLDC GLUCOMTR-MCNC: 360 MG/DL — HIGH (ref 70–99)
GLUCOSE BLDC GLUCOMTR-MCNC: 368 MG/DL — HIGH (ref 70–99)
GLUCOSE BLDC GLUCOMTR-MCNC: 58 MG/DL — LOW (ref 70–99)
GLUCOSE BLDC GLUCOMTR-MCNC: 75 MG/DL — SIGNIFICANT CHANGE UP (ref 70–99)
GLUCOSE BLDC GLUCOMTR-MCNC: 83 MG/DL — SIGNIFICANT CHANGE UP (ref 70–99)
GLUCOSE BLDC GLUCOMTR-MCNC: 84 MG/DL — SIGNIFICANT CHANGE UP (ref 70–99)
GLUCOSE BLDC GLUCOMTR-MCNC: 85 MG/DL — SIGNIFICANT CHANGE UP (ref 70–99)
GLUCOSE BLDC GLUCOMTR-MCNC: 93 MG/DL — SIGNIFICANT CHANGE UP (ref 70–99)
GLUCOSE BLDC GLUCOMTR-MCNC: 97 MG/DL — SIGNIFICANT CHANGE UP (ref 70–99)
GLUCOSE SERPL-MCNC: 113 MG/DL — HIGH (ref 70–99)
GLUCOSE SERPL-MCNC: 134 MG/DL — HIGH (ref 70–99)
GLUCOSE SERPL-MCNC: 138 MG/DL — HIGH (ref 70–99)
GLUCOSE SERPL-MCNC: 145 MG/DL — HIGH (ref 70–99)
GLUCOSE SERPL-MCNC: 146 MG/DL — HIGH (ref 70–99)
GLUCOSE SERPL-MCNC: 154 MG/DL — HIGH (ref 70–99)
GLUCOSE SERPL-MCNC: 163 MG/DL — HIGH (ref 70–99)
GLUCOSE SERPL-MCNC: 166 MG/DL — HIGH (ref 70–99)
GLUCOSE SERPL-MCNC: 167 MG/DL — HIGH (ref 70–99)
GLUCOSE SERPL-MCNC: 170 MG/DL — HIGH (ref 70–99)
GLUCOSE SERPL-MCNC: 174 MG/DL — HIGH (ref 70–99)
GLUCOSE SERPL-MCNC: 179 MG/DL — HIGH (ref 70–99)
GLUCOSE SERPL-MCNC: 183 MG/DL — HIGH (ref 70–99)
GLUCOSE SERPL-MCNC: 190 MG/DL — HIGH (ref 70–99)
GLUCOSE SERPL-MCNC: 191 MG/DL — HIGH (ref 70–99)
GLUCOSE SERPL-MCNC: 203 MG/DL — HIGH (ref 70–99)
GLUCOSE SERPL-MCNC: 235 MG/DL — HIGH (ref 70–99)
GLUCOSE SERPL-MCNC: 243 MG/DL — HIGH (ref 70–99)
GLUCOSE SERPL-MCNC: 244 MG/DL — HIGH (ref 70–99)
GLUCOSE SERPL-MCNC: 269 MG/DL — HIGH (ref 70–99)
GLUCOSE SERPL-MCNC: 269 MG/DL — HIGH (ref 70–99)
GLUCOSE SERPL-MCNC: 271 MG/DL — HIGH (ref 70–99)
GLUCOSE SERPL-MCNC: 284 MG/DL — HIGH (ref 70–99)
GLUCOSE SERPL-MCNC: 30 MG/DL — CRITICAL LOW (ref 70–99)
GLUCOSE SERPL-MCNC: 358 MG/DL — HIGH (ref 70–99)
GLUCOSE SERPL-MCNC: 63 MG/DL — LOW (ref 70–99)
GLUCOSE UR QL: NEGATIVE — SIGNIFICANT CHANGE UP
GRAM STN FLD: SIGNIFICANT CHANGE UP
HAPTOGLOB SERPL-MCNC: 185 MG/DL — SIGNIFICANT CHANGE UP (ref 34–200)
HAPTOGLOB SERPL-MCNC: 222 MG/DL — HIGH (ref 34–200)
HCO3 BLDA-SCNC: 21 MMOL/L — SIGNIFICANT CHANGE UP (ref 21–28)
HCO3 BLDA-SCNC: 22 MMOL/L — SIGNIFICANT CHANGE UP (ref 21–28)
HCO3 BLDA-SCNC: 22 MMOL/L — SIGNIFICANT CHANGE UP (ref 21–28)
HCO3 BLDA-SCNC: 24 MMOL/L — SIGNIFICANT CHANGE UP (ref 21–28)
HCT VFR BLD CALC: 16 % — LOW (ref 42–52)
HCT VFR BLD CALC: 17.3 % — LOW (ref 42–52)
HCT VFR BLD CALC: 20.7 % — LOW (ref 42–52)
HCT VFR BLD CALC: 20.7 % — LOW (ref 42–52)
HCT VFR BLD CALC: 21.1 % — LOW (ref 42–52)
HCT VFR BLD CALC: 21.2 % — LOW (ref 42–52)
HCT VFR BLD CALC: 21.7 % — LOW (ref 42–52)
HCT VFR BLD CALC: 21.8 % — LOW (ref 42–52)
HCT VFR BLD CALC: 22.3 % — LOW (ref 42–52)
HCT VFR BLD CALC: 23.3 % — LOW (ref 42–52)
HCT VFR BLD CALC: 23.4 % — LOW (ref 42–52)
HCT VFR BLD CALC: 24.3 % — LOW (ref 42–52)
HCT VFR BLD CALC: 24.3 % — LOW (ref 42–52)
HCT VFR BLD CALC: 24.6 % — LOW (ref 42–52)
HCT VFR BLD CALC: 25.9 % — LOW (ref 42–52)
HCT VFR BLD CALC: 26.1 % — LOW (ref 42–52)
HCT VFR BLD CALC: 26.6 % — LOW (ref 42–52)
HCT VFR BLD CALC: 26.7 % — LOW (ref 42–52)
HCT VFR BLD CALC: 26.9 % — LOW (ref 42–52)
HCT VFR BLD CALC: 29.3 % — LOW (ref 42–52)
HCT VFR BLD CALC: 30.9 % — LOW (ref 42–52)
HCT VFR BLD CALC: 30.9 % — LOW (ref 42–52)
HCT VFR BLD CALC: 33 % — LOW (ref 42–52)
HCT VFR BLD CALC: 36.8 % — LOW (ref 42–52)
HCV AB S/CO SERPL IA: 0.04 COI — SIGNIFICANT CHANGE UP
HCV AB SERPL-IMP: SIGNIFICANT CHANGE UP
HCYS SERPL-MCNC: 21.7 UMOL/L — HIGH
HDLC SERPL-MCNC: 26 MG/DL — LOW
HDLC SERPL-MCNC: 37 MG/DL — LOW
HGB BLD-MCNC: 10.2 G/DL — LOW (ref 14–18)
HGB BLD-MCNC: 10.8 G/DL — LOW (ref 14–18)
HGB BLD-MCNC: 11.3 G/DL — LOW (ref 14–18)
HGB BLD-MCNC: 12.2 G/DL — LOW (ref 14–18)
HGB BLD-MCNC: 5.3 G/DL — CRITICAL LOW (ref 14–18)
HGB BLD-MCNC: 5.9 G/DL — CRITICAL LOW (ref 14–18)
HGB BLD-MCNC: 6.5 G/DL — CRITICAL LOW (ref 14–18)
HGB BLD-MCNC: 6.7 G/DL — CRITICAL LOW (ref 14–18)
HGB BLD-MCNC: 6.8 G/DL — CRITICAL LOW (ref 14–18)
HGB BLD-MCNC: 6.9 G/DL — CRITICAL LOW (ref 14–18)
HGB BLD-MCNC: 7 G/DL — LOW (ref 14–18)
HGB BLD-MCNC: 7.1 G/DL — LOW (ref 14–18)
HGB BLD-MCNC: 7.2 G/DL — LOW (ref 14–18)
HGB BLD-MCNC: 7.5 G/DL — LOW (ref 14–18)
HGB BLD-MCNC: 7.6 G/DL — LOW (ref 14–18)
HGB BLD-MCNC: 7.6 G/DL — LOW (ref 14–18)
HGB BLD-MCNC: 8 G/DL — LOW (ref 14–18)
HGB BLD-MCNC: 8.1 G/DL — LOW (ref 14–18)
HGB BLD-MCNC: 8.4 G/DL — LOW (ref 14–18)
HGB BLD-MCNC: 8.4 G/DL — LOW (ref 14–18)
HGB BLD-MCNC: 8.6 G/DL — LOW (ref 14–18)
HGB BLD-MCNC: 8.9 G/DL — LOW (ref 14–18)
HGB BLD-MCNC: 8.9 G/DL — LOW (ref 14–18)
HGB BLD-MCNC: 9.5 G/DL — LOW (ref 14–18)
HOROWITZ INDEX BLDA+IHG-RTO: 100 — SIGNIFICANT CHANGE UP
HOROWITZ INDEX BLDA+IHG-RTO: 24 — SIGNIFICANT CHANGE UP
HOROWITZ INDEX BLDA+IHG-RTO: 40 — SIGNIFICANT CHANGE UP
HYALINE CASTS # UR AUTO: 7 /LPF — SIGNIFICANT CHANGE UP (ref 0–7)
IMM GRANULOCYTES NFR BLD AUTO: 0.3 % — SIGNIFICANT CHANGE UP (ref 0.1–0.3)
IMM GRANULOCYTES NFR BLD AUTO: 0.3 % — SIGNIFICANT CHANGE UP (ref 0.1–0.3)
IMM GRANULOCYTES NFR BLD AUTO: 0.4 % — HIGH (ref 0.1–0.3)
IMM GRANULOCYTES NFR BLD AUTO: 0.4 % — HIGH (ref 0.1–0.3)
IMM GRANULOCYTES NFR BLD AUTO: 0.5 % — HIGH (ref 0.1–0.3)
IMM GRANULOCYTES NFR BLD AUTO: 1 % — HIGH (ref 0.1–0.3)
IMM GRANULOCYTES NFR BLD AUTO: 1.3 % — HIGH (ref 0.1–0.3)
IMM GRANULOCYTES NFR BLD AUTO: 1.4 % — HIGH (ref 0.1–0.3)
IMM GRANULOCYTES NFR BLD AUTO: 1.9 % — HIGH (ref 0.1–0.3)
IMM GRANULOCYTES NFR BLD AUTO: 11.6 % — HIGH (ref 0.1–0.3)
IMM GRANULOCYTES NFR BLD AUTO: 2 % — HIGH (ref 0.1–0.3)
IMM GRANULOCYTES NFR BLD AUTO: 2.1 % — HIGH (ref 0.1–0.3)
IMM GRANULOCYTES NFR BLD AUTO: 2.2 % — HIGH (ref 0.1–0.3)
IMM GRANULOCYTES NFR BLD AUTO: 2.5 % — HIGH (ref 0.1–0.3)
IMM GRANULOCYTES NFR BLD AUTO: 6.1 % — HIGH (ref 0.1–0.3)
IMM GRANULOCYTES NFR BLD AUTO: 6.8 % — HIGH (ref 0.1–0.3)
INR BLD: 0.9 RATIO — SIGNIFICANT CHANGE UP (ref 0.65–1.3)
INR BLD: 0.99 RATIO — SIGNIFICANT CHANGE UP (ref 0.65–1.3)
INR BLD: 1 RATIO — SIGNIFICANT CHANGE UP (ref 0.65–1.3)
INR BLD: 1.05 RATIO — SIGNIFICANT CHANGE UP (ref 0.65–1.3)
INR BLD: 1.06 RATIO — SIGNIFICANT CHANGE UP (ref 0.65–1.3)
INR BLD: 1.28 RATIO — SIGNIFICANT CHANGE UP (ref 0.65–1.3)
INR BLD: 1.6 RATIO — HIGH (ref 0.65–1.3)
INR BLD: 2.31 RATIO — HIGH (ref 0.65–1.3)
INR BLD: 3.76 RATIO — HIGH (ref 0.65–1.3)
IRON SATN MFR SERPL: 10 % — LOW (ref 15–50)
IRON SATN MFR SERPL: 16 UG/DL — LOW (ref 35–150)
KETONES UR-MCNC: NEGATIVE — SIGNIFICANT CHANGE UP
KETONES UR-MCNC: SIGNIFICANT CHANGE UP
LDH SERPL L TO P-CCNC: 297 U/L — HIGH (ref 50–242)
LDH SERPL L TO P-CCNC: 318 U/L — HIGH (ref 50–242)
LEUKOCYTE ESTERASE UR-ACNC: ABNORMAL
LIDOCAIN IGE QN: 32 U/L — SIGNIFICANT CHANGE UP (ref 7–60)
LIPID PNL WITH DIRECT LDL SERPL: 48 MG/DL — SIGNIFICANT CHANGE UP
LIPID PNL WITH DIRECT LDL SERPL: 77 MG/DL — SIGNIFICANT CHANGE UP
LYMPHOCYTES # BLD AUTO: 0.5 K/UL — LOW (ref 1.2–3.4)
LYMPHOCYTES # BLD AUTO: 0.51 K/UL — LOW (ref 1.2–3.4)
LYMPHOCYTES # BLD AUTO: 0.59 K/UL — LOW (ref 1.2–3.4)
LYMPHOCYTES # BLD AUTO: 0.59 K/UL — LOW (ref 1.2–3.4)
LYMPHOCYTES # BLD AUTO: 0.6 K/UL — LOW (ref 1.2–3.4)
LYMPHOCYTES # BLD AUTO: 0.61 K/UL — LOW (ref 1.2–3.4)
LYMPHOCYTES # BLD AUTO: 0.61 K/UL — LOW (ref 1.2–3.4)
LYMPHOCYTES # BLD AUTO: 0.72 K/UL — LOW (ref 1.2–3.4)
LYMPHOCYTES # BLD AUTO: 0.77 K/UL — LOW (ref 1.2–3.4)
LYMPHOCYTES # BLD AUTO: 0.78 K/UL — LOW (ref 1.2–3.4)
LYMPHOCYTES # BLD AUTO: 0.82 K/UL — LOW (ref 1.2–3.4)
LYMPHOCYTES # BLD AUTO: 0.9 K/UL — LOW (ref 1.2–3.4)
LYMPHOCYTES # BLD AUTO: 1 K/UL — LOW (ref 1.2–3.4)
LYMPHOCYTES # BLD AUTO: 1.02 K/UL — LOW (ref 1.2–3.4)
LYMPHOCYTES # BLD AUTO: 1.09 K/UL — LOW (ref 1.2–3.4)
LYMPHOCYTES # BLD AUTO: 1.27 K/UL — SIGNIFICANT CHANGE UP (ref 1.2–3.4)
LYMPHOCYTES # BLD AUTO: 1.39 K/UL — SIGNIFICANT CHANGE UP (ref 1.2–3.4)
LYMPHOCYTES # BLD AUTO: 1.7 % — LOW (ref 20.5–51.1)
LYMPHOCYTES # BLD AUTO: 14.1 % — LOW (ref 20.5–51.1)
LYMPHOCYTES # BLD AUTO: 2.49 K/UL — SIGNIFICANT CHANGE UP (ref 1.2–3.4)
LYMPHOCYTES # BLD AUTO: 2.6 % — LOW (ref 20.5–51.1)
LYMPHOCYTES # BLD AUTO: 3.7 % — LOW (ref 20.5–51.1)
LYMPHOCYTES # BLD AUTO: 4.7 % — LOW (ref 20.5–51.1)
LYMPHOCYTES # BLD AUTO: 4.7 % — LOW (ref 20.5–51.1)
LYMPHOCYTES # BLD AUTO: 4.8 % — LOW (ref 20.5–51.1)
LYMPHOCYTES # BLD AUTO: 5.7 % — LOW (ref 20.5–51.1)
LYMPHOCYTES # BLD AUTO: 5.9 % — LOW (ref 20.5–51.1)
LYMPHOCYTES # BLD AUTO: 6 % — LOW (ref 20.5–51.1)
LYMPHOCYTES # BLD AUTO: 6.1 % — LOW (ref 20.5–51.1)
LYMPHOCYTES # BLD AUTO: 6.1 % — LOW (ref 20.5–51.1)
LYMPHOCYTES # BLD AUTO: 6.2 % — LOW (ref 20.5–51.1)
LYMPHOCYTES # BLD AUTO: 6.4 % — LOW (ref 20.5–51.1)
LYMPHOCYTES # BLD AUTO: 6.5 % — LOW (ref 20.5–51.1)
LYMPHOCYTES # BLD AUTO: 7.3 % — LOW (ref 20.5–51.1)
LYMPHOCYTES # BLD AUTO: 7.7 % — LOW (ref 20.5–51.1)
LYMPHOCYTES # BLD AUTO: 7.9 % — LOW (ref 20.5–51.1)
MAGNESIUM SERPL-MCNC: 2.3 MG/DL — SIGNIFICANT CHANGE UP (ref 1.8–2.4)
MAGNESIUM SERPL-MCNC: 2.3 MG/DL — SIGNIFICANT CHANGE UP (ref 1.8–2.4)
MAGNESIUM SERPL-MCNC: 2.4 MG/DL — SIGNIFICANT CHANGE UP (ref 1.8–2.4)
MAGNESIUM SERPL-MCNC: 2.4 MG/DL — SIGNIFICANT CHANGE UP (ref 1.8–2.4)
MAGNESIUM SERPL-MCNC: 2.5 MG/DL — HIGH (ref 1.8–2.4)
MAGNESIUM SERPL-MCNC: 2.5 MG/DL — HIGH (ref 1.8–2.4)
MAGNESIUM SERPL-MCNC: 2.6 MG/DL — HIGH (ref 1.8–2.4)
MAGNESIUM SERPL-MCNC: 2.7 MG/DL — HIGH (ref 1.8–2.4)
MAGNESIUM SERPL-MCNC: 2.8 MG/DL — HIGH (ref 1.8–2.4)
MAGNESIUM SERPL-MCNC: 2.9 MG/DL — HIGH (ref 1.8–2.4)
MAGNESIUM SERPL-MCNC: 3.2 MG/DL — CRITICAL HIGH (ref 1.8–2.4)
MAGNESIUM SERPL-MCNC: 3.5 MG/DL — CRITICAL HIGH (ref 1.8–2.4)
MAGNESIUM SERPL-MCNC: 3.7 MG/DL — CRITICAL HIGH (ref 1.8–2.4)
MAGNESIUM SERPL-MCNC: 3.8 MG/DL — CRITICAL HIGH (ref 1.8–2.4)
MAGNESIUM SERPL-MCNC: 3.8 MG/DL — CRITICAL HIGH (ref 1.8–2.4)
MAGNESIUM SERPL-MCNC: 3.9 MG/DL — CRITICAL HIGH (ref 1.8–2.4)
MANUAL SMEAR VERIFICATION: SIGNIFICANT CHANGE UP
MANUAL SMEAR VERIFICATION: SIGNIFICANT CHANGE UP
MCHC RBC-ENTMCNC: 28.2 PG — SIGNIFICANT CHANGE UP (ref 27–31)
MCHC RBC-ENTMCNC: 28.4 PG — SIGNIFICANT CHANGE UP (ref 27–31)
MCHC RBC-ENTMCNC: 28.5 PG — SIGNIFICANT CHANGE UP (ref 27–31)
MCHC RBC-ENTMCNC: 28.6 PG — SIGNIFICANT CHANGE UP (ref 27–31)
MCHC RBC-ENTMCNC: 28.7 PG — SIGNIFICANT CHANGE UP (ref 27–31)
MCHC RBC-ENTMCNC: 28.8 PG — SIGNIFICANT CHANGE UP (ref 27–31)
MCHC RBC-ENTMCNC: 28.9 PG — SIGNIFICANT CHANGE UP (ref 27–31)
MCHC RBC-ENTMCNC: 28.9 PG — SIGNIFICANT CHANGE UP (ref 27–31)
MCHC RBC-ENTMCNC: 29 PG — SIGNIFICANT CHANGE UP (ref 27–31)
MCHC RBC-ENTMCNC: 29 PG — SIGNIFICANT CHANGE UP (ref 27–31)
MCHC RBC-ENTMCNC: 29.1 PG — SIGNIFICANT CHANGE UP (ref 27–31)
MCHC RBC-ENTMCNC: 29.1 PG — SIGNIFICANT CHANGE UP (ref 27–31)
MCHC RBC-ENTMCNC: 29.4 PG — SIGNIFICANT CHANGE UP (ref 27–31)
MCHC RBC-ENTMCNC: 29.6 PG — SIGNIFICANT CHANGE UP (ref 27–31)
MCHC RBC-ENTMCNC: 29.7 PG — SIGNIFICANT CHANGE UP (ref 27–31)
MCHC RBC-ENTMCNC: 29.7 PG — SIGNIFICANT CHANGE UP (ref 27–31)
MCHC RBC-ENTMCNC: 29.9 PG — SIGNIFICANT CHANGE UP (ref 27–31)
MCHC RBC-ENTMCNC: 30.4 PG — SIGNIFICANT CHANGE UP (ref 27–31)
MCHC RBC-ENTMCNC: 30.4 PG — SIGNIFICANT CHANGE UP (ref 27–31)
MCHC RBC-ENTMCNC: 30.8 G/DL — LOW (ref 32–37)
MCHC RBC-ENTMCNC: 30.9 G/DL — LOW (ref 32–37)
MCHC RBC-ENTMCNC: 31.3 G/DL — LOW (ref 32–37)
MCHC RBC-ENTMCNC: 31.4 G/DL — LOW (ref 32–37)
MCHC RBC-ENTMCNC: 31.6 G/DL — LOW (ref 32–37)
MCHC RBC-ENTMCNC: 31.8 G/DL — LOW (ref 32–37)
MCHC RBC-ENTMCNC: 32 G/DL — SIGNIFICANT CHANGE UP (ref 32–37)
MCHC RBC-ENTMCNC: 32.2 G/DL — SIGNIFICANT CHANGE UP (ref 32–37)
MCHC RBC-ENTMCNC: 32.4 G/DL — SIGNIFICANT CHANGE UP (ref 32–37)
MCHC RBC-ENTMCNC: 32.4 G/DL — SIGNIFICANT CHANGE UP (ref 32–37)
MCHC RBC-ENTMCNC: 32.5 G/DL — SIGNIFICANT CHANGE UP (ref 32–37)
MCHC RBC-ENTMCNC: 32.5 G/DL — SIGNIFICANT CHANGE UP (ref 32–37)
MCHC RBC-ENTMCNC: 32.6 G/DL — SIGNIFICANT CHANGE UP (ref 32–37)
MCHC RBC-ENTMCNC: 32.6 G/DL — SIGNIFICANT CHANGE UP (ref 32–37)
MCHC RBC-ENTMCNC: 32.9 G/DL — SIGNIFICANT CHANGE UP (ref 32–37)
MCHC RBC-ENTMCNC: 32.9 G/DL — SIGNIFICANT CHANGE UP (ref 32–37)
MCHC RBC-ENTMCNC: 33 G/DL — SIGNIFICANT CHANGE UP (ref 32–37)
MCHC RBC-ENTMCNC: 33.1 G/DL — SIGNIFICANT CHANGE UP (ref 32–37)
MCHC RBC-ENTMCNC: 33.2 G/DL — SIGNIFICANT CHANGE UP (ref 32–37)
MCHC RBC-ENTMCNC: 33.2 G/DL — SIGNIFICANT CHANGE UP (ref 32–37)
MCHC RBC-ENTMCNC: 33.3 G/DL — SIGNIFICANT CHANGE UP (ref 32–37)
MCHC RBC-ENTMCNC: 33.3 G/DL — SIGNIFICANT CHANGE UP (ref 32–37)
MCHC RBC-ENTMCNC: 34.1 G/DL — SIGNIFICANT CHANGE UP (ref 32–37)
MCHC RBC-ENTMCNC: 34.2 G/DL — SIGNIFICANT CHANGE UP (ref 32–37)
MCHC RBC-ENTMCNC: 34.4 G/DL — SIGNIFICANT CHANGE UP (ref 32–37)
MCHC RBC-ENTMCNC: 35 G/DL — SIGNIFICANT CHANGE UP (ref 32–37)
MCV RBC AUTO: 85.6 FL — SIGNIFICANT CHANGE UP (ref 80–94)
MCV RBC AUTO: 86 FL — SIGNIFICANT CHANGE UP (ref 80–94)
MCV RBC AUTO: 86.8 FL — SIGNIFICANT CHANGE UP (ref 80–94)
MCV RBC AUTO: 87.2 FL — SIGNIFICANT CHANGE UP (ref 80–94)
MCV RBC AUTO: 88.3 FL — SIGNIFICANT CHANGE UP (ref 80–94)
MCV RBC AUTO: 88.4 FL — SIGNIFICANT CHANGE UP (ref 80–94)
MCV RBC AUTO: 88.5 FL — SIGNIFICANT CHANGE UP (ref 80–94)
MCV RBC AUTO: 88.6 FL — SIGNIFICANT CHANGE UP (ref 80–94)
MCV RBC AUTO: 88.8 FL — SIGNIFICANT CHANGE UP (ref 80–94)
MCV RBC AUTO: 88.9 FL — SIGNIFICANT CHANGE UP (ref 80–94)
MCV RBC AUTO: 89 FL — SIGNIFICANT CHANGE UP (ref 80–94)
MCV RBC AUTO: 89.1 FL — SIGNIFICANT CHANGE UP (ref 80–94)
MCV RBC AUTO: 89.2 FL — SIGNIFICANT CHANGE UP (ref 80–94)
MCV RBC AUTO: 89.2 FL — SIGNIFICANT CHANGE UP (ref 80–94)
MCV RBC AUTO: 89.3 FL — SIGNIFICANT CHANGE UP (ref 80–94)
MCV RBC AUTO: 89.4 FL — SIGNIFICANT CHANGE UP (ref 80–94)
MCV RBC AUTO: 89.7 FL — SIGNIFICANT CHANGE UP (ref 80–94)
MCV RBC AUTO: 89.9 FL — SIGNIFICANT CHANGE UP (ref 80–94)
MCV RBC AUTO: 89.9 FL — SIGNIFICANT CHANGE UP (ref 80–94)
MCV RBC AUTO: 91.2 FL — SIGNIFICANT CHANGE UP (ref 80–94)
MCV RBC AUTO: 92.3 FL — SIGNIFICANT CHANGE UP (ref 80–94)
MCV RBC AUTO: 92.4 FL — SIGNIFICANT CHANGE UP (ref 80–94)
MCV RBC AUTO: 93 FL — SIGNIFICANT CHANGE UP (ref 80–94)
MCV RBC AUTO: 93.4 FL — SIGNIFICANT CHANGE UP (ref 80–94)
METAMYELOCYTES # FLD: 0.9 % — HIGH (ref 0–0)
MICROCYTES BLD QL: SLIGHT — SIGNIFICANT CHANGE UP
MICROCYTES BLD QL: SLIGHT — SIGNIFICANT CHANGE UP
MONOCYTES # BLD AUTO: 0.76 K/UL — HIGH (ref 0.1–0.6)
MONOCYTES # BLD AUTO: 0.78 K/UL — HIGH (ref 0.1–0.6)
MONOCYTES # BLD AUTO: 0.82 K/UL — HIGH (ref 0.1–0.6)
MONOCYTES # BLD AUTO: 0.82 K/UL — HIGH (ref 0.1–0.6)
MONOCYTES # BLD AUTO: 0.86 K/UL — HIGH (ref 0.1–0.6)
MONOCYTES # BLD AUTO: 0.88 K/UL — HIGH (ref 0.1–0.6)
MONOCYTES # BLD AUTO: 0.93 K/UL — HIGH (ref 0.1–0.6)
MONOCYTES # BLD AUTO: 1.01 K/UL — HIGH (ref 0.1–0.6)
MONOCYTES # BLD AUTO: 1.02 K/UL — HIGH (ref 0.1–0.6)
MONOCYTES # BLD AUTO: 1.03 K/UL — HIGH (ref 0.1–0.6)
MONOCYTES # BLD AUTO: 1.06 K/UL — HIGH (ref 0.1–0.6)
MONOCYTES # BLD AUTO: 1.14 K/UL — HIGH (ref 0.1–0.6)
MONOCYTES # BLD AUTO: 1.18 K/UL — HIGH (ref 0.1–0.6)
MONOCYTES # BLD AUTO: 1.18 K/UL — HIGH (ref 0.1–0.6)
MONOCYTES # BLD AUTO: 1.77 K/UL — HIGH (ref 0.1–0.6)
MONOCYTES # BLD AUTO: 2 K/UL — HIGH (ref 0.1–0.6)
MONOCYTES # BLD AUTO: 2.56 K/UL — HIGH (ref 0.1–0.6)
MONOCYTES # BLD AUTO: 3.92 K/UL — HIGH (ref 0.1–0.6)
MONOCYTES NFR BLD AUTO: 5.3 % — SIGNIFICANT CHANGE UP (ref 1.7–9.3)
MONOCYTES NFR BLD AUTO: 6.5 % — SIGNIFICANT CHANGE UP (ref 1.7–9.3)
MONOCYTES NFR BLD AUTO: 6.9 % — SIGNIFICANT CHANGE UP (ref 1.7–9.3)
MONOCYTES NFR BLD AUTO: 7.4 % — SIGNIFICANT CHANGE UP (ref 1.7–9.3)
MONOCYTES NFR BLD AUTO: 7.4 % — SIGNIFICANT CHANGE UP (ref 1.7–9.3)
MONOCYTES NFR BLD AUTO: 7.5 % — SIGNIFICANT CHANGE UP (ref 1.7–9.3)
MONOCYTES NFR BLD AUTO: 7.7 % — SIGNIFICANT CHANGE UP (ref 1.7–9.3)
MONOCYTES NFR BLD AUTO: 7.8 % — SIGNIFICANT CHANGE UP (ref 1.7–9.3)
MONOCYTES NFR BLD AUTO: 8 % — SIGNIFICANT CHANGE UP (ref 1.7–9.3)
MONOCYTES NFR BLD AUTO: 8.1 % — SIGNIFICANT CHANGE UP (ref 1.7–9.3)
MONOCYTES NFR BLD AUTO: 8.1 % — SIGNIFICANT CHANGE UP (ref 1.7–9.3)
MONOCYTES NFR BLD AUTO: 8.2 % — SIGNIFICANT CHANGE UP (ref 1.7–9.3)
MONOCYTES NFR BLD AUTO: 8.5 % — SIGNIFICANT CHANGE UP (ref 1.7–9.3)
MONOCYTES NFR BLD AUTO: 8.6 % — SIGNIFICANT CHANGE UP (ref 1.7–9.3)
MONOCYTES NFR BLD AUTO: 8.7 % — SIGNIFICANT CHANGE UP (ref 1.7–9.3)
MONOCYTES NFR BLD AUTO: 8.9 % — SIGNIFICANT CHANGE UP (ref 1.7–9.3)
MONOCYTES NFR BLD AUTO: 9 % — SIGNIFICANT CHANGE UP (ref 1.7–9.3)
MONOCYTES NFR BLD AUTO: 9.6 % — HIGH (ref 1.7–9.3)
MRSA PCR RESULT.: NEGATIVE — SIGNIFICANT CHANGE UP
MYELOCYTES NFR BLD: 4.4 % — HIGH (ref 0–0)
NEUTROPHILS # BLD AUTO: 10.33 K/UL — HIGH (ref 1.4–6.5)
NEUTROPHILS # BLD AUTO: 10.33 K/UL — HIGH (ref 1.4–6.5)
NEUTROPHILS # BLD AUTO: 10.5 K/UL — HIGH (ref 1.4–6.5)
NEUTROPHILS # BLD AUTO: 10.76 K/UL — HIGH (ref 1.4–6.5)
NEUTROPHILS # BLD AUTO: 11.1 K/UL — HIGH (ref 1.4–6.5)
NEUTROPHILS # BLD AUTO: 12.17 K/UL — HIGH (ref 1.4–6.5)
NEUTROPHILS # BLD AUTO: 14.05 K/UL — HIGH (ref 1.4–6.5)
NEUTROPHILS # BLD AUTO: 17.53 K/UL — HIGH (ref 1.4–6.5)
NEUTROPHILS # BLD AUTO: 18.15 K/UL — HIGH (ref 1.4–6.5)
NEUTROPHILS # BLD AUTO: 21.83 K/UL — HIGH (ref 1.4–6.5)
NEUTROPHILS # BLD AUTO: 22.65 K/UL — HIGH (ref 1.4–6.5)
NEUTROPHILS # BLD AUTO: 37.61 K/UL — HIGH (ref 1.4–6.5)
NEUTROPHILS # BLD AUTO: 6.63 K/UL — HIGH (ref 1.4–6.5)
NEUTROPHILS # BLD AUTO: 7.44 K/UL — HIGH (ref 1.4–6.5)
NEUTROPHILS # BLD AUTO: 7.95 K/UL — HIGH (ref 1.4–6.5)
NEUTROPHILS # BLD AUTO: 8.32 K/UL — HIGH (ref 1.4–6.5)
NEUTROPHILS # BLD AUTO: 8.7 K/UL — HIGH (ref 1.4–6.5)
NEUTROPHILS # BLD AUTO: 8.98 K/UL — HIGH (ref 1.4–6.5)
NEUTROPHILS NFR BLD AUTO: 71.6 % — SIGNIFICANT CHANGE UP (ref 42.2–75.2)
NEUTROPHILS NFR BLD AUTO: 73.7 % — SIGNIFICANT CHANGE UP (ref 42.2–75.2)
NEUTROPHILS NFR BLD AUTO: 77.7 % — HIGH (ref 42.2–75.2)
NEUTROPHILS NFR BLD AUTO: 78.5 % — HIGH (ref 42.2–75.2)
NEUTROPHILS NFR BLD AUTO: 79.5 % — HIGH (ref 42.2–75.2)
NEUTROPHILS NFR BLD AUTO: 80.6 % — HIGH (ref 42.2–75.2)
NEUTROPHILS NFR BLD AUTO: 81.1 % — HIGH (ref 42.2–75.2)
NEUTROPHILS NFR BLD AUTO: 81.4 % — HIGH (ref 42.2–75.2)
NEUTROPHILS NFR BLD AUTO: 81.5 % — HIGH (ref 42.2–75.2)
NEUTROPHILS NFR BLD AUTO: 81.7 % — HIGH (ref 42.2–75.2)
NEUTROPHILS NFR BLD AUTO: 81.8 % — HIGH (ref 42.2–75.2)
NEUTROPHILS NFR BLD AUTO: 82 % — HIGH (ref 42.2–75.2)
NEUTROPHILS NFR BLD AUTO: 82.3 % — HIGH (ref 42.2–75.2)
NEUTROPHILS NFR BLD AUTO: 82.5 % — HIGH (ref 42.2–75.2)
NEUTROPHILS NFR BLD AUTO: 83 % — HIGH (ref 42.2–75.2)
NEUTROPHILS NFR BLD AUTO: 83.2 % — HIGH (ref 42.2–75.2)
NEUTROPHILS NFR BLD AUTO: 87.7 % — HIGH (ref 42.2–75.2)
NEUTROPHILS NFR BLD AUTO: 90.3 % — HIGH (ref 42.2–75.2)
NEUTS BAND # BLD: 0.9 % — SIGNIFICANT CHANGE UP (ref 0–6)
NEUTS BAND # BLD: 1.7 % — SIGNIFICANT CHANGE UP (ref 0–6)
NITRITE UR-MCNC: NEGATIVE — SIGNIFICANT CHANGE UP
NON HDL CHOLESTEROL: 137 MG/DL — HIGH
NON HDL CHOLESTEROL: 66 MG/DL — SIGNIFICANT CHANGE UP
NRBC # BLD: 0 /100 WBCS — SIGNIFICANT CHANGE UP (ref 0–0)
NRBC # BLD: 2 /100 WBCS — HIGH (ref 0–0)
NRBC # BLD: 5 /100 WBCS — HIGH (ref 0–0)
NT-PROBNP SERPL-SCNC: 1839 PG/ML — HIGH (ref 0–300)
NT-PROBNP SERPL-SCNC: 579 PG/ML — HIGH (ref 0–300)
OVALOCYTES BLD QL SMEAR: SLIGHT — SIGNIFICANT CHANGE UP
PCO2 BLDA: 35 MMHG — SIGNIFICANT CHANGE UP (ref 35–48)
PCO2 BLDA: 38 MMHG — SIGNIFICANT CHANGE UP (ref 35–48)
PH BLDA: 7.36 — SIGNIFICANT CHANGE UP (ref 7.35–7.45)
PH BLDA: 7.39 — SIGNIFICANT CHANGE UP (ref 7.35–7.45)
PH BLDA: 7.4 — SIGNIFICANT CHANGE UP (ref 7.35–7.45)
PH BLDA: 7.44 — SIGNIFICANT CHANGE UP (ref 7.35–7.45)
PH UR: 5.5 — SIGNIFICANT CHANGE UP (ref 5–8)
PH UR: 6 — SIGNIFICANT CHANGE UP (ref 5–8)
PHOSPHATE SERPL-MCNC: 12.8 MG/DL — HIGH (ref 2.1–4.9)
PHOSPHATE SERPL-MCNC: 3.4 MG/DL — SIGNIFICANT CHANGE UP (ref 2.1–4.9)
PHOSPHATE SERPL-MCNC: 3.9 MG/DL — SIGNIFICANT CHANGE UP (ref 2.1–4.9)
PHOSPHATE SERPL-MCNC: 4 MG/DL — SIGNIFICANT CHANGE UP (ref 2.1–4.9)
PHOSPHATE SERPL-MCNC: 4 MG/DL — SIGNIFICANT CHANGE UP (ref 2.1–4.9)
PHOSPHATE SERPL-MCNC: 4.1 MG/DL — SIGNIFICANT CHANGE UP (ref 2.1–4.9)
PHOSPHATE SERPL-MCNC: 4.3 MG/DL — SIGNIFICANT CHANGE UP (ref 2.1–4.9)
PHOSPHATE SERPL-MCNC: 4.4 MG/DL — SIGNIFICANT CHANGE UP (ref 2.1–4.9)
PHOSPHATE SERPL-MCNC: 4.5 MG/DL — SIGNIFICANT CHANGE UP (ref 2.1–4.9)
PHOSPHATE SERPL-MCNC: 4.8 MG/DL — SIGNIFICANT CHANGE UP (ref 2.1–4.9)
PHOSPHATE SERPL-MCNC: 5 MG/DL — HIGH (ref 2.1–4.9)
PHOSPHATE SERPL-MCNC: 5.2 MG/DL — HIGH (ref 2.1–4.9)
PHOSPHATE SERPL-MCNC: 5.3 MG/DL — HIGH (ref 2.1–4.9)
PHOSPHATE SERPL-MCNC: 5.4 MG/DL — HIGH (ref 2.1–4.9)
PHOSPHATE SERPL-MCNC: 5.4 MG/DL — HIGH (ref 2.1–4.9)
PHOSPHATE SERPL-MCNC: 5.8 MG/DL — HIGH (ref 2.1–4.9)
PHOSPHATE SERPL-MCNC: 6.4 MG/DL — HIGH (ref 2.1–4.9)
PHOSPHATE SERPL-MCNC: 7.7 MG/DL — HIGH (ref 2.1–4.9)
PHOSPHATE SERPL-MCNC: 8.1 MG/DL — HIGH (ref 2.1–4.9)
PHOSPHATE SERPL-MCNC: 8.9 MG/DL — HIGH (ref 2.1–4.9)
PLAT MORPH BLD: NORMAL — SIGNIFICANT CHANGE UP
PLAT MORPH BLD: NORMAL — SIGNIFICANT CHANGE UP
PLATELET # BLD AUTO: 173 K/UL — SIGNIFICANT CHANGE UP (ref 130–400)
PLATELET # BLD AUTO: 189 K/UL — SIGNIFICANT CHANGE UP (ref 130–400)
PLATELET # BLD AUTO: 190 K/UL — SIGNIFICANT CHANGE UP (ref 130–400)
PLATELET # BLD AUTO: 192 K/UL — SIGNIFICANT CHANGE UP (ref 130–400)
PLATELET # BLD AUTO: 192 K/UL — SIGNIFICANT CHANGE UP (ref 130–400)
PLATELET # BLD AUTO: 200 K/UL — SIGNIFICANT CHANGE UP (ref 130–400)
PLATELET # BLD AUTO: 203 K/UL — SIGNIFICANT CHANGE UP (ref 130–400)
PLATELET # BLD AUTO: 206 K/UL — SIGNIFICANT CHANGE UP (ref 130–400)
PLATELET # BLD AUTO: 207 K/UL — SIGNIFICANT CHANGE UP (ref 130–400)
PLATELET # BLD AUTO: 209 K/UL — SIGNIFICANT CHANGE UP (ref 130–400)
PLATELET # BLD AUTO: 212 K/UL — SIGNIFICANT CHANGE UP (ref 130–400)
PLATELET # BLD AUTO: 219 K/UL — SIGNIFICANT CHANGE UP (ref 130–400)
PLATELET # BLD AUTO: 219 K/UL — SIGNIFICANT CHANGE UP (ref 130–400)
PLATELET # BLD AUTO: 220 K/UL — SIGNIFICANT CHANGE UP (ref 130–400)
PLATELET # BLD AUTO: 226 K/UL — SIGNIFICANT CHANGE UP (ref 130–400)
PLATELET # BLD AUTO: 230 K/UL — SIGNIFICANT CHANGE UP (ref 130–400)
PLATELET # BLD AUTO: 240 K/UL — SIGNIFICANT CHANGE UP (ref 130–400)
PLATELET # BLD AUTO: 247 K/UL — SIGNIFICANT CHANGE UP (ref 130–400)
PLATELET # BLD AUTO: 248 K/UL — SIGNIFICANT CHANGE UP (ref 130–400)
PLATELET # BLD AUTO: 249 K/UL — SIGNIFICANT CHANGE UP (ref 130–400)
PLATELET # BLD AUTO: 282 K/UL — SIGNIFICANT CHANGE UP (ref 130–400)
PLATELET # BLD AUTO: 302 K/UL — SIGNIFICANT CHANGE UP (ref 130–400)
PLATELET # BLD AUTO: 394 K/UL — SIGNIFICANT CHANGE UP (ref 130–400)
PLATELET # BLD AUTO: 71 K/UL — LOW (ref 130–400)
PLATELET # BLD AUTO: 80 K/UL — LOW (ref 130–400)
PLATELET # BLD AUTO: 91 K/UL — LOW (ref 130–400)
PMV BLD: 10.3 FL — SIGNIFICANT CHANGE UP (ref 7.4–10.4)
PMV BLD: 10.4 FL — SIGNIFICANT CHANGE UP (ref 7.4–10.4)
PMV BLD: 11.6 FL — HIGH (ref 7.4–10.4)
PMV BLD: 13 FL — HIGH (ref 7.4–10.4)
PMV BLD: 13.8 FL — HIGH (ref 7.4–10.4)
PMV BLD: 8.5 FL — SIGNIFICANT CHANGE UP (ref 7.4–10.4)
PMV BLD: 8.6 FL — SIGNIFICANT CHANGE UP (ref 7.4–10.4)
PMV BLD: 8.7 FL — SIGNIFICANT CHANGE UP (ref 7.4–10.4)
PMV BLD: 8.7 FL — SIGNIFICANT CHANGE UP (ref 7.4–10.4)
PMV BLD: 8.9 FL — SIGNIFICANT CHANGE UP (ref 7.4–10.4)
PMV BLD: 8.9 FL — SIGNIFICANT CHANGE UP (ref 7.4–10.4)
PMV BLD: 9 FL — SIGNIFICANT CHANGE UP (ref 7.4–10.4)
PMV BLD: 9 FL — SIGNIFICANT CHANGE UP (ref 7.4–10.4)
PMV BLD: 9.1 FL — SIGNIFICANT CHANGE UP (ref 7.4–10.4)
PMV BLD: 9.3 FL — SIGNIFICANT CHANGE UP (ref 7.4–10.4)
PMV BLD: 9.4 FL — SIGNIFICANT CHANGE UP (ref 7.4–10.4)
PMV BLD: 9.4 FL — SIGNIFICANT CHANGE UP (ref 7.4–10.4)
PMV BLD: 9.5 FL — SIGNIFICANT CHANGE UP (ref 7.4–10.4)
PMV BLD: 9.5 FL — SIGNIFICANT CHANGE UP (ref 7.4–10.4)
PMV BLD: 9.7 FL — SIGNIFICANT CHANGE UP (ref 7.4–10.4)
PMV BLD: 9.8 FL — SIGNIFICANT CHANGE UP (ref 7.4–10.4)
PMV BLD: 9.8 FL — SIGNIFICANT CHANGE UP (ref 7.4–10.4)
PO2 BLDA: 125 MMHG — HIGH (ref 83–108)
PO2 BLDA: 161 MMHG — HIGH (ref 83–108)
PO2 BLDA: 70 MMHG — LOW (ref 83–108)
PO2 BLDA: 85 MMHG — SIGNIFICANT CHANGE UP (ref 83–108)
POIKILOCYTOSIS BLD QL AUTO: SIGNIFICANT CHANGE UP
POIKILOCYTOSIS BLD QL AUTO: SLIGHT — SIGNIFICANT CHANGE UP
POLYCHROMASIA BLD QL SMEAR: SIGNIFICANT CHANGE UP
POLYCHROMASIA BLD QL SMEAR: SLIGHT — SIGNIFICANT CHANGE UP
POTASSIUM SERPL-MCNC: 3.4 MMOL/L — LOW (ref 3.5–5)
POTASSIUM SERPL-MCNC: 3.7 MMOL/L — SIGNIFICANT CHANGE UP (ref 3.5–5)
POTASSIUM SERPL-MCNC: 3.9 MMOL/L — SIGNIFICANT CHANGE UP (ref 3.5–5)
POTASSIUM SERPL-MCNC: 4 MMOL/L — SIGNIFICANT CHANGE UP (ref 3.5–5)
POTASSIUM SERPL-MCNC: 4.1 MMOL/L — SIGNIFICANT CHANGE UP (ref 3.5–5)
POTASSIUM SERPL-MCNC: 4.3 MMOL/L — SIGNIFICANT CHANGE UP (ref 3.5–5)
POTASSIUM SERPL-MCNC: 4.4 MMOL/L — SIGNIFICANT CHANGE UP (ref 3.5–5)
POTASSIUM SERPL-MCNC: 4.4 MMOL/L — SIGNIFICANT CHANGE UP (ref 3.5–5)
POTASSIUM SERPL-MCNC: 4.5 MMOL/L — SIGNIFICANT CHANGE UP (ref 3.5–5)
POTASSIUM SERPL-MCNC: 4.6 MMOL/L — SIGNIFICANT CHANGE UP (ref 3.5–5)
POTASSIUM SERPL-MCNC: 4.6 MMOL/L — SIGNIFICANT CHANGE UP (ref 3.5–5)
POTASSIUM SERPL-MCNC: 4.7 MMOL/L — SIGNIFICANT CHANGE UP (ref 3.5–5)
POTASSIUM SERPL-MCNC: 4.8 MMOL/L — SIGNIFICANT CHANGE UP (ref 3.5–5)
POTASSIUM SERPL-MCNC: 6.1 MMOL/L — CRITICAL HIGH (ref 3.5–5)
POTASSIUM SERPL-MCNC: 7.1 MMOL/L — CRITICAL HIGH (ref 3.5–5)
POTASSIUM SERPL-SCNC: 3.4 MMOL/L — LOW (ref 3.5–5)
POTASSIUM SERPL-SCNC: 3.7 MMOL/L — SIGNIFICANT CHANGE UP (ref 3.5–5)
POTASSIUM SERPL-SCNC: 3.9 MMOL/L — SIGNIFICANT CHANGE UP (ref 3.5–5)
POTASSIUM SERPL-SCNC: 4 MMOL/L — SIGNIFICANT CHANGE UP (ref 3.5–5)
POTASSIUM SERPL-SCNC: 4.1 MMOL/L — SIGNIFICANT CHANGE UP (ref 3.5–5)
POTASSIUM SERPL-SCNC: 4.3 MMOL/L — SIGNIFICANT CHANGE UP (ref 3.5–5)
POTASSIUM SERPL-SCNC: 4.4 MMOL/L — SIGNIFICANT CHANGE UP (ref 3.5–5)
POTASSIUM SERPL-SCNC: 4.4 MMOL/L — SIGNIFICANT CHANGE UP (ref 3.5–5)
POTASSIUM SERPL-SCNC: 4.5 MMOL/L — SIGNIFICANT CHANGE UP (ref 3.5–5)
POTASSIUM SERPL-SCNC: 4.6 MMOL/L — SIGNIFICANT CHANGE UP (ref 3.5–5)
POTASSIUM SERPL-SCNC: 4.6 MMOL/L — SIGNIFICANT CHANGE UP (ref 3.5–5)
POTASSIUM SERPL-SCNC: 4.7 MMOL/L — SIGNIFICANT CHANGE UP (ref 3.5–5)
POTASSIUM SERPL-SCNC: 4.8 MMOL/L — SIGNIFICANT CHANGE UP (ref 3.5–5)
POTASSIUM SERPL-SCNC: 6.1 MMOL/L — CRITICAL HIGH (ref 3.5–5)
POTASSIUM SERPL-SCNC: 7.1 MMOL/L — CRITICAL HIGH (ref 3.5–5)
PROCALCITONIN SERPL-MCNC: 0.46 NG/ML — HIGH (ref 0.02–0.1)
PROCALCITONIN SERPL-MCNC: 0.47 NG/ML — HIGH (ref 0.02–0.1)
PROCALCITONIN SERPL-MCNC: 0.54 NG/ML — HIGH (ref 0.02–0.1)
PROCALCITONIN SERPL-MCNC: 0.54 NG/ML — HIGH (ref 0.02–0.1)
PROCALCITONIN SERPL-MCNC: 0.57 NG/ML — HIGH (ref 0.02–0.1)
PROCALCITONIN SERPL-MCNC: 0.96 NG/ML — HIGH (ref 0.02–0.1)
PROCALCITONIN SERPL-MCNC: 1.07 NG/ML — HIGH (ref 0.02–0.1)
PROCALCITONIN SERPL-MCNC: 1.2 NG/ML — HIGH (ref 0.02–0.1)
PROCALCITONIN SERPL-MCNC: 1.23 NG/ML — HIGH (ref 0.02–0.1)
PROT SERPL-MCNC: 3 G/DL — LOW (ref 6–8)
PROT SERPL-MCNC: 3.1 G/DL — LOW (ref 6–8)
PROT SERPL-MCNC: 3.1 G/DL — LOW (ref 6–8)
PROT SERPL-MCNC: 3.2 G/DL — LOW (ref 6–8)
PROT SERPL-MCNC: 4.3 G/DL — LOW (ref 6–8)
PROT SERPL-MCNC: 4.4 G/DL — LOW (ref 6–8)
PROT SERPL-MCNC: 4.6 G/DL — LOW (ref 6–8)
PROT SERPL-MCNC: 4.7 G/DL — LOW (ref 6–8)
PROT SERPL-MCNC: 4.9 G/DL — LOW (ref 6–8)
PROT SERPL-MCNC: 4.9 G/DL — LOW (ref 6–8)
PROT SERPL-MCNC: 5 G/DL — LOW (ref 6–8)
PROT SERPL-MCNC: 5.1 G/DL — LOW (ref 6–8)
PROT SERPL-MCNC: 5.2 G/DL — LOW (ref 6–8)
PROT SERPL-MCNC: 5.3 G/DL — LOW (ref 6–8)
PROT SERPL-MCNC: 5.3 G/DL — LOW (ref 6–8)
PROT SERPL-MCNC: 5.7 G/DL — LOW (ref 6–8)
PROT SERPL-MCNC: 5.8 G/DL — LOW (ref 6–8)
PROT SERPL-MCNC: 6.5 G/DL — SIGNIFICANT CHANGE UP (ref 6–8)
PROT UR-MCNC: ABNORMAL
PROT UR-MCNC: SIGNIFICANT CHANGE UP
PROTHROM AB SERPL-ACNC: 10.2 SEC — SIGNIFICANT CHANGE UP (ref 9.95–12.87)
PROTHROM AB SERPL-ACNC: 11.3 SEC — SIGNIFICANT CHANGE UP (ref 9.95–12.87)
PROTHROM AB SERPL-ACNC: 11.4 SEC — SIGNIFICANT CHANGE UP (ref 9.95–12.87)
PROTHROM AB SERPL-ACNC: 12 SEC — SIGNIFICANT CHANGE UP (ref 9.95–12.87)
PROTHROM AB SERPL-ACNC: 12.1 SEC — SIGNIFICANT CHANGE UP (ref 9.95–12.87)
PROTHROM AB SERPL-ACNC: 14.7 SEC — HIGH (ref 9.95–12.87)
PROTHROM AB SERPL-ACNC: 18.5 SEC — HIGH (ref 9.95–12.87)
PROTHROM AB SERPL-ACNC: 27 SEC — HIGH (ref 9.95–12.87)
PROTHROM AB SERPL-ACNC: >40 SEC — HIGH (ref 9.95–12.87)
RBC # BLD: 1.79 M/UL — LOW (ref 4.7–6.1)
RBC # BLD: 1.94 M/UL — LOW (ref 4.7–6.1)
RBC # BLD: 2.27 M/UL — LOW (ref 4.7–6.1)
RBC # BLD: 2.27 M/UL — LOW (ref 4.7–6.1)
RBC # BLD: 2.31 M/UL — LOW (ref 4.7–6.1)
RBC # BLD: 2.32 M/UL — LOW (ref 4.7–6.1)
RBC # BLD: 2.33 M/UL — LOW (ref 4.7–6.1)
RBC # BLD: 2.35 M/UL — LOW (ref 4.7–6.1)
RBC # BLD: 2.38 M/UL — LOW (ref 4.7–6.1)
RBC # BLD: 2.38 M/UL — LOW (ref 4.7–6.1)
RBC # BLD: 2.44 M/UL — LOW (ref 4.7–6.1)
RBC # BLD: 2.45 M/UL — LOW (ref 4.7–6.1)
RBC # BLD: 2.48 M/UL — LOW (ref 4.7–6.1)
RBC # BLD: 2.62 M/UL — LOW (ref 4.7–6.1)
RBC # BLD: 2.63 M/UL — LOW (ref 4.7–6.1)
RBC # BLD: 2.65 M/UL — LOW (ref 4.7–6.1)
RBC # BLD: 2.75 M/UL — LOW (ref 4.7–6.1)
RBC # BLD: 2.82 M/UL — LOW (ref 4.7–6.1)
RBC # BLD: 2.93 M/UL — LOW (ref 4.7–6.1)
RBC # BLD: 2.96 M/UL — LOW (ref 4.7–6.1)
RBC # BLD: 3 M/UL — LOW (ref 4.7–6.1)
RBC # BLD: 3 M/UL — LOW (ref 4.7–6.1)
RBC # BLD: 3.01 M/UL — LOW (ref 4.7–6.1)
RBC # BLD: 3.31 M/UL — LOW (ref 4.7–6.1)
RBC # BLD: 3.56 M/UL — LOW (ref 4.7–6.1)
RBC # BLD: 3.61 M/UL — LOW (ref 4.7–6.1)
RBC # BLD: 3.8 M/UL — LOW (ref 4.7–6.1)
RBC # BLD: 4.24 M/UL — LOW (ref 4.7–6.1)
RBC # FLD: 13.1 % — SIGNIFICANT CHANGE UP (ref 11.5–14.5)
RBC # FLD: 13.2 % — SIGNIFICANT CHANGE UP (ref 11.5–14.5)
RBC # FLD: 13.3 % — SIGNIFICANT CHANGE UP (ref 11.5–14.5)
RBC # FLD: 13.4 % — SIGNIFICANT CHANGE UP (ref 11.5–14.5)
RBC # FLD: 13.5 % — SIGNIFICANT CHANGE UP (ref 11.5–14.5)
RBC # FLD: 13.6 % — SIGNIFICANT CHANGE UP (ref 11.5–14.5)
RBC # FLD: 13.7 % — SIGNIFICANT CHANGE UP (ref 11.5–14.5)
RBC # FLD: 14.1 % — SIGNIFICANT CHANGE UP (ref 11.5–14.5)
RBC # FLD: 14.3 % — SIGNIFICANT CHANGE UP (ref 11.5–14.5)
RBC # FLD: 14.5 % — SIGNIFICANT CHANGE UP (ref 11.5–14.5)
RBC # FLD: 14.7 % — HIGH (ref 11.5–14.5)
RBC # FLD: 15.9 % — HIGH (ref 11.5–14.5)
RBC # FLD: 17.2 % — HIGH (ref 11.5–14.5)
RBC BLD AUTO: ABNORMAL
RBC BLD AUTO: ABNORMAL
RBC CASTS # UR COMP ASSIST: 10 /HPF — HIGH (ref 0–4)
RETICS #: 60.3 K/UL — SIGNIFICANT CHANGE UP (ref 25–125)
RETICS #: 82.1 K/UL — SIGNIFICANT CHANGE UP (ref 25–125)
RETICS/RBC NFR: 2.6 % — HIGH (ref 0.5–1.5)
RETICS/RBC NFR: 3.5 % — HIGH (ref 0.5–1.5)
SAO2 % BLDA: 96.5 % — SIGNIFICANT CHANGE UP (ref 94–98)
SAO2 % BLDA: 97.9 % — SIGNIFICANT CHANGE UP (ref 94–98)
SAO2 % BLDA: 98 % — SIGNIFICANT CHANGE UP (ref 94–98)
SAO2 % BLDA: 99.1 % — HIGH (ref 94–98)
SCHISTOCYTES BLD QL AUTO: SLIGHT — SIGNIFICANT CHANGE UP
SODIUM SERPL-SCNC: 137 MMOL/L — SIGNIFICANT CHANGE UP (ref 135–146)
SODIUM SERPL-SCNC: 137 MMOL/L — SIGNIFICANT CHANGE UP (ref 135–146)
SODIUM SERPL-SCNC: 138 MMOL/L — SIGNIFICANT CHANGE UP (ref 135–146)
SODIUM SERPL-SCNC: 139 MMOL/L — SIGNIFICANT CHANGE UP (ref 135–146)
SODIUM SERPL-SCNC: 140 MMOL/L — SIGNIFICANT CHANGE UP (ref 135–146)
SODIUM SERPL-SCNC: 140 MMOL/L — SIGNIFICANT CHANGE UP (ref 135–146)
SODIUM SERPL-SCNC: 141 MMOL/L — SIGNIFICANT CHANGE UP (ref 135–146)
SODIUM SERPL-SCNC: 143 MMOL/L — SIGNIFICANT CHANGE UP (ref 135–146)
SODIUM SERPL-SCNC: 145 MMOL/L — SIGNIFICANT CHANGE UP (ref 135–146)
SODIUM SERPL-SCNC: 146 MMOL/L — SIGNIFICANT CHANGE UP (ref 135–146)
SODIUM SERPL-SCNC: 147 MMOL/L — HIGH (ref 135–146)
SODIUM SERPL-SCNC: 148 MMOL/L — HIGH (ref 135–146)
SODIUM SERPL-SCNC: 148 MMOL/L — HIGH (ref 135–146)
SODIUM SERPL-SCNC: 150 MMOL/L — HIGH (ref 135–146)
SODIUM SERPL-SCNC: 151 MMOL/L — HIGH (ref 135–146)
SODIUM SERPL-SCNC: 152 MMOL/L — HIGH (ref 135–146)
SODIUM SERPL-SCNC: 152 MMOL/L — HIGH (ref 135–146)
SODIUM SERPL-SCNC: 155 MMOL/L — HIGH (ref 135–146)
SP GR SPEC: 1.01 — SIGNIFICANT CHANGE UP (ref 1.01–1.03)
SP GR SPEC: 1.01 — SIGNIFICANT CHANGE UP (ref 1.01–1.03)
SP GR SPEC: 1.02 — SIGNIFICANT CHANGE UP (ref 1.01–1.03)
SP GR SPEC: 1.02 — SIGNIFICANT CHANGE UP (ref 1.01–1.03)
SPECIMEN SOURCE: SIGNIFICANT CHANGE UP
TIBC SERPL-MCNC: 162 UG/DL — LOW (ref 220–430)
TRIGL SERPL-MCNC: 299 MG/DL — HIGH
TRIGL SERPL-MCNC: 592 MG/DL — HIGH
TRIGL SERPL-MCNC: 94 MG/DL — SIGNIFICANT CHANGE UP
TROPONIN T SERPL-MCNC: 0.07 NG/ML — CRITICAL HIGH
TROPONIN T SERPL-MCNC: 0.08 NG/ML — CRITICAL HIGH
TROPONIN T SERPL-MCNC: 0.13 NG/ML — CRITICAL HIGH
TROPONIN T SERPL-MCNC: 0.17 NG/ML — CRITICAL HIGH
TROPONIN T SERPL-MCNC: 0.18 NG/ML — CRITICAL HIGH
TSH SERPL-MCNC: 1.29 UIU/ML — SIGNIFICANT CHANGE UP (ref 0.27–4.2)
UIBC SERPL-MCNC: 146 UG/DL — SIGNIFICANT CHANGE UP (ref 110–370)
UROBILINOGEN FLD QL: SIGNIFICANT CHANGE UP
VANCOMYCIN FLD-MCNC: 13.6 UG/ML — HIGH (ref 5–10)
VANCOMYCIN FLD-MCNC: 9.8 UG/ML — SIGNIFICANT CHANGE UP (ref 5–10)
VIT B12 SERPL-MCNC: 1065 PG/ML — SIGNIFICANT CHANGE UP (ref 232–1245)
VIT D25+D1,25 OH+D1,25 PNL SERPL-MCNC: 24.7 PG/ML — SIGNIFICANT CHANGE UP (ref 19.9–79.3)
WBC # BLD: 10.21 K/UL — SIGNIFICANT CHANGE UP (ref 4.8–10.8)
WBC # BLD: 10.75 K/UL — SIGNIFICANT CHANGE UP (ref 4.8–10.8)
WBC # BLD: 11.13 K/UL — HIGH (ref 4.8–10.8)
WBC # BLD: 11.19 K/UL — HIGH (ref 4.8–10.8)
WBC # BLD: 11.48 K/UL — HIGH (ref 4.8–10.8)
WBC # BLD: 11.84 K/UL — HIGH (ref 4.8–10.8)
WBC # BLD: 12.18 K/UL — HIGH (ref 4.8–10.8)
WBC # BLD: 12.31 K/UL — HIGH (ref 4.8–10.8)
WBC # BLD: 12.63 K/UL — HIGH (ref 4.8–10.8)
WBC # BLD: 12.65 K/UL — HIGH (ref 4.8–10.8)
WBC # BLD: 12.72 K/UL — HIGH (ref 4.8–10.8)
WBC # BLD: 13.17 K/UL — HIGH (ref 4.8–10.8)
WBC # BLD: 13.27 K/UL — HIGH (ref 4.8–10.8)
WBC # BLD: 14.83 K/UL — HIGH (ref 4.8–10.8)
WBC # BLD: 14.88 K/UL — HIGH (ref 4.8–10.8)
WBC # BLD: 15.62 K/UL — HIGH (ref 4.8–10.8)
WBC # BLD: 17.06 K/UL — HIGH (ref 4.8–10.8)
WBC # BLD: 19.22 K/UL — HIGH (ref 4.8–10.8)
WBC # BLD: 22.82 K/UL — HIGH (ref 4.8–10.8)
WBC # BLD: 26.8 K/UL — HIGH (ref 4.8–10.8)
WBC # BLD: 31.62 K/UL — HIGH (ref 4.8–10.8)
WBC # BLD: 33.82 K/UL — HIGH (ref 4.8–10.8)
WBC # BLD: 45.1 K/UL — CRITICAL HIGH (ref 4.8–10.8)
WBC # BLD: 8.95 K/UL — SIGNIFICANT CHANGE UP (ref 4.8–10.8)
WBC # BLD: 8.99 K/UL — SIGNIFICANT CHANGE UP (ref 4.8–10.8)
WBC # BLD: 9.57 K/UL — SIGNIFICANT CHANGE UP (ref 4.8–10.8)
WBC # FLD AUTO: 10.21 K/UL — SIGNIFICANT CHANGE UP (ref 4.8–10.8)
WBC # FLD AUTO: 10.75 K/UL — SIGNIFICANT CHANGE UP (ref 4.8–10.8)
WBC # FLD AUTO: 11.13 K/UL — HIGH (ref 4.8–10.8)
WBC # FLD AUTO: 11.19 K/UL — HIGH (ref 4.8–10.8)
WBC # FLD AUTO: 11.48 K/UL — HIGH (ref 4.8–10.8)
WBC # FLD AUTO: 11.84 K/UL — HIGH (ref 4.8–10.8)
WBC # FLD AUTO: 12.18 K/UL — HIGH (ref 4.8–10.8)
WBC # FLD AUTO: 12.31 K/UL — HIGH (ref 4.8–10.8)
WBC # FLD AUTO: 12.63 K/UL — HIGH (ref 4.8–10.8)
WBC # FLD AUTO: 12.65 K/UL — HIGH (ref 4.8–10.8)
WBC # FLD AUTO: 12.72 K/UL — HIGH (ref 4.8–10.8)
WBC # FLD AUTO: 13.17 K/UL — HIGH (ref 4.8–10.8)
WBC # FLD AUTO: 13.27 K/UL — HIGH (ref 4.8–10.8)
WBC # FLD AUTO: 14.83 K/UL — HIGH (ref 4.8–10.8)
WBC # FLD AUTO: 14.88 K/UL — HIGH (ref 4.8–10.8)
WBC # FLD AUTO: 15.62 K/UL — HIGH (ref 4.8–10.8)
WBC # FLD AUTO: 17.06 K/UL — HIGH (ref 4.8–10.8)
WBC # FLD AUTO: 19.22 K/UL — HIGH (ref 4.8–10.8)
WBC # FLD AUTO: 22.82 K/UL — HIGH (ref 4.8–10.8)
WBC # FLD AUTO: 26.8 K/UL — HIGH (ref 4.8–10.8)
WBC # FLD AUTO: 31.62 K/UL — HIGH (ref 4.8–10.8)
WBC # FLD AUTO: 33.82 K/UL — HIGH (ref 4.8–10.8)
WBC # FLD AUTO: 45.1 K/UL — CRITICAL HIGH (ref 4.8–10.8)
WBC # FLD AUTO: 8.95 K/UL — SIGNIFICANT CHANGE UP (ref 4.8–10.8)
WBC # FLD AUTO: 8.99 K/UL — SIGNIFICANT CHANGE UP (ref 4.8–10.8)
WBC # FLD AUTO: 9.57 K/UL — SIGNIFICANT CHANGE UP (ref 4.8–10.8)
WBC UR QL: 21 /HPF — HIGH (ref 0–5)

## 2023-01-01 PROCEDURE — 71045 X-RAY EXAM CHEST 1 VIEW: CPT

## 2023-01-01 PROCEDURE — 70450 CT HEAD/BRAIN W/O DYE: CPT | Mod: 26

## 2023-01-01 PROCEDURE — 71045 X-RAY EXAM CHEST 1 VIEW: CPT | Mod: 26,77

## 2023-01-01 PROCEDURE — 70450 CT HEAD/BRAIN W/O DYE: CPT | Mod: 26,MA,59

## 2023-01-01 PROCEDURE — 74176 CT ABD & PELVIS W/O CONTRAST: CPT | Mod: 26

## 2023-01-01 PROCEDURE — 93005 ELECTROCARDIOGRAM TRACING: CPT

## 2023-01-01 PROCEDURE — 71045 X-RAY EXAM CHEST 1 VIEW: CPT | Mod: 26

## 2023-01-01 PROCEDURE — 99291 CRITICAL CARE FIRST HOUR: CPT

## 2023-01-01 PROCEDURE — 99284 EMERGENCY DEPT VISIT MOD MDM: CPT | Mod: 25,24

## 2023-01-01 PROCEDURE — 73030 X-RAY EXAM OF SHOULDER: CPT | Mod: RT

## 2023-01-01 PROCEDURE — 86901 BLOOD TYPING SEROLOGIC RH(D): CPT

## 2023-01-01 PROCEDURE — 95700 EEG CONT REC W/VID EEG TECH: CPT

## 2023-01-01 PROCEDURE — 95720 EEG PHY/QHP EA INCR W/VEEG: CPT

## 2023-01-01 PROCEDURE — 83010 ASSAY OF HAPTOGLOBIN QUANT: CPT

## 2023-01-01 PROCEDURE — 73030 X-RAY EXAM OF SHOULDER: CPT | Mod: 26,76,RT

## 2023-01-01 PROCEDURE — 82746 ASSAY OF FOLIC ACID SERUM: CPT

## 2023-01-01 PROCEDURE — 93970 EXTREMITY STUDY: CPT

## 2023-01-01 PROCEDURE — 84100 ASSAY OF PHOSPHORUS: CPT

## 2023-01-01 PROCEDURE — 95718 EEG PHYS/QHP 2-12 HR W/VEEG: CPT

## 2023-01-01 PROCEDURE — 82962 GLUCOSE BLOOD TEST: CPT

## 2023-01-01 PROCEDURE — 82607 VITAMIN B-12: CPT

## 2023-01-01 PROCEDURE — 36430 TRANSFUSION BLD/BLD COMPNT: CPT

## 2023-01-01 PROCEDURE — 99291 CRITICAL CARE FIRST HOUR: CPT | Mod: GC

## 2023-01-01 PROCEDURE — 73020 X-RAY EXAM OF SHOULDER: CPT | Mod: RT

## 2023-01-01 PROCEDURE — 84132 ASSAY OF SERUM POTASSIUM: CPT

## 2023-01-01 PROCEDURE — 94660 CPAP INITIATION&MGMT: CPT

## 2023-01-01 PROCEDURE — 36556 INSERT NON-TUNNEL CV CATH: CPT

## 2023-01-01 PROCEDURE — 97167 OT EVAL HIGH COMPLEX 60 MIN: CPT | Mod: GO

## 2023-01-01 PROCEDURE — 94760 N-INVAS EAR/PLS OXIMETRY 1: CPT

## 2023-01-01 PROCEDURE — 97162 PT EVAL MOD COMPLEX 30 MIN: CPT | Mod: GP

## 2023-01-01 PROCEDURE — 85610 PROTHROMBIN TIME: CPT

## 2023-01-01 PROCEDURE — 99222 1ST HOSP IP/OBS MODERATE 55: CPT

## 2023-01-01 PROCEDURE — 82330 ASSAY OF CALCIUM: CPT

## 2023-01-01 PROCEDURE — 84443 ASSAY THYROID STIM HORMONE: CPT

## 2023-01-01 PROCEDURE — C9254: CPT

## 2023-01-01 PROCEDURE — 84484 ASSAY OF TROPONIN QUANT: CPT

## 2023-01-01 PROCEDURE — 85027 COMPLETE CBC AUTOMATED: CPT

## 2023-01-01 PROCEDURE — 87640 STAPH A DNA AMP PROBE: CPT

## 2023-01-01 PROCEDURE — 82010 KETONE BODYS QUAN: CPT

## 2023-01-01 PROCEDURE — 82140 ASSAY OF AMMONIA: CPT

## 2023-01-01 PROCEDURE — 83735 ASSAY OF MAGNESIUM: CPT

## 2023-01-01 PROCEDURE — P9047: CPT

## 2023-01-01 PROCEDURE — 94002 VENT MGMT INPAT INIT DAY: CPT

## 2023-01-01 PROCEDURE — 99223 1ST HOSP IP/OBS HIGH 75: CPT

## 2023-01-01 PROCEDURE — 76705 ECHO EXAM OF ABDOMEN: CPT | Mod: 26

## 2023-01-01 PROCEDURE — P9016: CPT

## 2023-01-01 PROCEDURE — 93010 ELECTROCARDIOGRAM REPORT: CPT

## 2023-01-01 PROCEDURE — 86900 BLOOD TYPING SEROLOGIC ABO: CPT

## 2023-01-01 PROCEDURE — 86923 COMPATIBILITY TEST ELECTRIC: CPT

## 2023-01-01 PROCEDURE — 83605 ASSAY OF LACTIC ACID: CPT

## 2023-01-01 PROCEDURE — 80061 LIPID PANEL: CPT

## 2023-01-01 PROCEDURE — 71250 CT THORAX DX C-: CPT | Mod: 26

## 2023-01-01 PROCEDURE — 80053 COMPREHEN METABOLIC PANEL: CPT

## 2023-01-01 PROCEDURE — 80202 ASSAY OF VANCOMYCIN: CPT

## 2023-01-01 PROCEDURE — 86803 HEPATITIS C AB TEST: CPT

## 2023-01-01 PROCEDURE — 36620 INSERTION CATHETER ARTERY: CPT

## 2023-01-01 PROCEDURE — 82803 BLOOD GASES ANY COMBINATION: CPT

## 2023-01-01 PROCEDURE — 87077 CULTURE AEROBIC IDENTIFY: CPT

## 2023-01-01 PROCEDURE — 70498 CT ANGIOGRAPHY NECK: CPT | Mod: 26,MA

## 2023-01-01 PROCEDURE — 85385 FIBRINOGEN ANTIGEN: CPT

## 2023-01-01 PROCEDURE — 99291 CRITICAL CARE FIRST HOUR: CPT | Mod: GC,25,57

## 2023-01-01 PROCEDURE — 82306 VITAMIN D 25 HYDROXY: CPT

## 2023-01-01 PROCEDURE — 83690 ASSAY OF LIPASE: CPT

## 2023-01-01 PROCEDURE — 76705 ECHO EXAM OF ABDOMEN: CPT

## 2023-01-01 PROCEDURE — 99053 MED SERV 10PM-8AM 24 HR FAC: CPT

## 2023-01-01 PROCEDURE — 82728 ASSAY OF FERRITIN: CPT

## 2023-01-01 PROCEDURE — 74176 CT ABD & PELVIS W/O CONTRAST: CPT

## 2023-01-01 PROCEDURE — 82550 ASSAY OF CK (CPK): CPT

## 2023-01-01 PROCEDURE — 70496 CT ANGIOGRAPHY HEAD: CPT | Mod: 26,MA

## 2023-01-01 PROCEDURE — 82150 ASSAY OF AMYLASE: CPT

## 2023-01-01 PROCEDURE — 83090 ASSAY OF HOMOCYSTEINE: CPT

## 2023-01-01 PROCEDURE — 99221 1ST HOSP IP/OBS SF/LOW 40: CPT

## 2023-01-01 PROCEDURE — 87070 CULTURE OTHR SPECIMN AEROBIC: CPT

## 2023-01-01 PROCEDURE — 99222 1ST HOSP IP/OBS MODERATE 55: CPT | Mod: 25

## 2023-01-01 PROCEDURE — 80048 BASIC METABOLIC PNL TOTAL CA: CPT

## 2023-01-01 PROCEDURE — 94640 AIRWAY INHALATION TREATMENT: CPT

## 2023-01-01 PROCEDURE — 83540 ASSAY OF IRON: CPT

## 2023-01-01 PROCEDURE — 93970 EXTREMITY STUDY: CPT | Mod: 26

## 2023-01-01 PROCEDURE — 87641 MR-STAPH DNA AMP PROBE: CPT

## 2023-01-01 PROCEDURE — 70450 CT HEAD/BRAIN W/O DYE: CPT

## 2023-01-01 PROCEDURE — 86850 RBC ANTIBODY SCREEN: CPT

## 2023-01-01 PROCEDURE — 85018 HEMOGLOBIN: CPT

## 2023-01-01 PROCEDURE — 92610 EVALUATE SWALLOWING FUNCTION: CPT | Mod: GN

## 2023-01-01 PROCEDURE — 84478 ASSAY OF TRIGLYCERIDES: CPT

## 2023-01-01 PROCEDURE — 83880 ASSAY OF NATRIURETIC PEPTIDE: CPT

## 2023-01-01 PROCEDURE — 95714 VEEG EA 12-26 HR UNMNTR: CPT

## 2023-01-01 PROCEDURE — 85730 THROMBOPLASTIN TIME PARTIAL: CPT

## 2023-01-01 PROCEDURE — 31500 INSERT EMERGENCY AIRWAY: CPT | Mod: GC,59

## 2023-01-01 PROCEDURE — 82553 CREATINE MB FRACTION: CPT

## 2023-01-01 PROCEDURE — 93306 TTE W/DOPPLER COMPLETE: CPT | Mod: 26

## 2023-01-01 PROCEDURE — 83615 LACTATE (LD) (LDH) ENZYME: CPT

## 2023-01-01 PROCEDURE — 84295 ASSAY OF SERUM SODIUM: CPT

## 2023-01-01 PROCEDURE — 99291 CRITICAL CARE FIRST HOUR: CPT | Mod: GC,25

## 2023-01-01 PROCEDURE — 85014 HEMATOCRIT: CPT

## 2023-01-01 PROCEDURE — 94003 VENT MGMT INPAT SUBQ DAY: CPT

## 2023-01-01 PROCEDURE — 93306 TTE W/DOPPLER COMPLETE: CPT

## 2023-01-01 PROCEDURE — 36620 INSERTION CATHETER ARTERY: CPT | Mod: GC

## 2023-01-01 PROCEDURE — 84145 PROCALCITONIN (PCT): CPT

## 2023-01-01 PROCEDURE — 83550 IRON BINDING TEST: CPT

## 2023-01-01 PROCEDURE — 85025 COMPLETE CBC W/AUTO DIFF WBC: CPT

## 2023-01-01 PROCEDURE — 83036 HEMOGLOBIN GLYCOSYLATED A1C: CPT

## 2023-01-01 PROCEDURE — 85045 AUTOMATED RETICULOCYTE COUNT: CPT

## 2023-01-01 PROCEDURE — 82652 VIT D 1 25-DIHYDROXY: CPT

## 2023-01-01 PROCEDURE — 70450 CT HEAD/BRAIN W/O DYE: CPT | Mod: 26,59,77

## 2023-01-01 PROCEDURE — 85384 FIBRINOGEN ACTIVITY: CPT

## 2023-01-01 PROCEDURE — 71250 CT THORAX DX C-: CPT

## 2023-01-01 PROCEDURE — 87040 BLOOD CULTURE FOR BACTERIA: CPT

## 2023-01-01 PROCEDURE — 36415 COLL VENOUS BLD VENIPUNCTURE: CPT

## 2023-01-01 PROCEDURE — 81001 URINALYSIS AUTO W/SCOPE: CPT

## 2023-01-01 PROCEDURE — 71045 X-RAY EXAM CHEST 1 VIEW: CPT | Mod: 26,76

## 2023-01-01 PROCEDURE — 95711 VEEG 2-12 HR UNMONITORED: CPT

## 2023-01-01 PROCEDURE — C9113: CPT

## 2023-01-01 PROCEDURE — 70450 CT HEAD/BRAIN W/O DYE: CPT | Mod: 26,77,59

## 2023-01-01 PROCEDURE — 92526 ORAL FUNCTION THERAPY: CPT | Mod: GN

## 2023-01-01 RX ORDER — FENTANYL CITRATE 50 UG/ML
100 INJECTION INTRAVENOUS ONCE
Refills: 0 | Status: DISCONTINUED | OUTPATIENT
Start: 2023-01-01 | End: 2023-01-01

## 2023-01-01 RX ORDER — PIPERACILLIN AND TAZOBACTAM 4; .5 G/20ML; G/20ML
3.38 INJECTION, POWDER, LYOPHILIZED, FOR SOLUTION INTRAVENOUS EVERY 8 HOURS
Refills: 0 | Status: DISCONTINUED | OUTPATIENT
Start: 2023-01-01 | End: 2023-01-01

## 2023-01-01 RX ORDER — PHENYLEPHRINE HYDROCHLORIDE 10 MG/ML
0.1 INJECTION INTRAVENOUS
Qty: 160 | Refills: 0 | Status: DISCONTINUED | OUTPATIENT
Start: 2023-01-01 | End: 2023-01-01

## 2023-01-01 RX ORDER — METOPROLOL TARTRATE 50 MG
5 TABLET ORAL EVERY 6 HOURS
Refills: 0 | Status: DISCONTINUED | OUTPATIENT
Start: 2023-01-01 | End: 2023-01-01

## 2023-01-01 RX ORDER — SIMETHICONE 80 MG/1
80 TABLET, CHEWABLE ORAL EVERY 8 HOURS
Refills: 0 | Status: COMPLETED | OUTPATIENT
Start: 2023-01-01 | End: 2023-01-01

## 2023-01-01 RX ORDER — DEXTROSE 50 % IN WATER 50 %
12.5 SYRINGE (ML) INTRAVENOUS ONCE
Refills: 0 | Status: DISCONTINUED | OUTPATIENT
Start: 2023-01-01 | End: 2023-01-01

## 2023-01-01 RX ORDER — FENTANYL CITRATE 50 UG/ML
50 INJECTION INTRAVENOUS ONCE
Refills: 0 | Status: DISCONTINUED | OUTPATIENT
Start: 2023-01-01 | End: 2023-01-01

## 2023-01-01 RX ORDER — SODIUM BICARBONATE 1 MEQ/ML
50 SYRINGE (ML) INTRAVENOUS
Refills: 0 | Status: COMPLETED | OUTPATIENT
Start: 2023-01-01 | End: 2023-01-01

## 2023-01-01 RX ORDER — SODIUM BICARBONATE 1 MEQ/ML
150 SYRINGE (ML) INTRAVENOUS ONCE
Refills: 0 | Status: COMPLETED | OUTPATIENT
Start: 2023-01-01 | End: 2023-01-01

## 2023-01-01 RX ORDER — ACETYLCYSTEINE 200 MG/ML
4 VIAL (ML) MISCELLANEOUS EVERY 4 HOURS
Refills: 0 | Status: DISCONTINUED | OUTPATIENT
Start: 2023-01-01 | End: 2023-01-01

## 2023-01-01 RX ORDER — AMIODARONE HYDROCHLORIDE 400 MG/1
150 TABLET ORAL ONCE
Refills: 0 | Status: COMPLETED | OUTPATIENT
Start: 2023-01-01 | End: 2023-01-01

## 2023-01-01 RX ORDER — HYDRALAZINE HCL 50 MG
10 TABLET ORAL EVERY 6 HOURS
Refills: 0 | Status: DISCONTINUED | OUTPATIENT
Start: 2023-01-01 | End: 2023-01-01

## 2023-01-01 RX ORDER — HYDROCORTISONE 20 MG
100 TABLET ORAL EVERY 8 HOURS
Refills: 0 | Status: DISCONTINUED | OUTPATIENT
Start: 2023-01-01 | End: 2023-01-01

## 2023-01-01 RX ORDER — ERGOCALCIFEROL 1.25 MG/1
50000 CAPSULE ORAL
Refills: 0 | Status: DISCONTINUED | OUTPATIENT
Start: 2023-01-01 | End: 2023-01-01

## 2023-01-01 RX ORDER — ANDEXANET ALFA 200 MG/20ML
400 INJECTION, POWDER, LYOPHILIZED, FOR SOLUTION INTRAVENOUS ONCE
Refills: 0 | Status: COMPLETED | OUTPATIENT
Start: 2023-01-01 | End: 2023-01-01

## 2023-01-01 RX ORDER — INSULIN GLARGINE 100 [IU]/ML
50 INJECTION, SOLUTION SUBCUTANEOUS
Refills: 0 | DISCHARGE

## 2023-01-01 RX ORDER — PHENYLEPHRINE HYDROCHLORIDE 10 MG/ML
1000 INJECTION INTRAVENOUS ONCE
Refills: 0 | Status: COMPLETED | OUTPATIENT
Start: 2023-01-01 | End: 2023-01-01

## 2023-01-01 RX ORDER — HYDROMORPHONE HYDROCHLORIDE 2 MG/ML
0.25 INJECTION INTRAMUSCULAR; INTRAVENOUS; SUBCUTANEOUS EVERY 4 HOURS
Refills: 0 | Status: DISCONTINUED | OUTPATIENT
Start: 2023-01-01 | End: 2023-01-01

## 2023-01-01 RX ORDER — EZETIMIBE 10 MG/1
1 TABLET ORAL
Refills: 0 | DISCHARGE

## 2023-01-01 RX ORDER — PHENYLEPHRINE HYDROCHLORIDE 10 MG/ML
1000 INJECTION INTRAVENOUS
Refills: 0 | Status: DISCONTINUED | OUTPATIENT
Start: 2023-01-01 | End: 2023-01-01

## 2023-01-01 RX ORDER — POTASSIUM CHLORIDE 20 MEQ
20 PACKET (EA) ORAL ONCE
Refills: 0 | Status: COMPLETED | OUTPATIENT
Start: 2023-01-01 | End: 2023-01-01

## 2023-01-01 RX ORDER — IPRATROPIUM/ALBUTEROL SULFATE 18-103MCG
3 AEROSOL WITH ADAPTER (GRAM) INHALATION EVERY 4 HOURS
Refills: 0 | Status: DISCONTINUED | OUTPATIENT
Start: 2023-01-01 | End: 2023-01-01

## 2023-01-01 RX ORDER — SODIUM BICARBONATE 1 MEQ/ML
100 SYRINGE (ML) INTRAVENOUS ONCE
Refills: 0 | Status: COMPLETED | OUTPATIENT
Start: 2023-01-01 | End: 2023-01-01

## 2023-01-01 RX ORDER — HYDRALAZINE HCL 50 MG
75 TABLET ORAL
Refills: 0 | Status: DISCONTINUED | OUTPATIENT
Start: 2023-01-01 | End: 2023-01-01

## 2023-01-01 RX ORDER — ROCURONIUM BROMIDE 10 MG/ML
100 VIAL (ML) INTRAVENOUS ONCE
Refills: 0 | Status: COMPLETED | OUTPATIENT
Start: 2023-01-01 | End: 2023-01-01

## 2023-01-01 RX ORDER — SODIUM CHLORIDE 9 MG/ML
1000 INJECTION, SOLUTION INTRAVENOUS
Refills: 0 | Status: DISCONTINUED | OUTPATIENT
Start: 2023-01-01 | End: 2023-01-01

## 2023-01-01 RX ORDER — VASOPRESSIN 20 [USP'U]/ML
0.04 INJECTION INTRAVENOUS
Qty: 40 | Refills: 0 | Status: DISCONTINUED | OUTPATIENT
Start: 2023-01-01 | End: 2023-01-01

## 2023-01-01 RX ORDER — ALBUTEROL 90 UG/1
2.5 AEROSOL, METERED ORAL EVERY 4 HOURS
Refills: 0 | Status: DISCONTINUED | OUTPATIENT
Start: 2023-01-01 | End: 2023-01-01

## 2023-01-01 RX ORDER — INSULIN HUMAN 100 [IU]/ML
5 INJECTION, SOLUTION SUBCUTANEOUS
Qty: 100 | Refills: 0 | Status: DISCONTINUED | OUTPATIENT
Start: 2023-01-01 | End: 2023-01-01

## 2023-01-01 RX ORDER — BACITRACIN ZINC 500 UNIT/G
1 OINTMENT IN PACKET (EA) TOPICAL EVERY 12 HOURS
Refills: 0 | Status: DISCONTINUED | OUTPATIENT
Start: 2023-01-01 | End: 2023-01-01

## 2023-01-01 RX ORDER — IPRATROPIUM/ALBUTEROL SULFATE 18-103MCG
3 AEROSOL WITH ADAPTER (GRAM) INHALATION EVERY 6 HOURS
Refills: 0 | Status: DISCONTINUED | OUTPATIENT
Start: 2023-01-01 | End: 2023-01-01

## 2023-01-01 RX ORDER — SODIUM CHLORIDE 9 MG/ML
250 INJECTION, SOLUTION INTRAVENOUS ONCE
Refills: 0 | Status: COMPLETED | OUTPATIENT
Start: 2023-01-01 | End: 2023-01-01

## 2023-01-01 RX ORDER — NOREPINEPHRINE BITARTRATE/D5W 8 MG/250ML
0.05 PLASTIC BAG, INJECTION (ML) INTRAVENOUS
Qty: 32 | Refills: 0 | Status: DISCONTINUED | OUTPATIENT
Start: 2023-01-01 | End: 2023-01-01

## 2023-01-01 RX ORDER — ALFUZOSIN HYDROCHLORIDE 10 MG/1
1 TABLET, EXTENDED RELEASE ORAL
Refills: 0 | DISCHARGE

## 2023-01-01 RX ORDER — MEROPENEM 1 G/30ML
500 INJECTION INTRAVENOUS EVERY 12 HOURS
Refills: 0 | Status: DISCONTINUED | OUTPATIENT
Start: 2023-01-01 | End: 2023-01-01

## 2023-01-01 RX ORDER — DEXTROSE 50 % IN WATER 50 %
25 SYRINGE (ML) INTRAVENOUS ONCE
Refills: 0 | Status: COMPLETED | OUTPATIENT
Start: 2023-01-01 | End: 2023-01-01

## 2023-01-01 RX ORDER — FUROSEMIDE 40 MG
40 TABLET ORAL ONCE
Refills: 0 | Status: COMPLETED | OUTPATIENT
Start: 2023-01-01 | End: 2023-01-01

## 2023-01-01 RX ORDER — LABETALOL HCL 100 MG
200 TABLET ORAL EVERY 8 HOURS
Refills: 0 | Status: DISCONTINUED | OUTPATIENT
Start: 2023-01-01 | End: 2023-01-01

## 2023-01-01 RX ORDER — HUMAN INSULIN 100 [IU]/ML
6 INJECTION, SUSPENSION SUBCUTANEOUS EVERY 6 HOURS
Refills: 0 | Status: DISCONTINUED | OUTPATIENT
Start: 2023-01-01 | End: 2023-01-01

## 2023-01-01 RX ORDER — ASPIRIN/CALCIUM CARB/MAGNESIUM 324 MG
81 TABLET ORAL DAILY
Refills: 0 | Status: DISCONTINUED | OUTPATIENT
Start: 2023-01-01 | End: 2023-01-01

## 2023-01-01 RX ORDER — ACETYLCYSTEINE 200 MG/ML
4 VIAL (ML) MISCELLANEOUS EVERY 6 HOURS
Refills: 0 | Status: DISCONTINUED | OUTPATIENT
Start: 2023-01-01 | End: 2023-01-01

## 2023-01-01 RX ORDER — SODIUM BICARBONATE 1 MEQ/ML
50 SYRINGE (ML) INTRAVENOUS ONCE
Refills: 0 | Status: COMPLETED | OUTPATIENT
Start: 2023-01-01 | End: 2023-01-01

## 2023-01-01 RX ORDER — PREGABALIN 225 MG/1
1 CAPSULE ORAL
Refills: 0 | DISCHARGE

## 2023-01-01 RX ORDER — SODIUM CHLORIDE 9 MG/ML
1000 INJECTION INTRAMUSCULAR; INTRAVENOUS; SUBCUTANEOUS
Refills: 0 | Status: DISCONTINUED | OUTPATIENT
Start: 2023-01-01 | End: 2023-01-01

## 2023-01-01 RX ORDER — METOPROLOL TARTRATE 50 MG
5 TABLET ORAL ONCE
Refills: 0 | Status: COMPLETED | OUTPATIENT
Start: 2023-01-01 | End: 2023-01-01

## 2023-01-01 RX ORDER — LABETALOL HCL 100 MG
10 TABLET ORAL
Refills: 0 | Status: DISCONTINUED | OUTPATIENT
Start: 2023-01-01 | End: 2023-01-01

## 2023-01-01 RX ORDER — LACTULOSE 10 G/15ML
200 SOLUTION ORAL ONCE
Refills: 0 | Status: COMPLETED | OUTPATIENT
Start: 2023-01-01 | End: 2023-01-01

## 2023-01-01 RX ORDER — LABETALOL HCL 100 MG
300 TABLET ORAL EVERY 8 HOURS
Refills: 0 | Status: DISCONTINUED | OUTPATIENT
Start: 2023-01-01 | End: 2023-01-01

## 2023-01-01 RX ORDER — HUMAN INSULIN 100 [IU]/ML
18 INJECTION, SUSPENSION SUBCUTANEOUS EVERY 6 HOURS
Refills: 0 | Status: DISCONTINUED | OUTPATIENT
Start: 2023-01-01 | End: 2023-01-01

## 2023-01-01 RX ORDER — DILTIAZEM HCL 120 MG
10 CAPSULE, EXT RELEASE 24 HR ORAL ONCE
Refills: 0 | Status: COMPLETED | OUTPATIENT
Start: 2023-01-01 | End: 2023-01-01

## 2023-01-01 RX ORDER — HYDROMORPHONE HYDROCHLORIDE 2 MG/ML
0.5 INJECTION INTRAMUSCULAR; INTRAVENOUS; SUBCUTANEOUS EVERY 4 HOURS
Refills: 0 | Status: DISCONTINUED | OUTPATIENT
Start: 2023-01-01 | End: 2023-01-01

## 2023-01-01 RX ORDER — NICARDIPINE HYDROCHLORIDE 30 MG/1
5 CAPSULE, EXTENDED RELEASE ORAL
Qty: 40 | Refills: 0 | Status: DISCONTINUED | OUTPATIENT
Start: 2023-01-01 | End: 2023-01-01

## 2023-01-01 RX ORDER — LEVETIRACETAM 250 MG/1
1000 TABLET, FILM COATED ORAL ONCE
Refills: 0 | Status: COMPLETED | OUTPATIENT
Start: 2023-01-01 | End: 2023-01-01

## 2023-01-01 RX ORDER — FENTANYL CITRATE 50 UG/ML
50 INJECTION INTRAVENOUS
Refills: 0 | Status: DISCONTINUED | OUTPATIENT
Start: 2023-01-01 | End: 2023-01-01

## 2023-01-01 RX ORDER — CALCIUM GLUCONATE 100 MG/ML
1 VIAL (ML) INTRAVENOUS ONCE
Refills: 0 | Status: COMPLETED | OUTPATIENT
Start: 2023-01-01 | End: 2023-01-01

## 2023-01-01 RX ORDER — PIPERACILLIN AND TAZOBACTAM 4; .5 G/20ML; G/20ML
3.38 INJECTION, POWDER, LYOPHILIZED, FOR SOLUTION INTRAVENOUS ONCE
Refills: 0 | Status: COMPLETED | OUTPATIENT
Start: 2023-01-01 | End: 2023-01-01

## 2023-01-01 RX ORDER — PANTOPRAZOLE SODIUM 20 MG/1
40 TABLET, DELAYED RELEASE ORAL DAILY
Refills: 0 | Status: DISCONTINUED | OUTPATIENT
Start: 2023-01-01 | End: 2023-01-01

## 2023-01-01 RX ORDER — FENTANYL CITRATE 50 UG/ML
1 INJECTION INTRAVENOUS
Qty: 2500 | Refills: 0 | Status: DISCONTINUED | OUTPATIENT
Start: 2023-01-01 | End: 2023-01-01

## 2023-01-01 RX ORDER — FENTANYL CITRATE 50 UG/ML
25 INJECTION INTRAVENOUS ONCE
Refills: 0 | Status: DISCONTINUED | OUTPATIENT
Start: 2023-01-01 | End: 2023-01-01

## 2023-01-01 RX ORDER — BUMETANIDE 0.25 MG/ML
2 INJECTION INTRAMUSCULAR; INTRAVENOUS ONCE
Refills: 0 | Status: COMPLETED | OUTPATIENT
Start: 2023-01-01 | End: 2023-01-01

## 2023-01-01 RX ORDER — BUMETANIDE 0.25 MG/ML
2 INJECTION INTRAMUSCULAR; INTRAVENOUS ONCE
Refills: 0 | Status: DISCONTINUED | OUTPATIENT
Start: 2023-01-01 | End: 2023-01-01

## 2023-01-01 RX ORDER — NOREPINEPHRINE BITARTRATE/D5W 8 MG/250ML
0.05 PLASTIC BAG, INJECTION (ML) INTRAVENOUS
Qty: 16 | Refills: 0 | Status: DISCONTINUED | OUTPATIENT
Start: 2023-01-01 | End: 2023-01-01

## 2023-01-01 RX ORDER — DEXTROSE 50 % IN WATER 50 %
25 SYRINGE (ML) INTRAVENOUS ONCE
Refills: 0 | Status: DISCONTINUED | OUTPATIENT
Start: 2023-01-01 | End: 2023-01-01

## 2023-01-01 RX ORDER — ACETAMINOPHEN 500 MG
650 TABLET ORAL EVERY 6 HOURS
Refills: 0 | Status: DISCONTINUED | OUTPATIENT
Start: 2023-01-01 | End: 2023-01-01

## 2023-01-01 RX ORDER — SODIUM CHLORIDE 9 MG/ML
500 INJECTION, SOLUTION INTRAVENOUS ONCE
Refills: 0 | Status: COMPLETED | OUTPATIENT
Start: 2023-01-01 | End: 2023-01-01

## 2023-01-01 RX ORDER — FUROSEMIDE 40 MG
40 TABLET ORAL DAILY
Refills: 0 | Status: DISCONTINUED | OUTPATIENT
Start: 2023-01-01 | End: 2023-01-01

## 2023-01-01 RX ORDER — HUMAN INSULIN 100 [IU]/ML
8 INJECTION, SUSPENSION SUBCUTANEOUS EVERY 6 HOURS
Refills: 0 | Status: DISCONTINUED | OUTPATIENT
Start: 2023-01-01 | End: 2023-01-01

## 2023-01-01 RX ORDER — HYDRALAZINE HCL 50 MG
75 TABLET ORAL EVERY 8 HOURS
Refills: 0 | Status: DISCONTINUED | OUTPATIENT
Start: 2023-01-01 | End: 2023-01-01

## 2023-01-01 RX ORDER — HUMAN INSULIN 100 [IU]/ML
3 INJECTION, SUSPENSION SUBCUTANEOUS EVERY 6 HOURS
Refills: 0 | Status: DISCONTINUED | OUTPATIENT
Start: 2023-01-01 | End: 2023-01-01

## 2023-01-01 RX ORDER — HUMAN INSULIN 100 [IU]/ML
12 INJECTION, SUSPENSION SUBCUTANEOUS EVERY 6 HOURS
Refills: 0 | Status: DISCONTINUED | OUTPATIENT
Start: 2023-01-01 | End: 2023-01-01

## 2023-01-01 RX ORDER — CHLORHEXIDINE GLUCONATE 213 G/1000ML
15 SOLUTION TOPICAL EVERY 12 HOURS
Refills: 0 | Status: DISCONTINUED | OUTPATIENT
Start: 2023-01-01 | End: 2023-01-01

## 2023-01-01 RX ORDER — SENNA PLUS 8.6 MG/1
2 TABLET ORAL EVERY 12 HOURS
Refills: 0 | Status: DISCONTINUED | OUTPATIENT
Start: 2023-01-01 | End: 2023-01-01

## 2023-01-01 RX ORDER — BUMETANIDE 0.25 MG/ML
1 INJECTION INTRAMUSCULAR; INTRAVENOUS
Qty: 20 | Refills: 0 | Status: DISCONTINUED | OUTPATIENT
Start: 2023-01-01 | End: 2023-01-01

## 2023-01-01 RX ORDER — HYDROMORPHONE HYDROCHLORIDE 2 MG/ML
0.5 INJECTION INTRAMUSCULAR; INTRAVENOUS; SUBCUTANEOUS ONCE
Refills: 0 | Status: DISCONTINUED | OUTPATIENT
Start: 2023-01-01 | End: 2023-01-01

## 2023-01-01 RX ORDER — HYDROCORTISONE 20 MG
100 TABLET ORAL ONCE
Refills: 0 | Status: COMPLETED | OUTPATIENT
Start: 2023-01-01 | End: 2023-01-01

## 2023-01-01 RX ORDER — BUMETANIDE 0.25 MG/ML
2 INJECTION INTRAMUSCULAR; INTRAVENOUS
Qty: 20 | Refills: 0 | Status: DISCONTINUED | OUTPATIENT
Start: 2023-01-01 | End: 2023-01-01

## 2023-01-01 RX ORDER — CISATRACURIUM BESYLATE 2 MG/ML
15 INJECTION INTRAVENOUS ONCE
Refills: 0 | Status: COMPLETED | OUTPATIENT
Start: 2023-01-01 | End: 2023-01-01

## 2023-01-01 RX ORDER — DEXMEDETOMIDINE HYDROCHLORIDE IN 0.9% SODIUM CHLORIDE 4 UG/ML
1.5 INJECTION INTRAVENOUS
Qty: 400 | Refills: 0 | Status: DISCONTINUED | OUTPATIENT
Start: 2023-01-01 | End: 2023-01-01

## 2023-01-01 RX ORDER — FENTANYL CITRATE 50 UG/ML
0.5 INJECTION INTRAVENOUS
Qty: 2500 | Refills: 0 | Status: DISCONTINUED | OUTPATIENT
Start: 2023-01-01 | End: 2023-01-01

## 2023-01-01 RX ORDER — DEXTROSE 50 % IN WATER 50 %
50 SYRINGE (ML) INTRAVENOUS ONCE
Refills: 0 | Status: COMPLETED | OUTPATIENT
Start: 2023-01-01 | End: 2023-01-01

## 2023-01-01 RX ORDER — BUMETANIDE 0.25 MG/ML
4 INJECTION INTRAMUSCULAR; INTRAVENOUS ONCE
Refills: 0 | Status: DISCONTINUED | OUTPATIENT
Start: 2023-01-01 | End: 2023-01-01

## 2023-01-01 RX ORDER — PANTOPRAZOLE SODIUM 20 MG/1
8 TABLET, DELAYED RELEASE ORAL
Qty: 80 | Refills: 0 | Status: DISCONTINUED | OUTPATIENT
Start: 2023-01-01 | End: 2023-01-01

## 2023-01-01 RX ORDER — AMLODIPINE BESYLATE 2.5 MG/1
5 TABLET ORAL
Refills: 0 | Status: DISCONTINUED | OUTPATIENT
Start: 2023-01-01 | End: 2023-01-01

## 2023-01-01 RX ORDER — INSULIN LISPRO 100/ML
VIAL (ML) SUBCUTANEOUS EVERY 6 HOURS
Refills: 0 | Status: DISCONTINUED | OUTPATIENT
Start: 2023-01-01 | End: 2023-01-01

## 2023-01-01 RX ORDER — ALBUTEROL 90 UG/1
4 AEROSOL, METERED ORAL EVERY 6 HOURS
Refills: 0 | Status: DISCONTINUED | OUTPATIENT
Start: 2023-01-01 | End: 2023-01-01

## 2023-01-01 RX ORDER — ACETAMINOPHEN 500 MG
1000 TABLET ORAL ONCE
Refills: 0 | Status: COMPLETED | OUTPATIENT
Start: 2023-01-01 | End: 2023-01-01

## 2023-01-01 RX ORDER — LACOSAMIDE 50 MG/1
150 TABLET ORAL EVERY 12 HOURS
Refills: 0 | Status: DISCONTINUED | OUTPATIENT
Start: 2023-01-01 | End: 2023-01-01

## 2023-01-01 RX ORDER — CALCIUM GLUCONATE 100 MG/ML
2 VIAL (ML) INTRAVENOUS ONCE
Refills: 0 | Status: COMPLETED | OUTPATIENT
Start: 2023-01-01 | End: 2023-01-01

## 2023-01-01 RX ORDER — SODIUM BICARBONATE 1 MEQ/ML
100 SYRINGE (ML) INTRAVENOUS ONCE
Refills: 0 | Status: DISCONTINUED | OUTPATIENT
Start: 2023-01-01 | End: 2023-01-01

## 2023-01-01 RX ORDER — TAMSULOSIN HYDROCHLORIDE 0.4 MG/1
0.4 CAPSULE ORAL AT BEDTIME
Refills: 0 | Status: DISCONTINUED | OUTPATIENT
Start: 2023-01-01 | End: 2023-01-01

## 2023-01-01 RX ORDER — DULAGLUTIDE 4.5 MG/.5ML
1.5 INJECTION, SOLUTION SUBCUTANEOUS
Refills: 0 | DISCHARGE

## 2023-01-01 RX ORDER — NOREPINEPHRINE BITARTRATE/D5W 8 MG/250ML
0.05 PLASTIC BAG, INJECTION (ML) INTRAVENOUS
Qty: 8 | Refills: 0 | Status: DISCONTINUED | OUTPATIENT
Start: 2023-01-01 | End: 2023-01-01

## 2023-01-01 RX ORDER — MIDAZOLAM HYDROCHLORIDE 1 MG/ML
2 INJECTION, SOLUTION INTRAMUSCULAR; INTRAVENOUS EVERY 4 HOURS
Refills: 0 | Status: DISCONTINUED | OUTPATIENT
Start: 2023-01-01 | End: 2023-01-01

## 2023-01-01 RX ORDER — CALCIUM CHLORIDE
1000 POWDER (GRAM) MISCELLANEOUS ONCE
Refills: 0 | Status: COMPLETED | OUTPATIENT
Start: 2023-01-01 | End: 2023-01-01

## 2023-01-01 RX ORDER — HUMAN INSULIN 100 [IU]/ML
10 INJECTION, SUSPENSION SUBCUTANEOUS EVERY 6 HOURS
Refills: 0 | Status: DISCONTINUED | OUTPATIENT
Start: 2023-01-01 | End: 2023-01-01

## 2023-01-01 RX ORDER — VANCOMYCIN HCL 1 G
1250 VIAL (EA) INTRAVENOUS ONCE
Refills: 0 | Status: COMPLETED | OUTPATIENT
Start: 2023-01-01 | End: 2023-01-01

## 2023-01-01 RX ORDER — PIPERACILLIN AND TAZOBACTAM 4; .5 G/20ML; G/20ML
4.5 INJECTION, POWDER, LYOPHILIZED, FOR SOLUTION INTRAVENOUS EVERY 8 HOURS
Refills: 0 | Status: COMPLETED | OUTPATIENT
Start: 2023-01-01 | End: 2023-01-01

## 2023-01-01 RX ORDER — DEXMEDETOMIDINE HYDROCHLORIDE IN 0.9% SODIUM CHLORIDE 4 UG/ML
0.2 INJECTION INTRAVENOUS
Qty: 400 | Refills: 0 | Status: DISCONTINUED | OUTPATIENT
Start: 2023-01-01 | End: 2023-01-01

## 2023-01-01 RX ORDER — MIDAZOLAM HYDROCHLORIDE 1 MG/ML
2 INJECTION, SOLUTION INTRAMUSCULAR; INTRAVENOUS ONCE
Refills: 0 | Status: DISCONTINUED | OUTPATIENT
Start: 2023-01-01 | End: 2023-01-01

## 2023-01-01 RX ORDER — HYDROMORPHONE HYDROCHLORIDE 2 MG/ML
0.75 INJECTION INTRAMUSCULAR; INTRAVENOUS; SUBCUTANEOUS EVERY 4 HOURS
Refills: 0 | Status: DISCONTINUED | OUTPATIENT
Start: 2023-01-01 | End: 2023-01-01

## 2023-01-01 RX ORDER — LABETALOL HCL 100 MG
200 TABLET ORAL
Refills: 0 | Status: DISCONTINUED | OUTPATIENT
Start: 2023-01-01 | End: 2023-01-01

## 2023-01-01 RX ORDER — BUMETANIDE 0.25 MG/ML
2 INJECTION INTRAMUSCULAR; INTRAVENOUS EVERY 12 HOURS
Refills: 0 | Status: DISCONTINUED | OUTPATIENT
Start: 2023-01-01 | End: 2023-01-01

## 2023-01-01 RX ORDER — DESMOPRESSIN ACETATE 0.1 MG/1
40 TABLET ORAL ONCE
Refills: 0 | Status: DISCONTINUED | OUTPATIENT
Start: 2023-01-01 | End: 2023-01-01

## 2023-01-01 RX ORDER — SODIUM CHLORIDE 9 MG/ML
500 INJECTION INTRAMUSCULAR; INTRAVENOUS; SUBCUTANEOUS ONCE
Refills: 0 | Status: COMPLETED | OUTPATIENT
Start: 2023-01-01 | End: 2023-01-01

## 2023-01-01 RX ORDER — HYDRALAZINE HCL 50 MG
100 TABLET ORAL
Refills: 0 | Status: DISCONTINUED | OUTPATIENT
Start: 2023-01-01 | End: 2023-01-01

## 2023-01-01 RX ORDER — CHLORHEXIDINE GLUCONATE 213 G/1000ML
1 SOLUTION TOPICAL
Refills: 0 | Status: DISCONTINUED | OUTPATIENT
Start: 2023-01-01 | End: 2023-01-01

## 2023-01-01 RX ORDER — PHENYLEPHRINE HYDROCHLORIDE 10 MG/ML
1000 INJECTION INTRAVENOUS ONCE
Refills: 0 | Status: DISCONTINUED | OUTPATIENT
Start: 2023-01-01 | End: 2023-01-01

## 2023-01-01 RX ORDER — VANCOMYCIN HCL 1 G
1250 VIAL (EA) INTRAVENOUS ONCE
Refills: 0 | Status: DISCONTINUED | OUTPATIENT
Start: 2023-01-01 | End: 2023-01-01

## 2023-01-01 RX ORDER — HUMAN INSULIN 100 [IU]/ML
16 INJECTION, SUSPENSION SUBCUTANEOUS EVERY 6 HOURS
Refills: 0 | Status: DISCONTINUED | OUTPATIENT
Start: 2023-01-01 | End: 2023-01-01

## 2023-01-01 RX ORDER — SIMETHICONE 80 MG/1
80 TABLET, CHEWABLE ORAL EVERY 8 HOURS
Refills: 0 | Status: DISCONTINUED | OUTPATIENT
Start: 2023-01-01 | End: 2023-01-01

## 2023-01-01 RX ORDER — DOXAZOSIN MESYLATE 4 MG
2 TABLET ORAL AT BEDTIME
Refills: 0 | Status: DISCONTINUED | OUTPATIENT
Start: 2023-01-01 | End: 2023-01-01

## 2023-01-01 RX ORDER — ATORVASTATIN CALCIUM 80 MG/1
40 TABLET, FILM COATED ORAL AT BEDTIME
Refills: 0 | Status: DISCONTINUED | OUTPATIENT
Start: 2023-01-01 | End: 2023-01-01

## 2023-01-01 RX ORDER — SENNA PLUS 8.6 MG/1
2 TABLET ORAL AT BEDTIME
Refills: 0 | Status: DISCONTINUED | OUTPATIENT
Start: 2023-01-01 | End: 2023-01-01

## 2023-01-01 RX ORDER — PROPOFOL 10 MG/ML
20 INJECTION, EMULSION INTRAVENOUS
Qty: 1000 | Refills: 0 | Status: DISCONTINUED | OUTPATIENT
Start: 2023-01-01 | End: 2023-01-01

## 2023-01-01 RX ORDER — CHOLECALCIFEROL (VITAMIN D3) 125 MCG
1 CAPSULE ORAL
Refills: 0 | DISCHARGE

## 2023-01-01 RX ORDER — HYDRALAZINE HCL 50 MG
50 TABLET ORAL EVERY 8 HOURS
Refills: 0 | Status: DISCONTINUED | OUTPATIENT
Start: 2023-01-01 | End: 2023-01-01

## 2023-01-01 RX ORDER — HYDRALAZINE HCL 50 MG
1 TABLET ORAL
Refills: 0 | DISCHARGE

## 2023-01-01 RX ORDER — POLYETHYLENE GLYCOL 3350 17 G/17G
17 POWDER, FOR SOLUTION ORAL
Refills: 0 | Status: DISCONTINUED | OUTPATIENT
Start: 2023-01-01 | End: 2023-01-01

## 2023-01-01 RX ORDER — INSULIN HUMAN 100 [IU]/ML
2 INJECTION, SOLUTION SUBCUTANEOUS
Qty: 50 | Refills: 0 | Status: DISCONTINUED | OUTPATIENT
Start: 2023-01-01 | End: 2023-01-01

## 2023-01-01 RX ORDER — LACOSAMIDE 50 MG/1
200 TABLET ORAL EVERY 12 HOURS
Refills: 0 | Status: DISCONTINUED | OUTPATIENT
Start: 2023-01-01 | End: 2023-01-01

## 2023-01-01 RX ORDER — VANCOMYCIN HCL 1 G
1000 VIAL (EA) INTRAVENOUS ONCE
Refills: 0 | Status: COMPLETED | OUTPATIENT
Start: 2023-01-01 | End: 2023-01-01

## 2023-01-01 RX ORDER — GLUCAGON INJECTION, SOLUTION 0.5 MG/.1ML
1 INJECTION, SOLUTION SUBCUTANEOUS ONCE
Refills: 0 | Status: DISCONTINUED | OUTPATIENT
Start: 2023-01-01 | End: 2023-01-01

## 2023-01-01 RX ORDER — HEPARIN SODIUM 5000 [USP'U]/ML
7500 INJECTION INTRAVENOUS; SUBCUTANEOUS EVERY 8 HOURS
Refills: 0 | Status: DISCONTINUED | OUTPATIENT
Start: 2023-01-01 | End: 2023-01-01

## 2023-01-01 RX ORDER — ETOMIDATE 2 MG/ML
20 INJECTION INTRAVENOUS ONCE
Refills: 0 | Status: COMPLETED | OUTPATIENT
Start: 2023-01-01 | End: 2023-01-01

## 2023-01-01 RX ORDER — AMLODIPINE BESYLATE 2.5 MG/1
10 TABLET ORAL DAILY
Refills: 0 | Status: DISCONTINUED | OUTPATIENT
Start: 2023-01-01 | End: 2023-01-01

## 2023-01-01 RX ORDER — MAGNESIUM HYDROXIDE 400 MG/1
30 TABLET, CHEWABLE ORAL EVERY 8 HOURS
Refills: 0 | Status: COMPLETED | OUTPATIENT
Start: 2023-01-01 | End: 2023-01-01

## 2023-01-01 RX ORDER — CISATRACURIUM BESYLATE 2 MG/ML
3 INJECTION INTRAVENOUS
Qty: 200 | Refills: 0 | Status: DISCONTINUED | OUTPATIENT
Start: 2023-01-01 | End: 2023-01-01

## 2023-01-01 RX ORDER — METOPROLOL TARTRATE 50 MG
2.5 TABLET ORAL ONCE
Refills: 0 | Status: COMPLETED | OUTPATIENT
Start: 2023-01-01 | End: 2023-01-01

## 2023-01-01 RX ORDER — ALBUMIN HUMAN 25 %
50 VIAL (ML) INTRAVENOUS EVERY 6 HOURS
Refills: 0 | Status: COMPLETED | OUTPATIENT
Start: 2023-01-01 | End: 2023-01-01

## 2023-01-01 RX ORDER — ANDEXANET ALFA 200 MG/20ML
480 INJECTION, POWDER, LYOPHILIZED, FOR SOLUTION INTRAVENOUS ONCE
Refills: 0 | Status: COMPLETED | OUTPATIENT
Start: 2023-01-01 | End: 2023-01-01

## 2023-01-01 RX ORDER — LABETALOL HCL 100 MG
200 TABLET ORAL EVERY 12 HOURS
Refills: 0 | Status: DISCONTINUED | OUTPATIENT
Start: 2023-01-01 | End: 2023-01-01

## 2023-01-01 RX ORDER — ONDANSETRON 8 MG/1
4 TABLET, FILM COATED ORAL EVERY 6 HOURS
Refills: 0 | Status: DISCONTINUED | OUTPATIENT
Start: 2023-01-01 | End: 2023-01-01

## 2023-01-01 RX ORDER — DESMOPRESSIN ACETATE 0.1 MG/1
40 TABLET ORAL ONCE
Refills: 0 | Status: COMPLETED | OUTPATIENT
Start: 2023-01-01 | End: 2023-01-01

## 2023-01-01 RX ORDER — ANDEXANET ALFA 200 MG/20ML
480 INJECTION, POWDER, LYOPHILIZED, FOR SOLUTION INTRAVENOUS ONCE
Refills: 0 | Status: DISCONTINUED | OUTPATIENT
Start: 2023-01-01 | End: 2023-01-01

## 2023-01-01 RX ORDER — TRAMADOL HYDROCHLORIDE 50 MG/1
50 TABLET ORAL ONCE
Refills: 0 | Status: DISCONTINUED | OUTPATIENT
Start: 2023-01-01 | End: 2023-01-01

## 2023-01-01 RX ORDER — ATORVASTATIN CALCIUM 80 MG/1
1 TABLET, FILM COATED ORAL
Refills: 0 | DISCHARGE

## 2023-01-01 RX ORDER — DEXTROSE 10 % IN WATER 10 %
1000 INTRAVENOUS SOLUTION INTRAVENOUS
Refills: 0 | Status: DISCONTINUED | OUTPATIENT
Start: 2023-01-01 | End: 2023-01-01

## 2023-01-01 RX ORDER — ANDEXANET ALFA 200 MG/20ML
400 INJECTION, POWDER, LYOPHILIZED, FOR SOLUTION INTRAVENOUS ONCE
Refills: 0 | Status: DISCONTINUED | OUTPATIENT
Start: 2023-01-01 | End: 2023-01-01

## 2023-01-01 RX ORDER — LACTULOSE 10 G/15ML
10 SOLUTION ORAL ONCE
Refills: 0 | Status: COMPLETED | OUTPATIENT
Start: 2023-01-01 | End: 2023-01-01

## 2023-01-01 RX ORDER — FENTANYL CITRATE 50 UG/ML
12.5 INJECTION INTRAVENOUS EVERY 4 HOURS
Refills: 0 | Status: DISCONTINUED | OUTPATIENT
Start: 2023-01-01 | End: 2023-01-01

## 2023-01-01 RX ORDER — INSULIN HUMAN 100 [IU]/ML
5 INJECTION, SOLUTION SUBCUTANEOUS
Qty: 50 | Refills: 0 | Status: DISCONTINUED | OUTPATIENT
Start: 2023-01-01 | End: 2023-01-01

## 2023-01-01 RX ORDER — LACOSAMIDE 50 MG/1
100 TABLET ORAL EVERY 12 HOURS
Refills: 0 | Status: DISCONTINUED | OUTPATIENT
Start: 2023-01-01 | End: 2023-01-01

## 2023-01-01 RX ORDER — PANTOPRAZOLE SODIUM 20 MG/1
40 TABLET, DELAYED RELEASE ORAL EVERY 12 HOURS
Refills: 0 | Status: DISCONTINUED | OUTPATIENT
Start: 2023-01-01 | End: 2023-01-01

## 2023-01-01 RX ORDER — FUROSEMIDE 40 MG
20 TABLET ORAL ONCE
Refills: 0 | Status: COMPLETED | OUTPATIENT
Start: 2023-01-01 | End: 2023-01-01

## 2023-01-01 RX ORDER — HYDRALAZINE HCL 50 MG
50 TABLET ORAL
Refills: 0 | Status: DISCONTINUED | OUTPATIENT
Start: 2023-01-01 | End: 2023-01-01

## 2023-01-01 RX ORDER — INSULIN HUMAN 100 [IU]/ML
2 INJECTION, SOLUTION SUBCUTANEOUS
Qty: 100 | Refills: 0 | Status: DISCONTINUED | OUTPATIENT
Start: 2023-01-01 | End: 2023-01-01

## 2023-01-01 RX ORDER — MEROPENEM 1 G/30ML
1000 INJECTION INTRAVENOUS ONCE
Refills: 0 | Status: COMPLETED | OUTPATIENT
Start: 2023-01-01 | End: 2023-01-01

## 2023-01-01 RX ORDER — HYDRALAZINE HCL 50 MG
10 TABLET ORAL
Refills: 0 | Status: DISCONTINUED | OUTPATIENT
Start: 2023-01-01 | End: 2023-01-01

## 2023-01-01 RX ORDER — HYDRALAZINE HCL 50 MG
50 TABLET ORAL ONCE
Refills: 0 | Status: DISCONTINUED | OUTPATIENT
Start: 2023-01-01 | End: 2023-01-01

## 2023-01-01 RX ORDER — HUMAN INSULIN 100 [IU]/ML
25 INJECTION, SUSPENSION SUBCUTANEOUS EVERY 6 HOURS
Refills: 0 | Status: DISCONTINUED | OUTPATIENT
Start: 2023-01-01 | End: 2023-01-01

## 2023-01-01 RX ORDER — DEXTROSE 50 % IN WATER 50 %
15 SYRINGE (ML) INTRAVENOUS ONCE
Refills: 0 | Status: DISCONTINUED | OUTPATIENT
Start: 2023-01-01 | End: 2023-01-01

## 2023-01-01 RX ORDER — AMLODIPINE BESYLATE 2.5 MG/1
5 TABLET ORAL DAILY
Refills: 0 | Status: DISCONTINUED | OUTPATIENT
Start: 2023-01-01 | End: 2023-01-01

## 2023-01-01 RX ORDER — METOPROLOL TARTRATE 50 MG
2.5 TABLET ORAL EVERY 6 HOURS
Refills: 0 | Status: DISCONTINUED | OUTPATIENT
Start: 2023-01-01 | End: 2023-01-01

## 2023-01-01 RX ORDER — PANTOPRAZOLE SODIUM 20 MG/1
80 TABLET, DELAYED RELEASE ORAL ONCE
Refills: 0 | Status: COMPLETED | OUTPATIENT
Start: 2023-01-01 | End: 2023-01-01

## 2023-01-01 RX ADMIN — SODIUM CHLORIDE 500 MILLILITER(S): 9 INJECTION, SOLUTION INTRAVENOUS at 13:02

## 2023-01-01 RX ADMIN — PHENYLEPHRINE HYDROCHLORIDE 1000 MICROGRAM(S): 10 INJECTION INTRAVENOUS at 12:35

## 2023-01-01 RX ADMIN — HEPARIN SODIUM 7500 UNIT(S): 5000 INJECTION INTRAVENOUS; SUBCUTANEOUS at 17:01

## 2023-01-01 RX ADMIN — Medication 200 MILLIGRAM(S): at 23:40

## 2023-01-01 RX ADMIN — Medication 1 TABLET(S): at 14:12

## 2023-01-01 RX ADMIN — Medication 100 MILLIGRAM(S): at 15:34

## 2023-01-01 RX ADMIN — Medication 50 MILLIEQUIVALENT(S): at 14:08

## 2023-01-01 RX ADMIN — Medication 0.2 MILLIGRAM(S): at 21:58

## 2023-01-01 RX ADMIN — Medication 2: at 11:10

## 2023-01-01 RX ADMIN — FENTANYL CITRATE 50 MICROGRAM(S): 50 INJECTION INTRAVENOUS at 08:00

## 2023-01-01 RX ADMIN — BUMETANIDE 10 MG/HR: 0.25 INJECTION INTRAMUSCULAR; INTRAVENOUS at 22:39

## 2023-01-01 RX ADMIN — Medication 2: at 11:26

## 2023-01-01 RX ADMIN — CHLORHEXIDINE GLUCONATE 15 MILLILITER(S): 213 SOLUTION TOPICAL at 17:45

## 2023-01-01 RX ADMIN — Medication 2.5 MILLIGRAM(S): at 21:30

## 2023-01-01 RX ADMIN — HUMAN INSULIN 16 UNIT(S): 100 INJECTION, SUSPENSION SUBCUTANEOUS at 18:01

## 2023-01-01 RX ADMIN — CHLORHEXIDINE GLUCONATE 15 MILLILITER(S): 213 SOLUTION TOPICAL at 05:08

## 2023-01-01 RX ADMIN — SENNA PLUS 2 TABLET(S): 8.6 TABLET ORAL at 22:13

## 2023-01-01 RX ADMIN — ALBUTEROL 2.5 MILLIGRAM(S): 90 AEROSOL, METERED ORAL at 00:10

## 2023-01-01 RX ADMIN — HEPARIN SODIUM 7500 UNIT(S): 5000 INJECTION INTRAVENOUS; SUBCUTANEOUS at 23:33

## 2023-01-01 RX ADMIN — Medication 4: at 23:52

## 2023-01-01 RX ADMIN — NICARDIPINE HYDROCHLORIDE 25 MG/HR: 30 CAPSULE, EXTENDED RELEASE ORAL at 02:37

## 2023-01-01 RX ADMIN — Medication 100 MILLIEQUIVALENT(S): at 08:32

## 2023-01-01 RX ADMIN — LACOSAMIDE 100 MILLIGRAM(S): 50 TABLET ORAL at 18:51

## 2023-01-01 RX ADMIN — SODIUM CHLORIDE 250 MILLILITER(S): 9 INJECTION, SOLUTION INTRAVENOUS at 05:15

## 2023-01-01 RX ADMIN — Medication 4: at 01:15

## 2023-01-01 RX ADMIN — Medication 2: at 18:09

## 2023-01-01 RX ADMIN — Medication 0.2 MILLIGRAM(S): at 13:03

## 2023-01-01 RX ADMIN — HUMAN INSULIN 12 UNIT(S): 100 INJECTION, SUSPENSION SUBCUTANEOUS at 12:31

## 2023-01-01 RX ADMIN — Medication 50 MILLIEQUIVALENT(S): at 03:25

## 2023-01-01 RX ADMIN — HYDROMORPHONE HYDROCHLORIDE 0.5 MILLIGRAM(S): 2 INJECTION INTRAMUSCULAR; INTRAVENOUS; SUBCUTANEOUS at 22:17

## 2023-01-01 RX ADMIN — Medication 650 MILLIGRAM(S): at 12:26

## 2023-01-01 RX ADMIN — Medication 200 MILLIGRAM(S): at 18:12

## 2023-01-01 RX ADMIN — MAGNESIUM HYDROXIDE 30 MILLILITER(S): 400 TABLET, CHEWABLE ORAL at 05:07

## 2023-01-01 RX ADMIN — SIMETHICONE 80 MILLIGRAM(S): 80 TABLET, CHEWABLE ORAL at 06:20

## 2023-01-01 RX ADMIN — ALBUTEROL 2.5 MILLIGRAM(S): 90 AEROSOL, METERED ORAL at 04:21

## 2023-01-01 RX ADMIN — HYDROMORPHONE HYDROCHLORIDE 0.5 MILLIGRAM(S): 2 INJECTION INTRAMUSCULAR; INTRAVENOUS; SUBCUTANEOUS at 00:45

## 2023-01-01 RX ADMIN — Medication 2: at 13:41

## 2023-01-01 RX ADMIN — Medication 11.9 MICROGRAM(S)/KG/MIN: at 03:35

## 2023-01-01 RX ADMIN — HEPARIN SODIUM 7500 UNIT(S): 5000 INJECTION INTRAVENOUS; SUBCUTANEOUS at 21:58

## 2023-01-01 RX ADMIN — Medication 100 MILLIGRAM(S): at 16:30

## 2023-01-01 RX ADMIN — SENNA PLUS 2 TABLET(S): 8.6 TABLET ORAL at 17:46

## 2023-01-01 RX ADMIN — Medication 50 MILLIGRAM(S): at 17:26

## 2023-01-01 RX ADMIN — ANDEXANET ALFA 184.62 MILLIGRAM(S): 200 INJECTION, POWDER, LYOPHILIZED, FOR SOLUTION INTRAVENOUS at 05:04

## 2023-01-01 RX ADMIN — Medication 50 MILLIGRAM(S): at 17:28

## 2023-01-01 RX ADMIN — HUMAN INSULIN 3 UNIT(S): 100 INJECTION, SUSPENSION SUBCUTANEOUS at 06:26

## 2023-01-01 RX ADMIN — PIPERACILLIN AND TAZOBACTAM 25 GRAM(S): 4; .5 INJECTION, POWDER, LYOPHILIZED, FOR SOLUTION INTRAVENOUS at 14:31

## 2023-01-01 RX ADMIN — MEROPENEM 100 MILLIGRAM(S): 1 INJECTION INTRAVENOUS at 05:23

## 2023-01-01 RX ADMIN — NICARDIPINE HYDROCHLORIDE 25 MG/HR: 30 CAPSULE, EXTENDED RELEASE ORAL at 21:19

## 2023-01-01 RX ADMIN — PANTOPRAZOLE SODIUM 40 MILLIGRAM(S): 20 TABLET, DELAYED RELEASE ORAL at 12:27

## 2023-01-01 RX ADMIN — ALBUTEROL 2.5 MILLIGRAM(S): 90 AEROSOL, METERED ORAL at 04:18

## 2023-01-01 RX ADMIN — POLYETHYLENE GLYCOL 3350 17 GRAM(S): 17 POWDER, FOR SOLUTION ORAL at 17:19

## 2023-01-01 RX ADMIN — BUMETANIDE 2 MILLIGRAM(S): 0.25 INJECTION INTRAMUSCULAR; INTRAVENOUS at 17:18

## 2023-01-01 RX ADMIN — PIPERACILLIN AND TAZOBACTAM 25 GRAM(S): 4; .5 INJECTION, POWDER, LYOPHILIZED, FOR SOLUTION INTRAVENOUS at 14:42

## 2023-01-01 RX ADMIN — Medication 2: at 00:12

## 2023-01-01 RX ADMIN — Medication 2: at 05:38

## 2023-01-01 RX ADMIN — Medication 0.2 MILLIGRAM(S): at 11:52

## 2023-01-01 RX ADMIN — Medication 1000 MILLIGRAM(S): at 15:00

## 2023-01-01 RX ADMIN — FENTANYL CITRATE 50 MICROGRAM(S): 50 INJECTION INTRAVENOUS at 08:31

## 2023-01-01 RX ADMIN — HYDROMORPHONE HYDROCHLORIDE 0.25 MILLIGRAM(S): 2 INJECTION INTRAMUSCULAR; INTRAVENOUS; SUBCUTANEOUS at 18:50

## 2023-01-01 RX ADMIN — Medication 50 MILLIEQUIVALENT(S): at 11:35

## 2023-01-01 RX ADMIN — Medication 4 MILLILITER(S): at 20:11

## 2023-01-01 RX ADMIN — Medication 2: at 18:22

## 2023-01-01 RX ADMIN — HEPARIN SODIUM 7500 UNIT(S): 5000 INJECTION INTRAVENOUS; SUBCUTANEOUS at 21:43

## 2023-01-01 RX ADMIN — ETOMIDATE 20 MILLIGRAM(S): 2 INJECTION INTRAVENOUS at 19:00

## 2023-01-01 RX ADMIN — HUMAN INSULIN 6 UNIT(S): 100 INJECTION, SUSPENSION SUBCUTANEOUS at 01:16

## 2023-01-01 RX ADMIN — PIPERACILLIN AND TAZOBACTAM 25 GRAM(S): 4; .5 INJECTION, POWDER, LYOPHILIZED, FOR SOLUTION INTRAVENOUS at 23:44

## 2023-01-01 RX ADMIN — MEROPENEM 100 MILLIGRAM(S): 1 INJECTION INTRAVENOUS at 17:54

## 2023-01-01 RX ADMIN — HUMAN INSULIN 18 UNIT(S): 100 INJECTION, SUSPENSION SUBCUTANEOUS at 11:52

## 2023-01-01 RX ADMIN — HUMAN INSULIN 8 UNIT(S): 100 INJECTION, SUSPENSION SUBCUTANEOUS at 13:21

## 2023-01-01 RX ADMIN — FENTANYL CITRATE 50 MICROGRAM(S): 50 INJECTION INTRAVENOUS at 20:04

## 2023-01-01 RX ADMIN — Medication 4 MILLILITER(S): at 12:52

## 2023-01-01 RX ADMIN — HEPARIN SODIUM 7500 UNIT(S): 5000 INJECTION INTRAVENOUS; SUBCUTANEOUS at 21:52

## 2023-01-01 RX ADMIN — HEPARIN SODIUM 7500 UNIT(S): 5000 INJECTION INTRAVENOUS; SUBCUTANEOUS at 22:38

## 2023-01-01 RX ADMIN — CHLORHEXIDINE GLUCONATE 1 APPLICATION(S): 213 SOLUTION TOPICAL at 05:07

## 2023-01-01 RX ADMIN — HEPARIN SODIUM 7500 UNIT(S): 5000 INJECTION INTRAVENOUS; SUBCUTANEOUS at 13:04

## 2023-01-01 RX ADMIN — ALBUTEROL 2.5 MILLIGRAM(S): 90 AEROSOL, METERED ORAL at 23:17

## 2023-01-01 RX ADMIN — HYDROMORPHONE HYDROCHLORIDE 0.5 MILLIGRAM(S): 2 INJECTION INTRAMUSCULAR; INTRAVENOUS; SUBCUTANEOUS at 05:28

## 2023-01-01 RX ADMIN — Medication 50 MILLIEQUIVALENT(S): at 10:39

## 2023-01-01 RX ADMIN — HEPARIN SODIUM 7500 UNIT(S): 5000 INJECTION INTRAVENOUS; SUBCUTANEOUS at 14:22

## 2023-01-01 RX ADMIN — HYDROMORPHONE HYDROCHLORIDE 0.25 MILLIGRAM(S): 2 INJECTION INTRAMUSCULAR; INTRAVENOUS; SUBCUTANEOUS at 17:49

## 2023-01-01 RX ADMIN — CHLORHEXIDINE GLUCONATE 15 MILLILITER(S): 213 SOLUTION TOPICAL at 17:32

## 2023-01-01 RX ADMIN — ALBUTEROL 2.5 MILLIGRAM(S): 90 AEROSOL, METERED ORAL at 20:18

## 2023-01-01 RX ADMIN — Medication 6: at 06:38

## 2023-01-01 RX ADMIN — PIPERACILLIN AND TAZOBACTAM 25 GRAM(S): 4; .5 INJECTION, POWDER, LYOPHILIZED, FOR SOLUTION INTRAVENOUS at 13:16

## 2023-01-01 RX ADMIN — LACOSAMIDE 100 MILLIGRAM(S): 50 TABLET ORAL at 05:40

## 2023-01-01 RX ADMIN — CHLORHEXIDINE GLUCONATE 1 APPLICATION(S): 213 SOLUTION TOPICAL at 06:23

## 2023-01-01 RX ADMIN — Medication 0: at 12:06

## 2023-01-01 RX ADMIN — Medication 2 MILLIGRAM(S): at 22:38

## 2023-01-01 RX ADMIN — LACOSAMIDE 140 MILLIGRAM(S): 50 TABLET ORAL at 05:50

## 2023-01-01 RX ADMIN — Medication 50 MILLILITER(S): at 12:41

## 2023-01-01 RX ADMIN — NICARDIPINE HYDROCHLORIDE 25 MG/HR: 30 CAPSULE, EXTENDED RELEASE ORAL at 20:20

## 2023-01-01 RX ADMIN — ALBUTEROL 2.5 MILLIGRAM(S): 90 AEROSOL, METERED ORAL at 16:04

## 2023-01-01 RX ADMIN — Medication 50 MILLILITER(S): at 06:20

## 2023-01-01 RX ADMIN — FENTANYL CITRATE 50 MICROGRAM(S): 50 INJECTION INTRAVENOUS at 04:00

## 2023-01-01 RX ADMIN — HYDROMORPHONE HYDROCHLORIDE 0.5 MILLIGRAM(S): 2 INJECTION INTRAMUSCULAR; INTRAVENOUS; SUBCUTANEOUS at 05:35

## 2023-01-01 RX ADMIN — Medication 4: at 07:03

## 2023-01-01 RX ADMIN — PIPERACILLIN AND TAZOBACTAM 200 GRAM(S): 4; .5 INJECTION, POWDER, LYOPHILIZED, FOR SOLUTION INTRAVENOUS at 17:00

## 2023-01-01 RX ADMIN — HYDROMORPHONE HYDROCHLORIDE 0.5 MILLIGRAM(S): 2 INJECTION INTRAMUSCULAR; INTRAVENOUS; SUBCUTANEOUS at 03:31

## 2023-01-01 RX ADMIN — SENNA PLUS 2 TABLET(S): 8.6 TABLET ORAL at 05:06

## 2023-01-01 RX ADMIN — HUMAN INSULIN 10 UNIT(S): 100 INJECTION, SUSPENSION SUBCUTANEOUS at 06:19

## 2023-01-01 RX ADMIN — Medication 4 MILLILITER(S): at 04:18

## 2023-01-01 RX ADMIN — Medication 6: at 00:17

## 2023-01-01 RX ADMIN — Medication 2 MILLIGRAM(S): at 21:52

## 2023-01-01 RX ADMIN — AMIODARONE HYDROCHLORIDE 600 MILLIGRAM(S): 400 TABLET ORAL at 15:17

## 2023-01-01 RX ADMIN — PHENYLEPHRINE HYDROCHLORIDE 2.38 MICROGRAM(S)/KG/MIN: 10 INJECTION INTRAVENOUS at 15:35

## 2023-01-01 RX ADMIN — LACOSAMIDE 100 MILLIGRAM(S): 50 TABLET ORAL at 06:25

## 2023-01-01 RX ADMIN — SIMETHICONE 80 MILLIGRAM(S): 80 TABLET, CHEWABLE ORAL at 13:28

## 2023-01-01 RX ADMIN — HUMAN INSULIN 6 UNIT(S): 100 INJECTION, SUSPENSION SUBCUTANEOUS at 05:49

## 2023-01-01 RX ADMIN — Medication 50 MILLIEQUIVALENT(S): at 09:38

## 2023-01-01 RX ADMIN — Medication 200 MILLIGRAM(S): at 22:38

## 2023-01-01 RX ADMIN — PHENYLEPHRINE HYDROCHLORIDE 1000 MICROGRAM(S): 10 INJECTION INTRAVENOUS at 15:17

## 2023-01-01 RX ADMIN — FENTANYL CITRATE 6.36 MICROGRAM(S)/KG/HR: 50 INJECTION INTRAVENOUS at 22:39

## 2023-01-01 RX ADMIN — SENNA PLUS 2 TABLET(S): 8.6 TABLET ORAL at 06:19

## 2023-01-01 RX ADMIN — PIPERACILLIN AND TAZOBACTAM 25 GRAM(S): 4; .5 INJECTION, POWDER, LYOPHILIZED, FOR SOLUTION INTRAVENOUS at 06:29

## 2023-01-01 RX ADMIN — HUMAN INSULIN 3 UNIT(S): 100 INJECTION, SUSPENSION SUBCUTANEOUS at 17:43

## 2023-01-01 RX ADMIN — BUMETANIDE 2 MILLIGRAM(S): 0.25 INJECTION INTRAMUSCULAR; INTRAVENOUS at 12:29

## 2023-01-01 RX ADMIN — CHLORHEXIDINE GLUCONATE 15 MILLILITER(S): 213 SOLUTION TOPICAL at 05:59

## 2023-01-01 RX ADMIN — PIPERACILLIN AND TAZOBACTAM 25 GRAM(S): 4; .5 INJECTION, POWDER, LYOPHILIZED, FOR SOLUTION INTRAVENOUS at 13:28

## 2023-01-01 RX ADMIN — Medication 1 APPLICATION(S): at 18:00

## 2023-01-01 RX ADMIN — LACOSAMIDE 140 MILLIGRAM(S): 50 TABLET ORAL at 17:54

## 2023-01-01 RX ADMIN — DEXMEDETOMIDINE HYDROCHLORIDE IN 0.9% SODIUM CHLORIDE 6.36 MICROGRAM(S)/KG/HR: 4 INJECTION INTRAVENOUS at 22:25

## 2023-01-01 RX ADMIN — ALBUTEROL 2.5 MILLIGRAM(S): 90 AEROSOL, METERED ORAL at 13:39

## 2023-01-01 RX ADMIN — Medication 400 MILLIGRAM(S): at 14:45

## 2023-01-01 RX ADMIN — Medication 4 MILLILITER(S): at 00:11

## 2023-01-01 RX ADMIN — Medication 6: at 06:19

## 2023-01-01 RX ADMIN — LACTULOSE 200 GRAM(S): 10 SOLUTION ORAL at 12:00

## 2023-01-01 RX ADMIN — CHLORHEXIDINE GLUCONATE 15 MILLILITER(S): 213 SOLUTION TOPICAL at 17:19

## 2023-01-01 RX ADMIN — HUMAN INSULIN 3 UNIT(S): 100 INJECTION, SUSPENSION SUBCUTANEOUS at 17:31

## 2023-01-01 RX ADMIN — Medication 650 MILLIGRAM(S): at 03:30

## 2023-01-01 RX ADMIN — Medication 75 MILLIGRAM(S): at 14:43

## 2023-01-01 RX ADMIN — Medication 100 MILLIGRAM(S): at 10:30

## 2023-01-01 RX ADMIN — HUMAN INSULIN 6 UNIT(S): 100 INJECTION, SUSPENSION SUBCUTANEOUS at 17:30

## 2023-01-01 RX ADMIN — Medication 1 APPLICATION(S): at 05:20

## 2023-01-01 RX ADMIN — PHENYLEPHRINE HYDROCHLORIDE 1000 MICROGRAM(S): 10 INJECTION INTRAVENOUS at 12:00

## 2023-01-01 RX ADMIN — Medication 650 MILLIGRAM(S): at 12:00

## 2023-01-01 RX ADMIN — Medication 200 MILLIGRAM(S): at 00:10

## 2023-01-01 RX ADMIN — ALBUTEROL 2.5 MILLIGRAM(S): 90 AEROSOL, METERED ORAL at 15:09

## 2023-01-01 RX ADMIN — Medication 650 MILLIGRAM(S): at 22:35

## 2023-01-01 RX ADMIN — PANTOPRAZOLE SODIUM 40 MILLIGRAM(S): 20 TABLET, DELAYED RELEASE ORAL at 13:04

## 2023-01-01 RX ADMIN — LACOSAMIDE 140 MILLIGRAM(S): 50 TABLET ORAL at 05:27

## 2023-01-01 RX ADMIN — CHLORHEXIDINE GLUCONATE 15 MILLILITER(S): 213 SOLUTION TOPICAL at 05:28

## 2023-01-01 RX ADMIN — Medication 10 MILLIGRAM(S): at 11:15

## 2023-01-01 RX ADMIN — CHLORHEXIDINE GLUCONATE 1 APPLICATION(S): 213 SOLUTION TOPICAL at 05:58

## 2023-01-01 RX ADMIN — ALBUTEROL 2.5 MILLIGRAM(S): 90 AEROSOL, METERED ORAL at 07:48

## 2023-01-01 RX ADMIN — Medication 10 MILLIGRAM(S): at 22:30

## 2023-01-01 RX ADMIN — PANTOPRAZOLE SODIUM 10 MG/HR: 20 TABLET, DELAYED RELEASE ORAL at 05:27

## 2023-01-01 RX ADMIN — Medication 6: at 18:09

## 2023-01-01 RX ADMIN — Medication 75 MILLIGRAM(S): at 21:57

## 2023-01-01 RX ADMIN — HEPARIN SODIUM 7500 UNIT(S): 5000 INJECTION INTRAVENOUS; SUBCUTANEOUS at 13:23

## 2023-01-01 RX ADMIN — HEPARIN SODIUM 7500 UNIT(S): 5000 INJECTION INTRAVENOUS; SUBCUTANEOUS at 22:40

## 2023-01-01 RX ADMIN — Medication 10 MILLIGRAM(S): at 11:00

## 2023-01-01 RX ADMIN — CHLORHEXIDINE GLUCONATE 1 APPLICATION(S): 213 SOLUTION TOPICAL at 05:49

## 2023-01-01 RX ADMIN — POLYETHYLENE GLYCOL 3350 17 GRAM(S): 17 POWDER, FOR SOLUTION ORAL at 06:17

## 2023-01-01 RX ADMIN — FENTANYL CITRATE 100 MICROGRAM(S): 50 INJECTION INTRAVENOUS at 18:59

## 2023-01-01 RX ADMIN — Medication 10 MILLIGRAM(S): at 16:10

## 2023-01-01 RX ADMIN — Medication 100 MILLIGRAM(S): at 21:10

## 2023-01-01 RX ADMIN — Medication 4 MILLILITER(S): at 02:23

## 2023-01-01 RX ADMIN — POLYETHYLENE GLYCOL 3350 17 GRAM(S): 17 POWDER, FOR SOLUTION ORAL at 17:32

## 2023-01-01 RX ADMIN — PHENYLEPHRINE HYDROCHLORIDE 1000 MICROGRAM(S): 10 INJECTION INTRAVENOUS at 09:35

## 2023-01-01 RX ADMIN — FENTANYL CITRATE 50 MICROGRAM(S): 50 INJECTION INTRAVENOUS at 19:49

## 2023-01-01 RX ADMIN — Medication 1 TABLET(S): at 13:18

## 2023-01-01 RX ADMIN — Medication 40 MILLIGRAM(S): at 18:28

## 2023-01-01 RX ADMIN — Medication 650 MILLIGRAM(S): at 01:00

## 2023-01-01 RX ADMIN — Medication 4: at 06:26

## 2023-01-01 RX ADMIN — DESMOPRESSIN ACETATE 240 MICROGRAM(S): 0.1 TABLET ORAL at 07:32

## 2023-01-01 RX ADMIN — TRAMADOL HYDROCHLORIDE 50 MILLIGRAM(S): 50 TABLET ORAL at 04:30

## 2023-01-01 RX ADMIN — Medication 50 MILLILITER(S): at 15:20

## 2023-01-01 RX ADMIN — Medication 50 MILLIGRAM(S): at 12:24

## 2023-01-01 RX ADMIN — Medication 200 MILLIGRAM(S): at 23:04

## 2023-01-01 RX ADMIN — HUMAN INSULIN 6 UNIT(S): 100 INJECTION, SUSPENSION SUBCUTANEOUS at 13:08

## 2023-01-01 RX ADMIN — Medication 50 MILLIGRAM(S): at 23:37

## 2023-01-01 RX ADMIN — HYDROMORPHONE HYDROCHLORIDE 0.5 MILLIGRAM(S): 2 INJECTION INTRAMUSCULAR; INTRAVENOUS; SUBCUTANEOUS at 20:18

## 2023-01-01 RX ADMIN — HEPARIN SODIUM 7500 UNIT(S): 5000 INJECTION INTRAVENOUS; SUBCUTANEOUS at 23:39

## 2023-01-01 RX ADMIN — ALBUTEROL 2.5 MILLIGRAM(S): 90 AEROSOL, METERED ORAL at 20:17

## 2023-01-01 RX ADMIN — FENTANYL CITRATE 6.36 MICROGRAM(S)/KG/HR: 50 INJECTION INTRAVENOUS at 08:00

## 2023-01-01 RX ADMIN — Medication 4 MILLILITER(S): at 04:22

## 2023-01-01 RX ADMIN — POLYETHYLENE GLYCOL 3350 17 GRAM(S): 17 POWDER, FOR SOLUTION ORAL at 05:06

## 2023-01-01 RX ADMIN — Medication 650 MILLIGRAM(S): at 19:49

## 2023-01-01 RX ADMIN — Medication 4 MILLILITER(S): at 23:17

## 2023-01-01 RX ADMIN — Medication 2 MILLIGRAM(S): at 23:33

## 2023-01-01 RX ADMIN — PANTOPRAZOLE SODIUM 40 MILLIGRAM(S): 20 TABLET, DELAYED RELEASE ORAL at 11:52

## 2023-01-01 RX ADMIN — NICARDIPINE HYDROCHLORIDE 25 MG/HR: 30 CAPSULE, EXTENDED RELEASE ORAL at 04:32

## 2023-01-01 RX ADMIN — CHLORHEXIDINE GLUCONATE 1 APPLICATION(S): 213 SOLUTION TOPICAL at 05:06

## 2023-01-01 RX ADMIN — ANDEXANET ALFA 24 MILLIGRAM(S): 200 INJECTION, POWDER, LYOPHILIZED, FOR SOLUTION INTRAVENOUS at 05:03

## 2023-01-01 RX ADMIN — ALBUTEROL 2.5 MILLIGRAM(S): 90 AEROSOL, METERED ORAL at 08:27

## 2023-01-01 RX ADMIN — Medication 6: at 13:21

## 2023-01-01 RX ADMIN — SODIUM CHLORIDE 50 MILLILITER(S): 9 INJECTION, SOLUTION INTRAVENOUS at 15:19

## 2023-01-01 RX ADMIN — Medication 100 MILLIGRAM(S): at 05:18

## 2023-01-01 RX ADMIN — Medication 4: at 12:26

## 2023-01-01 RX ADMIN — LACOSAMIDE 140 MILLIGRAM(S): 50 TABLET ORAL at 05:07

## 2023-01-01 RX ADMIN — HEPARIN SODIUM 7500 UNIT(S): 5000 INJECTION INTRAVENOUS; SUBCUTANEOUS at 05:05

## 2023-01-01 RX ADMIN — Medication 8: at 05:31

## 2023-01-01 RX ADMIN — Medication 1000 MILLIGRAM(S): at 11:24

## 2023-01-01 RX ADMIN — HUMAN INSULIN 6 UNIT(S): 100 INJECTION, SUSPENSION SUBCUTANEOUS at 23:01

## 2023-01-01 RX ADMIN — HEPARIN SODIUM 7500 UNIT(S): 5000 INJECTION INTRAVENOUS; SUBCUTANEOUS at 06:18

## 2023-01-01 RX ADMIN — PIPERACILLIN AND TAZOBACTAM 25 GRAM(S): 4; .5 INJECTION, POWDER, LYOPHILIZED, FOR SOLUTION INTRAVENOUS at 22:39

## 2023-01-01 RX ADMIN — LACOSAMIDE 100 MILLIGRAM(S): 50 TABLET ORAL at 18:49

## 2023-01-01 RX ADMIN — HUMAN INSULIN 6 UNIT(S): 100 INJECTION, SUSPENSION SUBCUTANEOUS at 18:09

## 2023-01-01 RX ADMIN — HUMAN INSULIN 3 UNIT(S): 100 INJECTION, SUSPENSION SUBCUTANEOUS at 12:36

## 2023-01-01 RX ADMIN — NICARDIPINE HYDROCHLORIDE 25 MG/HR: 30 CAPSULE, EXTENDED RELEASE ORAL at 23:36

## 2023-01-01 RX ADMIN — Medication 650 MILLIGRAM(S): at 13:53

## 2023-01-01 RX ADMIN — Medication 4 MILLILITER(S): at 13:40

## 2023-01-01 RX ADMIN — Medication 2 MILLIGRAM(S): at 21:26

## 2023-01-01 RX ADMIN — SENNA PLUS 2 TABLET(S): 8.6 TABLET ORAL at 17:18

## 2023-01-01 RX ADMIN — HUMAN INSULIN 12 UNIT(S): 100 INJECTION, SUSPENSION SUBCUTANEOUS at 05:33

## 2023-01-01 RX ADMIN — LACOSAMIDE 100 MILLIGRAM(S): 50 TABLET ORAL at 05:23

## 2023-01-01 RX ADMIN — Medication 4 MILLILITER(S): at 15:09

## 2023-01-01 RX ADMIN — Medication 50 MILLILITER(S): at 01:43

## 2023-01-01 RX ADMIN — Medication 25 GRAM(S): at 06:27

## 2023-01-01 RX ADMIN — Medication 1 TABLET(S): at 11:48

## 2023-01-01 RX ADMIN — PHENYLEPHRINE HYDROCHLORIDE 1000 MICROGRAM(S): 10 INJECTION INTRAVENOUS at 13:02

## 2023-01-01 RX ADMIN — ALBUTEROL 2.5 MILLIGRAM(S): 90 AEROSOL, METERED ORAL at 12:20

## 2023-01-01 RX ADMIN — PHENYLEPHRINE HYDROCHLORIDE 1000 MICROGRAM(S): 10 INJECTION INTRAVENOUS at 11:48

## 2023-01-01 RX ADMIN — ALBUTEROL 2.5 MILLIGRAM(S): 90 AEROSOL, METERED ORAL at 12:51

## 2023-01-01 RX ADMIN — Medication 100 MILLIEQUIVALENT(S): at 18:57

## 2023-01-01 RX ADMIN — HUMAN INSULIN 6 UNIT(S): 100 INJECTION, SUSPENSION SUBCUTANEOUS at 11:10

## 2023-01-01 RX ADMIN — PANTOPRAZOLE SODIUM 40 MILLIGRAM(S): 20 TABLET, DELAYED RELEASE ORAL at 12:36

## 2023-01-01 RX ADMIN — Medication 1 TABLET(S): at 11:52

## 2023-01-01 RX ADMIN — Medication 1 APPLICATION(S): at 18:03

## 2023-01-01 RX ADMIN — PHENYLEPHRINE HYDROCHLORIDE 1000 MICROGRAM(S): 10 INJECTION INTRAVENOUS at 11:23

## 2023-01-01 RX ADMIN — FENTANYL CITRATE 25 MICROGRAM(S): 50 INJECTION INTRAVENOUS at 07:02

## 2023-01-01 RX ADMIN — MIDAZOLAM HYDROCHLORIDE 2 MILLIGRAM(S): 1 INJECTION, SOLUTION INTRAMUSCULAR; INTRAVENOUS at 18:14

## 2023-01-01 RX ADMIN — HYDROMORPHONE HYDROCHLORIDE 0.5 MILLIGRAM(S): 2 INJECTION INTRAMUSCULAR; INTRAVENOUS; SUBCUTANEOUS at 00:30

## 2023-01-01 RX ADMIN — Medication 200 GRAM(S): at 08:38

## 2023-01-01 RX ADMIN — Medication 50 MILLIEQUIVALENT(S): at 10:34

## 2023-01-01 RX ADMIN — CHLORHEXIDINE GLUCONATE 1 APPLICATION(S): 213 SOLUTION TOPICAL at 05:20

## 2023-01-01 RX ADMIN — AMIODARONE HYDROCHLORIDE 600 MILLIGRAM(S): 400 TABLET ORAL at 18:46

## 2023-01-01 RX ADMIN — PANTOPRAZOLE SODIUM 40 MILLIGRAM(S): 20 TABLET, DELAYED RELEASE ORAL at 12:03

## 2023-01-01 RX ADMIN — SENNA PLUS 2 TABLET(S): 8.6 TABLET ORAL at 06:16

## 2023-01-01 RX ADMIN — Medication 2: at 06:20

## 2023-01-01 RX ADMIN — LACTULOSE 10 GRAM(S): 10 SOLUTION ORAL at 23:33

## 2023-01-01 RX ADMIN — FENTANYL CITRATE 100 MICROGRAM(S): 50 INJECTION INTRAVENOUS at 19:00

## 2023-01-01 RX ADMIN — CHLORHEXIDINE GLUCONATE 15 MILLILITER(S): 213 SOLUTION TOPICAL at 17:16

## 2023-01-01 RX ADMIN — POLYETHYLENE GLYCOL 3350 17 GRAM(S): 17 POWDER, FOR SOLUTION ORAL at 18:15

## 2023-01-01 RX ADMIN — AMLODIPINE BESYLATE 5 MILLIGRAM(S): 2.5 TABLET ORAL at 10:36

## 2023-01-01 RX ADMIN — HUMAN INSULIN 10 UNIT(S): 100 INJECTION, SUSPENSION SUBCUTANEOUS at 23:50

## 2023-01-01 RX ADMIN — Medication 50 MILLIGRAM(S): at 23:00

## 2023-01-01 RX ADMIN — POLYETHYLENE GLYCOL 3350 17 GRAM(S): 17 POWDER, FOR SOLUTION ORAL at 17:46

## 2023-01-01 RX ADMIN — HEPARIN SODIUM 7500 UNIT(S): 5000 INJECTION INTRAVENOUS; SUBCUTANEOUS at 05:59

## 2023-01-01 RX ADMIN — CHLORHEXIDINE GLUCONATE 15 MILLILITER(S): 213 SOLUTION TOPICAL at 05:05

## 2023-01-01 RX ADMIN — PIPERACILLIN AND TAZOBACTAM 25 GRAM(S): 4; .5 INJECTION, POWDER, LYOPHILIZED, FOR SOLUTION INTRAVENOUS at 05:50

## 2023-01-01 RX ADMIN — Medication 200 MILLIGRAM(S): at 13:38

## 2023-01-01 RX ADMIN — HUMAN INSULIN 16 UNIT(S): 100 INJECTION, SUSPENSION SUBCUTANEOUS at 06:38

## 2023-01-01 RX ADMIN — Medication 2: at 17:30

## 2023-01-01 RX ADMIN — Medication 50 MILLIEQUIVALENT(S): at 07:54

## 2023-01-01 RX ADMIN — ERGOCALCIFEROL 50000 UNIT(S): 1.25 CAPSULE ORAL at 13:05

## 2023-01-01 RX ADMIN — CHLORHEXIDINE GLUCONATE 15 MILLILITER(S): 213 SOLUTION TOPICAL at 05:19

## 2023-01-01 RX ADMIN — Medication 200 MILLIGRAM(S): at 06:17

## 2023-01-01 RX ADMIN — Medication 250 MILLIGRAM(S): at 18:12

## 2023-01-01 RX ADMIN — FENTANYL CITRATE 50 MICROGRAM(S): 50 INJECTION INTRAVENOUS at 21:27

## 2023-01-01 RX ADMIN — CHLORHEXIDINE GLUCONATE 15 MILLILITER(S): 213 SOLUTION TOPICAL at 05:06

## 2023-01-01 RX ADMIN — Medication 2: at 05:16

## 2023-01-01 RX ADMIN — CHLORHEXIDINE GLUCONATE 1 APPLICATION(S): 213 SOLUTION TOPICAL at 05:33

## 2023-01-01 RX ADMIN — HEPARIN SODIUM 7500 UNIT(S): 5000 INJECTION INTRAVENOUS; SUBCUTANEOUS at 13:37

## 2023-01-01 RX ADMIN — FENTANYL CITRATE 50 MICROGRAM(S): 50 INJECTION INTRAVENOUS at 03:30

## 2023-01-01 RX ADMIN — Medication 2: at 17:50

## 2023-01-01 RX ADMIN — LACOSAMIDE 100 MILLIGRAM(S): 50 TABLET ORAL at 05:15

## 2023-01-01 RX ADMIN — Medication 650 MILLIGRAM(S): at 20:49

## 2023-01-01 RX ADMIN — Medication 2.5 MILLIGRAM(S): at 06:25

## 2023-01-01 RX ADMIN — PANTOPRAZOLE SODIUM 40 MILLIGRAM(S): 20 TABLET, DELAYED RELEASE ORAL at 11:48

## 2023-01-01 RX ADMIN — Medication 50 MILLIEQUIVALENT(S): at 11:55

## 2023-01-01 RX ADMIN — HUMAN INSULIN 16 UNIT(S): 100 INJECTION, SUSPENSION SUBCUTANEOUS at 00:16

## 2023-01-01 RX ADMIN — LACOSAMIDE 140 MILLIGRAM(S): 50 TABLET ORAL at 18:14

## 2023-01-01 RX ADMIN — HUMAN INSULIN 25 UNIT(S): 100 INJECTION, SUSPENSION SUBCUTANEOUS at 18:38

## 2023-01-01 RX ADMIN — Medication 200 MILLIGRAM(S): at 23:34

## 2023-01-01 RX ADMIN — DEXMEDETOMIDINE HYDROCHLORIDE IN 0.9% SODIUM CHLORIDE 6.36 MICROGRAM(S)/KG/HR: 4 INJECTION INTRAVENOUS at 22:39

## 2023-01-01 RX ADMIN — CISATRACURIUM BESYLATE 22.9 MICROGRAM(S)/KG/MIN: 2 INJECTION INTRAVENOUS at 06:49

## 2023-01-01 RX ADMIN — ALBUTEROL 2.5 MILLIGRAM(S): 90 AEROSOL, METERED ORAL at 20:30

## 2023-01-01 RX ADMIN — Medication 50 MILLIGRAM(S): at 08:48

## 2023-01-01 RX ADMIN — Medication 4 MILLILITER(S): at 08:28

## 2023-01-01 RX ADMIN — Medication 650 MILLIGRAM(S): at 05:00

## 2023-01-01 RX ADMIN — HUMAN INSULIN 6 UNIT(S): 100 INJECTION, SUSPENSION SUBCUTANEOUS at 07:03

## 2023-01-01 RX ADMIN — SODIUM CHLORIDE 250 MILLILITER(S): 9 INJECTION, SOLUTION INTRAVENOUS at 01:15

## 2023-01-01 RX ADMIN — HEPARIN SODIUM 7500 UNIT(S): 5000 INJECTION INTRAVENOUS; SUBCUTANEOUS at 21:26

## 2023-01-01 RX ADMIN — Medication 100 MILLIEQUIVALENT(S): at 07:15

## 2023-01-01 RX ADMIN — HEPARIN SODIUM 7500 UNIT(S): 5000 INJECTION INTRAVENOUS; SUBCUTANEOUS at 13:19

## 2023-01-01 RX ADMIN — PIPERACILLIN AND TAZOBACTAM 25 GRAM(S): 4; .5 INJECTION, POWDER, LYOPHILIZED, FOR SOLUTION INTRAVENOUS at 05:05

## 2023-01-01 RX ADMIN — FENTANYL CITRATE 100 MICROGRAM(S): 50 INJECTION INTRAVENOUS at 07:00

## 2023-01-01 RX ADMIN — Medication 1000 MILLIGRAM(S): at 17:19

## 2023-01-01 RX ADMIN — Medication 1 APPLICATION(S): at 06:02

## 2023-01-01 RX ADMIN — Medication 1000 MILLIGRAM(S): at 04:08

## 2023-01-01 RX ADMIN — Medication 650 MILLIGRAM(S): at 13:40

## 2023-01-01 RX ADMIN — Medication 1 APPLICATION(S): at 05:07

## 2023-01-01 RX ADMIN — Medication 5.96 MICROGRAM(S)/KG/MIN: at 15:35

## 2023-01-01 RX ADMIN — Medication 75 MILLIGRAM(S): at 06:23

## 2023-01-01 RX ADMIN — HUMAN INSULIN 3 UNIT(S): 100 INJECTION, SUSPENSION SUBCUTANEOUS at 11:36

## 2023-01-01 RX ADMIN — HYDROMORPHONE HYDROCHLORIDE 0.75 MILLIGRAM(S): 2 INJECTION INTRAMUSCULAR; INTRAVENOUS; SUBCUTANEOUS at 21:18

## 2023-01-01 RX ADMIN — CHLORHEXIDINE GLUCONATE 1 APPLICATION(S): 213 SOLUTION TOPICAL at 04:44

## 2023-01-01 RX ADMIN — Medication 200 MILLIGRAM(S): at 06:02

## 2023-01-01 RX ADMIN — DEXMEDETOMIDINE HYDROCHLORIDE IN 0.9% SODIUM CHLORIDE 6.36 MICROGRAM(S)/KG/HR: 4 INJECTION INTRAVENOUS at 20:19

## 2023-01-01 RX ADMIN — HEPARIN SODIUM 7500 UNIT(S): 5000 INJECTION INTRAVENOUS; SUBCUTANEOUS at 05:43

## 2023-01-01 RX ADMIN — HUMAN INSULIN 3 UNIT(S): 100 INJECTION, SUSPENSION SUBCUTANEOUS at 00:00

## 2023-01-01 RX ADMIN — LACOSAMIDE 100 MILLIGRAM(S): 50 TABLET ORAL at 18:08

## 2023-01-01 RX ADMIN — Medication 200 MILLIGRAM(S): at 13:22

## 2023-01-01 RX ADMIN — Medication 100 MILLIGRAM(S): at 15:15

## 2023-01-01 RX ADMIN — Medication 1: at 00:23

## 2023-01-01 RX ADMIN — BUMETANIDE 2 MILLIGRAM(S): 0.25 INJECTION INTRAMUSCULAR; INTRAVENOUS at 10:51

## 2023-01-01 RX ADMIN — LEVETIRACETAM 400 MILLIGRAM(S): 250 TABLET, FILM COATED ORAL at 03:57

## 2023-01-01 RX ADMIN — Medication 4 MILLILITER(S): at 20:18

## 2023-01-01 RX ADMIN — LACOSAMIDE 100 MILLIGRAM(S): 50 TABLET ORAL at 06:41

## 2023-01-01 RX ADMIN — PIPERACILLIN AND TAZOBACTAM 25 GRAM(S): 4; .5 INJECTION, POWDER, LYOPHILIZED, FOR SOLUTION INTRAVENOUS at 21:56

## 2023-01-01 RX ADMIN — Medication 50 MILLIEQUIVALENT(S): at 12:05

## 2023-01-01 RX ADMIN — PIPERACILLIN AND TAZOBACTAM 25 GRAM(S): 4; .5 INJECTION, POWDER, LYOPHILIZED, FOR SOLUTION INTRAVENOUS at 21:53

## 2023-01-01 RX ADMIN — Medication 75 MILLIGRAM(S): at 06:17

## 2023-01-01 RX ADMIN — Medication 200 MILLIGRAM(S): at 15:13

## 2023-01-01 RX ADMIN — Medication 10 MILLIGRAM(S): at 17:29

## 2023-01-01 RX ADMIN — LACOSAMIDE 100 MILLIGRAM(S): 50 TABLET ORAL at 18:05

## 2023-01-01 RX ADMIN — Medication 150 MILLIEQUIVALENT(S): at 22:09

## 2023-01-01 RX ADMIN — SENNA PLUS 2 TABLET(S): 8.6 TABLET ORAL at 17:32

## 2023-01-01 RX ADMIN — LACOSAMIDE 100 MILLIGRAM(S): 50 TABLET ORAL at 18:12

## 2023-01-01 RX ADMIN — Medication 650 MILLIGRAM(S): at 18:30

## 2023-01-01 RX ADMIN — Medication 4 MILLILITER(S): at 16:05

## 2023-01-01 RX ADMIN — PANTOPRAZOLE SODIUM 40 MILLIGRAM(S): 20 TABLET, DELAYED RELEASE ORAL at 13:19

## 2023-01-01 RX ADMIN — Medication 100 MILLIGRAM(S): at 11:50

## 2023-01-01 RX ADMIN — HYDROMORPHONE HYDROCHLORIDE 0.5 MILLIGRAM(S): 2 INJECTION INTRAMUSCULAR; INTRAVENOUS; SUBCUTANEOUS at 22:43

## 2023-01-01 RX ADMIN — Medication 1000 MILLIGRAM(S): at 03:17

## 2023-01-01 RX ADMIN — Medication 2.5 MILLIGRAM(S): at 23:26

## 2023-01-01 RX ADMIN — Medication 2 MILLIGRAM(S): at 22:40

## 2023-01-01 RX ADMIN — POLYETHYLENE GLYCOL 3350 17 GRAM(S): 17 POWDER, FOR SOLUTION ORAL at 06:19

## 2023-01-01 RX ADMIN — MAGNESIUM HYDROXIDE 30 MILLILITER(S): 400 TABLET, CHEWABLE ORAL at 21:52

## 2023-01-01 RX ADMIN — Medication 4 MILLILITER(S): at 12:40

## 2023-01-01 RX ADMIN — Medication 1000 MILLIGRAM(S): at 00:10

## 2023-01-01 RX ADMIN — CHLORHEXIDINE GLUCONATE 15 MILLILITER(S): 213 SOLUTION TOPICAL at 18:38

## 2023-01-01 RX ADMIN — HYDROMORPHONE HYDROCHLORIDE 0.5 MILLIGRAM(S): 2 INJECTION INTRAMUSCULAR; INTRAVENOUS; SUBCUTANEOUS at 03:46

## 2023-01-01 RX ADMIN — SODIUM CHLORIDE 1000 MILLILITER(S): 9 INJECTION, SOLUTION INTRAVENOUS at 04:39

## 2023-01-01 RX ADMIN — Medication 3: at 18:37

## 2023-01-01 RX ADMIN — Medication 10 MILLIGRAM(S): at 06:14

## 2023-01-01 RX ADMIN — HUMAN INSULIN 25 UNIT(S): 100 INJECTION, SUSPENSION SUBCUTANEOUS at 00:23

## 2023-01-01 RX ADMIN — Medication 2: at 11:35

## 2023-01-01 RX ADMIN — PIPERACILLIN AND TAZOBACTAM 25 GRAM(S): 4; .5 INJECTION, POWDER, LYOPHILIZED, FOR SOLUTION INTRAVENOUS at 06:21

## 2023-01-01 RX ADMIN — BUMETANIDE 2 MILLIGRAM(S): 0.25 INJECTION INTRAMUSCULAR; INTRAVENOUS at 08:45

## 2023-01-01 RX ADMIN — Medication 2: at 18:17

## 2023-01-01 RX ADMIN — PIPERACILLIN AND TAZOBACTAM 25 GRAM(S): 4; .5 INJECTION, POWDER, LYOPHILIZED, FOR SOLUTION INTRAVENOUS at 14:24

## 2023-01-01 RX ADMIN — Medication 650 MILLIGRAM(S): at 04:30

## 2023-01-01 RX ADMIN — MEROPENEM 100 MILLIGRAM(S): 1 INJECTION INTRAVENOUS at 18:38

## 2023-01-01 RX ADMIN — Medication 100 GRAM(S): at 23:10

## 2023-01-01 RX ADMIN — HYDROMORPHONE HYDROCHLORIDE 0.5 MILLIGRAM(S): 2 INJECTION INTRAMUSCULAR; INTRAVENOUS; SUBCUTANEOUS at 22:28

## 2023-01-01 RX ADMIN — Medication 4 MILLILITER(S): at 08:10

## 2023-01-01 RX ADMIN — POLYETHYLENE GLYCOL 3350 17 GRAM(S): 17 POWDER, FOR SOLUTION ORAL at 05:05

## 2023-01-01 RX ADMIN — Medication 4 MILLILITER(S): at 09:15

## 2023-01-01 RX ADMIN — Medication 1 APPLICATION(S): at 18:54

## 2023-01-01 RX ADMIN — CHLORHEXIDINE GLUCONATE 1 APPLICATION(S): 213 SOLUTION TOPICAL at 05:28

## 2023-01-01 RX ADMIN — Medication 3 MILLILITER(S): at 13:51

## 2023-01-01 RX ADMIN — Medication 2: at 17:43

## 2023-01-01 RX ADMIN — Medication 200 MILLIGRAM(S): at 23:44

## 2023-01-01 RX ADMIN — HEPARIN SODIUM 7500 UNIT(S): 5000 INJECTION INTRAVENOUS; SUBCUTANEOUS at 06:25

## 2023-01-01 RX ADMIN — CHLORHEXIDINE GLUCONATE 1 APPLICATION(S): 213 SOLUTION TOPICAL at 05:05

## 2023-01-01 RX ADMIN — Medication 1 APPLICATION(S): at 05:49

## 2023-01-01 RX ADMIN — DEXMEDETOMIDINE HYDROCHLORIDE IN 0.9% SODIUM CHLORIDE 47.7 MICROGRAM(S)/KG/HR: 4 INJECTION INTRAVENOUS at 22:13

## 2023-01-01 RX ADMIN — HUMAN INSULIN 6 UNIT(S): 100 INJECTION, SUSPENSION SUBCUTANEOUS at 00:58

## 2023-01-01 RX ADMIN — Medication 10 MILLIGRAM(S): at 16:20

## 2023-01-01 RX ADMIN — Medication 4: at 02:05

## 2023-01-01 RX ADMIN — Medication 2 MILLIGRAM(S): at 21:58

## 2023-01-01 RX ADMIN — ERGOCALCIFEROL 50000 UNIT(S): 1.25 CAPSULE ORAL at 12:24

## 2023-01-01 RX ADMIN — LACOSAMIDE 100 MILLIGRAM(S): 50 TABLET ORAL at 16:00

## 2023-01-01 RX ADMIN — Medication 4 MILLILITER(S): at 04:39

## 2023-01-01 RX ADMIN — MEROPENEM 100 MILLIGRAM(S): 1 INJECTION INTRAVENOUS at 05:27

## 2023-01-01 RX ADMIN — FENTANYL CITRATE 100 MICROGRAM(S): 50 INJECTION INTRAVENOUS at 07:35

## 2023-01-01 RX ADMIN — LACOSAMIDE 140 MILLIGRAM(S): 50 TABLET ORAL at 18:34

## 2023-01-01 RX ADMIN — HYDROMORPHONE HYDROCHLORIDE 0.75 MILLIGRAM(S): 2 INJECTION INTRAMUSCULAR; INTRAVENOUS; SUBCUTANEOUS at 15:35

## 2023-01-01 RX ADMIN — Medication 2: at 06:43

## 2023-01-01 RX ADMIN — Medication 100 MILLIGRAM(S): at 22:11

## 2023-01-01 RX ADMIN — Medication 10 MILLIGRAM(S): at 21:53

## 2023-01-01 RX ADMIN — POLYETHYLENE GLYCOL 3350 17 GRAM(S): 17 POWDER, FOR SOLUTION ORAL at 18:16

## 2023-01-01 RX ADMIN — Medication 166.67 MILLIGRAM(S): at 07:03

## 2023-01-01 RX ADMIN — DEXMEDETOMIDINE HYDROCHLORIDE IN 0.9% SODIUM CHLORIDE 6.36 MICROGRAM(S)/KG/HR: 4 INJECTION INTRAVENOUS at 05:07

## 2023-01-01 RX ADMIN — Medication 200 MILLIGRAM(S): at 05:05

## 2023-01-01 RX ADMIN — FENTANYL CITRATE 100 MICROGRAM(S): 50 INJECTION INTRAVENOUS at 07:50

## 2023-01-01 RX ADMIN — Medication 650 MILLIGRAM(S): at 08:55

## 2023-01-01 RX ADMIN — Medication 2 MILLIGRAM(S): at 21:43

## 2023-01-01 RX ADMIN — CHLORHEXIDINE GLUCONATE 15 MILLILITER(S): 213 SOLUTION TOPICAL at 05:58

## 2023-01-01 RX ADMIN — CHLORHEXIDINE GLUCONATE 15 MILLILITER(S): 213 SOLUTION TOPICAL at 06:18

## 2023-01-01 RX ADMIN — LACOSAMIDE 140 MILLIGRAM(S): 50 TABLET ORAL at 18:43

## 2023-01-01 RX ADMIN — HUMAN INSULIN 6 UNIT(S): 100 INJECTION, SUSPENSION SUBCUTANEOUS at 06:30

## 2023-01-01 RX ADMIN — Medication 5 MILLIGRAM(S): at 17:50

## 2023-01-01 RX ADMIN — NICARDIPINE HYDROCHLORIDE 25 MG/HR: 30 CAPSULE, EXTENDED RELEASE ORAL at 23:34

## 2023-01-01 RX ADMIN — Medication 2.5 MILLIGRAM(S): at 11:45

## 2023-01-01 RX ADMIN — PANTOPRAZOLE SODIUM 80 MILLIGRAM(S): 20 TABLET, DELAYED RELEASE ORAL at 04:35

## 2023-01-01 RX ADMIN — FENTANYL CITRATE 100 MICROGRAM(S): 50 INJECTION INTRAVENOUS at 06:45

## 2023-01-01 RX ADMIN — ALBUTEROL 2.5 MILLIGRAM(S): 90 AEROSOL, METERED ORAL at 04:39

## 2023-01-01 RX ADMIN — Medication 100 MILLIGRAM(S): at 18:55

## 2023-01-01 RX ADMIN — Medication 50 MILLIEQUIVALENT(S): at 09:36

## 2023-01-01 RX ADMIN — Medication 2: at 00:52

## 2023-01-01 RX ADMIN — Medication 4 MILLILITER(S): at 03:52

## 2023-01-01 RX ADMIN — Medication 4 MILLILITER(S): at 08:34

## 2023-01-01 RX ADMIN — ALBUTEROL 2.5 MILLIGRAM(S): 90 AEROSOL, METERED ORAL at 23:38

## 2023-01-01 RX ADMIN — Medication 0.2 MILLIGRAM(S): at 21:27

## 2023-01-01 RX ADMIN — CISATRACURIUM BESYLATE 15 MILLIGRAM(S): 2 INJECTION INTRAVENOUS at 06:48

## 2023-01-01 RX ADMIN — Medication 400 MILLIGRAM(S): at 23:40

## 2023-01-01 RX ADMIN — HUMAN INSULIN 8 UNIT(S): 100 INJECTION, SUSPENSION SUBCUTANEOUS at 18:09

## 2023-01-01 RX ADMIN — HEPARIN SODIUM 7500 UNIT(S): 5000 INJECTION INTRAVENOUS; SUBCUTANEOUS at 14:43

## 2023-01-01 RX ADMIN — Medication 100 MILLIEQUIVALENT(S): at 12:35

## 2023-01-01 RX ADMIN — FENTANYL CITRATE 6.36 MICROGRAM(S)/KG/HR: 50 INJECTION INTRAVENOUS at 05:06

## 2023-01-01 RX ADMIN — HYDROMORPHONE HYDROCHLORIDE 0.5 MILLIGRAM(S): 2 INJECTION INTRAMUSCULAR; INTRAVENOUS; SUBCUTANEOUS at 05:12

## 2023-01-01 RX ADMIN — Medication 5 MILLIGRAM(S): at 23:55

## 2023-01-01 RX ADMIN — Medication 2: at 12:36

## 2023-01-01 RX ADMIN — HEPARIN SODIUM 7500 UNIT(S): 5000 INJECTION INTRAVENOUS; SUBCUTANEOUS at 05:27

## 2023-01-01 RX ADMIN — Medication 1 APPLICATION(S): at 18:28

## 2023-01-01 RX ADMIN — FENTANYL CITRATE 25 MICROGRAM(S): 50 INJECTION INTRAVENOUS at 07:17

## 2023-01-01 RX ADMIN — CISATRACURIUM BESYLATE 22.9 MICROGRAM(S)/KG/MIN: 2 INJECTION INTRAVENOUS at 14:18

## 2023-01-01 RX ADMIN — Medication 0.2 MILLIGRAM(S): at 05:06

## 2023-01-01 RX ADMIN — Medication 650 MILLIGRAM(S): at 23:05

## 2023-01-01 RX ADMIN — PHENYLEPHRINE HYDROCHLORIDE 1000 MICROGRAM(S): 10 INJECTION INTRAVENOUS at 18:54

## 2023-01-01 RX ADMIN — SODIUM CHLORIDE 500 MILLILITER(S): 9 INJECTION INTRAMUSCULAR; INTRAVENOUS; SUBCUTANEOUS at 06:18

## 2023-01-01 RX ADMIN — Medication 50 MILLIGRAM(S): at 17:01

## 2023-01-01 RX ADMIN — Medication 75 MILLIGRAM(S): at 13:23

## 2023-01-01 RX ADMIN — Medication 650 MILLIGRAM(S): at 18:58

## 2023-01-01 RX ADMIN — PIPERACILLIN AND TAZOBACTAM 25 GRAM(S): 4; .5 INJECTION, POWDER, LYOPHILIZED, FOR SOLUTION INTRAVENOUS at 21:27

## 2023-01-01 RX ADMIN — VASOPRESSIN 6 UNIT(S)/MIN: 20 INJECTION INTRAVENOUS at 15:35

## 2023-01-01 RX ADMIN — Medication 650 MILLIGRAM(S): at 10:05

## 2023-01-01 RX ADMIN — ALBUTEROL 2.5 MILLIGRAM(S): 90 AEROSOL, METERED ORAL at 20:43

## 2023-01-01 RX ADMIN — DEXMEDETOMIDINE HYDROCHLORIDE IN 0.9% SODIUM CHLORIDE 6.36 MICROGRAM(S)/KG/HR: 4 INJECTION INTRAVENOUS at 05:50

## 2023-01-01 RX ADMIN — SIMETHICONE 80 MILLIGRAM(S): 80 TABLET, CHEWABLE ORAL at 22:38

## 2023-01-01 RX ADMIN — CHLORHEXIDINE GLUCONATE 15 MILLILITER(S): 213 SOLUTION TOPICAL at 17:54

## 2023-01-01 RX ADMIN — HUMAN INSULIN 6 UNIT(S): 100 INJECTION, SUSPENSION SUBCUTANEOUS at 12:28

## 2023-01-01 RX ADMIN — BUMETANIDE 2 MILLIGRAM(S): 0.25 INJECTION INTRAMUSCULAR; INTRAVENOUS at 03:36

## 2023-01-01 RX ADMIN — ALBUTEROL 2.5 MILLIGRAM(S): 90 AEROSOL, METERED ORAL at 03:52

## 2023-01-01 RX ADMIN — HUMAN INSULIN 3 UNIT(S): 100 INJECTION, SUSPENSION SUBCUTANEOUS at 02:04

## 2023-01-01 RX ADMIN — FENTANYL CITRATE 50 MICROGRAM(S): 50 INJECTION INTRAVENOUS at 08:15

## 2023-01-01 RX ADMIN — Medication 2: at 17:29

## 2023-01-01 RX ADMIN — Medication 75 MILLIGRAM(S): at 22:29

## 2023-01-01 RX ADMIN — Medication 50 MILLIEQUIVALENT(S): at 11:30

## 2023-01-01 RX ADMIN — Medication 200 MILLIGRAM(S): at 15:07

## 2023-01-01 RX ADMIN — POLYETHYLENE GLYCOL 3350 17 GRAM(S): 17 POWDER, FOR SOLUTION ORAL at 12:03

## 2023-01-01 RX ADMIN — HUMAN INSULIN 3 UNIT(S): 100 INJECTION, SUSPENSION SUBCUTANEOUS at 06:22

## 2023-01-01 RX ADMIN — Medication 4 MILLILITER(S): at 20:43

## 2023-01-01 RX ADMIN — Medication 4 MILLILITER(S): at 15:46

## 2023-01-01 RX ADMIN — Medication 4 MILLILITER(S): at 20:19

## 2023-01-01 RX ADMIN — ALBUTEROL 2.5 MILLIGRAM(S): 90 AEROSOL, METERED ORAL at 08:30

## 2023-01-01 RX ADMIN — Medication 650 MILLIGRAM(S): at 13:10

## 2023-01-01 RX ADMIN — LACOSAMIDE 140 MILLIGRAM(S): 50 TABLET ORAL at 18:39

## 2023-01-01 RX ADMIN — Medication 0.2 MILLIGRAM(S): at 17:44

## 2023-01-01 RX ADMIN — Medication 0.2 MILLIGRAM(S): at 06:17

## 2023-01-01 RX ADMIN — Medication 1 TABLET(S): at 13:03

## 2023-01-01 RX ADMIN — ALBUTEROL 2.5 MILLIGRAM(S): 90 AEROSOL, METERED ORAL at 00:00

## 2023-01-01 RX ADMIN — Medication 4 MILLILITER(S): at 23:38

## 2023-01-01 RX ADMIN — PIPERACILLIN AND TAZOBACTAM 25 GRAM(S): 4; .5 INJECTION, POWDER, LYOPHILIZED, FOR SOLUTION INTRAVENOUS at 06:16

## 2023-01-01 RX ADMIN — Medication 1 APPLICATION(S): at 06:16

## 2023-01-01 RX ADMIN — Medication 2 MILLIGRAM(S): at 21:18

## 2023-01-01 RX ADMIN — Medication 4 MILLILITER(S): at 12:22

## 2023-01-01 RX ADMIN — Medication 100 GRAM(S): at 22:10

## 2023-01-01 RX ADMIN — Medication 50 MILLIEQUIVALENT(S): at 18:45

## 2023-01-01 RX ADMIN — LACOSAMIDE 140 MILLIGRAM(S): 50 TABLET ORAL at 05:23

## 2023-01-01 RX ADMIN — Medication 200 MILLIGRAM(S): at 16:50

## 2023-01-01 RX ADMIN — Medication 1000 MILLIGRAM(S): at 14:22

## 2023-01-01 RX ADMIN — SENNA PLUS 2 TABLET(S): 8.6 TABLET ORAL at 18:14

## 2023-01-01 RX ADMIN — Medication 650 MILLIGRAM(S): at 09:25

## 2023-01-01 RX ADMIN — PIPERACILLIN AND TAZOBACTAM 25 GRAM(S): 4; .5 INJECTION, POWDER, LYOPHILIZED, FOR SOLUTION INTRAVENOUS at 13:35

## 2023-01-01 RX ADMIN — HUMAN INSULIN 6 UNIT(S): 100 INJECTION, SUSPENSION SUBCUTANEOUS at 18:16

## 2023-01-01 RX ADMIN — HYDROMORPHONE HYDROCHLORIDE 0.5 MILLIGRAM(S): 2 INJECTION INTRAMUSCULAR; INTRAVENOUS; SUBCUTANEOUS at 20:48

## 2023-01-01 RX ADMIN — Medication 1 TABLET(S): at 12:46

## 2023-01-01 RX ADMIN — ALBUTEROL 2.5 MILLIGRAM(S): 90 AEROSOL, METERED ORAL at 12:40

## 2023-01-01 RX ADMIN — PHENYLEPHRINE HYDROCHLORIDE 1000 MICROGRAM(S): 10 INJECTION INTRAVENOUS at 18:45

## 2023-01-01 RX ADMIN — Medication 5 MILLIGRAM(S): at 05:32

## 2023-01-01 RX ADMIN — Medication 650 MILLIGRAM(S): at 04:00

## 2023-01-01 RX ADMIN — PIPERACILLIN AND TAZOBACTAM 25 GRAM(S): 4; .5 INJECTION, POWDER, LYOPHILIZED, FOR SOLUTION INTRAVENOUS at 22:36

## 2023-01-01 RX ADMIN — HYDROMORPHONE HYDROCHLORIDE 0.75 MILLIGRAM(S): 2 INJECTION INTRAMUSCULAR; INTRAVENOUS; SUBCUTANEOUS at 15:20

## 2023-01-01 RX ADMIN — INSULIN HUMAN 5 UNIT(S)/HR: 100 INJECTION, SOLUTION SUBCUTANEOUS at 16:15

## 2023-01-01 RX ADMIN — CISATRACURIUM BESYLATE 15 MILLIGRAM(S): 2 INJECTION INTRAVENOUS at 14:22

## 2023-01-01 RX ADMIN — SODIUM CHLORIDE 100 MILLILITER(S): 9 INJECTION, SOLUTION INTRAVENOUS at 12:27

## 2023-01-01 RX ADMIN — Medication 5 MILLIGRAM(S): at 13:20

## 2023-01-01 RX ADMIN — Medication 2 MILLIGRAM(S): at 23:38

## 2023-01-01 RX ADMIN — Medication 8: at 11:51

## 2023-01-01 RX ADMIN — Medication 1 TABLET(S): at 12:36

## 2023-01-01 RX ADMIN — PHENYLEPHRINE HYDROCHLORIDE 1000 MICROGRAM(S): 10 INJECTION INTRAVENOUS at 10:00

## 2023-01-01 RX ADMIN — PIPERACILLIN AND TAZOBACTAM 25 GRAM(S): 4; .5 INJECTION, POWDER, LYOPHILIZED, FOR SOLUTION INTRAVENOUS at 05:06

## 2023-01-01 RX ADMIN — DEXMEDETOMIDINE HYDROCHLORIDE IN 0.9% SODIUM CHLORIDE 6.36 MICROGRAM(S)/KG/HR: 4 INJECTION INTRAVENOUS at 21:19

## 2023-01-01 RX ADMIN — Medication 4: at 12:07

## 2023-01-01 RX ADMIN — LACOSAMIDE 100 MILLIGRAM(S): 50 TABLET ORAL at 06:00

## 2023-01-01 RX ADMIN — FENTANYL CITRATE 12.7 MICROGRAM(S)/KG/HR: 50 INJECTION INTRAVENOUS at 15:36

## 2023-01-01 RX ADMIN — Medication 2 MILLIGRAM(S): at 22:13

## 2023-01-01 RX ADMIN — Medication 20 MILLIGRAM(S): at 11:00

## 2023-01-01 RX ADMIN — ALBUTEROL 2.5 MILLIGRAM(S): 90 AEROSOL, METERED ORAL at 08:56

## 2023-01-01 RX ADMIN — CHLORHEXIDINE GLUCONATE 15 MILLILITER(S): 213 SOLUTION TOPICAL at 18:15

## 2023-01-01 RX ADMIN — Medication 200 MILLIGRAM(S): at 21:56

## 2023-01-01 RX ADMIN — Medication 6: at 18:01

## 2023-01-01 RX ADMIN — FENTANYL CITRATE 50 MICROGRAM(S): 50 INJECTION INTRAVENOUS at 21:42

## 2023-01-01 RX ADMIN — Medication 50 MILLILITER(S): at 18:32

## 2023-01-01 RX ADMIN — Medication 4 MILLILITER(S): at 07:47

## 2023-01-01 RX ADMIN — BUMETANIDE 2 MILLIGRAM(S): 0.25 INJECTION INTRAMUSCULAR; INTRAVENOUS at 05:05

## 2023-01-01 RX ADMIN — Medication 50 MILLIEQUIVALENT(S): at 14:59

## 2023-01-01 RX ADMIN — PROPOFOL 15.3 MICROGRAM(S)/KG/MIN: 10 INJECTION, EMULSION INTRAVENOUS at 23:36

## 2023-01-01 RX ADMIN — Medication 100 MILLIEQUIVALENT(S): at 13:03

## 2023-01-01 RX ADMIN — MIDAZOLAM HYDROCHLORIDE 2 MILLIGRAM(S): 1 INJECTION, SOLUTION INTRAMUSCULAR; INTRAVENOUS at 14:31

## 2023-01-01 RX ADMIN — HEPARIN SODIUM 7500 UNIT(S): 5000 INJECTION INTRAVENOUS; SUBCUTANEOUS at 06:16

## 2023-01-01 RX ADMIN — AMLODIPINE BESYLATE 10 MILLIGRAM(S): 2.5 TABLET ORAL at 05:07

## 2023-01-01 RX ADMIN — Medication 166.67 MILLIGRAM(S): at 23:36

## 2023-01-01 RX ADMIN — Medication 400 MILLIGRAM(S): at 04:07

## 2023-01-01 RX ADMIN — SODIUM CHLORIDE 1000 MILLILITER(S): 9 INJECTION, SOLUTION INTRAVENOUS at 07:34

## 2023-01-01 RX ADMIN — MAGNESIUM HYDROXIDE 30 MILLILITER(S): 400 TABLET, CHEWABLE ORAL at 13:17

## 2023-01-01 RX ADMIN — Medication 100 MILLIGRAM(S): at 12:03

## 2023-01-01 RX ADMIN — Medication 0.2 MILLIGRAM(S): at 21:18

## 2023-01-01 RX ADMIN — Medication 50 MILLIEQUIVALENT(S): at 12:00

## 2023-01-01 RX ADMIN — Medication 6: at 12:32

## 2023-01-01 RX ADMIN — HYDROMORPHONE HYDROCHLORIDE 0.75 MILLIGRAM(S): 2 INJECTION INTRAMUSCULAR; INTRAVENOUS; SUBCUTANEOUS at 21:33

## 2023-01-01 RX ADMIN — HEPARIN SODIUM 7500 UNIT(S): 5000 INJECTION INTRAVENOUS; SUBCUTANEOUS at 21:15

## 2023-01-01 RX ADMIN — AMIODARONE HYDROCHLORIDE 600 MILLIGRAM(S): 400 TABLET ORAL at 15:26

## 2023-01-01 RX ADMIN — HYDROMORPHONE HYDROCHLORIDE 0.5 MILLIGRAM(S): 2 INJECTION INTRAMUSCULAR; INTRAVENOUS; SUBCUTANEOUS at 22:32

## 2023-01-01 RX ADMIN — TRAMADOL HYDROCHLORIDE 50 MILLIGRAM(S): 50 TABLET ORAL at 06:00

## 2023-01-01 RX ADMIN — PANTOPRAZOLE SODIUM 40 MILLIGRAM(S): 20 TABLET, DELAYED RELEASE ORAL at 05:17

## 2023-01-01 RX ADMIN — HYDROMORPHONE HYDROCHLORIDE 0.5 MILLIGRAM(S): 2 INJECTION INTRAMUSCULAR; INTRAVENOUS; SUBCUTANEOUS at 05:05

## 2023-01-01 RX ADMIN — SODIUM CHLORIDE 40 MILLILITER(S): 9 INJECTION INTRAMUSCULAR; INTRAVENOUS; SUBCUTANEOUS at 00:55

## 2023-01-01 RX ADMIN — NICARDIPINE HYDROCHLORIDE 25 MG/HR: 30 CAPSULE, EXTENDED RELEASE ORAL at 01:25

## 2023-01-01 RX ADMIN — ALBUTEROL 2.5 MILLIGRAM(S): 90 AEROSOL, METERED ORAL at 15:45

## 2023-01-01 RX ADMIN — PANTOPRAZOLE SODIUM 10 MG/HR: 20 TABLET, DELAYED RELEASE ORAL at 15:35

## 2023-01-01 RX ADMIN — PIPERACILLIN AND TAZOBACTAM 25 GRAM(S): 4; .5 INJECTION, POWDER, LYOPHILIZED, FOR SOLUTION INTRAVENOUS at 22:13

## 2023-01-01 RX ADMIN — Medication 50 MILLIGRAM(S): at 23:13

## 2023-01-01 RX ADMIN — CHLORHEXIDINE GLUCONATE 15 MILLILITER(S): 213 SOLUTION TOPICAL at 18:16

## 2023-01-01 RX ADMIN — FENTANYL CITRATE 50 MICROGRAM(S): 50 INJECTION INTRAVENOUS at 08:46

## 2023-01-01 RX ADMIN — CHLORHEXIDINE GLUCONATE 1 APPLICATION(S): 213 SOLUTION TOPICAL at 06:15

## 2023-01-01 RX ADMIN — CHLORHEXIDINE GLUCONATE 15 MILLILITER(S): 213 SOLUTION TOPICAL at 06:17

## 2023-01-01 RX ADMIN — Medication 4 MILLILITER(S): at 00:00

## 2023-01-01 RX ADMIN — LACOSAMIDE 140 MILLIGRAM(S): 50 TABLET ORAL at 06:31

## 2023-01-01 RX ADMIN — NICARDIPINE HYDROCHLORIDE 25 MG/HR: 30 CAPSULE, EXTENDED RELEASE ORAL at 18:09

## 2023-06-06 NOTE — CONSULT NOTE ADULT - SUBJECTIVE AND OBJECTIVE BOX
TRAUMA ACTIVATION LEVEL:  CODE / ALERT  / CONSULT  ACTIVATED BY: EMS**  /  ED**  INTUBATED: YES** / NO**    MECHANISM OF INJURY:   [] Blunt     [] MVC	  [x] Fall	  [] Pedestrian Struck	  [] Motorcycle     [] Assault     [] Bicycle collision    [] Sports injury    [] Penetrating    [] Gun Shot Wound      [] Stab Wound    GCS: 15 	E: 4	V: 5	M: 6    HPI:      "This is a 64 y/o M with hx of Afib on eiquis, CVA BG bleed ,HTN, CHF, Gout, DM  presents as a stroke code after being down in bathroom unknown time LKW was 2300. 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 Pt is lethargic  - Cardene started for blood pressure control. GCS 12 ICH 1 NIH 17 (06 Jun 2023 03:51)"    63M who was initially a code stroke pre-note, made a trauma alert after arrival to ED and story ? for +HT on Eliquis  As per wife she was in bedroom with patient when she heard him fall from bed, immediately reported he had pain and was unable to move right side.  Wife does not think he hit his head but stated she could not be sure.  On arrival, patient was HTN to 200s, RUE weakness and sensory deficits no external signs of trauma.  Patient was initially in CT scanner at time of trauma alert and was found to have a thalamic bleed ICH consistent with hemorhhagic stroke 2/2 HTN.    Trauma assessment in ED: ABCs intact , GCS 13 , AAOx1    Obvious external signs of injury: None    PAST MEDICAL & SURGICAL HISTORY:  Diabetes mellitus      Hypertension      Gout      Morbidly obese      Gout      S/P tonsillectomy        Allergies    No Known Allergies    Intolerances      Home Medications:  acetaminophen 325 mg oral tablet: 2 tab(s) orally every 6 hours, As needed, Temp greater or equal to 38C (100.4F), Mild Pain (1 - 3) (23 May 2019 10:27)  aspirin 81 mg oral tablet, chewable: 1 tab(s) orally once a day (23 May 2019 10:27)  cholecalciferol oral tablet: 2000 unit(s) orally once a day (29 Apr 2019 14:18)  polyethylene glycol 3350 oral powder for reconstitution: 17 gram(s) orally once a day (at bedtime) (23 May 2019 10:27)  senna oral tablet: 2 tab(s) orally once a day (at bedtime) (29 Apr 2019 14:11)      ROS: 10-system review is otherwise negative except HPI above.      Primary Survey:    A - airway intact  B - bilateral breath sounds and good chest rise  C - palpable pulses in all extremities  D - GCS 13 on arrival, DIAZ  Exposure obtained    Vital Signs Last 24 Hrs  T(C): --  T(F): --  HR: 70 (06 Jun 2023 03:41) (70 - 70)  BP: 200/100 (06 Jun 2023 03:41) (200/100 - 200/100)  BP(mean): --  RR: --  SpO2: 96% (06 Jun 2023 03:41) (96% - 96%)    Parameters below as of 06 Jun 2023 03:41  Patient On (Oxygen Delivery Method): nasal cannula  O2 Flow (L/min): 4      Secondary Survey:   HEENT: Normocephalic, atraumatic, EOMI, PEERLA. no scalp lacerations   Neck: Soft, midline trachea. no c-spine tenderness  Chest: No chest wall tenderness, no subcutaneous emphysema   Cardiac: RR  Respiratory: Bilateral breath sounds, clear and equal bilaterally  Abdomen: Soft, non-distended, non-tender, no rebound, no guarding.  Groin: Normal appearing, pelvis stable   Ext:  RUE weakness and sensory loss, right sided facial droop, minimal movement in RLE.  Back: No T/L/S spine tenderness, No palpable runoff/stepoff/deformity      FAST:     Labs:  CAPILLARY BLOOD GLUCOSE      POCT Blood Glucose.: 129 mg/dL (06 Jun 2023 03:28)                          12.2   8.99  )-----------( 226      ( 06 Jun 2023 03:30 )             36.8       Auto Neutrophil %: 73.7 % (06-06-23 @ 03:30)  Auto Immature Granulocyte %: 0.3 % (06-06-23 @ 03:30)    06-06    141  |  101  |  69<HH>  ----------------------------<  154<H>  3.7   |  27  |  3.1<H>      Calcium, Total Serum: 8.9 mg/dL (06-06-23 @ 03:30)      LFTs:             6.5  | 0.2  | 34       ------------------[65      ( 06 Jun 2023 03:30 )  4.1  | x    | 43          Lipase:x      Amylase:x             Coags:     12.00  ----< 1.05    ( 06 Jun 2023 03:30 )     30.8        CARDIAC MARKERS ( 06 Jun 2023 03:30 )  < from: CT Brain Stroke Protocol (06.06.23 @ 03:10) >  IMPRESSION:    1.  Acute left thalamic intraparenchymal hemorrhage extending to the left   corona radiata and possibly the left caudate nucleus with associated   vasogenic edema.  2.  Moderate chronic microvascular ischemic changes and chronic infarct   as above.      Preliminary findings with CHANG French at 6/6/2023 3:14 AM.    --- End of Report ---    < end of copied text >  x     / 0.07 ng/mL / x     / x     / x                        RADIOLOGY & ADDITIONAL STUDIES:  ---------------------------------------------------------------------------------------  < from: CT Brain Stroke Protocol (06.06.23 @ 03:10) >  IMPRESSION:    1.  Acute left thalamic intraparenchymal hemorrhage extending to the left   corona radiata and possibly the left caudate nucleus with associated   vasogenic edema.  2.  Moderate chronic microvascular ischemic changes and chronic infarct   as above.    < end of copied text >

## 2023-06-06 NOTE — CONSULT NOTE ADULT - ATTENDING COMMENTS
Trauma Attending H&P Attestation    Patient seen and evaluated with the trauma team in the trauma bay upon arrival. All pertinent labs and radiographic imaging reviewed, pending final reports. Outpatient medications reviewed, including the presence of anticoagulants, if applicable. I agree with the resident's note above, including the physical exam findings, assessment and plan as documented with the following adjustments.     Trauma Level: [ ] Code  [x ] Alert  [ ] Consult [ ] Transfer in  Patient is seen at the bedside at 03:54  Activation by:  [x ] ED physician [ ] EMS  Intubated in Field? [ ] Yes [x ] No  Intubated in ED? [ ] Yes [x ] No  Intubated in Trauma [place]? [ ] Yes [x ] No    DIANN GRIS Patient is a 63y old  Male who presents with a chief complaint of fall due to Stroke, evaluated in CT scan -> alert activated due to identified fact of fall.      Patient presented with GCS [13 ]  upon arrival to the critical/trauma bay.   Allergies  No Known Allergies  Intolerances    PAST MEDICAL & SURGICAL HISTORY:  Diabetes mellitus  Hypertension  Gout  Morbidly obese  Gout  S/P tonsillectomy    On AC/Antiplatelets [x ] Yes [ ] No              [x ] NOVACs, [ ] Coumadin, [ ] ASA, [ ] Antiplatelets     PE: No physical evidence of trauma  Assessment: CVA  PLAN  - supportive care  - GI/DVT prophylaxis  - pain management  - repeat studies as needed  - complete and follow up on trauma work up included but not limites to                          [x ] CXR [x ] PXR [x ] Extremities X-RAYs                          [ x] NCHCT [ x] C-Spine CT [ x] CT Chest [x ] CT Abdomen/Pelvis                          [x ] FAST [ ] Other                          [x ] Trauma Labs   [ ] Toxicology   - Follow up Consults  [x ] Neurosurgery [ ] Orthopaedics [ ] Plastics [ ] Fascial/OMFS [ ] Opthalmology   [ ] Urology  [ ] ENT                                          [x ] Medicine [ ] Geriatrics [ ] Cardiology/EP [ ] Hospice/Palliative Care                                        [ ] Pediatric ICU  [ ] SICU/SDU [ ] Burn/Burn ICU  [x] NeuroICU  - IV ABx give as indicated [ ] Yes [ x] No  - Tetanus given as indicated [ ] Yes [ x] No  - Seizures prophylaxis  [x ] Yes,  [ ] No    Kamala Franks MD, FACS  Trauma/ACS/Surgical Critical Care Attending

## 2023-06-06 NOTE — ED PROVIDER NOTE - OBJECTIVE STATEMENT
63-year-old male with past medical history atrial fibrillation (on Eliquis), hemorrhagic CVA in 2019, HTN, CHF, gout, diabetes mellitus presents as a stroke code.  Patient's last known well was 11:30 PM.  Patient's wife states she woke at 2 AM after hearing a thud.  States she saw her  on the floor, who denied head trauma and loss of consciousness, endorsed left-sided weakness. 63-year-old male with past medical history atrial fibrillation (on Eliquis), hemorrhagic CVA in 2019, HTN, CHF, gout, diabetes mellitus presents as a stroke code.  Patient's last known well was 11:30 PM.  Patient's wife states she woke at 2 AM after hearing a thud.  States she saw her  on the floor. EMS endorsed patient had left-sided weakness. Patient was stroke code pre-note and was trauma alert on arrival. 63-year-old male with past medical history atrial fibrillation (on Eliquis), hemorrhagic CVA in 2019, HTN, CHF, gout, diabetes mellitus presents as a stroke code.  Patient's last known well was 11:30 PM.  Patient's wife states she woke at 2 AM after hearing a thud.  States she saw her  on the floor. EMS endorsed patient had right-sided weakness. Patient was stroke code pre-note and was trauma alert on arrival.

## 2023-06-06 NOTE — ED PROVIDER NOTE - PHYSICAL EXAMINATION
Constitutional: Well developed, well nourished. NAD  TRAUMA: ABC intact. GCS 13.  Head: Normocephalic, atraumatic.  Eyes: PERRL. No Raccoon eyes.   ENT: No nasal discharge.  No Pena sign. Mucous membranes moist.  Neck: No midline tenderness or stepoffs.  Cardiovascular: Normal S1, S2. Regular rate and rhythm.   Pulmonary: Normal respiratory rate and effort. Lungs clear to auscultation bilaterally.   CHEST: No chest wall tenderness or crepitus.  Abdominal: Soft. Nondistended. Nontender.   Extremities. Pelvis stable.  Skin: No rashes, cyanosis, lacerations, or abrasions.  Neuro: AAOx1. Follows some commands. Decreased tone in right upper and lower extremities. Able to maintain force against gravity in left upper and lower extremities.   Psych: Normal mood. Normal affect. Constitutional: Well developed, well nourished. NAD  TRAUMA: ABC intact. GCS 13.  Head: Normocephalic, atraumatic.  Eyes: PERRL. No Raccoon eyes.   ENT: Airway intact. No nasal discharge.  No Pena sign. Mucous membranes moist.   Neck: No midline tenderness or stepoffs.  Cardiovascular: Normal S1, S2. Regular rate and rhythm.   Pulmonary: Normal respiratory rate and effort. Lungs clear to auscultation bilaterally.   CHEST: No chest wall tenderness or crepitus.  Abdominal: Soft. Nondistended. Nontender.   Extremities. Pelvis stable.  Skin: No rashes, cyanosis, lacerations, or abrasions.  Neuro: AAOx1. Responsive to verbal stimuli. Following commands. Decreased tone in right upper and lower extremities. Able to maintain force against gravity in left upper and lower extremities.   Psych: Normal mood. Normal affect.

## 2023-06-06 NOTE — CONSULT NOTE ADULT - ASSESSMENT
Impression;    Small ICB  HO CVA  HO ZE on CPAP     Plan:    ICB therapy for neuro crit  CAPAP during sleep when more awake  HOB At 45 degrees  OP Pulmonary follow up   DW wife at the bed side

## 2023-06-06 NOTE — H&P ADULT - HISTORY OF PRESENT ILLNESS
This is a 62 y/o M with hx of Afib on eiquis, CVA BG bleed ,HTN, CHF, Gout, DM  presents as a stroke code after being down in bathroom unknown time LKW was 2300. 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 Pt is lethargic  - Cardene started for blood pressure control. GCS 12 ICH 1 [facility] 17     This is a 62 y/o M with hx of Afib on eiquis, CVA BG bleed ,HTN, CHF, Gout, DM  presents as a stroke code after being down in bathroom unknown time LKW was 2300. 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 Pt is lethargic  - Cardene started for blood pressure control. GCS 12 ICH 1 [facility] 17,    ACC: [id number] EXAM:  CT ANGIO BRAIN STROKE PROTC IC   ORDERED BY: MARIELA MARES     ACC: [id number] EXAM:  CT ANGIO NECK STROKE PROTCL IC   ORDERED BY: MARIELA MARES     PROCEDURE DATE:  06/06/2023          INTERPRETATION:  CLINICAL INDICATION: Code stroke. Right-sided weakness   and altered mental status.    TECHNIQUE: CTA of the head and neck was performed after the intravenous   administration of of contrast. 3-D reconstructions were performed under   physician supervision on a separate workstation and reviewed. A total of   70 mL Omnipaque 350 nonionic IV contrast was administered, 30 cc   discarded.    NASCET CRITERIA FOR CAROTID STENOSIS:  Mild: 0% to 49%, Moderate: 50% to 69%, Severe: 70% to 99%, Complete   Occlusion.    COMPARISON: Noncontrast head CT performed earlier the same day. MRA brain   4/19/2019.      FINDINGS:  AORTIC ARCH: Normal three-vessel arch. There is calcification of the   brachiocephalic artery without significant flow-limiting stenosis    RIGHT ANTERIOR CIRCULATION:  The common carotid artery (CCA) takes a medialized course remaining   patent up to the bulb without stenosis. There is calcification at the   carotid bulb without significant flow-limiting stenosis. The external   carotid artery (ECA) and its proximal branches are patent without   stenosis. The cervical internal carotid artery (ICA) is patent without   stenosis. The intracranial internal carotid artery is patent without   stenosis.    The middle cerebral artery (MCA) is patent without stenosis. The anterior   cerebral artery, A1 segment is hypoplastic. The ALVAREZ A2 segment is   supplied via the anterior communicating artery, variant anatomy.    LEFT ANTERIOR CIRCULATION:  The CCA is patent up to the bulb without stenosis. There is calcification   at the carotid bulb without significant flow-limiting stenosis The ECA   and its proximal branches are patent without stenosis. The cervical ICA   is patent without stenosis. The intracranial ICA is patent without   stenosis.    The anterior and middle cerebral arteries are patent without stenosis.    There is a 5 mm focus of contrast enhancement along the lateral aspect of   the known intraparenchymal hematoma in the left thalamus discontinuous   vessels, best seen on 4:287, compatible with "spot sign."    POSTERIOR CIRCULATION:  The vertebral arteries are patent without stenosis bilaterally. There is   tiny atherosclerotic calcifications at the origin of the right vertebral   artery and involving the V2 segments bilaterally. The basilar artery is   patent without stenosis. The proximal branch vasculature of the posterior   circulation are within normal limits.    The posterior cerebral arteries are patent without stenosis.    There is no evidence for saccular aneurysm, vascular malformation, or   large vessel occlusion.      OTHERS:  Please see separately dictated CT head for the intracranial findings.  Partially imaged coronary artery calcifications.  Ectatic main pulmonary artery to 3.8 cm suggestive of pulmonary arterial   hypertension      IMPRESSION:  1.  No large vessel occlusion, high-grade stenosis, aneurysm, or vascular   malformation.  2.  A 5 mm focus of contrast enhancement within lateral aspect of acute   left thalamic intraparenchymal hemorrhage("CTA Spot sign"), which has   been associated with hematoma growth.  3.  Please see separately dictated CT head for nonvascular intracranial   findings.  MPRESSION:    1.  Acute left thalamic intraparenchymal hemorrhage extending to the left   corona radiata and possibly the left caudate nucleus with associated   vasogenic edema.  2.  Moderate chronic microvascular ischemic changes and chronic infarct   as above.      Preliminary findings with CHANG French at 6/6/2023 3:1     This is a 62 y/o M with hx of Afib on eiquis, CVA BG bleed ,HTN, CHF, Gout, DM  presents as a stroke code after being down in bathroom unknown time LKW was 2300. 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 Pt is lethargic  - Cardene started for blood pressure control.      Admission Scores  GCS 12   ICH 1   [facility] 17,    ACC: [id number] EXAM:  CT ANGIO BRAIN STROKE PROTC IC   ORDERED BY: MARIELA MARES     ACC: [id number] EXAM:  CT ANGIO NECK STROKE PROTCL IC   ORDERED BY: MARIELA MARES     PROCEDURE DATE:  06/06/2023          INTERPRETATION:  CLINICAL INDICATION: Code stroke. Right-sided weakness   and altered mental status.    TECHNIQUE: CTA of the head and neck was performed after the intravenous   administration of of contrast. 3-D reconstructions were performed under   physician supervision on a separate workstation and reviewed. A total of   70 mL Omnipaque 350 nonionic IV contrast was administered, 30 cc   discarded.    NASCET CRITERIA FOR CAROTID STENOSIS:  Mild: 0% to 49%, Moderate: 50% to 69%, Severe: 70% to 99%, Complete   Occlusion.    COMPARISON: Noncontrast head CT performed earlier the same day. MRA brain   4/19/2019.      FINDINGS:  AORTIC ARCH: Normal three-vessel arch. There is calcification of the   brachiocephalic artery without significant flow-limiting stenosis    RIGHT ANTERIOR CIRCULATION:  The common carotid artery (CCA) takes a medialized course remaining   patent up to the bulb without stenosis. There is calcification at the   carotid bulb without significant flow-limiting stenosis. The external   carotid artery (ECA) and its proximal branches are patent without   stenosis. The cervical internal carotid artery (ICA) is patent without   stenosis. The intracranial internal carotid artery is patent without   stenosis.    The middle cerebral artery (MCA) is patent without stenosis. The anterior   cerebral artery, A1 segment is hypoplastic. The ALVAREZ A2 segment is   supplied via the anterior communicating artery, variant anatomy.    LEFT ANTERIOR CIRCULATION:  The CCA is patent up to the bulb without stenosis. There is calcification   at the carotid bulb without significant flow-limiting stenosis The ECA   and its proximal branches are patent without stenosis. The cervical ICA   is patent without stenosis. The intracranial ICA is patent without   stenosis.    The anterior and middle cerebral arteries are patent without stenosis.    There is a 5 mm focus of contrast enhancement along the lateral aspect of   the known intraparenchymal hematoma in the left thalamus discontinuous   vessels, best seen on 4:287, compatible with "spot sign."    POSTERIOR CIRCULATION:  The vertebral arteries are patent without stenosis bilaterally. There is   tiny atherosclerotic calcifications at the origin of the right vertebral   artery and involving the V2 segments bilaterally. The basilar artery is   patent without stenosis. The proximal branch vasculature of the posterior   circulation are within normal limits.    The posterior cerebral arteries are patent without stenosis.    There is no evidence for saccular aneurysm, vascular malformation, or   large vessel occlusion.      OTHERS:  Please see separately dictated CT head for the intracranial findings.  Partially imaged coronary artery calcifications.  Ectatic main pulmonary artery to 3.8 cm suggestive of pulmonary arterial   hypertension      IMPRESSION:  1.  No large vessel occlusion, high-grade stenosis, aneurysm, or vascular   malformation.  2.  A 5 mm focus of contrast enhancement within lateral aspect of acute   left thalamic intraparenchymal hemorrhage("CTA Spot sign"), which has   been associated with hematoma growth.  3.  Please see separately dictated CT head for nonvascular intracranial   findings.  MPRESSION:    1.  Acute left thalamic intraparenchymal hemorrhage extending to the left   corona radiata and possibly the left caudate nucleus with associated   vasogenic edema.  2.  Moderate chronic microvascular ischemic changes and chronic infarct   as above.      Preliminary findings with CHANG French at 6/6/2023 3:1

## 2023-06-06 NOTE — ED PROVIDER NOTE - CARE PLAN
1 Principal Discharge DX:	Hemorrhagic stroke   Principal Discharge DX:	Hemorrhagic stroke  Assessment and plan of treatment:	stroke  labs, imaging, supportive care, neuro et al consultation

## 2023-06-06 NOTE — ED PROVIDER NOTE - CRITICAL CARE ATTENDING CONTRIBUTION TO CARE
63-year-old male with past medical history atrial fibrillation (on Eliquis), hemorrhagic CVA in 2019, HTN, CHF, gout, diabetes mellitus presents as a stroke code.  Patient's last known well was 11:30 PM.  Patient's wife states she woke at 2 AM after hearing a thud.  States she saw her  on the floor. EMS endorsed patient had left-sided weakness. Patient was stroke code pre-note and was trauma alert on arrival.    pt seen on arrival and sent to CT immediately. stroke code/trauma alert called.  Agree w above.    I personally saw the patient. PA ... and I provided critical care for a total of ... minutes. I provided a substantive portion of the care and the majority of the critical care time.

## 2023-06-06 NOTE — H&P ADULT - CRITICAL CARE ATTENDING COMMENT
Patient with hx of HTN, DM , gout afib on eloquis with L Basal ganglia hemorrhage with L lateral  ventricular compression    Neuro  Neuro checks q 1 hr  F/U CT scan increased heme post Andexxa management low dose Eliquis 2.5 mg  based on last dose < 8 hrs post ictus.  Repeat CT scan  Glucose control 140-180  Maintain - < 140  Consult IR for DSA  Monitor CR- for EV  Maintain Na goal - 140 < 150  Goal O2 > 94 %   MRI brain  F/U Stroke Core Measures

## 2023-06-06 NOTE — CONSULT NOTE ADULT - SUBJECTIVE AND OBJECTIVE BOX
Patient is a 63y old  Male who presents with a chief complaint of Stroke Code (06 Jun 2023 03:51)      HPI:      This is a 62 y/o M with hx of Afib on eiquis, CVA BG bleed ,HTN, CHF, Gout, DM  presents as a stroke code after being down in bathroom unknown time LKW was 2300. 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 Pt is lethargic  - Cardene started for blood pressure control. GCS 12 ICH 1 [facility] 17,    HO ZE on CPAP.  Fair compliance           PAST MEDICAL & SURGICAL HISTORY:  Diabetes mellitus      Hypertension      Gout      Morbidly obese      Gout      S/P tonsillectomy    ZE       SOCIAL HX:   Smoking  NO                       ETOH                            Other    FAMILY HISTORY:  FH: stroke    FH: CABG (coronary artery bypass surgery)    :  No known cardiovacular family hisotry     Review Of Systems:     All ROS are negative except per HPI       Allergies    No Known Allergies    Intolerances          PHYSICAL EXAM    ICU Vital Signs Last 24 Hrs  T(C): 35.7 (06 Jun 2023 06:00), Max: 35.7 (06 Jun 2023 06:00)  T(F): 96.3 (06 Jun 2023 06:00), Max: 96.3 (06 Jun 2023 06:00)  HR: 87 (06 Jun 2023 06:00) (70 - 87)  BP: 125/51 (06 Jun 2023 06:00) (112/53 - 200/100)  BP(mean): --  ABP: --  ABP(mean): --  RR: 18 (06 Jun 2023 06:00) (18 - 18)  SpO2: 100% (06 Jun 2023 06:00) (96% - 100%)    O2 Parameters below as of 06 Jun 2023 06:00  Patient On (Oxygen Delivery Method): nasal cannula  O2 Flow (L/min): 2          CONSTITUTIONAL:  Well nourished.   NAD    ENT:   Airway patent,   Mouth with normal mucosa.         CARDIAC:   Normal rate,   Regular rhythm.    edema    RESPIRATORY:   No wheezing  Bilateral BS   Not tachypneic,  No use of accessory muscles    GASTROINTESTINAL:  Abdomen soft,   Non-tender,   No guarding,   + BS      NEUROLOGICAL:   Lethargic    SKIN:   Skin normal color for race,   No evidence of rash.                LABS:                          12.2   8.99  )-----------( 226      ( 06 Jun 2023 03:30 )             36.8                                               06-06    141  |  101  |  69<HH>  ----------------------------<  154<H>  3.7   |  27  |  3.1<H>    Ca    8.9      06 Jun 2023 03:30    TPro  6.5  /  Alb  4.1  /  TBili  0.2  /  DBili  x   /  AST  34  /  ALT  43<H>  /  AlkPhos  65  06-06      PT/INR - ( 06 Jun 2023 03:30 )   PT: 12.00 sec;   INR: 1.05 ratio         PTT - ( 06 Jun 2023 03:30 )  PTT:30.8 sec                                           CARDIAC MARKERS ( 06 Jun 2023 03:30 )  x     / 0.07 ng/mL / x     / x     / x                                                LIVER FUNCTIONS - ( 06 Jun 2023 03:30 )  Alb: 4.1 g/dL / Pro: 6.5 g/dL / ALK PHOS: 65 U/L / ALT: 43 U/L / AST: 34 U/L / GGT: x                                                                                                                                   ABG - ( 06 Jun 2023 04:34 )  pH, Arterial: 7.42  pH, Blood: x     /  pCO2: 42    /  pO2: 124   / HCO3: 27    / Base Excess: 2.4   /  SaO2: 99.6                X-Rays reviewed                                                                                     ECHO        MEDICATIONS  (STANDING):  atorvastatin 40 milliGRAM(s) Oral at bedtime  dextrose 5%. 1000 milliLiter(s) (100 mL/Hr) IV Continuous <Continuous>  dextrose 5%. 1000 milliLiter(s) (50 mL/Hr) IV Continuous <Continuous>  dextrose 50% Injectable 25 Gram(s) IV Push once  dextrose 50% Injectable 12.5 Gram(s) IV Push once  dextrose 50% Injectable 25 Gram(s) IV Push once  furosemide    Tablet 40 milliGRAM(s) Oral daily  glucagon  Injectable 1 milliGRAM(s) IntraMuscular once  insulin lispro (ADMELOG) corrective regimen sliding scale   SubCutaneous every 6 hours  lactulose Syrup 10 Gram(s) Oral Once  niCARdipine Infusion 5 mG/Hr (25 mL/Hr) IV Continuous <Continuous>  niCARdipine Infusion 5 mG/Hr (25 mL/Hr) IV Continuous <Continuous>  tamsulosin 0.4 milliGRAM(s) Oral at bedtime    MEDICATIONS  (PRN):  acetaminophen     Tablet .. 650 milliGRAM(s) Oral every 6 hours PRN Temp greater or equal to 38C (100.4F), Mild Pain (1 - 3)  dextrose Oral Gel 15 Gram(s) Oral once PRN Blood Glucose LESS THAN 70 milliGRAM(s)/deciliter  hydrALAZINE Injectable 10 milliGRAM(s) IV Push every 6 hours PRN SBP> 160 and DBP> 90  labetalol Injectable 10 milliGRAM(s) IV Push every 2 hours PRN SBP> 160 and DBP> 90  ondansetron Injectable 4 milliGRAM(s) IV Push every 6 hours PRN Nausea and/or Vomiting  senna 2 Tablet(s) Oral at bedtime PRN Constipation  traMADol 50 milliGRAM(s) Oral Once PRN Severe Pain (7 - 10)

## 2023-06-06 NOTE — SWALLOW BEDSIDE ASSESSMENT ADULT - SLP GENERAL OBSERVATIONS
pt lethargic, minimally arousable, A&Ox1 with verbal cues provided, pt presented with left sided facial edema pt lethargic requiring noxious stim to increase arousal A&Ox1 with verbal cues provided, pt presented with left upper eyelid swelling

## 2023-06-06 NOTE — PROGRESS NOTE ADULT - SUBJECTIVE AND OBJECTIVE BOX
Left Thalamic Bleed    Pt seen and examined at bedside earlier. No distress      Vital Signs Last 24 Hrs  T(C): 36.1 (06 Jun 2023 15:14), Max: 36.1 (06 Jun 2023 07:51)  T(F): 97 (06 Jun 2023 15:14), Max: 97 (06 Jun 2023 07:51)  HR: 86 (06 Jun 2023 15:14) (70 - 87)  BP: 125/51 (06 Jun 2023 06:00) (112/53 - 200/100)  BP(mean): --  RR: 20 (06 Jun 2023 15:14) (18 - 20)  SpO2: 100% (06 Jun 2023 15:14) (96% - 100%)    Parameters below as of 06 Jun 2023 15:14  Patient On (Oxygen Delivery Method): nasal cannula  O2 Flow (L/min): 2      I&O's Detail    I&O's Summary      REVIEW OF SYSTEMS    [ ] A ten-point review of systems was otherwise negative except as noted.  [X] Due to altered mental status/intubation, subjective information were not able to be obtained from the patient. History was obtained, to the extent possible, from review of the chart and collateral sources of information.      PHYSICAL EXAM:  Neurological:  Awake & alert with slurred speech  PERRL  Left gaze preference  + Droop  + Right sided weakness  Follows some commands     LABS:                        11.3   12.18 )-----------( 209      ( 06 Jun 2023 10:10 )             33.0     06-06    140  |  107  |  61<HH>  ----------------------------<  145<H>  3.4<L>   |  22  |  3.2<H>    Ca    7.4<L>      06 Jun 2023 10:10  Phos  3.4     06-06  Mg     2.5     06-06    TPro  5.2<L>  /  Alb  3.4<L>  /  TBili  0.4  /  DBili  x   /  AST  24  /  ALT  33  /  AlkPhos  50  06-06    PT/INR - ( 06 Jun 2023 03:30 )   PT: 12.00 sec;   INR: 1.05 ratio         PTT - ( 06 Jun 2023 03:30 )  PTT:30.8 sec    CARDIAC MARKERS ( 06 Jun 2023 10:10 )  x     / 0.08 ng/mL / x     / x     / x      CARDIAC MARKERS ( 06 Jun 2023 03:30 )  x     / 0.07 ng/mL / x     / x     / x          CAPILLARY BLOOD GLUCOSE      POCT Blood Glucose.: 169 mg/dL (06 Jun 2023 17:13)  POCT Blood Glucose.: 175 mg/dL (06 Jun 2023 13:14)  POCT Blood Glucose.: 153 mg/dL (06 Jun 2023 09:26)  POCT Blood Glucose.: 187 mg/dL (06 Jun 2023 06:15)  POCT Blood Glucose.: 129 mg/dL (06 Jun 2023 03:28)      CSF Analysis: [] N/A      Allergies    No Known Allergies    Intolerances      MEDICATIONS:  Antibiotics:    Neuro:  acetaminophen     Tablet .. 650 milliGRAM(s) Oral every 6 hours PRN  ondansetron Injectable 4 milliGRAM(s) IV Push every 6 hours PRN  traMADol 50 milliGRAM(s) Oral Once PRN      IVF:  dextrose 5%. 1000 milliLiter(s) IV Continuous <Continuous>  dextrose 5%. 1000 milliLiter(s) IV Continuous <Continuous>      CULTURES:      RADIOLOGY & ADDITIONAL TESTS:  < from: CT Head No Cont (06.06.23 @ 12:39) >  Redemonstrated left thalamic hemorrhage extending into the left basal   ganglia and the ventricles without significant change in volume compared   to the prior CT head from 6/6/2023. Stable ventricle size.    < end of copied text >      ASSESSMENT:  63y Male s/p    Nontraumatic intracerebral hemorrhage    FH: stroke    FH: CABG (coronary artery bypass surgery)    Handoff    MEWS Score    Diabetes mellitus    Hypertension    Gout    Morbidly obese    Gout    Hemorrhagic stroke    S/P tonsillectomy    STROKE    90+    [id number]        PLAN:  Continue Neuro Critical Care Management Left Thalamic Bleed    Pt seen and examined at bedside earlier. No distress      Vital Signs Last 24 Hrs  T(C): 36.1 (06 Jun 2023 15:14), Max: 36.1 (06 Jun 2023 07:51)  T(F): 97 (06 Jun 2023 15:14), Max: 97 (06 Jun 2023 07:51)  HR: 86 (06 Jun 2023 15:14) (70 - 87)  BP: 125/51 (06 Jun 2023 06:00) (112/53 - 200/100)  BP(mean): --  RR: 20 (06 Jun 2023 15:14) (18 - 20)  SpO2: 100% (06 Jun 2023 15:14) (96% - 100%)    Parameters below as of 06 Jun 2023 15:14  Patient On (Oxygen Delivery Method): nasal cannula  O2 Flow (L/min): 2      I&O's Detail    I&O's Summary      REVIEW OF SYSTEMS    [ ] A ten-point review of systems was otherwise negative except as noted.  [X] Due to altered mental status/intubation, subjective information were not able to be obtained from the patient. History was obtained, to the extent possible, from review of the chart and collateral sources of information.      PHYSICAL EXAM:  Neurological:  Awake & alert with slurred speech  PERRL  Left gaze preference  + Droop  + Dense Right sided weakness  Follows simple commands antigravity L side     LABS:                        11.3   12.18 )-----------( 209      ( 06 Jun 2023 10:10 )             33.0     06-06    140  |  107  |  61<HH>  ----------------------------<  145<H>  3.4<L>   |  22  |  3.2<H>    Ca    7.4<L>      06 Jun 2023 10:10  Phos  3.4     06-06  Mg     2.5     06-06    TPro  5.2<L>  /  Alb  3.4<L>  /  TBili  0.4  /  DBili  x   /  AST  24  /  ALT  33  /  AlkPhos  50  06-06    PT/INR - ( 06 Jun 2023 03:30 )   PT: 12.00 sec;   INR: 1.05 ratio         PTT - ( 06 Jun 2023 03:30 )  PTT:30.8 sec    CARDIAC MARKERS ( 06 Jun 2023 10:10 )  x     / 0.08 ng/mL / x     / x     / x      CARDIAC MARKERS ( 06 Jun 2023 03:30 )  x     / 0.07 ng/mL / x     / x     / x          CAPILLARY BLOOD GLUCOSE      POCT Blood Glucose.: 169 mg/dL (06 Jun 2023 17:13)  POCT Blood Glucose.: 175 mg/dL (06 Jun 2023 13:14)  POCT Blood Glucose.: 153 mg/dL (06 Jun 2023 09:26)  POCT Blood Glucose.: 187 mg/dL (06 Jun 2023 06:15)  POCT Blood Glucose.: 129 mg/dL (06 Jun 2023 03:28)      CSF Analysis: [] N/A      Allergies    No Known Allergies    Intolerances      MEDICATIONS:  Antibiotics:    Neuro:  acetaminophen     Tablet .. 650 milliGRAM(s) Oral every 6 hours PRN  ondansetron Injectable 4 milliGRAM(s) IV Push every 6 hours PRN  traMADol 50 milliGRAM(s) Oral Once PRN      IVF:  dextrose 5%. 1000 milliLiter(s) IV Continuous <Continuous>  dextrose 5%. 1000 milliLiter(s) IV Continuous <Continuous>      CULTURES:      RADIOLOGY & ADDITIONAL TESTS:  < from: CT Head No Cont (06.06.23 @ 12:39) >  Redemonstrated left thalamic hemorrhage extending into the left basal   ganglia and the ventricles without significant change in volume compared   to the prior CT head from 6/6/2023. Stable ventricle size.    < end of copied text >      ASSESSMENT:  63y Male s/p    Nontraumatic intracerebral hemorrhage    FH: stroke    FH: CABG (coronary artery bypass surgery)    Handoff    MEWS Score    Diabetes mellitus    Hypertension    Gout    Morbidly obese    Gout    Hemorrhagic stroke    S/P tonsillectomy    STROKE    90+    [email or web address]        PLAN:  Continue Neuro Critical Care Management

## 2023-06-06 NOTE — STROKE CODE NOTE - NIH STROKE SCALE: 6A. MOTOR LEG, LEFT, QM
Pt approached RN requesting PRN pain medication for abdominal cramps  Pt provided Tylenol 650mg and heat packs at 0053  Pt returned to bed and slept throughout night without difficulty  (0) No drift; leg holds 30 degree position for full 5 secs

## 2023-06-06 NOTE — CONSULT NOTE ADULT - SUBJECTIVE AND OBJECTIVE BOX
Neuroendovascular Consult:   Consulted for:  Consideration for a catheter angiogram    HPI:  The patient is a 63-year-old male with a past history of Afib on Eliquis, prior basal ganglia hemorrhage, HTN, Gout, and DM, who presented as a stroke after having been found down by his wife at home complaining of right-sided weakness. SBP was in the 200's on arrival and CTH demonstrated a left thalamic IPH. A neuroendovascular consult was placed for consideration for a diagnostic cerebral angiogram.     ACC: [id number] EXAM:  CT ANGIO BRAIN STROKE PROTC IC   ORDERED BY: MARIELA MARES     ACC: [id number] EXAM:  CT ANGIO NECK STROKE PROTCL IC   ORDERED BY: MARIELA MARES     PROCEDURE DATE:  06/06/2023          INTERPRETATION:  CLINICAL INDICATION: Code stroke. Right-sided weakness   and altered mental status.    TECHNIQUE: CTA of the head and neck was performed after the intravenous   administration of of contrast. 3-D reconstructions were performed under   physician supervision on a separate workstation and reviewed. A total of   70 mL Omnipaque 350 nonionic IV contrast was administered, 30 cc   discarded.    NASCET CRITERIA FOR CAROTID STENOSIS:  Mild: 0% to 49%, Moderate: 50% to 69%, Severe: 70% to 99%, Complete   Occlusion.    COMPARISON: Noncontrast head CT performed earlier the same day. MRA brain   4/19/2019.      FINDINGS:  AORTIC ARCH: Normal three-vessel arch. There is calcification of the   brachiocephalic artery without significant flow-limiting stenosis    RIGHT ANTERIOR CIRCULATION:  The common carotid artery (CCA) takes a medialized course remaining   patent up to the bulb without stenosis. There is calcification at the   carotid bulb without significant flow-limiting stenosis. The external   carotid artery (ECA) and its proximal branches are patent without   stenosis. The cervical internal carotid artery (ICA) is patent without   stenosis. The intracranial internal carotid artery is patent without   stenosis.    The middle cerebral artery (MCA) is patent without stenosis. The anterior   cerebral artery, A1 segment is hypoplastic. The ALVAREZ A2 segment is   supplied via the anterior communicating artery, variant anatomy.    LEFT ANTERIOR CIRCULATION:  The CCA is patent up to the bulb without stenosis. There is calcification   at the carotid bulb without significant flow-limiting stenosis The ECA   and its proximal branches are patent without stenosis. The cervical ICA   is patent without stenosis. The intracranial ICA is patent without   stenosis.    The anterior and middle cerebral arteries are patent without stenosis.    There is a 5 mm focus of contrast enhancement along the lateral aspect of   the known intraparenchymal hematoma in the left thalamus discontinuous   vessels, best seen on 4:287, compatible with "spot sign."    POSTERIOR CIRCULATION:  The vertebral arteries are patent without stenosis bilaterally. There is   tiny atherosclerotic calcifications at the origin of the right vertebral   artery and involving the V2 segments bilaterally. The basilar artery is   patent without stenosis. The proximal branch vasculature of the posterior   circulation are within normal limits.    The posterior cerebral arteries are patent without stenosis.    There is no evidence for saccular aneurysm, vascular malformation, or   large vessel occlusion.      OTHERS:  Please see separately dictated CT head for the intracranial findings.  Partially imaged coronary artery calcifications.  Ectatic main pulmonary artery to 3.8 cm suggestive of pulmonary arterial   hypertension      IMPRESSION:  1.  No large vessel occlusion, high-grade stenosis, aneurysm, or vascular   malformation.  2.  A 5 mm focus of contrast enhancement within lateral aspect of acute   left thalamic intraparenchymal hemorrhage("CTA Spot sign"), which has   been associated with hematoma growth.  3.  Please see separately dictated CT head for nonvascular intracranial   findings.  MPRESSION:    1.  Acute left thalamic intraparenchymal hemorrhage extending to the left   corona radiata and possibly the left caudate nucleus with associated   vasogenic edema.  2.  Moderate chronic microvascular ischemic changes and chronic infarct   as above.      Preliminary findings with CHANG French at 6/6/2023 3:1 (06 Jun 2023 03:51)      Interval HPI:     PAST MEDICAL & SURGICAL HISTORY:  Diabetes mellitus      Hypertension      Gout      Morbidly obese      Gout      S/P tonsillectomy          Pertinent PMHx     MEDICATIONS  (STANDING):  atorvastatin 40 milliGRAM(s) Oral at bedtime  dextrose 5%. 1000 milliLiter(s) (100 mL/Hr) IV Continuous <Continuous>  dextrose 5%. 1000 milliLiter(s) (50 mL/Hr) IV Continuous <Continuous>  dextrose 50% Injectable 25 Gram(s) IV Push once  dextrose 50% Injectable 12.5 Gram(s) IV Push once  dextrose 50% Injectable 25 Gram(s) IV Push once  furosemide    Tablet 40 milliGRAM(s) Oral daily  glucagon  Injectable 1 milliGRAM(s) IntraMuscular once  insulin lispro (ADMELOG) corrective regimen sliding scale   SubCutaneous every 6 hours  lactulose Syrup 10 Gram(s) Oral Once  niCARdipine Infusion 5 mG/Hr (25 mL/Hr) IV Continuous <Continuous>  niCARdipine Infusion 5 mG/Hr (25 mL/Hr) IV Continuous <Continuous>  tamsulosin 0.4 milliGRAM(s) Oral at bedtime    MEDICATIONS  (PRN):  acetaminophen     Tablet .. 650 milliGRAM(s) Oral every 6 hours PRN Temp greater or equal to 38C (100.4F), Mild Pain (1 - 3)  dextrose Oral Gel 15 Gram(s) Oral once PRN Blood Glucose LESS THAN 70 milliGRAM(s)/deciliter  hydrALAZINE Injectable 10 milliGRAM(s) IV Push every 6 hours PRN SBP> 160 and DBP> 90  labetalol Injectable 10 milliGRAM(s) IV Push every 2 hours PRN SBP> 160 and DBP> 90  ondansetron Injectable 4 milliGRAM(s) IV Push every 6 hours PRN Nausea and/or Vomiting  senna 2 Tablet(s) Oral at bedtime PRN Constipation  traMADol 50 milliGRAM(s) Oral Once PRN Severe Pain (7 - 10)      Allergies    No Known Allergies    Intolerances        Social History:   Smoking: Yes [ ]  No [ ]   ______pk yrs  ETOH  Yes [ ]  No [ ]  Social [ ]  DRUGS:  Yes [ ]  No [ ]  if so what______________    RFs:     FAMILY HISTORY:  FH: stroke    FH: CABG (coronary artery bypass surgery)        Physical Exam:   Vital Signs Last 24 Hrs  T(C): 36.1 (06 Jun 2023 07:51), Max: 36.1 (06 Jun 2023 07:51)  T(F): 97 (06 Jun 2023 07:51), Max: 97 (06 Jun 2023 07:51)  HR: 75 (06 Jun 2023 13:05) (70 - 87)  BP: 125/51 (06 Jun 2023 06:00) (112/53 - 200/100)  BP(mean): --  RR: 20 (06 Jun 2023 13:05) (18 - 20)  SpO2: 100% (06 Jun 2023 13:05) (96% - 100%)    Parameters below as of 06 Jun 2023 13:05  Patient On (Oxygen Delivery Method): nasal cannula  O2 Flow (L/min): 2      General:     Neuro :   NIHSS     Labs:                         11.3   12.18 )-----------( 209      ( 06 Jun 2023 10:10 )             33.0     06-06    140  |  107  |  61<HH>  ----------------------------<  145<H>  3.4<L>   |  22  |  3.2<H>    Ca    7.4<L>      06 Jun 2023 10:10  Phos  3.4     06-06  Mg     2.5     06-06    TPro  5.2<L>  /  Alb  3.4<L>  /  TBili  0.4  /  DBili  x   /  AST  24  /  ALT  33  /  AlkPhos  50  06-06    PT/INR - ( 06 Jun 2023 03:30 )   PT: 12.00 sec;   INR: 1.05 ratio         PTT - ( 06 Jun 2023 03:30 )  PTT:30.8 sec    Pertinent labs:                      11.3   12.18 )-----------( 209      ( 06 Jun 2023 10:10 )             33.0       06-06    140  |  107  |  61<HH>  ----------------------------<  145<H>  3.4<L>   |  22  |  3.2<H>    Ca    7.4<L>      06 Jun 2023 10:10  Phos  3.4     06-06  Mg     2.5     06-06    TPro  5.2<L>  /  Alb  3.4<L>  /  TBili  0.4  /  DBili  x   /  AST  24  /  ALT  33  /  AlkPhos  50  06-06      PT/INR - ( 06 Jun 2023 03:30 )   PT: 12.00 sec;   INR: 1.05 ratio         PTT - ( 06 Jun 2023 03:30 )  PTT:30.8 sec    Radiology & Additional Studies:   Radiology imaging reviewed.       ASSESSMENT/ PLAN:       Suggestions:   -   -   -     Risks, benefits, and alternatives to treatment discussed. All questions answered with understanding.   Thank you for the courtesy of this consult, please call JANNETTE DOWNING [phone] with any further questions.    Neuroendovascular Consult:   Consulted for:  Consideration for a catheter angiogram    HPI:  The patient is a 63-year-old male with a past history of Afib on Eliquis, prior basal ganglia hemorrhage, HTN, Gout, and DM, who presented as a stroke after having been found down by his wife at home complaining of right-sided weakness. SBP was in the 200's on arrival and CTH demonstrated a left thalamic IPH. A neuroendovascular consult was placed for consideration for a diagnostic cerebral angiogram. The risks and benefits of the procedure were discussed with the patient's brother and wife at bedside.       Interval HPI:   PAST MEDICAL & SURGICAL HISTORY:  Diabetes mellitus  Hypertension  Gout  Morbidly obese  Gout  S/P tonsillectomy    Pertinent PMHx   MEDICATIONS  (STANDING):  atorvastatin 40 milliGRAM(s) Oral at bedtime  dextrose 5%. 1000 milliLiter(s) (100 mL/Hr) IV Continuous <Continuous>  dextrose 5%. 1000 milliLiter(s) (50 mL/Hr) IV Continuous <Continuous>  dextrose 50% Injectable 25 Gram(s) IV Push once  dextrose 50% Injectable 12.5 Gram(s) IV Push once  dextrose 50% Injectable 25 Gram(s) IV Push once  furosemide    Tablet 40 milliGRAM(s) Oral daily  glucagon  Injectable 1 milliGRAM(s) IntraMuscular once  insulin lispro (ADMELOG) corrective regimen sliding scale   SubCutaneous every 6 hours  lactulose Syrup 10 Gram(s) Oral Once  niCARdipine Infusion 5 mG/Hr (25 mL/Hr) IV Continuous <Continuous>  niCARdipine Infusion 5 mG/Hr (25 mL/Hr) IV Continuous <Continuous>  tamsulosin 0.4 milliGRAM(s) Oral at bedtime    MEDICATIONS  (PRN):  acetaminophen     Tablet .. 650 milliGRAM(s) Oral every 6 hours PRN Temp greater or equal to 38C (100.4F), Mild Pain (1 - 3)  dextrose Oral Gel 15 Gram(s) Oral once PRN Blood Glucose LESS THAN 70 milliGRAM(s)/deciliter  hydrALAZINE Injectable 10 milliGRAM(s) IV Push every 6 hours PRN SBP> 160 and DBP> 90  labetalol Injectable 10 milliGRAM(s) IV Push every 2 hours PRN SBP> 160 and DBP> 90  ondansetron Injectable 4 milliGRAM(s) IV Push every 6 hours PRN Nausea and/or Vomiting  senna 2 Tablet(s) Oral at bedtime PRN Constipation  traMADol 50 milliGRAM(s) Oral Once PRN Severe Pain (7 - 10)      Allergies  No Known Allergies    Social History:   Smoking: Yes [ ]  No [X]   ______pk yrs  ETOH  Yes [ ]  No [X]  Social [ ]  DRUGS:  Yes [ ]  No [X]  if so what______________    FAMILY HISTORY:  FH: stroke  FH: CABG (coronary artery bypass surgery)    Physical Exam:   Vital Signs Last 24 Hrs  T(C): 36.1 (06 Jun 2023 07:51), Max: 36.1 (06 Jun 2023 07:51)  T(F): 97 (06 Jun 2023 07:51), Max: 97 (06 Jun 2023 07:51)  HR: 75 (06 Jun 2023 13:05) (70 - 87)  BP: 125/51 (06 Jun 2023 06:00) (112/53 - 200/100)  BP(mean): --  RR: 20 (06 Jun 2023 13:05) (18 - 20)  SpO2: 100% (06 Jun 2023 13:05) (96% - 100%)    Parameters below as of 06 Jun 2023 13:05  Patient On (Oxygen Delivery Method): nasal cannula  O2 Flow (L/min): 2    General:  NAD  Neuro: AAOX0, following simple commands, EO to voice, +severe dysarthria, +aphasia, +right upper extremity no movement, right lower extremity some flexion of knee/ movement along plane of bed, left upper/lower extremities antigravity without drift, +gaze preference, +right UE/LE sensory deficit.     Labs:                         11.3   12.18 )-----------( 209      ( 06 Jun 2023 10:10 )             33.0     06-06    140  |  107  |  61<HH>  ----------------------------<  145<H>  3.4<L>   |  22  |  3.2<H>    Ca    7.4<L>      06 Jun 2023 10:10  Phos  3.4     06-06  Mg     2.5     06-06    TPro  5.2<L>  /  Alb  3.4<L>  /  TBili  0.4  /  DBili  x   /  AST  24  /  ALT  33  /  AlkPhos  50  06-06    PT/INR - ( 06 Jun 2023 03:30 )   PT: 12.00 sec;   INR: 1.05 ratio         PTT - ( 06 Jun 2023 03:30 )  PTT:30.8 sec    Pertinent labs:                      11.3   12.18 )-----------( 209      ( 06 Jun 2023 10:10 )             33.0       06-06    140  |  107  |  61<HH>  ----------------------------<  145<H>  3.4<L>   |  22  |  3.2<H>    Ca    7.4<L>      06 Jun 2023 10:10  Phos  3.4     06-06  Mg     2.5     06-06    TPro  5.2<L>  /  Alb  3.4<L>  /  TBili  0.4  /  DBili  x   /  AST  24  /  ALT  33  /  AlkPhos  50  06-06  PT/INR - ( 06 Jun 2023 03:30 )   PT: 12.00 sec;   INR: 1.05 ratio      PTT - ( 06 Jun 2023 03:30 )  PTT:30.8 sec    Radiology & Additional Studies:   Radiology imaging reviewed.     Assessment:   The patient is a 63-year-old male with a past history of Afib on Eliquis, prior basal ganglia hemorrhage, HTN, Gout, and DM, who presented as a stroke after having been found down by his wife at home complaining of right-sided weakness. SBP was in the 200's on arrival and CTH demonstrated a left thalamic IPH. A neuroendovascular consult was placed for consideration for a diagnostic cerebral angiogram. The risks and benefits of the procedure were discussed with the patient's brother and wife at bedside.     Suggestions:   - The patient is tentatively scheduled for a diagnostic cerebral angiogram tomorrow 6/7 or Thursday 6/8 pending IR availability and patient clinical stability/ NCC agreement with the plan. Please keep NPO except medication and on IV fluid after midnight in preparation for the procedure  - AM coags/ CBC/CMP/Blood Type and Screen  - Nephrology recommendations/  risk stratification appreciated  - Will provide updates regarding scheduling early tomorrow morning.   [phone] Neuroendovascular with additional questions/ concerns or if there is any acute change in the patient's exam   [phone]     Risks, benefits, and alternatives to treatment discussed. All questions answered with understanding.   Thank you for the courtesy of this consult.

## 2023-06-06 NOTE — CONSULT NOTE ADULT - ASSESSMENT
Imp Lt thalamic intracerebral hemorrhage    Plan reverse a/c, keppra 1,000 mg, bp control, rept CTH 6h, neuro checks q 1h, will follow

## 2023-06-06 NOTE — ED ADULT TRIAGE NOTE - PRE-HOSPITAL NOTIFICATION
Abdomen soft, non-tender and non-distended, no rebound, no guarding and no masses. no hepatosplenomegaly. 06-Jun-2023 02:50

## 2023-06-06 NOTE — ED PROVIDER NOTE - CLINICAL SUMMARY MEDICAL DECISION MAKING FREE TEXT BOX
stroke  labs, imaging, supportive care, neuro et al consultation  on eliquis, will reverse per neuro recs. needs admission to an icu.

## 2023-06-06 NOTE — CONSULT NOTE ADULT - SUBJECTIVE AND OBJECTIVE BOX
HPI: 63M found down at home after getting up from bed and hearing a thump, pt was calling out that he cannot get up, presented to ED with intitial  and stroke code activated.  Pt takes eliquis for Afib, has history of prior left basal ganglia bleed in 2019 and from then to now has been without deficits. No head strike.     PAST MEDICAL & SURGICAL HISTORY:  Diabetes mellitus  Hypertension  Gout  Morbidly obese  ZE - CPAP  S/P tonsillectomy        FAMILY HISTORY:  FH: stroke    FH: CABG (coronary artery bypass surgery)        HOME MEDICATIONS:  Home Medications:  acetaminophen 325 mg oral tablet: 2 tab(s) orally every 6 hours, As needed, Temp greater or equal to 38C (100.4F), Mild Pain (1 - 3) (23 May 2019 10:27)  aspirin 81 mg oral tablet, chewable: 1 tab(s) orally once a day (23 May 2019 10:27)  cholecalciferol oral tablet: 2000 unit(s) orally once a day (29 Apr 2019 14:18)  polyethylene glycol 3350 oral powder for reconstitution: 17 gram(s) orally once a day (at bedtime) (23 May 2019 10:27)  senna oral tablet: 2 tab(s) orally once a day (at bedtime) (29 Apr 2019 14:11)    L:ist from spouse:  Toujeo, Lipitor, Alfuzosin, Hydralazine, Labetalol, Clonidine, Ezetimide, Eliquis, Amlodipine, Trulicity        MEDICATIONS:  Antibiotics:    Neuro:  levETIRAcetam  IVPB 1000 milliGRAM(s) IV Intermittent once    Anticoagulation:  andexanet eitan IVPB 480 milliGRAM(s) IV Intermittent once  andexanet eitan IVPB 400 milliGRAM(s) IV Bolus once    OTHER:  niCARdipine Infusion 5 mG/Hr IV Continuous <Continuous>    IVF:        Allergies    No Known Allergies    Intolerances          SOCIAL HISTORY:  Tobacco Use: non smoker  EtOH use: non   Substance:    LABS:                        12.2   8.99  )-----------( 226      ( 06 Jun 2023 03:30 )             36.8         RADIOLOGY & ADDITIONAL STUDIES:  CTH Left intracerebral hemorrage - thalamus  CTA - report pending  CTH May 2019 left basal ganglia bleed  MRI July 2019 left basal ganglia bleed resolving        REVIEW OF SYSTEMS - unable to obtain      Vital Signs Last 24 Hrs  T(C): --  T(F): --  HR: 70 (06 Jun 2023 03:41) (70 - 70)  BP: 200/100 (06 Jun 2023 03:41) (200/100 - 200/100)  BP(mean): --  RR: --  SpO2: 96% (06 Jun 2023 03:41) (96% - 96%)    Parameters below as of 06 Jun 2023 03:41  Patient On (Oxygen Delivery Method): nasal cannula  O2 Flow (L/min): 4        PHYSICAL EXAM:  MENTAL STATUS: AAO x1 lethargic drifts back asleep but will say his name, not opening eyes, will localize left upper extremity.  No observable movement right side. pupils 3mm reactive

## 2023-06-06 NOTE — SWALLOW BEDSIDE ASSESSMENT ADULT - SLP PERTINENT HISTORY OF CURRENT PROBLEM
62 yo male, morbidly obese, hx of Afib on eiquis, CVA BG bleed, HTN, CHF, Gout, DM. Currently being treated for left thalamic hemorrhage 62 yo male, morbidly obese, hx of Afib on eliquis, CVA BG bleed, HTN, CHF, Gout, DM.  Admitted with  Upon arrival NIH 17 Dysarthria, facial RUE weakness witth  R negect and sensory deficit  SBP  in the 200's, Head CT shows a thalamic bleed ICH (1)

## 2023-06-06 NOTE — H&P ADULT - NSHPLABSRESULTS_GEN_ALL_CORE
CBC Full  -  ( 06 Jun 2023 03:30 )  WBC Count : 8.99 K/uL  RBC Count : 4.24 M/uL  Hemoglobin : 12.2 g/dL  Hematocrit : 36.8 %  Platelet Count - Automated : 226 K/uL CBC Full  -  ( 06 Jun 2023 03:30 )  WBC Count : 8.99 K/uL  RBC Count : 4.24 M/uL  Hemoglobin : 12.2 g/dL  Hematocrit : 36.8 %  Platelet Count - Automated : 226 K/uL    06-06    141  |  101  |  69<HH>  ----------------------------<  154<H>  3.7   |  27  |  3.1<H>    Ca    8.9      06 Jun 2023 03:30    TPro  6.5  /  Alb  4.1  /  TBili  0.2  /  DBili  x   /  AST  34  /  ALT  43<H>  /  AlkPhos  65  06-06    PT/INR - ( 06 Jun 2023 03:30 )   PT: 12.00 sec;   INR: 1.05 ratio         PTT - ( 06 Jun 2023 03:30 )  PTT:30.8 sec

## 2023-06-06 NOTE — SWALLOW BEDSIDE ASSESSMENT ADULT - SWALLOW EVAL: FUNCTIONAL LEVEL AT TIME OF EVAL
pt. lethargic, minimally arousable, A&Ox1, aware of self, inconsistently followed commands and responded to simple yes/no questions when given verbal, visual, and tactile cues pt. lethargic, requiring noxious stim to increase arousal, A&Ox1, aware of self via yes/no, inconsistently followed simple commands and responded to simple yes/no questions when given verbal, visual, and tactile cues

## 2023-06-06 NOTE — PHYSICAL THERAPY INITIAL EVALUATION ADULT - SPECIFY REASON(S)
Pt is currently on BIPAP as pt desaturating on NC as per RN. Will hold PT IE for now & f/u once pt is appropriate for PT.

## 2023-06-06 NOTE — H&P ADULT - ASSESSMENT
IMP:73 y/o M with hx of CVA, DM ,HTN, Afib on eliquis, CHF, Gout presents as [place] Code/Code ICH with NIH 17, GCS11, ICH 1 with Left thalamic hemorrhage      Plan: Admit to Neuro ICU            Neuro checks q 1 -= NIH scale           IV Tylenol for Pain          Head CT in 6H to evaluate progression    IMP:73 y/o M with hx of CVA, DM ,HTN, Afib on eliquis, CHF, Gout presents as [place] Code/Code ICH with NIH 17, GCS11, ICH 1 with Left thalamic hemorrhage      Plan: Admit to Neuro ICU            Neuro checks q 1 -= NIH scale           IV Tylenol for Pain          Head CT in 6H to evaluate progression          HOB at 30 degrees           Stroke labs - ICH score     CV: EKG daily            Trops bump .008 - Trend for 24H             Echo in AM            SBP [id number] - Cardene PRN by cuff            Daily weights    GI: Keep NPO       Protonix for GI coverage       Dysphagia screening    Pulmonary:      CXR now       ABG w/i normal limits - Sleep Apnea on CPAP at home  - Follow up with Pulmonary      ON 2L NC sating 95%        ABG q 6    Renal: On lasix 40mg daily              CKD - IVF at 10cc             Follow up BMP - Nephrology consult for CKD             Daily weights monitor stricy I's and O's               Endocrine: Fs q 6H                          ISS q 6H    Heme: Follow CBC                Hold Eliquis and ASA                Reversal of Eliquis with Andexa low dose 400U bolus and Gtt                Monitor Fever curve and WBC- If febrile Pan culture              IMP:73 y/o M with hx of CVA, DM ,HTN, Afib on eliquis, CHF, Gout presents as [place] Code/Code ICH with NIH 17, GCS11, ICH 1 with Left thalamic hemorrhage      Plan: Admit to Neuro ICU           Neuro checks q 1 -= NIH scale           IV Tylenol for Pain          Head CT in 6H to evaluate progression          HOB at 30 degrees          Consult IR for angio - R/O microaneurysm  as sourse of bleed           Stroke labs - ICH score     CV: EKG daily            Trops bump .008 - Trend for 24H             Echo in AM            SBP [id number] - Cardene PRN by cuff            Daily weights    GI: Keep NPO       Protonix for GI coverage       Dysphagia screening    Pulmonary:      CXR now       ABG w/i normal limits - Sleep Apnea on CPAP at home  - Follow up with Pulmonary      ON 2L NC sating 95%        ABG q 6    Renal: On lasix 40mg daily              CKD - IVF at 10cc             Follow up BMP - Nephrology consult for CKD             Daily weights monitor stricy I's and O's               Endocrine: Fs q 6H                          ISS q 6H                        Stroke Core measures- HGBA1C, Lipid profile, TSH     Heme: Follow CBC                Hold Eliquis and ASA                Reversal of Eliquis with Andexa low dose 400U bolus and Gtt- 480U                Monitor Fever curve and WBC- If febrile Pan culture    Hold Chemoprophylaxsis for DVT due to risk of increased bleeding  Place on SCD              IMP:73 y/o M with hx of CVA, DM ,HTN, Afib on eliquis, CHF, Gout presents as [place] Code/Code ICH with NIH 17, GCS11, ICH 1 with Left thalamic hemorrhage      Plan: Admit to Neuro ICU           Neuro checks q 1 -= NIH scale           IV Tylenol for Pain          Head CT in 6H to evaluate progression          HOB at 30 degrees          Consult IR for angio - R/O microaneurysm  as sourse of bleed           Stroke labs - ICH score     CV: EKG daily            Trops .008 - Trend for 24H             Lopressor 2.5mg Iv q6- hold if HR < 65  and SBP < 100            Echo in AM            SBP [id number] - Cardene PRN by cuff            Daily weights    GI: Keep NPO       Protonix for GI coverage       Dysphagia screening    Pulmonary:      CXR now       ABG w/i normal limits - Sleep Apnea on CPAP at home  - Follow up with Pulmonary      ON 2L NC sating 95%        ABG q 6    Renal: On lasix 40mg daily              CKD - IVF at 10cc             Follow up BMP - Nephrology consult for CKD             Daily weights monitor stricy I's and O's               Endocrine: Fs q 6H                          ISS q 6H                        Stroke Core measures- HGBA1C, Lipid profile, TSH     Heme: Follow CBC                Hold Eliquis and ASA                Reversal of Eliquis with Andexa low dose 400U bolus and Gtt- 480U                Monitor Fever curve and WBC- If febrile Pan culture    Hold Chemoprophylaxsis for DVT due to risk of increased bleeding  Place on SCD

## 2023-06-06 NOTE — CONSULT NOTE ADULT - ASSESSMENT
ASSESSMENT:  63M w/ PMH as above who presents as a stroke code pre-note, found to have left thalamic intraparenchymal hemorrhage consistent with HTN stroke.  Trauma alert called for possible head trauma s/p fall on Eliquis.  No external signs of injury, stroke likely preceding fall given radiographic findings and story per family who were present.    Injuries identified:   -  left thalamic intraparenchymal hemorrhage  -  No acute traumatic injuries      PLAN:    -No further traumatic workup required.   -Dispo per ED and plan per NSGY/NCC      Disposition pending results of above labs and imaging  Above plan discussed with Trauma attending, Dr. Franks  , patient, patient family, and ED team  --------------------------------------------------------------------------------------  06-06-23 @ 04:23    TRAUMA SENIOR SPECTRA: [id number]  TRAUMA TEAM SPECTRA: [id number]

## 2023-06-07 NOTE — PHYSICAL THERAPY INITIAL EVALUATION ADULT - IMPAIRMENTS CONTRIBUTING IMPAIRED BED MOBILITY, REHAB EVAL
impaired balance/cognition/impaired motor control/abnormal muscle tone/impaired postural control/impaired sensory feedback/decreased strength

## 2023-06-07 NOTE — PROGRESS NOTE ADULT - SUBJECTIVE AND OBJECTIVE BOX
S/p Left Thalamic bleed        pt seen and examined at bedside pt is awake follows simple commands       Vital Signs Last 24 Hrs  T(C): 37 (07 Jun 2023 08:00), Max: 37.6 (07 Jun 2023 04:00)  T(F): 98.6 (07 Jun 2023 08:00), Max: 99.7 (07 Jun 2023 04:00)  HR: 87 (07 Jun 2023 11:00) (75 - 120)  BP: 179/79 (06 Jun 2023 20:00) (179/79 - 179/79)  BP(mean): --  RR: 31 (07 Jun 2023 11:00) (19 - 48)  SpO2: 98% (07 Jun 2023 11:00) (95% - 100%)    Parameters below as of 07 Jun 2023 11:00  Patient On (Oxygen Delivery Method): nasal cannula  O2 Flow (L/min): 2      I&O's Detail    06 Jun 2023 07:01  -  07 Jun 2023 07:00  --------------------------------------------------------  IN:    NiCARdipine: 116 mL    NiCARdipine: 257.5 mL  Total IN: 373.5 mL    OUT:    Indwelling Catheter - Urethral (mL): 315 mL  Total OUT: 315 mL    Total NET: 58.5 mL      07 Jun 2023 07:01  -  07 Jun 2023 11:40  --------------------------------------------------------  IN:    NiCARdipine: 250 mL  Total IN: 250 mL    OUT:    Indwelling Catheter - Urethral (mL): 140 mL  Total OUT: 140 mL    Total NET: 110 mL        I&O's Summary    06 Jun 2023 07:01  -  07 Jun 2023 07:00  --------------------------------------------------------  IN: 373.5 mL / OUT: 315 mL / NET: 58.5 mL    07 Jun 2023 07:01  -  07 Jun 2023 11:40  --------------------------------------------------------  IN: 250 mL / OUT: 140 mL / NET: 110 mL        PHYSICAL EXAM:  Awake & alert with slurred speech  PERRL  Left gaze preference  + Droop  + Right sided weakness      LABS:                        10.2   12.65 )-----------( 249      ( 07 Jun 2023 06:09 )             30.9     06-07    143  |  104  |  78<HH>  ----------------------------<  170<H>  4.5   |  24  |  4.7<HH>    Ca    8.4      07 Jun 2023 06:09  Phos  3.9     06-07  Mg     3.5     06-07    TPro  5.8<L>  /  Alb  3.8  /  TBili  0.4  /  DBili  x   /  AST  31  /  ALT  33  /  AlkPhos  59  06-07    PT/INR - ( 07 Jun 2023 06:09 )   PT: 12.10 sec;   INR: 1.06 ratio         PTT - ( 07 Jun 2023 06:09 )  PTT:27.8 sec    CARDIAC MARKERS ( 07 Jun 2023 06:09 )  x     / 0.18 ng/mL / x     / x     / x      CARDIAC MARKERS ( 06 Jun 2023 20:32 )  x     / 0.13 ng/mL / x     / x     / x      CARDIAC MARKERS ( 06 Jun 2023 10:10 )  x     / 0.08 ng/mL / x     / x     / x      CARDIAC MARKERS ( 06 Jun 2023 03:30 )  x     / 0.07 ng/mL / x     / x     / x          CAPILLARY BLOOD GLUCOSE      POCT Blood Glucose.: 189 mg/dL (07 Jun 2023 11:21)  POCT Blood Glucose.: 176 mg/dL (07 Jun 2023 06:19)  POCT Blood Glucose.: 165 mg/dL (07 Jun 2023 04:38)  POCT Blood Glucose.: 150 mg/dL (06 Jun 2023 23:20)  POCT Blood Glucose.: 149 mg/dL (06 Jun 2023 21:18)  POCT Blood Glucose.: 169 mg/dL (06 Jun 2023 17:13)  POCT Blood Glucose.: 175 mg/dL (06 Jun 2023 13:14)      Drug Levels: [] N/A    CSF Analysis: [] N/A      Allergies    No Known Allergies    Intolerances      MEDICATIONS:  Antibiotics:    Neuro:  acetaminophen     Tablet .. 650 milliGRAM(s) Oral every 6 hours PRN  ondansetron Injectable 4 milliGRAM(s) IV Push every 6 hours PRN  traMADol 50 milliGRAM(s) Oral Once PRN    Anticoagulation:    OTHER:  atorvastatin 40 milliGRAM(s) Oral at bedtime  chlorhexidine 2% Cloths 1 Application(s) Topical <User Schedule>  dextrose 50% Injectable 25 Gram(s) IV Push once  dextrose 50% Injectable 25 Gram(s) IV Push once  dextrose 50% Injectable 12.5 Gram(s) IV Push once  dextrose Oral Gel 15 Gram(s) Oral once PRN  furosemide    Tablet 40 milliGRAM(s) Oral daily  glucagon  Injectable 1 milliGRAM(s) IntraMuscular once  hydrALAZINE Injectable 10 milliGRAM(s) IV Push every 6 hours PRN  insulin lispro (ADMELOG) corrective regimen sliding scale   SubCutaneous every 6 hours  labetalol Injectable 10 milliGRAM(s) IV Push every 2 hours PRN  lactulose Syrup 10 Gram(s) Oral Once  metoprolol tartrate Injectable 2.5 milliGRAM(s) IV Push every 6 hours  niCARdipine Infusion 5 mG/Hr IV Continuous <Continuous>  senna 2 Tablet(s) Oral at bedtime PRN  tamsulosin 0.4 milliGRAM(s) Oral at bedtime    IVF:  dextrose 5%. 1000 milliLiter(s) IV Continuous <Continuous>  dextrose 5%. 1000 milliLiter(s) IV Continuous <Continuous>    CULTURES:    RADIOLOGY & ADDITIONAL TESTS:      ASSESSMENT:  63y Male s/p    Nontraumatic intracerebral hemorrhage    FH: stroke    FH: CABG (coronary artery bypass surgery)    Handoff    MEWS Score    Diabetes mellitus    Hypertension    Gout    Morbidly obese    Gout    Hemorrhagic stroke    S/P tonsillectomy    STROKE    90+    [id number]      A/p           S/p Left Thalamic bleed                 No Neurosurgical intervention needed at this time                 Care per NCC / F/u with Stroke neuro     S/p Left Thalamic bleed        pt seen and examined at bedside pt is awake follows simple commands       Vital Signs Last 24 Hrs  T(C): 37 (07 Jun 2023 08:00), Max: 37.6 (07 Jun 2023 04:00)  T(F): 98.6 (07 Jun 2023 08:00), Max: 99.7 (07 Jun 2023 04:00)  HR: 87 (07 Jun 2023 11:00) (75 - 120)  BP: 179/79 (06 Jun 2023 20:00) (179/79 - 179/79)  BP(mean): --  RR: 31 (07 Jun 2023 11:00) (19 - 48)  SpO2: 98% (07 Jun 2023 11:00) (95% - 100%)    Parameters below as of 07 Jun 2023 11:00  Patient On (Oxygen Delivery Method): nasal cannula  O2 Flow (L/min): 2      I&O's Detail    06 Jun 2023 07:01  -  07 Jun 2023 07:00  --------------------------------------------------------  IN:    NiCARdipine: 116 mL    NiCARdipine: 257.5 mL  Total IN: 373.5 mL    OUT:    Indwelling Catheter - Urethral (mL): 315 mL  Total OUT: 315 mL    Total NET: 58.5 mL      07 Jun 2023 07:01  -  07 Jun 2023 11:40  --------------------------------------------------------  IN:    NiCARdipine: 250 mL  Total IN: 250 mL    OUT:    Indwelling Catheter - Urethral (mL): 140 mL  Total OUT: 140 mL    Total NET: 110 mL        I&O's Summary    06 Jun 2023 07:01  -  07 Jun 2023 07:00  --------------------------------------------------------  IN: 373.5 mL / OUT: 315 mL / NET: 58.5 mL    07 Jun 2023 07:01  -  07 Jun 2023 11:40  --------------------------------------------------------  IN: 250 mL / OUT: 140 mL / NET: 110 mL        PHYSICAL EXAM:  Awake & alert with slurred speech  PERRL  Left gaze preference  + Droop  + Right sided weakness      LABS:                        10.2   12.65 )-----------( 249      ( 07 Jun 2023 06:09 )             30.9     06-07    143  |  104  |  78<HH>  ----------------------------<  170<H>  4.5   |  24  |  4.7<HH>    Ca    8.4      07 Jun 2023 06:09  Phos  3.9     06-07  Mg     3.5     06-07    TPro  5.8<L>  /  Alb  3.8  /  TBili  0.4  /  DBili  x   /  AST  31  /  ALT  33  /  AlkPhos  59  06-07    PT/INR - ( 07 Jun 2023 06:09 )   PT: 12.10 sec;   INR: 1.06 ratio         PTT - ( 07 Jun 2023 06:09 )  PTT:27.8 sec    CARDIAC MARKERS ( 07 Jun 2023 06:09 )  x     / 0.18 ng/mL / x     / x     / x      CARDIAC MARKERS ( 06 Jun 2023 20:32 )  x     / 0.13 ng/mL / x     / x     / x      CARDIAC MARKERS ( 06 Jun 2023 10:10 )  x     / 0.08 ng/mL / x     / x     / x      CARDIAC MARKERS ( 06 Jun 2023 03:30 )  x     / 0.07 ng/mL / x     / x     / x          CAPILLARY BLOOD GLUCOSE      POCT Blood Glucose.: 189 mg/dL (07 Jun 2023 11:21)  POCT Blood Glucose.: 176 mg/dL (07 Jun 2023 06:19)  POCT Blood Glucose.: 165 mg/dL (07 Jun 2023 04:38)  POCT Blood Glucose.: 150 mg/dL (06 Jun 2023 23:20)  POCT Blood Glucose.: 149 mg/dL (06 Jun 2023 21:18)  POCT Blood Glucose.: 169 mg/dL (06 Jun 2023 17:13)  POCT Blood Glucose.: 175 mg/dL (06 Jun 2023 13:14)      Drug Levels: [] N/A    CSF Analysis: [] N/A      Allergies    No Known Allergies    Intolerances      MEDICATIONS:  Antibiotics:    Neuro:  acetaminophen     Tablet .. 650 milliGRAM(s) Oral every 6 hours PRN  ondansetron Injectable 4 milliGRAM(s) IV Push every 6 hours PRN  traMADol 50 milliGRAM(s) Oral Once PRN    Anticoagulation:    OTHER:  atorvastatin 40 milliGRAM(s) Oral at bedtime  chlorhexidine 2% Cloths 1 Application(s) Topical <User Schedule>  dextrose 50% Injectable 25 Gram(s) IV Push once  dextrose 50% Injectable 25 Gram(s) IV Push once  dextrose 50% Injectable 12.5 Gram(s) IV Push once  dextrose Oral Gel 15 Gram(s) Oral once PRN  furosemide    Tablet 40 milliGRAM(s) Oral daily  glucagon  Injectable 1 milliGRAM(s) IntraMuscular once  hydrALAZINE Injectable 10 milliGRAM(s) IV Push every 6 hours PRN  insulin lispro (ADMELOG) corrective regimen sliding scale   SubCutaneous every 6 hours  labetalol Injectable 10 milliGRAM(s) IV Push every 2 hours PRN  lactulose Syrup 10 Gram(s) Oral Once  metoprolol tartrate Injectable 2.5 milliGRAM(s) IV Push every 6 hours  niCARdipine Infusion 5 mG/Hr IV Continuous <Continuous>  senna 2 Tablet(s) Oral at bedtime PRN  tamsulosin 0.4 milliGRAM(s) Oral at bedtime    IVF:  dextrose 5%. 1000 milliLiter(s) IV Continuous <Continuous>  dextrose 5%. 1000 milliLiter(s) IV Continuous <Continuous>    CULTURES:    RADIOLOGY & ADDITIONAL TESTS:      ASSESSMENT:  63y Male s/p    Nontraumatic intracerebral hemorrhage    FH: stroke    FH: CABG (coronary artery bypass surgery)    Handoff    MEWS Score    Diabetes mellitus    Hypertension    Gout    Morbidly obese    Gout    Hemorrhagic stroke    S/P tonsillectomy    STROKE    90+    SysAdmin_VstLnk      A/p           S/p Left Thalamic bleed                 Hold Eliquis for 4 weeks if cleared with cardiology                 No Neurosurgical intervention needed at this time                 Care per NCC / F/u with Stroke neuro     S/p Left Thalamic bleed        pt seen and examined at bedside pt is awake follows simple commands       Vital Signs Last 24 Hrs  T(C): 37 (07 Jun 2023 08:00), Max: 37.6 (07 Jun 2023 04:00)  T(F): 98.6 (07 Jun 2023 08:00), Max: 99.7 (07 Jun 2023 04:00)  HR: 87 (07 Jun 2023 11:00) (75 - 120)  BP: 179/79 (06 Jun 2023 20:00) (179/79 - 179/79)  BP(mean): --  RR: 31 (07 Jun 2023 11:00) (19 - 48)  SpO2: 98% (07 Jun 2023 11:00) (95% - 100%)    Parameters below as of 07 Jun 2023 11:00  Patient On (Oxygen Delivery Method): nasal cannula  O2 Flow (L/min): 2      I&O's Detail    06 Jun 2023 07:01  -  07 Jun 2023 07:00  --------------------------------------------------------  IN:    NiCARdipine: 116 mL    NiCARdipine: 257.5 mL  Total IN: 373.5 mL    OUT:    Indwelling Catheter - Urethral (mL): 315 mL  Total OUT: 315 mL    Total NET: 58.5 mL      07 Jun 2023 07:01  -  07 Jun 2023 11:40  --------------------------------------------------------  IN:    NiCARdipine: 250 mL  Total IN: 250 mL    OUT:    Indwelling Catheter - Urethral (mL): 140 mL  Total OUT: 140 mL    Total NET: 110 mL        I&O's Summary    06 Jun 2023 07:01  -  07 Jun 2023 07:00  --------------------------------------------------------  IN: 373.5 mL / OUT: 315 mL / NET: 58.5 mL    07 Jun 2023 07:01  -  07 Jun 2023 11:40  --------------------------------------------------------  IN: 250 mL / OUT: 140 mL / NET: 110 mL        PHYSICAL EXAM:  Awake & alert with slurred speech  PERRL  Left gaze preference  + Droop  + Right sided weakness      LABS:                        10.2   12.65 )-----------( 249      ( 07 Jun 2023 06:09 )             30.9     06-07    143  |  104  |  78<HH>  ----------------------------<  170<H>  4.5   |  24  |  4.7<HH>    Ca    8.4      07 Jun 2023 06:09  Phos  3.9     06-07  Mg     3.5     06-07    TPro  5.8<L>  /  Alb  3.8  /  TBili  0.4  /  DBili  x   /  AST  31  /  ALT  33  /  AlkPhos  59  06-07    PT/INR - ( 07 Jun 2023 06:09 )   PT: 12.10 sec;   INR: 1.06 ratio         PTT - ( 07 Jun 2023 06:09 )  PTT:27.8 sec    CARDIAC MARKERS ( 07 Jun 2023 06:09 )  x     / 0.18 ng/mL / x     / x     / x      CARDIAC MARKERS ( 06 Jun 2023 20:32 )  x     / 0.13 ng/mL / x     / x     / x      CARDIAC MARKERS ( 06 Jun 2023 10:10 )  x     / 0.08 ng/mL / x     / x     / x      CARDIAC MARKERS ( 06 Jun 2023 03:30 )  x     / 0.07 ng/mL / x     / x     / x          CAPILLARY BLOOD GLUCOSE      POCT Blood Glucose.: 189 mg/dL (07 Jun 2023 11:21)  POCT Blood Glucose.: 176 mg/dL (07 Jun 2023 06:19)  POCT Blood Glucose.: 165 mg/dL (07 Jun 2023 04:38)  POCT Blood Glucose.: 150 mg/dL (06 Jun 2023 23:20)  POCT Blood Glucose.: 149 mg/dL (06 Jun 2023 21:18)  POCT Blood Glucose.: 169 mg/dL (06 Jun 2023 17:13)  POCT Blood Glucose.: 175 mg/dL (06 Jun 2023 13:14)      Drug Levels: [] N/A    CSF Analysis: [] N/A      Allergies    No Known Allergies    Intolerances      MEDICATIONS:  Antibiotics:    Neuro:  acetaminophen     Tablet .. 650 milliGRAM(s) Oral every 6 hours PRN  ondansetron Injectable 4 milliGRAM(s) IV Push every 6 hours PRN  traMADol 50 milliGRAM(s) Oral Once PRN    Anticoagulation:    OTHER:  atorvastatin 40 milliGRAM(s) Oral at bedtime  chlorhexidine 2% Cloths 1 Application(s) Topical <User Schedule>  dextrose 50% Injectable 25 Gram(s) IV Push once  dextrose 50% Injectable 25 Gram(s) IV Push once  dextrose 50% Injectable 12.5 Gram(s) IV Push once  dextrose Oral Gel 15 Gram(s) Oral once PRN  furosemide    Tablet 40 milliGRAM(s) Oral daily  glucagon  Injectable 1 milliGRAM(s) IntraMuscular once  hydrALAZINE Injectable 10 milliGRAM(s) IV Push every 6 hours PRN  insulin lispro (ADMELOG) corrective regimen sliding scale   SubCutaneous every 6 hours  labetalol Injectable 10 milliGRAM(s) IV Push every 2 hours PRN  lactulose Syrup 10 Gram(s) Oral Once  metoprolol tartrate Injectable 2.5 milliGRAM(s) IV Push every 6 hours  niCARdipine Infusion 5 mG/Hr IV Continuous <Continuous>  senna 2 Tablet(s) Oral at bedtime PRN  tamsulosin 0.4 milliGRAM(s) Oral at bedtime    IVF:  dextrose 5%. 1000 milliLiter(s) IV Continuous <Continuous>  dextrose 5%. 1000 milliLiter(s) IV Continuous <Continuous>    CULTURES:    RADIOLOGY & ADDITIONAL TESTS:      ASSESSMENT:  63y Male s/p    Nontraumatic intracerebral hemorrhage    FH: stroke    FH: CABG (coronary artery bypass surgery)    Handoff    MEWS Score    Diabetes mellitus    Hypertension    Gout    Morbidly obese    Gout    Hemorrhagic stroke    S/P tonsillectomy    STROKE    90+    SysAdmin_VstLnk      A/p           S/p Left Thalamic bleed                 Hold Eliquis for 4 weeks if cardiac risk allows                No Neurosurgical intervention needed at this time                 Care per NCC / F/u with Stroke neuro

## 2023-06-07 NOTE — OCCUPATIONAL THERAPY INITIAL EVALUATION ADULT - PHYSICAL ASSIST/NONPHYSICAL ASSIST: GROOMING, OT EVAL
presented with tooth brush, brings to mouth and demos task appropriately, unable to sequence placing toothpaste on brush, able to wipe face with washcloth/verbal cues/1 person assist

## 2023-06-07 NOTE — PROGRESS NOTE ADULT - SUBJECTIVE AND OBJECTIVE BOX
Neuroendovascular Progress Note:     HPI:   The patient is a 63-year-old male with a past history of Afib on Eliquis, prior basal ganglia hemorrhage, HTN, Gout, and DM, who presented as a stroke after having been found down by his wife at home complaining of right-sided weakness. SBP was in the 200's on arrival and CTH demonstrated a left thalamic IPH. A neuroendovascular consult was placed for consideration for a diagnostic cerebral angiogram. The risks and benefits of the procedure were discussed with the patient's brother and wife at bedside. Nephrology was consulted for pre-procedural risk stratification/ clearance. This AM Cr: 4.7 from 3.2; BUN 78 from 61.     Past Medical History:   Diabetes mellitus  Hypertension  Gout  Morbidly obese  Gout    24-Hour Events: Admission 6/6    Medication:  atorvastatin 40 milliGRAM(s) Oral at bedtime  chlorhexidine 2% Cloths 1 Application(s) Topical <User Schedule>  dextrose 5%. 1000 milliLiter(s) IV Continuous <Continuous>  dextrose 5%. 1000 milliLiter(s) IV Continuous <Continuous>  dextrose 50% Injectable 25 Gram(s) IV Push once  dextrose 50% Injectable 12.5 Gram(s) IV Push once  dextrose 50% Injectable 25 Gram(s) IV Push once  furosemide    Tablet 40 milliGRAM(s) Oral daily  glucagon  Injectable 1 milliGRAM(s) IntraMuscular once  insulin lispro (ADMELOG) corrective regimen sliding scale   SubCutaneous every 6 hours  lactulose Syrup 10 Gram(s) Oral Once  metoprolol tartrate Injectable 2.5 milliGRAM(s) IV Push every 6 hours  niCARdipine Infusion 5 mG/Hr IV Continuous <Continuous>  tamsulosin 0.4 milliGRAM(s) Oral at bedtime    No Known Allergies    Recent Vitals:  T(F): 98.6 (06-07-23 @ 08:00), Max: 99.7 (06-07-23 @ 04:00)  HR: 87 (06-07-23 @ 11:00) (75 - 120)  BP: 179/79 (06-06-23 @ 20:00) (179/79 - 179/79)  RR: 31 (06-07-23 @ 11:00) (19 - 48)  SpO2: 98% (06-07-23 @ 11:00) (95% - 100%)    Recent Labs:                        10.2   12.65 )-----------( 249      ( 07 Jun 2023 06:09 )             30.9     06-07    143  |  104  |  78<HH>  ----------------------------<  170<H>  4.5   |  24  |  4.7<HH>    Ca    8.4      07 Jun 2023 06:09  Phos  3.9     06-07  Mg     3.5     06-07    TPro  5.8<L>  /  Alb  3.8  /  TBili  0.4  /  DBili  x   /  AST  31  /  ALT  33  /  AlkPhos  59  06-07    PT/INR - ( 07 Jun 2023 06:09 )   PT: 12.10 sec;   INR: 1.06 ratio       PTT - ( 07 Jun 2023 06:09 )  PTT:27.8 sec    LIVER FUNCTIONS - ( 07 Jun 2023 06:09 )  Alb: 3.8 g/dL / Pro: 5.8 g/dL / ALK PHOS: 59 U/L / ALT: 33 U/L / AST: 31 U/L / GGT: x           Exam:  General:  NAD  Neuro: Drowsy, following some simple commands, EO to voice, +severe dysarthria, +right upper extremity no movement, right lower extremity some flexion of knee/ movement along plane of bed, left upper/lower extremities with some spontaneous movement, +gaze preference.    Radiology:   CT Head No Cont:   ACC: [id number] EXAM:  CT BRAIN   ORDERED BY: NUHA SCHMITT     PROCEDURE DATE:  06/06/2023      INTERPRETATION:  CLINICAL INDICATION: Intracranial hemorrhage, follow-up..    TECHNIQUE: CT of the head was performed without the administration of   intravenous contrast.    COMPARISON: CT head dated 6/6/2023..    FINDINGS:    There is redemonstrated left thalamic intraparenchymal hemorrhage   extending into the left is ganglia without significant change in size   compared to the prior CT head from6/6/2023. There is stable   intraventricular extension into the third ventricle, left occipital horn,   and the fourth ventricle. Stable ventricular size. There is moderate mass   effect and edema resulting in rightward bulging of the left thalamus.    There is small chronic infarct in the right thalamus.    There are scattered patchy low attenuations in bilateral periventricular   cerebral white matter consistent with moderate chronic microvascular   ischemic changes.    There is no evidence of acute territorial .    The visualized intraorbital contents are normal. The imaged portions of   the paranasal sinuses are aerated. The mastoid air cells are aerated. The   visualized soft tissues and osseous structures appear normal.      IMPRESSION:    Redemonstrated left thalamic hemorrhage extending into the left basal   ganglia and the ventricles without significant change in volume compared   to the prior CT head from 6/6/2023. Stable ventricle size.    --- End of Report ---    EMMA BURCH MD; Attending Radiologist  This document has been electronically signed. Jun 6 2023  1:05PM (06-06-23 @ 12:39)    Assessment:   The patient is a 63-year-old male with a past history of Afib on Eliquis, prior basal ganglia hemorrhage, HTN, Gout, and DM, who presented as a stroke after having been found down by his wife at home complaining of right-sided weakness. SBP was in the 200's on arrival and CTH demonstrated a left thalamic IPH. A neuroendovascular consult was placed for consideration for a diagnostic cerebral angiogram. The risks and benefits of the procedure were discussed with the patient's brother and wife at bedside. This AM Cr: 4.7 from 3.2; BUN 78 from 61.     Suggestions:  - Given nephrology elevated risk stratification for a diagnostic cerebral angiogram/ increasing creatinine, procedural planning will be on hold until the patient is more clinically stable to undergo the procedure.    - Please contact [facility] with questions/concerns or with acute changes in patient exam.

## 2023-06-07 NOTE — PHYSICAL THERAPY INITIAL EVALUATION ADULT - LEVEL OF INDEPENDENCE: SIT/SUPINE, REHAB EVAL
participate in sitting at EOB, able to initiate task hold sitting with Max support/dependent (less than 25% patients effort)

## 2023-06-07 NOTE — CONSULT NOTE ADULT - SUBJECTIVE AND OBJECTIVE BOX
Orthopedic Consult Note:    HPI:  63 RHD male admitted to [facility] NICU s/p stroke w/ Left thalamic intraparenchymal hemorrhage extending and sustained mechanical fall ground level height. Patient reported right arm pain, inability to bear chata RUE. No limitations prior to injury, full weight bearing.     PMH: Afib (Eliquis), CVA, HTN, CHF, Gout, DM  Allergies: Denies    PE:  Not awake, not alert  NAD  Breathing on RA    RUE  Skin intact with swelling over deltoid muscle  Grimacing with palpation to R proximal humerus  No TTP/grimacing humeral shaft, elbow, wrist, hand  Compartments soft and compressible, no pain with passive stretch digits  Spontaneously moving fingers, wrist - not to command  Radial pulse 2+  Cap refill < 2 sec      Imaging:  AP/ScapY/Lateral demonstrate R proximal humerus fracture    AP:  63 RHD R proximal humerus fracture s/p stroke associated with mechanical fall, impact right UE  -NWB RUE  -Sling placement with pillow underneath elbow for support  -Axillary/Velpeau view XR to r/o dislocation, if unable to obtain w/ XR, CT scan R shoulder without contrast  -Pain control  -Home medications  -ICS machine  -SCD bilateral LE  -PT/OT - passive/active ROM shoulder, wrist, hand to avoid joint/hand stiffness  -No acute orthopedic intervention at this time  -VTE prophylaxis per primary  -Orthopedics to follow   Orthopedic Consult Note:    HPI:  63 RHD male admitted to [facility] NICU s/p stroke w/ Left thalamic intraparenchymal hemorrhage extending and sustained mechanical fall ground level height. Patient reported right arm pain, inability to bear chata RUE. No limitations prior to injury, full weight bearing.     PMH: Afib (Eliquis), CVA, HTN, CHF, Gout, DM  Allergies: Denies    PE:  Not awake, not alert  NAD  Breathing on RA    RUE  Skin intact with swelling over deltoid muscle  Grimacing with palpation to R proximal humerus  No TTP/grimacing humeral shaft, elbow, wrist, hand  Compartments soft and compressible, no pain with passive stretch digits  Spontaneously moving fingers, wrist - not to command  Radial pulse 2+  Cap refill < 2 sec      Imaging:  AP/ScapY/Lateral demonstrate R proximal humerus fracture    AP:  63 RHD R proximal humerus fracture s/p stroke associated with mechanical fall, impact right UE  -NWB RUE  -Sling placement with pillow underneath elbow for support  -Axillary/Velpeau view XR to r/o dislocation, if unable to obtain w/ XR, CT scan R shoulder without contrast  -Pain control  -Home medications  -ICS machine  -SCD bilateral LE  -PT/OT - passive/active ROM shoulder, wrist, hand to avoid joint/hand stiffness  -No acute orthopedic intervention at this time  -VTE prophylaxis per primary  -Orthopedics to follow  pt seen and examined at bedside  in sling  non op tx   f/u as outpt  discussed with family

## 2023-06-07 NOTE — OCCUPATIONAL THERAPY INITIAL EVALUATION ADULT - NS ASR FOLLOW COMMAND OT EVAL
benefits from external cuing and tactile/visual demo. Decreased organization, +perseveration to motor tasks at times, presented with cup, toothbrush, spoon, appropriately demo's how to utilize, will continue to further asses cognition/50% of the time/able to follow single-step instructions

## 2023-06-07 NOTE — OCCUPATIONAL THERAPY INITIAL EVALUATION ADULT - LEVEL OF INDEPENDENCE: SUPINE/SIT, REHAB EVAL
pt initiates bringing trunk to upright, however max assist to maintain/dependent (less than 25% patients effort)

## 2023-06-07 NOTE — PHYSICAL THERAPY INITIAL EVALUATION ADULT - GENERAL OBSERVATIONS, REHAB EVAL
10:55-11:20. chart reviewed. Pt received semi-saavedra at B/S, drowsy, aphasic, able to follow one step instructions with Max verbal/tactile cues, + IV drip, + A-line, + O2 2 L via NC, + foly, + monitoring, VSS, + R side hemiplegia, R LE 0/5, + withdrawal, mild increase tone, provided with GEORGE boots with PF stop strap. Pt participate in Rolling, dependent to L side, Max A to R side, sitting at EOB, poor trunk control able to maintain sitting supported. NAD.

## 2023-06-07 NOTE — OCCUPATIONAL THERAPY INITIAL EVALUATION ADULT - OTHER, REHAB EVAL
Pt consistently lifts L hand when asked hand dominance, however pt R idania, may be impacting ability as well as aphasia, will continue to clarify

## 2023-06-07 NOTE — OCCUPATIONAL THERAPY INITIAL EVALUATION ADULT - GENERAL OBSERVATIONS, REHAB EVAL
Pt received semi saavedra in bed in NAD, SLP Shira present, +tele, +BP cuff, +pulse oxi, +L wrist A line, +R wrist IV drip, agrees to OT, aphasic, left partial bed in chair mode, PT Bella [place]

## 2023-06-07 NOTE — PHYSICAL THERAPY INITIAL EVALUATION ADULT - PERTINENT HX OF CURRENT PROBLEM, REHAB EVAL
63-year-old male with a past history of Afib on Eliquis, prior basal ganglia hemorrhage, HTN, Gout, and DM, who presented as a stroke after having been found down by his wife at home complaining of right-sided weakness. SBP was in the 200's on arrival and CTH demonstrated a left thalamic IPH. A neuroendovascular consult was placed for consideration for a diagnostic cerebral angiogram. The risks and benefits of the procedure were discussed with the patient's brother and wife at bedside. This AM Cr: 4.7 from 3.2; BUN 78 from 61.

## 2023-06-07 NOTE — PROGRESS NOTE ADULT - ASSESSMENT
IMP:73 y/o M with hx of CVA, DM ,HTN, Afib on eliquis, CHF, Gout presents as Stoke Code/Code ICH with NIH 17, GCS11, ICH 1 with Left thalamic hemorrhage      Plan: Admit to Neuro ICU           Neuro checks q 1 -= NIH scale           IV Tylenol for Pain          Head CT in 6H to evaluate progression          HOB at 30 degrees          Consult IR for angio - R/O microaneurysm  as sourse of bleed           Stroke labs - ICH score     CV: EKG daily            Trops .008 - Trend for 24H             Lopressor 2.5mg Iv q6- hold if HR < 65  and SBP < 100            Echo in AM            SBP [id number] - Cardene PRN by cuff            Daily weights    GI: Keep NPO       Protonix for GI coverage       Dysphagia screening    Pulmonary:      CXR now       ABG w/i normal limits - Sleep Apnea on CPAP at home  - Follow up with Pulmonary      ON 2L NC sating 95%        ABG q 6    Renal: On lasix 40mg daily              CKD - IVF at 10cc             Follow up BMP - Nephrology consult for CKD             Daily weights monitor stricy I's and O's               Endocrine: Fs q 6H                          ISS q 6H                        Stroke Core measures- HGBA1C, Lipid profile, TSH     Heme: Follow CBC                Hold Eliquis and ASA                Reversal of Eliquis with Andexa low dose 400U bolus and Gtt- 480U                Monitor Fever curve and WBC- If febrile Pan culture    Hold Chemoprophylaxsis for DVT due to risk of increased bleeding  Place on SCD              IMP:73 y/o M with hx of CVA, DM ,HTN, Afib on eliquis, CHF, Gout presents as Stoke Code/Code ICH with NIH 17, GCS11, ICH 1 with Left thalamic hemorrhage      Plan: Admit to Neuro ICU           Neuro checks q 1 -           IV Tylenol for Pain          Head CT i in am          HOB at 30 degrees          Consult IR for angio - R/O microaneurysm  as sourse of bleed           Stroke labs - ICH score     CV: EKG daily            Trops .018 - Trend for 24H             Lopressor  5mg q6             Check EKG             Echo in AM            SBP [id number] - Cardene PRN by cuff            Daily weights    GI: EMILY feeding tube         No need for PPI       Dysphagia screening- fail     Pulmonary:- Preumbaly ZE       CXR now       ABG Sleep Apnea on CPAP at home  - Follow up with Pulmonary      Humidify 2L NC sating 94%- wean O2       CPAP - - 5-10 - 10 Pm to 6 am- 30 %      Renal: DESI/ CRI - baseline  Cr 3.0               Need need for dialysis at this point               CKD - IVF at 10cc             Follow up BMP -             Daily weights monitor stricy I's and O's               Endocrine: Fs q 6H                          ISS q 6H- sglu - 140 -180                         Stroke Core measures as above     Heme- remote hx of DVT with prior L IPH   :           No CBC                 Hold Eliquis                 Reversal of Eliquis with Andexa low dose 400U bolus and Gtt- 480U                Monitor Fever curve and WBC- If febrile Pan culture                Doppler B/L LE    Hold Chemoprophylaxsis for DVT- if CT in amn stable start Lovenox 40 mg q day   Place on SCD      XRAY R Shoulder

## 2023-06-07 NOTE — OCCUPATIONAL THERAPY INITIAL EVALUATION ADULT - NSOTDISCHREC_GEN_A_CORE
Pt requires assistance with ADLs and functional transfers, will benefit from rehab facility upon d/c

## 2023-06-07 NOTE — PROGRESS NOTE ADULT - SUBJECTIVE AND OBJECTIVE BOX
SUMMARY: This is a 62 y/o M with hx of Afib on eiquis, CVA BG bleed ,HTN, CHF, Gout, DM  presents as a stroke code after being down in bathroom unknown time LKW was 2300. 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 Pt is lethargic  - Cardene started for blood pressure control.    OVERNIGHT EVENTS: Afib w/ RVR, improved w/ 5mg IV Lopressor x1    ADMISSION SCORES:   GCS 12   ICH 1   NIH 17    REVIEW OF SYSTEMS: Pt unable to participate in ROS due to neurological status    VITALS: [x] Reviewed    IMAGING/DATA: [x] Reviewed    IV FLUIDS/MEDICATIONS: [x] Reviewed    ALLERGIES: Allergies    No Known Allergies    Intolerances        EXAMINATION:  General: WN/WD, no acute distress  HENT: NC/AT, moist oral mucosa  Eyes: Anicteric sclerae  Cardiac: [id number]  Lungs: Clear  Abdomen: Soft, non-tender, +BS  Extremities: No c/c/e  Skin/Incision Site: Clean, dry and intact  Neurologic: AOx0, not following commands, PERRLA, no gaze prefernce , moves all extremities spontaneously, sensation intact SUMMARY: This is a 62 y/o M with hx of Afib on eiquis, CVA BG bleed ,HTN, CHF, Gout, DM  presents as a stroke code after being down in bathroom unknown time LKW was 2300. 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 Pt is lethargic  - Cardene started for blood pressure control.    OVERNIGHT EVENTS: Afib w/ RVR, improved w/ 5mg IV Lopressor x1    ADMISSION SCORES:   GCS 12   ICH 1   NIH 17    REVIEW OF SYSTEMS: Pt unable to participate in ROS due to neurological status    VITALS: [x]   ICU Vital Signs Last 24 Hrs  T(C): 36.7 (07 Jun 2023 12:00), Max: 37.6 (07 Jun 2023 04:00)  T(F): 98 (07 Jun 2023 12:00), Max: 99.7 (07 Jun 2023 04:00)  HR: 82 (07 Jun 2023 13:00) (78 - 120)  BP: 179/79 (06 Jun 2023 20:00) (179/79 - 179/79)  ABP: 168/61 (07 Jun 2023 13:00) (118/50 - 171/70)  ABP(mean): 90 (07 Jun 2023 13:00) (65 - 99)  RR: 32 (07 Jun 2023 13:00) (19 - 48)  SpO2: 100% (07 Jun 2023 13:00) (95% - 100%)    O2 Parameters below as of 07 Jun 2023 13:00  Patient On (Oxygen Delivery Method): room air      06 Jun 2023 07:01  -  07 Jun 2023 07:00  --------------------------------------------------------  IN:    NiCARdipine: 116 mL    NiCARdipine: 257.5 mL  Total IN: 373.5 mL    OUT:    Indwelling Catheter - Urethral (mL): 315 mL  Total OUT: 315 mL    Total NET: 58.5 mL      07 Jun 2023 07:01  -  07 Jun 2023 13:18  --------------------------------------------------------  IN:    NiCARdipine: 92.5 mL  Total IN: 92.5 mL    OUT:    Indwelling Catheter - Urethral (mL): 170 mL  Total OUT: 170 mL    Total NET: -77.5 mL        EXAMINATION:  MEDICATIONS  (STANDING):  atorvastatin 40 milliGRAM(s) Oral at bedtime  chlorhexidine 2% Cloths 1 Application(s) Topical <User Schedule>  dextrose 5%. 1000 milliLiter(s) (100 mL/Hr) IV Continuous <Continuous>  dextrose 5%. 1000 milliLiter(s) (50 mL/Hr) IV Continuous <Continuous>  dextrose 50% Injectable 25 Gram(s) IV Push once  dextrose 50% Injectable 12.5 Gram(s) IV Push once  dextrose 50% Injectable 25 Gram(s) IV Push once  glucagon  Injectable 1 milliGRAM(s) IntraMuscular once  Lopressor 5 mg q 6   insulin lispro (ADMELOG) corrective regimen sliding scale   SubCutaneous every 6 hours  lactulose Syrup 10 Gram(s) Oral Once  metoprolol tartrate Injectable 5.0 milliGRAM(s) IV Push every 6 hours  niCARdipine Infusion 5 mG/Hr (25 mL/Hr) IV Continuous <Continuous>    CT Chest No Cont (06.06.23 @ 12:40) >  IMPRESSION:    Artifact limits image quality.      Elevated right hemidiaphragm with diminished right lung volumes. Adjacent   atelectasis right middle lobe.. No pleural effusions.      Age-indeterminate fracture right humeral neck. Correlate with clinical   history and symptoms.      Enlarged main pulmonary artery segment measuring 3.8 cm compatible with   pulmonary hypertension (5/80     Xray Chest 1 View-PORTABLE IMMEDIATE (Xray Chest 1 View-PORTABLE IMMEDIATE .) (06.06.23 @ 10:08) >  FINDINGS/  IMPRESSION:    Low lung volumes. Likely right basal effusion/opacity. No pneumothorax.    Stable cardiomediastinal silhouette.    Unchanged osseous structures.    06-07    143  |  104  |  78<HH>  ----------------------------<  170<H>  4.5   |  24  |  4.7<HH>    Ca    8.4      07 Jun 2023 06:09  Phos  3.9     06-07  Mg     3.5     06-07    TPro  5.8<L>  /  Alb  3.8  /  TBili  0.4  /  DBili  x   /  AST  31  /  ALT  33  /  AlkPhos  59  06-07                            10.2   12.65 )-----------( 249      ( 07 Jun 2023 06:09 )             30.9       tamsulosin 0.4 milliGRAM(s) Oral at bedtime  General: WN/WD, no acute distress  HENT: NC/AT, moist oral mucosa  Eyes: Anicteric sclerae  Cardiac: [id number]  Lungs: Clear  Abdomen: Soft, non-tender, +BS  Extremities: No c/c/e  Skin/Incision Site: Clean, dry and intact  Neurologic: AOx0, not following commands, PERRLA, no gaze prefernce , moves all extremities spontaneously, sensation intact SUMMARY: This is a 62 y/o M with hx of Afib on eiquis, CVA BG bleed ,HTN, CHF, Gout, DM  presents as a stroke code after being down in bathroom unknown time LKW was 2300. 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 Pt is lethargic  - Cardene started for blood pressure control.    OVERNIGHT EVENTS: Afib w/ RVR, improved w/ 5mg IV Lopressor x1    ADMISSION SCORES:   GCS 12   ICH 1   NIH 17    REVIEW OF SYSTEMS: Pt unable to participate in ROS due to neurological status    VITALS: [x]   ICU Vital Signs Last 24 Hrs  T(C): 36.7 (07 Jun 2023 12:00), Max: 37.6 (07 Jun 2023 04:00)  T(F): 98 (07 Jun 2023 12:00), Max: 99.7 (07 Jun 2023 04:00)  HR: 82 (07 Jun 2023 13:00) (78 - 120)  BP: 179/79 (06 Jun 2023 20:00) (179/79 - 179/79)  ABP: 168/61 (07 Jun 2023 13:00) (118/50 - 171/70)  ABP(mean): 90 (07 Jun 2023 13:00) (65 - 99)  RR: 32 (07 Jun 2023 13:00) (19 - 48)  SpO2: 100% (07 Jun 2023 13:00) (95% - 100%)    O2 Parameters below as of 07 Jun 2023 13:00  Patient On (Oxygen Delivery Method): room air      06 Jun 2023 07:01  -  07 Jun 2023 07:00  --------------------------------------------------------  IN:    NiCARdipine: 116 mL    NiCARdipine: 257.5 mL  Total IN: 373.5 mL    OUT:    Indwelling Catheter - Urethral (mL): 315 mL  Total OUT: 315 mL    Total NET: 58.5 mL      07 Jun 2023 07:01  -  07 Jun 2023 13:18  --------------------------------------------------------  IN:    NiCARdipine: 92.5 mL  Total IN: 92.5 mL    OUT:    Indwelling Catheter - Urethral (mL): 170 mL  Total OUT: 170 mL    Total NET: -77.5 mL        EXAMINATION:  MEDICATIONS  (STANDING):  atorvastatin 40 milliGRAM(s) Oral at bedtime  chlorhexidine 2% Cloths 1 Application(s) Topical <User Schedule>  dextrose 5%. 1000 milliLiter(s) (100 mL/Hr) IV Continuous <Continuous>  dextrose 5%. 1000 milliLiter(s) (50 mL/Hr) IV Continuous <Continuous>  dextrose 50% Injectable 25 Gram(s) IV Push once  dextrose 50% Injectable 12.5 Gram(s) IV Push once  dextrose 50% Injectable 25 Gram(s) IV Push once  glucagon  Injectable 1 milliGRAM(s) IntraMuscular once  Lopressor 5 mg q 6   insulin lispro (ADMELOG) corrective regimen sliding scale   SubCutaneous every 6 hours  lactulose Syrup 10 Gram(s) Oral Once  metoprolol tartrate Injectable 5.0 milliGRAM(s) IV Push every 6 hours  niCARdipine Infusion 5 mG/Hr (25 mL/Hr) IV Continuous <Continuous>    CT Chest No Cont (06.06.23 @ 12:40) >  IMPRESSION:    Artifact limits image quality.      Elevated right hemidiaphragm with diminished right lung volumes. Adjacent   atelectasis right middle lobe.. No pleural effusions.      Age-indeterminate fracture right humeral neck. Correlate with clinical   history and symptoms.      Enlarged main pulmonary artery segment measuring 3.8 cm compatible with   pulmonary hypertension (5/80     Xray Chest 1 View-PORTABLE IMMEDIATE (Xray Chest 1 View-PORTABLE IMMEDIATE .) (06.06.23 @ 10:08) >  FINDINGS/  IMPRESSION:    Low lung volumes. Likely right basal effusion/opacity. No pneumothorax.    Stable cardiomediastinal silhouette.    Unchanged osseous structures.    06-07    143  |  104  |  78<HH>  ----------------------------<  170<H>  4.5   |  24  |  4.7<HH>    Ca    8.4      07 Jun 2023 06:09  Phos  3.9     06-07  Mg     3.5     06-07    TPro  5.8<L>  /  Alb  3.8  /  TBili  0.4  /  DBili  x   /  AST  31  /  ALT  33  /  AlkPhos  59  06-07                            10.2   12.65 )-----------( 249      ( 07 Jun 2023 06:09 )             30.9       tamsulosin 0.4 milliGRAM(s) Oral at bedtime  General: WN/WD, no acute distress  HENT: NC/AT, moist oral mucosa  Eyes: Anicteric sclerae  Cardiac: [id number]  Lungs: Clear  Abdomen: Soft, non-tender, +BS  Extremities: No c/c/e  Skin/Incision Site: Clean, dry and intact  Neurologic: AOx0, not following commands, PERRLA, no gaze prefernce , moves all extremities spontaneously, sensation intact  R shoulder - pain and crepitance / swelling R shoulder

## 2023-06-07 NOTE — CONSULT NOTE ADULT - SUBJECTIVE AND OBJECTIVE BOX
NEPHROLOGY CONSULTATION NOTE    THIS CONSULT IS INCOMPLETE / FULL CONSULT TO FOLLOW    Patient is a 63y Male whom presented to the hospital with     PAST MEDICAL & SURGICAL HISTORY:  Diabetes mellitus      Hypertension      Gout      Morbidly obese      Gout      S/P tonsillectomy        Allergies:  No Known Allergies    Home Medications Reviewed  Hospital Medications:   MEDICATIONS  (STANDING):  atorvastatin 40 milliGRAM(s) Oral at bedtime  chlorhexidine 2% Cloths 1 Application(s) Topical <User Schedule>  dextrose 5%. 1000 milliLiter(s) (100 mL/Hr) IV Continuous <Continuous>  dextrose 5%. 1000 milliLiter(s) (50 mL/Hr) IV Continuous <Continuous>  dextrose 50% Injectable 25 Gram(s) IV Push once  dextrose 50% Injectable 12.5 Gram(s) IV Push once  dextrose 50% Injectable 25 Gram(s) IV Push once  furosemide    Tablet 40 milliGRAM(s) Oral daily  glucagon  Injectable 1 milliGRAM(s) IntraMuscular once  insulin lispro (ADMELOG) corrective regimen sliding scale   SubCutaneous every 6 hours  lactulose Syrup 10 Gram(s) Oral Once  metoprolol tartrate Injectable 2.5 milliGRAM(s) IV Push every 6 hours  niCARdipine Infusion 5 mG/Hr (25 mL/Hr) IV Continuous <Continuous>  tamsulosin 0.4 milliGRAM(s) Oral at bedtime      SOCIAL HISTORY:  Denies ETOH,Smoking,   FAMILY HISTORY:  FH: stroke    FH: CABG (coronary artery bypass surgery)          REVIEW OF SYSTEMS:  CONSTITUTIONAL: No weakness, fevers or chills  EYES/ENT: No visual changes;  No vertigo or throat pain   NECK: No pain or stiffness  RESPIRATORY: No cough, wheezing, hemoptysis; No shortness of breath  CARDIOVASCULAR: No chest pain or palpitations.  GASTROINTESTINAL: No abdominal or epigastric pain. No nausea, vomiting, or hematemesis; No diarrhea or constipation. No melena or hematochezia.  GENITOURINARY: No dysuria, frequency, foamy urine, urinary urgency, incontinence or hematuria  NEUROLOGICAL: No numbness or weakness  SKIN: No itching, burning, rashes, or lesions   VASCULAR: No bilateral lower extremity edema.   All other review of systems is negative unless indicated above.    VITALS:  T(F): 98.6 (06-07-23 @ 08:00), Max: 99.7 (06-07-23 @ 04:00)  HR: 92 (06-07-23 @ 08:45)  BP: 179/79 (06-06-23 @ 20:00)  RR: 30 (06-07-23 @ 08:45)  SpO2: 98% (06-07-23 @ 08:45)    06-06 @ 07:01  -  06-07 @ 07:00  --------------------------------------------------------  IN: 373.5 mL / OUT: 315 mL / NET: 58.5 mL    06-07 @ 07:01  -  06-07 @ 09:11  --------------------------------------------------------  IN: 62.5 mL / OUT: 65 mL / NET: -2.5 mL      Height (cm): 172.7 (06-07 @ 04:00)  Weight (kg): 127.1 (06-07 @ 04:00)  BMI (kg/m2): 42.6 (06-07 @ 04:00)  BSA (m2): 2.36 (06-07 @ 04:00)    06-06-23 @ 07:01  -  06-07-23 @ 07:00  --------------------------------------------------------  IN: 0 mL / OUT: 315 mL / NET: -315 mL    06-07-23 @ 07:01  -  06-07-23 @ 09:11  --------------------------------------------------------  IN: 0 mL / OUT: 65 mL / NET: -65 mL      I&O's Detail    06 Jun 2023 07:01  -  07 Jun 2023 07:00  --------------------------------------------------------  IN:    NiCARdipine: 116 mL    NiCARdipine: 257.5 mL  Total IN: 373.5 mL    OUT:    Indwelling Catheter - Urethral (mL): 315 mL  Total OUT: 315 mL    Total NET: 58.5 mL      07 Jun 2023 07:01  -  07 Jun 2023 09:11  --------------------------------------------------------  IN:    NiCARdipine: 62.5 mL  Total IN: 62.5 mL    OUT:    Indwelling Catheter - Urethral (mL): 65 mL  Total OUT: 65 mL    Total NET: -2.5 mL            PHYSICAL EXAM:  Constitutional: NAD  HEENT: anicteric sclera, oropharynx clear, MMM  Neck: No JVD  Respiratory: CTAB, no wheezes, rales or rhonchi  Cardiovascular: S1, S2, RRR  Gastrointestinal: BS+, soft, NT/ND  Extremities: No cyanosis or clubbing. No peripheral edema  Neurological: A/O x 3, no focal deficits  Psychiatric: Normal mood, normal affect  : No CVA tenderness. No khan.   Skin: No rashes  Vascular Access:    LABS:  06-07    143  |  104  |  78<HH>  ----------------------------<  170<H>  4.5   |  24  |  4.7<HH>    Ca    8.4      07 Jun 2023 06:09  Phos  3.9     06-07  Mg     3.5     06-07    TPro  5.8<L>  /  Alb  3.8  /  TBili  0.4  /  DBili      /  AST  31  /  ALT  33  /  AlkPhos  59  06-07    Creatinine Trend: 4.7 <--, 3.2 <--, 3.1 <--                        10.2   12.65 )-----------( 249      ( 07 Jun 2023 06:09 )             30.9     Urine Studies:          HbA1c 8.7      [04-08-19 @ 04:49]  TSH 1.29      [06-06-23 @ 10:10]  Lipid: chol 103, TG 94, HDL 37, LDL --      [06-06-23 @ 10:10]    HBsAg Nonreact      [05-04-19 @ 06:38]  HCV 0.08, Nonreact      [05-04-19 @ 06:38]    RAMO: titer Negative, pattern --      [05-04-19 @ 06:38]  C3 Complement 177      [05-04-19 @ 06:38]  C4 Complement 46      [05-04-19 @ 06:38]  ANCA: cANCA Negative, pANCA Negative, atypical ANCA Negative      [05-04-19 @ 06:38]  Immunofixation Serum:   No Monoclonal Band Identified    Reference Range: None Detected      [04-05-19 @ 04:00]  SPEP Interpretation: Normal Electrophoresis Pattern      [04-05-19 @ 04:00]  Immunofixation Urine: Reference Range: None Detected      [05-05-19 @ 12:39]  UPEP Interpretation: Mild Selective (Glomerular) Proteinuria      [05-05-19 @ 12:39]      RADIOLOGY & ADDITIONAL STUDIES:                 NEPHROLOGY CONSULTATION NOTE      The patient is a 63-year-old male with a past history of Afib on Eliquis, prior basal ganglia hemorrhage, HTN, Gout, and DM, CKD 3b- 4 ( known to my office)  who presented as a stroke after having been found down by his wife at home yesterday complaining of right-sided weakness. on arrival to ED a stroke code was called , BP was high and patient was started on cardene drip for BP management .  Patient upgraded to Neuro ICU for management .  Developed DESI on CKD     PAST MEDICAL & SURGICAL HISTORY:  Diabetes mellitus  Hypertension  Gout  Morbidly obese  Gout  S/P tonsillectomy        Allergies:  No Known Allergies    Home Medications Reviewed  Hospital Medications:   MEDICATIONS  (STANDING):  atorvastatin 40 milliGRAM(s) Oral at bedtime  furosemide    Tablet 40 milliGRAM(s) Oral daily  glucagon  Injectable 1 milliGRAM(s) IntraMuscular once  insulin lispro (ADMELOG) corrective regimen sliding scale   SubCutaneous every 6 hours  lactulose Syrup 10 Gram(s) Oral Once  metoprolol tartrate Injectable 2.5 milliGRAM(s) IV Push every 6 hours  niCARdipine Infusion 5 mG/Hr (25 mL/Hr) IV Continuous <Continuous>  tamsulosin 0.4 milliGRAM(s) Oral at bedtime      SOCIAL HISTORY:  Denies ETOH,Smoking,   FAMILY HISTORY:  FH: stroke    FH: CABG (coronary artery bypass surgery)          REVIEW OF SYSTEMS:  CONSTITUTIONAL: lethargic weak   NEUROLOGICAL: right hemiparesis and facial droop  All other review of systems is negative unless indicated above.    VITALS:  T(F): 98.6 (06-07-23 @ 08:00), Max: 99.7 (06-07-23 @ 04:00)  HR: 92 (06-07-23 @ 08:45)  BP: 179/79 (06-06-23 @ 20:00)  RR: 30 (06-07-23 @ 08:45)  SpO2: 98% (06-07-23 @ 08:45)    06-06 @ 07:01  -  06-07 @ 07:00  --------------------------------------------------------  IN: 373.5 mL / OUT: 315 mL / NET: 58.5 mL    06-07 @ 07:01  -  06-07 @ 09:11  --------------------------------------------------------  IN: 62.5 mL / OUT: 65 mL / NET: -2.5 mL      Height (cm): 172.7 (06-07 @ 04:00)  Weight (kg): 127.1 (06-07 @ 04:00)  BMI (kg/m2): 42.6 (06-07 @ 04:00)  BSA (m2): 2.36 (06-07 @ 04:00)    06-06-23 @ 07:01  -  06-07-23 @ 07:00  --------------------------------------------------------  IN: 0 mL / OUT: 315 mL / NET: -315 mL    06-07-23 @ 07:01  -  06-07-23 @ 09:11  --------------------------------------------------------  IN: 0 mL / OUT: 65 mL / NET: -65 mL      I&O's Detail    06 Jun 2023 07:01  -  07 Jun 2023 07:00  --------------------------------------------------------  IN:    NiCARdipine: 116 mL    NiCARdipine: 257.5 mL  Total IN: 373.5 mL    OUT:    Indwelling Catheter - Urethral (mL): 315 mL  Total OUT: 315 mL    Total NET: 58.5 mL      07 Jun 2023 07:01  -  07 Jun 2023 09:11  --------------------------------------------------------  IN:    NiCARdipine: 62.5 mL  Total IN: 62.5 mL    OUT:    Indwelling Catheter - Urethral (mL): 65 mL  Total OUT: 65 mL    Total NET: -2.5 mL            PHYSICAL EXAM:  Constitutional: NAD  Respiratory: CTAB,  Cardiovascular: S1, S2, RRR  Gastrointestinal: BS+, soft, NT/ND  Extremities: No cyanosis or clubbing. No peripheral edema  Neurological: Awake / facial droop   : No CVA tenderness. No khan.   Skin: No rashes  Vascular Access:    LABS:  06-07    143  |  104  |  78<HH>  ----------------------------<  170<H>  4.5   |  24  |  4.7<HH>    Ca    8.4      07 Jun 2023 06:09  Phos  3.9     06-07  Mg     3.5     06-07    TPro  5.8<L>  /  Alb  3.8  /  TBili  0.4  /  DBili      /  AST  31  /  ALT  33  /  AlkPhos  59  06-07    Creatinine Trend: 4.7 <--, 3.2 <--, 3.1 <--                        10.2   12.65 )-----------( 249      ( 07 Jun 2023 06:09 )             30.9     Urine Studies:          HbA1c 8.7      [04-08-19 @ 04:49]  TSH 1.29      [06-06-23 @ 10:10]  Lipid: chol 103, TG 94, HDL 37, LDL --      [06-06-23 @ 10:10]    HBsAg Nonreact      [05-04-19 @ 06:38]  HCV 0.08, Nonreact      [05-04-19 @ 06:38]    RAMO: titer Negative, pattern --      [05-04-19 @ 06:38]  C3 Complement 177      [05-04-19 @ 06:38]  C4 Complement 46      [05-04-19 @ 06:38]  ANCA: cANCA Negative, pANCA Negative, atypical ANCA Negative      [05-04-19 @ 06:38]  Immunofixation Serum:   No Monoclonal Band Identified    Reference Range: None Detected      [04-05-19 @ 04:00]  SPEP Interpretation: Normal Electrophoresis Pattern      [04-05-19 @ 04:00]  Immunofixation Urine: Reference Range: None Detected      [05-05-19 @ 12:39]  UPEP Interpretation: Mild Selective (Glomerular) Proteinuria      [05-05-19 @ 12:39]      RADIOLOGY & ADDITIONAL STUDIES:  < from: CT Chest No Cont (06.06.23 @ 12:40) >  Artifact limits image quality.      Elevated right hemidiaphragm with diminished right lung volumes. Adjacent   atelectasis right middle lobe.. No pleural effusions.      Age-indeterminate fracture right humeral neck. Correlate with clinical   history and symptoms.      Enlarged main pulmonary artery segment measuring 3.8 cm compatible with   pulmonary hypertension (5/80    < end of copied text >      < from: CT Angio Brain Stroke Protocol  w/ IV Cont (06.06.23 @ 03:36) >  IMPRESSION:  1.  No large vessel occlusion, high-grade stenosis, aneurysm, or vascular   malformation.  2.  A 5 mm focus of contrast enhancement within lateral aspect of acute   left thalamic intraparenchymal hemorrhage("CTA Spot sign"), which has   been associated with hematoma growth.  3.  Please see separately dictated CT head for nonvascular intracranial   findings.    < end of copied text >      < from: CT Brain Stroke Protocol (06.06.23 @ 03:10) >  1.  Acute left thalamic intraparenchymal hemorrhage extending to the left   corona radiata and possibly the left caudate nucleus with associated   vasogenic edema.  2.  Moderate chronic microvascular ischemic changes and chronic infarct   as above.    < end of copied text >

## 2023-06-07 NOTE — CONSULT NOTE ADULT - ASSESSMENT
The patient is a 63-year-old male with a past history of Afib on Eliquis, prior basal ganglia hemorrhage, HTN, Gout, and DM, CKD 3b- 4 ( known to my office)  who presented as a stroke.    # DESI on GIT0p-1/ ATN vs EV   # oliguria   # HTN uncontrolled   # Thalamic CVA     - oliguric trial of bumex 2 mg IV q12h instead of oral lasix  -keep khan/ strict I and O   - avoid further nephrotoxins / contrast unless urgently needed   - BP target as per neuro ICU  - check IP and PTH   - BMP tonight and in AM   - will follow closely and assess need for RRT    d/w neuro ICU team

## 2023-06-07 NOTE — OCCUPATIONAL THERAPY INITIAL EVALUATION ADULT - ADDITIONAL COMMENTS
As per care coordinator assessment, pt was independent without AE/DME at baseline, will continue to clarify as pt is currently aphasic/nonverbal

## 2023-06-07 NOTE — OCCUPATIONAL THERAPY INITIAL EVALUATION ADULT - PERTINENT HX OF CURRENT PROBLEM, REHAB EVAL
73 y/o M with hx of CVA, DM ,HTN, Afib on eliquis, CHF, Gout presents as Stoke Code/Code ICH with NIH 17, GCS11, ICH 1 with Left thalamic hemorrhage

## 2023-06-07 NOTE — OCCUPATIONAL THERAPY INITIAL EVALUATION ADULT - RANGE OF MOTION EXAMINATION, UPPER EXTREMITY
RUE no AROM observed even during functional and bilateral tasks/Left UE Active ROM was WNL (within normal limits)

## 2023-06-07 NOTE — OCCUPATIONAL THERAPY INITIAL EVALUATION ADULT - ORIENTATION, REHAB EVAL
consistently turns to name, points to name on paper from field of 2, not oriented to place/situation/time despite visual options on paper provided/person

## 2023-06-07 NOTE — OCCUPATIONAL THERAPY INITIAL EVALUATION ADULT - LEVEL OF INDEPENDENCE: SIT/STAND, REHAB EVAL
not attempts 2* to decreased command following and retropulsion in sitting, will attempt in follow up session

## 2023-06-07 NOTE — PHYSICAL THERAPY INITIAL EVALUATION ADULT - BED MOBILITY TRAINING, PT EVAL
Pt will participate in supine to sit and reverse using side rails with Mod A  by discharge to facilitate return to PLOF.

## 2023-06-08 NOTE — DIETITIAN INITIAL EVALUATION ADULT - ENTERAL
If pt for TF- Glucerna 1.2 formula, bolus/gravity feeds, 480ml @8AM, 12noon, 4PM and 8PM. Add No Carb Prosource (1pkg = 15gms Protein) 1pkt once a day. Free water flushes 100ml before and after each feed. -- will provide 2364kcal, 129g protein, 1660+800ml free water.   - Glycemic control

## 2023-06-08 NOTE — DIETITIAN INITIAL EVALUATION ADULT - ORAL INTAKE PTA/DIET HISTORY
Hx obtained from pt's wife via phone. Reports pt follows DM diet at home - limits sweets and carbs. No food allergy/intolerance. No chewing/swallowing issues. UBW >300lbs a few years ago, but had intentional weight loss from dieting; he is now between 280-285lbs. Height 5'11".

## 2023-06-08 NOTE — DIETITIAN INITIAL EVALUATION ADULT - PERTINENT MEDS FT
MEDICATIONS  (STANDING):  chlorhexidine 2% Cloths 1 Application(s) Topical <User Schedule>  dextrose 5%. 1000 milliLiter(s) (100 mL/Hr) IV Continuous <Continuous>  dextrose 5%. 1000 milliLiter(s) (50 mL/Hr) IV Continuous <Continuous>  dextrose 50% Injectable 25 Gram(s) IV Push once  dextrose 50% Injectable 12.5 Gram(s) IV Push once  dextrose 50% Injectable 25 Gram(s) IV Push once  doxazosin 2 milliGRAM(s) Oral at bedtime  glucagon  Injectable 1 milliGRAM(s) IntraMuscular once  insulin lispro (ADMELOG) corrective regimen sliding scale   SubCutaneous every 6 hours  labetalol 200 milliGRAM(s) Oral every 8 hours  lacosamide IVPB 100 milliGRAM(s) IV Intermittent every 12 hours  lactulose Syrup 10 Gram(s) Oral Once  multiple electrolytes Injection Type 1 1000 milliLiter(s) (50 mL/Hr) IV Continuous <Continuous>  niCARdipine Infusion 5 mG/Hr (25 mL/Hr) IV Continuous <Continuous>    MEDICATIONS  (PRN):  acetaminophen     Tablet .. 650 milliGRAM(s) Oral every 6 hours PRN Temp greater or equal to 38C (100.4F), Mild Pain (1 - 3)  dextrose Oral Gel 15 Gram(s) Oral once PRN Blood Glucose LESS THAN 70 milliGRAM(s)/deciliter  hydrALAZINE Injectable 10 milliGRAM(s) IV Push every 6 hours PRN sustained SBP > 160 and HR < 60  labetalol Injectable 10 milliGRAM(s) IV Push every 2 hours PRN sustained SBP > 160 and HR > 60  ondansetron Injectable 4 milliGRAM(s) IV Push every 6 hours PRN Nausea and/or Vomiting  senna 2 Tablet(s) Oral at bedtime PRN Constipation  traMADol 50 milliGRAM(s) Oral Once PRN Severe Pain (7 - 10)

## 2023-06-08 NOTE — CHART NOTE - NSCHARTNOTEFT_GEN_A_CORE
Neuroendovascular team consulted for evaluation for diagnostic cerebral angiogram by neuro ICU team.     Given elevated risk for contrast administration at this time, diagnostic cerebral angiogram will be postponed until clinical improvement / nephrology clearance.   Per nephrology note, avoid contrast administration unless urgently needed.     Will continue to follow, please contact [phone] neuroendovascular when patient stabilizes / is medically suitable for cerebral angiogram.     Discussed with Dr. Harper.   [phone]

## 2023-06-08 NOTE — PROGRESS NOTE ADULT - ASSESSMENT
IMP:75 y/o M with hx of CVA, DM ,HTN, Afib on eliquis, CHF, Gout presents as Stoke Code/Code ICH with NIH 17, GCS11, ICH 1 with Left thalamic hemorrhage      Plan: Admit to Neuro ICU           Neuro checks q 1 -           IV Tylenol for Pain          Head CT i in am          HOB at 30 degrees          Consult IR for angio - R/O microaneurysm  as sourse of bleed           Stroke labs - ICH score     CV: EKG daily            Trops .018 - Trend for 24H             Lopressor  5mg q6             Check EKG             Echo in AM            SBP [id number] - Cardene PRN by cuff            Daily weights    GI: EMILY feeding tube         No need for PPI       Dysphagia screening- fail     Pulmonary:- Preumbaly ZE       CXR now       ABG Sleep Apnea on CPAP at home  - Follow up with Pulmonary      Humidify 2L NC sating 94%- wean O2       CPAP - - 5-10 - 10 Pm to 6 am- 30 %      Renal: DESI/ CRI - baseline  Cr 3.0               Need need for dialysis at this point               CKD - IVF at 10cc             Follow up BMP -             Daily weights monitor stricy I's and O's               Endocrine: Fs q 6H                          ISS q 6H- sglu - 140 -180                         Stroke Core measures as above     Heme- remote hx of DVT with prior L IPH   :           No CBC                 Hold Eliquis                 Reversal of Eliquis with Andexa low dose 400U bolus and Gtt- 480U                Monitor Fever curve and WBC- If febrile Pan culture                Doppler B/L LE    Hold Chemoprophylaxsis for DVT- if CT in amn stable start Lovenox 40 mg q day   Place on SCD      XRAY R Shoulder               IMP:75 y/o M with hx of CVA, DM ,HTN, Afib on eliquis, CHF, Gout presents as Stoke Code/Code ICH with NIH 17, GCS11, ICH 1 with Left thalamic hemorrhage    sICH/IVH  hypertensive emergency   Desi on CKD   encephalopathy      Plan: Admit to Neuro ICU           Neuro checks q 1   interval CTH now  vEEG with significant sharp wave burden, vimpat initiated           Consult IR for angio - R/O microaneurysm  as source of bleed once Cr stable           Stroke labs - ICH score   multifactorial encephalopathy- neurologic, metabolic/uremic  bedrest    CV: htn, hx of afib on DOAC s/p reversal  EKG daily            Trops downtrending            convert lopressor to labetalol   on multiple antihypertensive at home- clonidine, labetalol, hydralazine, norvasc           afib- off doac s/p reversal for ICH            Echo             SBP [id number] - Cardene PRN by cuff            Daily weights    GI: EMILY feeding tube in place  hold off on feeds given concern for worsening encephalopathy        gi ppx- n/a      Dysphagia screening- fail     Pulmonary:- Preumbaly ZE ?[facility]      CXR now       ABG Sleep Apnea on CPAP at home  - Follow up with Pulmonary      Humidify 2L NC sating 94%- wean O2       CPAP - - 5-10 - 10 Pm to 6 am- 30 %      Renal: DESI/ CRI - baseline  Cr 3.0               worsening uremia  renal following, pt may require RRT              gentle IVF with physiologic crystalloid             Daily weights monitor stricy I's and O's               Endocrine: Fs q 6H                          ISS q 6H- sglu - 140 -180                         Stroke Core measures as above   a1c 5.9    Heme- remote hx of DVT with prior L IPH  :           No CBC                 Hold Eliquis                 Reversal of Eliquis with Andexa low dose 400U bolus and Gtt- 480U                Monitor Fever curve and WBC- If febrile Pan culture                Doppler B/L LE  hold chemoppx pending stability scan  Place on SCD  venous doppler negative      ID:  full sepsis w/u as part of encephalopathy w/u    ortho- XRAY R Shoulder- Redemonstrated is an acute surgical neck fracture of the proximal humerus with displacement and mild impaction; conservative management per ortho     PIVs, khan 6/6, A-line 6/6    dispo- nsicu

## 2023-06-08 NOTE — PROGRESS NOTE ADULT - SUBJECTIVE AND OBJECTIVE BOX
Nephrology progress note    THIS IS AN INCOMPLETE NOTE . FULL NOTE TO FOLLOW SHORTLY    Patient is seen and examined, events over the last 24 h noted .    Allergies:  No Known Allergies    Hospital Medications:   MEDICATIONS  (STANDING):  atorvastatin 40 milliGRAM(s) Oral at bedtime  chlorhexidine 2% Cloths 1 Application(s) Topical <User Schedule>  dextrose 5%. 1000 milliLiter(s) (100 mL/Hr) IV Continuous <Continuous>  dextrose 5%. 1000 milliLiter(s) (50 mL/Hr) IV Continuous <Continuous>  dextrose 50% Injectable 25 Gram(s) IV Push once  dextrose 50% Injectable 12.5 Gram(s) IV Push once  dextrose 50% Injectable 25 Gram(s) IV Push once  doxazosin 2 milliGRAM(s) Oral at bedtime  glucagon  Injectable 1 milliGRAM(s) IntraMuscular once  insulin lispro (ADMELOG) corrective regimen sliding scale   SubCutaneous every 6 hours  lactulose Syrup 10 Gram(s) Oral Once  metoprolol tartrate Injectable 5 milliGRAM(s) IV Push every 6 hours  niCARdipine Infusion 5 mG/Hr (25 mL/Hr) IV Continuous <Continuous>  sodium chloride 0.9%. 1000 milliLiter(s) (40 mL/Hr) IV Continuous <Continuous>        VITALS:  T(F): 99 (06-08-23 @ 04:00), Max: 99.1 (06-07-23 @ 18:00)  HR: 94 (06-08-23 @ 07:00)  BP: --  RR: 30 (06-08-23 @ 07:00)  SpO2: 94% (06-08-23 @ 07:00)  Wt(kg): --    06-06 @ 07:01  -  06-07 @ 07:00  --------------------------------------------------------  IN: 373.5 mL / OUT: 315 mL / NET: 58.5 mL    06-07 @ 07:01  -  06-08 @ 07:00  --------------------------------------------------------  IN: 1220 mL / OUT: 500 mL / NET: 720 mL      07 Jun 2023 07:01  -  08 Jun 2023 07:00  --------------------------------------------------------  IN:    NiCARdipine: 92.5 mL    NiCARdipine: 847.5 mL    sodium chloride 0.9%: 280 mL  Total IN: 1220 mL    OUT:    Indwelling Catheter - Urethral (mL): 500 mL  Total OUT: 500 mL    Total NET: 720 mL            PHYSICAL EXAM:  Constitutional: NAD  HEENT: anicteric sclera, oropharynx clear, MMM  Neck: No JVD  Respiratory: CTAB, no wheezes, rales or rhonchi  Cardiovascular: S1, S2, RRR  Gastrointestinal: BS+, soft, NT/ND  Extremities: No cyanosis or clubbing. No peripheral edema  :  No khan.   Skin: No rashes    LABS:  06-08    146  |  110  |  95<HH>  ----------------------------<  163<H>  4.6   |  22  |  5.4<HH>  Creatinine Trend: 5.4<--, 5.5<--, 4.7<--, 3.2<--, 3.1<--  Ca    8.3<L>      08 Jun 2023 04:40  Phos  4.1     06-08  Mg     3.9     06-08    TPro  5.7<L>  /  Alb  3.7  /  TBili  0.4  /  DBili      /  AST  30  /  ALT  31  /  AlkPhos  58  06-08                          10.2   12.65 )-----------( 249      ( 07 Jun 2023 06:09 )             30.9       Urine Studies:        HbA1c 8.7      [04-08-19 @ 04:49]  TSH 1.29      [06-06-23 @ 10:10]  Lipid: chol 103, TG 94, HDL 37, LDL --      [06-06-23 @ 10:10]    HBsAg Nonreact      [05-04-19 @ 06:38]  HCV 0.08, Nonreact      [05-04-19 @ 06:38]    RAMO: titer Negative, pattern --      [05-04-19 @ 06:38]  C3 Complement 177      [05-04-19 @ 06:38]  C4 Complement 46      [05-04-19 @ 06:38]  ANCA: cANCA Negative, pANCA Negative, atypical ANCA Negative      [05-04-19 @ 06:38]  Immunofixation Serum:   No Monoclonal Band Identified    Reference Range: None Detected      [04-05-19 @ 04:00]  SPEP Interpretation: Normal Electrophoresis Pattern      [04-05-19 @ 04:00]  Immunofixation Urine: Reference Range: None Detected      [05-05-19 @ 12:39]  UPEP Interpretation: Mild Selective (Glomerular) Proteinuria      [05-05-19 @ 12:39]      RADIOLOGY & ADDITIONAL STUDIES:   Nephrology progress note    Patient is seen and examined, events over the last 24 h noted .  Lying in bed on NIPPV     Allergies:  No Known Allergies    Hospital Medications:     MEDICATIONS  (STANDING):    atorvastatin 40 milliGRAM(s) Oral at bedtime  doxazosin 2 milliGRAM(s) Oral at bedtime  glucagon  Injectable 1 milliGRAM(s) IntraMuscular once  insulin lispro (ADMELOG) corrective regimen sliding scale   SubCutaneous every 6 hours  lactulose Syrup 10 Gram(s) Oral Once  metoprolol tartrate Injectable 5 milliGRAM(s) IV Push every 6 hours  niCARdipine Infusion 5 mG/Hr (25 mL/Hr) IV Continuous <Continuous>  sodium chloride 0.9%. 1000 milliLiter(s) (40 mL/Hr) IV Continuous <Continuous>        VITALS:  T(F): 99 (06-08-23 @ 04:00), Max: 99.1 (06-07-23 @ 18:00)  HR: 94 (06-08-23 @ 07:00)  RR: 30 (06-08-23 @ 07:00)  SpO2: 94% (06-08-23 @ 07:00)  Wt(kg): --    06-06 @ 07:01  -  06-07 @ 07:00  --------------------------------------------------------  IN: 373.5 mL / OUT: 315 mL / NET: 58.5 mL    06-07 @ 07:01  -  06-08 @ 07:00  --------------------------------------------------------  IN: 1220 mL / OUT: 500 mL / NET: 720 mL      07 Jun 2023 07:01  -  08 Jun 2023 07:00  --------------------------------------------------------  IN:    NiCARdipine: 92.5 mL    NiCARdipine: 847.5 mL    sodium chloride 0.9%: 280 mL  Total IN: 1220 mL    OUT:    Indwelling Catheter - Urethral (mL): 500 mL  Total OUT: 500 mL    Total NET: 720 mL            PHYSICAL EXAM:  Constitutional: on oxygen   Respiratory: CTAB,   Cardiovascular: S1, S2, RRR  Gastrointestinal: BS+, soft, NT/ND  Extremities: No cyanosis or clubbing. trace edema   :  No khan.   Skin: No rashes    LABS:  06-08    146  |  110  |  95<HH>  ----------------------------<  163<H>  4.6   |  22  |  5.4<HH>  Creatinine Trend: 5.4<--, 5.5<--, 4.7<--, 3.2<--, 3.1<--  Ca    8.3<L>      08 Jun 2023 04:40  Phos  4.1     06-08  Mg     3.9     06-08    TPro  5.7<L>  /  Alb  3.7  /  TBili  0.4  /  DBili      /  AST  30  /  ALT  31  /  AlkPhos  58  06-08                          10.2   12.65 )-----------( 249      ( 07 Jun 2023 06:09 )             30.9       Urine Studies:        HbA1c 8.7      [04-08-19 @ 04:49]  TSH 1.29      [06-06-23 @ 10:10]  Lipid: chol 103, TG 94, HDL 37, LDL --      [06-06-23 @ 10:10]    HBsAg Nonreact      [05-04-19 @ 06:38]  HCV 0.08, Nonreact      [05-04-19 @ 06:38]    RAMO: titer Negative, pattern --      [05-04-19 @ 06:38]  C3 Complement 177      [05-04-19 @ 06:38]  C4 Complement 46      [05-04-19 @ 06:38]  ANCA: cANCA Negative, pANCA Negative, atypical ANCA Negative      [05-04-19 @ 06:38]  Immunofixation Serum:   No Monoclonal Band Identified    Reference Range: None Detected      [04-05-19 @ 04:00]  SPEP Interpretation: Normal Electrophoresis Pattern      [04-05-19 @ 04:00]  Immunofixation Urine: Reference Range: None Detected      [05-05-19 @ 12:39]  UPEP Interpretation: Mild Selective (Glomerular) Proteinuria      [05-05-19 @ 12:39]      RADIOLOGY & ADDITIONAL STUDIES:

## 2023-06-08 NOTE — EEG REPORT - NS EEG TEXT BOX
Epilepsy Attending Note:     GRIS TIDWELL    63y Male  MRN [id number]    Vital Signs Last 24 Hrs  T(C): 36.7 (2023 08:00), Max: 37.3 (2023 18:00)  T(F): 98 (2023 08:00), Max: 99.1 (2023 18:00)  HR: 90 (2023 12:00) (70 - 139)  BP: --  BP(mean): --  RR: 36 (2023 12:00) (18 - 64)  SpO2: 100% (2023 12:00) (88% - 100%)    Parameters below as of 2023 00:00  Patient On (Oxygen Delivery Method): BiPAP/CPAP  O2 Flow (L/min): 8  O2 Concentration (%): 30                          10.2   12.65 )-----------( 249      ( 2023 06:09 )             30.9       06-08    146  |  110  |  95<HH>  ----------------------------<  163<H>  4.6   |  22  |  5.4<HH>    Ca    8.3<L>      2023 04:40  Phos  4.1     06-08  Mg     3.9     06-08    TPro  5.7<L>  /  Alb  3.7  /  TBili  0.4  /  DBili  x   /  AST  30  /  ALT  31  /  AlkPhos  58  06-08      MEDICATIONS  (STANDING):  atorvastatin 40 milliGRAM(s) Oral at bedtime  chlorhexidine 2% Cloths 1 Application(s) Topical <User Schedule>  dextrose 5%. 1000 milliLiter(s) (50 mL/Hr) IV Continuous <Continuous>  dextrose 5%. 1000 milliLiter(s) (100 mL/Hr) IV Continuous <Continuous>  dextrose 50% Injectable 25 Gram(s) IV Push once  dextrose 50% Injectable 12.5 Gram(s) IV Push once  dextrose 50% Injectable 25 Gram(s) IV Push once  doxazosin 2 milliGRAM(s) Oral at bedtime  glucagon  Injectable 1 milliGRAM(s) IntraMuscular once  insulin lispro (ADMELOG) corrective regimen sliding scale   SubCutaneous every 6 hours  lactulose Syrup 10 Gram(s) Oral Once  metoprolol tartrate Injectable 5 milliGRAM(s) IV Push every 6 hours  niCARdipine Infusion 5 mG/Hr (25 mL/Hr) IV Continuous <Continuous>  sodium chloride 0.9%. 1000 milliLiter(s) (40 mL/Hr) IV Continuous <Continuous>    MEDICATIONS  (PRN):  acetaminophen     Tablet .. 650 milliGRAM(s) Oral every 6 hours PRN Temp greater or equal to 38C (100.4F), Mild Pain (1 - 3)  dextrose Oral Gel 15 Gram(s) Oral once PRN Blood Glucose LESS THAN 70 milliGRAM(s)/deciliter  hydrALAZINE Injectable 10 milliGRAM(s) IV Push every 6 hours PRN sustained SBP > 160 and HR < 60  labetalol Injectable 10 milliGRAM(s) IV Push every 2 hours PRN sustained SBP > 160 and HR > 60  ondansetron Injectable 4 milliGRAM(s) IV Push every 6 hours PRN Nausea and/or Vomiting  senna 2 Tablet(s) Oral at bedtime PRN Constipation  traMADol 50 milliGRAM(s) Oral Once PRN Severe Pain (7 - 10)            VEEG in the last 24 hours:    Background - continuous, reaching frequencies in the range of 6-7 Hz, showing reactivity    Focal and generalized slowin. moderate generalized slowing  2. moderate left hemispheric focal slowing, mostly over parasaggital and T-O region    Interictal activity - large number of left FT sharp waves    Events - none    Seizures - none    Impression: Abnormal VEEG as above    Plan - per [facility] team

## 2023-06-08 NOTE — DIETITIAN INITIAL EVALUATION ADULT - OTHER CALCULATIONS
Estimated kcal MSJ*SF 1.0 and protein as above given BMI>30.  Estimated fluid 3605ml/day based on adjusted weight for BMI>30.

## 2023-06-08 NOTE — DIETITIAN INITIAL EVALUATION ADULT - [id number]_GEN_A_CORE
I&O's Detail    07 Jun 2023 07:01  -  08 Jun 2023 07:00  --------------------------------------------------------  IN:    NiCARdipine: 92.5 mL    NiCARdipine: 847.5 mL    sodium chloride 0.9%: 280 mL  Total IN: 1220 mL    OUT:    Indwelling Catheter - Urethral (mL): 500 mL  Total OUT: 500 mL    Total NET: 720 mL

## 2023-06-08 NOTE — DIETITIAN INITIAL EVALUATION ADULT - PERTINENT LABORATORY DATA
10.2   12.65 )-----------( 249      ( 07 Jun 2023 06:09 )             30.9     06-08    146  |  110  |  95<HH>  ----------------------------<  163<H>  4.6   |  22  |  5.4<HH>    Ca    8.3<L>      08 Jun 2023 04:40  Phos  4.1     06-08  Mg     3.9     06-08    TPro  5.7<L>  /  Alb  3.7  /  TBili  0.4  /  DBili  x   /  AST  30  /  ALT  31  /  AlkPhos  58  06-08    CAPILLARY BLOOD GLUCOSE  POCT Blood Glucose.: 201 mg/dL (08 Jun 2023 11:09)  POCT Blood Glucose.: 175 mg/dL (08 Jun 2023 05:37)  POCT Blood Glucose.: 173 mg/dL (08 Jun 2023 00:09)  POCT Blood Glucose.: 180 mg/dL (07 Jun 2023 17:08)    A1C with Estimated Average Glucose Result: 5.9 % (06-07-23 @ 06:13)  A1C with Estimated Average Glucose Result: 6.3 % (06-06-23 @ 10:10)

## 2023-06-08 NOTE — PROGRESS NOTE ADULT - ASSESSMENT
The patient is a 63-year-old male with a past history of Afib on Eliquis, prior basal ganglia hemorrhage, HTN, Gout, and DM, CKD 3b- 4 ( known to my office)  who presented as a stroke.    # DESI on [id number]/ ATN vs EV   # oliguria   # HTN uncontrolled   # Thalamic CVA hemorrhagic     - oliguric trial of bumex 2 mg IV q12h instead of oral lasix as per yesterday note   - creta noted / Creatinine Trend: 5.4<--, 5.5<--, 4.7<--, 3.2<--, 3.1<--  - keep khan/ strict I and O   - avoid further nephrotoxins / contrast unless urgently needed   - BP target as per neuro ICU  - check IP and PTH   - follow BMP  - will follow closely and assess need for RRT    d/w neuro ICU team again today

## 2023-06-08 NOTE — PROGRESS NOTE ADULT - SUBJECTIVE AND OBJECTIVE BOX
SUMMARY: This is a 62 y/o M with hx of Afib on eiquis, CVA BG bleed ,HTN, CHF, Gout, DM  presents as a stroke code after being down in bathroom unknown time LKW was 2300. 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 Pt is lethargic  - Cardene started for blood pressure control.    OVERNIGHT EVENTS: Afib w/ RVR, treated with Lopressor 5mg IVP. Placed on CPAP overnight    ADMISSION SCORES:   GCS 12   ICH 1   NIH 17    REVIEW OF SYSTEMS: Pt unable to participate in ROS due to neurological status    VITALS: [x]   ICU Vital Signs Last 24 Hrs  T(C): 36.7 (07 Jun 2023 12:00), Max: 37.6 (07 Jun 2023 04:00)  T(F): 98 (07 Jun 2023 12:00), Max: 99.7 (07 Jun 2023 04:00)  HR: 82 (07 Jun 2023 13:00) (78 - 120)  BP: 179/79 (06 Jun 2023 20:00) (179/79 - 179/79)  ABP: 168/61 (07 Jun 2023 13:00) (118/50 - 171/70)  ABP(mean): 90 (07 Jun 2023 13:00) (65 - 99)  RR: 32 (07 Jun 2023 13:00) (19 - 48)  SpO2: 100% (07 Jun 2023 13:00) (95% - 100%)    O2 Parameters below as of 07 Jun 2023 13:00  Patient On (Oxygen Delivery Method): room air      06 Jun 2023 07:01  -  07 Jun 2023 07:00  --------------------------------------------------------  IN:    NiCARdipine: 116 mL    NiCARdipine: 257.5 mL  Total IN: 373.5 mL    OUT:    Indwelling Catheter - Urethral (mL): 315 mL  Total OUT: 315 mL    Total NET: 58.5 mL      07 Jun 2023 07:01  -  07 Jun 2023 13:18  --------------------------------------------------------  IN:    NiCARdipine: 92.5 mL  Total IN: 92.5 mL    OUT:    Indwelling Catheter - Urethral (mL): 170 mL  Total OUT: 170 mL    Total NET: -77.5 mL        EXAMINATION:  MEDICATIONS  (STANDING):  atorvastatin 40 milliGRAM(s) Oral at bedtime  chlorhexidine 2% Cloths 1 Application(s) Topical <User Schedule>  dextrose 5%. 1000 milliLiter(s) (100 mL/Hr) IV Continuous <Continuous>  dextrose 5%. 1000 milliLiter(s) (50 mL/Hr) IV Continuous <Continuous>  dextrose 50% Injectable 25 Gram(s) IV Push once  dextrose 50% Injectable 12.5 Gram(s) IV Push once  dextrose 50% Injectable 25 Gram(s) IV Push once  glucagon  Injectable 1 milliGRAM(s) IntraMuscular once  Lopressor 5 mg q 6   insulin lispro (ADMELOG) corrective regimen sliding scale   SubCutaneous every 6 hours  lactulose Syrup 10 Gram(s) Oral Once  metoprolol tartrate Injectable 5.0 milliGRAM(s) IV Push every 6 hours  niCARdipine Infusion 5 mG/Hr (25 mL/Hr) IV Continuous <Continuous>    CT Chest No Cont (06.06.23 @ 12:40) >  IMPRESSION:    Artifact limits image quality.      Elevated right hemidiaphragm with diminished right lung volumes. Adjacent   atelectasis right middle lobe.. No pleural effusions.      Age-indeterminate fracture right humeral neck. Correlate with clinical   history and symptoms.      Enlarged main pulmonary artery segment measuring 3.8 cm compatible with   pulmonary hypertension (5/80     Xray Chest 1 View-PORTABLE IMMEDIATE (Xray Chest 1 View-PORTABLE IMMEDIATE .) (06.06.23 @ 10:08) >  FINDINGS/  IMPRESSION:    Low lung volumes. Likely right basal effusion/opacity. No pneumothorax.    Stable cardiomediastinal silhouette.    Unchanged osseous structures.    06-07    143  |  104  |  78<HH>  ----------------------------<  170<H>  4.5   |  24  |  4.7<HH>    Ca    8.4      07 Jun 2023 06:09  Phos  3.9     06-07  Mg     3.5     06-07    TPro  5.8<L>  /  Alb  3.8  /  TBili  0.4  /  DBili  x   /  AST  31  /  ALT  33  /  AlkPhos  59  06-07                            10.2   12.65 )-----------( 249      ( 07 Jun 2023 06:09 )             30.9       tamsulosin 0.4 milliGRAM(s) Oral at bedtime  General: WN/WD, no acute distress  HENT: NC/AT, moist oral mucosa  Eyes: Anicteric sclerae  Cardiac: [id number]  Lungs: Clear  Abdomen: Soft, non-tender, +BS  Extremities: No c/c/e  Skin/Incision Site: Clean, dry and intact  Neurologic: AOx0, not following commands, PERRLA, no gaze prefernce , moves all extremities spontaneously, sensation intact  R shoulder - pain and crepitance / swelling R shoulder  SUMMARY: This is a 62 y/o M with hx of Afib on eiquis, CVA BG bleed ,HTN, CHF, Gout, DM  presents as a stroke code after being down in bathroom unknown time LKW was 2300. 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 Pt is lethargic  - Cardene started for blood pressure control.    OVERNIGHT EVENTS: Afib w/ RVR, treated with Lopressor 5mg IVP. Placed on CPAP overnight    ADMISSION SCORES:   GCS 12   ICH 1   NIH 17    REVIEW OF SYSTEMS: Pt unable to participate in ROS due to neurological status    VITALS: [x]     ICU Vital Signs Last 24 Hrs  T(C): 37.2 (08 Jun 2023 04:00), Max: 37.3 (07 Jun 2023 18:00)  T(F): 99 (08 Jun 2023 04:00), Max: 99.1 (07 Jun 2023 18:00)  HR: 94 (08 Jun 2023 07:00) (70 - 133)  BP: --  BP(mean): --  ABP: 137/54 (08 Jun 2023 07:00) (130/52 - 168/61)  ABP(mean): 75 (08 Jun 2023 07:00) (72 - 93)  RR: 30 (08 Jun 2023 07:00) (18 - 64)  SpO2: 94% (08 Jun 2023 07:00) (88% - 100%)      06-07-23 @ 07:01  -  06-08-23 @ 07:00  --------------------------------------------------------  IN: 1220 mL / OUT: 500 mL / NET: 720 mL          LABS:  Na: 146 (06-08 @ 04:40), 146 (06-07 @ 23:16), 143 (06-07 @ 06:09), 140 (06-06 @ 10:10), 141 (06-06 @ 03:30)  K: 4.6 (06-08 @ 04:40), 4.6 (06-07 @ 23:16), 4.5 (06-07 @ 06:09), 3.4 (06-06 @ 10:10), 3.7 (06-06 @ 03:30)  Cl: 110 (06-08 @ 04:40), 109 (06-07 @ 23:16), 104 (06-07 @ 06:09), 107 (06-06 @ 10:10), 101 (06-06 @ 03:30)  CO2: 22 (06-08 @ 04:40), 22 (06-07 @ 23:16), 24 (06-07 @ 06:09), 22 (06-06 @ 10:10), 27 (06-06 @ 03:30)  BUN: 95 (06-08 @ 04:40), 90 (06-07 @ 23:16), 78 (06-07 @ 06:09), 61 (06-06 @ 10:10), 69 (06-06 @ 03:30)  Cr: 5.4 (06-08 @ 04:40), 5.5 (06-07 @ 23:16), 4.7 (06-07 @ 06:09), 3.2 (06-06 @ 10:10), 3.1 (06-06 @ 03:30)  Glu: 163(06-08 @ 04:40), 166(06-07 @ 23:16), 170(06-07 @ 06:09), 145(06-06 @ 10:10), 154(06-06 @ 03:30)    Hgb: 10.2 (06-07 @ 06:09), 11.3 (06-06 @ 10:10), 12.2 (06-06 @ 03:30)  Hct: 30.9 (06-07 @ 06:09), 33.0 (06-06 @ 10:10), 36.8 (06-06 @ 03:30)  WBC: 12.65 (06-07 @ 06:09), 12.18 (06-06 @ 10:10), 8.99 (06-06 @ 03:30)  Plt: 249 (06-07 @ 06:09), 209 (06-06 @ 10:10), 226 (06-06 @ 03:30)    INR: 1.06 06-07-23 @ 06:09, 1.05 06-06-23 @ 03:30  PTT: 27.8 06-07-23 @ 06:09, 30.8 06-06-23 @ 03:30      LIVER FUNCTIONS - ( 08 Jun 2023 04:40 )  Alb: 3.7 g/dL / Pro: 5.7 g/dL / ALK PHOS: 58 U/L / ALT: 31 U/L / AST: 30 U/L / GGT: x           ABG - ( 07 Jun 2023 13:51 )  pH, Arterial: 7.42  pH, Blood: x     /  pCO2: 36    /  pO2: 82    / HCO3: 23    / Base Excess: -0.8  /  SaO2: 97.8          MEDICATIONS  (STANDING):  atorvastatin 40 milliGRAM(s) Oral at bedtime  chlorhexidine 2% Cloths 1 Application(s) Topical <User Schedule>  dextrose 5%. 1000 milliLiter(s) (100 mL/Hr) IV Continuous <Continuous>  dextrose 5%. 1000 milliLiter(s) (50 mL/Hr) IV Continuous <Continuous>  dextrose 50% Injectable 25 Gram(s) IV Push once  dextrose 50% Injectable 12.5 Gram(s) IV Push once  dextrose 50% Injectable 25 Gram(s) IV Push once  doxazosin 2 milliGRAM(s) Oral at bedtime  glucagon  Injectable 1 milliGRAM(s) IntraMuscular once  insulin lispro (ADMELOG) corrective regimen sliding scale   SubCutaneous every 6 hours  lactulose Syrup 10 Gram(s) Oral Once  metoprolol tartrate Injectable 5 milliGRAM(s) IV Push every 6 hours  niCARdipine Infusion 5 mG/Hr (25 mL/Hr) IV Continuous <Continuous>  sodium chloride 0.9%. 1000 milliLiter(s) (40 mL/Hr) IV Continuous <Continuous>    MEDICATIONS  (PRN):  acetaminophen     Tablet .. 650 milliGRAM(s) Oral every 6 hours PRN Temp greater or equal to 38C (100.4F), Mild Pain (1 - 3)  dextrose Oral Gel 15 Gram(s) Oral once PRN Blood Glucose LESS THAN 70 milliGRAM(s)/deciliter  hydrALAZINE Injectable 10 milliGRAM(s) IV Push every 6 hours PRN sustained SBP > 160 and HR < 60  labetalol Injectable 10 milliGRAM(s) IV Push every 2 hours PRN sustained SBP > 160 and HR > 60  ondansetron Injectable 4 milliGRAM(s) IV Push every 6 hours PRN Nausea and/or Vomiting  senna 2 Tablet(s) Oral at bedtime PRN Constipation  traMADol 50 milliGRAM(s) Oral Once PRN Severe Pain (7 - 10)      CT Chest No Cont (06.06.23 @ 12:40) >  IMPRESSION:    Artifact limits image quality.      Elevated right hemidiaphragm with diminished right lung volumes. Adjacent   atelectasis right middle lobe.. No pleural effusions.      Age-indeterminate fracture right humeral neck. Correlate with clinical   history and symptoms.      Enlarged main pulmonary artery segment measuring 3.8 cm compatible with   pulmonary hypertension (5/80     Xray Chest 1 View-PORTABLE IMMEDIATE (Xray Chest 1 View-PORTABLE IMMEDIATE .) (06.06.23 @ 10:08) >  FINDINGS/  IMPRESSION:    Low lung volumes. Likely right basal effusion/opacity. No pneumothorax.    Stable cardiomediastinal silhouette.    Unchanged osseous structures.    06-07    143  |  104  |  78<HH>  ----------------------------<  170<H>  4.5   |  24  |  4.7<HH>    Ca    8.4      07 Jun 2023 06:09  Phos  3.9     06-07  Mg     3.5     06-07    TPro  5.8<L>  /  Alb  3.8  /  TBili  0.4  /  DBili  x   /  AST  31  /  ALT  33  /  AlkPhos  59  06-07                            10.2   12.65 )-----------( 249      ( 07 Jun 2023 06:09 )             30.9       tamsulosin 0.4 milliGRAM(s) Oral at bedtime  General: WN/WD, no acute distress  HENT: NC/AT, moist oral mucosa  Eyes: Anicteric sclerae  Cardiac: [id number]  Lungs: Clear  Abdomen: Soft, non-tender, +BS  Extremities: No c/c/e  Skin/Incision Site: Clean, dry and intact  Neurologic: AOx0, not following commands, PERRLA, no gaze prefernce , moves all extremities spontaneously, sensation intact  R shoulder - pain and crepitance / swelling R shoulder  SUMMARY: This is a 62 y/o M with hx of Afib on eiquis, CVA BG bleed ,HTN, CHF, Gout, DM  presents as a stroke code after being down in bathroom unknown time LKW was 2300. 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 Pt is lethargic  - Cardene started for blood pressure control.    OVERNIGHT EVENTS: Afib w/ RVR, treated with Lopressor 5mg IVP. Placed on CPAP overnight    ADMISSION SCORES:   GCS 12   ICH 1   NIH 17    REVIEW OF SYSTEMS: Pt unable to participate in ROS due to neurological status    VITALS: [x]     ICU Vital Signs Last 24 Hrs  T(C): 37.2 (08 Jun 2023 04:00), Max: 37.3 (07 Jun 2023 18:00)  T(F): 99 (08 Jun 2023 04:00), Max: 99.1 (07 Jun 2023 18:00)  HR: 94 (08 Jun 2023 07:00) (70 - 133)  BP: --  BP(mean): --  ABP: 137/54 (08 Jun 2023 07:00) (130/52 - 168/61)  ABP(mean): 75 (08 Jun 2023 07:00) (72 - 93)  RR: 30 (08 Jun 2023 07:00) (18 - 64)  SpO2: 94% (08 Jun 2023 07:00) (88% - 100%)      06-07-23 @ 07:01  -  06-08-23 @ 07:00  --------------------------------------------------------  IN: 1220 mL / OUT: 500 mL / NET: 720 mL          LABS:  Na: 146 (06-08 @ 04:40), 146 (06-07 @ 23:16), 143 (06-07 @ 06:09), 140 (06-06 @ 10:10), 141 (06-06 @ 03:30)  K: 4.6 (06-08 @ 04:40), 4.6 (06-07 @ 23:16), 4.5 (06-07 @ 06:09), 3.4 (06-06 @ 10:10), 3.7 (06-06 @ 03:30)  Cl: 110 (06-08 @ 04:40), 109 (06-07 @ 23:16), 104 (06-07 @ 06:09), 107 (06-06 @ 10:10), 101 (06-06 @ 03:30)  CO2: 22 (06-08 @ 04:40), 22 (06-07 @ 23:16), 24 (06-07 @ 06:09), 22 (06-06 @ 10:10), 27 (06-06 @ 03:30)  BUN: 95 (06-08 @ 04:40), 90 (06-07 @ 23:16), 78 (06-07 @ 06:09), 61 (06-06 @ 10:10), 69 (06-06 @ 03:30)  Cr: 5.4 (06-08 @ 04:40), 5.5 (06-07 @ 23:16), 4.7 (06-07 @ 06:09), 3.2 (06-06 @ 10:10), 3.1 (06-06 @ 03:30)  Glu: 163(06-08 @ 04:40), 166(06-07 @ 23:16), 170(06-07 @ 06:09), 145(06-06 @ 10:10), 154(06-06 @ 03:30)    Hgb: 10.2 (06-07 @ 06:09), 11.3 (06-06 @ 10:10), 12.2 (06-06 @ 03:30)  Hct: 30.9 (06-07 @ 06:09), 33.0 (06-06 @ 10:10), 36.8 (06-06 @ 03:30)  WBC: 12.65 (06-07 @ 06:09), 12.18 (06-06 @ 10:10), 8.99 (06-06 @ 03:30)  Plt: 249 (06-07 @ 06:09), 209 (06-06 @ 10:10), 226 (06-06 @ 03:30)    INR: 1.06 06-07-23 @ 06:09, 1.05 06-06-23 @ 03:30  PTT: 27.8 06-07-23 @ 06:09, 30.8 06-06-23 @ 03:30      LIVER FUNCTIONS - ( 08 Jun 2023 04:40 )  Alb: 3.7 g/dL / Pro: 5.7 g/dL / ALK PHOS: 58 U/L / ALT: 31 U/L / AST: 30 U/L / GGT: x           ABG - ( 07 Jun 2023 13:51 )  pH, Arterial: 7.42  pH, Blood: x     /  pCO2: 36    /  pO2: 82    / HCO3: 23    / Base Excess: -0.8  /  SaO2: 97.8          MEDICATIONS  (STANDING):  atorvastatin 40 milliGRAM(s) Oral at bedtime  chlorhexidine 2% Cloths 1 Application(s) Topical <User Schedule>  dextrose 5%. 1000 milliLiter(s) (100 mL/Hr) IV Continuous <Continuous>  dextrose 5%. 1000 milliLiter(s) (50 mL/Hr) IV Continuous <Continuous>  dextrose 50% Injectable 25 Gram(s) IV Push once  dextrose 50% Injectable 12.5 Gram(s) IV Push once  dextrose 50% Injectable 25 Gram(s) IV Push once  doxazosin 2 milliGRAM(s) Oral at bedtime  glucagon  Injectable 1 milliGRAM(s) IntraMuscular once  insulin lispro (ADMELOG) corrective regimen sliding scale   SubCutaneous every 6 hours  lactulose Syrup 10 Gram(s) Oral Once  metoprolol tartrate Injectable 5 milliGRAM(s) IV Push every 6 hours  niCARdipine Infusion 5 mG/Hr (25 mL/Hr) IV Continuous <Continuous>  sodium chloride 0.9%. 1000 milliLiter(s) (40 mL/Hr) IV Continuous <Continuous>    MEDICATIONS  (PRN):  acetaminophen     Tablet .. 650 milliGRAM(s) Oral every 6 hours PRN Temp greater or equal to 38C (100.4F), Mild Pain (1 - 3)  dextrose Oral Gel 15 Gram(s) Oral once PRN Blood Glucose LESS THAN 70 milliGRAM(s)/deciliter  hydrALAZINE Injectable 10 milliGRAM(s) IV Push every 6 hours PRN sustained SBP > 160 and HR < 60  labetalol Injectable 10 milliGRAM(s) IV Push every 2 hours PRN sustained SBP > 160 and HR > 60  ondansetron Injectable 4 milliGRAM(s) IV Push every 6 hours PRN Nausea and/or Vomiting  senna 2 Tablet(s) Oral at bedtime PRN Constipation  traMADol 50 milliGRAM(s) Oral Once PRN Severe Pain (7 - 10)      CT Chest No Cont (06.06.23 @ 12:40) >  IMPRESSION:    Artifact limits image quality.      Elevated right hemidiaphragm with diminished right lung volumes. Adjacent   atelectasis right middle lobe.. No pleural effusions.      Age-indeterminate fracture right humeral neck. Correlate with clinical   history and symptoms.      Enlarged main pulmonary artery segment measuring 3.8 cm compatible with   pulmonary hypertension (5/80     Xray Chest 1 View-PORTABLE IMMEDIATE (Xray Chest 1 View-PORTABLE IMMEDIATE .) (06.06.23 @ 10:08) >  FINDINGS/  IMPRESSION:    Low lung volumes. Likely right basal effusion/opacity. No pneumothorax.    Stable cardiomediastinal silhouette.    Unchanged osseous structures.    06-07    143  |  104  |  78<HH>  ----------------------------<  170<H>  4.5   |  24  |  4.7<HH>    Ca    8.4      07 Jun 2023 06:09  Phos  3.9     06-07  Mg     3.5     06-07    TPro  5.8<L>  /  Alb  3.8  /  TBili  0.4  /  DBili  x   /  AST  31  /  ALT  33  /  AlkPhos  59  06-07                            10.2   12.65 )-----------( 249      ( 07 Jun 2023 06:09 )             30.9       tamsulosin 0.4 milliGRAM(s) Oral at bedtime  General: WN/WD, no acute distress  HENT: NC/AT, moist oral mucosa  Eyes: Anicteric sclerae  Cardiac: [id number]  Lungs: Clear  Abdomen: Soft, non-tender, +BS  Extremities: No c/c/e  Skin/Incision Site: Clean, dry and intact  Neurologic: AOx0,lethargic,  not following commands/aphasic, PERRLA, midline gaze, WD on RUE (limited by R arm injury_, RLe TF, L side spont AG  R shoulder - pain and crepitance / swelling R shoulder

## 2023-06-09 NOTE — SWALLOW BEDSIDE ASSESSMENT ADULT - COMMENTS
most recent fees in 2019 s/p CVA BG bleed. Familiar to SLP department with IRF admission
FEES 2019 s/p L intra-parenchymal bleed w/ extend into the ventricle associated w/ midline shift. Known to SLP department w/ IRF admission.

## 2023-06-09 NOTE — SWALLOW BEDSIDE ASSESSMENT ADULT - SLP PERTINENT HISTORY OF CURRENT PROBLEM
Pt is a 62 y/o morbidly obese M w/ PMHx: afib on Eliquis, CVA BG bleed, HTN, CHF, Gout, DM. Presented as stroke code, NIH 17 Dysarthria, facial RUE weakness w/ R side neglect and sensory deficit. SBP in the 200's. CTH-> thalamic bleed ICH (1). Pt is a 62 y/o morbidly obese M w/ PMHx: afib on Eliquis, CVA BG bleed, HTN, CHF, Gout, DM, presented as stroke code, NIHSS 17. Repeat CTH-> L thalamic IPH. Mildly increased intraventricular hemorrhage. +sICH/IVH, hypertensive emergency, DESI on CKD, R humerus fx. IR c/s for angiogram to r/o microaneurysm as source of bleed once Cr stable. Pt w/ multifactorial encephalopathy- neurologic, metabolic/uremic.

## 2023-06-09 NOTE — EEG REPORT - NS EEG TEXT BOX
Epilepsy Attending Note:     GRIS TIDWELL    63y Male  MRN [id number]    Vital Signs Last 24 Hrs  T(C): 36.1 (2023 08:00), Max: 37.7 (2023 04:00)  T(F): 97 (2023 08:00), Max: 99.9 (2023 04:00)  HR: 59 (2023 09:00) (59 - 127)  BP: --  BP(mean): --  RR: 18 (2023 09:00) (18 - 51)  SpO2: 93% (2023 09:00) (93% - 100%)    Parameters below as of 2023 08:00  Patient On (Oxygen Delivery Method): nasal cannula  O2 Flow (L/min): 2                            8.4    10.75 )-----------( 192      ( 2023 05:42 )             26.6       06-09    146  |  112<H>  |  106<HH>  ----------------------------<  183<H>  4.4   |  19  |  5.5<HH>    Ca    7.7<L>      2023 05:42  Phos  5.0     -  Mg     3.8         TPro  5.1<L>  /  Alb  3.1<L>  /  TBili  0.4  /  DBili  x   /  AST  20  /  ALT  24  /  AlkPhos  47  06-09      MEDICATIONS  (STANDING):  chlorhexidine 2% Cloths 1 Application(s) Topical <User Schedule>  dextrose 5%. 1000 milliLiter(s) (100 mL/Hr) IV Continuous <Continuous>  dextrose 5%. 1000 milliLiter(s) (50 mL/Hr) IV Continuous <Continuous>  dextrose 50% Injectable 25 Gram(s) IV Push once  dextrose 50% Injectable 12.5 Gram(s) IV Push once  dextrose 50% Injectable 25 Gram(s) IV Push once  doxazosin 2 milliGRAM(s) Oral at bedtime  glucagon  Injectable 1 milliGRAM(s) IntraMuscular once  heparin   Injectable 7500 Unit(s) SubCutaneous every 8 hours  hydrALAZINE 50 milliGRAM(s) Oral <User Schedule>  insulin lispro (ADMELOG) corrective regimen sliding scale   SubCutaneous every 6 hours  insulin NPH human recombinant 3 Unit(s) SubCutaneous every 6 hours  labetalol 200 milliGRAM(s) Oral every 8 hours  lacosamide IVPB 100 milliGRAM(s) IV Intermittent every 12 hours  multiple electrolytes Injection Type 1 1000 milliLiter(s) (75 mL/Hr) IV Continuous <Continuous>  niCARdipine Infusion 5 mG/Hr (25 mL/Hr) IV Continuous <Continuous>    MEDICATIONS  (PRN):  acetaminophen     Tablet .. 650 milliGRAM(s) Oral every 6 hours PRN Temp greater or equal to 38C (100.4F), Mild Pain (1 - 3)  dextrose Oral Gel 15 Gram(s) Oral once PRN Blood Glucose LESS THAN 70 milliGRAM(s)/deciliter  hydrALAZINE Injectable 10 milliGRAM(s) IV Push every 6 hours PRN sustained SBP > 160 and HR < 60  labetalol Injectable 10 milliGRAM(s) IV Push every 2 hours PRN sustained SBP > 160 and HR > 60  ondansetron Injectable 4 milliGRAM(s) IV Push every 6 hours PRN Nausea and/or Vomiting  senna 2 Tablet(s) Oral at bedtime PRN Constipation            VEEG in the last 24 hours:    Background - continuous, reaching frequencies in the range of 6-7 Hz, showing reactivity    Focal and generalized slowin. moderate generalized slowing  2. moderate left hemispheric focal slowing, mostly over parasaggital and T-O region    Interictal activity - large number of left FT sharp waves    Events - none    Seizures - none    Impression: Abnormal VEEG as above    Plan - per [facility] team

## 2023-06-09 NOTE — PROGRESS NOTE ADULT - ASSESSMENT
The patient is a 63-year-old male with a past history of Afib on Eliquis, prior basal ganglia hemorrhage, HTN, Gout, and DM, CKD 3b- 4 ( known to my office)  who presented as a stroke.    # DESI on [id number]/ ATN vs EV   # oliguria   # HTN uncontrolled   # Thalamic CVA hemorrhagic     - oliguric trial of bumex 2 mg IV q12h  - creta noted /high but stable   - keep khan/ strict I and O   - avoid further nephrotoxins / contrast unless urgently needed   - BP target as per neuro ICU  - IP at target   - follow BMP  - will follow closely and assess need for RRT    d/w neuro ICU team  today

## 2023-06-09 NOTE — PROGRESS NOTE ADULT - ASSESSMENT
IMP:75 y/o M with hx of CVA, DM ,HTN, Afib on eliquis, CHF, Gout presents as [place] Code/Code ICH with NIH 17, GCS11, ICH 1 with Left thalamic hemorrhage    sICH/IVH  hypertensive emergency   Desi on CKD   encephalopathy      Plan: Admit to Neuro ICU           Neuro checks q 1   interval CTH now  vEEG with significant sharp wave burden, vimpat initiated           Consult IR for angio - R/O microaneurysm  as source of bleed once Cr stable           Stroke labs - ICH score   multifactorial encephalopathy- neurologic, metabolic/uremic  bedrest    CV: htn, hx of afib on DOAC s/p reversal  EKG daily            Trops downtrending            convert lopressor to labetalol   on multiple antihypertensive at home- clonidine, labetalol, hydralazine, norvasc           afib- off doac s/p reversal for ICH            Echo             SBP [id number] - Cardene PRN by cuff            Daily weights    GI: EMILY feeding tube in place  hold off on feeds given concern for worsening encephalopathy        gi ppx- n/a      Dysphagia screening- fail     Pulmonary:- Preumbaly ZE ?OHS      CXR now       ABG Sleep Apnea on CPAP at home  - Follow up with Pulmonary      Humidify 2L NC sating 94%- wean O2       CPAP - - 5-10 - 10 Pm to 6 am- 30 %      Renal: DESI/ CRI - baseline  Cr 3.0               worsening uremia  renal following, pt may require RRT              gentle IVF with physiologic crystalloid             Daily weights monitor stricy I's and O's               Endocrine: Fs q 6H                          ISS q 6H- sglu - 140 -180                         Stroke Core measures as above   a1c 5.9    Heme- remote hx of DVT with prior L IPH  :           No CBC                 Hold Eliquis                 Reversal of Eliquis with Andexa low dose 400U bolus and Gtt- 480U                Monitor Fever curve and WBC- If febrile Pan culture                Doppler B/L LE  hold chemoppx pending stability scan  Place on SCD  venous doppler negative      ID:  full sepsis w/u as part of encephalopathy w/u    ortho- XRAY R Shoulder- Redemonstrated is an acute surgical neck fracture of the proximal humerus with displacement and mild impaction; conservative management per ortho     PIVs, khan 6/6, A-line 6/6    dispo- nsicu  IMP:73 y/o M with hx of CVA, DM ,HTN, Afib on eliquis, CHF, Gout presents as [place] Code/Code ICH with NIH 17, GCS11, ICH 1 with Left thalamic hemorrhage    sICH/IVH  hypertensive emergency   Desi on CKD   encephalopathy  R humerus fracture      Plan: Admit to Neuro ICU           Neuro checks q 1   interval CTH now  vEEG with significant sharp wave burden, vimpat initiated           Consult IR for angio - R/O microaneurysm  as source of bleed once Cr stable           Stroke labs - ICH score   multifactorial encephalopathy- neurologic, metabolic/uremic  bedrest    CV: htn, hx of afib on DOAC s/p reversal  EKG daily            Trops downtrending            convert lopressor to labetalol   on multiple antihypertensive at home- clonidine, labetalol, hydralazine, norvasc           afib- off doac s/p reversal for ICH            Echo             SBP [id number] - Cardene PRN by cuff            Daily weights    GI: EMILY feeding tube in place  hold off on feeds given concern for worsening encephalopathy        gi ppx- n/a      Dysphagia screening- fail     Pulmonary:- Preumbaly ZE ?OHS      CXR now       ABG Sleep Apnea on CPAP at home  - Follow up with Pulmonary      Humidify 2L NC sating 94%- wean O2       CPAP - - 5-10 - 10 Pm to 6 am- 30 %      Renal: DESI/ CRI - baseline  Cr 3.0               worsening uremia  renal following, pt may require RRT              gentle IVF with physiologic crystalloid             Daily weights monitor stricy I's and O's               Endocrine: Fs q 6H                          ISS q 6H- sglu - 140 -180                         Stroke Core measures as above   a1c 5.9    Heme- remote hx of DVT with prior L IPH  :           No CBC                 Hold Eliquis                 Reversal of Eliquis with Andexa low dose 400U bolus and Gtt- 480U                Monitor Fever curve and WBC- If febrile Pan culture                Doppler B/L LE  hold chemoppx pending stability scan  Place on SCD  venous doppler negative      ID:  full sepsis w/u as part of encephalopathy w/u    ortho- XRAY R Shoulder- Redemonstrated is an acute surgical neck fracture of the proximal humerus with displacement and mild impaction; conservative management per ortho     PIVs, khan 6/6, A-line 6/6    dispo- nsicu  IMP:73 y/o M with hx of CVA, DM ,HTN, Afib on eliquis, CHF, Gout presents as [place] Code/Code ICH with NIH 17, GCS11, ICH 1 with Left thalamic hemorrhage    sICH/IVH  hypertensive emergency   Desi on CKD   encephalopathy  R humerus fracture      Plan: Admit to Neuro ICU           Neuro checks q 2  CTH 6/8 stable  MRI when stable  vEEG with significant sharp wave burden, vimpat initiated   Consult IR for angio - R/O microaneurysm  as source of bleed once Cr stable           Stroke labs - ICH score   multifactorial encephalopathy- neurologic, metabolic/uremic  bedrest    CV: htn, hx of afib on DOAC s/p reversal  EKG daily            Trops downtrending- no need for further trend            convert lopressor to labetalol    Start hydralazine 50mg q8h  on multiple antihypertensive at home- clonidine, labetalol, hydralazine, norvasc           afib- off doac s/p reversal for ICH; intermittent RVR, IV lopressor PRN            Echo             -150 - Cardene PRN by cuff            Daily weights    Pulmonary:- Presumably ZE ?OHS       Check ABG   Sleep Apnea on CPAP at home  - Follow up with Pulmonary      Humidify 2L NC sating 94%- wean O2       CPAP - - 5-10 - 10 Pm to 6 am- 30 %      GI: EMILY feeding tube in place  May start glucerna 1.2 20/hr if ABG stable        gi ppx- n/a      Dysphagia screening- fail   Bowel regimen- senna    Renal: DESI/ CRI - baseline  Cr 3.0               worsening uremia, UOP improving today after IVF bolus  renal following- monitor need for RRT         Increase Normosol 75/hr             Daily weights monitor stricy I's and O's               Endocrine: Fs q 6H                          ISS q 6H- sglu - 140 -180       Start NPH 3 q6h if starting feeds                         Stroke Core measures as above   a1c 5.9    Heme- remote hx of DVT with prior L IPH  :           No CBC                 Hold Eliquis                 Reversal of Eliquis with Andexa low dose 400U bolus and Gtt- 480U                Monitor Fever curve and WBC- If febrile Pan culture                Doppler B/L LE 6/8 no dvt  DVT PPx- SCD, heparin SQ  Renal deficiency associated anemia    ID:  full sepsis w/u as part of encephalopathy w/u  UA negative    ortho- XRAY R Shoulder- Redemonstrated is an acute surgical neck fracture of the proximal humerus with displacement and mild impaction; conservative management per ortho     PIVs, khan 6/6, A-line 6/6    dispo- nsicu

## 2023-06-09 NOTE — PROGRESS NOTE ADULT - SUBJECTIVE AND OBJECTIVE BOX
Nephrology progress note    THIS IS AN INCOMPLETE NOTE . FULL NOTE TO FOLLOW SHORTLY    Patient is seen and examined, events over the last 24 h noted .    Allergies:  No Known Allergies    Hospital Medications:   MEDICATIONS  (STANDING):  chlorhexidine 2% Cloths 1 Application(s) Topical <User Schedule>  dextrose 5%. 1000 milliLiter(s) (100 mL/Hr) IV Continuous <Continuous>  dextrose 5%. 1000 milliLiter(s) (50 mL/Hr) IV Continuous <Continuous>  dextrose 50% Injectable 25 Gram(s) IV Push once  dextrose 50% Injectable 25 Gram(s) IV Push once  dextrose 50% Injectable 12.5 Gram(s) IV Push once  doxazosin 2 milliGRAM(s) Oral at bedtime  glucagon  Injectable 1 milliGRAM(s) IntraMuscular once  heparin   Injectable 7500 Unit(s) SubCutaneous every 8 hours  insulin lispro (ADMELOG) corrective regimen sliding scale   SubCutaneous every 6 hours  labetalol 200 milliGRAM(s) Oral every 8 hours  lacosamide IVPB 100 milliGRAM(s) IV Intermittent every 12 hours  multiple electrolytes Injection Type 1 1000 milliLiter(s) (50 mL/Hr) IV Continuous <Continuous>  niCARdipine Infusion 5 mG/Hr (25 mL/Hr) IV Continuous <Continuous>        VITALS:  T(F): 97 (23 @ 08:00), Max: 99.9 (23 @ 04:00)  HR: 70 (23 @ 08:00)  BP: --  RR: 51 (23 @ 08:00)  SpO2: 100% (23 @ 08:00)  Wt(kg): --     @ 07:  -   @ 07:00  --------------------------------------------------------  IN: 1220 mL / OUT: 500 mL / NET: 720 mL     @ 07:  -   @ 07:00  --------------------------------------------------------  IN: 1522.5 mL / OUT: 670 mL / NET: 852.5 mL     @ 07:01  -   @ 08:55  --------------------------------------------------------  IN: 100 mL / OUT: 60 mL / NET: 40 mL      Height (cm): 177.8 ( @ 14:03)    PHYSICAL EXAM:  Constitutional: NAD  HEENT: anicteric sclera, oropharynx clear, MMM  Neck: No JVD  Respiratory: CTAB, no wheezes, rales or rhonchi  Cardiovascular: S1, S2, RRR  Gastrointestinal: BS+, soft, NT/ND  Extremities: No cyanosis or clubbing. No peripheral edema  :  No khan.   Skin: No rashes    LABS:      146  |  112<H>  |  106<HH>  ----------------------------<  183<H>  4.4   |  19  |  5.5<HH>  Creatinine Trend: 5.5<--, 5.4<--, 5.5<--, 4.7<--, 3.2<--, 3.1<--  Ca    7.7<L>      2023 05:42  Phos  5.0       Mg     3.8         TPro  5.1<L>  /  Alb  3.1<L>  /  TBili  0.4  /  DBili      /  AST  20  /  ALT  24  /  AlkPhos  47                            8.4    10.75 )-----------( 192      ( 2023 05:42 )             26.6       Urine Studies:  Urinalysis Basic - ( 2023 15:25 )    Color: Yellow / Appearance: Slightly Turbid / S.021 / pH:   Gluc:  / Ketone: Trace  / Bili: Negative / Urobili: <2 mg/dL   Blood:  / Protein: 300 mg/dL / Nitrite: Negative   Leuk Esterase: Moderate / RBC: 140 /HPF / WBC 16 /HPF   Sq Epi:  / Non Sq Epi:  / Bacteria: Negative          HbA1c 8.7      [19 @ 04:49]  TSH 1.29      [23 @ 10:10]  Lipid: chol 103, TG 94, HDL 37, LDL --      [23 @ 10:10]    HBsAg Nonreact      [19 @ 06:38]  HCV 0.08, Nonreact      [19 @ 06:38]    RAMO: titer Negative, pattern --      [19 @ 06:38]  C3 Complement 177      [19 @ 06:38]  C4 Complement 46      [19 @ 06:38]  ANCA: cANCA Negative, pANCA Negative, atypical ANCA Negative      [19 @ 06:38]  Immunofixation Serum:   No Monoclonal Band Identified    Reference Range: None Detected      [19 @ 04:00]  SPEP Interpretation: Normal Electrophoresis Pattern      [19 @ 04:00]  Immunofixation Urine: Reference Range: None Detected      [19 @ 12:39]  UPEP Interpretation: Mild Selective (Glomerular) Proteinuria      [19 @ 12:39]      RADIOLOGY & ADDITIONAL STUDIES:   Nephrology progress note  Patient is seen and examined, events over the last 24 h noted .  Lying in bed comfortable     Allergies:  No Known Allergies    Hospital Medications:   MEDICATIONS  (STANDING):    dextrose 5%. 1000 milliLiter(s) (100 mL/Hr) IV Continuous <Continuous>  dextrose 5%. 1000 milliLiter(s) (50 mL/Hr) IV Continuous <Continuous>  doxazosin 2 milliGRAM(s) Oral at bedtime  glucagon  Injectable 1 milliGRAM(s) IntraMuscular once  heparin   Injectable 7500 Unit(s) SubCutaneous every 8 hours  insulin lispro (ADMELOG) corrective regimen sliding scale   SubCutaneous every 6 hours  labetalol 200 milliGRAM(s) Oral every 8 hours  lacosamide IVPB 100 milliGRAM(s) IV Intermittent every 12 hours  multiple electrolytes Injection Type 1 1000 milliLiter(s) (50 mL/Hr) IV Continuous <Continuous>  niCARdipine Infusion 5 mG/Hr (25 mL/Hr) IV Continuous <Continuous>        VITALS:  T(F): 97 (23 @ 08:00), Max: 99.9 (23 @ 04:00)  HR: 70 (23 @ 08:00)  RR: 51 (23 @ 08:00)  SpO2: 100% (23 @ 08:00)  Wt(kg): --     @ :  -   @ 07:00  --------------------------------------------------------  IN: 1220 mL / OUT: 500 mL / NET: 720 mL     @ 07:  -   @ 07:00  --------------------------------------------------------  IN: 1522.5 mL / OUT: 670 mL / NET: 852.5 mL     @ 07:01  -   @ 08:55  --------------------------------------------------------  IN: 100 mL / OUT: 60 mL / NET: 40 mL      Height (cm): 177.8 ( @ 14:03)    PHYSICAL EXAM:  Constitutional: confused / lethargic   Respiratory: CTAB,  Cardiovascular: S1, S2, RRR  Gastrointestinal: BS+, soft, NT/ND  Extremities: No cyanosis or clubbing. No peripheral edema  :  No khan.   Skin: No rashes    LABS:      146  |  112<H>  |  106<HH>  ----------------------------<  183<H>  4.4   |  19  |  5.5<HH>    Creatinine Trend: 5.5<--, 5.4<--, 5.5<--, 4.7<--, 3.2<--, 3.1<--    Ca    7.7<L>      2023 05:42  Phos  5.0       Mg     3.8         TPro  5.1<L>  /  Alb  3.1<L>  /  TBili  0.4  /  DBili      /  AST  20  /  ALT  24  /  AlkPhos  47                            8.4    10.75 )-----------( 192      ( 2023 05:42 )             26.6       Urine Studies:  Urinalysis Basic - ( 2023 15:25 )    Color: Yellow / Appearance: Slightly Turbid / S.021 / pH:   Gluc:  / Ketone: Trace  / Bili: Negative / Urobili: <2 mg/dL   Blood:  / Protein: 300 mg/dL / Nitrite: Negative   Leuk Esterase: Moderate / RBC: 140 /HPF / WBC 16 /HPF   Sq Epi:  / Non Sq Epi:  / Bacteria: Negative          HbA1c 8.7      [19 @ 04:49]  TSH 1.29      [23 @ 10:10]  Lipid: chol 103, TG 94, HDL 37, LDL --      [23 @ 10:10]    HBsAg Nonreact      [19 @ 06:38]  HCV 0.08, Nonreact      [19 @ 06:38]    RAMO: titer Negative, pattern --      [19 @ 06:38]  C3 Complement 177      [19 @ 06:38]  C4 Complement 46      [19 @ 06:38]  ANCA: cANCA Negative, pANCA Negative, atypical ANCA Negative      [19 @ 06:38]  Immunofixation Serum:   No Monoclonal Band Identified    Reference Range: None Detected      [19 @ 04:00]  SPEP Interpretation: Normal Electrophoresis Pattern      [19 @ 04:00]  Immunofixation Urine: Reference Range: None Detected      [19 @ 12:39]  UPEP Interpretation: Mild Selective (Glomerular) Proteinuria      [19 @ 12:39]      RADIOLOGY & ADDITIONAL STUDIES:

## 2023-06-09 NOTE — SWALLOW BEDSIDE ASSESSMENT ADULT - DATE
09-Jun-2023
06-Jun-2023
Afx Histology Text: Mild to severe solar elastosis is noted.  Aggregates of dermal tumor cells  with moderate to severe pleomorphism with spindle, epithelioid, or multinucleated forms and atypical mitotic figures are seen.

## 2023-06-09 NOTE — SWALLOW BEDSIDE ASSESSMENT ADULT - SLP GENERAL OBSERVATIONS
Pt received in bed, unarousable despite noxious stimuli, on 2.5L NC, w/ no apparent c/o pain and cousin at bedside. Pt received in bed, minimally arousable in no apparent pain. +2.5L NC +cousin Kwan at bedside Pt received in bed, minimally arousable in no apparent pain. +2.5L NC +NGT in place +VEEG in progress +cousin Kwan at bedside

## 2023-06-09 NOTE — SWALLOW BEDSIDE ASSESSMENT ADULT - SWALLOW EVAL: DIAGNOSIS
Pt unarousable despite noxious stimuli. PO trials remain unsafe 2' poor arousability. PO trials unsafe 2' pt minimally arousable.

## 2023-06-09 NOTE — PROGRESS NOTE ADULT - SUBJECTIVE AND OBJECTIVE BOX
SUMMARY: This is a 64 y/o M with hx of Afib on eiquis, CVA BG bleed ,HTN, CHF, Gout, DM  presents as a stroke code after being down in bathroom unknown time LKW was 2300. 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 Pt is lethargic  - Cardene started for blood pressure control.    OVERNIGHT EVENTS: No acute events overnight    ADMISSION SCORES:   GCS 12   ICH 1   [facility] 17    REVIEW OF SYSTEMS: Pt unable to participate in ROS due to neurological status    VITALS: [x]     ICU Vital Signs Last 24 Hrs  T(C): 37.2 (08 Jun 2023 04:00), Max: 37.3 (07 Jun 2023 18:00)  T(F): 99 (08 Jun 2023 04:00), Max: 99.1 (07 Jun 2023 18:00)  HR: 94 (08 Jun 2023 07:00) (70 - 133)  BP: --  BP(mean): --  ABP: 137/54 (08 Jun 2023 07:00) (130/52 - 168/61)  ABP(mean): 75 (08 Jun 2023 07:00) (72 - 93)  RR: 30 (08 Jun 2023 07:00) (18 - 64)  SpO2: 94% (08 Jun 2023 07:00) (88% - 100%)      06-07-23 @ 07:01  -  06-08-23 @ 07:00  --------------------------------------------------------  IN: 1220 mL / OUT: 500 mL / NET: 720 mL          LABS:  Na: 146 (06-08 @ 04:40), 146 (06-07 @ 23:16), 143 (06-07 @ 06:09), 140 (06-06 @ 10:10), 141 (06-06 @ 03:30)  K: 4.6 (06-08 @ 04:40), 4.6 (06-07 @ 23:16), 4.5 (06-07 @ 06:09), 3.4 (06-06 @ 10:10), 3.7 (06-06 @ 03:30)  Cl: 110 (06-08 @ 04:40), 109 (06-07 @ 23:16), 104 (06-07 @ 06:09), 107 (06-06 @ 10:10), 101 (06-06 @ 03:30)  CO2: 22 (06-08 @ 04:40), 22 (06-07 @ 23:16), 24 (06-07 @ 06:09), 22 (06-06 @ 10:10), 27 (06-06 @ 03:30)  BUN: 95 (06-08 @ 04:40), 90 (06-07 @ 23:16), 78 (06-07 @ 06:09), 61 (06-06 @ 10:10), 69 (06-06 @ 03:30)  Cr: 5.4 (06-08 @ 04:40), 5.5 (06-07 @ 23:16), 4.7 (06-07 @ 06:09), 3.2 (06-06 @ 10:10), 3.1 (06-06 @ 03:30)  Glu: 163(06-08 @ 04:40), 166(06-07 @ 23:16), 170(06-07 @ 06:09), 145(06-06 @ 10:10), 154(06-06 @ 03:30)    Hgb: 10.2 (06-07 @ 06:09), 11.3 (06-06 @ 10:10), 12.2 (06-06 @ 03:30)  Hct: 30.9 (06-07 @ 06:09), 33.0 (06-06 @ 10:10), 36.8 (06-06 @ 03:30)  WBC: 12.65 (06-07 @ 06:09), 12.18 (06-06 @ 10:10), 8.99 (06-06 @ 03:30)  Plt: 249 (06-07 @ 06:09), 209 (06-06 @ 10:10), 226 (06-06 @ 03:30)    INR: 1.06 06-07-23 @ 06:09, 1.05 06-06-23 @ 03:30  PTT: 27.8 06-07-23 @ 06:09, 30.8 06-06-23 @ 03:30      LIVER FUNCTIONS - ( 08 Jun 2023 04:40 )  Alb: 3.7 g/dL / Pro: 5.7 g/dL / ALK PHOS: 58 U/L / ALT: 31 U/L / AST: 30 U/L / GGT: x           ABG - ( 07 Jun 2023 13:51 )  pH, Arterial: 7.42  pH, Blood: x     /  pCO2: 36    /  pO2: 82    / HCO3: 23    / Base Excess: -0.8  /  SaO2: 97.8          MEDICATIONS  (STANDING):  atorvastatin 40 milliGRAM(s) Oral at bedtime  chlorhexidine 2% Cloths 1 Application(s) Topical <User Schedule>  dextrose 5%. 1000 milliLiter(s) (100 mL/Hr) IV Continuous <Continuous>  dextrose 5%. 1000 milliLiter(s) (50 mL/Hr) IV Continuous <Continuous>  dextrose 50% Injectable 25 Gram(s) IV Push once  dextrose 50% Injectable 12.5 Gram(s) IV Push once  dextrose 50% Injectable 25 Gram(s) IV Push once  doxazosin 2 milliGRAM(s) Oral at bedtime  glucagon  Injectable 1 milliGRAM(s) IntraMuscular once  insulin lispro (ADMELOG) corrective regimen sliding scale   SubCutaneous every 6 hours  lactulose Syrup 10 Gram(s) Oral Once  metoprolol tartrate Injectable 5 milliGRAM(s) IV Push every 6 hours  niCARdipine Infusion 5 mG/Hr (25 mL/Hr) IV Continuous <Continuous>  sodium chloride 0.9%. 1000 milliLiter(s) (40 mL/Hr) IV Continuous <Continuous>    MEDICATIONS  (PRN):  acetaminophen     Tablet .. 650 milliGRAM(s) Oral every 6 hours PRN Temp greater or equal to 38C (100.4F), Mild Pain (1 - 3)  dextrose Oral Gel 15 Gram(s) Oral once PRN Blood Glucose LESS THAN 70 milliGRAM(s)/deciliter  hydrALAZINE Injectable 10 milliGRAM(s) IV Push every 6 hours PRN sustained SBP > 160 and HR < 60  labetalol Injectable 10 milliGRAM(s) IV Push every 2 hours PRN sustained SBP > 160 and HR > 60  ondansetron Injectable 4 milliGRAM(s) IV Push every 6 hours PRN Nausea and/or Vomiting  senna 2 Tablet(s) Oral at bedtime PRN Constipation  traMADol 50 milliGRAM(s) Oral Once PRN Severe Pain (7 - 10)      CT Chest No Cont (06.06.23 @ 12:40) >  IMPRESSION:    Artifact limits image quality.      Elevated right hemidiaphragm with diminished right lung volumes. Adjacent   atelectasis right middle lobe.. No pleural effusions.      Age-indeterminate fracture right humeral neck. Correlate with clinical   history and symptoms.      Enlarged main pulmonary artery segment measuring 3.8 cm compatible with   pulmonary hypertension (5/80     Xray Chest 1 View-PORTABLE IMMEDIATE (Xray Chest 1 View-PORTABLE IMMEDIATE .) (06.06.23 @ 10:08) >  FINDINGS/  IMPRESSION:    Low lung volumes. Likely right basal effusion/opacity. No pneumothorax.    Stable cardiomediastinal silhouette.    Unchanged osseous structures.    06-07    143  |  104  |  78<HH>  ----------------------------<  170<H>  4.5   |  24  |  4.7<HH>    Ca    8.4      07 Jun 2023 06:09  Phos  3.9     06-07  Mg     3.5     06-07    TPro  5.8<L>  /  Alb  3.8  /  TBili  0.4  /  DBili  x   /  AST  31  /  ALT  33  /  AlkPhos  59  06-07                            10.2   12.65 )-----------( 249      ( 07 Jun 2023 06:09 )             30.9       tamsulosin 0.4 milliGRAM(s) Oral at bedtime  General: WN/WD, no acute distress  HENT: NC/AT, moist oral mucosa  Eyes: Anicteric sclerae  Cardiac: [id number]  Lungs: Clear  Abdomen: Soft, non-tender, +BS  Extremities: No c/c/e  Skin/Incision Site: Clean, dry and intact  Neurologic: AOx0,lethargic,  not following commands/aphasic, PERRLA, midline gaze, WD on RUE (limited by R arm injury_, RLe TF, L side spont AG  R shoulder - pain and crepitance / swelling R shoulder  SUMMARY: This is a 62 y/o M with hx of Afib on eiquis, CVA BG bleed ,HTN, CHF, Gout, DM  presents as a stroke code after being down in bathroom unknown time LKW was 2300. 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 Pt is lethargic  - Cardene started for blood pressure control.    OVERNIGHT EVENTS: No acute events overnight    ADMISSION SCORES:   GCS 12   ICH 1   NIH 17    REVIEW OF SYSTEMS: Pt unable to participate in ROS due to neurological status    VITALS: [x]             ICU Vital Signs Last 24 Hrs  T(C): 36.1 (09 Jun 2023 08:00), Max: 37.7 (09 Jun 2023 04:00)  T(F): 97 (09 Jun 2023 08:00), Max: 99.9 (09 Jun 2023 04:00)  HR: 59 (09 Jun 2023 09:00) (59 - 139)  BP: --  BP(mean): --  ABP: 119/71 (09 Jun 2023 09:00) (88/67 - 177/63)  ABP(mean): 74 (09 Jun 2023 09:00) (73 - 94)  RR: 18 (09 Jun 2023 09:00) (18 - 51)  SpO2: 93% (09 Jun 2023 09:00) (93% - 100%)      06-08-23 @ 07:01  -  06-09-23 @ 07:00  --------------------------------------------------------  IN: 1522.5 mL / OUT: 670 mL / NET: 852.5 mL    06-09-23 @ 07:01  -  06-09-23 @ 09:55  --------------------------------------------------------  IN: 150 mL / OUT: 185 mL / NET: -35 mL            acetaminophen     Tablet .. 650 milliGRAM(s) Oral every 6 hours PRN  chlorhexidine 2% Cloths 1 Application(s) Topical <User Schedule>  dextrose 5%. 1000 milliLiter(s) (100 mL/Hr) IV Continuous <Continuous>  dextrose 5%. 1000 milliLiter(s) (50 mL/Hr) IV Continuous <Continuous>  dextrose 50% Injectable 25 Gram(s) IV Push once  dextrose 50% Injectable 12.5 Gram(s) IV Push once  dextrose 50% Injectable 25 Gram(s) IV Push once  dextrose Oral Gel 15 Gram(s) Oral once PRN  doxazosin 2 milliGRAM(s) Oral at bedtime  glucagon  Injectable 1 milliGRAM(s) IntraMuscular once  heparin   Injectable 7500 Unit(s) SubCutaneous every 8 hours  hydrALAZINE Injectable 10 milliGRAM(s) IV Push every 6 hours PRN  insulin lispro (ADMELOG) corrective regimen sliding scale   SubCutaneous every 6 hours  labetalol 200 milliGRAM(s) Oral every 8 hours  labetalol Injectable 10 milliGRAM(s) IV Push every 2 hours PRN  lacosamide IVPB 100 milliGRAM(s) IV Intermittent every 12 hours  multiple electrolytes Injection Type 1 1000 milliLiter(s) (50 mL/Hr) IV Continuous <Continuous>  niCARdipine Infusion 5 mG/Hr (25 mL/Hr) IV Continuous <Continuous>  ondansetron Injectable 4 milliGRAM(s) IV Push every 6 hours PRN  senna 2 Tablet(s) Oral at bedtime PRN      LABS:  Na: 146 (06-09 @ 05:42), 146 (06-08 @ 04:40), 146 (06-07 @ 23:16), 143 (06-07 @ 06:09), 140 (06-06 @ 10:10)  K: 4.4 (06-09 @ 05:42), 4.6 (06-08 @ 04:40), 4.6 (06-07 @ 23:16), 4.5 (06-07 @ 06:09), 3.4 (06-06 @ 10:10)  Cl: 112 (06-09 @ 05:42), 110 (06-08 @ 04:40), 109 (06-07 @ 23:16), 104 (06-07 @ 06:09), 107 (06-06 @ 10:10)  CO2: 19 (06-09 @ 05:42), 22 (06-08 @ 04:40), 22 (06-07 @ 23:16), 24 (06-07 @ 06:09), 22 (06-06 @ 10:10)  BUN: 106 (06-09 @ 05:42), 95 (06-08 @ 04:40), 90 (06-07 @ 23:16), 78 (06-07 @ 06:09), 61 (06-06 @ 10:10)  Cr: 5.5 (06-09 @ 05:42), 5.4 (06-08 @ 04:40), 5.5 (06-07 @ 23:16), 4.7 (06-07 @ 06:09), 3.2 (06-06 @ 10:10)  Glu: 183(06-09 @ 05:42), 163(06-08 @ 04:40), 166(06-07 @ 23:16), 170(06-07 @ 06:09), 145(06-06 @ 10:10)    Hgb: 8.4 (06-09 @ 05:42), 9.5 (06-08 @ 15:55), 10.2 (06-07 @ 06:09), 11.3 (06-06 @ 10:10)  Hct: 26.6 (06-09 @ 05:42), 29.3 (06-08 @ 15:55), 30.9 (06-07 @ 06:09), 33.0 (06-06 @ 10:10)  WBC: 10.75 (06-09 @ 05:42), 11.84 (06-08 @ 15:55), 12.65 (06-07 @ 06:09), 12.18 (06-06 @ 10:10)  Plt: 192 (06-09 @ 05:42), 219 (06-08 @ 15:55), 249 (06-07 @ 06:09), 209 (06-06 @ 10:10)    INR: 1.00 06-09-23 @ 05:42, 1.06 06-07-23 @ 06:09  PTT: 26.8 06-09-23 @ 05:42, 27.8 06-07-23 @ 06:09          LIVER FUNCTIONS - ( 09 Jun 2023 05:42 )  Alb: 3.1 g/dL / Pro: 5.1 g/dL / ALK PHOS: 47 U/L / ALT: 24 U/L / AST: 20 U/L / GGT: x           ABG - ( 08 Jun 2023 13:18 )  pH, Arterial: 7.40  pH, Blood: x     /  pCO2: 35    /  pO2: 85    / HCO3: 22    / Base Excess: -2.5  /  SaO2: 97.9            CT Chest No Cont (06.06.23 @ 12:40) >  IMPRESSION:    Artifact limits image quality.      Elevated right hemidiaphragm with diminished right lung volumes. Adjacent   atelectasis right middle lobe.. No pleural effusions.      Age-indeterminate fracture right humeral neck. Correlate with clinical   history and symptoms.      Enlarged main pulmonary artery segment measuring 3.8 cm compatible with   pulmonary hypertension (5/80     Xray Chest 1 View-PORTABLE IMMEDIATE (Xray Chest 1 View-PORTABLE IMMEDIATE .) (06.06.23 @ 10:08) >  FINDINGS/  IMPRESSION:    Low lung volumes. Likely right basal effusion/opacity. No pneumothorax.    Stable cardiomediastinal silhouette.    Unchanged osseous structures.    06-07    143  |  104  |  78<HH>  ----------------------------<  170<H>  4.5   |  24  |  4.7<HH>    Ca    8.4      07 Jun 2023 06:09  Phos  3.9     06-07  Mg     3.5     06-07    TPro  5.8<L>  /  Alb  3.8  /  TBili  0.4  /  DBili  x   /  AST  31  /  ALT  33  /  AlkPhos  59  06-07                            10.2   12.65 )-----------( 249      ( 07 Jun 2023 06:09 )             30.9       tamsulosin 0.4 milliGRAM(s) Oral at bedtime  General: WN/WD, no acute distress  HENT: NC/AT, moist oral mucosa  Eyes: Anicteric sclerae  Cardiac: [id number]  Lungs: Clear  Abdomen: Soft, non-tender, +BS  Extremities: No c/c/e, R shoulder - pain and crepitance / swelling R shoulder   Skin/Incision Site: Clean, dry and intact  Neurologic: Drowsy, EO to loud voice, oriented x0, not following commands/aphasic, PERRLA, midline gaze, WD on RUE (limited by R arm injury_, RLe TF, L side spont AG SUMMARY: This is a 64 y/o M with hx of Afib on eiquis, CVA BG bleed ,HTN, CHF, Gout, DM  presents as a stroke code after being down in bathroom unknown time LKW was 2300. 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 Pt is lethargic  - Cardene started for blood pressure control.    OVERNIGHT EVENTS: No acute events overnight    ADMISSION SCORES:   GCS 12   ICH 1   NIH 17    REVIEW OF SYSTEMS: Pt unable to participate in ROS due to neurological status    VITALS: [x]             ICU Vital Signs Last 24 Hrs  T(C): 36.1 (09 Jun 2023 08:00), Max: 37.7 (09 Jun 2023 04:00)  T(F): 97 (09 Jun 2023 08:00), Max: 99.9 (09 Jun 2023 04:00)  HR: 59 (09 Jun 2023 09:00) (59 - 139)  BP: --  BP(mean): --  ABP: 119/71 (09 Jun 2023 09:00) (88/67 - 177/63)  ABP(mean): 74 (09 Jun 2023 09:00) (73 - 94)  RR: 18 (09 Jun 2023 09:00) (18 - 51)  SpO2: 93% (09 Jun 2023 09:00) (93% - 100%)      06-08-23 @ 07:01  -  06-09-23 @ 07:00  --------------------------------------------------------  IN: 1522.5 mL / OUT: 670 mL / NET: 852.5 mL    06-09-23 @ 07:01  -  06-09-23 @ 09:55  --------------------------------------------------------  IN: 150 mL / OUT: 185 mL / NET: -35 mL            acetaminophen     Tablet .. 650 milliGRAM(s) Oral every 6 hours PRN  chlorhexidine 2% Cloths 1 Application(s) Topical <User Schedule>  dextrose 5%. 1000 milliLiter(s) (100 mL/Hr) IV Continuous <Continuous>  dextrose 5%. 1000 milliLiter(s) (50 mL/Hr) IV Continuous <Continuous>  dextrose 50% Injectable 25 Gram(s) IV Push once  dextrose 50% Injectable 12.5 Gram(s) IV Push once  dextrose 50% Injectable 25 Gram(s) IV Push once  dextrose Oral Gel 15 Gram(s) Oral once PRN  doxazosin 2 milliGRAM(s) Oral at bedtime  glucagon  Injectable 1 milliGRAM(s) IntraMuscular once  heparin   Injectable 7500 Unit(s) SubCutaneous every 8 hours  hydrALAZINE Injectable 10 milliGRAM(s) IV Push every 6 hours PRN  insulin lispro (ADMELOG) corrective regimen sliding scale   SubCutaneous every 6 hours  labetalol 200 milliGRAM(s) Oral every 8 hours  labetalol Injectable 10 milliGRAM(s) IV Push every 2 hours PRN  lacosamide IVPB 100 milliGRAM(s) IV Intermittent every 12 hours  multiple electrolytes Injection Type 1 1000 milliLiter(s) (50 mL/Hr) IV Continuous <Continuous>  niCARdipine Infusion 5 mG/Hr (25 mL/Hr) IV Continuous <Continuous>  ondansetron Injectable 4 milliGRAM(s) IV Push every 6 hours PRN  senna 2 Tablet(s) Oral at bedtime PRN      LABS:  Na: 146 (06-09 @ 05:42), 146 (06-08 @ 04:40), 146 (06-07 @ 23:16), 143 (06-07 @ 06:09), 140 (06-06 @ 10:10)  K: 4.4 (06-09 @ 05:42), 4.6 (06-08 @ 04:40), 4.6 (06-07 @ 23:16), 4.5 (06-07 @ 06:09), 3.4 (06-06 @ 10:10)  Cl: 112 (06-09 @ 05:42), 110 (06-08 @ 04:40), 109 (06-07 @ 23:16), 104 (06-07 @ 06:09), 107 (06-06 @ 10:10)  CO2: 19 (06-09 @ 05:42), 22 (06-08 @ 04:40), 22 (06-07 @ 23:16), 24 (06-07 @ 06:09), 22 (06-06 @ 10:10)  BUN: 106 (06-09 @ 05:42), 95 (06-08 @ 04:40), 90 (06-07 @ 23:16), 78 (06-07 @ 06:09), 61 (06-06 @ 10:10)  Cr: 5.5 (06-09 @ 05:42), 5.4 (06-08 @ 04:40), 5.5 (06-07 @ 23:16), 4.7 (06-07 @ 06:09), 3.2 (06-06 @ 10:10)  Glu: 183(06-09 @ 05:42), 163(06-08 @ 04:40), 166(06-07 @ 23:16), 170(06-07 @ 06:09), 145(06-06 @ 10:10)    Hgb: 8.4 (06-09 @ 05:42), 9.5 (06-08 @ 15:55), 10.2 (06-07 @ 06:09), 11.3 (06-06 @ 10:10)  Hct: 26.6 (06-09 @ 05:42), 29.3 (06-08 @ 15:55), 30.9 (06-07 @ 06:09), 33.0 (06-06 @ 10:10)  WBC: 10.75 (06-09 @ 05:42), 11.84 (06-08 @ 15:55), 12.65 (06-07 @ 06:09), 12.18 (06-06 @ 10:10)  Plt: 192 (06-09 @ 05:42), 219 (06-08 @ 15:55), 249 (06-07 @ 06:09), 209 (06-06 @ 10:10)    INR: 1.00 06-09-23 @ 05:42, 1.06 06-07-23 @ 06:09  PTT: 26.8 06-09-23 @ 05:42, 27.8 06-07-23 @ 06:09          LIVER FUNCTIONS - ( 09 Jun 2023 05:42 )  Alb: 3.1 g/dL / Pro: 5.1 g/dL / ALK PHOS: 47 U/L / ALT: 24 U/L / AST: 20 U/L / GGT: x           ABG - ( 08 Jun 2023 13:18 )  pH, Arterial: 7.40  pH, Blood: x     /  pCO2: 35    /  pO2: 85    / HCO3: 22    / Base Excess: -2.5  /  SaO2: 97.9            CT Chest No Cont (06.06.23 @ 12:40) >  IMPRESSION:    Artifact limits image quality.      Elevated right hemidiaphragm with diminished right lung volumes. Adjacent   atelectasis right middle lobe.. No pleural effusions.      Age-indeterminate fracture right humeral neck. Correlate with clinical   history and symptoms.      Enlarged main pulmonary artery segment measuring 3.8 cm compatible with   pulmonary hypertension (5/80     Xray Chest 1 View-PORTABLE IMMEDIATE (Xray Chest 1 View-PORTABLE IMMEDIATE .) (06.06.23 @ 10:08) >  FINDINGS/  IMPRESSION:    Low lung volumes. Likely right basal effusion/opacity. No pneumothorax.    Stable cardiomediastinal silhouette.    Unchanged osseous structures.    06-07    143  |  104  |  78<HH>  ----------------------------<  170<H>  4.5   |  24  |  4.7<HH>    Ca    8.4      07 Jun 2023 06:09  Phos  3.9     06-07  Mg     3.5     06-07    TPro  5.8<L>  /  Alb  3.8  /  TBili  0.4  /  DBili  x   /  AST  31  /  ALT  33  /  AlkPhos  59  06-07                            10.2   12.65 )-----------( 249      ( 07 Jun 2023 06:09 )             30.9       tamsulosin 0.4 milliGRAM(s) Oral at bedtime  General: WN/WD, no acute distress  HENT: NC/AT, moist oral mucosa  Eyes: Anicteric sclerae  Cardiac: [id number]  Lungs: Clear  Abdomen: Soft, non-tender, +BS  Extremities: R shoulder - tenderness and crepitance / swelling R shoulder w/ overlying ecchymosis- distal pulses RUE intact, good capillary refill.  Skin/Incision Site: Clean, dry and intact  Neurologic: Drowsy, EO to loud voice, oriented x0, not following commands/aphasic, PERRLA, midline gaze, WD on RUE (limited by R arm injury_, RLe TF, L side spont AG

## 2023-06-10 NOTE — PROGRESS NOTE ADULT - SUBJECTIVE AND OBJECTIVE BOX
SUMMARY: This is a 62 y/o M with hx of Afib on eiquis, CVA BG bleed ,HTN, CHF, Gout, DM  presents as a stroke code after being down in bathroom unknown time LKW was 2300. 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 Pt is lethargic  - Cardene started for blood pressure control.    OVERNIGHT EVENTS: No acute events overnight    ADMISSION SCORES:   GCS 12   ICH 1   NIH 17    REVIEW OF SYSTEMS: Pt unable to participate in ROS due to neurological status    VITALS: [x]             ICU Vital Signs Last 24 Hrs  T(C): 36.1 (09 Jun 2023 08:00), Max: 37.7 (09 Jun 2023 04:00)  T(F): 97 (09 Jun 2023 08:00), Max: 99.9 (09 Jun 2023 04:00)  HR: 59 (09 Jun 2023 09:00) (59 - 139)  BP: --  BP(mean): --  ABP: 119/71 (09 Jun 2023 09:00) (88/67 - 177/63)  ABP(mean): 74 (09 Jun 2023 09:00) (73 - 94)  RR: 18 (09 Jun 2023 09:00) (18 - 51)  SpO2: 93% (09 Jun 2023 09:00) (93% - 100%)      06-08-23 @ 07:01  -  06-09-23 @ 07:00  --------------------------------------------------------  IN: 1522.5 mL / OUT: 670 mL / NET: 852.5 mL    06-09-23 @ 07:01  -  06-09-23 @ 09:55  --------------------------------------------------------  IN: 150 mL / OUT: 185 mL / NET: -35 mL            acetaminophen     Tablet .. 650 milliGRAM(s) Oral every 6 hours PRN  chlorhexidine 2% Cloths 1 Application(s) Topical <User Schedule>  dextrose 5%. 1000 milliLiter(s) (100 mL/Hr) IV Continuous <Continuous>  dextrose 5%. 1000 milliLiter(s) (50 mL/Hr) IV Continuous <Continuous>  dextrose 50% Injectable 25 Gram(s) IV Push once  dextrose 50% Injectable 12.5 Gram(s) IV Push once  dextrose 50% Injectable 25 Gram(s) IV Push once  dextrose Oral Gel 15 Gram(s) Oral once PRN  doxazosin 2 milliGRAM(s) Oral at bedtime  glucagon  Injectable 1 milliGRAM(s) IntraMuscular once  heparin   Injectable 7500 Unit(s) SubCutaneous every 8 hours  hydrALAZINE Injectable 10 milliGRAM(s) IV Push every 6 hours PRN  insulin lispro (ADMELOG) corrective regimen sliding scale   SubCutaneous every 6 hours  labetalol 200 milliGRAM(s) Oral every 8 hours  labetalol Injectable 10 milliGRAM(s) IV Push every 2 hours PRN  lacosamide IVPB 100 milliGRAM(s) IV Intermittent every 12 hours  multiple electrolytes Injection Type 1 1000 milliLiter(s) (50 mL/Hr) IV Continuous <Continuous>  niCARdipine Infusion 5 mG/Hr (25 mL/Hr) IV Continuous <Continuous>  ondansetron Injectable 4 milliGRAM(s) IV Push every 6 hours PRN  senna 2 Tablet(s) Oral at bedtime PRN      LABS:  Na: 146 (06-09 @ 05:42), 146 (06-08 @ 04:40), 146 (06-07 @ 23:16), 143 (06-07 @ 06:09), 140 (06-06 @ 10:10)  K: 4.4 (06-09 @ 05:42), 4.6 (06-08 @ 04:40), 4.6 (06-07 @ 23:16), 4.5 (06-07 @ 06:09), 3.4 (06-06 @ 10:10)  Cl: 112 (06-09 @ 05:42), 110 (06-08 @ 04:40), 109 (06-07 @ 23:16), 104 (06-07 @ 06:09), 107 (06-06 @ 10:10)  CO2: 19 (06-09 @ 05:42), 22 (06-08 @ 04:40), 22 (06-07 @ 23:16), 24 (06-07 @ 06:09), 22 (06-06 @ 10:10)  BUN: 106 (06-09 @ 05:42), 95 (06-08 @ 04:40), 90 (06-07 @ 23:16), 78 (06-07 @ 06:09), 61 (06-06 @ 10:10)  Cr: 5.5 (06-09 @ 05:42), 5.4 (06-08 @ 04:40), 5.5 (06-07 @ 23:16), 4.7 (06-07 @ 06:09), 3.2 (06-06 @ 10:10)  Glu: 183(06-09 @ 05:42), 163(06-08 @ 04:40), 166(06-07 @ 23:16), 170(06-07 @ 06:09), 145(06-06 @ 10:10)    Hgb: 8.4 (06-09 @ 05:42), 9.5 (06-08 @ 15:55), 10.2 (06-07 @ 06:09), 11.3 (06-06 @ 10:10)  Hct: 26.6 (06-09 @ 05:42), 29.3 (06-08 @ 15:55), 30.9 (06-07 @ 06:09), 33.0 (06-06 @ 10:10)  WBC: 10.75 (06-09 @ 05:42), 11.84 (06-08 @ 15:55), 12.65 (06-07 @ 06:09), 12.18 (06-06 @ 10:10)  Plt: 192 (06-09 @ 05:42), 219 (06-08 @ 15:55), 249 (06-07 @ 06:09), 209 (06-06 @ 10:10)    INR: 1.00 06-09-23 @ 05:42, 1.06 06-07-23 @ 06:09  PTT: 26.8 06-09-23 @ 05:42, 27.8 06-07-23 @ 06:09          LIVER FUNCTIONS - ( 09 Jun 2023 05:42 )  Alb: 3.1 g/dL / Pro: 5.1 g/dL / ALK PHOS: 47 U/L / ALT: 24 U/L / AST: 20 U/L / GGT: x           ABG - ( 08 Jun 2023 13:18 )  pH, Arterial: 7.40  pH, Blood: x     /  pCO2: 35    /  pO2: 85    / HCO3: 22    / Base Excess: -2.5  /  SaO2: 97.9            CT Chest No Cont (06.06.23 @ 12:40) >  IMPRESSION:    Artifact limits image quality.      Elevated right hemidiaphragm with diminished right lung volumes. Adjacent   atelectasis right middle lobe.. No pleural effusions.      Age-indeterminate fracture right humeral neck. Correlate with clinical   history and symptoms.      Enlarged main pulmonary artery segment measuring 3.8 cm compatible with   pulmonary hypertension (5/80     Xray Chest 1 View-PORTABLE IMMEDIATE (Xray Chest 1 View-PORTABLE IMMEDIATE .) (06.06.23 @ 10:08) >  FINDINGS/  IMPRESSION:    Low lung volumes. Likely right basal effusion/opacity. No pneumothorax.    Stable cardiomediastinal silhouette.    Unchanged osseous structures.    06-07    143  |  104  |  78<HH>  ----------------------------<  170<H>  4.5   |  24  |  4.7<HH>    Ca    8.4      07 Jun 2023 06:09  Phos  3.9     06-07  Mg     3.5     06-07    TPro  5.8<L>  /  Alb  3.8  /  TBili  0.4  /  DBili  x   /  AST  31  /  ALT  33  /  AlkPhos  59  06-07                            10.2   12.65 )-----------( 249      ( 07 Jun 2023 06:09 )             30.9       tamsulosin 0.4 milliGRAM(s) Oral at bedtime  General: WN/WD, no acute distress  HENT: NC/AT, moist oral mucosa  Eyes: Anicteric sclerae  Cardiac: [id number]  Lungs: Clear  Abdomen: Soft, non-tender, +BS  Extremities: R shoulder - tenderness and crepitance / swelling R shoulder w/ overlying ecchymosis- distal pulses RUE intact, good capillary refill.  Skin/Incision Site: Clean, dry and intact  Neurologic: Drowsy, EO to loud voice, oriented x0, not following commands/aphasic, PERRLA, midline gaze, WD on RUE (limited by R arm injury_, RLe TF, L side spont AG SUMMARY: This is a 64 y/o M with hx of Afib on eiquis, CVA BG bleed ,HTN, CHF, Gout, DM  presents as a stroke code after being down in bathroom unknown time LKW was 2300. 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 Pt is lethargic  - Cardene started for blood pressure control.    OVERNIGHT EVENTS: No acute events overnight    ADMISSION SCORES:   GCS 12   ICH 1   NIH 17    REVIEW OF SYSTEMS: Pt unable to participate in ROS due to neurological status    VITALS: [x]       General: morbidly obese, lethargic in bed  HENT: NC/AT, moist oral mucosa; snoring/intermittent hypopneic events  Eyes: Anicteric sclerae  Cardiac: [id number]  Lungs: Clear  Abdomen: Soft, non-tender, +BS  Extremities: R shoulder - tenderness and crepitance / swelling R shoulder w/ overlying ecchymosis- distal pulses RUE intact, good capillary refill.  Skin/Incision Site: Clean, dry and intact  Neurologic: lethargic, eo to tactile stimulation, not following commands/aphasic, PERRLA, midline gaze, WD on RUE (limited by R arm injury_, RLE WD, L side spont AG and purposeful,             ICU Vital Signs Last 24 Hrs  T(C): 37.3 (10 Alan 2023 08:00), Max: 37.3 (10 Alan 2023 08:00)  T(F): 99.1 (10 Alan 2023 08:00), Max: 99.1 (10 Alan 2023 08:00)  HR: 81 (10 Alan 2023 09:00) (64 - 136)  BP: 129/58 (10 Alan 2023 09:00) (102/56 - 176/81)  BP(mean): 84 (10 Alan 2023 09:00) (74 - 117)  ABP: 78/58 (10 Alan 2023 05:15) (78/58 - 167/67)  ABP(mean): 67 (10 Alan 2023 05:15) (66 - 98)  RR: 43 (10 Alan 2023 09:00) (22 - 43)  SpO2: 90% (10 Alan 2023 09:00) (89% - 100%)      06-09-23 @ 07:01  -  06-10-23 @ 07:00  --------------------------------------------------------  IN: 2620 mL / OUT: 1705 mL / NET: 915 mL    06-10-23 @ 07:01  -  06-10-23 @ 11:18  --------------------------------------------------------  IN: 300 mL / OUT: 215 mL / NET: 85 mL            acetaminophen     Tablet .. 650 milliGRAM(s) Oral every 6 hours PRN  albuterol/ipratropium for Nebulization 3 milliLiter(s) Nebulizer every 6 hours PRN  chlorhexidine 2% Cloths 1 Application(s) Topical <User Schedule>  dextrose 5%. 1000 milliLiter(s) (100 mL/Hr) IV Continuous <Continuous>  dextrose 5%. 1000 milliLiter(s) (50 mL/Hr) IV Continuous <Continuous>  dextrose 50% Injectable 25 Gram(s) IV Push once  dextrose 50% Injectable 12.5 Gram(s) IV Push once  dextrose 50% Injectable 25 Gram(s) IV Push once  dextrose Oral Gel 15 Gram(s) Oral once PRN  doxazosin 2 milliGRAM(s) Oral at bedtime  ergocalciferol 37567 Unit(s) Oral every week  glucagon  Injectable 1 milliGRAM(s) IntraMuscular once  heparin   Injectable 7500 Unit(s) SubCutaneous every 8 hours  hydrALAZINE 50 milliGRAM(s) Oral <User Schedule>  hydrALAZINE Injectable 10 milliGRAM(s) IV Push every 6 hours PRN  insulin lispro (ADMELOG) corrective regimen sliding scale   SubCutaneous every 6 hours  insulin NPH human recombinant 3 Unit(s) SubCutaneous every 6 hours  labetalol 200 milliGRAM(s) Oral every 8 hours  labetalol Injectable 10 milliGRAM(s) IV Push every 2 hours PRN  lacosamide IVPB 150 milliGRAM(s) IV Intermittent every 12 hours  multiple electrolytes Injection Type 1 1000 milliLiter(s) (75 mL/Hr) IV Continuous <Continuous>  niCARdipine Infusion 5 mG/Hr (25 mL/Hr) IV Continuous <Continuous>  ondansetron Injectable 4 milliGRAM(s) IV Push every 6 hours PRN  senna 2 Tablet(s) Oral at bedtime PRN      LABS:  Na: 148 (06-10 @ 04:40), 148 (06-09 @ 16:22), 146 (06-09 @ 05:42), 146 (06-08 @ 04:40), 146 (06-07 @ 23:16)  K: 4.5 (06-10 @ 04:40), 4.7 (06-09 @ 16:22), 4.4 (06-09 @ 05:42), 4.6 (06-08 @ 04:40), 4.6 (06-07 @ 23:16)  Cl: 114 (06-10 @ 04:40), 115 (06-09 @ 16:22), 112 (06-09 @ 05:42), 110 (06-08 @ 04:40), 109 (06-07 @ 23:16)  CO2: 20 (06-10 @ 04:40), 21 (06-09 @ 16:22), 19 (06-09 @ 05:42), 22 (06-08 @ 04:40), 22 (06-07 @ 23:16)  BUN: 108 (06-10 @ 04:40), 106 (06-09 @ 16:22), 106 (06-09 @ 05:42), 95 (06-08 @ 04:40), 90 (06-07 @ 23:16)  Cr: 5.1 (06-10 @ 04:40), 5.3 (06-09 @ 16:22), 5.5 (06-09 @ 05:42), 5.4 (06-08 @ 04:40), 5.5 (06-07 @ 23:16)  Glu: 174(06-10 @ 04:40), 179(06-09 @ 16:22), 183(06-09 @ 05:42), 163(06-08 @ 04:40), 166(06-07 @ 23:16)    Hgb: 8.6 (06-10 @ 04:40), 8.4 (06-09 @ 05:42), 9.5 (06-08 @ 15:55)  Hct: 26.9 (06-10 @ 04:40), 26.6 (06-09 @ 05:42), 29.3 (06-08 @ 15:55)  WBC: 9.57 (06-10 @ 04:40), 10.75 (06-09 @ 05:42), 11.84 (06-08 @ 15:55)  Plt: 212 (06-10 @ 04:40), 192 (06-09 @ 05:42), 219 (06-08 @ 15:55)    INR: 1.00 06-09-23 @ 05:42  PTT: 26.8 06-09-23 @ 05:42          LIVER FUNCTIONS - ( 10 Alan 2023 04:40 )  Alb: 3.2 g/dL / Pro: 5.3 g/dL / ALK PHOS: 47 U/L / ALT: 25 U/L / AST: 19 U/L / GGT: x           ABG - ( 10 Alan 2023 10:49 )  pH, Arterial: 7.41  pH, Blood: x     /  pCO2: 37    /  pO2: 84    / HCO3: 24    / Base Excess: -0.9  /  SaO2: 98.1

## 2023-06-10 NOTE — EEG REPORT - NS EEG TEXT BOX
Epilepsy Attending Note:     GRIS TIDWELL    63y Male  MRN [id number]    Vital Signs Last 24 Hrs  T(C): 37.3 (10 Alan 2023 08:00), Max: 37.3 (10 Alan 2023 08:00)  T(F): 99.1 (10 Alan 2023 08:00), Max: 99.1 (10 Alan 2023 08:00)  HR: 81 (10 Alan 2023 09:00) (64 - 136)  BP: 129/58 (10 Alan 2023 09:00) (102/56 - 176/81)  BP(mean): 84 (10 Alan 2023 09:00) (74 - 117)  RR: 43 (10 Alan 2023 09:00) (22 - 43)  SpO2: 99% (10 Alan 2023 11:30) (89% - 100%)    Parameters below as of 10 Alan 2023 11:30  Patient On (Oxygen Delivery Method): nasal cannula, high flow  O2 Flow (L/min): 50  O2 Concentration (%): 30                          8.6    9.57  )-----------( 212      ( 10 Alan 2023 04:40 )             26.9       06-10    148<H>  |  114<H>  |  108<HH>  ----------------------------<  174<H>  4.5   |  20  |  5.1<HH>    Ca    8.0<L>      10 Alan 2023 04:40  Phos  4.4     06-10  Mg     3.8     06-10    TPro  5.3<L>  /  Alb  3.2<L>  /  TBili  0.4  /  DBili  x   /  AST  19  /  ALT  25  /  AlkPhos  47  06-10      MEDICATIONS  (STANDING):  albuterol/ipratropium for Nebulization 3 milliLiter(s) Nebulizer every 6 hours  chlorhexidine 2% Cloths 1 Application(s) Topical <User Schedule>  dextrose 5%. 1000 milliLiter(s) (50 mL/Hr) IV Continuous <Continuous>  dextrose 5%. 1000 milliLiter(s) (100 mL/Hr) IV Continuous <Continuous>  dextrose 50% Injectable 25 Gram(s) IV Push once  dextrose 50% Injectable 12.5 Gram(s) IV Push once  dextrose 50% Injectable 25 Gram(s) IV Push once  doxazosin 2 milliGRAM(s) Oral at bedtime  ergocalciferol 20535 Unit(s) Oral every week  glucagon  Injectable 1 milliGRAM(s) IntraMuscular once  heparin   Injectable 7500 Unit(s) SubCutaneous every 8 hours  hydrALAZINE 50 milliGRAM(s) Oral <User Schedule>  insulin lispro (ADMELOG) corrective regimen sliding scale   SubCutaneous every 6 hours  insulin NPH human recombinant 3 Unit(s) SubCutaneous every 6 hours  labetalol 200 milliGRAM(s) Oral every 8 hours  lacosamide IVPB 150 milliGRAM(s) IV Intermittent every 12 hours  multiple electrolytes Injection Type 1 1000 milliLiter(s) (75 mL/Hr) IV Continuous <Continuous>  niCARdipine Infusion 5 mG/Hr (25 mL/Hr) IV Continuous <Continuous>    MEDICATIONS  (PRN):  acetaminophen     Tablet .. 650 milliGRAM(s) Oral every 6 hours PRN Temp greater or equal to 38C (100.4F), Mild Pain (1 - 3)  dextrose Oral Gel 15 Gram(s) Oral once PRN Blood Glucose LESS THAN 70 milliGRAM(s)/deciliter  hydrALAZINE Injectable 10 milliGRAM(s) IV Push every 6 hours PRN sustained SBP > 150 and HR < 60  labetalol Injectable 10 milliGRAM(s) IV Push every 2 hours PRN sustained SBP > 150 and HR > 60  ondansetron Injectable 4 milliGRAM(s) IV Push every 6 hours PRN Nausea and/or Vomiting  senna 2 Tablet(s) Oral at bedtime PRN Constipation            VEEG in the last 24 hours:    Background--continues, reactive, slightly less than optimally organized reaching 6-7 hz that showing  slight improvement of the BG    Focal and generalized slowing---------mild to moderate left and generalized slowingfocal slowing    Interictal activity ---1- large number of left FT sharp waves. 2- rare independent right FT sharp waves    Events--------none    Seizures----------- none    Impression:  abnormal  as above    Plan - as/NCC team

## 2023-06-10 NOTE — PROGRESS NOTE ADULT - ASSESSMENT
The patient is a 63-year-old male with a past history of Afib on Eliquis, prior basal ganglia hemorrhage, HTN, Gout, and DM, CKD 3b- 4 ( known to my office)  who presented as a stroke.    # DESI on CRB2n-2/ ATN vs EV   # oliguria   # HTN uncontrolled   # Thalamic CVA hemorrhagic     - non oliguric  - creat stable- better   - keep khan/ strict I and O   - avoid further nephrotoxins / contrast unless urgently needed   - BP target as per neuro ICU  - IP at target   - follow BMP  - will follow closely/ no need still for urgent RRT

## 2023-06-10 NOTE — PROGRESS NOTE ADULT - ASSESSMENT
IMP:75 y/o M with hx of CVA, DM ,HTN, Afib on eliquis, CHF, Gout presents as [place] Code/Code ICH with NIH 17, GCS11, ICH 1 with Left thalamic hemorrhage    sICH/IVH  hypertensive emergency   Desi on CKD   encephalopathy  R humerus fracture      Plan: Admit to Neuro ICU           Neuro checks q 2  CTH 6/8 stable  MRI when stable  vEEG with significant sharp wave burden, vimpat initiated   Consult IR for angio - R/O microaneurysm  as source of bleed once Cr stable           Stroke labs - ICH score   multifactorial encephalopathy- neurologic, metabolic/uremic  bedrest    CV: htn, hx of afib on DOAC s/p reversal  EKG daily            Trops downtrending- no need for further trend            convert lopressor to labetalol    Start hydralazine 50mg q8h  on multiple antihypertensive at home- clonidine, labetalol, hydralazine, norvasc           afib- off doac s/p reversal for ICH; intermittent RVR, IV lopressor PRN            Echo             -150 - Cardene PRN by cuff            Daily weights    Pulmonary:- Presumably ZE ?OHS       Check ABG   Sleep Apnea on CPAP at home  - Follow up with Pulmonary      Humidify 2L NC sating 94%- wean O2       CPAP - - 5-10 - 10 Pm to 6 am- 30 %      GI: EMILY feeding tube in place  May start glucerna 1.2 20/hr if ABG stable        gi ppx- n/a      Dysphagia screening- fail   Bowel regimen- senna    Renal: DESI/ CRI - baseline  Cr 3.0               worsening uremia, UOP improving today after IVF bolus  renal following- monitor need for RRT         Increase Normosol 75/hr             Daily weights monitor stricy I's and O's               Endocrine: Fs q 6H                          ISS q 6H- sglu - 140 -180       Start NPH 3 q6h if starting feeds                         Stroke Core measures as above   a1c 5.9    Heme- remote hx of DVT with prior L IPH  :           No CBC                 Hold Eliquis                 Reversal of Eliquis with Andexa low dose 400U bolus and Gtt- 480U                Monitor Fever curve and WBC- If febrile Pan culture                Doppler B/L LE 6/8 no dvt  DVT PPx- SCD, heparin SQ  Renal deficiency associated anemia    ID:  full sepsis w/u as part of encephalopathy w/u  UA negative    ortho- XRAY R Shoulder- Redemonstrated is an acute surgical neck fracture of the proximal humerus with displacement and mild impaction; conservative management per ortho     PIVs, khan 6/6, A-line 6/6    dispo- nsicu  IMP:73 y/o M with hx of CVA, DM ,HTN, Afib on eliquis, CHF, Gout presents as [place] Code/Code ICH with NIH 17, GCS11, ICH 1 with Left thalamic hemorrhage    sICH/IVH  hypertensive emergency   Desi on CKD   encephalopathy  R humerus fracture      Plan: Admit to Neuro ICU  Neuro checks q 2  CTH 6/8 stable  MRI when stable  vEEG with significant sharp wave burden, vimpat increased on 6/9 to 150 mg q 12   Consult IR for angio - R/O microaneurysm  as source of bleed once Cr stable           Stroke labs - ICH score   multifactorial encephalopathy- neurologic, metabolic/uremic  bedrest    CV: htn, hx of afib on DOAC s/p reversal  EKG daily            Trops downtrending- no need for further trend            convert lopressor to labetalol    Start hydralazine 50mg q8h  on multiple antihypertensive at home- clonidine, labetalol, hydralazine, norvasc           afib- off doac s/p reversal for ICH; intermittent RVR, IV lopressor PRN            Echo             -150 - Cardene PRN by cuff            Daily weights    Pulmonary:- Presumably ZE ?OHS  initiate hfnc for secretion burden and wob  duoneb  q6 for 24 hours   Sleep Apnea on CPAP at home  - Follow up with Pulmonary      Humidify 2L NC sating 94%- wean O2       CPAP - - 5-10 - 10 Pm to 6 am- 30 %      GI: EMILY feeding tube in place  May start glucerna 1.2 20/hr if ABG stable        gi ppx- n/a      Dysphagia screening- fail   Bowel regimen- senna  LBM- 6/10    Renal: DESI/ CRI - baseline  Cr 3.0               worsening uremia, UOP improving today after IVF bolus  renal following- monitor need for RRT         Increase Normosol 75/hr             Daily weights monitor stricy I's and O's               Endocrine: Fs q 6H                          ISS q 6H- sglu - 140 -180       Start NPH 3 q6h if starting feeds                         Stroke Core measures as above   a1c 5.9    Heme- remote hx of DVT with prior L IPH  :           No CBC                 Hold Eliquis                 Reversal of Eliquis with Andexa low dose 400U bolus and Gtt- 480U                Monitor Fever curve and WBC                Doppler B/L LE 6/8 no dvt  DVT PPx- SCD, heparin SQ  Renal deficiency associated anemia    ID:  full sepsis w/u as part of encephalopathy w/u pending  UA negative  cxr with basilar atelectasis  pct 0.47 in setting of renal failure, wbc normal, fever curve wnl    ortho- XRAY R Shoulder- Redemonstrated is an acute surgical neck fracture of the proximal humerus with displacement and mild impaction; conservative management per ortho     erin Carrion 6/6, A-line 6/6    dispo- nsicu

## 2023-06-10 NOTE — PROGRESS NOTE ADULT - CRITICAL CARE ATTENDING COMMENT
ich/ivh, hypertensive emergency, kenyetta on ckd, encephalopathy    plan as above ich/ivh, hypertensive emergency, kenyetta on ckd, encephalopathy    plan as above      family updated at bedside

## 2023-06-10 NOTE — PROGRESS NOTE ADULT - SUBJECTIVE AND OBJECTIVE BOX
Nephrology progress note    THIS IS AN INCOMPLETE NOTE . FULL NOTE TO FOLLOW SHORTLY    Patient is seen and examined, events over the last 24 h noted .    Allergies:  No Known Allergies    Hospital Medications:   MEDICATIONS  (STANDING):  chlorhexidine 2% Cloths 1 Application(s) Topical <User Schedule>  dextrose 5%. 1000 milliLiter(s) (100 mL/Hr) IV Continuous <Continuous>  dextrose 5%. 1000 milliLiter(s) (50 mL/Hr) IV Continuous <Continuous>  dextrose 50% Injectable 25 Gram(s) IV Push once  dextrose 50% Injectable 12.5 Gram(s) IV Push once  dextrose 50% Injectable 25 Gram(s) IV Push once  doxazosin 2 milliGRAM(s) Oral at bedtime  ergocalciferol 07805 Unit(s) Oral every week  glucagon  Injectable 1 milliGRAM(s) IntraMuscular once  heparin   Injectable 7500 Unit(s) SubCutaneous every 8 hours  hydrALAZINE 50 milliGRAM(s) Oral <User Schedule>  insulin lispro (ADMELOG) corrective regimen sliding scale   SubCutaneous every 6 hours  insulin NPH human recombinant 3 Unit(s) SubCutaneous every 6 hours  labetalol 200 milliGRAM(s) Oral every 8 hours  lacosamide IVPB 150 milliGRAM(s) IV Intermittent every 12 hours  multiple electrolytes Injection Type 1 1000 milliLiter(s) (75 mL/Hr) IV Continuous <Continuous>  niCARdipine Infusion 5 mG/Hr (25 mL/Hr) IV Continuous <Continuous>        VITALS:  T(F): 98.8 (06-10-23 @ 04:00), Max: 98.8 (06-10-23 @ 04:00)  HR: 107 (06-10-23 @ 07:00)  BP: 165/66 (06-10-23 @ 07:00)  RR: 29 (06-10-23 @ 07:00)  SpO2: 99% (06-10-23 @ 07:00)  Wt(kg): --     @ 07:01  -   @ 07:00  --------------------------------------------------------  IN: 1522.5 mL / OUT: 670 mL / NET: 852.5 mL     @ 07:01  -  06-10 @ 07:00  --------------------------------------------------------  IN: 2620 mL / OUT: 1705 mL / NET: 915 mL      2023 07:01  -  10 Alan 2023 07:00  --------------------------------------------------------  IN:    Enteral Tube Flush: 60 mL    Glucerna: 160 mL    IV PiggyBack: 50 mL    multiple electrolytes Injection Type 1.: 150 mL    multiple electrolytes Injection Type 1.: 1575 mL    NiCARdipine: 625 mL  Total IN: 2620 mL    OUT:    Indwelling Catheter - Urethral (mL): 1705 mL  Total OUT: 1705 mL    Total NET: 915 mL            PHYSICAL EXAM:  Constitutional: NAD  HEENT: anicteric sclera, oropharynx clear, MMM  Neck: No JVD  Respiratory: CTAB, no wheezes, rales or rhonchi  Cardiovascular: S1, S2, RRR  Gastrointestinal: BS+, soft, NT/ND  Extremities: No cyanosis or clubbing. No peripheral edema  :  No khan.   Skin: No rashes    LABS:  06-10    148<H>  |  114<H>  |  108<HH>  ----------------------------<  174<H>  4.5   |  20  |  5.1<HH>  Creatinine Trend: 5.1<--, 5.3<--, 5.5<--, 5.4<--, 5.5<--, 4.7<--  Ca    8.0<L>      10 Alan 2023 04:40  Phos  4.4     06-10  Mg     3.8     06-10    TPro  5.3<L>  /  Alb  3.2<L>  /  TBili  0.4  /  DBili      /  AST  19  /  ALT  25  /  AlkPhos  47  06-10                          8.6    9.57  )-----------( 212      ( 10 Alan 2023 04:40 )             26.9       Urine Studies:  Urinalysis Basic - ( 2023 15:25 )    Color: Yellow / Appearance: Slightly Turbid / S.021 / pH:   Gluc:  / Ketone: Trace  / Bili: Negative / Urobili: <2 mg/dL   Blood:  / Protein: 300 mg/dL / Nitrite: Negative   Leuk Esterase: Moderate / RBC: 140 /HPF / WBC 16 /HPF   Sq Epi:  / Non Sq Epi:  / Bacteria: Negative          Vitamin D (25OH) 19      [23 @ 10:34]  HbA1c 8.7      [19 @ 04:49]  TSH 1.29      [23 @ 10:10]  Lipid: chol 103, TG 94, HDL 37, LDL --      [23 @ 10:10]    HBsAg Nonreact      [19 @ 06:38]  HCV 0.08, Nonreact      [19 @ 06:38]    RAMO: titer Negative, pattern --      [19 @ 06:38]  C3 Complement 177      [19 @ 06:38]  C4 Complement 46      [19 @ 06:38]  ANCA: cANCA Negative, pANCA Negative, atypical ANCA Negative      [19 @ 06:38]  Immunofixation Serum:   No Monoclonal Band Identified    Reference Range: None Detected      [19 @ 04:00]  SPEP Interpretation: Normal Electrophoresis Pattern      [19 @ 04:00]  Immunofixation Urine: Reference Range: None Detected      [19 @ 12:39]  UPEP Interpretation: Mild Selective (Glomerular) Proteinuria      [19 @ 12:39]      RADIOLOGY & ADDITIONAL STUDIES:   Nephrology progress note    Patient is seen and examined, events over the last 24 h noted .  Lying in bed lethargic     Allergies:  No Known Allergies    Hospital Medications:   MEDICATIONS  (STANDING):  chlorhexidine 2% Cloths 1 Application(s) Topical <User Schedule>  doxazosin 2 milliGRAM(s) Oral at bedtime  ergocalciferol 17342 Unit(s) Oral every week  glucagon  Injectable 1 milliGRAM(s) IntraMuscular once  heparin   Injectable 7500 Unit(s) SubCutaneous every 8 hours  hydrALAZINE 50 milliGRAM(s) Oral <User Schedule>  insulin lispro (ADMELOG) corrective regimen sliding scale   SubCutaneous every 6 hours  insulin NPH human recombinant 3 Unit(s) SubCutaneous every 6 hours  labetalol 200 milliGRAM(s) Oral every 8 hours  lacosamide IVPB 150 milliGRAM(s) IV Intermittent every 12 hours  multiple electrolytes Injection Type 1 1000 milliLiter(s) (75 mL/Hr) IV Continuous <Continuous>  niCARdipine Infusion 5 mG/Hr (25 mL/Hr) IV Continuous <Continuous>        VITALS:  T(F): 98.8 (06-10-23 @ 04:00), Max: 98.8 (06-10-23 @ 04:00)  HR: 107 (06-10-23 @ 07:00)  BP: 165/66 (06-10-23 @ 07:00)  RR: 29 (06-10-23 @ 07:00)  SpO2: 99% (06-10-23 @ 07:00)       @ :  -   @ 07:00  --------------------------------------------------------  IN: 1522.5 mL / OUT: 670 mL / NET: 852.5 mL     @ 07:  -  06-10 @ 07:00  --------------------------------------------------------  IN: 2620 mL / OUT: 1705 mL / NET: 915 mL      2023 07:01  -  10 Alan 2023 07:00  --------------------------------------------------------  IN:    Enteral Tube Flush: 60 mL    Glucerna: 160 mL    IV PiggyBack: 50 mL    multiple electrolytes Injection Type 1.: 150 mL    multiple electrolytes Injection Type 1.: 1575 mL    NiCARdipine: 625 mL  Total IN: 2620 mL    OUT:    Indwelling Catheter - Urethral (mL): 1705 mL  Total OUT: 1705 mL    Total NET: 915 mL            PHYSICAL EXAM:  Constitutional: lethargic   Respiratory: CTAB, no wheezes, rales or rhonchi  Cardiovascular: S1, S2, RRR  Gastrointestinal: BS+, soft, NT/ND  Extremities: No cyanosis or clubbing. No peripheral edema  :  khan.   Skin: No rashes    LABS:  06-10    148<H>  |  114<H>  |  108<HH>  ----------------------------<  174<H>  4.5   |  20  |  5.1<HH>  Creatinine Trend: 5.1<--, 5.3<--, 5.5<--, 5.4<--, 5.5<--, 4.7<--  Ca    8.0<L>      10 Alan 2023 04:40  Phos  4.4     06-10  Mg     3.8     06-10    TPro  5.3<L>  /  Alb  3.2<L>  /  TBili  0.4  /  DBili      /  AST  19  /  ALT  25  /  AlkPhos  47  06-10                          8.6    9.57  )-----------( 212      ( 10 Alan 2023 04:40 )             26.9       Urine Studies:  Urinalysis Basic - ( 2023 15:25 )    Color: Yellow / Appearance: Slightly Turbid / S.021 / pH:   Gluc:  / Ketone: Trace  / Bili: Negative / Urobili: <2 mg/dL   Blood:  / Protein: 300 mg/dL / Nitrite: Negative   Leuk Esterase: Moderate / RBC: 140 /HPF / WBC 16 /HPF   Sq Epi:  / Non Sq Epi:  / Bacteria: Negative          Vitamin D (25OH) 19      [23 @ 10:34]  HbA1c 8.7      [19 @ 04:49]  TSH 1.29      [23 @ 10:10]  Lipid: chol 103, TG 94, HDL 37, LDL --      [23 @ 10:10]    HBsAg Nonreact      [19 @ 06:38]  HCV 0.08, Nonreact      [19 @ 06:38]    RAMO: titer Negative, pattern --      [19 @ 06:38]  C3 Complement 177      [19 @ 06:38]  C4 Complement 46      [19 @ 06:38]  ANCA: cANCA Negative, pANCA Negative, atypical ANCA Negative      [19 @ 06:38]  Immunofixation Serum:   No Monoclonal Band Identified    Reference Range: None Detected      [19 @ 04:00]  SPEP Interpretation: Normal Electrophoresis Pattern      [19 @ 04:00]  Immunofixation Urine: Reference Range: None Detected      [19 @ 12:39]  UPEP Interpretation: Mild Selective (Glomerular) Proteinuria      [19 @ 12:39]      RADIOLOGY & ADDITIONAL STUDIES:

## 2023-06-11 NOTE — PROGRESS NOTE ADULT - ASSESSMENT
IMP:73 y/o M with hx of CVA, DM ,HTN, Afib on eliquis, CHF, Gout presents as [place] Code/Code ICH with NIH 17, GCS11, ICH 1 with Left thalamic hemorrhage    sICH/IVH  hypertensive emergency   Desi on CKD   encephalopathy  R humerus fracture      Plan: Admit to Neuro ICU  Neuro checks q 2  CTH 6/8 stable  MRI when stable  vEEG with significant sharp wave burden, vimpat increased on 6/9 to 150 mg q 12   Consult IR for angio - R/O microaneurysm  as source of bleed once Cr stable           Stroke labs - ICH score   multifactorial encephalopathy- neurologic, metabolic/uremic  bedrest    CV: htn, hx of afib on DOAC s/p reversal  EKG daily            Trops downtrending- no need for further trend            convert lopressor to labetalol    Start hydralazine 50mg q8h  on multiple antihypertensive at home- clonidine, labetalol, hydralazine, norvasc           afib- off doac s/p reversal for ICH; intermittent RVR, IV lopressor PRN            Echo             -150 - Cardene PRN by cuff            Daily weights    Pulmonary:- Presumably ZE ?OHS  initiate hfnc for secretion burden and wob  duoneb  q6 for 24 hours   Sleep Apnea on CPAP at home  - Follow up with Pulmonary      Humidify 2L NC sating 94%- wean O2       CPAP - - 5-10 - 10 Pm to 6 am- 30 %      GI: EMILY feeding tube in place  May start glucerna 1.2 20/hr if ABG stable        gi ppx- n/a      Dysphagia screening- fail   Bowel regimen- senna  LBM- 6/10    Renal: DESI/ CRI - baseline  Cr 3.0               worsening uremia, UOP improving today after IVF bolus  renal following- monitor need for RRT         Increase Normosol 75/hr             Daily weights monitor stricy I's and O's               Endocrine: Fs q 6H                          ISS q 6H- sglu - 140 -180       Start NPH 3 q6h if starting feeds                         Stroke Core measures as above   a1c 5.9    Heme- remote hx of DVT with prior L IPH  :           No CBC                 Hold Eliquis                 Reversal of Eliquis with Andexa low dose 400U bolus and Gtt- 480U                Monitor Fever curve and WBC                Doppler B/L LE 6/8 no dvt  DVT PPx- SCD, heparin SQ  Renal deficiency associated anemia    ID:  full sepsis w/u as part of encephalopathy w/u pending  UA negative  cxr with basilar atelectasis  pct 0.47 in setting of renal failure, wbc normal, fever curve wnl    ortho- XRAY R Shoulder- Redemonstrated is an acute surgical neck fracture of the proximal humerus with displacement and mild impaction; conservative management per ortho     erin Carrion 6/6, A-line 6/6    dispo- nsicu  IMP:75 y/o M with hx of CVA, DM ,HTN, Afib on eliquis, CHF, Gout presents as [place] Code/Code ICH with NIH 17, GCS11, ICH 1 with Left thalamic hemorrhage    sICH/IVH  hypertensive emergency   acute hypoxemic respiratory   Desi on CKD   encephalopathy  R humerus fracture      Plan: Admit to Neuro ICU  Neuro checks q 4  RASS -4 to -5 on prop and fent gtt  cth stable on 6/10  MRI when stable  vEEG with significant sharp wave burden, vimpat increased on 6/9 to 150 mg q 12; if eeg stable will give scalp rest today   Consult IR for angio - R/O microaneurysm  as source of bleed once Cr stable           Stroke labs - ICH score   multifactorial encephalopathy- neurologic, metabolic/uremic--> plan for cvvhd  bedrest    CV: htn, hx of afib on DOAC s/p reversal  labetalol and hydralazine initiated; cardene gtt prn, MAP>65, sbp <150  on multiple antihypertensive at home- clonidine, labetalol, hydralazine, norvasc           afib- off doac s/p reversal for ICH; intermittent RVR, IV lopressor PRN            Echo-             Daily weights    Pulmonary:- AHRF due to aspiration pneumonitis  lung protective vent support  sedate for lung compliance given significant vent requirement, no SAT/SBT right now  agree pulmonary toileting- nebulizers/mucomyst q 4, chest PT q2  HOB >45, aspiration precautions  Sleep Apnea on CPAP at home  - Follow up with Pulmonary      Humidify 2L NC sating 94%- wean O2       CPAP - - 5-10 - 10 Pm to 6 am- 30 %      GI: EMILY feeding tube in place  glucerna 1.2 adjusted goal is 45 given propofol gtt dosage        gi ppx-       Dysphagia screening- fail   Bowel regimen- senna  LBM- 6/10    Renal: DESI/ CRI - baseline  Cr 3.0               worsening uremia, with worsening AMS--> plan for CVVHD  renal following   IVL  bumex IV x1 and monitor response  khan in place              Daily weights monitor stricy I's and O's               Endocrine: Fs q 6H                          ISS q 6H- sglu - 140 -180       Start NPH q6h if starting feeds                         Stroke Core measures as above   a1c 5.9    Heme- remote hx of DVT with prior L IPH                Hold Eliquis                 Reversal of Eliquis with Andexa low dose 400U bolus and Gtt- 480U                Monitor Fever curve and WBC                Doppler B/L LE 6/8 no dvt  DVT PPx- SCD, heparin SQ  Renal deficiency associated anemia; no sequelae of bleed; will monitor     ID:  sepsis due to aspiration pna  f/u culture data  MRSA negative   c/w IV zosyn  CT chest with RLL PNA    ortho- XRAY R Shoulder- Redemonstrated is an acute surgical neck fracture of the proximal humerus with displacement and mild impaction; conservative management per ortho     khan/a-line 6/6  LIJ CVC 6/10  ETT 6/10    dispo- nsicu    family updated at bedisde

## 2023-06-11 NOTE — PROGRESS NOTE ADULT - ASSESSMENT
The patient is a 63-year-old male with a past history of Afib on Eliquis, prior basal ganglia hemorrhage, HTN, Gout, and DM, CKD 3b- 4 ( known to my office)  who presented as a stroke.    # DESI on EFH1m-9/ ATN vs EV   # oliguria, resolved  # HTN uncontrolled   # Thalamic CVA hemorrhagic     - non oliguric  - creat stable  - positive fluid balance  - no further iv fluids  - increase free water enterally   - keep khan/ strict I and O   - avoid further nephrotoxins / contrast unless urgently needed   - BP target as per neuro ICU / nicardipine drip as needed   - dose meds for eGFR  - IP at target   - plan for udall catheter placement by neuro icu for RRT d/t possibility of uremia contributing to poor mentation / will order cvvhd once udall catheter is placed    Please call [phone] with all updates   The patient is a 63-year-old male with a past history of Afib on Eliquis, prior basal ganglia hemorrhage, HTN, Gout, and DM, CKD 3b- 4 ( known to my office)  who presented as a stroke.    # DESI on FAE2a-7/ ATN vs EV   # oliguria, resolved  # HTN uncontrolled   # Thalamic CVA hemorrhagic     - non oliguric  - creat stable  - positive fluid balance  - no further iv fluids  - increase free water enterally   - keep khan/ strict I and O   - avoid further nephrotoxins / contrast unless urgently needed   - BP target as per neuro ICU / nicardipine drip as needed   - dose meds for eGFR  - IP at target   - plan for udall catheter placement by neuro icu for RRT d/t possibility of uremia contributing to poor mentation / will order cvvhd once udall catheter is placed  - d/w pt's wife Colleen, gave consent for RRT    Please call [phone] with all updates   The patient is a 63-year-old male with a past history of Afib on Eliquis, prior basal ganglia hemorrhage, HTN, Gout, and DM, CKD 3b- 4 ( known to my office)  who presented as a stroke.    # DESI on NXF7h-1/ ATN vs EV   # oliguria, resolved  # HTN uncontrolled   # Thalamic CVA hemorrhagic     - non oliguric  - creat stable  - positive fluid balance  - no further iv fluids  - hypernatremic, increase free water enterally   - strict I and O   - avoid nephrotoxins / contrast unless urgently needed   - BP target as per neuro ICU / nicardipine drip as needed   - plan for udall catheter placement by neuro icu for RRT d/t possibility of uremia contributing to poor mentation / will order cvvhd once udall catheter is placed  - d/w pt's wife Colleen, gave consent for RRT  - check phos level daily while on cvvhd  Please call [phone] with all updates  PM update: udall catheter placed;  CVVHD orders placed, start net even and increase UF to 100cc/hr as tolerated

## 2023-06-11 NOTE — EEG REPORT - NS EEG TEXT BOX
Epilepsy Attending Note:     GRIS TIDWELL    63y Male  MRN [id number]    Vital Signs Last 24 Hrs  T(C): 37 (11 Jun 2023 08:00), Max: 37.2 (10 Alan 2023 16:00)  T(F): 98.6 (11 Jun 2023 08:00), Max: 99 (10 Alan 2023 16:00)  HR: 61 (11 Jun 2023 10:16) (57 - 141)  BP: 154/84 (10 Alan 2023 18:30) (112/53 - 209/143)  BP(mean): 109 (10 Alan 2023 18:30) (74 - 161)  RR: 26 (11 Jun 2023 08:00) (16 - 52)  SpO2: 100% (11 Jun 2023 10:16) (90% - 100%)    Parameters below as of 11 Jun 2023 06:00  Patient On (Oxygen Delivery Method): ventilator    O2 Concentration (%): 80                          7.5    10.21 )-----------( 206      ( 11 Jun 2023 05:14 )             24.3       06-11    150<H>  |  119<H>  |  115<HH>  ----------------------------<  191<H>  4.4   |  21  |  5.1<HH>    Ca    7.5<L>      11 Jun 2023 05:14  Phos  5.2     06-11  Mg     3.7     06-11    TPro  4.6<L>  /  Alb  2.6<L>  /  TBili  0.3  /  DBili  x   /  AST  15  /  ALT  21  /  AlkPhos  46  06-11      MEDICATIONS  (STANDING):  acetylcysteine 10%  Inhalation 4 milliLiter(s) Inhalation every 4 hours  albuterol    0.083% 2.5 milliGRAM(s) Nebulizer every 4 hours  buMETAnide Injectable 2 milliGRAM(s) IV Push once  chlorhexidine 0.12% Liquid 15 milliLiter(s) Oral Mucosa every 12 hours  chlorhexidine 2% Cloths 1 Application(s) Topical <User Schedule>  dextrose 5%. 1000 milliLiter(s) (100 mL/Hr) IV Continuous <Continuous>  dextrose 5%. 1000 milliLiter(s) (50 mL/Hr) IV Continuous <Continuous>  dextrose 50% Injectable 25 Gram(s) IV Push once  dextrose 50% Injectable 12.5 Gram(s) IV Push once  dextrose 50% Injectable 25 Gram(s) IV Push once  doxazosin 2 milliGRAM(s) Oral at bedtime  ergocalciferol 47588 Unit(s) Oral every week  fentaNYL   Infusion. 0.5 MICROgram(s)/kG/Hr (6.36 mL/Hr) IV Continuous <Continuous>  glucagon  Injectable 1 milliGRAM(s) IntraMuscular once  heparin   Injectable 7500 Unit(s) SubCutaneous every 8 hours  hydrALAZINE 50 milliGRAM(s) Oral <User Schedule>  insulin lispro (ADMELOG) corrective regimen sliding scale   SubCutaneous every 6 hours  insulin NPH human recombinant 6 Unit(s) SubCutaneous every 6 hours  labetalol 200 milliGRAM(s) Oral every 8 hours  lacosamide IVPB 150 milliGRAM(s) IV Intermittent every 12 hours  niCARdipine Infusion 5 mG/Hr (25 mL/Hr) IV Continuous <Continuous>  pantoprazole  Injectable 40 milliGRAM(s) IV Push daily  piperacillin/tazobactam IVPB.. 3.375 Gram(s) IV Intermittent every 8 hours  propofol Infusion 20 MICROgram(s)/kG/Min (15.3 mL/Hr) IV Continuous <Continuous>    MEDICATIONS  (PRN):  acetaminophen     Tablet .. 650 milliGRAM(s) Oral every 6 hours PRN Temp greater or equal to 38C (100.4F), Mild Pain (1 - 3)  dextrose Oral Gel 15 Gram(s) Oral once PRN Blood Glucose LESS THAN 70 milliGRAM(s)/deciliter  hydrALAZINE Injectable 10 milliGRAM(s) IV Push every 6 hours PRN sustained SBP > 150 and HR < 60  labetalol Injectable 10 milliGRAM(s) IV Push every 2 hours PRN sustained SBP > 150 and HR > 60  ondansetron Injectable 4 milliGRAM(s) IV Push every 6 hours PRN Nausea and/or Vomiting  senna 2 Tablet(s) Oral at bedtime PRN Constipation            VEEG in the last 24 hours:    Background------continues, reactive slightly asymmetrical with higher amplitude from the left reaching 6-7 hz    Focal and generalized slowing---moderate generalized slowing and oderate left anterior quadrants focal slowing and bifrontal slowing    Interictal activity---------1- large number of left FP /frontal  triphasic waves / sharp waves and small number of independent right frontal sharp waves that are probably epileptiform. 2- small amount of diffusely expressed triphasic discharges    Events-------None    Seizures-------- none    Impression:  abnormal as above    Plan - as/NCC team

## 2023-06-11 NOTE — PROGRESS NOTE ADULT - CRITICAL CARE ATTENDING COMMENT
ich/ivh, hypertensive emergency, kenyetta on ckd, encephalopathy    plan as above      family updated at bedside ich/ivh, hypertensive emergency, kenyetta on ckd requiring RRT, encephalopathy c/b hypoxemic respiratory failure due to aspiration pna necessitating endotracheal intubation with mechanical vent on IV abx    plan as above      family updated at bedside

## 2023-06-11 NOTE — PROGRESS NOTE ADULT - SUBJECTIVE AND OBJECTIVE BOX
SUMMARY: This is a 64 y/o M with hx of Afib on eiquis, CVA BG bleed ,HTN, CHF, Gout, DM  presents as a stroke code after being down in bathroom unknown time LKW was 2300. 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 Pt is lethargic  - Cardene started for blood pressure control.    OVERNIGHT EVENTS: Intubated for hypoxic respiratory failure, FiO2 decreases to 80%, MV adjusted for ABG, urine output decreasing s/p 500 cc bolus    ADMISSION SCORES:   GCS 12   ICH 1   [facility] 17    REVIEW OF SYSTEMS: Pt unable to participate in ROS due to neurological status    VITALS: [x]       General: morbidly obese, lethargic in bed  HENT: NC/AT, moist oral mucosa; snoring/intermittent hypopneic events  Eyes: Anicteric sclerae  Cardiac: [id number]  Lungs: Clear  Abdomen: Soft, non-tender, +BS  Extremities: R shoulder - tenderness and crepitance / swelling R shoulder w/ overlying ecchymosis- distal pulses RUE intact, good capillary refill.  Skin/Incision Site: Clean, dry and intact  Neurologic: lethargic, eo to tactile stimulation, not following commands/aphasic, PERRLA, midline gaze, WD on RUE (limited by R arm injury_, RLE WD, L side spont AG and purposeful,             ICU Vital Signs Last 24 Hrs  T(C): 37.3 (10 Alan 2023 08:00), Max: 37.3 (10 Alan 2023 08:00)  T(F): 99.1 (10 Alan 2023 08:00), Max: 99.1 (10 Alan 2023 08:00)  HR: 81 (10 Alan 2023 09:00) (64 - 136)  BP: 129/58 (10 Alan 2023 09:00) (102/56 - 176/81)  BP(mean): 84 (10 Alan 2023 09:00) (74 - 117)  ABP: 78/58 (10 Alan 2023 05:15) (78/58 - 167/67)  ABP(mean): 67 (10 Alan 2023 05:15) (66 - 98)  RR: 43 (10 Alan 2023 09:00) (22 - 43)  SpO2: 90% (10 Alan 2023 09:00) (89% - 100%)      06-09-23 @ 07:01  -  06-10-23 @ 07:00  --------------------------------------------------------  IN: 2620 mL / OUT: 1705 mL / NET: 915 mL    06-10-23 @ 07:01  -  06-10-23 @ 11:18  --------------------------------------------------------  IN: 300 mL / OUT: 215 mL / NET: 85 mL            acetaminophen     Tablet .. 650 milliGRAM(s) Oral every 6 hours PRN  albuterol/ipratropium for Nebulization 3 milliLiter(s) Nebulizer every 6 hours PRN  chlorhexidine 2% Cloths 1 Application(s) Topical <User Schedule>  dextrose 5%. 1000 milliLiter(s) (100 mL/Hr) IV Continuous <Continuous>  dextrose 5%. 1000 milliLiter(s) (50 mL/Hr) IV Continuous <Continuous>  dextrose 50% Injectable 25 Gram(s) IV Push once  dextrose 50% Injectable 12.5 Gram(s) IV Push once  dextrose 50% Injectable 25 Gram(s) IV Push once  dextrose Oral Gel 15 Gram(s) Oral once PRN  doxazosin 2 milliGRAM(s) Oral at bedtime  ergocalciferol 75023 Unit(s) Oral every week  glucagon  Injectable 1 milliGRAM(s) IntraMuscular once  heparin   Injectable 7500 Unit(s) SubCutaneous every 8 hours  hydrALAZINE 50 milliGRAM(s) Oral <User Schedule>  hydrALAZINE Injectable 10 milliGRAM(s) IV Push every 6 hours PRN  insulin lispro (ADMELOG) corrective regimen sliding scale   SubCutaneous every 6 hours  insulin NPH human recombinant 3 Unit(s) SubCutaneous every 6 hours  labetalol 200 milliGRAM(s) Oral every 8 hours  labetalol Injectable 10 milliGRAM(s) IV Push every 2 hours PRN  lacosamide IVPB 150 milliGRAM(s) IV Intermittent every 12 hours  multiple electrolytes Injection Type 1 1000 milliLiter(s) (75 mL/Hr) IV Continuous <Continuous>  niCARdipine Infusion 5 mG/Hr (25 mL/Hr) IV Continuous <Continuous>  ondansetron Injectable 4 milliGRAM(s) IV Push every 6 hours PRN  senna 2 Tablet(s) Oral at bedtime PRN      LABS:  Na: 148 (06-10 @ 04:40), 148 (06-09 @ 16:22), 146 (06-09 @ 05:42), 146 (06-08 @ 04:40), 146 (06-07 @ 23:16)  K: 4.5 (06-10 @ 04:40), 4.7 (06-09 @ 16:22), 4.4 (06-09 @ 05:42), 4.6 (06-08 @ 04:40), 4.6 (06-07 @ 23:16)  Cl: 114 (06-10 @ 04:40), 115 (06-09 @ 16:22), 112 (06-09 @ 05:42), 110 (06-08 @ 04:40), 109 (06-07 @ 23:16)  CO2: 20 (06-10 @ 04:40), 21 (06-09 @ 16:22), 19 (06-09 @ 05:42), 22 (06-08 @ 04:40), 22 (06-07 @ 23:16)  BUN: 108 (06-10 @ 04:40), 106 (06-09 @ 16:22), 106 (06-09 @ 05:42), 95 (06-08 @ 04:40), 90 (06-07 @ 23:16)  Cr: 5.1 (06-10 @ 04:40), 5.3 (06-09 @ 16:22), 5.5 (06-09 @ 05:42), 5.4 (06-08 @ 04:40), 5.5 (06-07 @ 23:16)  Glu: 174(06-10 @ 04:40), 179(06-09 @ 16:22), 183(06-09 @ 05:42), 163(06-08 @ 04:40), 166(06-07 @ 23:16)    Hgb: 8.6 (06-10 @ 04:40), 8.4 (06-09 @ 05:42), 9.5 (06-08 @ 15:55)  Hct: 26.9 (06-10 @ 04:40), 26.6 (06-09 @ 05:42), 29.3 (06-08 @ 15:55)  WBC: 9.57 (06-10 @ 04:40), 10.75 (06-09 @ 05:42), 11.84 (06-08 @ 15:55)  Plt: 212 (06-10 @ 04:40), 192 (06-09 @ 05:42), 219 (06-08 @ 15:55)    INR: 1.00 06-09-23 @ 05:42  PTT: 26.8 06-09-23 @ 05:42          LIVER FUNCTIONS - ( 10 Alan 2023 04:40 )  Alb: 3.2 g/dL / Pro: 5.3 g/dL / ALK PHOS: 47 U/L / ALT: 25 U/L / AST: 19 U/L / GGT: x           ABG - ( 10 Alan 2023 10:49 )  pH, Arterial: 7.41  pH, Blood: x     /  pCO2: 37    /  pO2: 84    / HCO3: 24    / Base Excess: -0.9  /  SaO2: 98.1                 SUMMARY: This is a 62 y/o M with hx of Afib on eiquis, CVA BG bleed ,HTN, CHF, Gout, DM  presents as a stroke code after being down in bathroom unknown time LKW was 2300. 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 Pt is lethargic  - Cardene started for blood pressure control.    OVERNIGHT EVENTS: Intubated for hypoxic respiratory failure, FiO2 decreases to 80%, MV adjusted for ABG, urine output decreasing s/p 500 cc bolus      6/10- patient became increasingly lethargic in afternoon/early evening, febrile, hfnc maximized and patient intubated for hypoxemic respiratory failure with VL, sedated overnight on prop and fent, renal function worsening, pt on broad spectrum IV abx and fully cultured for sepsis due to aspiration pneumonia     ADMISSION SCORES:   GCS 12   ICH 1   NIH 17    REVIEW OF SYSTEMS: Pt unable to participate in ROS due to neurological status    VITALS: [x]       General: morbidly obese; ill appearing  HENT: nc/at, orally intubated  Eyes: Anicteric sclerae  Cardiac: [id number]  Lungs: b/l rhonci   Abdomen: Soft, non-tender, +BS  Extremities: R shoulder - tenderness and crepitance / swelling R shoulder w/ overlying ecchymosis- distal pulses RUE intact, good capillary refill; anasarca  Skin/Incision Site: Clean, dry and intact  Neurologic: sedated, midline pupils, sluggish, BS-reflexes present, flaccid on all four                ICU Vital Signs Last 24 Hrs  T(C): 37 (11 Jun 2023 08:00), Max: 37.9 (10 Alan 2023 12:00)  T(F): 98.6 (11 Jun 2023 08:00), Max: 100.2 (10 Alan 2023 12:00)  HR: 61 (11 Jun 2023 08:00) (57 - 141)  BP: 154/84 (10 Alan 2023 18:30) (109/54 - 209/143)  BP(mean): 109 (10 Alan 2023 18:30) (74 - 161)  ABP: 128/47 (11 Jun 2023 08:15) (123/79 - 186/47)  ABP(mean): 66 (11 Jun 2023 08:15) (65 - 105)  RR: 26 (11 Jun 2023 08:00) (16 - 52)  SpO2: 100% (11 Jun 2023 08:00) (90% - 100%)      06-10-23 @ 07:01  -  06-11-23 @ 07:00  --------------------------------------------------------  IN: 2929.5 mL / OUT: 1560 mL / NET: 1369.5 mL    06-11-23 @ 07:01  -  06-11-23 @ 08:41  --------------------------------------------------------  IN: 241 mL / OUT: 0 mL / NET: 241 mL        Mode: AC/ CMV (Assist Control/ Continuous Mandatory Ventilation), RR (machine): 18, TV (machine): 450, FiO2: 80, PEEP: 8, ITime: 1, MAP: 12, PIP: 21    acetaminophen     Tablet .. 650 milliGRAM(s) Oral every 6 hours PRN  acetylcysteine 10%  Inhalation 4 milliLiter(s) Inhalation every 6 hours  albuterol    90 MICROgram(s) HFA Inhaler 4 Puff(s) Inhalation every 6 hours  buMETAnide Injectable 2 milliGRAM(s) IV Push once  chlorhexidine 0.12% Liquid 15 milliLiter(s) Oral Mucosa every 12 hours  chlorhexidine 2% Cloths 1 Application(s) Topical <User Schedule>  dextrose 5%. 1000 milliLiter(s) (100 mL/Hr) IV Continuous <Continuous>  dextrose 5%. 1000 milliLiter(s) (50 mL/Hr) IV Continuous <Continuous>  dextrose 50% Injectable 25 Gram(s) IV Push once  dextrose 50% Injectable 25 Gram(s) IV Push once  dextrose 50% Injectable 12.5 Gram(s) IV Push once  dextrose Oral Gel 15 Gram(s) Oral once PRN  doxazosin 2 milliGRAM(s) Oral at bedtime  ergocalciferol 23456 Unit(s) Oral every week  fentaNYL   Infusion. 0.5 MICROgram(s)/kG/Hr (6.36 mL/Hr) IV Continuous <Continuous>  glucagon  Injectable 1 milliGRAM(s) IntraMuscular once  heparin   Injectable 7500 Unit(s) SubCutaneous every 8 hours  hydrALAZINE 50 milliGRAM(s) Oral <User Schedule>  hydrALAZINE Injectable 10 milliGRAM(s) IV Push every 6 hours PRN  insulin lispro (ADMELOG) corrective regimen sliding scale   SubCutaneous every 6 hours  insulin NPH human recombinant 3 Unit(s) SubCutaneous every 6 hours  labetalol 200 milliGRAM(s) Oral every 8 hours  labetalol Injectable 10 milliGRAM(s) IV Push every 2 hours PRN  lacosamide IVPB 150 milliGRAM(s) IV Intermittent every 12 hours  niCARdipine Infusion 5 mG/Hr (25 mL/Hr) IV Continuous <Continuous>  ondansetron Injectable 4 milliGRAM(s) IV Push every 6 hours PRN  piperacillin/tazobactam IVPB.. 3.375 Gram(s) IV Intermittent every 8 hours  propofol Infusion 20 MICROgram(s)/kG/Min (15.3 mL/Hr) IV Continuous <Continuous>  senna 2 Tablet(s) Oral at bedtime PRN      LABS:  Na: 150 (06-11 @ 05:14), 151 (06-10 @ 14:45), 148 (06-10 @ 04:40), 148 (06-09 @ 16:22), 146 (06-09 @ 05:42)  K: 4.4 (06-11 @ 05:14), 4.5 (06-10 @ 14:45), 4.5 (06-10 @ 04:40), 4.7 (06-09 @ 16:22), 4.4 (06-09 @ 05:42)  Cl: 119 (06-11 @ 05:14), 116 (06-10 @ 14:45), 114 (06-10 @ 04:40), 115 (06-09 @ 16:22), 112 (06-09 @ 05:42)  CO2: 21 (06-11 @ 05:14), 20 (06-10 @ 14:45), 20 (06-10 @ 04:40), 21 (06-09 @ 16:22), 19 (06-09 @ 05:42)  BUN: 115 (06-11 @ 05:14), 110 (06-10 @ 14:45), 108 (06-10 @ 04:40), 106 (06-09 @ 16:22), 106 (06-09 @ 05:42)  Cr: 5.1 (06-11 @ 05:14), 5.1 (06-10 @ 14:45), 5.1 (06-10 @ 04:40), 5.3 (06-09 @ 16:22), 5.5 (06-09 @ 05:42)  Glu: 191(06-11 @ 05:14), 146(06-10 @ 14:45), 174(06-10 @ 04:40), 179(06-09 @ 16:22), 183(06-09 @ 05:42)    Hgb: 7.5 (06-11 @ 05:14), 8.4 (06-10 @ 14:45), 8.6 (06-10 @ 04:40), 8.4 (06-09 @ 05:42), 9.5 (06-08 @ 15:55)  Hct: 24.3 (06-11 @ 05:14), 26.1 (06-10 @ 14:45), 26.9 (06-10 @ 04:40), 26.6 (06-09 @ 05:42), 29.3 (06-08 @ 15:55)  WBC: 10.21 (06-11 @ 05:14), 8.95 (06-10 @ 14:45), 9.57 (06-10 @ 04:40), 10.75 (06-09 @ 05:42), 11.84 (06-08 @ 15:55)  Plt: 206 (06-11 @ 05:14), 240 (06-10 @ 14:45), 212 (06-10 @ 04:40), 192 (06-09 @ 05:42), 219 (06-08 @ 15:55)    INR: 1.00 06-09-23 @ 05:42  PTT: 26.8 06-09-23 @ 05:42          LIVER FUNCTIONS - ( 11 Jun 2023 05:14 )  Alb: 2.6 g/dL / Pro: 4.6 g/dL / ALK PHOS: 46 U/L / ALT: 21 U/L / AST: 15 U/L / GGT: x           ABG - ( 11 Jun 2023 03:38 )  pH, Arterial: 7.36  pH, Blood: x     /  pCO2: 38    /  pO2: 161   / HCO3: 22    / Base Excess: -3.6  /  SaO2: 99.1

## 2023-06-11 NOTE — PROGRESS NOTE ADULT - SUBJECTIVE AND OBJECTIVE BOX
Nephrology Progress Note    GRIS TIDWELL  [id number]  63y  Male    S:  Patient is seen and examined, events over the last 24h noted.    O:  Allergies:  No Known Allergies    Hospital Medications:   MEDICATIONS  (STANDING):  acetylcysteine 10%  Inhalation 4 milliLiter(s) Inhalation every 4 hours  albuterol    0.083% 2.5 milliGRAM(s) Nebulizer every 4 hours  doxazosin 2 milliGRAM(s) Oral at bedtime  ergocalciferol 75935 Unit(s) Oral every week  fentaNYL   Infusion. 0.5 MICROgram(s)/kG/Hr (6.36 mL/Hr) IV Continuous <Continuous>  heparin   Injectable 7500 Unit(s) SubCutaneous every 8 hours  hydrALAZINE 50 milliGRAM(s) Oral <User Schedule>  insulin lispro (ADMELOG) corrective regimen sliding scale   SubCutaneous every 6 hours  insulin NPH human recombinant 6 Unit(s) SubCutaneous every 6 hours  labetalol 200 milliGRAM(s) Oral every 8 hours  lacosamide IVPB 150 milliGRAM(s) IV Intermittent every 12 hours  niCARdipine Infusion 5 mG/Hr (25 mL/Hr) IV Continuous <Continuous>  pantoprazole  Injectable 40 milliGRAM(s) IV Push daily  piperacillin/tazobactam IVPB.. 3.375 Gram(s) IV Intermittent every 8 hours  propofol Infusion 20 MICROgram(s)/kG/Min (15.3 mL/Hr) IV Continuous <Continuous>    MEDICATIONS  (PRN):  acetaminophen     Tablet .. 650 milliGRAM(s) Oral every 6 hours PRN Temp greater or equal to 38C (100.4F), Mild Pain (1 - 3)  dextrose Oral Gel 15 Gram(s) Oral once PRN Blood Glucose LESS THAN 70 milliGRAM(s)/deciliter  hydrALAZINE Injectable 10 milliGRAM(s) IV Push every 6 hours PRN sustained SBP > 150 and HR < 60  labetalol Injectable 10 milliGRAM(s) IV Push every 2 hours PRN sustained SBP > 150 and HR > 60  ondansetron Injectable 4 milliGRAM(s) IV Push every 6 hours PRN Nausea and/or Vomiting  senna 2 Tablet(s) Oral at bedtime PRN Constipation    Home Medications:  alfuzosin 10 mg oral tablet, extended release: 1 orally once a day (2023 15:38)  atorvastatin 40 mg oral tablet: 1 orally once a day (2023 15:38)  cholecalciferol 50 mcg (2000 intl units) oral tablet, chewable: 1 orally once a day (2023 15:42)  cyanocobalamin 1000 mcg oral tablet: 1 orally once a day (2023 15:42)  ezetimibe 10 mg oral tablet: 1 orally once a day (2023 15:42)  hydrALAZINE 100 mg oral tablet: 1 orally 2 times a day (2023 15:42)  Toujoso Max SoloStar 300 units/mL subcutaneous solution: 50 subcutaneous once a day (2023 15:37)  Trulicity Pen 1.5 mg/0.5 mL subcutaneous solution: 1.5 subcutaneously once a week (2023 15:42)      VITALS:  Daily     Daily Weight in k.4 (2023 06:00)  T(F): 98.6 (23 @ 08:00), Max: 100.2 (06-10-23 @ 12:00)  HR: 58 (23 @ 08:05)  BP: 154/84 (06-10-23 @ 18:30)  RR: 26 (23 @ 08:00)  SpO2: 100% (23 @ 08:05)  Wt(kg): --  I&O's Detail    10 Alan 2023 07:  -  2023 07:00  --------------------------------------------------------  IN:    Glucerna: 240 mL    IV PiggyBack: 550 mL    multiple electrolytes Injection Type 1 Bolus: 500 mL    multiple electrolytes Injection Type 1.: 725 mL    NiCARdipine: 480 mL    Propofol: 434.5 mL  Total IN: 2929.5 mL    OUT:    FentaNYL: 0 mL    Indwelling Catheter - Urethral (mL): 1560 mL  Total OUT: 1560 mL    Total NET: 1369.5 mL      2023 07:01  -  2023 09:48  --------------------------------------------------------  IN:    Glucerna: 80 mL    NiCARdipine: 100 mL    Propofol: 61 mL  Total IN: 241 mL    OUT:  Total OUT: 0 mL    Total NET: 241 mL        I&O's Summary    10 Alan 2023 07:  -  2023 07:00  --------------------------------------------------------  IN: 2929.5 mL / OUT: 1560 mL / NET: 1369.5 mL    2023 07:01  -  2023 09:48  --------------------------------------------------------  IN: 241 mL / OUT: 0 mL / NET: 241 mL          PHYSICAL EXAM:  Gen: intubated/ventilated  Resp: b/l breath sounds  Vascular access:       LABS:  ABG - ( 2023 03:38 )  pH, Arterial: 7.36  pH, Blood: x     /  pCO2: 38    /  pO2: 161   / HCO3: 22    / Base Excess: -3.6  /  SaO2: 99.1          150<H>  |  119<H>  |  115<HH>  ----------------------------<  191<H>  4.4   |  21  |  5.1<HH>    Ca    7.5<L>      2023 05:14  Phos  5.2       Mg     3.7         TPro  4.6<L>  /  Alb  2.6<L>  /  TBili  0.3  /  DBili      /  AST  15  /  ALT  21  /  AlkPhos  46      Phosphorus Level, Serum: 5.2 mg/dL (23 @ 05:14)  Phosphorus Level, Serum: 4.3 mg/dL (06-10-23 @ 14:45)                          7.5    10.21 )-----------( 206      ( 2023 05:14 )             24.3     Mean Cell Volume: 92.4 fL (23 @ 05:14)        Urine Studies:  Urinalysis Basic - ( 10 Alan 2023 14:45 )    Color: Light Yellow / Appearance: Slightly Turbid / S.018 / pH:   Gluc:  / Ketone: Negative  / Bili: Negative / Urobili: <2 mg/dL   Blood:  / Protein: 100 mg/dL / Nitrite: Negative   Leuk Esterase: Moderate / RBC: 222 /HPF / WBC 12 /HPF   Sq Epi:  / Non Sq Epi:  / Bacteria: Negative        Urea Nitrogen,  Random Urine: 596 mg/dL (19 @ 11:42)    Culture Results:   No growth to date. ( @ 12:43)  Culture Results:   No growth at 5 days. ( @ 22:23)    Creatinine trend:  Creatinine, Serum: 5.1 mg/dL (23 @ 05:14)  Creatinine, Serum: 5.1 mg/dL (06-10-23 @ 14:45)  Creatinine, Serum: 5.1 mg/dL (06-10-23 @ 04:40)  Creatinine, Serum: 5.3 mg/dL (23 @ 16:22)  Creatinine, Serum: 5.5 mg/dL (23 @ 05:42)  Creatinine, Serum: 5.4 mg/dL (23 @ 04:40)  Creatinine, Serum: 5.5 mg/dL (23 @ 23:16) Nephrology Progress Note    GRIS TIDWELL  MRN-[id number]  63y  Male    S:  Patient is seen and examined, events over the last 24h noted.    O:  Allergies:  No Known Allergies    Hospital Medications:   MEDICATIONS  (STANDING):  acetylcysteine 10%  Inhalation 4 milliLiter(s) Inhalation every 4 hours  albuterol    0.083% 2.5 milliGRAM(s) Nebulizer every 4 hours  doxazosin 2 milliGRAM(s) Oral at bedtime  ergocalciferol 23953 Unit(s) Oral every week  fentaNYL   Infusion. 0.5 MICROgram(s)/kG/Hr (6.36 mL/Hr) IV Continuous <Continuous>  heparin   Injectable 7500 Unit(s) SubCutaneous every 8 hours  hydrALAZINE 50 milliGRAM(s) Oral <User Schedule>  insulin lispro (ADMELOG) corrective regimen sliding scale   SubCutaneous every 6 hours  insulin NPH human recombinant 6 Unit(s) SubCutaneous every 6 hours  labetalol 200 milliGRAM(s) Oral every 8 hours  lacosamide IVPB 150 milliGRAM(s) IV Intermittent every 12 hours  niCARdipine Infusion 5 mG/Hr (25 mL/Hr) IV Continuous <Continuous>  pantoprazole  Injectable 40 milliGRAM(s) IV Push daily  piperacillin/tazobactam IVPB.. 3.375 Gram(s) IV Intermittent every 8 hours  propofol Infusion 20 MICROgram(s)/kG/Min (15.3 mL/Hr) IV Continuous <Continuous>    MEDICATIONS  (PRN):  acetaminophen     Tablet .. 650 milliGRAM(s) Oral every 6 hours PRN Temp greater or equal to 38C (100.4F), Mild Pain (1 - 3)  dextrose Oral Gel 15 Gram(s) Oral once PRN Blood Glucose LESS THAN 70 milliGRAM(s)/deciliter  hydrALAZINE Injectable 10 milliGRAM(s) IV Push every 6 hours PRN sustained SBP > 150 and HR < 60  labetalol Injectable 10 milliGRAM(s) IV Push every 2 hours PRN sustained SBP > 150 and HR > 60  ondansetron Injectable 4 milliGRAM(s) IV Push every 6 hours PRN Nausea and/or Vomiting  senna 2 Tablet(s) Oral at bedtime PRN Constipation    Home Medications:  alfuzosin 10 mg oral tablet, extended release: 1 orally once a day (2023 15:38)  atorvastatin 40 mg oral tablet: 1 orally once a day (2023 15:38)  cholecalciferol 50 mcg (2000 intl units) oral tablet, chewable: 1 orally once a day (2023 15:42)  cyanocobalamin 1000 mcg oral tablet: 1 orally once a day (2023 15:42)  ezetimibe 10 mg oral tablet: 1 orally once a day (2023 15:42)  hydrALAZINE 100 mg oral tablet: 1 orally 2 times a day (2023 15:42)  Tou[place]oso Max SoloStar 300 units/mL subcutaneous solution: 50 subcutaneous once a day (2023 15:37)  Trulicity Pen 1.5 mg/0.5 mL subcutaneous solution: 1.5 subcutaneously once a week (2023 15:42)      VITALS:  Daily     Daily Weight in k.4 (2023 06:00)  T(F): 98.6 (23 @ 08:00), Max: 100.2 (06-10-23 @ 12:00)  HR: 58 (23 @ 08:05)  BP: 154/84 (06-10-23 @ 18:30)  RR: 26 (23 @ 08:00)  SpO2: 100% (23 @ 08:05)  Wt(kg): --  I&O's Detail    10 Alan 2023 07:  -  2023 07:00  --------------------------------------------------------  IN:    Glucerna: 240 mL    IV PiggyBack: 550 mL    multiple electrolytes Injection Type 1 Bolus: 500 mL    multiple electrolytes Injection Type 1.: 725 mL    NiCARdipine: 480 mL    Propofol: 434.5 mL  Total IN: 2929.5 mL    OUT:    FentaNYL: 0 mL    Indwelling Catheter - Urethral (mL): 1560 mL  Total OUT: 1560 mL    Total NET: 1369.5 mL      2023 07:01  -  2023 09:48  --------------------------------------------------------  IN:    Glucerna: 80 mL    NiCARdipine: 100 mL    Propofol: 61 mL  Total IN: 241 mL    OUT:  Total OUT: 0 mL    Total NET: 241 mL        I&O's Summary    10 Alan 2023 07:  -  2023 07:00  --------------------------------------------------------  IN: 2929.5 mL / OUT: 1560 mL / NET: 1369.5 mL    2023 07:01  -  2023 09:48  --------------------------------------------------------  IN: 241 mL / OUT: 0 mL / NET: 241 mL          PHYSICAL EXAM:  Gen: intubated/ventilated  Resp: b/l breath sounds  Vascular access: udall      LABS:  ABG - ( 2023 03:38 )  pH, Arterial: 7.36  pH, Blood: x     /  pCO2: 38    /  pO2: 161   / HCO3: 22    / Base Excess: -3.6  /  SaO2: 99.1          150<H>  |  119<H>  |  115<HH>  ----------------------------<  191<H>  4.4   |  21  |  5.1<HH>    Ca    7.5<L>      2023 05:14  Phos  5.2       Mg     3.7         TPro  4.6<L>  /  Alb  2.6<L>  /  TBili  0.3  /  DBili      /  AST  15  /  ALT  21  /  AlkPhos  46      Phosphorus Level, Serum: 5.2 mg/dL (23 @ 05:14)  Phosphorus Level, Serum: 4.3 mg/dL (06-10-23 @ 14:45)                          7.5    10.21 )-----------( 206      ( 2023 05:14 )             24.3     Mean Cell Volume: 92.4 fL (23 @ 05:14)        Urine Studies:  Urinalysis Basic - ( 10 Alan 2023 14:45 )    Color: Light Yellow / Appearance: Slightly Turbid / S.018 / pH:   Gluc:  / Ketone: Negative  / Bili: Negative / Urobili: <2 mg/dL   Blood:  / Protein: 100 mg/dL / Nitrite: Negative   Leuk Esterase: Moderate / RBC: 222 /HPF / WBC 12 /HPF   Sq Epi:  / Non Sq Epi:  / Bacteria: Negative        Urea Nitrogen,  Random Urine: 596 mg/dL (19 @ 11:42)    Culture Results:   No growth to date. ( @ 12:43)  Culture Results:   No growth at 5 days. ( @ 22:23)    Creatinine trend:  Creatinine, Serum: 5.1 mg/dL (23 @ 05:14)  Creatinine, Serum: 5.1 mg/dL (06-10-23 @ 14:45)  Creatinine, Serum: 5.1 mg/dL (06-10-23 @ 04:40)  Creatinine, Serum: 5.3 mg/dL (23 @ 16:22)  Creatinine, Serum: 5.5 mg/dL (23 @ 05:42)  Creatinine, Serum: 5.4 mg/dL (23 @ 04:40)  Creatinine, Serum: 5.5 mg/dL (23 @ 23:16)

## 2023-06-12 NOTE — EEG REPORT - NS EEG TEXT BOX
Epilepsy Attending Note:     GRIS TIDWELL    63y Male  MRN [id number]    Vital Signs Last 24 Hrs  T(C): 35.8 (12 Jun 2023 08:00), Max: 37.5 (12 Jun 2023 00:00)  T(F): 96.4 (12 Jun 2023 08:00), Max: 99.5 (12 Jun 2023 00:00)  HR: 50 (12 Jun 2023 10:00) (50 - 72)  BP: --  BP(mean): --  RR: 24 (12 Jun 2023 10:00) (9 - 30)  SpO2: 100% (12 Jun 2023 10:00) (94% - 100%)    Parameters below as of 12 Jun 2023 10:00  Patient On (Oxygen Delivery Method): ventilator    O2 Concentration (%): 60                          6.8    14.88 )-----------( 203      ( 12 Jun 2023 06:15 )             21.7       06-12    145  |  112<H>  |  101<HH>  ----------------------------<  138<H>  4.7   |  20  |  4.8<HH>    Ca    7.4<L>      12 Jun 2023 05:00  Phos  5.8     06-12  Mg     3.2     06-12    TPro  4.4<L>  /  Alb  2.4<L>  /  TBili  0.5  /  DBili  x   /  AST  24  /  ALT  24  /  AlkPhos  55  06-12      MEDICATIONS  (STANDING):  acetylcysteine 10%  Inhalation 4 milliLiter(s) Inhalation every 4 hours  chlorhexidine 0.12% Liquid 15 milliLiter(s) Oral Mucosa every 12 hours  chlorhexidine 2% Cloths 1 Application(s) Topical <User Schedule>  cisatracurium Infusion 3 MICROgram(s)/kG/Min (22.9 mL/Hr) IV Continuous <Continuous>  CRRT Treatment    <Continuous>  dextrose 5%. 1000 milliLiter(s) (50 mL/Hr) IV Continuous <Continuous>  dextrose 5%. 1000 milliLiter(s) (100 mL/Hr) IV Continuous <Continuous>  dextrose 50% Injectable 25 Gram(s) IV Push once  dextrose 50% Injectable 12.5 Gram(s) IV Push once  dextrose 50% Injectable 25 Gram(s) IV Push once  doxazosin 2 milliGRAM(s) Oral at bedtime  ergocalciferol 77976 Unit(s) Oral every week  fentaNYL   Infusion. 0.5 MICROgram(s)/kG/Hr (6.36 mL/Hr) IV Continuous <Continuous>  glucagon  Injectable 1 milliGRAM(s) IntraMuscular once  insulin lispro (ADMELOG) corrective regimen sliding scale   SubCutaneous every 6 hours  insulin NPH human recombinant 6 Unit(s) SubCutaneous every 6 hours  lacosamide IVPB 150 milliGRAM(s) IV Intermittent every 12 hours  norepinephrine Infusion 0.05 MICROgram(s)/kG/Min (5.96 mL/Hr) IV Continuous <Continuous>  pantoprazole  Injectable 40 milliGRAM(s) IV Push daily  piperacillin/tazobactam IVPB.. 4.5 Gram(s) IV Intermittent every 8 hours  polyethylene glycol 3350 17 Gram(s) Oral two times a day  propofol Infusion 20 MICROgram(s)/kG/Min (15.3 mL/Hr) IV Continuous <Continuous>  PureFlow Dialysate RFP-400 (K 2 / Ca 3) 5000 milliLiter(s) (2000 mL/Hr) CRRT <Continuous>  senna 2 Tablet(s) Oral at bedtime    MEDICATIONS  (PRN):  acetaminophen     Tablet .. 650 milliGRAM(s) Oral every 6 hours PRN Temp greater or equal to 38C (100.4F), Mild Pain (1 - 3)  dextrose Oral Gel 15 Gram(s) Oral once PRN Blood Glucose LESS THAN 70 milliGRAM(s)/deciliter  ondansetron Injectable 4 milliGRAM(s) IV Push every 6 hours PRN Nausea and/or Vomiting            VEEG in the last 24 hours:    Background----------------continues, reactive, reaching 4-5 hz  with brief few seconds af attenuation expressed  diffusely with shifting asymmetry     Focal and generalized slowing-----1- moderate generalized slowing. 2- bilateral independent focal slowing     Interictal activity-------------small number of bilateral independent left> right sharp waves    Events----------------none    Seizures----- none    Impression: abnormal as above    Plan - as/NCC team

## 2023-06-12 NOTE — PROGRESS NOTE ADULT - ASSESSMENT
IMP:73 y/o M with hx of CVA, DM ,HTN, Afib on eliquis, CHF, Gout presents as [place] Code/Code ICH with NIH 17, GCS11, ICH 1 with Left thalamic hemorrhage    sICH/IVH  hypertensive emergency   acute hypoxemic respiratory   Desi on CKD   encephalopathy  R humerus fracture      Plan: Admit to Neuro ICU  Neuro checks q 4  RASS -4 to -5 on prop and fent gtt  cth stable on 6/10  MRI when stable  vEEG with significant sharp wave burden, vimpat increased on 6/9 to 150 mg q 12; if eeg stable will give scalp rest today   Consult IR for angio - R/O microaneurysm  as source of bleed once Cr stable           Stroke labs - ICH score   multifactorial encephalopathy- neurologic, metabolic/uremic--> plan for cvvhd  bedrest    CV: htn, hx of afib on DOAC s/p reversal  labetalol and hydralazine initiated; cardene gtt prn, MAP>65, sbp <150  on multiple antihypertensive at home- clonidine, labetalol, hydralazine, norvasc           afib- off doac s/p reversal for ICH; intermittent RVR, IV lopressor PRN            Echo-             Daily weights    Pulmonary:- AHRF due to aspiration pneumonitis  lung protective vent support  sedate for lung compliance given significant vent requirement, no SAT/SBT right now  agree pulmonary toileting- nebulizers/mucomyst q 4, chest PT q2  HOB >45, aspiration precautions  Sleep Apnea on CPAP at home  - Follow up with Pulmonary      Humidify 2L NC sating 94%- wean O2       CPAP - - 5-10 - 10 Pm to 6 am- 30 %      GI: EMILY feeding tube in place  glucerna 1.2 adjusted goal is 45 given propofol gtt dosage        gi ppx-       Dysphagia screening- fail   Bowel regimen- senna  LBM- 6/10    Renal: DESI/ CRI - baseline  Cr 3.0               worsening uremia, with worsening AMS--> plan for CVVHD  renal following   IVL  bumex IV x1 and monitor response  khan in place              Daily weights monitor stricy I's and O's               Endocrine: Fs q 6H                          ISS q 6H- sglu - 140 -180       Start NPH q6h if starting feeds                         Stroke Core measures as above   a1c 5.9    Heme- remote hx of DVT with prior L IPH                Hold Eliquis                 Reversal of Eliquis with Andexa low dose 400U bolus and Gtt- 480U                Monitor Fever curve and WBC                Doppler B/L LE 6/8 no dvt  DVT PPx- SCD, heparin SQ  Renal deficiency associated anemia; no sequelae of bleed; will monitor     ID:  sepsis due to aspiration pna  f/u culture data  MRSA negative   c/w IV zosyn  CT chest with RLL PNA    ortho- XRAY R Shoulder- Redemonstrated is an acute surgical neck fracture of the proximal humerus with displacement and mild impaction; conservative management per ortho     khan/a-line 6/6  LIJ CVC 6/10  ETT 6/10    dispo- nsicu    family updated at bedisde  IMP:73 y/o M with hx of CVA, DM ,HTN, Afib on eliquis, CHF, Gout presents as [place] Code/Code ICH with NIH 17, GCS11, ICH 1 with Left thalamic hemorrhage    sICH/IVH  hypertensive emergency   acute hypoxemic respiratory   DESI on CKD   encephalopathy  R humerus fracture      Plan:   Neuro checks q 4  RASS -4 to -5 on prop and fent gtt  cth stable on 6/10  MRI when stable  vEEG with significant sharp wave burden, vimpat increased on 6/9 to 150 mg q 12; if eeg stable will give scalp rest today   Consult IR for angio - R/O microaneurysm  as source of bleed once Cr stable           Stroke labs - ICH score   multifactorial encephalopathy- neurologic, metabolic/uremic--> started cvvhd  bedrest    CV: htn, hx of afib on DOAC s/p reversal  labetalol and hydralazine initiated; cardene gtt prn, MAP>65, sbp <150  on multiple antihypertensive at home- clonidine, labetalol, hydralazine, norvasc           afib- off doac s/p reversal for ICH; intermittent RVR, IV lopressor PRN            Echo-             Daily weights    Pulmonary:- AHRF due to aspiration pneumonitis  lung protective vent support  sedate for lung compliance given significant vent requirement, no SAT/SBT right now  agree pulmonary toileting- nebulizers/mucomyst q 4, chest PT q2  HOB >45, aspiration precautions  Sleep Apnea on CPAP at home  - Follow up with Pulmonary      Humidify 2L NC sating 94%- wean O2       CPAP - - 5-10 - 10 Pm to 6 am- 30 %      GI: EMILY feeding tube in place  glucerna 1.2 adjusted goal is 45 given propofol gtt dosage        gi ppx-       Dysphagia screening- fail   Bowel regimen- senna  LBM- 6/10    Renal: DESI/ CRI - baseline  Cr 3.0               worsening uremia, with worsening AMS--> plan for CVVHD  renal following   IVL  bumex IV x1 and monitor response  khan in place              Daily weights monitor stricy I's and O's               Endocrine: Fs q 6H                          ISS q 6H- sglu - 140 -180       Start NPH q6h if starting feeds                         Stroke Core measures as above   a1c 5.9    Heme- remote hx of DVT with prior L IPH                Hold Eliquis                 Reversal of Eliquis with Andexa low dose 400U bolus and Gtt- 480U                Monitor Fever curve and WBC                Doppler B/L LE 6/8 no dvt  DVT PPx- SCD, heparin SQ  Renal deficiency associated anemia; no sequelae of bleed; will monitor     ID:  sepsis due to aspiration pna  f/u culture data  MRSA negative   c/w IV zosyn  CT chest with RLL PNA    ortho- XRAY R Shoulder- Redemonstrated is an acute surgical neck fracture of the proximal humerus with displacement and mild impaction; conservative management per ortho     khan/a-line 6/6  LIJ CVC 6/10  ETT 6/10    dispo- nsicu    family updated at bedisde  IMP:75 y/o M with hx of CVA, DM ,HTN, Afib on eliquis, CHF, Gout presents as [place] Code/Code ICH with NIH 17, GCS11, ICH 1 with Left thalamic hemorrhage    sICH/IVH  hypertensive emergency   acute hypoxemic respiratory   DESI on CKD   encephalopathy  R humerus fracture      Plan:   Neuro checks q 4  BIS 40 to 60 while on Nimbex on prop and fent gtt  cth stable on 6/10  MRI when stable  vEEG with significant sharp wave burden, vimpat increased on 6/9 to 150 mg q 12; if eeg stable will give scalp rest today   Consult IR for angio - R/O microaneurysm  as source of bleed once Cr stable           Stroke labs - ICH score   multifactorial encephalopathy- neurologic, metabolic/uremic--> started cvvhd  bedrest    CV: htn, hx of afib on DOAC s/p reversal  On Levo, MAP>65, sbp <150  on multiple antihypertensive at home- currently holding           afib- off doac s/p reversal for ICH; intermittent RVR, IV lopressor PRN            Echo-             Daily weights    Pulmonary:- AHRF due to aspiration pneumonitis  lung protective vent support  sedated, on Nimbex for lung compliance given significant vent requirement, no SAT/SBT right now  agree pulmonary toileting- nebulizers/mucomyst q 4, chest PT q2  HOB >45, aspiration precautions  Sleep Apnea on CPAP at home  - Follow up with Pulmonary    GI: EMILY feeding tube in place  glucerna 1.2 adjusted goal is 45 given propofol gtt dosage        gi ppx-       Dysphagia screening- fail   Bowel regimen- senna  LBM- 6/10    Renal: DESI/ CRI - baseline  Cr 3.0               worsening uremia, with worsening AMS--> on CVVHD  renal following   IVL  khan in place              Daily weights monitor stricy I's and O's               Endocrine: Fs q 6H                          ISS q 6H- sglu - 140 -180                        NPH 6u q6h                          Stroke Core measures as above   a1c 5.9    Heme- remote hx of DVT with prior L IPH                Hold Eliquis                 Reversal of Eliquis with Andexa low dose 400U bolus and Gtt- 480U                Monitor Fever curve and WBC                Doppler B/L LE 6/8 no dvt  DVT PPx- SCD, heparin SQ  Renal deficiency associated anemia; no sequelae of bleed; will monitor     ID:  sepsis due to aspiration pna  f/u culture data  MRSA negative   c/w IV zosyn  CT chest with RLL PNA    ortho- XRAY R Shoulder- Redemonstrated is an acute surgical neck fracture of the proximal humerus with displacement and mild impaction; conservative management per ortho     khan/a-line 6/6  LIJ CVC 6/10  ETT 6/10    dispo- nsicu    family updated at [facility]  IMP:73 y/o M with hx of CVA, DM ,HTN, Afib on eliquis, CHF, Gout presents as [place] Code/Code ICH with NIH 17, GCS11, ICH 1 with Left thalamic hemorrhage    sICH/IVH  hypertensive emergency   acute hypoxemic respiratory   DESI on CKD   encephalopathy  R humerus fracture      Plan:   Neuro coma checks q   Pupillometer checks q1   BIS 40 to 60 while on Nimbex on prop and fent gtt  CTH, CT C/A/P non contrast today if able   MRI when stable  vEEG with significant sharp wave burden, vimpat increased on 6/9 to 150 mg q 12; if eeg stable will give scalp rest today   Consult IR for angio - R/O microaneurysm  as source of bleed once Cr stable           Stroke labs - ICH score   multifactorial encephalopathy- neurologic, metabolic/uremic--> started cvvhd  bedrest    CV: htn, hx of afib on DOAC s/p reversal  On Levo, MAP>65, sbp <150  on multiple antihypertensive at home- currently holding due to pressor requirements           afib- off doac s/p reversal for ICH; intermittent RVR, IV lopressor PRN            Echo- Noted, LV EF 55-60%, grade I diastolic dysfuntion            Daily weights  Check EKG today    Pulmonary:- AHRF due to aspiration pneumonitis  lung protective vent support  sedated, on Nimbex for lung compliance given significant vent requirement, no SAT/SBT right now  agree pulmonary toileting- nebulizers/mucomyst q 4 prn, chest PT q2  HOB >45, aspiration precautions  Sleep Apnea on CPAP at home  - Follow up with Pulmonary    GI: EMILY feeding tube in place  Change TF to concentrated Nephro. Nutrition consult for recommendations        gi ppx-       Dysphagia screening- fail   Bowel regimen- senna, add miralax  LBM- 6/10    Renal: DESI/ CRI - baseline  Cr 3.0               worsening uremia, with worsening AMS--> on CVVHD  F/U Renal regarding fluid removal 125cc/hr  IVL  DC khan               Daily weights monitor stricy I's and O's               Endocrine: Fs q 6H                          ISS q 6H- sglu - 140 -180                        NPH 6u q6h                          Stroke Core measures as above   a1c 5.9    Heme- remote hx of DVT with prior L IPH                Hold Eliquis                 Reversal of Eliquis with Andexa low dose 400U bolus and Gtt- 480U                Monitor Fever curve and WBC                Doppler B/L LE 6/8 no dvt  DVT PPx- SCD, DC Heparin  Renal deficiency associated anemia; Anemia workup pending. Repeat CBC today and transfuse if Hgb < 7.5     ID:  sepsis due to aspiration pna  f/u culture data. NGTD so far  Check Procalcitonin  MRSA negative   c/w IV zosyn  CT chest with RLL PNA    ortho- XRAY R Shoulder- Redemonstrated is an acute surgical neck fracture of the proximal humerus with displacement and mild impaction; conservative management per ortho   Check Lipids      khan/a-line 6/6  LIJ CVC 6/10  ETT 6/10    dispo- nsicu    Case D/W Dr. Vernon    family updated at [facility]

## 2023-06-12 NOTE — CHART NOTE - NSCHARTNOTEFT_GEN_A_CORE
Registered Dietitian Follow-Up     Patient Profile Reviewed                           Yes [x]   No []     Nutrition History Previously Obtained        Yes []  No [x]        Pertinent Medical Interventions: Intubated for lethargy on 6/10. Started on CVVHD. Pt continues to be managed for sICH/IVH, hypertensive emergency, acute hypoxemic respiratory, DESI on CKD, encephalopathy, R humerus fracture.      Diet order: Diet, NPO with Tube Feed:   Tube Feeding Modality: Nasogastric  Glucerna 1.2 Titi  Total Volume for 24 Hours (mL): 1080  Continuous  Starting Tube Feed Rate {mL per Hour}: 20  Increase Tube Feed Rate by (mL): 10     Every 4 hours  Until Goal Tube Feed Rate (mL per Hour): 45  Tube Feed Duration (in Hours): 24  Tube Feed Start Time: 10:27 (23 @ 08:43) [Active]      Anthropometrics:  Height (cm): 177.8 (23 @ 14:03)  Weight (kg): 127.1 (23 @ 04:00)  BMI (kg/m2): 40.2 (23 @ 14:03)  IBW:   UBW:     Daily Weight in k.3 (), Weight in k.4 (), Weight in k.2 (06-10), Weight in k.7 (), Weight in k.6 (), Weight in k.1 ()  % Weight Change    MEDICATIONS  (STANDING):  acetylcysteine 10%  Inhalation 4 milliLiter(s) Inhalation every 4 hours  chlorhexidine 0.12% Liquid 15 milliLiter(s) Oral Mucosa every 12 hours  chlorhexidine 2% Cloths 1 Application(s) Topical <User Schedule>  cisatracurium Infusion 3 MICROgram(s)/kG/Min (22.9 mL/Hr) IV Continuous <Continuous>  CRRT Treatment    <Continuous>  dextrose 5%. 1000 milliLiter(s) (50 mL/Hr) IV Continuous <Continuous>  dextrose 5%. 1000 milliLiter(s) (100 mL/Hr) IV Continuous <Continuous>  dextrose 50% Injectable 25 Gram(s) IV Push once  dextrose 50% Injectable 12.5 Gram(s) IV Push once  dextrose 50% Injectable 25 Gram(s) IV Push once  doxazosin 2 milliGRAM(s) Oral at bedtime  ergocalciferol 61727 Unit(s) Oral every week  fentaNYL   Infusion. 0.5 MICROgram(s)/kG/Hr (6.36 mL/Hr) IV Continuous <Continuous>  glucagon  Injectable 1 milliGRAM(s) IntraMuscular once  insulin lispro (ADMELOG) corrective regimen sliding scale   SubCutaneous every 6 hours  insulin NPH human recombinant 6 Unit(s) SubCutaneous every 6 hours  lacosamide IVPB 150 milliGRAM(s) IV Intermittent every 12 hours  norepinephrine Infusion 0.05 MICROgram(s)/kG/Min (5.96 mL/Hr) IV Continuous <Continuous>  pantoprazole  Injectable 40 milliGRAM(s) IV Push daily  piperacillin/tazobactam IVPB.. 4.5 Gram(s) IV Intermittent every 8 hours  polyethylene glycol 3350 17 Gram(s) Oral two times a day  propofol Infusion 20 MICROgram(s)/kG/Min (15.3 mL/Hr) IV Continuous <Continuous>  PureFlow Dialysate RFP-400 (K 2 / Ca 3) 5000 milliLiter(s) (2000 mL/Hr) CRRT <Continuous>  senna 2 Tablet(s) Oral at bedtime    MEDICATIONS  (PRN):  acetaminophen     Tablet .. 650 milliGRAM(s) Oral every 6 hours PRN Temp greater or equal to 38C (100.4F), Mild Pain (1 - 3)  dextrose Oral Gel 15 Gram(s) Oral once PRN Blood Glucose LESS THAN 70 milliGRAM(s)/deciliter  ondansetron Injectable 4 milliGRAM(s) IV Push every 6 hours PRN Nausea and/or Vomiting    Pertinent Labs:  @ 05:00: Na 145, <HH>, Cr 4.8<HH>, <H>, K+ 4.7, Phos 5.8<H>, Mg 3.2<HH>, Alk Phos 55, ALT/SGPT 24, AST/SGOT 24, HbA1c --    Finger Sticks:  POCT Blood Glucose.: 188 mg/dL ( @ 10:56)  POCT Blood Glucose.: 147 mg/dL ( @ 05:54)  POCT Blood Glucose.: 173 mg/dL ( @ 00:46)  POCT Blood Glucose.: 153 mg/dL ( @ 17:19)  POCT Blood Glucose.: 214 mg/dL ( @ 12:03)    Physical Findings:  - Appearance:  - GI function:  - Tubes:  - Oral/Mouth cavity:  - Skin:  - Edema:     Nutrition Requirements:  Weight Used:     Estimated Energy Needs    Continue []  Adjust []  Energy Recommendations:   kcal/day -     Estimated Protein Needs    Continue []  Adjust []  Protein Recommendations:   gm/day -      Estimated Fluid Needs        Continue []  Adjust []  Fluid Recommendations:   mL/day -      Nutrient Intake:    [x] Previous Nutrition Diagnosis:            [] Ongoing          [] Resolved    [] No active nutrition diagnosis identified at this time     Nutrition Education:      Goal/Expected Outcome:      Indicator/Monitoring: Monitor diet order, kcal intake, body composition, NFPE, labs  (lytes, BG, renal indices)    Recommendation: Registered Dietitian Follow-Up     Patient Profile Reviewed                           Yes [x]   No []     Nutrition History Previously Obtained        Yes []  No [x]        Pertinent Medical Interventions: Intubated for lethargy on 6/10. Started on CVVHD. Pt continues to be managed for sICH/IVH, hypertensive emergency, acute hypoxemic respiratory, DESI on CKD, encephalopathy, R humerus fracture. TF to be switched to more concentrated formula Nepro per attending in rounds.      Diet order: Diet, NPO with Tube Feed:   Tube Feeding Modality: Nasogastric  Glucerna 1.2 Titi  Total Volume for 24 Hours (mL): 1080  Continuous  Starting Tube Feed Rate {mL per Hour}: 20  Increase Tube Feed Rate by (mL): 10     Every 4 hours  Until Goal Tube Feed Rate (mL per Hour): 45  Tube Feed Duration (in Hours): 24  Tube Feed Start Time: 10:27 (23 @ 08:43) [Active]    Anthropometrics:  Height (cm): 177.8 (23 @ 14:03)  Weight (kg): 127.1 (23 @ 04:00)  BMI (kg/m2): 40.2 (23 @ 14:03)  IBW: 75.4 kg  UBW: 128.2 kg    Daily Weight in k.3 (), Weight in k.4 (), Weight in k.2 (06-10), Weight in k.7 (), Weight in k.6 (), Weight in k.1 ()  Weight Change: lowest weight 124.7 kg recorded on  indicates 2.4kg (2%) weight loss over 3 days after 3days being admitted; will continue to monitor; currently trending up likely due to fluid retention    MEDICATIONS  (STANDING):  acetylcysteine 10%  Inhalation 4 milliLiter(s) Inhalation every 4 hours  chlorhexidine 0.12% Liquid 15 milliLiter(s) Oral Mucosa every 12 hours  chlorhexidine 2% Cloths 1 Application(s) Topical <User Schedule>  cisatracurium Infusion 3 MICROgram(s)/kG/Min (22.9 mL/Hr) IV Continuous <Continuous>  CRRT Treatment    <Continuous>  dextrose 5%. 1000 milliLiter(s) (50 mL/Hr) IV Continuous <Continuous>  dextrose 5%. 1000 milliLiter(s) (100 mL/Hr) IV Continuous <Continuous>  dextrose 50% Injectable 25 Gram(s) IV Push once  dextrose 50% Injectable 12.5 Gram(s) IV Push once  dextrose 50% Injectable 25 Gram(s) IV Push once  doxazosin 2 milliGRAM(s) Oral at bedtime  ergocalciferol 30084 Unit(s) Oral every week  fentaNYL   Infusion. 0.5 MICROgram(s)/kG/Hr (6.36 mL/Hr) IV Continuous <Continuous>  glucagon  Injectable 1 milliGRAM(s) IntraMuscular once  insulin lispro (ADMELOG) corrective regimen sliding scale   SubCutaneous every 6 hours  insulin NPH human recombinant 6 Unit(s) SubCutaneous every 6 hours  lacosamide IVPB 150 milliGRAM(s) IV Intermittent every 12 hours  norepinephrine Infusion 0.05 MICROgram(s)/kG/Min (5.96 mL/Hr) IV Continuous <Continuous>  pantoprazole  Injectable 40 milliGRAM(s) IV Push daily  piperacillin/tazobactam IVPB.. 4.5 Gram(s) IV Intermittent every 8 hours  polyethylene glycol 3350 17 Gram(s) Oral two times a day  propofol Infusion 20 MICROgram(s)/kG/Min IV Continuous <Continuous>  -- 22.9ml/kg = 605kcal/day   PureFlow Dialysate RFP-400 (K 2 / Ca 3) 5000 milliLiter(s) (2000 mL/Hr) CRRT <Continuous>  senna 2 Tablet(s) Oral at bedtime    MEDICATIONS  (PRN):  acetaminophen     Tablet .. 650 milliGRAM(s) Oral every 6 hours PRN Temp greater or equal to 38C (100.4F), Mild Pain (1 - 3)  dextrose Oral Gel 15 Gram(s) Oral once PRN Blood Glucose LESS THAN 70 milliGRAM(s)/deciliter  ondansetron Injectable 4 milliGRAM(s) IV Push every 6 hours PRN Nausea and/or Vomiting    Pertinent Labs:                         6.8    14.88 )-----------( 203      ( 2023 06:15 )             21.7     06-12 @ 05:00: Na 145, <HH>, Cr 4.8<HH>, <H>, K+ 4.7, Phos 5.8<H>, Mg 3.2<HH>, Alk Phos 55, ALT/SGPT 24, AST/SGOT 24, HbA1c --    Finger Sticks:  POCT Blood Glucose.: 188 mg/dL ( @ 10:56)  POCT Blood Glucose.: 147 mg/dL ( @ 05:54)  POCT Blood Glucose.: 173 mg/dL ( @ 00:46)  POCT Blood Glucose.: 153 mg/dL ( @ 17:19)  POCT Blood Glucose.: 214 mg/dL ( @ 12:03)    Physical Findings:  - Appearance: paralyzed on paralytics   - GI function: abdomen obese; last bowel movement 6/10  - Tubes: NG tube   - Oral/Mouth cavity: NPO  - Skin: intact  - Edema: 1+ generalized edema      I&O's Detail  2023 07:01  -  2023 07:00  --------------------------------------------------------  IN:    Enteral Tube Flush: 240 mL    FentaNYL: 605.2 mL    Glucerna: 930 mL    IV PiggyBack: 400 mL    NiCARdipine: 652.5 mL    Norepinephrine: 39.6 mL    Propofol: 732 mL  Total IN: 3599.3 mL    OUT:    Indwelling Catheter - Urethral (mL): 995 mL    Other (mL): 960 mL  Total OUT: 1955 mL    Total NET: 1644.3 mL    Nutrition Requirements:  Weight Used: IBW 75.4 kg; actual weight 124.7 kg     Estimated Energy Needs    Continue []  Adjust []  Energy Recommendations: 829-1056 kcal/day - 11-14kcal/kg IBW    Estimated Protein Needs    Continue []  Adjust []  Protein Recommendations: 151-189 gm/day - 2-2.5g/kg IBW, ASPEN BMI>30, CVVHD     Estimated Fluid Needs        Continue []  Adjust []  Fluid Recommendations: 2494 mL/day - 20ml/kg actual weight, or as needed to maintain labs and hemodynamics      Nutrient Intake: Note propofol also contributing calories.     [x] Previous Nutrition Diagnosis: Inadequate Energy Intake            [x] Ongoing          [] Resolved     Nutrition Education:      Goal/Expected Outcome: Pt to meet >90% & <105% estimated needs via EN in 3-5 days. RD to follow as per high risk protocol.     Indicator/Monitoring: Monitor diet order, kcal intake, body composition, NFPE, labs  (lytes, BG, renal indices)    Recommendation: Given BMI, pt will benefit from high-protein hypocaloric feeding to preserve lean body mass, mobilize adipose stores, and minimize the metabolic complications of overfeeding. Suggest Vital HP formula with low %fat to provide more balanced nutrition breakdown while pt on IV fat emusion from propofol    - Vital High Protein formula, keep rate @10ml/hr. Add No Carb Prosource (1pkg = 15gms Protein) 2pkt Q6H (8 pkts/day in total). -- will provide 720kcal, 141g protein, 194ml free water. +kcal from propofol 605kcal/day at current infusion rate.   -- if Nepro is still preferred-> 10ml/hr + No Carb Prosource same as above. -- would provide 912kcal, 139g protein, 175ml free water.   - order multivitamin (with NO minerals); or better, renal vitamins such as Nephrovite--may need to complete non-formulary med form.       Patient assessed at high nutrition risk.  RD spectra [phone], also available on Teams.

## 2023-06-12 NOTE — PROGRESS NOTE ADULT - SUBJECTIVE AND OBJECTIVE BOX
SUMMARY: This is a 62 y/o M with hx of Afib on eiquis, CVA BG bleed ,HTN, CHF, Gout, DM  presents as a stroke code after being down in bathroom unknown time LKW was 2300. 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 Pt is lethargic  - Cardene started for blood pressure control.    OVERNIGHT EVENTS: Intubated for hypoxic respiratory failure, FiO2 decreases to 80%, MV adjusted for ABG, urine output decreasing s/p 500 cc bolus      6/10- patient became increasingly lethargic in afternoon/early evening, febrile, hfnc maximized and patient intubated for hypoxemic respiratory failure with VL, sedated overnight on prop and fent, renal function worsening, pt on broad spectrum IV abx and fully cultured for sepsis due to aspiration pneumonia     6/11: Patient required exchange of [place], remains on and tolerating CVVH    ADMISSION SCORES:   GCS 12   ICH 1   NIH 17    REVIEW OF SYSTEMS: Pt unable to participate in ROS due to neurological status    VITALS: [x]       General: morbidly obese; ill appearing  HENT: nc/at, orally intubated  Eyes: Anicteric sclerae  Cardiac: [id number]  Lungs: b/l rhonci   Abdomen: Soft, non-tender, +BS  Extremities: R shoulder - tenderness and crepitance / swelling R shoulder w/ overlying ecchymosis- distal pulses RUE intact, good capillary refill; anasarca  Skin/Incision Site: Clean, dry and intact  Neurologic: sedated, midline pupils, sluggish, BS-reflexes present, flaccid on all four                ICU Vital Signs Last 24 Hrs  T(C): 37 (11 Jun 2023 08:00), Max: 37.9 (10 Alan 2023 12:00)  T(F): 98.6 (11 Jun 2023 08:00), Max: 100.2 (10 Alan 2023 12:00)  HR: 61 (11 Jun 2023 08:00) (57 - 141)  BP: 154/84 (10 Alan 2023 18:30) (109/54 - 209/143)  BP(mean): 109 (10 Alan 2023 18:30) (74 - 161)  ABP: 128/47 (11 Jun 2023 08:15) (123/79 - 186/47)  ABP(mean): 66 (11 Jun 2023 08:15) (65 - 105)  RR: 26 (11 Jun 2023 08:00) (16 - 52)  SpO2: 100% (11 Jun 2023 08:00) (90% - 100%)      06-10-23 @ 07:01  -  06-11-23 @ 07:00  --------------------------------------------------------  IN: 2929.5 mL / OUT: 1560 mL / NET: 1369.5 mL    06-11-23 @ 07:01  -  06-11-23 @ 08:41  --------------------------------------------------------  IN: 241 mL / OUT: 0 mL / NET: 241 mL        Mode: AC/ CMV (Assist Control/ Continuous Mandatory Ventilation), RR (machine): 18, TV (machine): 450, FiO2: 80, PEEP: 8, ITime: 1, MAP: 12, PIP: 21    acetaminophen     Tablet .. 650 milliGRAM(s) Oral every 6 hours PRN  acetylcysteine 10%  Inhalation 4 milliLiter(s) Inhalation every 6 hours  albuterol    90 MICROgram(s) HFA Inhaler 4 Puff(s) Inhalation every 6 hours  buMETAnide Injectable 2 milliGRAM(s) IV Push once  chlorhexidine 0.12% Liquid 15 milliLiter(s) Oral Mucosa every 12 hours  chlorhexidine 2% Cloths 1 Application(s) Topical <User Schedule>  dextrose 5%. 1000 milliLiter(s) (100 mL/Hr) IV Continuous <Continuous>  dextrose 5%. 1000 milliLiter(s) (50 mL/Hr) IV Continuous <Continuous>  dextrose 50% Injectable 25 Gram(s) IV Push once  dextrose 50% Injectable 25 Gram(s) IV Push once  dextrose 50% Injectable 12.5 Gram(s) IV Push once  dextrose Oral Gel 15 Gram(s) Oral once PRN  doxazosin 2 milliGRAM(s) Oral at bedtime  ergocalciferol 07233 Unit(s) Oral every week  fentaNYL   Infusion. 0.5 MICROgram(s)/kG/Hr (6.36 mL/Hr) IV Continuous <Continuous>  glucagon  Injectable 1 milliGRAM(s) IntraMuscular once  heparin   Injectable 7500 Unit(s) SubCutaneous every 8 hours  hydrALAZINE 50 milliGRAM(s) Oral <User Schedule>  hydrALAZINE Injectable 10 milliGRAM(s) IV Push every 6 hours PRN  insulin lispro (ADMELOG) corrective regimen sliding scale   SubCutaneous every 6 hours  insulin NPH human recombinant 3 Unit(s) SubCutaneous every 6 hours  labetalol 200 milliGRAM(s) Oral every 8 hours  labetalol Injectable 10 milliGRAM(s) IV Push every 2 hours PRN  lacosamide IVPB 150 milliGRAM(s) IV Intermittent every 12 hours  niCARdipine Infusion 5 mG/Hr (25 mL/Hr) IV Continuous <Continuous>  ondansetron Injectable 4 milliGRAM(s) IV Push every 6 hours PRN  piperacillin/tazobactam IVPB.. 3.375 Gram(s) IV Intermittent every 8 hours  propofol Infusion 20 MICROgram(s)/kG/Min (15.3 mL/Hr) IV Continuous <Continuous>  senna 2 Tablet(s) Oral at bedtime PRN      LABS:  Na: 150 (06-11 @ 05:14), 151 (06-10 @ 14:45), 148 (06-10 @ 04:40), 148 (06-09 @ 16:22), 146 (06-09 @ 05:42)  K: 4.4 (06-11 @ 05:14), 4.5 (06-10 @ 14:45), 4.5 (06-10 @ 04:40), 4.7 (06-09 @ 16:22), 4.4 (06-09 @ 05:42)  Cl: 119 (06-11 @ 05:14), 116 (06-10 @ 14:45), 114 (06-10 @ 04:40), 115 (06-09 @ 16:22), 112 (06-09 @ 05:42)  CO2: 21 (06-11 @ 05:14), 20 (06-10 @ 14:45), 20 (06-10 @ 04:40), 21 (06-09 @ 16:22), 19 (06-09 @ 05:42)  BUN: 115 (06-11 @ 05:14), 110 (06-10 @ 14:45), 108 (06-10 @ 04:40), 106 (06-09 @ 16:22), 106 (06-09 @ 05:42)  Cr: 5.1 (06-11 @ 05:14), 5.1 (06-10 @ 14:45), 5.1 (06-10 @ 04:40), 5.3 (06-09 @ 16:22), 5.5 (06-09 @ 05:42)  Glu: 191(06-11 @ 05:14), 146(06-10 @ 14:45), 174(06-10 @ 04:40), 179(06-09 @ 16:22), 183(06-09 @ 05:42)    Hgb: 7.5 (06-11 @ 05:14), 8.4 (06-10 @ 14:45), 8.6 (06-10 @ 04:40), 8.4 (06-09 @ 05:42), 9.5 (06-08 @ 15:55)  Hct: 24.3 (06-11 @ 05:14), 26.1 (06-10 @ 14:45), 26.9 (06-10 @ 04:40), 26.6 (06-09 @ 05:42), 29.3 (06-08 @ 15:55)  WBC: 10.21 (06-11 @ 05:14), 8.95 (06-10 @ 14:45), 9.57 (06-10 @ 04:40), 10.75 (06-09 @ 05:42), 11.84 (06-08 @ 15:55)  Plt: 206 (06-11 @ 05:14), 240 (06-10 @ 14:45), 212 (06-10 @ 04:40), 192 (06-09 @ 05:42), 219 (06-08 @ 15:55)    INR: 1.00 06-09-23 @ 05:42  PTT: 26.8 06-09-23 @ 05:42          LIVER FUNCTIONS - ( 11 Jun 2023 05:14 )  Alb: 2.6 g/dL / Pro: 4.6 g/dL / ALK PHOS: 46 U/L / ALT: 21 U/L / AST: 15 U/L / GGT: x           ABG - ( 11 Jun 2023 03:38 )  pH, Arterial: 7.36  pH, Blood: x     /  pCO2: 38    /  pO2: 161   / HCO3: 22    / Base Excess: -3.6  /  SaO2: 99.1                           SUMMARY: This is a 64 y/o M with hx of Afib on eiquis, CVA BG bleed ,HTN, CHF, Gout, DM  presents as a stroke code after being down in bathroom unknown time LKW was 2300. 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 Pt is lethargic  - Cardene started for blood pressure control.    6/10- patient became increasingly lethargic in afternoon/early evening, febrile, hfnc maximized and patient intubated for hypoxemic respiratory failure with VL, sedated overnight on prop and fent, renal function worsening, pt on broad spectrum IV abx and fully cultured for sepsis due to aspiration pneumonia     OVERNIGHT EVENTS: Patient required exchange of [place], remains on and tolerating CVVH, patient increasingly more hypoxemic on 80%/8, PEEP increased to 10, Nimbex IVP given, started on a drip.     ADMISSION SCORES:   GCS 12   ICH 1   NIH 17    REVIEW OF SYSTEMS: Pt unable to participate in ROS due to neurological status    VITALS: [x]       General: morbidly obese; ill appearing  HENT: nc/at, orally intubated  Eyes: Anicteric sclerae  Cardiac: [id number]  Lungs: b/l rhonci   Abdomen: Soft, non-tender, +BS  Extremities: R shoulder - tenderness and crepitance / swelling R shoulder w/ overlying ecchymosis- distal pulses RUE intact, good capillary refill; anasarca  Skin/Incision Site: Clean, dry and intact  Neurologic: sedated, midline pupils, sluggish, BS-reflexes present, flaccid on all four                ICU Vital Signs Last 24 Hrs  T(C): 37 (11 Jun 2023 08:00), Max: 37.9 (10 Alan 2023 12:00)  T(F): 98.6 (11 Jun 2023 08:00), Max: 100.2 (10 Alan 2023 12:00)  HR: 61 (11 Jun 2023 08:00) (57 - 141)  BP: 154/84 (10 Alan 2023 18:30) (109/54 - 209/143)  BP(mean): 109 (10 Alan 2023 18:30) (74 - 161)  ABP: 128/47 (11 Jun 2023 08:15) (123/79 - 186/47)  ABP(mean): 66 (11 Jun 2023 08:15) (65 - 105)  RR: 26 (11 Jun 2023 08:00) (16 - 52)  SpO2: 100% (11 Jun 2023 08:00) (90% - 100%)      06-10-23 @ 07:01  -  06-11-23 @ 07:00  --------------------------------------------------------  IN: 2929.5 mL / OUT: 1560 mL / NET: 1369.5 mL    06-11-23 @ 07:01  -  06-11-23 @ 08:41  --------------------------------------------------------  IN: 241 mL / OUT: 0 mL / NET: 241 mL        Mode: AC/ CMV (Assist Control/ Continuous Mandatory Ventilation), RR (machine): 18, TV (machine): 450, FiO2: 80, PEEP: 8, ITime: 1, MAP: 12, PIP: 21    acetaminophen     Tablet .. 650 milliGRAM(s) Oral every 6 hours PRN  acetylcysteine 10%  Inhalation 4 milliLiter(s) Inhalation every 6 hours  albuterol    90 MICROgram(s) HFA Inhaler 4 Puff(s) Inhalation every 6 hours  buMETAnide Injectable 2 milliGRAM(s) IV Push once  chlorhexidine 0.12% Liquid 15 milliLiter(s) Oral Mucosa every 12 hours  chlorhexidine 2% Cloths 1 Application(s) Topical <User Schedule>  dextrose 5%. 1000 milliLiter(s) (100 mL/Hr) IV Continuous <Continuous>  dextrose 5%. 1000 milliLiter(s) (50 mL/Hr) IV Continuous <Continuous>  dextrose 50% Injectable 25 Gram(s) IV Push once  dextrose 50% Injectable 25 Gram(s) IV Push once  dextrose 50% Injectable 12.5 Gram(s) IV Push once  dextrose Oral Gel 15 Gram(s) Oral once PRN  doxazosin 2 milliGRAM(s) Oral at bedtime  ergocalciferol 23584 Unit(s) Oral every week  fentaNYL   Infusion. 0.5 MICROgram(s)/kG/Hr (6.36 mL/Hr) IV Continuous <Continuous>  glucagon  Injectable 1 milliGRAM(s) IntraMuscular once  heparin   Injectable 7500 Unit(s) SubCutaneous every 8 hours  hydrALAZINE 50 milliGRAM(s) Oral <User Schedule>  hydrALAZINE Injectable 10 milliGRAM(s) IV Push every 6 hours PRN  insulin lispro (ADMELOG) corrective regimen sliding scale   SubCutaneous every 6 hours  insulin NPH human recombinant 3 Unit(s) SubCutaneous every 6 hours  labetalol 200 milliGRAM(s) Oral every 8 hours  labetalol Injectable 10 milliGRAM(s) IV Push every 2 hours PRN  lacosamide IVPB 150 milliGRAM(s) IV Intermittent every 12 hours  niCARdipine Infusion 5 mG/Hr (25 mL/Hr) IV Continuous <Continuous>  ondansetron Injectable 4 milliGRAM(s) IV Push every 6 hours PRN  piperacillin/tazobactam IVPB.. 3.375 Gram(s) IV Intermittent every 8 hours  propofol Infusion 20 MICROgram(s)/kG/Min (15.3 mL/Hr) IV Continuous <Continuous>  senna 2 Tablet(s) Oral at bedtime PRN      LABS:  Na: 150 (06-11 @ 05:14), 151 (06-10 @ 14:45), 148 (06-10 @ 04:40), 148 (06-09 @ 16:22), 146 (06-09 @ 05:42)  K: 4.4 (06-11 @ 05:14), 4.5 (06-10 @ 14:45), 4.5 (06-10 @ 04:40), 4.7 (06-09 @ 16:22), 4.4 (06-09 @ 05:42)  Cl: 119 (06-11 @ 05:14), 116 (06-10 @ 14:45), 114 (06-10 @ 04:40), 115 (06-09 @ 16:22), 112 (06-09 @ 05:42)  CO2: 21 (06-11 @ 05:14), 20 (06-10 @ 14:45), 20 (06-10 @ 04:40), 21 (06-09 @ 16:22), 19 (06-09 @ 05:42)  BUN: 115 (06-11 @ 05:14), 110 (06-10 @ 14:45), 108 (06-10 @ 04:40), 106 (06-09 @ 16:22), 106 (06-09 @ 05:42)  Cr: 5.1 (06-11 @ 05:14), 5.1 (06-10 @ 14:45), 5.1 (06-10 @ 04:40), 5.3 (06-09 @ 16:22), 5.5 (06-09 @ 05:42)  Glu: 191(06-11 @ 05:14), 146(06-10 @ 14:45), 174(06-10 @ 04:40), 179(06-09 @ 16:22), 183(06-09 @ 05:42)    Hgb: 7.5 (06-11 @ 05:14), 8.4 (06-10 @ 14:45), 8.6 (06-10 @ 04:40), 8.4 (06-09 @ 05:42), 9.5 (06-08 @ 15:55)  Hct: 24.3 (06-11 @ 05:14), 26.1 (06-10 @ 14:45), 26.9 (06-10 @ 04:40), 26.6 (06-09 @ 05:42), 29.3 (06-08 @ 15:55)  WBC: 10.21 (06-11 @ 05:14), 8.95 (06-10 @ 14:45), 9.57 (06-10 @ 04:40), 10.75 (06-09 @ 05:42), 11.84 (06-08 @ 15:55)  Plt: 206 (06-11 @ 05:14), 240 (06-10 @ 14:45), 212 (06-10 @ 04:40), 192 (06-09 @ 05:42), 219 (06-08 @ 15:55)    INR: 1.00 06-09-23 @ 05:42  PTT: 26.8 06-09-23 @ 05:42          LIVER FUNCTIONS - ( 11 Jun 2023 05:14 )  Alb: 2.6 g/dL / Pro: 4.6 g/dL / ALK PHOS: 46 U/L / ALT: 21 U/L / AST: 15 U/L / GGT: x           ABG - ( 11 Jun 2023 03:38 )  pH, Arterial: 7.36  pH, Blood: x     /  pCO2: 38    /  pO2: 161   / HCO3: 22    / Base Excess: -3.6  /  SaO2: 99.1                           SUMMARY: This is a 64 y/o M with hx of Afib on eiquis, CVA BG bleed ,HTN, CHF, Gout, DM  presents as a stroke code after being down in bathroom unknown time LKW was 2300. 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 Pt is lethargic  - Cardene started for blood pressure control.    6/10- patient became increasingly lethargic in afternoon/early evening, febrile, hfnc maximized and patient intubated for hypoxemic respiratory failure with VL, sedated overnight on prop and fent, renal function worsening, pt on broad spectrum IV abx and fully cultured for sepsis due to aspiration pneumonia     OVERNIGHT EVENTS: Patient required exchange of [place], remains on and tolerating CVVH, patient increasingly more hypoxemic on 80%/8, PEEP increased to 10, Nimbex IVP given, started on a drip.     ADMISSION SCORES:   GCS 12   ICH 1   NIH 17    REVIEW OF SYSTEMS: Pt unable to participate in ROS due to neurological status    VITALS: [x]       General: morbidly obese; ill appearing  HENT: nc/at, orally intubated  Eyes: Anicteric sclerae  Cardiac: [id number]  Lungs: b/l rhonci   Abdomen: Soft, non-tender, +BS  Extremities: R shoulder - tenderness and crepitance / swelling R shoulder w/ overlying ecchymosis- distal pulses RUE intact, good capillary refill; anasarca  Skin/Incision Site: Clean, dry and intact  Neurologic: sedated/paralyzed, midline gaze, pupils constricted, R pupil sluggish, L pupil nonreactive, flaccid on all four          ICU Vital Signs Last 24 Hrs  T(C): 35.8 (12 Jun 2023 08:00), Max: 37.5 (12 Jun 2023 00:00)  T(F): 96.4 (12 Jun 2023 08:00), Max: 99.5 (12 Jun 2023 00:00)  HR: 51 (12 Jun 2023 08:00) (51 - 72)  BP: --  BP(mean): --  ABP: 117/42 (12 Jun 2023 08:00) (101/40 - 169/26)  ABP(mean): 61 (12 Jun 2023 08:00) (52 - 87)  RR: 24 (12 Jun 2023 08:00) (9 - 30)  SpO2: 100% (12 Jun 2023 08:00) (94% - 100%)      06-11-23 @ 07:01  -  06-12-23 @ 07:00  --------------------------------------------------------  IN: 3599.3 mL / OUT: 1955 mL / NET: 1644.3 mL    06-12-23 @ 07:01  -  06-12-23 @ 09:07  --------------------------------------------------------  IN: 128.3 mL / OUT: 248 mL / NET: -119.7 mL        Mode: AC/ CMV (Assist Control/ Continuous Mandatory Ventilation), RR (machine): 24, TV (machine): 450, FiO2: 80, PEEP: 10, ITime: 1, MAP: 15, PIP: 27    acetaminophen     Tablet .. 650 milliGRAM(s) Oral every 6 hours PRN  acetylcysteine 10%  Inhalation 4 milliLiter(s) Inhalation every 4 hours  albuterol    0.083% 2.5 milliGRAM(s) Nebulizer every 4 hours  chlorhexidine 0.12% Liquid 15 milliLiter(s) Oral Mucosa every 12 hours  chlorhexidine 2% Cloths 1 Application(s) Topical <User Schedule>  cisatracurium Infusion 3 MICROgram(s)/kG/Min (22.9 mL/Hr) IV Continuous <Continuous>  CRRT Treatment    <Continuous>  dextrose 5%. 1000 milliLiter(s) (50 mL/Hr) IV Continuous <Continuous>  dextrose 5%. 1000 milliLiter(s) (100 mL/Hr) IV Continuous <Continuous>  dextrose 50% Injectable 25 Gram(s) IV Push once  dextrose 50% Injectable 12.5 Gram(s) IV Push once  dextrose 50% Injectable 25 Gram(s) IV Push once  dextrose Oral Gel 15 Gram(s) Oral once PRN  doxazosin 2 milliGRAM(s) Oral at bedtime  ergocalciferol 40117 Unit(s) Oral every week  fentaNYL   Infusion. 0.5 MICROgram(s)/kG/Hr (6.36 mL/Hr) IV Continuous <Continuous>  glucagon  Injectable 1 milliGRAM(s) IntraMuscular once  heparin   Injectable 7500 Unit(s) SubCutaneous every 8 hours  hydrALAZINE 50 milliGRAM(s) Oral <User Schedule>  hydrALAZINE Injectable 10 milliGRAM(s) IV Push every 6 hours PRN  insulin lispro (ADMELOG) corrective regimen sliding scale   SubCutaneous every 6 hours  insulin NPH human recombinant 6 Unit(s) SubCutaneous every 6 hours  labetalol 200 milliGRAM(s) Oral every 8 hours  labetalol Injectable 10 milliGRAM(s) IV Push every 2 hours PRN  lacosamide IVPB 150 milliGRAM(s) IV Intermittent every 12 hours  niCARdipine Infusion 5 mG/Hr (25 mL/Hr) IV Continuous <Continuous>  norepinephrine Infusion 0.05 MICROgram(s)/kG/Min (5.96 mL/Hr) IV Continuous <Continuous>  ondansetron Injectable 4 milliGRAM(s) IV Push every 6 hours PRN  pantoprazole  Injectable 40 milliGRAM(s) IV Push daily  piperacillin/tazobactam IVPB.. 4.5 Gram(s) IV Intermittent every 8 hours  propofol Infusion 20 MICROgram(s)/kG/Min (15.3 mL/Hr) IV Continuous <Continuous>  PureFlow Dialysate RFP-400 (K 2 / Ca 3) 5000 milliLiter(s) (2000 mL/Hr) CRRT <Continuous>  senna 2 Tablet(s) Oral at bedtime PRN      LABS:  Na: 145 (06-12 @ 05:00), 150 (06-11 @ 05:14), 151 (06-10 @ 14:45), 148 (06-10 @ 04:40), 148 (06-09 @ 16:22)  K: 4.7 (06-12 @ 05:00), 4.4 (06-11 @ 05:14), 4.5 (06-10 @ 14:45), 4.5 (06-10 @ 04:40), 4.7 (06-09 @ 16:22)  Cl: 112 (06-12 @ 05:00), 119 (06-11 @ 05:14), 116 (06-10 @ 14:45), 114 (06-10 @ 04:40), 115 (06-09 @ 16:22)  CO2: 20 (06-12 @ 05:00), 21 (06-11 @ 05:14), 20 (06-10 @ 14:45), 20 (06-10 @ 04:40), 21 (06-09 @ 16:22)  BUN: 101 (06-12 @ 05:00), 115 (06-11 @ 05:14), 110 (06-10 @ 14:45), 108 (06-10 @ 04:40), 106 (06-09 @ 16:22)  Cr: 4.8 (06-12 @ 05:00), 5.1 (06-11 @ 05:14), 5.1 (06-10 @ 14:45), 5.1 (06-10 @ 04:40), 5.3 (06-09 @ 16:22)  Glu: 138(06-12 @ 05:00), 191(06-11 @ 05:14), 146(06-10 @ 14:45), 174(06-10 @ 04:40), 179(06-09 @ 16:22)    Hgb: 6.8 (06-12 @ 06:15), 6.5 (06-12 @ 05:00), 7.5 (06-11 @ 05:14), 8.4 (06-10 @ 14:45), 8.6 (06-10 @ 04:40)  Hct: 21.7 (06-12 @ 06:15), 21.1 (06-12 @ 05:00), 24.3 (06-11 @ 05:14), 26.1 (06-10 @ 14:45), 26.9 (06-10 @ 04:40)  WBC: 14.88 (06-12 @ 06:15), 13.17 (06-12 @ 05:00), 10.21 (06-11 @ 05:14), 8.95 (06-10 @ 14:45), 9.57 (06-10 @ 04:40)  Plt: 203 (06-12 @ 06:15), 200 (06-12 @ 05:00), 206 (06-11 @ 05:14), 240 (06-10 @ 14:45), 212 (06-10 @ 04:40)    INR:   PTT:           LIVER FUNCTIONS - ( 12 Jun 2023 05:00 )  Alb: 2.4 g/dL / Pro: 4.4 g/dL / ALK PHOS: 55 U/L / ALT: 24 U/L / AST: 24 U/L / GGT: x           ABG - ( 12 Jun 2023 08:33 )  pH, Arterial: 7.39  pH, Blood: x     /  pCO2: 35    /  pO2: 139   / HCO3: 21    / Base Excess: -3.3  /  SaO2: 98.9             SUMMARY: This is a 64 y/o M with hx of Afib on eiquis, CVA BG bleed ,HTN, CHF, Gout, DM  presents as a stroke code after being down in bathroom unknown time LKW was 2300. 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 Pt is lethargic  - Cardene started for blood pressure control.    6/10- patient became increasingly lethargic in afternoon/early evening, febrile, hfnc maximized and patient intubated for hypoxemic respiratory failure with VL, sedated overnight on prop and fent, renal function worsening, pt on broad spectrum IV abx and fully cultured for sepsis due to aspiration pneumonia     OVERNIGHT EVENTS: Patient required exchange of [place], remains on and tolerating CVVH, patient increasingly more hypoxemic on 80%/8, PEEP increased to 10, Nimbex IVP given, started on a drip.     ADMISSION SCORES:   GCS 12   ICH 1   NIH 17    REVIEW OF SYSTEMS: Pt unable to participate in ROS due to neurological status    VITALS: [x]       General: morbidly obese; ill appearing  HENT: nc/at, orally intubated  Eyes: Anicteric sclerae  Cardiac: [id number]  Lungs: b/l rhonci   Abdomen: Soft, non-tender, +BS  Extremities: R shoulder - tenderness and crepitance / swelling R shoulder w/ overlying ecchymosis- distal pulses RUE intact, good capillary refill; anasarca  Skin/Incision Site: Clean, dry and intact  Neurologic: sedated/paralyzed, midline gaze, pupils constricted, L pupil sluggish, R pupil nonreactive, flaccid on all four          ICU Vital Signs Last 24 Hrs  T(C): 35.8 (12 Jun 2023 08:00), Max: 37.5 (12 Jun 2023 00:00)  T(F): 96.4 (12 Jun 2023 08:00), Max: 99.5 (12 Jun 2023 00:00)  HR: 51 (12 Jun 2023 08:00) (51 - 72)  BP: --  BP(mean): --  ABP: 117/42 (12 Jun 2023 08:00) (101/40 - 169/26)  ABP(mean): 61 (12 Jun 2023 08:00) (52 - 87)  RR: 24 (12 Jun 2023 08:00) (9 - 30)  SpO2: 100% (12 Jun 2023 08:00) (94% - 100%)      06-11-23 @ 07:01  -  06-12-23 @ 07:00  --------------------------------------------------------  IN: 3599.3 mL / OUT: 1955 mL / NET: 1644.3 mL    06-12-23 @ 07:01  -  06-12-23 @ 09:07  --------------------------------------------------------  IN: 128.3 mL / OUT: 248 mL / NET: -119.7 mL        Mode: AC/ CMV (Assist Control/ Continuous Mandatory Ventilation), RR (machine): 24, TV (machine): 450, FiO2: 80, PEEP: 10, ITime: 1, MAP: 15, PIP: 27    acetaminophen     Tablet .. 650 milliGRAM(s) Oral every 6 hours PRN  acetylcysteine 10%  Inhalation 4 milliLiter(s) Inhalation every 4 hours  albuterol    0.083% 2.5 milliGRAM(s) Nebulizer every 4 hours  chlorhexidine 0.12% Liquid 15 milliLiter(s) Oral Mucosa every 12 hours  chlorhexidine 2% Cloths 1 Application(s) Topical <User Schedule>  cisatracurium Infusion 3 MICROgram(s)/kG/Min (22.9 mL/Hr) IV Continuous <Continuous>  CRRT Treatment    <Continuous>  dextrose 5%. 1000 milliLiter(s) (50 mL/Hr) IV Continuous <Continuous>  dextrose 5%. 1000 milliLiter(s) (100 mL/Hr) IV Continuous <Continuous>  dextrose 50% Injectable 25 Gram(s) IV Push once  dextrose 50% Injectable 12.5 Gram(s) IV Push once  dextrose 50% Injectable 25 Gram(s) IV Push once  dextrose Oral Gel 15 Gram(s) Oral once PRN  doxazosin 2 milliGRAM(s) Oral at bedtime  ergocalciferol 28807 Unit(s) Oral every week  fentaNYL   Infusion. 0.5 MICROgram(s)/kG/Hr (6.36 mL/Hr) IV Continuous <Continuous>  glucagon  Injectable 1 milliGRAM(s) IntraMuscular once  heparin   Injectable 7500 Unit(s) SubCutaneous every 8 hours  hydrALAZINE 50 milliGRAM(s) Oral <User Schedule>  hydrALAZINE Injectable 10 milliGRAM(s) IV Push every 6 hours PRN  insulin lispro (ADMELOG) corrective regimen sliding scale   SubCutaneous every 6 hours  insulin NPH human recombinant 6 Unit(s) SubCutaneous every 6 hours  labetalol 200 milliGRAM(s) Oral every 8 hours  labetalol Injectable 10 milliGRAM(s) IV Push every 2 hours PRN  lacosamide IVPB 150 milliGRAM(s) IV Intermittent every 12 hours  niCARdipine Infusion 5 mG/Hr (25 mL/Hr) IV Continuous <Continuous>  norepinephrine Infusion 0.05 MICROgram(s)/kG/Min (5.96 mL/Hr) IV Continuous <Continuous>  ondansetron Injectable 4 milliGRAM(s) IV Push every 6 hours PRN  pantoprazole  Injectable 40 milliGRAM(s) IV Push daily  piperacillin/tazobactam IVPB.. 4.5 Gram(s) IV Intermittent every 8 hours  propofol Infusion 20 MICROgram(s)/kG/Min (15.3 mL/Hr) IV Continuous <Continuous>  PureFlow Dialysate RFP-400 (K 2 / Ca 3) 5000 milliLiter(s) (2000 mL/Hr) CRRT <Continuous>  senna 2 Tablet(s) Oral at bedtime PRN      LABS:  Na: 145 (06-12 @ 05:00), 150 (06-11 @ 05:14), 151 (06-10 @ 14:45), 148 (06-10 @ 04:40), 148 (06-09 @ 16:22)  K: 4.7 (06-12 @ 05:00), 4.4 (06-11 @ 05:14), 4.5 (06-10 @ 14:45), 4.5 (06-10 @ 04:40), 4.7 (06-09 @ 16:22)  Cl: 112 (06-12 @ 05:00), 119 (06-11 @ 05:14), 116 (06-10 @ 14:45), 114 (06-10 @ 04:40), 115 (06-09 @ 16:22)  CO2: 20 (06-12 @ 05:00), 21 (06-11 @ 05:14), 20 (06-10 @ 14:45), 20 (06-10 @ 04:40), 21 (06-09 @ 16:22)  BUN: 101 (06-12 @ 05:00), 115 (06-11 @ 05:14), 110 (06-10 @ 14:45), 108 (06-10 @ 04:40), 106 (06-09 @ 16:22)  Cr: 4.8 (06-12 @ 05:00), 5.1 (06-11 @ 05:14), 5.1 (06-10 @ 14:45), 5.1 (06-10 @ 04:40), 5.3 (06-09 @ 16:22)  Glu: 138(06-12 @ 05:00), 191(06-11 @ 05:14), 146(06-10 @ 14:45), 174(06-10 @ 04:40), 179(06-09 @ 16:22)    Hgb: 6.8 (06-12 @ 06:15), 6.5 (06-12 @ 05:00), 7.5 (06-11 @ 05:14), 8.4 (06-10 @ 14:45), 8.6 (06-10 @ 04:40)  Hct: 21.7 (06-12 @ 06:15), 21.1 (06-12 @ 05:00), 24.3 (06-11 @ 05:14), 26.1 (06-10 @ 14:45), 26.9 (06-10 @ 04:40)  WBC: 14.88 (06-12 @ 06:15), 13.17 (06-12 @ 05:00), 10.21 (06-11 @ 05:14), 8.95 (06-10 @ 14:45), 9.57 (06-10 @ 04:40)  Plt: 203 (06-12 @ 06:15), 200 (06-12 @ 05:00), 206 (06-11 @ 05:14), 240 (06-10 @ 14:45), 212 (06-10 @ 04:40)    INR:   PTT:           LIVER FUNCTIONS - ( 12 Jun 2023 05:00 )  Alb: 2.4 g/dL / Pro: 4.4 g/dL / ALK PHOS: 55 U/L / ALT: 24 U/L / AST: 24 U/L / GGT: x           ABG - ( 12 Jun 2023 08:33 )  pH, Arterial: 7.39  pH, Blood: x     /  pCO2: 35    /  pO2: 139   / HCO3: 21    / Base Excess: -3.3  /  SaO2: 98.9

## 2023-06-12 NOTE — PROGRESS NOTE ADULT - SUBJECTIVE AND OBJECTIVE BOX
seen and examined  24 h events noted   intubated/ ventilated   on cvvhd         PAST HISTORY  --------------------------------------------------------------------------------  No significant changes to PMH, PSH, FHx, SHx, unless otherwise noted    ALLERGIES & MEDICATIONS  --------------------------------------------------------------------------------  Allergies    No Known Allergies    Intolerances      Standing Inpatient Medications  acetylcysteine 10%  Inhalation 4 milliLiter(s) Inhalation every 4 hours  albuterol    0.083% 2.5 milliGRAM(s) Nebulizer every 4 hours  chlorhexidine 0.12% Liquid 15 milliLiter(s) Oral Mucosa every 12 hours  chlorhexidine 2% Cloths 1 Application(s) Topical <User Schedule>  cisatracurium Infusion 3 MICROgram(s)/kG/Min IV Continuous <Continuous>  CRRT Treatment    <Continuous>  dextrose 5%. 1000 milliLiter(s) IV Continuous <Continuous>  dextrose 5%. 1000 milliLiter(s) IV Continuous <Continuous>  dextrose 50% Injectable 25 Gram(s) IV Push once  dextrose 50% Injectable 25 Gram(s) IV Push once  dextrose 50% Injectable 12.5 Gram(s) IV Push once  doxazosin 2 milliGRAM(s) Oral at bedtime  ergocalciferol 45815 Unit(s) Oral every week  fentaNYL   Infusion. 0.5 MICROgram(s)/kG/Hr IV Continuous <Continuous>  glucagon  Injectable 1 milliGRAM(s) IntraMuscular once  heparin   Injectable 7500 Unit(s) SubCutaneous every 8 hours  hydrALAZINE 50 milliGRAM(s) Oral <User Schedule>  insulin lispro (ADMELOG) corrective regimen sliding scale   SubCutaneous every 6 hours  insulin NPH human recombinant 6 Unit(s) SubCutaneous every 6 hours  labetalol 200 milliGRAM(s) Oral every 8 hours  lacosamide IVPB 150 milliGRAM(s) IV Intermittent every 12 hours  niCARdipine Infusion 5 mG/Hr IV Continuous <Continuous>  norepinephrine Infusion 0.05 MICROgram(s)/kG/Min IV Continuous <Continuous>  pantoprazole  Injectable 40 milliGRAM(s) IV Push daily  piperacillin/tazobactam IVPB.. 4.5 Gram(s) IV Intermittent every 8 hours  propofol Infusion 20 MICROgram(s)/kG/Min IV Continuous <Continuous>  PureFlow Dialysate RFP-400 (K 2 / Ca 3) 5000 milliLiter(s) CRRT <Continuous>    PRN Inpatient Medications  acetaminophen     Tablet .. 650 milliGRAM(s) Oral every 6 hours PRN  dextrose Oral Gel 15 Gram(s) Oral once PRN  hydrALAZINE Injectable 10 milliGRAM(s) IV Push every 6 hours PRN  labetalol Injectable 10 milliGRAM(s) IV Push every 2 hours PRN  ondansetron Injectable 4 milliGRAM(s) IV Push every 6 hours PRN  senna 2 Tablet(s) Oral at bedtime PRN          VITALS/PHYSICAL EXAM  --------------------------------------------------------------------------------  T(C): 35.8 (06-12-23 @ 08:00), Max: 37.5 (06-12-23 @ 00:00)  HR: 51 (06-12-23 @ 08:00) (51 - 72)  BP: --  RR: 24 (06-12-23 @ 08:00) (9 - 30)  SpO2: 100% (06-12-23 @ 08:00) (94% - 100%)  Wt(kg): --        06-11-23 @ 07:01  -  06-12-23 @ 07:00  --------------------------------------------------------  IN: 3599.3 mL / OUT: 1955 mL / NET: 1644.3 mL    06-12-23 @ 07:01  -  06-12-23 @ 08:58  --------------------------------------------------------  IN: 128.3 mL / OUT: 248 mL / NET: -119.7 mL      Physical Exam:  	Gen: intubated/ventilated   	 VEEG   	Abd: +distended  	Vascular access: udall     LABS/STUDIES  --------------------------------------------------------------------------------              6.8    14.88 >-----------<  203      [06-12-23 @ 06:15]              21.7     145  |  112  |  101  ----------------------------<  138      [06-12-23 @ 05:00]  4.7   |  20  |  4.8        Ca     7.4     [06-12-23 @ 05:00]      Mg     3.2     [06-12-23 @ 05:00]      Phos  5.8     [06-12-23 @ 05:00]    TPro  4.4  /  Alb  2.4  /  TBili  0.5  /  DBili  x   /  AST  24  /  ALT  24  /  AlkPhos  55  [06-12-23 @ 05:00]      Creatinine Trend:  SCr 4.8 [06-12 @ 05:00]  SCr 5.1 [06-11 @ 05:14]  SCr 5.1 [06-10 @ 14:45]  SCr 5.1 [06-10 @ 04:40]  SCr 5.3 [06-09 @ 16:22]    Urinalysis - [06-10-23 @ 14:45]      Color Light Yellow / Appearance Slightly Turbid / SG 1.018 / pH 6.0      Gluc Negative / Ketone Negative  / Bili Negative / Urobili <2 mg/dL       Blood Large / Protein 100 mg/dL / Leuk Est Moderate / Nitrite Negative       / WBC 12 / Hyaline 6 / Gran  / Sq Epi  / Non Sq Epi  / Bacteria Negative      Vitamin D (25OH) 19      [06-09-23 @ 10:34]  HbA1c 8.7      [04-08-19 @ 04:49]  TSH 1.29      [06-06-23 @ 10:10]  Lipid: chol 103, TG 94, HDL 37, LDL --      [06-06-23 @ 10:10]

## 2023-06-12 NOTE — PROGRESS NOTE ADULT - ASSESSMENT
The patient is a 63-year-old male with a past history of Afib on Eliquis, prior basal ganglia hemorrhage, HTN, Gout, and DM, CKD 3b- 4 ( known to my office)  who presented as a stroke.    # DESI on BIN0k-8/ ATN vs EV   # oliguria, resolved  # HTN uncontrolled   # Thalamic CVA hemorrhagic   - non oliguric  - continue cvvhd , 100 cc negative/ h   -check daily ph on CVVHD   - followed by neurology   will follow

## 2023-06-12 NOTE — PROGRESS NOTE ADULT - CRITICAL CARE ATTENDING COMMENT
ich/ivh, hypertensive emergency, kenyetta on ckd requiring RRT, encephalopathy c/b hypoxemic respiratory failure due to aspiration pna necessitating endotracheal intubation with mechanical vent on IV abx    plan as above      family updated at bedside ATTENDING ADDENDUM    Events and examination as above    Active issues:    #L. thalamic ICH-1  #Type I respiratory failure due to aspiration pneumonia  #DESI on CKD  #R. humeral fracture  #History of CVA  #History of CHF, A. fib (on eliquis)  #History of DM    Plan updates:  - Q1h neuro checks/pupillometry/vital signs, VEEG/bis, sedation w/ precedex, off prop (TG > 500), weaning fentanyl, vimpat, nimbex  - Intubated on VAC 24/450/80/10, wean as tolerated, zosyn  - Holding AC s/p reversal  - CVVHD, strict I/O, daily weights  - Anemia work, tx 1u PRBC

## 2023-06-13 NOTE — PROGRESS NOTE ADULT - SUBJECTIVE AND OBJECTIVE BOX
seen and examined  24 h events noted   no pressors   on cvvhd   intubated/ventilated         PAST HISTORY  --------------------------------------------------------------------------------  No significant changes to PMH, PSH, FHx, SHx, unless otherwise noted    ALLERGIES & MEDICATIONS  --------------------------------------------------------------------------------  Allergies    No Known Allergies    Intolerances      Standing Inpatient Medications  chlorhexidine 0.12% Liquid 15 milliLiter(s) Oral Mucosa every 12 hours  chlorhexidine 2% Cloths 1 Application(s) Topical <User Schedule>  CRRT Treatment    <Continuous>  dexMEDEtomidine Infusion 1.5 MICROgram(s)/kG/Hr IV Continuous <Continuous>  dextrose 5%. 1000 milliLiter(s) IV Continuous <Continuous>  dextrose 5%. 1000 milliLiter(s) IV Continuous <Continuous>  dextrose 50% Injectable 25 Gram(s) IV Push once  dextrose 50% Injectable 12.5 Gram(s) IV Push once  dextrose 50% Injectable 25 Gram(s) IV Push once  doxazosin 2 milliGRAM(s) Oral at bedtime  ergocalciferol 78903 Unit(s) Oral every week  fentaNYL   Infusion. 0.5 MICROgram(s)/kG/Hr IV Continuous <Continuous>  glucagon  Injectable 1 milliGRAM(s) IntraMuscular once  insulin lispro (ADMELOG) corrective regimen sliding scale   SubCutaneous every 6 hours  insulin NPH human recombinant 6 Unit(s) SubCutaneous every 6 hours  lacosamide IVPB 150 milliGRAM(s) IV Intermittent every 12 hours  multivitamin 1 Tablet(s) Oral daily  norepinephrine Infusion 0.05 MICROgram(s)/kG/Min IV Continuous <Continuous>  pantoprazole  Injectable 40 milliGRAM(s) IV Push daily  piperacillin/tazobactam IVPB.. 4.5 Gram(s) IV Intermittent every 8 hours  polyethylene glycol 3350 17 Gram(s) Oral two times a day  PureFlow Dialysate RFP-400 (K 2 / Ca 3) 5000 milliLiter(s) CRRT <Continuous>  senna 2 Tablet(s) Oral at bedtime    PRN Inpatient Medications  acetaminophen     Tablet .. 650 milliGRAM(s) Oral every 6 hours PRN  acetylcysteine 10%  Inhalation 4 milliLiter(s) Inhalation every 4 hours PRN  albuterol   0.5% 2.5 milliGRAM(s) Nebulizer every 4 hours PRN  dextrose Oral Gel 15 Gram(s) Oral once PRN  ondansetron Injectable 4 milliGRAM(s) IV Push every 6 hours PRN        VITALS/PHYSICAL EXAM  --------------------------------------------------------------------------------  T(C): 37 (06-13-23 @ 08:00), Max: 40 (06-12-23 @ 17:00)  HR: 53 (06-13-23 @ 08:00) (50 - 61)  BP: --  RR: 24 (06-13-23 @ 08:00) (24 - 36)  SpO2: 100% (06-13-23 @ 08:00) (97% - 100%)  Wt(kg): --        06-12-23 @ 07:01  -  06-13-23 @ 07:00  --------------------------------------------------------  IN: 3252.2 mL / OUT: 6349 mL / NET: -3096.8 mL    06-13-23 @ 07:01  -  06-13-23 @ 08:37  --------------------------------------------------------  IN: 144 mL / OUT: 30 mL / NET: 114 mL      Physical Exam:  	Gen: intubated/ventilated   	Abd: +distended  	LE: edema  	Vascular access:udall     LABS/STUDIES  --------------------------------------------------------------------------------              8.1    12.63 >-----------<  190      [06-13-23 @ 04:08]              24.6     139  |  106  |  54  ----------------------------<  134      [06-13-23 @ 04:08]  4.0   |  20  |  2.8        Ca     8.0     [06-13-23 @ 04:08]      Mg     2.3     [06-13-23 @ 04:08]      Phos  4.0     [06-13-23 @ 04:08]    TPro  5.0  /  Alb  2.7  /  TBili  0.9  /  DBili  x   /  AST  23  /  ALT  27  /  AlkPhos  82  [06-13-23 @ 04:08]            [06-12-23 @ 11:15]    Creatinine Trend:  SCr 2.8 [06-13 @ 04:08]  SCr 3.1 [06-12 @ 18:06]  SCr 4.8 [06-12 @ 05:00]  SCr 5.1 [06-11 @ 05:14]  SCr 5.1 [06-10 @ 14:45]    Urinalysis - [06-10-23 @ 14:45]      Color Light Yellow / Appearance Slightly Turbid / SG 1.018 / pH 6.0      Gluc Negative / Ketone Negative  / Bili Negative / Urobili <2 mg/dL       Blood Large / Protein 100 mg/dL / Leuk Est Moderate / Nitrite Negative       / WBC 12 / Hyaline 6 / Gran  / Sq Epi  / Non Sq Epi  / Bacteria Negative      Iron 16, TIBC 162, %sat 10      [06-12-23 @ 11:15]  Vitamin D (25OH) 19      [06-09-23 @ 10:34]  HbA1c 8.7      [04-08-19 @ 04:49]  TSH 1.29      [06-06-23 @ 10:10]  Lipid: chol --, , HDL --, LDL --      [06-12-23 @ 11:15]

## 2023-06-13 NOTE — PROGRESS NOTE ADULT - SUBJECTIVE AND OBJECTIVE BOX
SUMMARY: This is a 64 y/o M with hx of Afib on eiquis, CVA BG bleed ,HTN, CHF, Gout, DM  presents as a stroke code after being down in bathroom unknown time LKW was 2300. 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 Pt is lethargic  - Cardene started for blood pressure control.    6/10- patient became increasingly lethargic in afternoon/early evening, febrile, hfnc maximized and patient intubated for hypoxemic respiratory failure with VL, sedated overnight on prop and fent, renal function worsening, pt on broad spectrum IV abx and fully cultured for sepsis due to aspiration pneumonia     OVERNIGHT EVENTS: Vent requirements decreasing.     ADMISSION SCORES:   GCS 12   ICH 1   [facility] 17    REVIEW OF SYSTEMS: Pt unable to participate in ROS due to neurological status    VITALS: [x]       General: morbidly obese; ill appearing  HENT: nc/at, orally intubated  Eyes: Anicteric sclerae  Cardiac: [id number]  Lungs: b/l rhonci   Abdomen: Soft, non-tender, +BS  Extremities: R shoulder - tenderness and crepitance / swelling R shoulder w/ overlying ecchymosis- distal pulses RUE intact, good capillary refill; anasarca  Skin/Incision Site: Clean, dry and intact  Neurologic: sedated/paralyzed, midline gaze, pupils constricted, L pupil sluggish, R pupil nonreactive, flaccid on all four          ICU Vital Signs Last 24 Hrs  T(C): 35.8 (12 Jun 2023 08:00), Max: 37.5 (12 Jun 2023 00:00)  T(F): 96.4 (12 Jun 2023 08:00), Max: 99.5 (12 Jun 2023 00:00)  HR: 51 (12 Jun 2023 08:00) (51 - 72)  BP: --  BP(mean): --  ABP: 117/42 (12 Jun 2023 08:00) (101/40 - 169/26)  ABP(mean): 61 (12 Jun 2023 08:00) (52 - 87)  RR: 24 (12 Jun 2023 08:00) (9 - 30)  SpO2: 100% (12 Jun 2023 08:00) (94% - 100%)      06-11-23 @ 07:01  -  06-12-23 @ 07:00  --------------------------------------------------------  IN: 3599.3 mL / OUT: 1955 mL / NET: 1644.3 mL    06-12-23 @ 07:01  -  06-12-23 @ 09:07  --------------------------------------------------------  IN: 128.3 mL / OUT: 248 mL / NET: -119.7 mL        Mode: AC/ CMV (Assist Control/ Continuous Mandatory Ventilation), RR (machine): 24, TV (machine): 450, FiO2: 80, PEEP: 10, ITime: 1, MAP: 15, PIP: 27    acetaminophen     Tablet .. 650 milliGRAM(s) Oral every 6 hours PRN  acetylcysteine 10%  Inhalation 4 milliLiter(s) Inhalation every 4 hours  albuterol    0.083% 2.5 milliGRAM(s) Nebulizer every 4 hours  chlorhexidine 0.12% Liquid 15 milliLiter(s) Oral Mucosa every 12 hours  chlorhexidine 2% Cloths 1 Application(s) Topical <User Schedule>  cisatracurium Infusion 3 MICROgram(s)/kG/Min (22.9 mL/Hr) IV Continuous <Continuous>  CRRT Treatment    <Continuous>  dextrose 5%. 1000 milliLiter(s) (50 mL/Hr) IV Continuous <Continuous>  dextrose 5%. 1000 milliLiter(s) (100 mL/Hr) IV Continuous <Continuous>  dextrose 50% Injectable 25 Gram(s) IV Push once  dextrose 50% Injectable 12.5 Gram(s) IV Push once  dextrose 50% Injectable 25 Gram(s) IV Push once  dextrose Oral Gel 15 Gram(s) Oral once PRN  doxazosin 2 milliGRAM(s) Oral at bedtime  ergocalciferol 46152 Unit(s) Oral every week  fentaNYL   Infusion. 0.5 MICROgram(s)/kG/Hr (6.36 mL/Hr) IV Continuous <Continuous>  glucagon  Injectable 1 milliGRAM(s) IntraMuscular once  heparin   Injectable 7500 Unit(s) SubCutaneous every 8 hours  hydrALAZINE 50 milliGRAM(s) Oral <User Schedule>  hydrALAZINE Injectable 10 milliGRAM(s) IV Push every 6 hours PRN  insulin lispro (ADMELOG) corrective regimen sliding scale   SubCutaneous every 6 hours  insulin NPH human recombinant 6 Unit(s) SubCutaneous every 6 hours  labetalol 200 milliGRAM(s) Oral every 8 hours  labetalol Injectable 10 milliGRAM(s) IV Push every 2 hours PRN  lacosamide IVPB 150 milliGRAM(s) IV Intermittent every 12 hours  niCARdipine Infusion 5 mG/Hr (25 mL/Hr) IV Continuous <Continuous>  norepinephrine Infusion 0.05 MICROgram(s)/kG/Min (5.96 mL/Hr) IV Continuous <Continuous>  ondansetron Injectable 4 milliGRAM(s) IV Push every 6 hours PRN  pantoprazole  Injectable 40 milliGRAM(s) IV Push daily  piperacillin/tazobactam IVPB.. 4.5 Gram(s) IV Intermittent every 8 hours  propofol Infusion 20 MICROgram(s)/kG/Min (15.3 mL/Hr) IV Continuous <Continuous>  PureFlow Dialysate RFP-400 (K 2 / Ca 3) 5000 milliLiter(s) (2000 mL/Hr) CRRT <Continuous>  senna 2 Tablet(s) Oral at bedtime PRN      LABS:  Na: 145 (06-12 @ 05:00), 150 (06-11 @ 05:14), 151 (06-10 @ 14:45), 148 (06-10 @ 04:40), 148 (06-09 @ 16:22)  K: 4.7 (06-12 @ 05:00), 4.4 (06-11 @ 05:14), 4.5 (06-10 @ 14:45), 4.5 (06-10 @ 04:40), 4.7 (06-09 @ 16:22)  Cl: 112 (06-12 @ 05:00), 119 (06-11 @ 05:14), 116 (06-10 @ 14:45), 114 (06-10 @ 04:40), 115 (06-09 @ 16:22)  CO2: 20 (06-12 @ 05:00), 21 (06-11 @ 05:14), 20 (06-10 @ 14:45), 20 (06-10 @ 04:40), 21 (06-09 @ 16:22)  BUN: 101 (06-12 @ 05:00), 115 (06-11 @ 05:14), 110 (06-10 @ 14:45), 108 (06-10 @ 04:40), 106 (06-09 @ 16:22)  Cr: 4.8 (06-12 @ 05:00), 5.1 (06-11 @ 05:14), 5.1 (06-10 @ 14:45), 5.1 (06-10 @ 04:40), 5.3 (06-09 @ 16:22)  Glu: 138(06-12 @ 05:00), 191(06-11 @ 05:14), 146(06-10 @ 14:45), 174(06-10 @ 04:40), 179(06-09 @ 16:22)    Hgb: 6.8 (06-12 @ 06:15), 6.5 (06-12 @ 05:00), 7.5 (06-11 @ 05:14), 8.4 (06-10 @ 14:45), 8.6 (06-10 @ 04:40)  Hct: 21.7 (06-12 @ 06:15), 21.1 (06-12 @ 05:00), 24.3 (06-11 @ 05:14), 26.1 (06-10 @ 14:45), 26.9 (06-10 @ 04:40)  WBC: 14.88 (06-12 @ 06:15), 13.17 (06-12 @ 05:00), 10.21 (06-11 @ 05:14), 8.95 (06-10 @ 14:45), 9.57 (06-10 @ 04:40)  Plt: 203 (06-12 @ 06:15), 200 (06-12 @ 05:00), 206 (06-11 @ 05:14), 240 (06-10 @ 14:45), 212 (06-10 @ 04:40)    INR:   PTT:           LIVER FUNCTIONS - ( 12 Jun 2023 05:00 )  Alb: 2.4 g/dL / Pro: 4.4 g/dL / ALK PHOS: 55 U/L / ALT: 24 U/L / AST: 24 U/L / GGT: x           ABG - ( 12 Jun 2023 08:33 )  pH, Arterial: 7.39  pH, Blood: x     /  pCO2: 35    /  pO2: 139   / HCO3: 21    / Base Excess: -3.3  /  SaO2: 98.9             SUMMARY: This is a 64 y/o M with hx of Afib on eiquis, CVA BG bleed ,HTN, CHF, Gout, DM  presents as a stroke code after being down in bathroom unknown time LKW was 2300. 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 Pt is lethargic  - Cardene started for blood pressure control.    6/10- patient became increasingly lethargic in afternoon/early evening, febrile, hfnc maximized and patient intubated for hypoxemic respiratory failure with VL, sedated overnight on prop and fent, renal function worsening, pt on broad spectrum IV abx and fully cultured for sepsis due to aspiration pneumonia     OVERNIGHT EVENTS: Vent requirements decreasing.     ADMISSION SCORES:   GCS 12   ICH 1   [facility] 17    REVIEW OF SYSTEMS: Pt unable to participate in ROS due to neurological status    VITALS: [x]       General: morbidly obese; ill appearing  HENT: nc/at, orally intubated  Eyes: Anicteric sclerae  Cardiac: [id number]  Lungs: b/l rhonci   Abdomen: Soft, non-tender, +BS  Extremities: R shoulder - tenderness and crepitance / swelling R shoulder w/ overlying ecchymosis- distal pulses RUE intact, good capillary refill; anasarca  Skin/Incision Site: Clean, dry and intact  Neurologic: sedated/paralyzed, midline gaze, pupils constricted, L pupil nonreactive, R pupil nonreactive, flaccid on all four    ICU Vital Signs Last 24 Hrs  T(C): 37 (13 Jun 2023 08:00), Max: 40 (12 Jun 2023 17:00)  T(F): 98.6 (13 Jun 2023 08:00), Max: 104 (12 Jun 2023 17:00)  HR: 53 (13 Jun 2023 08:00) (50 - 61)  BP: --  BP(mean): --  ABP: 142/55 (13 Jun 2023 08:00) (123/52 - 175/50)  ABP(mean): 82 (13 Jun 2023 08:00) (62 - 92)  RR: 24 (13 Jun 2023 08:00) (24 - 36)  SpO2: 100% (13 Jun 2023 08:00) (97% - 100%)      06-12-23 @ 07:01  -  06-13-23 @ 07:00  --------------------------------------------------------  IN: 3252.2 mL / OUT: 6349 mL / NET: -3096.8 mL    06-13-23 @ 07:01  -  06-13-23 @ 08:35  --------------------------------------------------------  IN: 144 mL / OUT: 30 mL / NET: 114 mL        LABS:  Na: 139 (06-13 @ 04:08), 141 (06-12 @ 18:06), 145 (06-12 @ 05:00), 150 (06-11 @ 05:14), 151 (06-10 @ 14:45)  K: 4.0 (06-13 @ 04:08), 4.1 (06-12 @ 18:06), 4.7 (06-12 @ 05:00), 4.4 (06-11 @ 05:14), 4.5 (06-10 @ 14:45)  Cl: 106 (06-13 @ 04:08), 108 (06-12 @ 18:06), 112 (06-12 @ 05:00), 119 (06-11 @ 05:14), 116 (06-10 @ 14:45)  CO2: 20 (06-13 @ 04:08), 20 (06-12 @ 18:06), 20 (06-12 @ 05:00), 21 (06-11 @ 05:14), 20 (06-10 @ 14:45)  BUN: 54 (06-13 @ 04:08), 67 (06-12 @ 18:06), 101 (06-12 @ 05:00), 115 (06-11 @ 05:14), 110 (06-10 @ 14:45)  Cr: 2.8 (06-13 @ 04:08), 3.1 (06-12 @ 18:06), 4.8 (06-12 @ 05:00), 5.1 (06-11 @ 05:14), 5.1 (06-10 @ 14:45)  Glu: 134(06-13 @ 04:08), 167(06-12 @ 18:06), 138(06-12 @ 05:00), 191(06-11 @ 05:14), 146(06-10 @ 14:45)    Hgb: 8.1 (06-13 @ 04:08), 8.0 (06-12 @ 22:23), 6.9 (06-12 @ 11:15), 6.8 (06-12 @ 06:15), 6.5 (06-12 @ 05:00), 7.5 (06-11 @ 05:14), 8.4 (06-10 @ 14:45)  Hct: 24.6 (06-13 @ 04:08), 24.3 (06-12 @ 22:23), 21.7 (06-12 @ 11:15), 21.7 (06-12 @ 06:15), 21.1 (06-12 @ 05:00), 24.3 (06-11 @ 05:14), 26.1 (06-10 @ 14:45)  WBC: 12.63 (06-13 @ 04:08), 11.48 (06-12 @ 22:23), 15.62 (06-12 @ 11:15), 14.88 (06-12 @ 06:15), 13.17 (06-12 @ 05:00), 10.21 (06-11 @ 05:14), 8.95 (06-10 @ 14:45)  Plt: 190 (06-13 @ 04:08), 173 (06-12 @ 22:23), 192 (06-12 @ 11:15), 203 (06-12 @ 06:15), 200 (06-12 @ 05:00), 206 (06-11 @ 05:14), 240 (06-10 @ 14:45)      LIVER FUNCTIONS - ( 13 Jun 2023 04:08 )  Alb: 2.7 g/dL / Pro: 5.0 g/dL / ALK PHOS: 82 U/L / ALT: 27 U/L / AST: 23 U/L / GGT: x           ABG - ( 12 Jun 2023 08:33 )  pH, Arterial: 7.39  pH, Blood: x     /  pCO2: 35    /  pO2: 139   / HCO3: 21    / Base Excess: -3.3  /  SaO2: 98.9        Mode: AC/ CMV (Assist Control/ Continuous Mandatory Ventilation), RR (machine): 24, TV (machine): 450, FiO2: 40, PEEP: 8, ITime: 1, MAP: 13, PIP: 24    MEDICATIONS  (STANDING):  chlorhexidine 0.12% Liquid 15 milliLiter(s) Oral Mucosa every 12 hours  chlorhexidine 2% Cloths 1 Application(s) Topical <User Schedule>  CRRT Treatment    <Continuous>  dexMEDEtomidine Infusion 1.5 MICROgram(s)/kG/Hr (47.7 mL/Hr) IV Continuous <Continuous>  dextrose 5%. 1000 milliLiter(s) (100 mL/Hr) IV Continuous <Continuous>  dextrose 5%. 1000 milliLiter(s) (50 mL/Hr) IV Continuous <Continuous>  dextrose 50% Injectable 25 Gram(s) IV Push once  dextrose 50% Injectable 12.5 Gram(s) IV Push once  dextrose 50% Injectable 25 Gram(s) IV Push once  doxazosin 2 milliGRAM(s) Oral at bedtime  ergocalciferol 80846 Unit(s) Oral every week  fentaNYL   Infusion. 0.5 MICROgram(s)/kG/Hr (6.36 mL/Hr) IV Continuous <Continuous>  glucagon  Injectable 1 milliGRAM(s) IntraMuscular once  insulin lispro (ADMELOG) corrective regimen sliding scale   SubCutaneous every 6 hours  insulin NPH human recombinant 6 Unit(s) SubCutaneous every 6 hours  lacosamide IVPB 150 milliGRAM(s) IV Intermittent every 12 hours  multivitamin 1 Tablet(s) Oral daily  norepinephrine Infusion 0.05 MICROgram(s)/kG/Min (5.96 mL/Hr) IV Continuous <Continuous>  pantoprazole  Injectable 40 milliGRAM(s) IV Push daily  piperacillin/tazobactam IVPB.. 4.5 Gram(s) IV Intermittent every 8 hours  polyethylene glycol 3350 17 Gram(s) Oral two times a day  PureFlow Dialysate RFP-400 (K 2 / Ca 3) 5000 milliLiter(s) (2000 mL/Hr) CRRT <Continuous>  senna 2 Tablet(s) Oral at bedtime    MEDICATIONS  (PRN):  acetaminophen     Tablet .. 650 milliGRAM(s) Oral every 6 hours PRN Temp greater or equal to 38C (100.4F), Mild Pain (1 - 3)  acetylcysteine 10%  Inhalation 4 milliLiter(s) Inhalation every 4 hours PRN wheezing  albuterol   0.5% 2.5 milliGRAM(s) Nebulizer every 4 hours PRN Shortness of Breath and/or Wheezing  dextrose Oral Gel 15 Gram(s) Oral once PRN Blood Glucose LESS THAN 70 milliGRAM(s)/deciliter  ondansetron Injectable 4 milliGRAM(s) IV Push every 6 hours PRN Nausea and/or Vomiting          IPTT:           LIVER FUNCTIONS - ( 12 Jun 2023 05:00 )  Alb: 2.4 g/dL / Pro: 4.4 g/dL / ALK PHOS: 55 U/L / ALT: 24 U/L / AST: 24 U/L / GGT: x           ABG - ( 12 Jun 2023 08:33 )  pH, Arterial: 7.39  pH, Blood: x     /  pCO2: 35    /  pO2: 139   / HCO3: 21    / Base Excess: -3.3  /  SaO2: 98.9             SUMMARY: This is a 64 y/o M with hx of Afib on eiquis, CVA BG bleed ,HTN, CHF, Gout, DM  presents as a stroke code after being down in bathroom unknown time LKW was 2300. 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 Pt is lethargic  - Cardene started for blood pressure control.    6/10- patient became increasingly lethargic in afternoon/early evening, febrile, hfnc maximized and patient intubated for hypoxemic respiratory failure with VL, sedated overnight on prop and fent, renal function worsening, pt on broad spectrum IV abx and fully cultured for sepsis due to aspiration pneumonia     OVERNIGHT EVENTS: Vent requirements decreasing.     ADMISSION SCORES:   GCS 12   ICH 1   NIH 17    REVIEW OF SYSTEMS: Pt unable to participate in ROS due to neurological status    VITALS: [x]       General: morbidly obese; ill appearing  HENT: nc/at, orally intubated  Eyes: Anicteric sclerae  Cardiac: [id number]  Lungs: b/l rhonci   Abdomen: Soft, non-tender, +BS  Extremities: R shoulder - tenderness and crepitance / swelling R shoulder w/ overlying ecchymosis- distal pulses RUE intact, good capillary refill; anasarca  Skin/Incision Site: Clean, dry and intact  Neurologic: sedated/paralyzed, midline gaze, pupils constricted, L pupil nonreactive, R pupil nonreactive, flaccid on all four    ICU Vital Signs Last 24 Hrs  T(C): 37 (13 Jun 2023 08:00), Max: 40 (12 Jun 2023 17:00)  T(F): 98.6 (13 Jun 2023 08:00), Max: 104 (12 Jun 2023 17:00)  HR: 53 (13 Jun 2023 08:00) (50 - 61)  BP: --  BP(mean): --  ABP: 142/55 (13 Jun 2023 08:00) (123/52 - 175/50)  ABP(mean): 82 (13 Jun 2023 08:00) (62 - 92)  RR: 24 (13 Jun 2023 08:00) (24 - 36)  SpO2: 100% (13 Jun 2023 08:00) (97% - 100%)      06-12-23 @ 07:01  -  06-13-23 @ 07:00  --------------------------------------------------------  IN: 3252.2 mL / OUT: 6349 mL / NET: -3096.8 mL    06-13-23 @ 07:01  -  06-13-23 @ 08:35  --------------------------------------------------------  IN: 144 mL / OUT: 30 mL / NET: 114 mL        LABS:  Na: 139 (06-13 @ 04:08), 141 (06-12 @ 18:06), 145 (06-12 @ 05:00), 150 (06-11 @ 05:14), 151 (06-10 @ 14:45)  K: 4.0 (06-13 @ 04:08), 4.1 (06-12 @ 18:06), 4.7 (06-12 @ 05:00), 4.4 (06-11 @ 05:14), 4.5 (06-10 @ 14:45)  Cl: 106 (06-13 @ 04:08), 108 (06-12 @ 18:06), 112 (06-12 @ 05:00), 119 (06-11 @ 05:14), 116 (06-10 @ 14:45)  CO2: 20 (06-13 @ 04:08), 20 (06-12 @ 18:06), 20 (06-12 @ 05:00), 21 (06-11 @ 05:14), 20 (06-10 @ 14:45)  BUN: 54 (06-13 @ 04:08), 67 (06-12 @ 18:06), 101 (06-12 @ 05:00), 115 (06-11 @ 05:14), 110 (06-10 @ 14:45)  Cr: 2.8 (06-13 @ 04:08), 3.1 (06-12 @ 18:06), 4.8 (06-12 @ 05:00), 5.1 (06-11 @ 05:14), 5.1 (06-10 @ 14:45)  Glu: 134(06-13 @ 04:08), 167(06-12 @ 18:06), 138(06-12 @ 05:00), 191(06-11 @ 05:14), 146(06-10 @ 14:45)    Hgb: 8.1 (06-13 @ 04:08), 8.0 (06-12 @ 22:23), 6.9 (06-12 @ 11:15), 6.8 (06-12 @ 06:15), 6.5 (06-12 @ 05:00), 7.5 (06-11 @ 05:14), 8.4 (06-10 @ 14:45)  Hct: 24.6 (06-13 @ 04:08), 24.3 (06-12 @ 22:23), 21.7 (06-12 @ 11:15), 21.7 (06-12 @ 06:15), 21.1 (06-12 @ 05:00), 24.3 (06-11 @ 05:14), 26.1 (06-10 @ 14:45)  WBC: 12.63 (06-13 @ 04:08), 11.48 (06-12 @ 22:23), 15.62 (06-12 @ 11:15), 14.88 (06-12 @ 06:15), 13.17 (06-12 @ 05:00), 10.21 (06-11 @ 05:14), 8.95 (06-10 @ 14:45)  Plt: 190 (06-13 @ 04:08), 173 (06-12 @ 22:23), 192 (06-12 @ 11:15), 203 (06-12 @ 06:15), 200 (06-12 @ 05:00), 206 (06-11 @ 05:14), 240 (06-10 @ 14:45)      LIVER FUNCTIONS - ( 13 Jun 2023 04:08 )  Alb: 2.7 g/dL / Pro: 5.0 g/dL / ALK PHOS: 82 U/L / ALT: 27 U/L / AST: 23 U/L / GGT: x           ABG - ( 12 Jun 2023 08:33 )  pH, Arterial: 7.39  pH, Blood: x     /  pCO2: 35    /  pO2: 139   / HCO3: 21    / Base Excess: -3.3  /  SaO2: 98.9        Mode: AC/ CMV (Assist Control/ Continuous Mandatory Ventilation), RR (machine): 24, TV (machine): 450, FiO2: 40, PEEP: 8, ITime: 1, MAP: 13, PIP: 24    MEDICATIONS  (STANDING):  chlorhexidine 0.12% Liquid 15 milliLiter(s) Oral Mucosa every 12 hours  chlorhexidine 2% Cloths 1 Application(s) Topical <User Schedule>  CRRT Treatment    <Continuous>  dexMEDEtomidine Infusion 1.5 MICROgram(s)/kG/Hr (47.7 mL/Hr) IV Continuous <Continuous>  dextrose 5%. 1000 milliLiter(s) (100 mL/Hr) IV Continuous <Continuous>  dextrose 5%. 1000 milliLiter(s) (50 mL/Hr) IV Continuous <Continuous>  dextrose 50% Injectable 25 Gram(s) IV Push once  dextrose 50% Injectable 12.5 Gram(s) IV Push once  dextrose 50% Injectable 25 Gram(s) IV Push once  doxazosin 2 milliGRAM(s) Oral at bedtime  ergocalciferol 03053 Unit(s) Oral every week  fentaNYL   Infusion. 0.5 MICROgram(s)/kG/Hr (6.36 mL/Hr) IV Continuous <Continuous>  glucagon  Injectable 1 milliGRAM(s) IntraMuscular once  insulin lispro (ADMELOG) corrective regimen sliding scale   SubCutaneous every 6 hours  insulin NPH human recombinant 6 Unit(s) SubCutaneous every 6 hours  lacosamide IVPB 150 milliGRAM(s) IV Intermittent every 12 hours  multivitamin 1 Tablet(s) Oral daily  norepinephrine Infusion 0.05 MICROgram(s)/kG/Min (5.96 mL/Hr) IV Continuous <Continuous>  pantoprazole  Injectable 40 milliGRAM(s) IV Push daily  piperacillin/tazobactam IVPB.. 4.5 Gram(s) IV Intermittent every 8 hours  polyethylene glycol 3350 17 Gram(s) Oral two times a day  PureFlow Dialysate RFP-400 (K 2 / Ca 3) 5000 milliLiter(s) (2000 mL/Hr) CRRT <Continuous>  senna 2 Tablet(s) Oral at bedtime    MEDICATIONS  (PRN):  acetaminophen     Tablet .. 650 milliGRAM(s) Oral every 6 hours PRN Temp greater or equal to 38C (100.4F), Mild Pain (1 - 3)  acetylcysteine 10%  Inhalation 4 milliLiter(s) Inhalation every 4 hours PRN wheezing  albuterol   0.5% 2.5 milliGRAM(s) Nebulizer every 4 hours PRN Shortness of Breath and/or Wheezing  dextrose Oral Gel 15 Gram(s) Oral once PRN Blood Glucose LESS THAN 70 milliGRAM(s)/deciliter  ondansetron Injectable 4 milliGRAM(s) IV Push every 6 hours PRN Nausea and/or Vomiting     SUMMARY: This is a 62 y/o M with hx of Afib on eiquis, CVA BG bleed ,HTN, CHF, Gout, DM  presents as a stroke code after being down in bathroom unknown time LKW was 2300. 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 Pt is lethargic  - Cardene started for blood pressure control.    6/10- patient became increasingly lethargic in afternoon/early evening, febrile, hfnc maximized and patient intubated for hypoxemic respiratory failure with VL, sedated overnight on prop and fent, renal function worsening, pt on broad spectrum IV abx and fully cultured for sepsis due to aspiration pneumonia     OVERNIGHT EVENTS: Vent requirements decreasing.     ADMISSION SCORES:   GCS 12   ICH 1   NIH 17    REVIEW OF SYSTEMS: Pt unable to participate in ROS due to neurological status    VITALS: [x]       General: morbidly obese; ill appearing  HENT: nc/at, orally intubated  Eyes: Anicteric sclerae  Cardiac: [id number]  Lungs: b/l rhonci   Abdomen: Soft, non-tender, +BS  Extremities: R shoulder - tenderness and crepitance / swelling R shoulder w/ overlying ecchymosis- distal pulses RUE intact, good capillary refill; anasarca  Skin/Incision Site: Clean, dry and intact  Neurologic: sedated on precedex/fentanyl, no EO or command following, pupils constricted/sluggish, midline gaze, + corneals b/l, no cough/occulocephalic, flaccid all four extremities      ICU Vital Signs Last 24 Hrs  T(C): 37 (13 Jun 2023 08:00), Max: 40 (12 Jun 2023 17:00)  T(F): 98.6 (13 Jun 2023 08:00), Max: 104 (12 Jun 2023 17:00)  HR: 53 (13 Jun 2023 08:00) (50 - 61)  BP: --  BP(mean): --  ABP: 142/55 (13 Jun 2023 08:00) (123/52 - 175/50)  ABP(mean): 82 (13 Jun 2023 08:00) (62 - 92)  RR: 24 (13 Jun 2023 08:00) (24 - 36)  SpO2: 100% (13 Jun 2023 08:00) (97% - 100%)      06-12-23 @ 07:01  -  06-13-23 @ 07:00  --------------------------------------------------------  IN: 3252.2 mL / OUT: 6349 mL / NET: -3096.8 mL    06-13-23 @ 07:01  -  06-13-23 @ 08:35  --------------------------------------------------------  IN: 144 mL / OUT: 30 mL / NET: 114 mL        LABS:  Na: 139 (06-13 @ 04:08), 141 (06-12 @ 18:06), 145 (06-12 @ 05:00), 150 (06-11 @ 05:14), 151 (06-10 @ 14:45)  K: 4.0 (06-13 @ 04:08), 4.1 (06-12 @ 18:06), 4.7 (06-12 @ 05:00), 4.4 (06-11 @ 05:14), 4.5 (06-10 @ 14:45)  Cl: 106 (06-13 @ 04:08), 108 (06-12 @ 18:06), 112 (06-12 @ 05:00), 119 (06-11 @ 05:14), 116 (06-10 @ 14:45)  CO2: 20 (06-13 @ 04:08), 20 (06-12 @ 18:06), 20 (06-12 @ 05:00), 21 (06-11 @ 05:14), 20 (06-10 @ 14:45)  BUN: 54 (06-13 @ 04:08), 67 (06-12 @ 18:06), 101 (06-12 @ 05:00), 115 (06-11 @ 05:14), 110 (06-10 @ 14:45)  Cr: 2.8 (06-13 @ 04:08), 3.1 (06-12 @ 18:06), 4.8 (06-12 @ 05:00), 5.1 (06-11 @ 05:14), 5.1 (06-10 @ 14:45)  Glu: 134(06-13 @ 04:08), 167(06-12 @ 18:06), 138(06-12 @ 05:00), 191(06-11 @ 05:14), 146(06-10 @ 14:45)    Hgb: 8.1 (06-13 @ 04:08), 8.0 (06-12 @ 22:23), 6.9 (06-12 @ 11:15), 6.8 (06-12 @ 06:15), 6.5 (06-12 @ 05:00), 7.5 (06-11 @ 05:14), 8.4 (06-10 @ 14:45)  Hct: 24.6 (06-13 @ 04:08), 24.3 (06-12 @ 22:23), 21.7 (06-12 @ 11:15), 21.7 (06-12 @ 06:15), 21.1 (06-12 @ 05:00), 24.3 (06-11 @ 05:14), 26.1 (06-10 @ 14:45)  WBC: 12.63 (06-13 @ 04:08), 11.48 (06-12 @ 22:23), 15.62 (06-12 @ 11:15), 14.88 (06-12 @ 06:15), 13.17 (06-12 @ 05:00), 10.21 (06-11 @ 05:14), 8.95 (06-10 @ 14:45)  Plt: 190 (06-13 @ 04:08), 173 (06-12 @ 22:23), 192 (06-12 @ 11:15), 203 (06-12 @ 06:15), 200 (06-12 @ 05:00), 206 (06-11 @ 05:14), 240 (06-10 @ 14:45)      LIVER FUNCTIONS - ( 13 Jun 2023 04:08 )  Alb: 2.7 g/dL / Pro: 5.0 g/dL / ALK PHOS: 82 U/L / ALT: 27 U/L / AST: 23 U/L / GGT: x           ABG - ( 12 Jun 2023 08:33 )  pH, Arterial: 7.39  pH, Blood: x     /  pCO2: 35    /  pO2: 139   / HCO3: 21    / Base Excess: -3.3  /  SaO2: 98.9        Mode: AC/ CMV (Assist Control/ Continuous Mandatory Ventilation), RR (machine): 24, TV (machine): 450, FiO2: 40, PEEP: 8, ITime: 1, MAP: 13, PIP: 24    MEDICATIONS  (STANDING):  chlorhexidine 0.12% Liquid 15 milliLiter(s) Oral Mucosa every 12 hours  chlorhexidine 2% Cloths 1 Application(s) Topical <User Schedule>  CRRT Treatment    <Continuous>  dexMEDEtomidine Infusion 1.5 MICROgram(s)/kG/Hr (47.7 mL/Hr) IV Continuous <Continuous>  dextrose 5%. 1000 milliLiter(s) (100 mL/Hr) IV Continuous <Continuous>  dextrose 5%. 1000 milliLiter(s) (50 mL/Hr) IV Continuous <Continuous>  dextrose 50% Injectable 25 Gram(s) IV Push once  dextrose 50% Injectable 12.5 Gram(s) IV Push once  dextrose 50% Injectable 25 Gram(s) IV Push once  doxazosin 2 milliGRAM(s) Oral at bedtime  ergocalciferol 71373 Unit(s) Oral every week  fentaNYL   Infusion. 0.5 MICROgram(s)/kG/Hr (6.36 mL/Hr) IV Continuous <Continuous>  glucagon  Injectable 1 milliGRAM(s) IntraMuscular once  insulin lispro (ADMELOG) corrective regimen sliding scale   SubCutaneous every 6 hours  insulin NPH human recombinant 6 Unit(s) SubCutaneous every 6 hours  lacosamide IVPB 150 milliGRAM(s) IV Intermittent every 12 hours  multivitamin 1 Tablet(s) Oral daily  norepinephrine Infusion 0.05 MICROgram(s)/kG/Min (5.96 mL/Hr) IV Continuous <Continuous>  pantoprazole  Injectable 40 milliGRAM(s) IV Push daily  piperacillin/tazobactam IVPB.. 4.5 Gram(s) IV Intermittent every 8 hours  polyethylene glycol 3350 17 Gram(s) Oral two times a day  PureFlow Dialysate RFP-400 (K 2 / Ca 3) 5000 milliLiter(s) (2000 mL/Hr) CRRT <Continuous>  senna 2 Tablet(s) Oral at bedtime    MEDICATIONS  (PRN):  acetaminophen     Tablet .. 650 milliGRAM(s) Oral every 6 hours PRN Temp greater or equal to 38C (100.4F), Mild Pain (1 - 3)  acetylcysteine 10%  Inhalation 4 milliLiter(s) Inhalation every 4 hours PRN wheezing  albuterol   0.5% 2.5 milliGRAM(s) Nebulizer every 4 hours PRN Shortness of Breath and/or Wheezing  dextrose Oral Gel 15 Gram(s) Oral once PRN Blood Glucose LESS THAN 70 milliGRAM(s)/deciliter  ondansetron Injectable 4 milliGRAM(s) IV Push every 6 hours PRN Nausea and/or Vomiting

## 2023-06-13 NOTE — PROGRESS NOTE ADULT - CRITICAL CARE ATTENDING COMMENT
ATTENDING ADDENDUM    Events and examination as above    Active issues:    #L. thalamic ICH-1  #Type I respiratory failure due to aspiration pneumonia  #DESI on CKD  #R. humeral fracture  #History of CVA  #History of CHF, A. fib (on eliquis)  #History of DM    Plan updates:  - Q1h neuro checks/pupillometry/vital signs, VEEG/bis, sedation w/ precedex, off prop (TG > 500), weaning fentanyl, vimpat, nimbex  - Intubated on VAC 24/450/80/10, wean as tolerated, zosyn  - Holding AC s/p reversal  - CVVHD, strict I/O, daily weights  - Anemia work, tx 1u PRBC ATTENDING ADDENDUM    Events and examination as above    Active issues:    #L. thalamic ICH-1  #Type I respiratory failure due to aspiration pneumonia  #DESI on CKD  #R. humeral fracture  #History of CVA  #History of CHF, A. fib (on eliquis)  #History of DM    Plan updates:  - Q1h neuro checks/pupillometry/vital signs, VEEG, sedation w/ precedex (RASS 0 to -2), off prop (TG > 500), weaning fentanyl, vimpat, nimbex d/c'd  - Intubated on VAC 24/450/40%/+6, wean as tolerated, zosyn (SpCx sensitive H. influenzae)  - Holding AC s/p reversal  - CVVHD discontinued, bumex 2mg q12h, strict I/O, daily weights  - Anemia work, tx 1u PRBC, resuming SQH today, pending FOBT  - Last BM 8/10

## 2023-06-13 NOTE — PROGRESS NOTE ADULT - ASSESSMENT
The patient is a 63-year-old male with a past history of Afib on Eliquis, prior basal ganglia hemorrhage, HTN, Gout, and DM, CKD 3b- 4  who presented as a stroke.  # DESI on YYE6u-5/ ATN vs EV   # oliguria, resolved  # HTN uncontrolled   # Thalamic CVA hemorrhagic   - non oliguric  - on  cvvhd , 125 cc negative/ h / might be able to hold/ UO 40 cc/h start bumex 2 q12 /   -ph noted / no binders   - off pressors   - followed by neurology   will follow

## 2023-06-13 NOTE — CONSULT NOTE ADULT - NS ATTEND AMEND GEN_ALL_CORE FT
Agree with the above plan, on my exam this morning of June 6, 2023.  Mr. Cannon is neurologically improved.  He opens eyes to voice he does have a disconjugate gaze.  He has a right-sided neglect and right-sided plegia.  He does follow commands briskly on the left side he is antigravity in the left upper and the left lower extremity.  He has severely dysarthric speech.    Repeat head CT this morning does show enlargement of the left thalamic hemorrhage however this is confounded by the fact that he did receive IV contrast for his CTA imaging therefore some component of this thalamic hemorrhage expansion is secondary to the contrast.    Clinically, Mr. Cannon is neurologically improved compared to overnight.  I did meet with his wife and they are in agreement with full conservative aggressive medical management at this time.    Recommend systolic blood pressure less than 140, recommend Keppra 1000 mg every 12 hours, recommend hypertonic saline per the neuro ICU and every hour neurochecks.    We discussed the possibility of a life-saving surgery should there be additional compression and hematoma expansion.  Mrs. Cannon will think about this and if this is an intervention that would be in her 's wishes.  All her questions were answered and she was in full agreement with the plan above    Neurosurgery to follow.
62 y/o M presents with thalamic bleed ICH, Rt humerus fracture, sepsis, DESI.   Patient intubated, sedated; ON IV antibiotics, CVVHD - high risk  Will plan to reach out to family later in the week after primary neuro ICU team and discuss GOC as appropriate   Will follow

## 2023-06-13 NOTE — CONSULT NOTE ADULT - ASSESSMENT
64 y/o M with hx of Afib on eiquis, CVA BG bleed ,HTN, CHF, Gout, DM  admitted following fall in bathroom, stroke code in ED with CT showing thalamic bleed ICH. Patient also with Rt humerus fracture.  On 6/10 patient became increasingly lethargic and was intubated for AHRF, sedated, with worsening DESI. Patient is being following by nephrology, on CVVHD. Patient also started on broad spectrum abx for sepsis 2/2 aspiration pna. Patient was planned for diagnostic cerebral angiogram which is on hold until he is more stable.  Palliative consulted for [facility] due to high LACE score.     Patient appeared comfortable on exam.         Education about palliative care provided to patient/family.  See Recs below.    Please call [phone] with questions or concerns 24/7.   We will continue to follow.    62 y/o M with hx of Afib on eiquis, CVA BG bleed ,HTN, CHF, Gout, DM  admitted following fall in bathroom, stroke code in ED with CT showing thalamic bleed ICH. Patient also with Rt humerus fracture.  On 6/10 patient became increasingly lethargic and was intubated for AHRF, sedated, with worsening DESI. Patient is being following by nephrology, on CVVHD. Patient also started on broad spectrum abx for sepsis 2/2 aspiration pna. Patient was planned for diagnostic cerebral angiogram which is on hold until he is more stable.  Palliative consulted for [facility] due to high LACE score.     Patient appeared comfortable on exam.   Spoke with Neurocrit team, will reach out to family later this week to introduce palliative care.     Education about palliative care provided to patient/family.  See Recs below.    Please call [phone] with questions or concerns 24/7.   We will continue to follow.

## 2023-06-13 NOTE — CONSULT NOTE ADULT - PROBLEM SELECTOR RECOMMENDATION 2
2/2 aspiration pna, ICH  vent support   may need to discuss trach/peg versus extubation if patient does not improve

## 2023-06-13 NOTE — CONSULT NOTE ADULT - PROBLEM SELECTOR RECOMMENDATION 5
Full code  HCP is wife Colleen, secondary is brother Mauri  Continue current medical management  Will reach out to family to introduce palliative care

## 2023-06-13 NOTE — EEG REPORT - NS EEG TEXT BOX
Epilepsy Attending Note:     GRIS TIDWELL    63y Male  MRN [id number]    Vital Signs Last 24 Hrs  T(C): 37 (2023 08:00), Max: 40 (2023 17:00)  T(F): 98.6 (2023 08:00), Max: 104 (2023 17:00)  HR: 53 (2023 10:06) (50 - 61)  BP: --  BP(mean): --  RR: 24 (2023 08:00) (24 - 36)  SpO2: 100% (2023 10:06) (97% - 100%)    Parameters below as of 2023 08:00  Patient On (Oxygen Delivery Method): ventilator    O2 Concentration (%): 40                          8.1    12.63 )-----------( 190      ( 2023 04:08 )             24.6       06-13    139  |  106  |  54<H>  ----------------------------<  134<H>  4.0   |  20  |  2.8<H>    Ca    8.0<L>      2023 04:08  Phos  4.0     06-13  Mg     2.3     06-13    TPro  5.0<L>  /  Alb  2.7<L>  /  TBili  0.9  /  DBili  x   /  AST  23  /  ALT  27  /  AlkPhos  82  06-13      MEDICATIONS  (STANDING):  buMETAnide Injectable 2 milliGRAM(s) IV Push every 12 hours  chlorhexidine 0.12% Liquid 15 milliLiter(s) Oral Mucosa every 12 hours  chlorhexidine 2% Cloths 1 Application(s) Topical <User Schedule>  CRRT Treatment    <Continuous>  dexMEDEtomidine Infusion 0.2 MICROgram(s)/kG/Hr (6.36 mL/Hr) IV Continuous <Continuous>  dextrose 5%. 1000 milliLiter(s) (50 mL/Hr) IV Continuous <Continuous>  dextrose 5%. 1000 milliLiter(s) (100 mL/Hr) IV Continuous <Continuous>  dextrose 50% Injectable 25 Gram(s) IV Push once  dextrose 50% Injectable 12.5 Gram(s) IV Push once  dextrose 50% Injectable 25 Gram(s) IV Push once  doxazosin 2 milliGRAM(s) Oral at bedtime  ergocalciferol 97030 Unit(s) Oral every week  fentaNYL   Infusion. 0.5 MICROgram(s)/kG/Hr (6.36 mL/Hr) IV Continuous <Continuous>  glucagon  Injectable 1 milliGRAM(s) IntraMuscular once  insulin lispro (ADMELOG) corrective regimen sliding scale   SubCutaneous every 6 hours  insulin NPH human recombinant 6 Unit(s) SubCutaneous every 6 hours  lacosamide IVPB 150 milliGRAM(s) IV Intermittent every 12 hours  magnesium hydroxide Suspension 30 milliLiter(s) Oral every 8 hours  multivitamin 1 Tablet(s) Oral daily  norepinephrine Infusion 0.05 MICROgram(s)/kG/Min (5.96 mL/Hr) IV Continuous <Continuous>  pantoprazole  Injectable 40 milliGRAM(s) IV Push daily  piperacillin/tazobactam IVPB.. 4.5 Gram(s) IV Intermittent every 8 hours  polyethylene glycol 3350 17 Gram(s) Oral two times a day  PureFlow Dialysate RFP-400 (K 2 / Ca 3) 5000 milliLiter(s) (2000 mL/Hr) CRRT <Continuous>  senna 2 Tablet(s) Oral every 12 hours    MEDICATIONS  (PRN):  acetaminophen     Tablet .. 650 milliGRAM(s) Oral every 6 hours PRN Temp greater or equal to 38C (100.4F), Mild Pain (1 - 3)  acetylcysteine 10%  Inhalation 4 milliLiter(s) Inhalation every 4 hours PRN wheezing  albuterol   0.5% 2.5 milliGRAM(s) Nebulizer every 4 hours PRN Shortness of Breath and/or Wheezing  dextrose Oral Gel 15 Gram(s) Oral once PRN Blood Glucose LESS THAN 70 milliGRAM(s)/deciliter  hydrALAZINE Injectable 10 milliGRAM(s) IV Push every 2 hours PRN SBP > 150  ondansetron Injectable 4 milliGRAM(s) IV Push every 6 hours PRN Nausea and/or Vomiting            VEEG in the last 24 hours: Intubated, sedated    Background - continuous, reaching frequencies in the range of 4-5 hz  with brief few seconds of attenuation expressed diffusely with shifting asymmetry     Focal and generalized slowin. moderate generalized slowing  2. bilateral independent focal slowing     Interictal activity - small to moderate number of left FT sharp waves, on rare occasions appearing as brief tuns of periodic pattern with frequency of 1 per 2-3 sec    Events - none    Seizures - none    Impression: Abnormal VEEG as above    Plan - per [facility] team

## 2023-06-13 NOTE — CONSULT NOTE ADULT - SUBJECTIVE AND OBJECTIVE BOX
CC:     HPI:    This is a 64 y/o M with hx of Afib on eiquis, CVA BG bleed ,HTN, CHF, Gout, DM  admitted following fall in bathroom, stroke code in ED with CT showing thalamic bleed ICH. Patient also with Rt humerus fracture.  On 6/10 patient became increasingly lethargic and was intubated for AHRF, sedated, with worsening DESI. Patient is being following by nephrology, on CVVHD. Patient also started on broad spectrum abx for sepsis 2/2 aspiration pna. Patient was planned for diagnostic cerebral angiogram which is on hold until he is more stable.  Palliative consulted for GOC due to high LACE score.     PERTINENT PM/SXH:   Diabetes mellitus    Hypertension    Gout    Morbidly obese    Gout      S/P tonsillectomy      FAMILY HISTORY:  FH: stroke    FH: CABG (coronary artery bypass surgery)      ITEMS NOT CHECKED ARE NOT PRESENT    SOCIAL HISTORY:   Significant other/partner[ ]  Children [ ]  Catholic/Spirituality:  Substance hx:  [ ]   Tobacco hx  [ ]   Alcohol hx [ ] Other  Living Situation: [ ]Home  [ ]Long term care  [ ]Rehab [ ]Other  Home Services: [ ] HHA [ ] Visting RN [ ] Hospice  Occupation:  Home Opioid hx:  [ ] Y [ ] N [ ] I-Stop Reference No:    ADVANCE DIRECTIVES:    [ ] Full Code [ ] DNR [ ] MOLST [ ] Living Will     DECISION MAKER(s):  [ ] Health Care Proxy(s)  [ ] Surrogate(s)  [ ] Guardian           Name(s): Phone Number(s):    BASELINE (I)ADL(s) (prior to admission):  [place]: [ ]Total  [ ] Moderate [ ]Dependent  Palliative Performance Status Version 2:         %    http://npcrc.org/files/news/palliative_performance_scale_ppsv2.pdf    Allergies    No Known Allergies    Intolerances    MEDICATIONS  (STANDING):  buMETAnide Injectable 2 milliGRAM(s) IV Push every 12 hours  chlorhexidine 0.12% Liquid 15 milliLiter(s) Oral Mucosa every 12 hours  chlorhexidine 2% Cloths 1 Application(s) Topical <User Schedule>  CRRT Treatment    <Continuous>  dexMEDEtomidine Infusion 0.2 MICROgram(s)/kG/Hr (6.36 mL/Hr) IV Continuous <Continuous>  dextrose 5%. 1000 milliLiter(s) (50 mL/Hr) IV Continuous <Continuous>  dextrose 5%. 1000 milliLiter(s) (100 mL/Hr) IV Continuous <Continuous>  dextrose 50% Injectable 25 Gram(s) IV Push once  dextrose 50% Injectable 12.5 Gram(s) IV Push once  dextrose 50% Injectable 25 Gram(s) IV Push once  doxazosin 2 milliGRAM(s) Oral at bedtime  ergocalciferol 10319 Unit(s) Oral every week  fentaNYL   Infusion. 0.5 MICROgram(s)/kG/Hr (6.36 mL/Hr) IV Continuous <Continuous>  glucagon  Injectable 1 milliGRAM(s) IntraMuscular once  insulin lispro (ADMELOG) corrective regimen sliding scale   SubCutaneous every 6 hours  insulin NPH human recombinant 6 Unit(s) SubCutaneous every 6 hours  lacosamide IVPB 150 milliGRAM(s) IV Intermittent every 12 hours  magnesium hydroxide Suspension 30 milliLiter(s) Oral every 8 hours  multivitamin 1 Tablet(s) Oral daily  norepinephrine Infusion 0.05 MICROgram(s)/kG/Min (5.96 mL/Hr) IV Continuous <Continuous>  pantoprazole  Injectable 40 milliGRAM(s) IV Push daily  piperacillin/tazobactam IVPB.. 4.5 Gram(s) IV Intermittent every 8 hours  polyethylene glycol 3350 17 Gram(s) Oral two times a day  PureFlow Dialysate RFP-400 (K 2 / Ca 3) 5000 milliLiter(s) (2000 mL/Hr) CRRT <Continuous>  senna 2 Tablet(s) Oral every 12 hours    MEDICATIONS  (PRN):  acetaminophen     Tablet .. 650 milliGRAM(s) Oral every 6 hours PRN Temp greater or equal to 38C (100.4F), Mild Pain (1 - 3)  acetylcysteine 10%  Inhalation 4 milliLiter(s) Inhalation every 4 hours PRN wheezing  albuterol   0.5% 2.5 milliGRAM(s) Nebulizer every 4 hours PRN Shortness of Breath and/or Wheezing  dextrose Oral Gel 15 Gram(s) Oral once PRN Blood Glucose LESS THAN 70 milliGRAM(s)/deciliter  hydrALAZINE Injectable 10 milliGRAM(s) IV Push every 2 hours PRN SBP > 150  ondansetron Injectable 4 milliGRAM(s) IV Push every 6 hours PRN Nausea and/or Vomiting    PRESENT SYMPTOMS: [ ]Unable to obtain due to poor mentation   Source if other than patient:  [ ]Family   [ ]Team     Pain: [ ]yes [ ]no  QOL impact -   Location -                    Aggravating factors -  Quality -  Radiation -  Timing-  Severity (0-10 scale):  Minimal acceptable level (0-10 scale):     CPOT:    https://www.[facility].org/getattachment/[email or web address]/Critical-Care-Pain-Observation-Tool-(CPOT)    PAIN AD Score:   http://geriatrictoolkit.[facility]/cog/painad.pdf (press ctrl +  left click to view)    Dyspnea:                           [ ]None[ ]Mild [ ]Moderate [ ]Severe     Respiratory Distress Observation Scale (RDOS):   A score of 0 to 2 signifies little or no respiratory distress, 3 signifies mild distress, scores 4 to 6 indicate moderate distress, and scores greater than 7 signify severe distress  https://www.[facility].ca/sites/default/files/PDFS/849338-xqgkspsgsyi-bnnfocsa-dspndwfrool-orusm.pdf    Anxiety:                             [ ]None[ ]Mild [ ]Moderate [ ]Severe   Fatigue:                             [ ]None[ ]Mild [ ]Moderate [ ]Severe   Nausea:                             [ ]None[ ]Mild [ ]Moderate [ ]Severe   Loss of appetite:              [ ]None[ ]Mild [ ]Moderate [ ]Severe   Constipation:                    [ ]None[ ]Mild [ ]Moderate [ ]Severe    Other Symptoms:  [ ]All other review of systems negative     Palliative Performance Status Version 2:         %    http://[facility].org/files/news/palliative_performance_scale_ppsv2.pdf  PHYSICAL EXAM:  Vital Signs Last 24 Hrs  T(C): 38.5 (13 Jun 2023 12:00), Max: 40 (12 Jun 2023 17:00)  T(F): 101.3 (13 Jun 2023 12:00), Max: 104 (12 Jun 2023 17:00)  HR: 66 (13 Jun 2023 14:07) (50 - 81)  BP: --  BP(mean): --  RR: 24 (13 Jun 2023 13:30) (21 - 36)  SpO2: 100% (13 Jun 2023 14:07) (96% - 100%)    Parameters below as of 13 Jun 2023 08:00  Patient On (Oxygen Delivery Method): ventilator    O2 Concentration (%): 40 I&O's Summary    12 Jun 2023 07:01  -  13 Jun 2023 07:00  --------------------------------------------------------  IN: 3252.2 mL / OUT: 6349 mL / NET: -3096.8 mL    13 Jun 2023 07:01  -  13 Jun 2023 14:44  --------------------------------------------------------  IN: 743.7 mL / OUT: 1116 mL / NET: -372.3 mL        GENERAL:  [ ] No acute distress [ ]Lethargic  [ ]Unarousable  [ ]Verbal  [ ]Non-Verbal [ ]Cachexia    BEHAVIORAL/PSYCH:  [ ]Alert and Oriented x  [ ] Anxiety [ ] Delirium [ ] Agitation [ ] Calm   EYES: [ ] No scleral icterus [ ] Scleral icterus [ ] Closed  ENMT:  [ ]Dry mouth  [ ]No external oral lesions [ ] No external ear or nose lesions  CARDIOVASCULAR:  [ ]Regular [ ]Irregular [ ]Tachy [ ]Not Tachy  [ ]Martin [ ] Edema [ ] No edema  PULMONARY:  [ ]Tachypnea  [ ]Audible excessive secretions [ ] No labored breathing [ ] mildly labored breathing [ ] labored breathing  GASTROINTESTINAL: [ ]Soft  [ ]Distended  [ ]Not distended [ ]Non tender [ ]Tender  MUSCULOSKELETAL: [ ]No clubbing [ ] clubbing  [ ] No cyanosis [ ] cyanosis  NEUROLOGIC: [ ]No focal deficits  [ ]Follows commands  [ ]Does not follow commands  [ ]Cognitive impairment  [ ]Dysphagia  [ ]Dysarthria  [ ]Paresis   SKIN: [ ] Jaundiced [ ] Non-jaundiced [ ]Rash [ ]No Rash [ ] Warm [ ] Dry  MISC/LINES: [ ] ET tube [ ] Trach [ ]NGT/OGT [ ]PEG [ ][place]    CRITICAL CARE:  [ ] Shock Present  [ ]Septic [ ]Cardiogenic [ ]Neurologic [ ]Hypovolemic  [ ]  Vasopressors [ ]  Inotropes   [ ]Respiratory failure present [ ]Mechanical ventilation [ ]Non-invasive ventilatory support [ ]High flow  [ ]Acute  [ ]Chronic [ ]Hypoxic  [ ]Hypercarbic [ ]Other  [ ]Other organ failure     LABS: reviewed by me                        8.1    12.63 )-----------( 190      ( 13 Jun 2023 04:08 )             24.6   06-13    139  |  106  |  54<H>  ----------------------------<  134<H>  4.0   |  20  |  2.8<H>    Ca    8.0<L>      13 Jun 2023 04:08  Phos  4.0     06-13  Mg     2.3     06-13    TPro  5.0<L>  /  Alb  2.7<L>  /  TBili  0.9  /  DBili  x   /  AST  23  /  ALT  27  /  AlkPhos  82  06-13        RADIOLOGY & ADDITIONAL STUDIES: reviewed by me    EKG: reviewed by me      PROTEIN CALORIE MALNUTRITION PRESENT: [ ]mild [ ]moderate [ ]severe [ ]underweight [ ]morbid obesity  https://www.andeal.org/vault/2440/web/files/ONC/Table_Clinical%20Characteristics%20to%20Document%20Malnutrition-White%20JV%20et%20al%202012.pdf    Height (cm): 177.8 (06-08-23 @ 14:03)  Weight (kg): 127.1 (06-07-23 @ 04:00)  BMI (kg/m2): 40.2 (06-08-23 @ 14:03)    [ ]PPSV2 < or = to 30% [ ]significant weight loss  [ ]poor nutritional intake  [ ]anasarca      [ ]Artificial Nutrition      Patient and/or family assessed for spiritual and social needs     REFERRALS:   [ ]Chaplaincy  [ ]Hospice  [ ]Child Life  [ ]Social Work  [ ]Case management [ ]Holistic Therapy     Patient discussed with primary medical team MD  Palliative care education provided to patient and/or family    Goals of Care Document:    CC:     HPI:    This is a 62 y/o M with hx of Afib on eiquis, CVA BG bleed ,HTN, CHF, Gout, DM  admitted following fall in bathroom, stroke code in ED with CT showing thalamic bleed ICH. Patient also with Rt humerus fracture.  On 6/10 patient became increasingly lethargic and was intubated for AHRF, sedated, with worsening DESI. Patient is being following by nephrology, on CVVHD. Patient also started on broad spectrum abx for sepsis 2/2 aspiration pna. Patient was planned for diagnostic cerebral angiogram which is on hold until he is more stable.  Palliative consulted for GOC due to high LACE score.     PERTINENT PM/SXH:   Diabetes mellitus    Hypertension    Gout    Morbidly obese    Gout      S/P tonsillectomy      FAMILY HISTORY:  FH: stroke    FH: CABG (coronary artery bypass surgery)      ITEMS NOT CHECKED ARE NOT PRESENT    SOCIAL HISTORY:   Significant other/partner[X ]  Children [ X]  Yarsanism/Spirituality:  Substance hx:  [ ]   Tobacco hx  [ ]   Alcohol hx [ ] Other  Living Situation: [X ]Home  [ ]Long term care  [ ]Rehab [ ]Other  Home Services: [ ] HHA [ ] Visting RN [ ] Hospice  Occupation:  Home Opioid hx:  [ ] Y [ ] N [X ] I-Stop Reference No: This report was requested by: Rachel Navas | Reference #: [id number]    There are no results for the search terms that you entered.    ADVANCE DIRECTIVES:    [ X ] Full Code [ ] DNR [ ] MOLST [ ] Living Will     DECISION MAKER(s): Colleen Cannon is primary HCP, Mauri (brother) Davis is Secondary HCP  [ X ] Health Care Proxy(s)  [ ] Surrogate(s)  [ ] Guardian           Name(s): Phone Number(s): Colleen (wife) is [phone]    BASELINE (I)ADL(s) (prior to admission):  [place]: [ X]Total  [ ] Moderate [ ]Dependent  Palliative Performance Status Version 2:       100  %    http://npcrc.org/files/news/palliative_performance_scale_ppsv2.pdf    Allergies    No Known Allergies    Intolerances    MEDICATIONS  (STANDING):  buMETAnide Injectable 2 milliGRAM(s) IV Push every 12 hours  chlorhexidine 0.12% Liquid 15 milliLiter(s) Oral Mucosa every 12 hours  chlorhexidine 2% Cloths 1 Application(s) Topical <User Schedule>  CRRT Treatment    <Continuous>  dexMEDEtomidine Infusion 0.2 MICROgram(s)/kG/Hr (6.36 mL/Hr) IV Continuous <Continuous>  dextrose 5%. 1000 milliLiter(s) (50 mL/Hr) IV Continuous <Continuous>  dextrose 5%. 1000 milliLiter(s) (100 mL/Hr) IV Continuous <Continuous>  dextrose 50% Injectable 25 Gram(s) IV Push once  dextrose 50% Injectable 12.5 Gram(s) IV Push once  dextrose 50% Injectable 25 Gram(s) IV Push once  doxazosin 2 milliGRAM(s) Oral at bedtime  ergocalciferol 73550 Unit(s) Oral every week  fentaNYL   Infusion. 0.5 MICROgram(s)/kG/Hr (6.36 mL/Hr) IV Continuous <Continuous>  glucagon  Injectable 1 milliGRAM(s) IntraMuscular once  insulin lispro (ADMELOG) corrective regimen sliding scale   SubCutaneous every 6 hours  insulin NPH human recombinant 6 Unit(s) SubCutaneous every 6 hours  lacosamide IVPB 150 milliGRAM(s) IV Intermittent every 12 hours  magnesium hydroxide Suspension 30 milliLiter(s) Oral every 8 hours  multivitamin 1 Tablet(s) Oral daily  norepinephrine Infusion 0.05 MICROgram(s)/kG/Min (5.96 mL/Hr) IV Continuous <Continuous>  pantoprazole  Injectable 40 milliGRAM(s) IV Push daily  piperacillin/tazobactam IVPB.. 4.5 Gram(s) IV Intermittent every 8 hours  polyethylene glycol 3350 17 Gram(s) Oral two times a day  PureFlow Dialysate RFP-400 (K 2 / Ca 3) 5000 milliLiter(s) (2000 mL/Hr) CRRT <Continuous>  senna 2 Tablet(s) Oral every 12 hours    MEDICATIONS  (PRN):  acetaminophen     Tablet .. 650 milliGRAM(s) Oral every 6 hours PRN Temp greater or equal to 38C (100.4F), Mild Pain (1 - 3)  acetylcysteine 10%  Inhalation 4 milliLiter(s) Inhalation every 4 hours PRN wheezing  albuterol   0.5% 2.5 milliGRAM(s) Nebulizer every 4 hours PRN Shortness of Breath and/or Wheezing  dextrose Oral Gel 15 Gram(s) Oral once PRN Blood Glucose LESS THAN 70 milliGRAM(s)/deciliter  hydrALAZINE Injectable 10 milliGRAM(s) IV Push every 2 hours PRN SBP > 150  ondansetron Injectable 4 milliGRAM(s) IV Push every 6 hours PRN Nausea and/or Vomiting    PRESENT SYMPTOMS: [ X ]Unable to obtain due to poor mentation   Source if other than patient:  [ ]Family   [ ]Team     Pain: [ ]yes [ ]no  QOL impact -   Location -                    Aggravating factors -  Quality -  Radiation -  Timing-  Severity (0-10 scale):  Minimal acceptable level (0-10 scale):     CPOT:  0  https://www.[facility].org/getattachment/[email or web address]/Critical-Care-Pain-Observation-Tool-(CPOT)    PAIN AD Score:   http://geriatrictoolkit.[facility]/cog/painad.pdf (press ctrl +  left click to view)    Dyspnea:                           [ ]None[ ]Mild [ ]Moderate [ ]Severe     Respiratory Distress Observation Scale (RDOS): 0  A score of 0 to 2 signifies little or no respiratory distress, 3 signifies mild distress, scores 4 to 6 indicate moderate distress, and scores greater than 7 signify severe distress  https://www.[facility].ca/sites/default/files/PDFS/244432-kpndggovrwn-lwrseouf-tmxiopxfyms-vevqs.pdf    Anxiety:                             [ ]None[ ]Mild [ ]Moderate [ ]Severe   Fatigue:                             [ ]None[ ]Mild [ ]Moderate [ ]Severe   Nausea:                             [ ]None[ ]Mild [ ]Moderate [ ]Severe   Loss of appetite:              [ ]None[ ]Mild [ ]Moderate [ ]Severe   Constipation:                    [ ]None[ ]Mild [ ]Moderate [ ]Severe    Other Symptoms:  [ ]All other review of systems negative     Palliative Performance Status Version 2:       10  %    http://npcrc.org/files/news/palliative_performance_scale_ppsv2.pdf    PHYSICAL EXAM:  Vital Signs Last 24 Hrs  T(C): 38.5 (13 Jun 2023 12:00), Max: 40 (12 Jun 2023 17:00)  T(F): 101.3 (13 Jun 2023 12:00), Max: 104 (12 Jun 2023 17:00)  HR: 66 (13 Jun 2023 14:07) (50 - 81)  RR: 24 (13 Jun 2023 13:30) (21 - 36)  SpO2: 100% (13 Jun 2023 14:07) (96% - 100%)    GENERAL:  [ X] No acute distress [ ]Lethargic  [ X]Unarousable  [ ]Verbal  [ ]Non-Verbal [ ]Cachexia    BEHAVIORAL/PSYCH:  [ ]Alert and Oriented x  [ ] Anxiety [ ] Delirium [ ] Agitation [X ] Calm   EYES: [ ] No scleral icterus [ ] Scleral icterus [ X] Closed  ENMT:  [ ]Dry mouth  [ X]No external oral lesions [ X] No external ear or nose lesions  CARDIOVASCULAR:  [ ]Regular [ ]Irregular [ ]Tachy [ ]Not Tachy  [ ]Martin [ ] Edema [ X] No edema  PULMONARY:  [ ]Tachypnea  [ ]Audible excessive secretions [ X] No labored breathing [ ] mildly labored breathing [ ] labored breathing  GASTROINTESTINAL: [ ]Soft  [ ]Distended  [ X]Not distended [ ]Non tender [ ]Tender  MUSCULOSKELETAL: [X ]No clubbing [ ] clubbing  [ X] No cyanosis [ ] cyanosis  NEUROLOGIC: [ ]No focal deficits  [ ]Follows commands  [ ]Does not follow commands  [ X]Cognitive impairment  [ ]Dysphagia  [ ]Dysarthria  [ ]Paresis   SKIN: [ ] Jaundiced [ X] Non-jaundiced [ ]Rash [X ]No Rash [ ] Warm [ ] Dry  MISC/LINES: [X ] ET tube [ ] Trach [ ]NGT/OGT [ ]PEG [ ][place]    CRITICAL CARE:  [ ] Shock Present  [ ]Septic [ ]Cardiogenic [ ]Neurologic [ ]Hypovolemic  [ ]  Vasopressors [ ]  Inotropes   [ ]Respiratory failure present [ X ]Mechanical ventilation [ ]Non-invasive ventilatory support [ ]High flow  [ ]Acute  [ ]Chronic [ ]Hypoxic  [ ]Hypercarbic [ ]Other  [ ]Other organ failure     LABS: reviewed by me                        8.1    12.63 )-----------( 190      ( 13 Jun 2023 04:08 )             24.6   06-13    139  |  106  |  54<H>  ----------------------------<  134<H>  4.0   |  20  |  2.8<H>    Ca    8.0<L>      13 Jun 2023 04:08  Phos  4.0     06-13  Mg     2.3     06-13    TPro  5.0<L>  /  Alb  2.7<L>  /  TBili  0.9  /  DBili  x   /  AST  23  /  ALT  27  /  AlkPhos  82  06-13        RADIOLOGY & ADDITIONAL STUDIES: reviewed by me    EKG: reviewed by me    < from: 12 Lead ECG (06.06.23 @ 08:24) >    Ventricular Rate 82 BPM    Atrial Rate 82 BPM    P-R Interval 204 ms    QRS Duration 82 ms    Q-T Interval 384 ms    QTC Calculation(Bazett) 448 ms    P Axis 24 degrees    R Axis 84 degrees    T Axis -8 degrees    Diagnosis Line Normal sinus rhythm  Septal infarct , age undetermined  Possible Inferior infarct , age undetermined  Abnormal ECG    Confirmed by Ajay West ([id number]) on 6/6/2023 1:39:28 PM    < end of copied text >    < from: CT Head No Cont (06.10.23 @ 16:43) >  IMPRESSION:    Evaluation is limited due to motion artifact.    Stable 5.5 cm left thalamic intraparenchymal hematoma with surrounding   edema as well as hemorrhagic layering within the occipital horns of the   lateral ventricles.    Unchanged mild ventriculomegaly.    --- End of Report ---    < end of copied text >    < from: MR Head w/wo IV Cont (07.09.19 @ 14:42) >    IMPRESSION:   In comparison to the previous brain MRI dated 4/18/2019:    1.  Interval decreased size of the left basal ganglia hematoma measuring   up to 2.5 cm,previously 5 cm. No evidence of nodular/masslike   enhancement.    2.  Stable mild chronic microvascular changes and scattered chronic   lacunar infarcts.      < end of copied text >    Patient and/or family assessed for spiritual and social needs     REFERRALS:   [ ]Chaplaincy  [ ]Hospice  [ ]Child Life  [ ]Social Work  [ ]Case management [ ]Holistic Therapy     Patient discussed with primary medical team MD  Palliative care education provided to patient and/or family     CC:     HPI:    This is a 62 y/o M with hx of Afib on eiquis, CVA BG bleed ,HTN, CHF, Gout, DM  admitted following fall in bathroom, stroke code in ED with CT showing thalamic bleed ICH. Patient also with Rt humerus fracture.  On 6/10 patient became increasingly lethargic and was intubated for AHRF, sedated, with worsening DESI. Patient is being following by nephrology, on CVVHD. Patient also started on broad spectrum abx for sepsis 2/2 aspiration pna. Patient was planned for diagnostic cerebral angiogram which is on hold until he is more stable.  Palliative consulted for GOC due to high LACE score.     PERTINENT PM/SXH:   Diabetes mellitus    Hypertension    Gout    Morbidly obese    Gout      S/P tonsillectomy      FAMILY HISTORY:  FH: stroke    FH: CABG (coronary artery bypass surgery)      ITEMS NOT CHECKED ARE NOT PRESENT    SOCIAL HISTORY:   Significant other/partner[X ]  Children [ X]  Scientologist/Spirituality:  Substance hx:  [ ]   Tobacco hx  [ ]   Alcohol hx [ ] Other  Living Situation: [X ]Home  [ ]Long term care  [ ]Rehab [ ]Other  Home Services: [ ] HHA [ ] Visting RN [ ] Hospice  Occupation:  Home Opioid hx:  [ ] Y [ ] N [X ] I-Stop Reference No: This report was requested by: Rachel Navas | Reference #: [id number]    There are no results for the search terms that you entered.    ADVANCE DIRECTIVES:    [ X ] Full Code [ ] DNR [ ] MOLST [ ] Living Will     DECISION MAKER(s): Colleen Cannon is primary HCP, Mauri (brother) Davis is Secondary HCP  [ X ] Health Care Proxy(s)  [ ] Surrogate(s)  [ ] Guardian           Name(s): Phone Number(s): Colleen (wife) is [phone]    BASELINE (I)ADL(s) (prior to admission):  [place]: [ X]Total  [ ] Moderate [ ]Dependent  Palliative Performance Status Version 2:       100  %    http://npcrc.org/files/news/palliative_performance_scale_ppsv2.pdf    Allergies    No Known Allergies    Intolerances    MEDICATIONS  (STANDING):  buMETAnide Injectable 2 milliGRAM(s) IV Push every 12 hours  chlorhexidine 0.12% Liquid 15 milliLiter(s) Oral Mucosa every 12 hours  chlorhexidine 2% Cloths 1 Application(s) Topical <User Schedule>  CRRT Treatment    <Continuous>  dexMEDEtomidine Infusion 0.2 MICROgram(s)/kG/Hr (6.36 mL/Hr) IV Continuous <Continuous>  dextrose 5%. 1000 milliLiter(s) (50 mL/Hr) IV Continuous <Continuous>  dextrose 5%. 1000 milliLiter(s) (100 mL/Hr) IV Continuous <Continuous>  dextrose 50% Injectable 25 Gram(s) IV Push once  dextrose 50% Injectable 12.5 Gram(s) IV Push once  dextrose 50% Injectable 25 Gram(s) IV Push once  doxazosin 2 milliGRAM(s) Oral at bedtime  ergocalciferol 40765 Unit(s) Oral every week  fentaNYL   Infusion. 0.5 MICROgram(s)/kG/Hr (6.36 mL/Hr) IV Continuous <Continuous>  glucagon  Injectable 1 milliGRAM(s) IntraMuscular once  insulin lispro (ADMELOG) corrective regimen sliding scale   SubCutaneous every 6 hours  insulin NPH human recombinant 6 Unit(s) SubCutaneous every 6 hours  lacosamide IVPB 150 milliGRAM(s) IV Intermittent every 12 hours  magnesium hydroxide Suspension 30 milliLiter(s) Oral every 8 hours  multivitamin 1 Tablet(s) Oral daily  norepinephrine Infusion 0.05 MICROgram(s)/kG/Min (5.96 mL/Hr) IV Continuous <Continuous>  pantoprazole  Injectable 40 milliGRAM(s) IV Push daily  piperacillin/tazobactam IVPB.. 4.5 Gram(s) IV Intermittent every 8 hours  polyethylene glycol 3350 17 Gram(s) Oral two times a day  PureFlow Dialysate RFP-400 (K 2 / Ca 3) 5000 milliLiter(s) (2000 mL/Hr) CRRT <Continuous>  senna 2 Tablet(s) Oral every 12 hours    MEDICATIONS  (PRN):  acetaminophen     Tablet .. 650 milliGRAM(s) Oral every 6 hours PRN Temp greater or equal to 38C (100.4F), Mild Pain (1 - 3)  acetylcysteine 10%  Inhalation 4 milliLiter(s) Inhalation every 4 hours PRN wheezing  albuterol   0.5% 2.5 milliGRAM(s) Nebulizer every 4 hours PRN Shortness of Breath and/or Wheezing  dextrose Oral Gel 15 Gram(s) Oral once PRN Blood Glucose LESS THAN 70 milliGRAM(s)/deciliter  hydrALAZINE Injectable 10 milliGRAM(s) IV Push every 2 hours PRN SBP > 150  ondansetron Injectable 4 milliGRAM(s) IV Push every 6 hours PRN Nausea and/or Vomiting    PRESENT SYMPTOMS: [ X ]Unable to obtain due to poor mentation   Source if other than patient:  [ ]Family   [ ]Team     Pain: [ ]yes [ ]no  QOL impact -   Location -                    Aggravating factors -  Quality -  Radiation -  Timing-  Severity (0-10 scale):  Minimal acceptable level (0-10 scale):     CPOT:  0  https://www.[facility].org/getattachment/[email or web address]/Critical-Care-Pain-Observation-Tool-(CPOT)    PAIN AD Score:   http://geriatrictoolkit.[facility]/cog/painad.pdf (press ctrl +  left click to view)    Dyspnea:                           [ ]None[ ]Mild [ ]Moderate [ ]Severe     Respiratory Distress Observation Scale (RDOS): 0  A score of 0 to 2 signifies little or no respiratory distress, 3 signifies mild distress, scores 4 to 6 indicate moderate distress, and scores greater than 7 signify severe distress  https://www.[facility].ca/sites/default/files/PDFS/356026-atovfydecjs-jwhdiqnq-rlpyqgvjdgs-cuwvu.pdf    Anxiety:                             [ ]None[ ]Mild [ ]Moderate [ ]Severe   Fatigue:                             [ ]None[ ]Mild [ ]Moderate [ ]Severe   Nausea:                             [ ]None[ ]Mild [ ]Moderate [ ]Severe   Loss of appetite:              [ ]None[ ]Mild [ ]Moderate [ ]Severe   Constipation:                    [ ]None[ ]Mild [ ]Moderate [ ]Severe    Other Symptoms:  [ ]All other review of systems negative     Palliative Performance Status Version 2:       10  %    http://npcrc.org/files/news/palliative_performance_scale_ppsv2.pdf    PHYSICAL EXAM:  Vital Signs Last 24 Hrs  T(C): 38.5 (13 Jun 2023 12:00), Max: 40 (12 Jun 2023 17:00)  T(F): 101.3 (13 Jun 2023 12:00), Max: 104 (12 Jun 2023 17:00)  HR: 66 (13 Jun 2023 14:07) (50 - 81)  RR: 24 (13 Jun 2023 13:30) (21 - 36)  SpO2: 100% (13 Jun 2023 14:07) (96% - 100%)    GENERAL:  [ X] No acute distress [ ]Lethargic  [ X]Unarousable  [ ]Verbal  [ ]Non-Verbal [ ]Cachexia    BEHAVIORAL/PSYCH:  [ ]Alert and Oriented x  [ ] Anxiety [ ] Delirium [ ] Agitation [X ] Calm   EYES: [ ] No scleral icterus [ ] Scleral icterus [ X] Closed  ENMT:  [ ]Dry mouth  [ X]No external oral lesions [ X] No external ear or nose lesions  CARDIOVASCULAR:  [ ]Regular [ ]Irregular [ ]Tachy [ ]Not Tachy  [ ]Martin [ ] Edema [ X] No edema  PULMONARY:  [ ]Tachypnea  [ ]Audible excessive secretions [ X] No labored breathing [ ] mildly labored breathing [ ] labored breathing  GASTROINTESTINAL: [ ]Soft  [ ]Distended  [ X]Not distended [ ]Non tender [ ]Tender  MUSCULOSKELETAL: [X ]No clubbing [ ] clubbing  [ X] No cyanosis [ ] cyanosis  NEUROLOGIC: [ ]No focal deficits  [ ]Follows commands  [ ]Does not follow commands  [ X]Cognitive impairment  [ ]Dysphagia  [ ]Dysarthria  [ ]Paresis   SKIN: [ ] Jaundiced [ X] Non-jaundiced [ ]Rash [X ]No Rash [ ] Warm [ ] Dry  MISC/LINES: [X ] ET tube [ ] Trach [ ]NGT/OGT [ ]PEG [ ][place]    CRITICAL CARE:  [ ] Shock Present  [ ]Septic [ ]Cardiogenic [ ]Neurologic [ ]Hypovolemic  [ ]  Vasopressors [ ] Inotropes   [ ]Respiratory failure present [ X ]Mechanical ventilation [ ]Non-invasive ventilatory support [ ]High flow  [ ]Acute  [ ]Chronic [ ]Hypoxic  [ ]Hypercarbic [ ]Other  [ ]Other organ failure     LABS: reviewed by me                        8.1    12.63 )-----------( 190      ( 13 Jun 2023 04:08 )             24.6   06-13    139  |  106  |  54<H>  ----------------------------<  134<H>  4.0   |  20  |  2.8<H>    Ca    8.0<L>      13 Jun 2023 04:08  Phos  4.0     06-13  Mg     2.3     06-13    TPro  5.0<L>  /  Alb  2.7<L>  /  TBili  0.9  /  DBili  x   /  AST  23  /  ALT  27  /  AlkPhos  82  06-13      RADIOLOGY & ADDITIONAL STUDIES: reviewed by me    EKG: reviewed by me    < from: 12 Lead ECG (06.06.23 @ 08:24) >    Ventricular Rate 82 BPM    Atrial Rate 82 BPM    P-R Interval 204 ms    QRS Duration 82 ms    Q-T Interval 384 ms    QTC Calculation(Bazett) 448 ms    P Axis 24 degrees    R Axis 84 degrees    T Axis -8 degrees    Diagnosis Line Normal sinus rhythm  Septal infarct , age undetermined  Possible Inferior infarct , age undetermined  Abnormal ECG    Confirmed by Ajay West ([id number]) on 6/6/2023 1:39:28 PM    < end of copied text >    < from: CT Head No Cont (06.10.23 @ 16:43) >  IMPRESSION:    Evaluation is limited due to motion artifact.    Stable 5.5 cm left thalamic intraparenchymal hematoma with surrounding   edema as well as hemorrhagic layering within the occipital horns of the   lateral ventricles.    Unchanged mild ventriculomegaly.    --- End of Report ---    < end of copied text >    < from: MR Head w/wo IV Cont (07.09.19 @ 14:42) >    IMPRESSION:   In comparison to the previous brain MRI dated 4/18/2019:    1.  Interval decreased size of the left basal ganglia hematoma measuring   up to 2.5 cm,previously 5 cm. No evidence of nodular/masslike   enhancement.    2.  Stable mild chronic microvascular changes and scattered chronic   lacunar infarcts.      < end of copied text >    Patient and/or family assessed for spiritual and social needs       Patient discussed with primary medical team MD  Palliative care education provided to patient and/or family     CC: fall    HPI:    This is a 62 y/o M with hx of Afib on eiquis, CVA BG bleed ,HTN, CHF, Gout, DM  admitted following fall in bathroom, stroke code in ED with CT showing thalamic bleed ICH. Patient also with Rt humerus fracture.  On 6/10 patient became increasingly lethargic and was intubated for AHRF, sedated, with worsening DESI. Patient is being following by nephrology, on CVVHD. Patient also started on broad spectrum abx for sepsis 2/2 aspiration pna. Patient was planned for diagnostic cerebral angiogram which is on hold until he is more stable.  Palliative consulted for GOC due to high LACE score.     PERTINENT PM/SXH:   Diabetes mellitus    Hypertension    Gout    Morbidly obese    Gout      S/P tonsillectomy      FAMILY HISTORY:  FH: stroke    FH: CABG (coronary artery bypass surgery)      ITEMS NOT CHECKED ARE NOT PRESENT    SOCIAL HISTORY:   Significant other/partner[X ]  Children [ X]  Congregational/Spirituality:  Substance hx:  [ ]   Tobacco hx  [ ]   Alcohol hx [ ] Other  Living Situation: [X ]Home  [ ]Long term care  [ ]Rehab [ ]Other  Home Services: [ ] HHA [ ] Visting RN [ ] Hospice  Occupation:  Home Opioid hx:  [ ] Y [ ] N [X ] I-Stop Reference No: This report was requested by: Rachel Navas | Reference #: [id number]    There are no results for the search terms that you entered.    ADVANCE DIRECTIVES:    [ X ] Full Code [ ] DNR [ ] MOLST [ ] Living Will     DECISION MAKER(s): Colleen Cannon is primary HCP, Mauri (brother) Davis is Secondary HCP  [ X ] Health Care Proxy(s)  [ ] Surrogate(s)  [ ] Guardian           Name(s): Phone Number(s): Colleen (wife) is [phone]    BASELINE (I)ADL(s) (prior to admission):  [place]: [ X]Total  [ ] Moderate [ ]Dependent  Palliative Performance Status Version 2:       100  %    http://npcrc.org/files/news/palliative_performance_scale_ppsv2.pdf    Allergies    No Known Allergies    Intolerances    MEDICATIONS  (STANDING):  buMETAnide Injectable 2 milliGRAM(s) IV Push every 12 hours  chlorhexidine 0.12% Liquid 15 milliLiter(s) Oral Mucosa every 12 hours  chlorhexidine 2% Cloths 1 Application(s) Topical <User Schedule>  CRRT Treatment    <Continuous>  dexMEDEtomidine Infusion 0.2 MICROgram(s)/kG/Hr (6.36 mL/Hr) IV Continuous <Continuous>  dextrose 5%. 1000 milliLiter(s) (50 mL/Hr) IV Continuous <Continuous>  dextrose 5%. 1000 milliLiter(s) (100 mL/Hr) IV Continuous <Continuous>  dextrose 50% Injectable 25 Gram(s) IV Push once  dextrose 50% Injectable 12.5 Gram(s) IV Push once  dextrose 50% Injectable 25 Gram(s) IV Push once  doxazosin 2 milliGRAM(s) Oral at bedtime  ergocalciferol 19659 Unit(s) Oral every week  fentaNYL   Infusion. 0.5 MICROgram(s)/kG/Hr (6.36 mL/Hr) IV Continuous <Continuous>  glucagon  Injectable 1 milliGRAM(s) IntraMuscular once  insulin lispro (ADMELOG) corrective regimen sliding scale   SubCutaneous every 6 hours  insulin NPH human recombinant 6 Unit(s) SubCutaneous every 6 hours  lacosamide IVPB 150 milliGRAM(s) IV Intermittent every 12 hours  magnesium hydroxide Suspension 30 milliLiter(s) Oral every 8 hours  multivitamin 1 Tablet(s) Oral daily  norepinephrine Infusion 0.05 MICROgram(s)/kG/Min (5.96 mL/Hr) IV Continuous <Continuous>  pantoprazole  Injectable 40 milliGRAM(s) IV Push daily  piperacillin/tazobactam IVPB.. 4.5 Gram(s) IV Intermittent every 8 hours  polyethylene glycol 3350 17 Gram(s) Oral two times a day  PureFlow Dialysate RFP-400 (K 2 / Ca 3) 5000 milliLiter(s) (2000 mL/Hr) CRRT <Continuous>  senna 2 Tablet(s) Oral every 12 hours    MEDICATIONS  (PRN):  acetaminophen     Tablet .. 650 milliGRAM(s) Oral every 6 hours PRN Temp greater or equal to 38C (100.4F), Mild Pain (1 - 3)  acetylcysteine 10%  Inhalation 4 milliLiter(s) Inhalation every 4 hours PRN wheezing  albuterol   0.5% 2.5 milliGRAM(s) Nebulizer every 4 hours PRN Shortness of Breath and/or Wheezing  dextrose Oral Gel 15 Gram(s) Oral once PRN Blood Glucose LESS THAN 70 milliGRAM(s)/deciliter  hydrALAZINE Injectable 10 milliGRAM(s) IV Push every 2 hours PRN SBP > 150  ondansetron Injectable 4 milliGRAM(s) IV Push every 6 hours PRN Nausea and/or Vomiting    PRESENT SYMPTOMS: [ X ]Unable to obtain due to poor mentation   Source if other than patient:  [ ]Family   [ ]Team     Pain: [ ]yes [ ]no  QOL impact -   Location -                    Aggravating factors -  Quality -  Radiation -  Timing-  Severity (0-10 scale):  Minimal acceptable level (0-10 scale):     CPOT:  0  https://[email or web address]/getattachment/[email or web address]/Critical-Care-Pain-Observation-Tool-(CPOT)    PAIN AD Score:   [email or web address]/painad.pdf (press ctrl +  left click to view)    Dyspnea:                           [ ]None[ ]Mild [ ]Moderate [ ]Severe     Respiratory Distress Observation Scale (RDOS): 0  A score of 0 to 2 signifies little or no respiratory distress, 3 signifies mild distress, scores 4 to 6 indicate moderate distress, and scores greater than 7 signify severe distress  [email or web address]-blmcyafdcbf-kdakfmrt-[email or web address]    Anxiety:                             [ ]None[ ]Mild [ ]Moderate [ ]Severe   Fatigue:                             [ ]None[ ]Mild [ ]Moderate [ ]Severe   Nausea:                             [ ]None[ ]Mild [ ]Moderate [ ]Severe   Loss of appetite:              [ ]None[ ]Mild [ ]Moderate [ ]Severe   Constipation:                    [ ]None[ ]Mild [ ]Moderate [ ]Severe    Other Symptoms:  [ ]All other review of systems negative     Palliative Performance Status Version 2:       10  %    http://npcrc.org/files/news/palliative_performance_scale_ppsv2.pdf    PHYSICAL EXAM:  Vital Signs Last 24 Hrs  T(C): 38.5 (13 Jun 2023 12:00), Max: 40 (12 Jun 2023 17:00)  T(F): 101.3 (13 Jun 2023 12:00), Max: 104 (12 Jun 2023 17:00)  HR: 66 (13 Jun 2023 14:07) (50 - 81)  RR: 24 (13 Jun 2023 13:30) (21 - 36)  SpO2: 100% (13 Jun 2023 14:07) (96% - 100%)    GENERAL:  [ X] No acute distress [ ]Lethargic  [ X]Unarousable  [ ]Verbal  [ ]Non-Verbal [ ]Cachexia    BEHAVIORAL/PSYCH:  [ ]Alert and Oriented x  [ ] Anxiety [ ] Delirium [ ] Agitation [X ] Calm   EYES: [ ] No scleral icterus [ ] Scleral icterus [ X] Closed  ENMT:  [ ]Dry mouth  [ X]No external oral lesions [ X] No external ear or nose lesions  CARDIOVASCULAR:  [ ]Regular [ ]Irregular [ ]Tachy [ ]Not Tachy  [ ]Martin [ ] Edema [ X] No edema  PULMONARY:  [ ]Tachypnea  [ ]Audible excessive secretions [ X] No labored breathing [ ] mildly labored breathing [ ] labored breathing  GASTROINTESTINAL: [ ]Soft  [ ]Distended  [ X]Not distended [ ]Non tender [ ]Tender  MUSCULOSKELETAL: [X ]No clubbing [ ] clubbing  [ X] No cyanosis [ ] cyanosis  NEUROLOGIC: [ ]No focal deficits  [ ]Follows commands  [ ]Does not follow commands  [ X]Cognitive impairment  [ ]Dysphagia  [ ]Dysarthria  [ ]Paresis   SKIN: [ ] Jaundiced [ X] Non-jaundiced [ ]Rash [X ]No Rash [ ] Warm [ ] Dry  MISC/LINES: [X ] ET tube [ ] Trach [ ]NGT/OGT [ ]PEG [ ][place]    CRITICAL CARE:  [ ] Shock Present  [ ]Septic [ ]Cardiogenic [ ]Neurologic [ ]Hypovolemic  [ ]  Vasopressors [ ] Inotropes   [ ]Respiratory failure present [ X ]Mechanical ventilation [ ]Non-invasive ventilatory support [ ]High flow  [ ]Acute  [ ]Chronic [ ]Hypoxic  [ ]Hypercarbic [ ]Other  [ ]Other organ failure     LABS: reviewed by me                        8.1    12.63 )-----------( 190      ( 13 Jun 2023 04:08 )             24.6   06-13    139  |  106  |  54<H>  ----------------------------<  134<H>  4.0   |  20  |  2.8<H>    Ca    8.0<L>      13 Jun 2023 04:08  Phos  4.0     06-13  Mg     2.3     06-13    TPro  5.0<L>  /  Alb  2.7<L>  /  TBili  0.9  /  DBili  x   /  AST  23  /  ALT  27  /  AlkPhos  82  06-13      RADIOLOGY & ADDITIONAL STUDIES: reviewed by me    EKG: reviewed by me    < from: 12 Lead ECG (06.06.23 @ 08:24) >    Ventricular Rate 82 BPM    Atrial Rate 82 BPM    P-R Interval 204 ms    QRS Duration 82 ms    Q-T Interval 384 ms    QTC Calculation(Bazett) 448 ms    P Axis 24 degrees    R Axis 84 degrees    T Axis -8 degrees    Diagnosis Line Normal sinus rhythm  Septal infarct , age undetermined  Possible Inferior infarct , age undetermined  Abnormal ECG    Confirmed by Ajay West ([id number]) on 6/6/2023 1:39:28 PM    < end of copied text >    < from: CT Head No Cont (06.10.23 @ 16:43) >  IMPRESSION:    Evaluation is limited due to motion artifact.    Stable 5.5 cm left thalamic intraparenchymal hematoma with surrounding   edema as well as hemorrhagic layering within the occipital horns of the   lateral ventricles.    Unchanged mild ventriculomegaly.    --- End of Report ---    < end of copied text >    < from: MR Head w/wo IV Cont (07.09.19 @ 14:42) >    IMPRESSION:   In comparison to the previous brain MRI dated 4/18/2019:    1.  Interval decreased size of the left basal ganglia hematoma measuring   up to 2.5 cm,previously 5 cm. No evidence of nodular/masslike   enhancement.    2.  Stable mild chronic microvascular changes and scattered chronic   lacunar infarcts.      < end of copied text >    Patient and/or family assessed for spiritual and social needs       Patient discussed with primary medical team MD  Palliative care education provided to patient and/or family

## 2023-06-13 NOTE — PROGRESS NOTE ADULT - ASSESSMENT
IMP:75 y/o M with hx of CVA, DM ,HTN, Afib on eliquis, CHF, Gout presents as [place] Code/Code ICH with NIH 17, GCS11, ICH 1 with Left thalamic hemorrhage    sICH/IVH  hypertensive emergency   acute hypoxemic respiratory   DESI on CKD   encephalopathy  R humerus fracture      Plan:   Neuro coma checks q   Pupillometer checks q1   BIS 40 to 60 while on Nimbex on prop and fent gtt  CTH, CT C/A/P non contrast today if able   MRI when stable  vEEG with significant sharp wave burden, vimpat increased on 6/9 to 150 mg q 12; if eeg stable will give scalp rest today   Consult IR for angio - R/O microaneurysm  as source of bleed once Cr stable           Stroke labs - ICH score   multifactorial encephalopathy- neurologic, metabolic/uremic--> started cvvhd  bedrest    CV: htn, hx of afib on DOAC s/p reversal  On Levo, MAP>65, sbp <150  on multiple antihypertensive at home- currently holding due to pressor requirements           afib- off doac s/p reversal for ICH; intermittent RVR, IV lopressor PRN            Echo- Noted, LV EF 55-60%, grade I diastolic dysfuntion            Daily weights  Check EKG today    Pulmonary:- AHRF due to aspiration pneumonitis  lung protective vent support  sedated, on Nimbex for lung compliance given significant vent requirement, no SAT/SBT right now  agree pulmonary toileting- nebulizers/mucomyst q 4 prn, chest PT q2  HOB >45, aspiration precautions  Sleep Apnea on CPAP at home  - Follow up with Pulmonary    GI: EMILY feeding tube in place  Change TF to concentrated Nephro. Nutrition consult for recommendations        gi ppx-       Dysphagia screening- fail   Bowel regimen- senna, add miralax  LBM- 6/10    Renal: DESI/ CRI - baseline  Cr 3.0               worsening uremia, with worsening AMS--> on CVVHD  F/U Renal regarding fluid removal 125cc/hr  IVL  DC khan               Daily weights monitor stricy I's and O's               Endocrine: Fs q 6H                          ISS q 6H- sglu - 140 -180                        NPH 6u q6h                          Stroke Core measures as above   a1c 5.9    Heme- remote hx of DVT with prior L IPH                Hold Eliquis                 Reversal of Eliquis with Andexa low dose 400U bolus and Gtt- 480U                Monitor Fever curve and WBC                Doppler B/L LE 6/8 no dvt  DVT PPx- SCD, DC Heparin  Renal deficiency associated anemia; Anemia workup pending. Repeat CBC today and transfuse if Hgb < 7.5     ID:  sepsis due to aspiration pna  f/u culture data. NGTD so far  Check Procalcitonin  MRSA negative   c/w IV zosyn  CT chest with RLL PNA    ortho- XRAY R Shoulder- Redemonstrated is an acute surgical neck fracture of the proximal humerus with displacement and mild impaction; conservative management per ortho   Check Lipids      khan/a-line 6/6  LIJ CVC 6/10  ETT 6/10    dispo- nsicu    Case D/W Dr. Vernon    family updated at [facility]  IMP:75 y/o M with hx of CVA, DM ,HTN, Afib on eliquis, CHF, Gout presents as [place] Code/Code ICH with NIH 17, GCS11, ICH 1 with Left thalamic hemorrhage    sICH/IVH  hypertensive emergency   acute hypoxemic respiratory   DESI on CKD   encephalopathy  R humerus fracture      Plan:   Neuro coma checks q 1  Pupillometer checks q1   On Precedex, fentanyl- wean to RASS 0 to -2 as tolerates  Off Nimbex gtt  Propofol d/c'd due to elevated triglycerides  MRI when stable  vEEG with significant sharp wave burden, vimpat increased on 6/9 to 150 mg q 12; continue EEG while weaning sedation  Consult IR for angio - R/O microaneurysm  as source of bleed once Cr stable           Stroke labs - ICH score   multifactorial encephalopathy- neurologic, metabolic/uremic--> started cvvhd  bedrest    CV: htn, hx of afib on DOAC s/p reversal  MAP>65, sbp <150- off levo  on multiple antihypertensive at home- currently holding due to hypotension           afib- off doac s/p reversal for ICH; intermittent RVR, IV lopressor PRN            Echo- Noted, LV EF 55-60%, grade I diastolic dysfuntion            Daily weights  Check EKG today    Pulmonary:- AHRF due to aspiration pneumonitis  lung protective vent support  sedated for lung compliance, now off nimbex- no sbt for now  agree pulmonary toileting- nebulizers/mucomyst q 4 prn, chest PT q2  HOB >45, aspiration precautions  Sleep Apnea on CPAP at home  - Follow up with Pulmonary    GI: EMILY feeding tube in place  Change TF to concentrated Nephro. Nutrition consult for recommendations        gi ppx-       Dysphagia screening- fail   Bowel regimen- senna, add miralax  LBM- 6/13    Renal: DESI/ CRI - baseline  Cr 3.0               worsening uremia, with worsening AMS--> initiated  CVVHD 6/11  UOP/BUN improving- hold CVVHD, trial bumex 2 q12h  Nephrology following  IVL  DC khan               Daily weights monitor stricy I's and O's               Endocrine: Fs q 6H                          ISS q 6H- sglu - 140 -180                        NPH 6u q6h                          Stroke Core measures as above   a1c 5.9    Heme- remote hx of DVT with prior L IPH                Hold Eliquis                 Reversal of Eliquis with Andexa low dose 400U bolus and Gtt- 480U                Monitor Fever curve and WBC                Doppler B/L LE 6/8 no dvt  S/p 1u pRBC 6/13 for worsening anemia  DVT PPx- SCD, Holding SQH pending occult stool    ID:  sepsis due to aspiration pna  f/u culture data- sputum cx 6/11 (+) mod haemophilus influenzae  Procal 0.54  MRSA negative   c/w IV zosyn  CT chest with RLL PNA    ortho- XRAY R Shoulder- Redemonstrated is an acute surgical neck fracture of the proximal humerus with displacement and mild impaction; conservative management per ortho   Check Lipids      a-line 6/6  LIJ CVC 6/10  ETT 6/10    dispo- nsicu    Case D/W Dr. Vernon    family updated at [facility]

## 2023-06-13 NOTE — CHART NOTE - NSCHARTNOTEFT_GEN_A_CORE
Unable to perform tertiary service due to patient being intubated, please recall after extubation.     spectra [phone]

## 2023-06-14 NOTE — PROGRESS NOTE ADULT - SUBJECTIVE AND OBJECTIVE BOX
Nephrology progress note    THIS IS AN INCOMPLETE NOTE . FULL NOTE TO FOLLOW SHORTLY    Patient is seen and examined, events over the last 24 h noted .    Allergies:  No Known Allergies    Hospital Medications:   MEDICATIONS  (STANDING):  buMETAnide Injectable 2 milliGRAM(s) IV Push every 12 hours  chlorhexidine 0.12% Liquid 15 milliLiter(s) Oral Mucosa every 12 hours  chlorhexidine 2% Cloths 1 Application(s) Topical <User Schedule>  dexMEDEtomidine Infusion 0.2 MICROgram(s)/kG/Hr (6.36 mL/Hr) IV Continuous <Continuous>  dextrose 5%. 1000 milliLiter(s) (100 mL/Hr) IV Continuous <Continuous>  dextrose 5%. 1000 milliLiter(s) (50 mL/Hr) IV Continuous <Continuous>  dextrose 50% Injectable 25 Gram(s) IV Push once  dextrose 50% Injectable 25 Gram(s) IV Push once  dextrose 50% Injectable 12.5 Gram(s) IV Push once  doxazosin 2 milliGRAM(s) Oral at bedtime  ergocalciferol 70576 Unit(s) Oral every week  fentaNYL   Infusion. 0.5 MICROgram(s)/kG/Hr (6.36 mL/Hr) IV Continuous <Continuous>  glucagon  Injectable 1 milliGRAM(s) IntraMuscular once  heparin   Injectable 7500 Unit(s) SubCutaneous every 8 hours  insulin lispro (ADMELOG) corrective regimen sliding scale   SubCutaneous every 6 hours  insulin NPH human recombinant 6 Unit(s) SubCutaneous every 6 hours  lacosamide IVPB 150 milliGRAM(s) IV Intermittent every 12 hours  multivitamin 1 Tablet(s) Oral daily  niCARdipine Infusion 5 mG/Hr (25 mL/Hr) IV Continuous <Continuous>  norepinephrine Infusion 0.05 MICROgram(s)/kG/Min (5.96 mL/Hr) IV Continuous <Continuous>  pantoprazole  Injectable 40 milliGRAM(s) IV Push daily  piperacillin/tazobactam IVPB.. 4.5 Gram(s) IV Intermittent every 8 hours  polyethylene glycol 3350 17 Gram(s) Oral two times a day  senna 2 Tablet(s) Oral every 12 hours        VITALS:  T(F): 99.9 (23 @ 08:00), Max: 101.3 (23 @ 12:00)  HR: 67 (23 @ 08:30)  BP: --  RR: 24 (23 @ 08:30)  SpO2: 97% (23 @ 08:30)  Wt(kg): --     @ 07:01  -   @ 07:00  --------------------------------------------------------  IN: 3252.2 mL / OUT: 6349 mL / NET: -3096.8 mL     @ 07:  -   @ 07:00  --------------------------------------------------------  IN: 2524.8 mL / OUT: 1566 mL / NET: 958.8 mL     @ 07:01  -   @ 09:01  --------------------------------------------------------  IN: 168.6 mL / OUT: 0 mL / NET: 168.6 mL          PHYSICAL EXAM:  Constitutional: NAD  HEENT: anicteric sclera, oropharynx clear, MMM  Neck: No JVD  Respiratory: CTAB, no wheezes, rales or rhonchi  Cardiovascular: S1, S2, RRR  Gastrointestinal: BS+, soft, NT/ND  Extremities: No cyanosis or clubbing. No peripheral edema  :  No khan.   Skin: No rashes    LABS:      137  |  103  |  58<H>  ----------------------------<  203<H>  4.1   |  21  |  3.5<H>    Ca    7.6<L>      2023 05:00  Phos  4.5       Mg     2.6         TPro  4.7<L>  /  Alb  2.4<L>  /  TBili  1.4<H>  /  DBili      /  AST  24  /  ALT  29  /  AlkPhos  188<H>  06-                          7.6    12.72 )-----------( 207      ( 2023 05:00 )             23.3       Urine Studies:  Urinalysis Basic - ( 2023 23:55 )    Color: Light Yellow / Appearance: Slightly Turbid / S.010 / pH:   Gluc:  / Ketone: Negative  / Bili: Negative / Urobili: <2 mg/dL   Blood:  / Protein: 30 mg/dL / Nitrite: Negative   Leuk Esterase: Large / RBC: 9 /HPF /  /HPF   Sq Epi:  / Non Sq Epi:  / Bacteria: Negative          Iron 16, TIBC 162, %sat 10      [23 @ 11:15]  Ferritin 231      [23 @ 11:15]  Vitamin D (25OH) 19      [23 @ 10:34]  HbA1c 8.7      [19 @ 04:49]  TSH 1.29      [23 @ 10:10]  Lipid: chol --, , HDL --, LDL --      [23 @ 11:15]    HBsAg Nonreact      [19 @ 06:38]  HCV 0.08, Nonreact      [19 @ 06:38]    RAMO: titer Negative, pattern --      [19 @ 06:38]  C3 Complement 177      [19 @ 06:38]  C4 Complement 46      [19 @ 06:38]  ANCA: cANCA Negative, pANCA Negative, atypical ANCA Negative      [19 @ 06:38]  Immunofixation Serum:   No Monoclonal Band Identified    Reference Range: None Detected      [19 @ 04:00]  SPEP Interpretation: Normal Electrophoresis Pattern      [19 @ 04:00]  Immunofixation Urine: Reference Range: None Detected      [19 @ 12:39]  UPEP Interpretation: Mild Selective (Glomerular) Proteinuria      [19 @ 12:39]      RADIOLOGY & ADDITIONAL STUDIES:   Nephrology progress note    Patient is seen and examined, events over the last 24 h noted .  Lying in bed  on MV  CVVHD stopped yesterday     Allergies:  No Known Allergies    Hospital Medications:   MEDICATIONS  (STANDING):  buMETAnide Injectable 2 milliGRAM(s) IV Push every 12 hours  dexMEDEtomidine Infusion 0.2 MICROgram(s)/kG/Hr (6.36 mL/Hr) IV Continuous <Continuous>  doxazosin 2 milliGRAM(s) Oral at bedtime  ergocalciferol 62075 Unit(s) Oral every week  fentaNYL   Infusion. 0.5 MICROgram(s)/kG/Hr (6.36 mL/Hr) IV Continuous <Continuous>  glucagon  Injectable 1 milliGRAM(s) IntraMuscular once  heparin   Injectable 7500 Unit(s) SubCutaneous every 8 hours  insulin lispro (ADMELOG) corrective regimen sliding scale   SubCutaneous every 6 hours  insulin NPH human recombinant 6 Unit(s) SubCutaneous every 6 hours  lacosamide IVPB 150 milliGRAM(s) IV Intermittent every 12 hours  multivitamin 1 Tablet(s) Oral daily  niCARdipine Infusion 5 mG/Hr (25 mL/Hr) IV Continuous <Continuous>  norepinephrine Infusion 0.05 MICROgram(s)/kG/Min (5.96 mL/Hr) IV Continuous <Continuous>  pantoprazole  Injectable 40 milliGRAM(s) IV Push daily  piperacillin/tazobactam IVPB.. 4.5 Gram(s) IV Intermittent every 8 hours  polyethylene glycol 3350 17 Gram(s) Oral two times a day  senna 2 Tablet(s) Oral every 12 hours        VITALS:  T(F): 99.9 (23 @ 08:00), Max: 101.3 (23 @ 12:00)  HR: 67 (23 @ 08:30)  RR: 24 (23 @ 08:30)  SpO2: 97% (23 @ 08:30)       @ 07:  -   @ 07:00  --------------------------------------------------------  IN: 3252.2 mL / OUT: 6349 mL / NET: -3096.8 mL     @ 07:01  -  06-14 @ 07:00  --------------------------------------------------------  IN: 2524.8 mL / OUT: 1566 mL / NET: 958.8 mL     @ 07: @ 09:01  --------------------------------------------------------  IN: 168.6 mL / OUT: 0 mL / NET: 168.6 mL        2023 07: 07:00  --------------------------------------------------------  IN:    Dexmedetomidine: 69.8 mL    Dexmedetomidine: 596.2 mL    Enteral Tube Flush: 60 mL    FentaNYL: 38.2 mL    FentaNYL: 305.6 mL    IV PiggyBack: 275 mL    Nepro with Carb Steady: 1080 mL    NiCARdipine: 100 mL  Total IN: 2524.8 mL    OUT:    Indwelling Catheter - Urethral (mL): 305 mL    Norepinephrine: 0 mL    Other (mL): 911 mL    Voided (mL): 350 mL  Total OUT: 1566 mL    Total NET: 958.8 mL      2023 07:  -  2023 10:53  --------------------------------------------------------  IN:    Dexmedetomidine: 79.4 mL    FentaNYL: 55.8 mL    NiCARdipine: 220 mL    Peptamen Intense VHP: 180 mL  Total IN: 535.2 mL    OUT:    Voided (mL): 150 mL  Total OUT: 150 mL    Total NET: 385.2 mL          PHYSICAL EXAM:  Constitutional: intubated on MV   Respiratory: CTAB,   Cardiovascular: S1, S2, RRR  Gastrointestinal: BS+, soft, NT/ND  Extremities: No cyanosis or clubbing. No peripheral edema  :  No khan.   Skin: No rashes    LABS:      137  |  103  |  58<H>  ----------------------------<  203<H>  4.1   |  21  |  3.5<H>    Ca    7.6<L>      2023 05:00  Phos  4.5       Mg     2.6         TPro  4.7<L>  /  Alb  2.4<L>  /  TBili  1.4<H>  /  DBili      /  AST  24  /  ALT  29  /  AlkPhos  188<H>                            7.6    12.72 )-----------( 207      ( 2023 05:00 )             23.3       Urine Studies:  Urinalysis Basic - ( 2023 23:55 )    Color: Light Yellow / Appearance: Slightly Turbid / S.010 / pH:   Gluc:  / Ketone: Negative  / Bili: Negative / Urobili: <2 mg/dL   Blood:  / Protein: 30 mg/dL / Nitrite: Negative   Leuk Esterase: Large / RBC: 9 /HPF /  /HPF   Sq Epi:  / Non Sq Epi:  / Bacteria: Negative          Iron 16, TIBC 162, %sat 10      [23 @ 11:15]  Ferritin 231      [23 @ 11:15]  Vitamin D (25OH) 19      [23 @ 10:34]  HbA1c 8.7      [19 @ 04:49]  TSH 1.29      [23 @ 10:10]  Lipid: chol --, , HDL --, LDL --      [23 @ 11:15]    HBsAg Nonreact      [19 @ 06:38]  HCV 0.08, Nonreact      [19 @ 06:38]    RAMO: titer Negative, pattern --      [19 @ 06:38]  C3 Complement 177      [19 @ 06:38]  C4 Complement 46      [19 @ 06:38]  ANCA: cANCA Negative, pANCA Negative, atypical ANCA Negative      [19 @ 06:38]  Immunofixation Serum:   No Monoclonal Band Identified    Reference Range: None Detected      [19 @ 04:00]  SPEP Interpretation: Normal Electrophoresis Pattern      [19 @ 04:00]  Immunofixation Urine: Reference Range: None Detected      [19 @ 12:39]  UPEP Interpretation: Mild Selective (Glomerular) Proteinuria      [19 @ 12:39]      RADIOLOGY & ADDITIONAL STUDIES:

## 2023-06-14 NOTE — CHART NOTE - NSCHARTNOTEFT_GEN_A_CORE
Pt no longer on CVVHD or propofol.     Estimated Needs  Weight Used: IBW 75.4 kg; actual weight 124.7 kg     Estimated Energy Needs    Continue []  Adjust []  Energy Recommendations: 829-1056 kcal/day - 11-14kcal/kg IBW    Estimated Protein Needs    Continue []  Adjust []  Protein Recommendations: 151 gm/day - 2 g/kg IBW, ASPEN BMI>30     Estimated Fluid Needs        Continue []  Adjust []  Fluid Recommendations: 2494 mL/day - 20ml/kg actual weight, or as needed to maintain labs    Recommend Peptamen Intense VHP formula, start at 20ml/hr and advance by 20ml Q4H to goal 45ml/hr. Add No Carb Prosource (1pkg = 15gms Protein) 1pkt 4x/day. -- will provide 1300kcal, 159g protein, 907ml free water. Interruptions to TF considered.

## 2023-06-14 NOTE — PROGRESS NOTE ADULT - ASSESSMENT
IMP:75 y/o M with hx of CVA, DM ,HTN, Afib on eliquis, CHF, Gout presents as [place] Code/Code ICH with NIH 17, GCS11, ICH 1 with Left thalamic hemorrhage    sICH/IVH  hypertensive emergency   acute hypoxemic respiratory   DESI on CKD   encephalopathy  R humerus fracture      Plan:   Neuro coma checks q 1  Pupillometer checks q1   On Precedex, fentanyl- wean to RASS -1 to -2 as tolerates  Off Nimbex gtt  Propofol d/c'd due to elevated triglycerides  MRI when stable  vEEG with significant sharp wave burden, vimpat increased on 6/9 to 150 mg q 12; continue EEG while weaning sedation - if negative today will give scalp break for one day   Consult IR for angio - R/O microaneurysm  as source of bleed once Cr stable           Stroke labs - ICH score   multifactorial encephalopathy- neurologic, metabolic/uremic--> started cvvhd - now on hold  bedrest    CV: htn, hx of afib on DOAC s/p reversal  MAP>60, SBP < 150 on cardene   on multiple antihypertensive at home - will be started on bumex gtt today  - will start home labetalol if unable to come off cardene after bumex gtt started            afib- off doac s/p reversal for ICH; intermittent RVR, IV lopressor PRN            Echo- Noted, LV EF 55-60%, grade I diastolic dysfuntion            Daily weights  Check EKG today    Pulmonary:- AHRF due to aspiration pneumonitis  lung protective vent support  sedated for lung compliance, now off nimbex- no sbt for now  agree pulmonary toileting- nebulizers/mucomyst q 4 prn, chest PT q2  HOB >45, aspiration precautions  Sleep Apnea on CPAP at home  - Follow up with Pulmonary    GI: EMILY feeding tube in place  Change TF to concentrated Nephro. Nutrition consult for recommendations        gi ppx-       Dysphagia screening- fail   Bowel regimen- senna, add miralax, lactulose enema   LBM- 6/10    Renal: DESI/ CRI - baseline  Cr 3.0               worsening uremia, with worsening AMS--> initiated  CVVHD 6/11 -- on hold since 6/13 - bumex trial started 6/13  UOP/BUN improving- hold CVVHD, trial bumex 2 q12h, bumex gtt today   Nephrology following  Replace erin for strict I&O's              Daily weights monitor stricy I's and O's               Endocrine: Fs q 6H                          ISS q 6H- sglu - 140 -180                        NPH 8u q6h                          Stroke Core measures as above   a1c 5.9    Heme- remote hx of DVT with prior L IPH                Hold Eliquis                 Reversal of Eliquis with Andexa low dose 400U bolus and Gtt- 480U                Monitor Fever curve and WBC                Doppler B/L LE 6/8 no dvt  S/p 1u pRBC 6/13 for worsening anemia  DVT PPx- SCD, Holding SQH pending occult stool    ID:  sepsis due to aspiration pna  f/u culture data- sputum cx 6/11 (+) mod haemophilus influenzae  Procal 0.54  MRSA negative   c/w IV zosyn day # 3  CT chest with RLL PNA    ortho- XRAY R Shoulder- Redemonstrated is an acute surgical neck fracture of the proximal humerus with displacement and mild impaction; conservative management per ortho   Check Lipids      a-line 6/6  [facility] CVC 6/11  ETT 6/10  [place] 6/14    dispo- nsicu    Case D/W Dr. Vernon    family updated at [facility]

## 2023-06-14 NOTE — PROGRESS NOTE ADULT - CRITICAL CARE ATTENDING COMMENT
ATTENDING ADDENDUM    Events and examination as above    Active issues:    #L. thalamic ICH-1  #Type I respiratory failure due to aspiration pneumonia  #DESI on CKD  #R. humeral fracture  #History of CVA  #History of CHF, A. fib (on eliquis)  #History of DM    Plan updates:  - Q1h neuro checks/pupillometry/vital signs, VEEG, sedation w/ precedex (RASS 0 to -2), off prop (TG > 500), weaning fentanyl, vimpat, nimbex d/c'd  - Intubated on VAC 24/450/40%/+6, wean as tolerated, zosyn (SpCx sensitive H. influenzae)  - Holding AC s/p reversal  - CVVHD discontinued, bumex 2mg q12h, strict I/O, daily weights  - Anemia work, tx 1u PRBC, resuming SQH today, pending FOBT  - Last BM 8/10

## 2023-06-14 NOTE — PROGRESS NOTE ADULT - ASSESSMENT
The patient is a 63-year-old male with a past history of Afib on Eliquis, prior basal ganglia hemorrhage, HTN, Gout, and DM, CKD 3b- 4  who presented as a stroke.    # DESI on GAC2z-0/ ATN vs EV   # oliguria,  # HTN uncontrolled   # Thalamic CVA hemorrhagic     - non oliguric  - for HD today 3h UF 2l  / follow UOP / sp Bumex yesterday and today still oliguric though  cc / day   - discussed with wife Ayana over the phone who is agreeable to IHD / explained procedure to her   - ph noted / no binders   - off pressors   - followed by neurology / discussed with Neuro ICU team   will follow  The patient is a 63-year-old male with a past history of Afib on Eliquis, prior basal ganglia hemorrhage, HTN, Gout, and DM, CKD 3b- 4  who presented as a stroke.    # DESI on KHS9l-7/ ATN vs EV   # oliguria,  # HTN uncontrolled   # Thalamic CVA hemorrhagic     - non oliguric  - will hold off IHD today  / follow UOP / sp Bumex yesterday and today still oliguric / need to place khan catheter  - discussed with wife Ayana over the phone who is agreeable to IHD once needed  / explained procedure to her   - ph noted / no binders   - off pressors   - followed by neurology / discussed with Neuro ICU team   will follow

## 2023-06-14 NOTE — PROGRESS NOTE ADULT - SUBJECTIVE AND OBJECTIVE BOX
SUMMARY: This is a 62 y/o M with hx of Afib on eiquis, CVA BG bleed ,HTN, CHF, Gout, DM  presents as a stroke code after being down in bathroom unknown time LKW was 2300. 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 Pt is lethargic  - Cardene started for blood pressure control.    6/10- patient became increasingly lethargic in afternoon/early evening, febrile, hfnc maximized and patient intubated for hypoxemic respiratory failure with VL, sedated overnight on prop and fent, renal function worsening, pt on broad spectrum IV abx and fully cultured for sepsis due to aspiration pneumonia     OVERNIGHT EVENTS: Vent requirements decreasing.     ADMISSION SCORES:   GCS 12   ICH 1   NIH 17    REVIEW OF SYSTEMS: Pt unable to participate in ROS due to neurological status    VITALS: [x]       General: morbidly obese; ill appearing  HENT: nc/at, orally intubated  Eyes: Anicteric sclerae  Cardiac: O2Y4bys  Lungs: b/l rhonci   Abdomen: Soft, non-tender, +BS  Extremities: R shoulder - tenderness and crepitance / swelling R shoulder w/ overlying ecchymosis- distal pulses RUE intact, good capillary refill; anasarca  Skin/Incision Site: Clean, dry and intact  Neurologic: sedated on precedex/fentanyl, no EO or command following, pupils constricted/sluggish, midline gaze, + corneals b/l, no cough/occulocephalic, flaccid all four extremities      ICU Vital Signs Last 24 Hrs  T(C): 37 (13 Jun 2023 08:00), Max: 40 (12 Jun 2023 17:00)  T(F): 98.6 (13 Jun 2023 08:00), Max: 104 (12 Jun 2023 17:00)  HR: 53 (13 Jun 2023 08:00) (50 - 61)  BP: --  BP(mean): --  ABP: 142/55 (13 Jun 2023 08:00) (123/52 - 175/50)  ABP(mean): 82 (13 Jun 2023 08:00) (62 - 92)  RR: 24 (13 Jun 2023 08:00) (24 - 36)  SpO2: 100% (13 Jun 2023 08:00) (97% - 100%)      06-12-23 @ 07:01  -  06-13-23 @ 07:00  --------------------------------------------------------  IN: 3252.2 mL / OUT: 6349 mL / NET: -3096.8 mL    06-13-23 @ 07:01  -  06-13-23 @ 08:35  --------------------------------------------------------  IN: 144 mL / OUT: 30 mL / NET: 114 mL        LABS:  Na: 139 (06-13 @ 04:08), 141 (06-12 @ 18:06), 145 (06-12 @ 05:00), 150 (06-11 @ 05:14), 151 (06-10 @ 14:45)  K: 4.0 (06-13 @ 04:08), 4.1 (06-12 @ 18:06), 4.7 (06-12 @ 05:00), 4.4 (06-11 @ 05:14), 4.5 (06-10 @ 14:45)  Cl: 106 (06-13 @ 04:08), 108 (06-12 @ 18:06), 112 (06-12 @ 05:00), 119 (06-11 @ 05:14), 116 (06-10 @ 14:45)  CO2: 20 (06-13 @ 04:08), 20 (06-12 @ 18:06), 20 (06-12 @ 05:00), 21 (06-11 @ 05:14), 20 (06-10 @ 14:45)  BUN: 54 (06-13 @ 04:08), 67 (06-12 @ 18:06), 101 (06-12 @ 05:00), 115 (06-11 @ 05:14), 110 (06-10 @ 14:45)  Cr: 2.8 (06-13 @ 04:08), 3.1 (06-12 @ 18:06), 4.8 (06-12 @ 05:00), 5.1 (06-11 @ 05:14), 5.1 (06-10 @ 14:45)  Glu: 134(06-13 @ 04:08), 167(06-12 @ 18:06), 138(06-12 @ 05:00), 191(06-11 @ 05:14), 146(06-10 @ 14:45)    Hgb: 8.1 (06-13 @ 04:08), 8.0 (06-12 @ 22:23), 6.9 (06-12 @ 11:15), 6.8 (06-12 @ 06:15), 6.5 (06-12 @ 05:00), 7.5 (06-11 @ 05:14), 8.4 (06-10 @ 14:45)  Hct: 24.6 (06-13 @ 04:08), 24.3 (06-12 @ 22:23), 21.7 (06-12 @ 11:15), 21.7 (06-12 @ 06:15), 21.1 (06-12 @ 05:00), 24.3 (06-11 @ 05:14), 26.1 (06-10 @ 14:45)  WBC: 12.63 (06-13 @ 04:08), 11.48 (06-12 @ 22:23), 15.62 (06-12 @ 11:15), 14.88 (06-12 @ 06:15), 13.17 (06-12 @ 05:00), 10.21 (06-11 @ 05:14), 8.95 (06-10 @ 14:45)  Plt: 190 (06-13 @ 04:08), 173 (06-12 @ 22:23), 192 (06-12 @ 11:15), 203 (06-12 @ 06:15), 200 (06-12 @ 05:00), 206 (06-11 @ 05:14), 240 (06-10 @ 14:45)      LIVER FUNCTIONS - ( 13 Jun 2023 04:08 )  Alb: 2.7 g/dL / Pro: 5.0 g/dL / ALK PHOS: 82 U/L / ALT: 27 U/L / AST: 23 U/L / GGT: x           ABG - ( 12 Jun 2023 08:33 )  pH, Arterial: 7.39  pH, Blood: x     /  pCO2: 35    /  pO2: 139   / HCO3: 21    / Base Excess: -3.3  /  SaO2: 98.9        Mode: AC/ CMV (Assist Control/ Continuous Mandatory Ventilation), RR (machine): 24, TV (machine): 450, FiO2: 40, PEEP: 8, ITime: 1, MAP: 13, PIP: 24    MEDICATIONS  (STANDING):  chlorhexidine 0.12% Liquid 15 milliLiter(s) Oral Mucosa every 12 hours  chlorhexidine 2% Cloths 1 Application(s) Topical <User Schedule>  CRRT Treatment    <Continuous>  dexMEDEtomidine Infusion 1.5 MICROgram(s)/kG/Hr (47.7 mL/Hr) IV Continuous <Continuous>  dextrose 5%. 1000 milliLiter(s) (100 mL/Hr) IV Continuous <Continuous>  dextrose 5%. 1000 milliLiter(s) (50 mL/Hr) IV Continuous <Continuous>  dextrose 50% Injectable 25 Gram(s) IV Push once  dextrose 50% Injectable 12.5 Gram(s) IV Push once  dextrose 50% Injectable 25 Gram(s) IV Push once  doxazosin 2 milliGRAM(s) Oral at bedtime  ergocalciferol 33935 Unit(s) Oral every week  fentaNYL   Infusion. 0.5 MICROgram(s)/kG/Hr (6.36 mL/Hr) IV Continuous <Continuous>  glucagon  Injectable 1 milliGRAM(s) IntraMuscular once  insulin lispro (ADMELOG) corrective regimen sliding scale   SubCutaneous every 6 hours  insulin NPH human recombinant 6 Unit(s) SubCutaneous every 6 hours  lacosamide IVPB 150 milliGRAM(s) IV Intermittent every 12 hours  multivitamin 1 Tablet(s) Oral daily  norepinephrine Infusion 0.05 MICROgram(s)/kG/Min (5.96 mL/Hr) IV Continuous <Continuous>  pantoprazole  Injectable 40 milliGRAM(s) IV Push daily  piperacillin/tazobactam IVPB.. 4.5 Gram(s) IV Intermittent every 8 hours  polyethylene glycol 3350 17 Gram(s) Oral two times a day  PureFlow Dialysate RFP-400 (K 2 / Ca 3) 5000 milliLiter(s) (2000 mL/Hr) CRRT <Continuous>  senna 2 Tablet(s) Oral at bedtime    MEDICATIONS  (PRN):  acetaminophen     Tablet .. 650 milliGRAM(s) Oral every 6 hours PRN Temp greater or equal to 38C (100.4F), Mild Pain (1 - 3)  acetylcysteine 10%  Inhalation 4 milliLiter(s) Inhalation every 4 hours PRN wheezing  albuterol   0.5% 2.5 milliGRAM(s) Nebulizer every 4 hours PRN Shortness of Breath and/or Wheezing  dextrose Oral Gel 15 Gram(s) Oral once PRN Blood Glucose LESS THAN 70 milliGRAM(s)/deciliter  ondansetron Injectable 4 milliGRAM(s) IV Push every 6 hours PRN Nausea and/or Vomiting

## 2023-06-14 NOTE — EEG REPORT - NS EEG TEXT BOX
Epilepsy Attending Note:     GRIS TIDWELL    63y Male  MRN [id number]    Vital Signs Last 24 Hrs  T(C): 37.7 (2023 08:00), Max: 38.5 (2023 12:00)  T(F): 99.9 (2023 08:00), Max: 101.3 (2023 12:00)  HR: 65 (2023 11:00) (53 - 82)  BP: --  BP(mean): --  RR: 24 (2023 11:00) (19 - 36)  SpO2: 97% (2023 11:00) (92% - 100%)    Parameters below as of 2023 08:00  Patient On (Oxygen Delivery Method): ventilator    O2 Concentration (%): 40                          7.6    12.72 )-----------( 207      ( 2023 05:00 )             23.3       06-14    137  |  103  |  58<H>  ----------------------------<  203<H>  4.1   |  21  |  3.5<H>    Ca    7.6<L>      2023 05:00  Phos  4.5       Mg     2.6         TPro  4.7<L>  /  Alb  2.4<L>  /  TBili  1.4<H>  /  DBili  x   /  AST  24  /  ALT  29  /  AlkPhos  188<H>  14      MEDICATIONS  (STANDING):  acetylcysteine 10%  Inhalation 4 milliLiter(s) Inhalation every 4 hours  albuterol   0.5% 2.5 milliGRAM(s) Nebulizer every 4 hours  buMETAnide Infusion 2 mG/Hr (10 mL/Hr) IV Continuous <Continuous>  chlorhexidine 0.12% Liquid 15 milliLiter(s) Oral Mucosa every 12 hours  chlorhexidine 2% Cloths 1 Application(s) Topical <User Schedule>  dexMEDEtomidine Infusion 0.2 MICROgram(s)/kG/Hr (6.36 mL/Hr) IV Continuous <Continuous>  dextrose 5%. 1000 milliLiter(s) (100 mL/Hr) IV Continuous <Continuous>  dextrose 5%. 1000 milliLiter(s) (50 mL/Hr) IV Continuous <Continuous>  dextrose 50% Injectable 25 Gram(s) IV Push once  dextrose 50% Injectable 25 Gram(s) IV Push once  dextrose 50% Injectable 12.5 Gram(s) IV Push once  doxazosin 2 milliGRAM(s) Oral at bedtime  ergocalciferol 51781 Unit(s) Oral every week  fentaNYL   Infusion. 0.5 MICROgram(s)/kG/Hr (6.36 mL/Hr) IV Continuous <Continuous>  glucagon  Injectable 1 milliGRAM(s) IntraMuscular once  heparin   Injectable 7500 Unit(s) SubCutaneous every 8 hours  insulin lispro (ADMELOG) corrective regimen sliding scale   SubCutaneous every 6 hours  insulin NPH human recombinant 8 Unit(s) SubCutaneous every 6 hours  lacosamide IVPB 150 milliGRAM(s) IV Intermittent every 12 hours  lactulose Retention Enema 200 Gram(s) Rectal once  multivitamin 1 Tablet(s) Oral daily  niCARdipine Infusion 5 mG/Hr (25 mL/Hr) IV Continuous <Continuous>  norepinephrine Infusion 0.05 MICROgram(s)/kG/Min (5.96 mL/Hr) IV Continuous <Continuous>  pantoprazole  Injectable 40 milliGRAM(s) IV Push daily  piperacillin/tazobactam IVPB.. 4.5 Gram(s) IV Intermittent every 8 hours  polyethylene glycol 3350 17 Gram(s) Oral two times a day  senna 2 Tablet(s) Oral every 12 hours  simethicone 80 milliGRAM(s) Chew every 8 hours    MEDICATIONS  (PRN):  acetaminophen     Tablet .. 650 milliGRAM(s) Oral every 6 hours PRN Temp greater or equal to 38C (100.4F), Mild Pain (1 - 3)  dextrose Oral Gel 15 Gram(s) Oral once PRN Blood Glucose LESS THAN 70 milliGRAM(s)/deciliter  hydrALAZINE Injectable 10 milliGRAM(s) IV Push every 2 hours PRN SBP > 150  ondansetron Injectable 4 milliGRAM(s) IV Push every 6 hours PRN Nausea and/or Vomiting            VEEG in the last 24 hours: Intubated, sedated    Background - continuous, slightly asymmetrical with higher amplitude from the left side, reaching frequencies in the range of 4-5 hz that gradually becomes more reactive towards the latter part of the recording     Focal and generalized slowin. moderate generalized slowing  2. mild to moderate bilateral independent focal slowing, L>R    Interictal activity:  1. very large number of right hemispheric, mainly FT sharp waves that are epileptiform  2. diffusely expressed triphasic waves  2. independent left hemispheric sharp waves    Events - none    Seizures - none    Impression: Abnormal VEEG as above    Plan - per NCC team

## 2023-06-15 NOTE — PROGRESS NOTE ADULT - ASSESSMENT
IMP:75 y/o M with hx of CVA, DM ,HTN, Afib on eliquis, CHF, Gout presents as [place] Code/Code ICH with NIH 17, GCS11, ICH 1 with Left thalamic hemorrhage    sICH/IVH  hypertensive emergency   acute hypoxemic respiratory   DESI on CKD   encephalopathy  R humerus fracture      Plan:   Neuro coma checks q 1  Pupillometer checks q1   On Precedex, fentanyl- wean to RASS -1 to -2 as tolerates  Off Nimbex gtt  Propofol d/c'd due to elevated triglycerides  MRI when stable  vEEG with significant sharp wave burden, vimpat increased on 6/9 to 150 mg q 12; continue EEG while weaning sedation - if negative today will give scalp break for one day   Consult IR for angio - R/O microaneurysm  as source of bleed once Cr stable           Stroke labs - ICH score   multifactorial encephalopathy- neurologic, metabolic/uremic--> started cvvhd - now on hold  bedrest    CV: htn, hx of afib on DOAC s/p reversal  MAP>60, SBP < 150 on cardene   on multiple antihypertensive at home - will be started on bumex gtt today  - will start home labetalol if unable to come off cardene after bumex gtt started            afib- off doac s/p reversal for ICH; intermittent RVR, IV lopressor PRN            Echo- Noted, LV EF 55-60%, grade I diastolic dysfuntion            Daily weights  Check EKG today    Pulmonary:- AHRF due to aspiration pneumonitis  lung protective vent support  sedated for lung compliance, now off nimbex- no sbt for now  agree pulmonary toileting- nebulizers/mucomyst q 4 prn, chest PT q2  HOB >45, aspiration precautions  Sleep Apnea on CPAP at home  - Follow up with Pulmonary    GI: EMILY feeding tube in place  Change TF to concentrated Nephro. Nutrition consult for recommendations        gi ppx-       Dysphagia screening- fail   Bowel regimen- senna, add miralax, lactulose enema   LBM- 6/10    Renal: DESI/ CRI - baseline  Cr 3.0               worsening uremia, with worsening AMS--> initiated  CVVHD 6/11 -- on hold since 6/13 - bumex trial started 6/13  UOP/BUN improving- hold CVVHD, trial bumex 2 q12h, bumex gtt today   Nephrology following  Replace erin for strict I&O's              Daily weights monitor stricy I's and O's               Endocrine: Fs q 6H                          ISS q 6H- sglu - 140 -180                        NPH 8u q6h                          Stroke Core measures as above   a1c 5.9    Heme- remote hx of DVT with prior L IPH                Hold Eliquis                 Reversal of Eliquis with Andexa low dose 400U bolus and Gtt- 480U                Monitor Fever curve and WBC                Doppler B/L LE 6/8 no dvt  S/p 1u pRBC 6/13 for worsening anemia  DVT PPx- SCD, Holding SQH pending occult stool    ID:  sepsis due to aspiration pna  f/u culture data- sputum cx 6/11 (+) mod haemophilus influenzae  Procal 0.54  MRSA negative   c/w IV zosyn day # 3  CT chest with RLL PNA    ortho- XRAY R Shoulder- Redemonstrated is an acute surgical neck fracture of the proximal humerus with displacement and mild impaction; conservative management per ortho   Check Lipids      a-line 6/6  LIJ CVC 6/11  ETT 6/10  Noyola 6/14    dispo- nsicu    Case D/W Dr. Vernon    family updated at [facility]  IMP:75 y/o M with hx of CVA, DM ,HTN, Afib on eliquis, CHF, Gout presents as [place] Code/Code ICH with NIH 17, GCS11, ICH 1 with Left thalamic hemorrhage    sICH/IVH  hypertensive emergency   acute hypoxemic respiratory   DESI on CKD   encephalopathy  R humerus fracture      Plan:   Neuro coma checks q 2  On Precedex, fentanyl- wean to RASS 0 to -2 as tolerates  Wean fentanyl, PRN Dilaudid for vent synchrony  Off Nimbex gtt  Propofol d/c'd due to elevated triglycerides- improving  MRI brain  vEEG with significant sharp wave burden, on vimpat 200 q12h- scalp rest, may resume EEG tomorrow  Consult IR for angio - R/O microaneurysm  as source of bleed once Cr stable           Stroke labs - ICH score   multifactorial encephalopathy- neurologic, metabolic/uremic--> started cvvhd - now on hold  bedrest    CV: htn, hx of afib on DOAC s/p reversal  MAP>60, SBP < 150 on cardene- wean as tolerates  on multiple antihypertensive at home -on bumex gtt  - resume labetalol, hydralazine           afib- off doac s/p reversal for ICH; intermittent RVR, IV lopressor PRN            Echo- Noted, LV EF 55-60%, grade I diastolic dysfunction            Daily weights  Check EKG today    Pulmonary:- AHRF due to aspiration pneumonitis  lung protective vent support  End tidal CO2  sedated for lung compliance, now off nimbex- no sbt for now  agree pulmonary toileting- nebulizers/mucomyst q 4 prn, chest PT q2  HOB >45, aspiration precautions  Sleep Apnea on CPAP at home  - Follow up with Pulmonary    GI: EMILY feeding tube in place  TF peptamen intense VHP        gi ppx-   Bowel regimen- senna, add miralax  LBM- 6/14    Renal: DESI/ CRI - baseline  Cr 3.0               worsening uremia, with worsening AMS--> initiated  CVVHD 6/11 -- on hold since 6/13 - bumex trial started 6/13  UOP/BUN improving- hold CVVHD, on bumex gtt  Metolazone x1, albumin q6h x4  Nephrology following  khan catheter             Daily weights monitor stricy I's and O's               Endocrine: Fs q 6H                          ISS q 6H- sglu - 140 -180                        NPH 12u q6h                          Stroke Core measures as above   a1c 5.9    Heme- remote hx of DVT with prior L IPH                Hold Eliquis                 Reversal of Eliquis with Andexa low dose 400U bolus and Gtt- 480U                Monitor Fever curve and WBC                Doppler B/L LE 6/8 no dvt  S/p 1u pRBC 6/13 for worsening anemia  DVT PPx- SCD, SQH    ID:  sepsis due to aspiration pna  f/u culture data- sputum cx 6/11 (+) mod haemophilus influenzae  Procal 0.54  MRSA negative   c/w IV zosyn through 6/17  CT chest with RLL PNA    ortho- XRAY R Shoulder- Redemonstrated is an acute surgical neck fracture of the proximal humerus with displacement and mild impaction; conservative management per ortho       a-line 6/6  LIJ CVC 6/10  ETT 6/10  Khan 6/14    dispo- nsicu    family updated at bedside

## 2023-06-15 NOTE — CHART NOTE - NSCHARTNOTEFT_GEN_A_CORE
SARAH Groin Shiley catheter removed   Uncomplicated  2 Sutures removed; pressure held for 5 minutes; site hemostatic; cleaned with chlorhexadine solution, dressed with xeroform, gauze and Tegaderm   Site clean and dry

## 2023-06-15 NOTE — ADVANCED PRACTICE NURSE CONSULT - ASSESSMENT
History of Present Illness:   This is a 64 y/o M with hx of Afib on eiquis, CVA BG bleed ,HTN, CHF, Gout, DM  presents as a stroke code after being down in bathroom unknown time LKW was 2300. 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 Pt is lethargic  - Cardene started for blood pressure control.    Patient received lying in bed. Intubated and sedated.  Limited mobility. Noyola in place. On pressors. High risk for pressure injury.    Type of Wound: Multiple skin tears  Location: Right buttock, right knee, and left thigh  Measurements: Right buttock ~1sac9fr, right knee ~ 0.5cmx0.5cm, left thigh ~1myy1bq  Tunneling /Undermining: No  Wound bed: Pink partial thickness skin loss  Wound edges: Intact  Periwound: Intact  Wound exudate: Intact  Wound odor: None  Induration, erythema, warmth: No  Wound pain: No    Skin to bilateral heels intact at time of assessment. History of Present Illness:   This is a 64 y/o M with hx of Afib on eiquis, CVA BG bleed ,HTN, CHF, Gout, DM  presents as a stroke code after being down in bathroom unknown time LKW was 2300. 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 Pt is lethargic  - Cardene started for blood pressure control.    Allergies and Intolerances:        Allergies:  	No Known Allergies:     Home Medications:   * Patient Currently Takes Medications as of 29-May-2019 13:39 documented in Structured Notes  · 	calcium acetate 667 mg oral tablet: 1  orally 3 times a day (with meals)  · 	amLODIPine 5 mg oral tablet: 1 tab(s) orally once a day  · 	traMADol 50 mg oral tablet: 1 tab(s) orally every 6 hours, As Needed -Severe Pain (7 - 10) MDD:4 tabs  · 	lactulose 10 g/15 mL oral syrup: 15 milliliter(s) orally 2 times a day, As Needed -for constipation   · 	colchicine 0.6 mg oral tablet: 1 tab(s) orally once a day   · 	hydrALAZINE 100 mg oral tablet: 1 tab(s) orally every 8 hours  · 	furosemide 40 mg oral tablet: 1 tab(s) orally once a day  · 	labetalol 200 mg oral tablet: 1 tab(s) orally 3 times a day  · 	Lipitor 40 mg oral tablet: 1 tab(s) orally once a day (at bedtime)   · 	insulin glargine (concentrated) 300 units/mL subcutaneous solution: 50 unit(s) subcutaneous once a day (at bedtime)   · 	HumaLOG KwikPen 200 units/mL (Concentrated) subcutaneous solution: 20 unit(s) subcutaneous 3 times a day (with meals)   · 	apixaban 5 mg oral tablet: 1 tab(s) orally every 12 hours  · 	tamsulosin 0.4 mg oral capsule: 1 cap(s) orally once a day (at bedtime)  · 	cloNIDine 0.2 mg oral tablet: 1 tab(s) orally 3 times a day  · 	predniSONE 5 mg oral tablet: 4 tab(s) orally once a day x 4 days, 3 tabs daily x 4 days, 2 tabs daily x 4 days, 1 tab daily x 4 days, then stop  · 	aspirin 81 mg oral tablet, chewable: 1 tab(s) orally once a day  · 	polyethylene glycol 3350 oral powder for reconstitution: 17 gram(s) orally once a day (at bedtime)  · 	acetaminophen 325 mg oral tablet: 2 tab(s) orally every 6 hours, As needed, Temp greater or equal to 38C (100.4F), Mild Pain (1 - 3)  · 	cholecalciferol oral tablet: 2000 unit(s) orally once a day  · 	senna oral tablet: 2 tab(s) orally once a day (at bedtime)    Patient History:    Past Medical, Past Surgical, and Family History:  PAST MEDICAL HISTORY:  Diabetes mellitus  Gout   Hypertension   Morbidly obese.   PAST SURGICAL HISTORY:  S/P tonsillectomy.   FAMILY HISTORY:  FH: CABG (coronary artery bypass surgery)  FH: stroke.     Social History:  · Substance use	No     Tobacco Screening:  · Core Measure Site	No    Patient received lying in bed. Intubated and sedated.  Limited mobility. Noyola in place. On pressors. High risk for pressure injury.    Type of Wound: Multiple skin tears  Location: Right buttock, right knee, and left thigh  Measurements: Right buttock ~8msh2mm, right knee ~ 0.5cmx0.5cm, left thigh ~1tzd1uh  Tunneling /Undermining: No  Wound bed: Pink partial thickness skin loss  Wound edges: Intact  Periwound: Intact  Wound exudate: Intact  Wound odor: None  Induration, erythema, warmth: No  Wound pain: No    Skin to bilateral heels intact at time of assessment. History of Present Illness:   This is a 62 y/o M with hx of Afib on eiquis, CVA BG bleed ,HTN, CHF, Gout, DM  presents as a stroke code after being down in bathroom unknown time LKW was 2300. 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 Pt is lethargic  - Cardene started for blood pressure control.    Allergies and Intolerances:        Allergies:  	No Known Allergies:     Home Medications:   * Patient Currently Takes Medications as of 29-May-2019 13:39 documented in Structured Notes  · 	calcium acetate 667 mg oral tablet: 1  orally 3 times a day (with meals)  · 	amLODIPine 5 mg oral tablet: 1 tab(s) orally once a day  · 	traMADol 50 mg oral tablet: 1 tab(s) orally every 6 hours, As Needed -Severe Pain (7 - 10) MDD:4 tabs  · 	lactulose 10 g/15 mL oral syrup: 15 milliliter(s) orally 2 times a day, As Needed -for constipation   · 	colchicine 0.6 mg oral tablet: 1 tab(s) orally once a day   · 	hydrALAZINE 100 mg oral tablet: 1 tab(s) orally every 8 hours  · 	furosemide 40 mg oral tablet: 1 tab(s) orally once a day  · 	labetalol 200 mg oral tablet: 1 tab(s) orally 3 times a day  · 	Lipitor 40 mg oral tablet: 1 tab(s) orally once a day (at bedtime)   · 	insulin glargine (concentrated) 300 units/mL subcutaneous solution: 50 unit(s) subcutaneous once a day (at bedtime)   · 	HumaLOG KwikPen 200 units/mL (Concentrated) subcutaneous solution: 20 unit(s) subcutaneous 3 times a day (with meals)   · 	apixaban 5 mg oral tablet: 1 tab(s) orally every 12 hours  · 	tamsulosin 0.4 mg oral capsule: 1 cap(s) orally once a day (at bedtime)  · 	cloNIDine 0.2 mg oral tablet: 1 tab(s) orally 3 times a day  · 	predniSONE 5 mg oral tablet: 4 tab(s) orally once a day x 4 days, 3 tabs daily x 4 days, 2 tabs daily x 4 days, 1 tab daily x 4 days, then stop  · 	aspirin 81 mg oral tablet, chewable: 1 tab(s) orally once a day  · 	polyethylene glycol 3350 oral powder for reconstitution: 17 gram(s) orally once a day (at bedtime)  · 	acetaminophen 325 mg oral tablet: 2 tab(s) orally every 6 hours, As needed, Temp greater or equal to 38C (100.4F), Mild Pain (1 - 3)  · 	cholecalciferol oral tablet: 2000 unit(s) orally once a day  · 	senna oral tablet: 2 tab(s) orally once a day (at bedtime)    Patient History:    Past Medical, Past Surgical, and Family History:  PAST MEDICAL HISTORY:  Diabetes mellitus  Gout   Hypertension   Morbidly obese.   PAST SURGICAL HISTORY:  S/P tonsillectomy.   FAMILY HISTORY:  FH: CABG (coronary artery bypass surgery)  FH: stroke.     Social History:  · Substance use	No     Tobacco Screening:  · Core Measure Site	No    Patient received lying in bed. Intubated and sedated.  Limited mobility. Noyola in place. High risk for pressure injury.    Type of Wound: Multiple skin tears  Location: Right buttock, right knee, and left thigh  Measurements: Right buttock ~9wsh0sn, right knee ~ 0.5cmx0.5cm, left thigh ~9lob6ad  Tunneling /Undermining: No  Wound bed: Pink partial thickness skin loss  Wound edges: Intact  Periwound: Intact  Wound exudate: Intact  Wound odor: None  Induration, erythema, warmth: No  Wound pain: No    Skin to bilateral heels intact at time of assessment.

## 2023-06-15 NOTE — ADVANCED PRACTICE NURSE CONSULT - RECOMMEDATIONS
Cleanse wounds with soap and water  Pat dry, apply bacitracin, Xeroform, cover with dry sterile dressing twice a day, prn for soiling  Maintain pressure injury prevention.   Keep skin clean.   Maintain incontinence care.   Monitor wound for changes and notify provider   Case discussed with primary RN

## 2023-06-15 NOTE — PROGRESS NOTE ADULT - SUBJECTIVE AND OBJECTIVE BOX
ICU Vital Signs Last 24 Hrs  T(C): 37.1 (15 Alan 2023 04:00), Max: 37.8 (14 Jun 2023 07:00)  T(F): 98.8 (15 Alan 2023 04:00), Max: 100.1 (14 Jun 2023 07:00)  HR: 51 (15 Alan 2023 04:30) (51 - 73)  BP: --  BP(mean): --  ABP: 142/41 (15 Alan 2023 04:30) (123/65 - 193/54)  ABP(mean): 62 (15 Alan 2023 04:30) (60 - 229)  RR: 18 (15 Alan 2023 04:30) (1 - 33)  SpO2: 93% (15 Alan 2023 04:30) (90% - 100%)      06-13-23 @ 07:01  -  06-14-23 @ 07:00  --------------------------------------------------------  IN: 2524.8 mL / OUT: 1566 mL / NET: 958.8 mL    06-14-23 @ 07:01  -  06-15-23 @ 04:54  --------------------------------------------------------  IN: 2735.4 mL / OUT: 2765 mL / NET: -29.6 mL        Mode: AC/ CMV (Assist Control/ Continuous Mandatory Ventilation), RR (machine): 18, TV (machine): 450, FiO2: 40, PEEP: 8, ITime: 1, MAP: 12, PIP: 27    acetaminophen     Tablet .. 650 milliGRAM(s) Oral every 6 hours PRN  acetylcysteine 10%  Inhalation 4 milliLiter(s) Inhalation every 4 hours  albumin human 25% IVPB 50 milliLiter(s) IV Intermittent every 6 hours  albuterol   0.5% 2.5 milliGRAM(s) Nebulizer every 4 hours  bacitracin   Ointment 1 Application(s) Topical every 12 hours  buMETAnide Infusion 2 mG/Hr (10 mL/Hr) IV Continuous <Continuous>  chlorhexidine 0.12% Liquid 15 milliLiter(s) Oral Mucosa every 12 hours  chlorhexidine 2% Cloths 1 Application(s) Topical <User Schedule>  dexMEDEtomidine Infusion 0.2 MICROgram(s)/kG/Hr (6.36 mL/Hr) IV Continuous <Continuous>  dextrose 5%. 1000 milliLiter(s) (100 mL/Hr) IV Continuous <Continuous>  dextrose 5%. 1000 milliLiter(s) (50 mL/Hr) IV Continuous <Continuous>  dextrose 50% Injectable 25 Gram(s) IV Push once  dextrose 50% Injectable 12.5 Gram(s) IV Push once  dextrose 50% Injectable 25 Gram(s) IV Push once  dextrose Oral Gel 15 Gram(s) Oral once PRN  doxazosin 2 milliGRAM(s) Oral at bedtime  ergocalciferol 52223 Unit(s) Oral every week  fentaNYL   Infusion. 0.5 MICROgram(s)/kG/Hr (6.36 mL/Hr) IV Continuous <Continuous>  glucagon  Injectable 1 milliGRAM(s) IntraMuscular once  heparin   Injectable 7500 Unit(s) SubCutaneous every 8 hours  hydrALAZINE Injectable 10 milliGRAM(s) IV Push every 2 hours PRN  insulin lispro (ADMELOG) corrective regimen sliding scale   SubCutaneous every 6 hours  insulin NPH human recombinant 10 Unit(s) SubCutaneous every 6 hours  labetalol 200 milliGRAM(s) Oral every 12 hours  lacosamide IVPB 200 milliGRAM(s) IV Intermittent every 12 hours  multivitamin 1 Tablet(s) Oral daily  niCARdipine Infusion 5 mG/Hr (25 mL/Hr) IV Continuous <Continuous>  norepinephrine Infusion 0.05 MICROgram(s)/kG/Min (5.96 mL/Hr) IV Continuous <Continuous>  ondansetron Injectable 4 milliGRAM(s) IV Push every 6 hours PRN  pantoprazole  Injectable 40 milliGRAM(s) IV Push daily  piperacillin/tazobactam IVPB.. 4.5 Gram(s) IV Intermittent every 8 hours  polyethylene glycol 3350 17 Gram(s) Oral two times a day  senna 2 Tablet(s) Oral every 12 hours  simethicone 80 milliGRAM(s) Chew every 8 hours      LABS:  Na: 139 (06-14 @ 23:36), 137 (06-14 @ 19:30), 137 (06-14 @ 05:00), 139 (06-13 @ 04:08), 141 (06-12 @ 18:06), 145 (06-12 @ 05:00)  K: 4.1 (06-14 @ 23:36), 3.9 (06-14 @ 19:30), 4.1 (06-14 @ 05:00), 4.0 (06-13 @ 04:08), 4.1 (06-12 @ 18:06), 4.7 (06-12 @ 05:00)  Cl: 101 (06-14 @ 23:36), 101 (06-14 @ 19:30), 103 (06-14 @ 05:00), 106 (06-13 @ 04:08), 108 (06-12 @ 18:06), 112 (06-12 @ 05:00)  CO2: 22 (06-14 @ 23:36), 22 (06-14 @ 19:30), 21 (06-14 @ 05:00), 20 (06-13 @ 04:08), 20 (06-12 @ 18:06), 20 (06-12 @ 05:00)  BUN: 68 (06-14 @ 23:36), 65 (06-14 @ 19:30), 58 (06-14 @ 05:00), 54 (06-13 @ 04:08), 67 (06-12 @ 18:06), 101 (06-12 @ 05:00)  Cr: 4.0 (06-14 @ 23:36), 4.2 (06-14 @ 19:30), 3.5 (06-14 @ 05:00), 2.8 (06-13 @ 04:08), 3.1 (06-12 @ 18:06), 4.8 (06-12 @ 05:00)  Glu: 243(06-14 @ 23:36), 235(06-14 @ 19:30), 203(06-14 @ 05:00), 134(06-13 @ 04:08), 167(06-12 @ 18:06), 138(06-12 @ 05:00)    Hgb: 7.6 (06-14 @ 05:00), 8.1 (06-13 @ 04:08), 8.0 (06-12 @ 22:23), 6.9 (06-12 @ 11:15), 6.8 (06-12 @ 06:15), 6.5 (06-12 @ 05:00)  Hct: 23.3 (06-14 @ 05:00), 24.6 (06-13 @ 04:08), 24.3 (06-12 @ 22:23), 21.7 (06-12 @ 11:15), 21.7 (06-12 @ 06:15), 21.1 (06-12 @ 05:00)  WBC: 12.72 (06-14 @ 05:00), 12.63 (06-13 @ 04:08), 11.48 (06-12 @ 22:23), 15.62 (06-12 @ 11:15), 14.88 (06-12 @ 06:15), 13.17 (06-12 @ 05:00)  Plt: 207 (06-14 @ 05:00), 190 (06-13 @ 04:08), 173 (06-12 @ 22:23), 192 (06-12 @ 11:15), 203 (06-12 @ 06:15), 200 (06-12 @ 05:00)    INR:   PTT:           LIVER FUNCTIONS - ( 14 Jun 2023 19:30 )  Alb: 2.6 g/dL / Pro: 4.9 g/dL / ALK PHOS: 209 U/L / ALT: 37 U/L / AST: 35 U/L / GGT: x           ABG - ( 15 Alan 2023 03:42 )  pH, Arterial: 7.41  pH, Blood: x     /  pCO2: 36    /  pO2: 94    / HCO3: 23    / Base Excess: -1.6  /  SaO2: 98.5                SUMMARY: This is a 64 y/o M with hx of Afib on eiquis, CVA BG bleed ,HTN, CHF, Gout, DM  presents as a stroke code after being down in bathroom unknown time LKW was 2300. 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 Pt is lethargic  - Cardene started for blood pressure control.    6/10- patient became increasingly lethargic in afternoon/early evening, febrile, hfnc maximized and patient intubated for hypoxemic respiratory failure with VL, sedated overnight on prop and fent, renal function worsening, pt on broad spectrum IV abx and fully cultured for sepsis due to aspiration pneumonia     OVERNIGHT EVENTS: Decreased RR to 18; remains on Bumex gtt     ADMISSION SCORES:   GCS 12   ICH 1   NIH 17    REVIEW OF SYSTEMS: Pt unable to participate in ROS due to neurological status    VITALS: [x]     EXAMINATION:  General: morbidly obese; ill appearing  HENT: nc/at, orally intubated  Eyes: Anicteric sclerae  Cardiac: [id number]  Lungs: b/l rhonci   Abdomen: Soft, non-tender, +BS  Extremities: R shoulder - tenderness and crepitance / swelling R shoulder w/ overlying ecchymosis- distal pulses RUE intact, good capillary refill; anasarca  Skin/Incision Site: Clean, dry and intact  Neurologic: sedated on precedex/fentanyl, no EO or command following, pupils constricted/sluggish, midline gaze, + corneals b/l, no cough/occulocephalic, flaccid all four extremities     SUMMARY: This is a 64 y/o M with hx of Afib on eiquis, CVA BG bleed ,HTN, CHF, Gout, DM  presents as a stroke code after being down in bathroom unknown time LKW was 2300. 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 Pt is lethargic  - Cardene started for blood pressure control.    6/10- patient became increasingly lethargic in afternoon/early evening, febrile, hfnc maximized and patient intubated for hypoxemic respiratory failure with VL, sedated overnight on prop and fent, renal function worsening, pt on broad spectrum IV abx and fully cultured for sepsis due to aspiration pneumonia     OVERNIGHT EVENTS: Decreased RR to 18; remains on Bumex gtt     ADMISSION SCORES:   GCS 12   ICH 1   NIH 17    REVIEW OF SYSTEMS: Pt unable to participate in ROS due to neurological status    VITALS: [x]     EXAMINATION:  General: morbidly obese; ill appearing  HENT: nc/at, orally intubated  Eyes: Anicteric sclerae  Cardiac: [id number]  Lungs: diminished air entry b/l, mild crackles  Abdomen: Soft, non-tender, +BS, truncal edema  Extremities: R shoulder - tenderness and crepitance / swelling R shoulder w/ overlying ecchymosis- distal pulses RUE intact, good capillary refill; anasarca  Skin/Incision Site: Clean, dry and intact  Neurologic: sedated on precedex/fentanyl, EO to noxious, no command following, Localizing w/ LUE, pupils constricted/sluggish, midline gaze, + corneals b/l, no cough/occulocephalic, localizing LUE, no response RUE/RLE/LLE            ICU Vital Signs Last 24 Hrs  T(C): 36.9 (15 Alan 2023 08:00), Max: 37.8 (14 Jun 2023 16:00)  T(F): 98.5 (15 Alan 2023 08:00), Max: 100.1 (14 Jun 2023 16:00)  HR: 53 (15 Alan 2023 09:15) (51 - 79)  BP: --  BP(mean): --  ABP: 159/52 (15 Alan 2023 09:15) (123/65 - 199/42)  ABP(mean): 74 (15 Alan 2023 09:15) (60 - 95)  RR: 18 (15 Alan 2023 09:15) (1 - 43)  SpO2: 98% (15 Laan 2023 09:15) (90% - 100%)      06-14-23 @ 07:01  -  06-15-23 @ 07:00  --------------------------------------------------------  IN: 3751.7 mL / OUT: 3355 mL / NET: 396.7 mL    06-15-23 @ 07:01  -  06-15-23 @ 10:25  --------------------------------------------------------  IN: 155.9 mL / OUT: 125 mL / NET: 30.9 mL        Mode: AC/ CMV (Assist Control/ Continuous Mandatory Ventilation), RR (machine): 18, TV (machine): 450, FiO2: 40, PEEP: 8, ITime: 1, MAP: 12, PIP: 21    acetaminophen     Tablet .. 650 milliGRAM(s) Oral every 6 hours PRN  acetylcysteine 10%  Inhalation 4 milliLiter(s) Inhalation every 4 hours  albumin human 25% IVPB 50 milliLiter(s) IV Intermittent every 6 hours  albuterol   0.5% 2.5 milliGRAM(s) Nebulizer every 4 hours  bacitracin   Ointment 1 Application(s) Topical every 12 hours  buMETAnide Infusion 2 mG/Hr (10 mL/Hr) IV Continuous <Continuous>  chlorhexidine 0.12% Liquid 15 milliLiter(s) Oral Mucosa every 12 hours  chlorhexidine 2% Cloths 1 Application(s) Topical <User Schedule>  dexMEDEtomidine Infusion 0.2 MICROgram(s)/kG/Hr (6.36 mL/Hr) IV Continuous <Continuous>  dextrose 5%. 1000 milliLiter(s) (50 mL/Hr) IV Continuous <Continuous>  dextrose 5%. 1000 milliLiter(s) (100 mL/Hr) IV Continuous <Continuous>  dextrose 50% Injectable 12.5 Gram(s) IV Push once  dextrose 50% Injectable 25 Gram(s) IV Push once  dextrose 50% Injectable 25 Gram(s) IV Push once  dextrose Oral Gel 15 Gram(s) Oral once PRN  doxazosin 2 milliGRAM(s) Oral at bedtime  ergocalciferol 49624 Unit(s) Oral every week  fentaNYL   Infusion. 0.5 MICROgram(s)/kG/Hr (6.36 mL/Hr) IV Continuous <Continuous>  glucagon  Injectable 1 milliGRAM(s) IntraMuscular once  heparin   Injectable 7500 Unit(s) SubCutaneous every 8 hours  hydrALAZINE Injectable 10 milliGRAM(s) IV Push every 2 hours PRN  insulin lispro (ADMELOG) corrective regimen sliding scale   SubCutaneous every 6 hours  insulin NPH human recombinant 12 Unit(s) SubCutaneous every 6 hours  labetalol 200 milliGRAM(s) Oral every 12 hours  lacosamide IVPB 200 milliGRAM(s) IV Intermittent every 12 hours  multivitamin 1 Tablet(s) Oral daily  niCARdipine Infusion 5 mG/Hr (25 mL/Hr) IV Continuous <Continuous>  norepinephrine Infusion 0.05 MICROgram(s)/kG/Min (5.96 mL/Hr) IV Continuous <Continuous>  ondansetron Injectable 4 milliGRAM(s) IV Push every 6 hours PRN  pantoprazole  Injectable 40 milliGRAM(s) IV Push daily  piperacillin/tazobactam IVPB.. 4.5 Gram(s) IV Intermittent every 8 hours  polyethylene glycol 3350 17 Gram(s) Oral two times a day  senna 2 Tablet(s) Oral every 12 hours  simethicone 80 milliGRAM(s) Chew every 8 hours      LABS:  Na: 139 (06-15 @ 04:40), 139 (06-14 @ 23:36), 137 (06-14 @ 19:30), 137 (06-14 @ 05:00), 139 (06-13 @ 04:08), 141 (06-12 @ 18:06)  K: 4.0 (06-15 @ 04:40), 4.1 (06-14 @ 23:36), 3.9 (06-14 @ 19:30), 4.1 (06-14 @ 05:00), 4.0 (06-13 @ 04:08), 4.1 (06-12 @ 18:06)  Cl: 102 (06-15 @ 04:40), 101 (06-14 @ 23:36), 101 (06-14 @ 19:30), 103 (06-14 @ 05:00), 106 (06-13 @ 04:08), 108 (06-12 @ 18:06)  CO2: 23 (06-15 @ 04:40), 22 (06-14 @ 23:36), 22 (06-14 @ 19:30), 21 (06-14 @ 05:00), 20 (06-13 @ 04:08), 20 (06-12 @ 18:06)  BUN: 68 (06-15 @ 04:40), 68 (06-14 @ 23:36), 65 (06-14 @ 19:30), 58 (06-14 @ 05:00), 54 (06-13 @ 04:08), 67 (06-12 @ 18:06)  Cr: 4.3 (06-15 @ 04:40), 4.0 (06-14 @ 23:36), 4.2 (06-14 @ 19:30), 3.5 (06-14 @ 05:00), 2.8 (06-13 @ 04:08), 3.1 (06-12 @ 18:06)  Glu: 244(06-15 @ 04:40), 243(06-14 @ 23:36), 235(06-14 @ 19:30), 203(06-14 @ 05:00), 134(06-13 @ 04:08), 167(06-12 @ 18:06)    Hgb: 7.6 (06-15 @ 04:40), 7.6 (06-14 @ 05:00), 8.1 (06-13 @ 04:08), 8.0 (06-12 @ 22:23), 6.9 (06-12 @ 11:15)  Hct: 23.4 (06-15 @ 04:40), 23.3 (06-14 @ 05:00), 24.6 (06-13 @ 04:08), 24.3 (06-12 @ 22:23), 21.7 (06-12 @ 11:15)  WBC: 11.13 (06-15 @ 04:40), 12.72 (06-14 @ 05:00), 12.63 (06-13 @ 04:08), 11.48 (06-12 @ 22:23), 15.62 (06-12 @ 11:15)  Plt: 220 (06-15 @ 04:40), 207 (06-14 @ 05:00), 190 (06-13 @ 04:08), 173 (06-12 @ 22:23), 192 (06-12 @ 11:15)        LIVER FUNCTIONS - ( 15 Alan 2023 04:40 )  Alb: 2.7 g/dL / Pro: 5.1 g/dL / ALK PHOS: 202 U/L / ALT: 39 U/L / AST: 34 U/L / GGT: x           ABG - ( 15 Alan 2023 03:42 )  pH, Arterial: 7.41  pH, Blood: x     /  pCO2: 36    /  pO2: 94    / HCO3: 23    / Base Excess: -1.6  /  SaO2: 98.5

## 2023-06-15 NOTE — PROGRESS NOTE ADULT - CRITICAL CARE ATTENDING COMMENT
ATTENDING ADDENDUM    Events and examination as above    Active issues:    #L. thalamic ICH-1  #Type I respiratory failure due to aspiration pneumonia  #DESI on CKD  #R. humeral fracture  #History of CVA  #History of CHF, A. fib (on eliquis)  #History of DM    Plan updates:  - Q1h neuro checks/pupillometry/vital signs, VEEG, sedation w/ precedex (RASS 0 to -2), off prop (TG > 500), weaning fentanyl, vimpat, nimbex d/c'd  - Intubated on VAC 24/450/40%/+6, wean as tolerated, zosyn (SpCx sensitive H. influenzae)  - Holding AC s/p reversal  - CVVHD discontinued, bumex 2mg q12h, strict I/O, daily weights  - Anemia work, tx 1u PRBC, resuming SQH today, pending FOBT  - Last BM 8/10 ATTENDING ADDENDUM    Events and examination as above    Active issues:    #L. thalamic ICH-1  #Type I respiratory failure due to aspiration pneumonia/pulmonary edema  #stage 3 DESI on CKD  #R. humeral fracture  #History of CVA  #History of CHF, A. fib (on eliquis)  #History of DM    Plan updates:  -q2 neuro, scalp rest for vEEG, RASS goal 0 -1 with precedex/fent gtt, vimpat q 12; consider reconnecting to vEEG in 24 hours  - improving vent requirements- c/w bumex gtt, metolazone x1 goal negative balance ; IV zosyn for PNA; daily weights  on cardene gtt; restarted labetalol, hydralazine to wean cardene   - Holding AC s/p reversal given ICH  - Anemia work, tx 1u PRBC, stable hb    remainder of plan as above

## 2023-06-15 NOTE — PROGRESS NOTE ADULT - SUBJECTIVE AND OBJECTIVE BOX
seen and examined  24 h events noted   intubated/ventilated         PAST HISTORY  --------------------------------------------------------------------------------  No significant changes to PMH, PSH, FHx, SHx, unless otherwise noted    ALLERGIES & MEDICATIONS  --------------------------------------------------------------------------------  Allergies    No Known Allergies    Intolerances      Standing Inpatient Medications  acetylcysteine 10%  Inhalation 4 milliLiter(s) Inhalation every 4 hours  albumin human 25% IVPB 50 milliLiter(s) IV Intermittent every 6 hours  albuterol   0.5% 2.5 milliGRAM(s) Nebulizer every 4 hours  bacitracin   Ointment 1 Application(s) Topical every 12 hours  buMETAnide Infusion 2 mG/Hr IV Continuous <Continuous>  chlorhexidine 0.12% Liquid 15 milliLiter(s) Oral Mucosa every 12 hours  chlorhexidine 2% Cloths 1 Application(s) Topical <User Schedule>  dexMEDEtomidine Infusion 0.2 MICROgram(s)/kG/Hr IV Continuous <Continuous>  dextrose 5%. 1000 milliLiter(s) IV Continuous <Continuous>  dextrose 5%. 1000 milliLiter(s) IV Continuous <Continuous>  dextrose 50% Injectable 25 Gram(s) IV Push once  dextrose 50% Injectable 25 Gram(s) IV Push once  dextrose 50% Injectable 12.5 Gram(s) IV Push once  doxazosin 2 milliGRAM(s) Oral at bedtime  ergocalciferol 69129 Unit(s) Oral every week  fentaNYL   Infusion. 0.5 MICROgram(s)/kG/Hr IV Continuous <Continuous>  glucagon  Injectable 1 milliGRAM(s) IntraMuscular once  heparin   Injectable 7500 Unit(s) SubCutaneous every 8 hours  insulin lispro (ADMELOG) corrective regimen sliding scale   SubCutaneous every 6 hours  insulin NPH human recombinant 12 Unit(s) SubCutaneous every 6 hours  labetalol 200 milliGRAM(s) Oral every 12 hours  lacosamide IVPB 200 milliGRAM(s) IV Intermittent every 12 hours  multivitamin 1 Tablet(s) Oral daily  niCARdipine Infusion 5 mG/Hr IV Continuous <Continuous>  norepinephrine Infusion 0.05 MICROgram(s)/kG/Min IV Continuous <Continuous>  pantoprazole  Injectable 40 milliGRAM(s) IV Push daily  piperacillin/tazobactam IVPB.. 4.5 Gram(s) IV Intermittent every 8 hours  polyethylene glycol 3350 17 Gram(s) Oral two times a day  senna 2 Tablet(s) Oral every 12 hours  simethicone 80 milliGRAM(s) Chew every 8 hours    PRN Inpatient Medications  acetaminophen     Tablet .. 650 milliGRAM(s) Oral every 6 hours PRN  dextrose Oral Gel 15 Gram(s) Oral once PRN  hydrALAZINE Injectable 10 milliGRAM(s) IV Push every 2 hours PRN  ondansetron Injectable 4 milliGRAM(s) IV Push every 6 hours PRN            VITALS/PHYSICAL EXAM  --------------------------------------------------------------------------------  T(C): 37.1 (06-15-23 @ 04:00), Max: 37.8 (06-14-23 @ 16:00)  HR: 52 (06-15-23 @ 07:00) (51 - 79)  BP: --  RR: 18 (06-15-23 @ 07:00) (1 - 43)  SpO2: 98% (06-15-23 @ 07:00) (90% - 100%)  Wt(kg): --        06-14-23 @ 07:01  -  06-15-23 @ 07:00  --------------------------------------------------------  IN: 3751.7 mL / OUT: 3355 mL / NET: 396.7 mL      Physical Exam:  	Gen: intubated/ventilated  	Pulm: decrease BS B/L  	CV: S1S2; no rub  	Abd: +distended  	Vascular access:    LABS/STUDIES  --------------------------------------------------------------------------------              7.6    11.13 >-----------<  220      [06-15-23 @ 04:40]              23.4     139  |  102  |  68  ----------------------------<  244      [06-15-23 @ 04:40]  4.0   |  23  |  4.3        Ca     7.8     [06-15-23 @ 04:40]      Mg     2.9     [06-15-23 @ 04:40]      Phos  5.4     [06-15-23 @ 04:40]    TPro  5.1  /  Alb  2.7  /  TBili  1.2  /  DBili  x   /  AST  34  /  ALT  39  /  AlkPhos  202  [06-15-23 @ 04:40]    Creatinine Trend:  SCr 4.3 [06-15 @ 04:40]  SCr 4.0 [06-14 @ 23:36]  SCr 4.2 [06-14 @ 19:30]  SCr 3.5 [06-14 @ 05:00]  SCr 2.8 [06-13 @ 04:08]    Urinalysis - [06-13-23 @ 23:55]      Color Light Yellow / Appearance Slightly Turbid / SG 1.010 / pH 6.0      Gluc Negative / Ketone Negative  / Bili Negative / Urobili <2 mg/dL       Blood Large / Protein 30 mg/dL / Leuk Est Large / Nitrite Negative      RBC 9 /  / Hyaline 2 / Gran  / Sq Epi  / Non Sq Epi  / Bacteria Negative      Iron 16, TIBC 162, %sat 10      [06-12-23 @ 11:15]  Ferritin 231      [06-12-23 @ 11:15]  Vitamin D (25OH) 19      [06-09-23 @ 10:34]  HbA1c 8.7      [04-08-19 @ 04:49]  TSH 1.29      [06-06-23 @ 10:10]  Lipid: chol 163, , HDL 26, LDL --      [06-15-23 @ 04:40]

## 2023-06-15 NOTE — PROGRESS NOTE ADULT - ASSESSMENT
The patient is a 63-year-old male with a past history of Afib on Eliquis, prior basal ganglia hemorrhage, HTN, Gout, and DM, CKD 3b- 4  who presented as a stroke.  # DESI on VPI2t-0/ ATN vs EV   # oliguria,  # HTN uncontrolled   # Thalamic CVA hemorrhagic   - non oliguric  - no need for HD today   - cr noted   - ph noted at goal  - leave udall for 24/ will reassess the need for HD     - can change bumex drip to 2 q 12   - ph noted / no binders   - followed by neurology  will follow

## 2023-06-16 NOTE — PROGRESS NOTE ADULT - ASSESSMENT
IMP:73 y/o M with hx of CVA, DM ,HTN, Afib on eliquis, CHF, Gout presents as [place] Code/Code ICH with NIH 17, GCS11, ICH 1 with Left thalamic hemorrhage    sICH/IVH  hypertensive emergency   acute hypoxemic respiratory   DESI on CKD   encephalopathy  R humerus fracture      Plan:   Neuro coma checks q 2  On Precedex, fentanyl- wean to RASS 0 to -2 as tolerates  Wean fentanyl, PRN Dilaudid for vent synchrony  Off Nimbex gtt  Propofol d/c'd due to elevated triglycerides- improving  MRI brain  vEEG with significant sharp wave burden, on vimpat 200 q12h- scalp rest, may resume EEG tomorrow  Consult IR for angio - R/O microaneurysm  as source of bleed once Cr stable           Stroke labs - ICH score   multifactorial encephalopathy- neurologic, metabolic/uremic--> started cvvhd - now on hold  bedrest    CV: htn, hx of afib on DOAC s/p reversal  MAP>60, SBP < 150 on cardene- wean as tolerates  on multiple antihypertensive at home -on bumex gtt  - resume labetalol, hydralazine           afib- off doac s/p reversal for ICH; intermittent RVR, IV lopressor PRN            Echo- Noted, LV EF 55-60%, grade I diastolic dysfunction            Daily weights  Check EKG today    Pulmonary:- AHRF due to aspiration pneumonitis  lung protective vent support  End tidal CO2  sedated for lung compliance, now off nimbex- no sbt for now  agree pulmonary toileting- nebulizers/mucomyst q 4 prn, chest PT q2  HOB >45, aspiration precautions  Sleep Apnea on CPAP at home  - Follow up with Pulmonary    GI: EMILY feeding tube in place  TF peptamen intense VHP        gi ppx-   Bowel regimen- senna, add miralax  LBM- 6/14    Renal: DESI/ CRI - baseline  Cr 3.0               worsening uremia, with worsening AMS--> initiated  CVVHD 6/11 -- on hold since 6/13 - bumex trial started 6/13  UOP/BUN improving- hold CVVHD, on bumex gtt  Metolazone x1, albumin q6h x4  Nephrology following  khan catheter             Daily weights monitor stricy I's and O's               Endocrine: Fs q 6H                          ISS q 6H- sglu - 140 -180                        NPH 12u q6h                          Stroke Core measures as above   a1c 5.9    Heme- remote hx of DVT with prior L IPH                Hold Eliquis                 Reversal of Eliquis with Andexa low dose 400U bolus and Gtt- 480U                Monitor Fever curve and WBC                Doppler B/L LE 6/8 no dvt  S/p 1u pRBC 6/13 for worsening anemia  DVT PPx- SCD, SQH    ID:  sepsis due to aspiration pna  f/u culture data- sputum cx 6/11 (+) mod haemophilus influenzae  Procal 0.54  MRSA negative   c/w IV zosyn through 6/17  CT chest with RLL PNA    ortho- XRAY R Shoulder- Redemonstrated is an acute surgical neck fracture of the proximal humerus with displacement and mild impaction; conservative management per ortho       a-line 6/6  LIJ CVC 6/10  ETT 6/10  Khan 6/14    dispo- nsicu    family updated at bedside  IMP:75 y/o M with hx of CVA, DM ,HTN, Afib on eliquis, CHF, Gout presents as [place] Code/Code ICH with NIH 17, GCS11, ICH 1 with Left thalamic hemorrhage  PBD 10     sICH/IVH  hypertensive emergency   acute hypoxemic respiratory   DESI on CKD   encephalopathy  R humerus fracture      Plan:   Neuro coma checks q 2  On Precedex, fentanyl- wean to RASS 0 to -2 as tolerates  Wean fentanyl, PRN Dilaudid for vent synchrony  MRI brain pending   vEEG with significant sharp wave burden, on vimpat 200 q12h            Stroke labs - ICH score   multifactorial encephalopathy- neurologic, metabolic/uremic   PT/OT     CV: htn, hx of afib on DOAC s/p reversal  MAP>60, SBP < 150 on cardene- wean as tolerates  on multiple antihypertensive at home -on bumex gtt  - resume labetalol, hydralazine           afib- off doac s/p reversal for ICH; intermittent RVR, IV lopressor PRN            Echo- Noted, LV EF 55-60%, grade I diastolic dysfunction            Daily weights  Check EKG today    Pulmonary:- AHRF due to aspiration pneumonitis  lung protective vent support  End tidal CO2  sedated for lung compliance, now off nimbex- no sbt for now  agree pulmonary toileting- nebulizers/mucomyst q 4 prn, chest PT q2  HOB >45, aspiration precautions  Sleep Apnea on CPAP at home  - Follow up with Pulmonary    GI: EMILY feeding tube in place  TF peptamen intense VHP        gi ppx-   Bowel regimen- senna, add miralax  LBM- 6/14    Renal: DESI/ CRI - baseline  Cr 3.0               worsening uremia, with worsening AMS--> initiated  CVVHD 6/11 -- on hold since 6/13 - bumex trial started 6/13  UOP/BUN improving- hold CVVHD, on bumex gtt  Metolazone x1, albumin q6h x4  Nephrology following  khan catheter             Daily weights monitor stricy I's and O's               Endocrine: Fs q 6H                          ISS q 6H- sglu - 140 -180                        NPH 12u q6h                          Stroke Core measures as above   a1c 5.9    Heme- remote hx of DVT with prior L IPH                Hold Eliquis                 Reversal of Eliquis with Andexa low dose 400U bolus and Gtt- 480U                Monitor Fever curve and WBC                Doppler B/L LE 6/8 no dvt  S/p 1u pRBC 6/13 for worsening anemia  DVT PPx- SCD, SQH    ID:  sepsis due to aspiration pna  f/u culture data- sputum cx 6/11 (+) mod haemophilus influenzae  Procal 0.54  MRSA negative   c/w IV zosyn through 6/17  CT chest with RLL PNA    ortho- XRAY R Shoulder- Redemonstrated is an acute surgical neck fracture of the proximal humerus with displacement and mild impaction; conservative management per ortho       a-line 6/6  LIJ CVC 6/10  ETT 6/10  Khan 6/14    dispo- nsicu    family updated at bedside  IMP:75 y/o M with hx of CVA, DM ,HTN, Afib on eliquis, CHF, Gout presents as [place] Code/Code ICH with NIH 17, GCS11, ICH 1 with Left thalamic hemorrhage  PBD 10     sICH/IVH  hypertensive emergency   acute hypoxemic respiratory   DESI on CKD   encephalopathy  R humerus fracture      Plan:   Neuro coma checks q 2  On Precedex, fentanyl- wean to RASS 0 to -2 as tolerates  Wean fentanyl, PRN Dilaudid for vent synchrony  MRI brain pending   vEEG with significant sharp wave burden, on vimpat 200 q12h            Stroke labs - ICH score   multifactorial encephalopathy- neurologic, metabolic/uremic   PT/OT     CV: htn, hx of afib on DOAC s/p reversal  MAP>60, SBP < 160 on cardene- wean as tolerates  on multiple antihypertensive at home -on bumex gtt  - resume labetalol, hydralazine           afib- off doac s/p reversal for ICH; intermittent RVR, IV lopressor PRN            Echo- Noted, LV EF 55-60%, grade I diastolic dysfunction            Daily weights    Pulmonary:- AHRF due to aspiration pneumonitis  lung protective vent support  agree pulmonary toileting- nebulizers/mucomyst q 4 prn, chest PT q2  HOB >45, aspiration precautions    GI: EMILY feeding tube in place  TF peptamen intense VHP        gi ppx-   Bowel regimen- senna, add miralax    Renal: DESI/ CRI - baseline  Cr 3.0   on bumex drip   UOP/BUN improving- hold CVVHD, on bumex gtt  Nephrology following  khan catheter             Daily weights monitor stricy I's and O's               Endocrine: Fs q 6H                         ISS q 6H- sglu - 140 -180                        NPH 18u q6h                          Stroke Core measures as above   a1c 5.9    Heme-  DVT PPx- SCD, SQH  repeat cbc tonight     ID:  sepsis due to aspiration pna  f/u culture data- sputum cx 6/11 (+) mod haemophilus influenzae  c/w IV zosyn through 6/17  CT chest with RLL PNA    ortho- XRAY R Shoulder- Redemonstrated is an acute surgical neck fracture of the proximal humerus with displacement and mild impaction; conservative management per ortho       a-line 6/6  LIJ CVC 6/10  ETT 6/10  Khan 6/14    dispo- nsicu    family updated at bedside

## 2023-06-16 NOTE — PROGRESS NOTE ADULT - ASSESSMENT
The patient is a 63-year-old male with a past history of Afib on Eliquis, prior basal ganglia hemorrhage, HTN, Gout, and DM, CKD 3b- 4  who presented as a stroke.  # DESI on GHV7v-6/ ATN vs EV   # oliguria,  # HTN uncontrolled   # Thalamic CVA hemorrhagic   - non oliguric  - no need for HD today still   - cr noted trending up   - on bumex drip decrease rate to 1mg/hr and change to 2 mg q12h later tonight   - ph noted at goal  - leave udall for 24h / will reassess the need for HD in AM    - ph noted / no binders   - followed by neurology  will follow

## 2023-06-16 NOTE — PROGRESS NOTE ADULT - CRITICAL CARE ATTENDING COMMENT
ATTENDING ADDENDUM    Events and examination as above    Active issues:    #L. thalamic ICH-1  #Type I respiratory failure due to aspiration pneumonia/pulmonary edema  #stage 3 DESI on CKD  #R. humeral fracture  #History of CVA  #History of CHF, A. fib (on eliquis)  #History of DM    Plan updates:  -q2 neuro, scalp rest for vEEG, RASS goal 0 -1 with precedex/fent gtt, vimpat q 12; consider reconnecting to vEEG in 24 hours  - improving vent requirements- c/w bumex gtt, metolazone x1 goal negative balance ; IV zosyn for PNA; daily weights  on cardene gtt; restarted labetalol, hydralazine to wean cardene   - Holding AC s/p reversal given ICH  - Anemia work, tx 1u PRBC, stable hb    remainder of plan as above LEFT THALAMIC ich WITH ivh IKELY htn IN ETIOLOGY, cta NO VASCULAR MALFORMATION, mri BRAIN PENDING   neuro checks q 2 hr, precedex for comfort   EEG no seizures, on vimpat for epileptiform discharges   SBP goal 100-160 , EF 55-60%   a fib  HTN   SBP goal 100-160 mmhg , wean off nicardipine   on clondine 0.2 mg q 8 hr, hydralzine 75 mg q 8 hr, labetolol 300 mg q 8 hr, add amlodipine 5 mg   left radial al ine, left IJ central line   Pulm: acute respiratory failure due to aspiration pnemonia   Mode: AC/ CMV (Assist Control/ Continuous Mandatory Ventilation), RR (machine): 16, TV (machine): 450, FiO2: 40, PEEP: 8, ITime: 1, MAP: 12, PIP: 28  on zosyn   cwill get a hest xray   GI NG TF at goal vital   AP elavtaed, US abd no cholecystitis   last BM 6/14   PPI while intubated   renal DESI on CKD  s/p CVVHD , on bumex drip 2 mg/ hr change from D5 to NS, I/O -450 ml leakage khan, change khan    endo DM, increase to  NPH 18 units    ID cx neg so far  pneumonia on zosyn  hem: hgb dropping, send FOB, send cbc at 4 pm   heparin sc for ch LEFT THALAMIC ich WITH ivh IKELY htn IN ETIOLOGY, cta NO VASCULAR MALFORMATION, mri BRAIN PENDING   neuro checks q 2 hr, precedex for comfort   EEG no seizures, on vimpat for epileptiform discharges   SBP goal 100-160 , EF 55-60%   a fib  HTN   SBP goal 100-160 mmhg , wean off nicardipine   on clondine 0.2 mg q 8 hr, hydralzine 75 mg q 8 hr, labetolol 300 mg q 8 hr, add amlodipine 5 mg   left radial al ine, left IJ central line   Pulm: acute respiratory failure due to aspiration pneumonia   Mode: AC/ CMV (Assist Control/ Continuous Mandatory Ventilation), RR (machine): 16, TV (machine): 450, FiO2: 40, PEEP: 8, ITime: 1, MAP: 12, PIP: 28  on zosyn   cwill get a hest xray   GI NG TF at goal vital   AP elavtaed, US abd no cholecystitis   last BM 6/14   PPI while intubated   renal DESI on CKD  s/p CVVHD , on bumex drip 2 mg/ hr change from D5 to NS, I/O -450 ml leakage khan, change khan    endo DM, increase to  NPH 18 units    ID cx neg so far  pneumonia on zosyn  hem: hgb dropping, send FOB, send cbc at 4 pm   heparin sc for chemorpohylaxis LEFT THALAMIC ich WITH ivh IKELY htn IN ETIOLOGY, cta NO VASCULAR MALFORMATION, mri BRAIN PENDING   neuro checks q 2 hr, precedex for comfort   EEG no seizures, on vimpat for epileptiform discharges   SBP goal 100-160 , EF 55-60%   a fib  HTN   SBP goal 100-160 mmhg , wean off nicardipine   on clondine 0.2 mg q 8 hr, hydralzine 75 mg q 8 hr, labetolol 300 mg q 8 hr, add amlodipine 5 mg   left radial al ine, left IJ central line   Pulm: acute respiratory failure due to aspiration pneumonia   Mode: AC/ CMV (Assist Control/ Continuous Mandatory Ventilation), RR (machine): 16, TV (machine): 450, FiO2: 40, PEEP: 8, ITime: 1, MAP: 12, PIP: 28  on zosyn   cwill get a hest xray   GI NG TF at goal vital   AP elavtaed, US abd no cholecystitis   last BM 6/14   PPI while intubated   renal DESI on CKD  s/p CVVHD , on bumex drip 2 mg/ hr, decrease to 1 mg/hr for now and  change from D5 to NS, I/O -450 ml leakage khan, change khan    endo DM, increase to  NPH 18 units    ID cx neg so far  pneumonia on zosyn  hem: hgb dropping, send FOB, send cbc at 4 pm   heparin sc for chemorpohylaxis

## 2023-06-16 NOTE — CHART NOTE - NSCHARTNOTEFT_GEN_A_CORE
Registered Dietitian Follow-Up     Patient Profile Reviewed                           Yes [x]   No []     Nutrition History Previously Obtained        Yes []  No [x]       Pertinent Medical Interventions:  Pt continues to be managed for sICH/IVH, hypertensive emergency, acute hypoxemic respiratory, DESI on CKD, encephalopathy, R humerus fracture. Remains intubated. Nephro assessing need for HD daily.      Diet order: Diet, NPO with Tube Feed:   Tube Feeding Modality: Nasogastric  Peptamen Intense VHP  Total Volume for 24 Hours (mL): 1080  Continuous  Starting Tube Feed Rate {mL per Hour}: 25  Increase Tube Feed Rate by (mL): 20     Every 4 hours  Until Goal Tube Feed Rate (mL per Hour): 45  Tube Feed Duration (in Hours): 24  Tube Feed Start Time: 08:00  No Carb Prosource (1pkg = 15gms Protein)     Qty per Day:  4 (23 @ 07:28) [Active]    Anthropometrics:  Height (cm): 177.8 (23 @ 14:03)  Weight (kg): 127.1 (23 @ 04:00)  BMI (kg/m2): 40.2 (23 @ 14:03)  IBW: 75.4 kg  UBW: 128.2 kg    Daily Weight in k.2 (-16), Weight in k.7 (15), Weight in k.2 (-14), Weight in k.1 (13), Weight in k.3 (12), Weight in k.4 (11), Weight in k.2 (06-10), Weight in k.7 (), Weight in k.6 (), Weight in k.1 ()  Weight Change: trending up likely reflective of fluid retention     MEDICATIONS  (STANDING):  acetylcysteine 10%  Inhalation 4 milliLiter(s) Inhalation every 4 hours  albuterol   0.5% 2.5 milliGRAM(s) Nebulizer every 4 hours  amLODIPine   Tablet 5 milliGRAM(s) Oral daily  bacitracin   Ointment 1 Application(s) Topical every 12 hours  buMETAnide Infusion 2 mG/Hr (10 mL/Hr) IV Continuous <Continuous>  chlorhexidine 0.12% Liquid 15 milliLiter(s) Oral Mucosa every 12 hours  chlorhexidine 2% Cloths 1 Application(s) Topical <User Schedule>  cloNIDine 0.2 milliGRAM(s) Oral every 8 hours  dexMEDEtomidine Infusion 0.2 MICROgram(s)/kG/Hr (6.36 mL/Hr) IV Continuous <Continuous>  dextrose 5%. 1000 milliLiter(s) (50 mL/Hr) IV Continuous <Continuous>  dextrose 5%. 1000 milliLiter(s) (100 mL/Hr) IV Continuous <Continuous>  dextrose 50% Injectable 25 Gram(s) IV Push once  dextrose 50% Injectable 12.5 Gram(s) IV Push once  dextrose 50% Injectable 25 Gram(s) IV Push once  doxazosin 2 milliGRAM(s) Oral at bedtime  ergocalciferol 56638 Unit(s) Oral every week  fentaNYL   Infusion. 0.5 MICROgram(s)/kG/Hr (6.36 mL/Hr) IV Continuous <Continuous>  glucagon  Injectable 1 milliGRAM(s) IntraMuscular once  heparin   Injectable 7500 Unit(s) SubCutaneous every 8 hours  hydrALAZINE 75 milliGRAM(s) Oral every 8 hours  insulin lispro (ADMELOG) corrective regimen sliding scale   SubCutaneous every 6 hours  insulin NPH human recombinant 18 Unit(s) SubCutaneous every 6 hours  labetalol 300 milliGRAM(s) Oral every 8 hours  lacosamide IVPB 200 milliGRAM(s) IV Intermittent every 12 hours  multivitamin 1 Tablet(s) Oral daily  niCARdipine Infusion 5 mG/Hr (25 mL/Hr) IV Continuous <Continuous>  pantoprazole  Injectable 40 milliGRAM(s) IV Push daily  piperacillin/tazobactam IVPB.. 4.5 Gram(s) IV Intermittent every 8 hours  polyethylene glycol 3350 17 Gram(s) Oral two times a day  senna 2 Tablet(s) Oral every 12 hours    MEDICATIONS  (PRN):  acetaminophen     Tablet .. 650 milliGRAM(s) Oral every 6 hours PRN Temp greater or equal to 38C (100.4F), Mild Pain (1 - 3)  dextrose Oral Gel 15 Gram(s) Oral once PRN Blood Glucose LESS THAN 70 milliGRAM(s)/deciliter  hydrALAZINE Injectable 10 milliGRAM(s) IV Push every 2 hours PRN SBP > 150  HYDROmorphone  Injectable 0.5 milliGRAM(s) IV Push every 4 hours PRN Severe Pain (7 - 10) / Vent synchrony / Ween of fent  ondansetron Injectable 4 milliGRAM(s) IV Push every 6 hours PRN Nausea and/or Vomiting    Pertinent Labs:                         7.0    11.19 )-----------( 219      ( 2023 05:35 )             22.3      @ 05:35: Na 141, BUN 83<HH>, Cr 4.7<HH>, <H>, K+ 4.1, Phos 5.4<H>, Mg 2.8<H>, Alk Phos 159<H>, ALT/SGPT 38, AST/SGOT 27, HbA1c --   @ 00:05: Na 139, BUN 82<HH>, Cr 4.5<HH>, <H>, K+ 4.3, Phos --, Mg --, Alk Phos --, ALT/SGPT --, AST/SGOT --, HbA1c --    Finger Sticks:  POCT Blood Glucose.: 286 mg/dL ( @ 06:35)  POCT Blood Glucose.: 268 mg/dL (06-15 @ 23:44)  POCT Blood Glucose.: 296 mg/dL (06-15 @ 17:58)  POCT Blood Glucose.: 273 mg/dL (06-15 @ 12:26)    I&O's Detail    15 Alan 2023 07:01  -  2023 07:00  --------------------------------------------------------  IN:    Bumetanide: 240 mL    Dexmedetomidine: 785.4 mL    FentaNYL: 173.4 mL    IV PiggyBack: 200 mL    NiCARdipine: 762.5 mL    Peptamen Intense VHP: 1080 mL  Total IN: 3241.3 mL    OUT:    Indwelling Catheter - Urethral (mL): 3700 mL  Total OUT: 3700 mL    Total NET: -458.7 mL    Physical Findings:  - Appearance: sedated  - GI function: abdomen obese; last bowel movement 2023  - Tubes: NG tube   - Oral/Mouth cavity: NPO  - Skin: WOCN 6/15: Multiple skin tears, no pressure injury   - Edema: 2+ generalized edema; 3+ edema to right wrist; left wrist; left hand; right hand     Nutrition Requirements:  Weight used: IBW 75.4 kg; actual weight 124.7 kg     Estimated Energy Needs    Continue [x]  Adjust []  Energy Recommendations: 829-1056 kcal/day - 11-14kcal/kg IBW    Estimated Protein Needs    Continue [x]  Adjust []  Protein Recommendations: 151 gm/day - 2g/kg IBW, ASPEN BMI>30     Estimated Fluid Needs        Continue [x]  Adjust []  Fluid Recommendations: 2494 mL/day - 20ml/kg actual weight, or as needed to maintain labs and hemodynamics      Nutrient Intake: Current TF provides 1320kcal, 159g protein, 907ml free water    [x] Previous Nutrition Diagnosis: Inadequate Energy Intake            [x] Ongoing          [] Resolved     Nutrition Education: N/A     Goal/Expected Outcome: Pt to meet >90% & <105% estimated needs via EN in 3-5 days     Indicator/Monitoring: Monitor diet order, kcal intake, body composition, NFPE, labs  (lytes, BG, renal indices)    Recommendation:  Cont with current TF order    Patient assessed at high nutrition risk.  RD spectra [phone], also available on Teams.

## 2023-06-16 NOTE — PROGRESS NOTE ADULT - SUBJECTIVE AND OBJECTIVE BOX
Nephrology progress note    THIS IS AN INCOMPLETE NOTE . FULL NOTE TO FOLLOW SHORTLY    Patient is seen and examined, events over the last 24 h noted .    Allergies:  No Known Allergies    Hospital Medications:   MEDICATIONS  (STANDING):  acetylcysteine 10%  Inhalation 4 milliLiter(s) Inhalation every 4 hours  albuterol   0.5% 2.5 milliGRAM(s) Nebulizer every 4 hours  bacitracin   Ointment 1 Application(s) Topical every 12 hours  buMETAnide Infusion 2 mG/Hr (10 mL/Hr) IV Continuous <Continuous>  chlorhexidine 0.12% Liquid 15 milliLiter(s) Oral Mucosa every 12 hours  chlorhexidine 2% Cloths 1 Application(s) Topical <User Schedule>  cloNIDine 0.2 milliGRAM(s) Oral every 8 hours  dexMEDEtomidine Infusion 0.2 MICROgram(s)/kG/Hr (6.36 mL/Hr) IV Continuous <Continuous>  dextrose 5%. 1000 milliLiter(s) (50 mL/Hr) IV Continuous <Continuous>  dextrose 5%. 1000 milliLiter(s) (100 mL/Hr) IV Continuous <Continuous>  dextrose 50% Injectable 25 Gram(s) IV Push once  dextrose 50% Injectable 12.5 Gram(s) IV Push once  dextrose 50% Injectable 25 Gram(s) IV Push once  doxazosin 2 milliGRAM(s) Oral at bedtime  ergocalciferol 06985 Unit(s) Oral every week  fentaNYL   Infusion. 0.5 MICROgram(s)/kG/Hr (6.36 mL/Hr) IV Continuous <Continuous>  glucagon  Injectable 1 milliGRAM(s) IntraMuscular once  heparin   Injectable 7500 Unit(s) SubCutaneous every 8 hours  hydrALAZINE 75 milliGRAM(s) Oral every 8 hours  insulin lispro (ADMELOG) corrective regimen sliding scale   SubCutaneous every 6 hours  insulin NPH human recombinant 16 Unit(s) SubCutaneous every 6 hours  labetalol 200 milliGRAM(s) Oral every 8 hours  lacosamide IVPB 200 milliGRAM(s) IV Intermittent every 12 hours  multivitamin 1 Tablet(s) Oral daily  niCARdipine Infusion 5 mG/Hr (25 mL/Hr) IV Continuous <Continuous>  pantoprazole  Injectable 40 milliGRAM(s) IV Push daily  piperacillin/tazobactam IVPB.. 4.5 Gram(s) IV Intermittent every 8 hours  polyethylene glycol 3350 17 Gram(s) Oral two times a day  senna 2 Tablet(s) Oral every 12 hours        VITALS:  T(F): 99.3 (23 @ 04:00), Max: 100.6 (06-15-23 @ 22:00)  HR: 47 (23 @ 07:50)  BP: 177/74 (23 @ 06:00)  RR: 32 (23 @ 07:00)  SpO2: 100% (23 @ 07:50)  Wt(kg): --     @ 07:  -  06-15 @ 07:00  --------------------------------------------------------  IN: 3751.7 mL / OUT: 3355 mL / NET: 396.7 mL    06-15 @ :  -   @ 07:00  --------------------------------------------------------  IN: 3241.3 mL / OUT: 3700 mL / NET: -458.7 mL          PHYSICAL EXAM:  Constitutional: NAD  HEENT: anicteric sclera, oropharynx clear, MMM  Neck: No JVD  Respiratory: CTAB, no wheezes, rales or rhonchi  Cardiovascular: S1, S2, RRR  Gastrointestinal: BS+, soft, NT/ND  Extremities: No cyanosis or clubbing. No peripheral edema  :  No khan.   Skin: No rashes    LABS:      141  |  102  |  83<HH>  ----------------------------<  269<H>  4.1   |  23  |  4.7<HH>    Ca    7.7<L>      2023 05:35  Phos  5.4       Mg     2.8         TPro  5.1<L>  /  Alb  3.0<L>  /  TBili  1.1  /  DBili      /  AST  27  /  ALT  38  /  AlkPhos  159<H>                            7.0    11.19 )-----------( 219      ( 2023 05:35 )             22.3       Urine Studies:  Urinalysis Basic - ( 2023 23:55 )    Color: Light Yellow / Appearance: Slightly Turbid / S.010 / pH:   Gluc:  / Ketone: Negative  / Bili: Negative / Urobili: <2 mg/dL   Blood:  / Protein: 30 mg/dL / Nitrite: Negative   Leuk Esterase: Large / RBC: 9 /HPF /  /HPF   Sq Epi:  / Non Sq Epi:  / Bacteria: Negative          Iron 16, TIBC 162, %sat 10      [23 @ 11:15]  Ferritin 231      [23 @ 11:15]  Vitamin D (25OH) 19      [23 @ 10:34]  HbA1c 8.7      [19 @ 04:49]  TSH 1.29      [23 @ 10:10]  Lipid: chol 163, , HDL 26, LDL --      [06-15-23 @ 04:40]    HBsAg Nonreact      [19 @ 06:38]  HCV 0.08, Nonreact      [19 @ 06:38]    RAMO: titer Negative, pattern --      [19 @ 06:38]  C3 Complement 177      [19 @ 06:38]  C4 Complement 46      [19 @ 06:38]  ANCA: cANCA Negative, pANCA Negative, atypical ANCA Negative      [19 @ 06:38]  Immunofixation Serum:   No Monoclonal Band Identified    Reference Range: None Detected      [19 @ 04:00]  SPEP Interpretation: Normal Electrophoresis Pattern      [19 @ 04:00]  Immunofixation Urine: Reference Range: None Detected      [19 @ 12:39]  UPEP Interpretation: Mild Selective (Glomerular) Proteinuria      [19 @ 12:39]      RADIOLOGY & ADDITIONAL STUDIES:   Nephrology progress note    Patient is seen and examined, events over the last 24 h noted .  Lying in bed on MV     Allergies:  No Known Allergies    Hospital Medications:   MEDICATIONS  (STANDING):  acetylcysteine 10%  Inhalation 4 milliLiter(s) Inhalation every 4 hours  albuterol   0.5% 2.5 milliGRAM(s) Nebulizer every 4 hours  bacitracin   Ointment 1 Application(s) Topical every 12 hours  buMETAnide Infusion 2 mG/Hr (10 mL/Hr) IV Continuous <Continuous>  cloNIDine 0.2 milliGRAM(s) Oral every 8 hours  dexMEDEtomidine Infusion 0.2 MICROgram(s)/kG/Hr (6.36 mL/Hr) IV Continuous <Continuous>  doxazosin 2 milliGRAM(s) Oral at bedtime  ergocalciferol 76416 Unit(s) Oral every week  fentaNYL   Infusion. 0.5 MICROgram(s)/kG/Hr (6.36 mL/Hr) IV Continuous <Continuous>  glucagon  Injectable 1 milliGRAM(s) IntraMuscular once  heparin   Injectable 7500 Unit(s) SubCutaneous every 8 hours  hydrALAZINE 75 milliGRAM(s) Oral every 8 hours  insulin lispro (ADMELOG) corrective regimen sliding scale   SubCutaneous every 6 hours  insulin NPH human recombinant 16 Unit(s) SubCutaneous every 6 hours  labetalol 200 milliGRAM(s) Oral every 8 hours  lacosamide IVPB 200 milliGRAM(s) IV Intermittent every 12 hours  multivitamin 1 Tablet(s) Oral daily  niCARdipine Infusion 5 mG/Hr (25 mL/Hr) IV Continuous <Continuous>  pantoprazole  Injectable 40 milliGRAM(s) IV Push daily  piperacillin/tazobactam IVPB.. 4.5 Gram(s) IV Intermittent every 8 hours  polyethylene glycol 3350 17 Gram(s) Oral two times a day  senna 2 Tablet(s) Oral every 12 hours        VITALS:  T(F): 99.3 (23 @ 04:00), Max: 100.6 (06-15-23 @ 22:00)  HR: 47 (23 @ 07:50)  BP: 177/74 (23 @ 06:00)  RR: 32 (23 @ 07:00)  SpO2: 100% (23 @ 07:50)      - @ 07:01  -  06-15 @ 07:00  --------------------------------------------------------  IN: 3751.7 mL / OUT: 3355 mL / NET: 396.7 mL    06-15 @ 07:01  -   @ 07:00  --------------------------------------------------------  IN: 3241.3 mL / OUT: 3700 mL / NET: -458.7 mL      15 Alan 2023 07:  -  2023 07:00  --------------------------------------------------------  IN:    Bumetanide: 240 mL    Dexmedetomidine: 785.4 mL    FentaNYL: 173.4 mL    IV PiggyBack: 200 mL    NiCARdipine: 762.5 mL    Peptamen Intense VHP: 1080 mL  Total IN: 3241.3 mL    OUT:    Indwelling Catheter - Urethral (mL): 3700 mL  Total OUT: 3700 mL    Total NET: -458.7 mL      2023 07:  -  2023 09:57  --------------------------------------------------------  IN:    Bumetanide: 20 mL    Dexmedetomidine: 69.9 mL    NiCARdipine: 62.5 mL    Peptamen Intense VHP: 90 mL  Total IN: 242.4 mL    OUT:    FentaNYL: 0 mL    Indwelling Catheter - Urethral (mL): 505 mL  Total OUT: 505 mL    Total NET: -262.6 mL            PHYSICAL EXAM:  Constitutional: intubated on MV   Respiratory: CTAB,   Cardiovascular: S1, S2, RRR  Gastrointestinal: BS+, soft, NT/ND  Extremities: No cyanosis or clubbing. No peripheral edema  :  No khan.   Skin: No rashes    LABS:      141  |  102  |  83<HH>  ----------------------------<  269<H>  4.1   |  23  |  4.7<HH>    Creatinine Trend: 4.7<--, 4.5<--, 4.3<--, 4.0<--, 4.2<--, 3.5<--    Ca    7.7<L>      2023 05:35  Phos  5.4       Mg     2.8         TPro  5.1<L>  /  Alb  3.0<L>  /  TBili  1.1  /  DBili      /  AST  27  /  ALT  38  /  AlkPhos  159<H>                            7.0    11.19 )-----------( 219      ( 2023 05:35 )             22.3       Urine Studies:  Urinalysis Basic - ( 2023 23:55 )    Color: Light Yellow / Appearance: Slightly Turbid / S.010 / pH:   Gluc:  / Ketone: Negative  / Bili: Negative / Urobili: <2 mg/dL   Blood:  / Protein: 30 mg/dL / Nitrite: Negative   Leuk Esterase: Large / RBC: 9 /HPF /  /HPF   Sq Epi:  / Non Sq Epi:  / Bacteria: Negative          Iron 16, TIBC 162, %sat 10      [23 @ 11:15]  Ferritin 231      [23 @ 11:15]  Vitamin D (25OH) 19      [23 @ 10:34]  HbA1c 8.7      [19 @ 04:49]  TSH 1.29      [23 @ 10:10]  Lipid: chol 163, , HDL 26, LDL --      [06-15-23 @ 04:40]    HBsAg Nonreact      [19 @ 06:38]  HCV 0.08, Nonreact      [19 @ 06:38]    RAMO: titer Negative, pattern --      [19 @ 06:38]  C3 Complement 177      [19 @ 06:38]  C4 Complement 46      [19 @ 06:38]  ANCA: cANCA Negative, pANCA Negative, atypical ANCA Negative      [19 @ 06:38]  Immunofixation Serum:   No Monoclonal Band Identified    Reference Range: None Detected      [19 @ 04:00]  SPEP Interpretation: Normal Electrophoresis Pattern      [19 @ 04:00]  Immunofixation Urine: Reference Range: None Detected      [19 @ 12:39]  UPEP Interpretation: Mild Selective (Glomerular) Proteinuria      [19 @ 12:39]      RADIOLOGY & ADDITIONAL STUDIES:

## 2023-06-16 NOTE — PROGRESS NOTE ADULT - SUBJECTIVE AND OBJECTIVE BOX
SUMMARY: This is a 62 y/o M with hx of Afib on eiquis, CVA BG bleed ,HTN, CHF, Gout, DM  presents as a stroke code after being down in bathroom unknown time LKW was 2300. 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 Pt is lethargic  - Cardene started for blood pressure control.    6/10- patient became increasingly lethargic in afternoon/early evening, febrile, hfnc maximized and patient intubated for hypoxemic respiratory failure with VL, sedated overnight on prop and fent, renal function worsening, pt on broad spectrum IV abx and fully cultured for sepsis due to aspiration pneumonia     OVERNIGHT EVENTS: remains on Bumex gtt; weaned off Fentanyl gtt; Nicardipine gtt for BP control    ADMISSION SCORES:   GCS 12   ICH 1   NIH 17    REVIEW OF SYSTEMS: Pt unable to participate in ROS due to neurological status    VITALS: [x]     EXAMINATION:  General: morbidly obese; ill appearing  HENT: nc/at, orally intubated  Eyes: Anicteric sclerae  Cardiac: [id number]  Lungs: diminished air entry b/l, mild crackles  Abdomen: Soft, non-tender, +BS, truncal edema  Extremities: R shoulder - tenderness and crepitance / swelling R shoulder w/ overlying ecchymosis- distal pulses RUE intact, good capillary refill; anasarca  Skin/Incision Site: Clean, dry and intact  Neurologic: sedated on precedex, EO to noxious, no command following, Localizing w/ LUE, pupils constricted/sluggish, midline gaze, + corneals b/l, no cough/occulocephalic, localizing LUE, no response RUE/RLE/LLE        ICU Vital Signs Last 24 Hrs  T(C): 37.4 (16 Jun 2023 04:00), Max: 38.1 (15 Alan 2023 22:00)  T(F): 99.3 (16 Jun 2023 04:00), Max: 100.6 (15 Alan 2023 22:00)  HR: 56 (16 Jun 2023 04:48) (48 - 69)  BP: 173/77 (16 Jun 2023 03:00) (131/61 - 173/77)  BP(mean): 110 (16 Jun 2023 03:00) (85 - 114)  ABP: 150/43 (16 Jun 2023 04:00) (128/45 - 189/46)  ABP(mean): 64 (16 Jun 2023 04:00) (60 - 92)  RR: 16 (16 Jun 2023 04:00) (16 - 37)  SpO2: 100% (16 Jun 2023 04:48) (95% - 100%)      06-14-23 @ 07:01  -  06-15-23 @ 07:00  --------------------------------------------------------  IN: 3751.7 mL / OUT: 3355 mL / NET: 396.7 mL    06-15-23 @ 07:01  -  06-16-23 @ 05:31  --------------------------------------------------------  IN: 2096.4 mL / OUT: 2000 mL / NET: 96.4 mL        Mode: AC/ CMV (Assist Control/ Continuous Mandatory Ventilation), RR (machine): 16, TV (machine): 450, FiO2: 40, PEEP: 8, ITime: 1, MAP: 12, PIP: 25    acetaminophen     Tablet .. 650 milliGRAM(s) Oral every 6 hours PRN  acetylcysteine 10%  Inhalation 4 milliLiter(s) Inhalation every 4 hours  albuterol   0.5% 2.5 milliGRAM(s) Nebulizer every 4 hours  bacitracin   Ointment 1 Application(s) Topical every 12 hours  buMETAnide Infusion 2 mG/Hr (10 mL/Hr) IV Continuous <Continuous>  chlorhexidine 0.12% Liquid 15 milliLiter(s) Oral Mucosa every 12 hours  chlorhexidine 2% Cloths 1 Application(s) Topical <User Schedule>  cloNIDine 0.2 milliGRAM(s) Oral every 8 hours  dexMEDEtomidine Infusion 0.2 MICROgram(s)/kG/Hr (6.36 mL/Hr) IV Continuous <Continuous>  dextrose 5%. 1000 milliLiter(s) (50 mL/Hr) IV Continuous <Continuous>  dextrose 5%. 1000 milliLiter(s) (100 mL/Hr) IV Continuous <Continuous>  dextrose 50% Injectable 25 Gram(s) IV Push once  dextrose 50% Injectable 12.5 Gram(s) IV Push once  dextrose 50% Injectable 25 Gram(s) IV Push once  dextrose Oral Gel 15 Gram(s) Oral once PRN  doxazosin 2 milliGRAM(s) Oral at bedtime  ergocalciferol 33822 Unit(s) Oral every week  fentaNYL   Infusion. 0.5 MICROgram(s)/kG/Hr (6.36 mL/Hr) IV Continuous <Continuous>  glucagon  Injectable 1 milliGRAM(s) IntraMuscular once  heparin   Injectable 7500 Unit(s) SubCutaneous every 8 hours  hydrALAZINE 75 milliGRAM(s) Oral every 8 hours  hydrALAZINE Injectable 10 milliGRAM(s) IV Push every 2 hours PRN  HYDROmorphone  Injectable 0.5 milliGRAM(s) IV Push every 4 hours PRN  HYDROmorphone  Injectable 0.5 milliGRAM(s) IV Push once  insulin lispro (ADMELOG) corrective regimen sliding scale   SubCutaneous every 6 hours  insulin NPH human recombinant 16 Unit(s) SubCutaneous every 6 hours  labetalol 200 milliGRAM(s) Oral every 8 hours  lacosamide IVPB 200 milliGRAM(s) IV Intermittent every 12 hours  multivitamin 1 Tablet(s) Oral daily  niCARdipine Infusion 5 mG/Hr (25 mL/Hr) IV Continuous <Continuous>  ondansetron Injectable 4 milliGRAM(s) IV Push every 6 hours PRN  pantoprazole  Injectable 40 milliGRAM(s) IV Push daily  piperacillin/tazobactam IVPB.. 4.5 Gram(s) IV Intermittent every 8 hours  polyethylene glycol 3350 17 Gram(s) Oral two times a day  senna 2 Tablet(s) Oral every 12 hours      LABS:  Na: 139 (06-16 @ 00:05), 139 (06-15 @ 04:40), 139 (06-14 @ 23:36), 137 (06-14 @ 19:30), 137 (06-14 @ 05:00)  K: 4.3 (06-16 @ 00:05), 4.0 (06-15 @ 04:40), 4.1 (06-14 @ 23:36), 3.9 (06-14 @ 19:30), 4.1 (06-14 @ 05:00)  Cl: 101 (06-16 @ 00:05), 102 (06-15 @ 04:40), 101 (06-14 @ 23:36), 101 (06-14 @ 19:30), 103 (06-14 @ 05:00)  CO2: 24 (06-16 @ 00:05), 23 (06-15 @ 04:40), 22 (06-14 @ 23:36), 22 (06-14 @ 19:30), 21 (06-14 @ 05:00)  BUN: 82 (06-16 @ 00:05), 68 (06-15 @ 04:40), 68 (06-14 @ 23:36), 65 (06-14 @ 19:30), 58 (06-14 @ 05:00)  Cr: 4.5 (06-16 @ 00:05), 4.3 (06-15 @ 04:40), 4.0 (06-14 @ 23:36), 4.2 (06-14 @ 19:30), 3.5 (06-14 @ 05:00)  Glu: 271(06-16 @ 00:05), 244(06-15 @ 04:40), 243(06-14 @ 23:36), 235(06-14 @ 19:30), 203(06-14 @ 05:00)    Hgb: 7.6 (06-15 @ 04:40), 7.6 (06-14 @ 05:00)  Hct: 23.4 (06-15 @ 04:40), 23.3 (06-14 @ 05:00)  WBC: 11.13 (06-15 @ 04:40), 12.72 (06-14 @ 05:00)  Plt: 220 (06-15 @ 04:40), 207 (06-14 @ 05:00)    INR: 0.90 06-15-23 @ 12:47  PTT: 30.3 06-15-23 @ 12:47          LIVER FUNCTIONS - ( 15 Alan 2023 04:40 )  Alb: 2.7 g/dL / Pro: 5.1 g/dL / ALK PHOS: 202 U/L / ALT: 39 U/L / AST: 34 U/L / GGT: x           ABG - ( 16 Jun 2023 04:11 )  pH, Arterial: 7.39  pH, Blood: x     /  pCO2: 39    /  pO2: 113   / HCO3: 24    / Base Excess: -1.2  /  SaO2: 99.3                 SUMMARY: This is a 64 y/o M with hx of Afib on eiquis, CVA BG bleed ,HTN, CHF, Gout, DM  presents as a stroke code after being down in bathroom unknown time LKW was 2300. 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 Pt is lethargic  - Cardene started for blood pressure control.    6/10- patient became increasingly lethargic in afternoon/early evening, febrile, hfnc maximized and patient intubated for hypoxemic respiratory failure with VL, sedated overnight on prop and fent, renal function worsening, pt on broad spectrum IV abx and fully cultured for sepsis due to aspiration pneumonia     OVERNIGHT EVENTS: remains on Bumex gtt; weaned off Fentanyl gtt; Nicardipine gtt for BP control    ADMISSION SCORES:   GCS 12   ICH 1   NIH 17    REVIEW OF SYSTEMS: Pt unable to participate in ROS due to neurological status    T(C): 37.4 (06-16-23 @ 04:00), Max: 38.1 (06-15-23 @ 22:00)  HR: 47 (06-16-23 @ 07:50) (47 - 69)  BP: 177/74 (06-16-23 @ 06:00) (131/61 - 180/77)  RR: 32 (06-16-23 @ 07:00) (16 - 37)  SpO2: 100% (06-16-23 @ 07:50) (98% - 100%)  06-15-23 @ 07:01  -  06-16-23 @ 07:00  --------------------------------------------------------  IN: 3241.3 mL / OUT: 3700 mL / NET: -458.7 mL    acetaminophen     Tablet .. 650 milliGRAM(s) Oral every 6 hours PRN  acetylcysteine 10%  Inhalation 4 milliLiter(s) Inhalation every 4 hours  albuterol   0.5% 2.5 milliGRAM(s) Nebulizer every 4 hours  bacitracin   Ointment 1 Application(s) Topical every 12 hours  buMETAnide Infusion 2 mG/Hr IV Continuous <Continuous>  chlorhexidine 0.12% Liquid 15 milliLiter(s) Oral Mucosa every 12 hours  chlorhexidine 2% Cloths 1 Application(s) Topical <User Schedule>  cloNIDine 0.2 milliGRAM(s) Oral every 8 hours  dexMEDEtomidine Infusion 0.2 MICROgram(s)/kG/Hr IV Continuous <Continuous>  dextrose 5%. 1000 milliLiter(s) IV Continuous <Continuous>  dextrose 5%. 1000 milliLiter(s) IV Continuous <Continuous>  dextrose 50% Injectable 25 Gram(s) IV Push once  dextrose 50% Injectable 12.5 Gram(s) IV Push once  dextrose 50% Injectable 25 Gram(s) IV Push once  dextrose Oral Gel 15 Gram(s) Oral once PRN  doxazosin 2 milliGRAM(s) Oral at bedtime  ergocalciferol 72094 Unit(s) Oral every week  fentaNYL   Infusion. 0.5 MICROgram(s)/kG/Hr IV Continuous <Continuous>  glucagon  Injectable 1 milliGRAM(s) IntraMuscular once  heparin   Injectable 7500 Unit(s) SubCutaneous every 8 hours  hydrALAZINE 75 milliGRAM(s) Oral every 8 hours  hydrALAZINE Injectable 10 milliGRAM(s) IV Push every 2 hours PRN  HYDROmorphone  Injectable 0.5 milliGRAM(s) IV Push every 4 hours PRN  insulin lispro (ADMELOG) corrective regimen sliding scale   SubCutaneous every 6 hours  insulin NPH human recombinant 16 Unit(s) SubCutaneous every 6 hours  labetalol 200 milliGRAM(s) Oral every 8 hours  lacosamide IVPB 200 milliGRAM(s) IV Intermittent every 12 hours  multivitamin 1 Tablet(s) Oral daily  niCARdipine Infusion 5 mG/Hr IV Continuous <Continuous>  ondansetron Injectable 4 milliGRAM(s) IV Push every 6 hours PRN  pantoprazole  Injectable 40 milliGRAM(s) IV Push daily  piperacillin/tazobactam IVPB.. 4.5 Gram(s) IV Intermittent every 8 hours  polyethylene glycol 3350 17 Gram(s) Oral two times a day  senna 2 Tablet(s) Oral every 12 hours  Mode: AC/ CMV (Assist Control/ Continuous Mandatory Ventilation), RR (machine): 16, TV (machine): 450, FiO2: 40, PEEP: 8, ITime: 1, MAP: 12, PIP: 28    EXAMINATION:  General: morbidly obese; ill appearing  HENT: nc/at, orally intubated  Eyes: Anicteric sclerae  Cardiac: [id number]  Lungs: diminished air entry b/l, mild crackles  Abdomen: Soft, non-tender, +BS, truncal edema  Extremities: R shoulder - tenderness and crepitance / swelling R shoulder w/ overlying ecchymosis- distal pulses RUE intact, good capillary refill; anasarca  Skin/Incision Site: Clean, dry and intact  Neurologic: sedated on precedex, EO spontaneously,  intermittently  follows simple commands, BHARAT , eyes midline,  Localizing w/ LUE,  + corneals b/l, no cough/occulocephalic, localizing LUE, 0/5 on the right side, left LE 0/5     LABS:  Na: 139 (06-16 @ 00:05), 139 (06-15 @ 04:40), 139 (06-14 @ 23:36), 137 (06-14 @ 19:30), 137 (06-14 @ 05:00)  K: 4.3 (06-16 @ 00:05), 4.0 (06-15 @ 04:40), 4.1 (06-14 @ 23:36), 3.9 (06-14 @ 19:30), 4.1 (06-14 @ 05:00)  Cl: 101 (06-16 @ 00:05), 102 (06-15 @ 04:40), 101 (06-14 @ 23:36), 101 (06-14 @ 19:30), 103 (06-14 @ 05:00)  CO2: 24 (06-16 @ 00:05), 23 (06-15 @ 04:40), 22 (06-14 @ 23:36), 22 (06-14 @ 19:30), 21 (06-14 @ 05:00)  BUN: 82 (06-16 @ 00:05), 68 (06-15 @ 04:40), 68 (06-14 @ 23:36), 65 (06-14 @ 19:30), 58 (06-14 @ 05:00)  Cr: 4.5 (06-16 @ 00:05), 4.3 (06-15 @ 04:40), 4.0 (06-14 @ 23:36), 4.2 (06-14 @ 19:30), 3.5 (06-14 @ 05:00)  Glu: 271(06-16 @ 00:05), 244(06-15 @ 04:40), 243(06-14 @ 23:36), 235(06-14 @ 19:30), 203(06-14 @ 05:00)    Hgb: 7.6 (06-15 @ 04:40), 7.6 (06-14 @ 05:00)  Hct: 23.4 (06-15 @ 04:40), 23.3 (06-14 @ 05:00)  WBC: 11.13 (06-15 @ 04:40), 12.72 (06-14 @ 05:00)  Plt: 220 (06-15 @ 04:40), 207 (06-14 @ 05:00)    INR: 0.90 06-15-23 @ 12:47  PTT: 30.3 06-15-23 @ 12:47

## 2023-06-17 NOTE — PROGRESS NOTE ADULT - SUBJECTIVE AND OBJECTIVE BOX
seen and examined  24 h events noted   intubated/ventilated        PAST HISTORY  --------------------------------------------------------------------------------  No significant changes to PMH, PSH, FHx, SHx, unless otherwise noted    ALLERGIES & MEDICATIONS  --------------------------------------------------------------------------------  Allergies    No Known Allergies    Intolerances      Standing Inpatient Medications  acetylcysteine 10%  Inhalation 4 milliLiter(s) Inhalation every 4 hours  albuterol    0.083% 2.5 milliGRAM(s) Nebulizer every 4 hours  amLODIPine   Tablet 10 milliGRAM(s) Oral daily  bacitracin   Ointment 1 Application(s) Topical every 12 hours  buMETAnide Infusion 1 mG/Hr IV Continuous <Continuous>  chlorhexidine 0.12% Liquid 15 milliLiter(s) Oral Mucosa every 12 hours  chlorhexidine 2% Cloths 1 Application(s) Topical <User Schedule>  cloNIDine 0.2 milliGRAM(s) Oral every 8 hours  dexMEDEtomidine Infusion 0.2 MICROgram(s)/kG/Hr IV Continuous <Continuous>  dextrose 5%. 1000 milliLiter(s) IV Continuous <Continuous>  dextrose 5%. 1000 milliLiter(s) IV Continuous <Continuous>  dextrose 50% Injectable 25 Gram(s) IV Push once  dextrose 50% Injectable 12.5 Gram(s) IV Push once  dextrose 50% Injectable 25 Gram(s) IV Push once  doxazosin 2 milliGRAM(s) Oral at bedtime  ergocalciferol 23196 Unit(s) Oral every week  fentaNYL   Infusion. 0.5 MICROgram(s)/kG/Hr IV Continuous <Continuous>  glucagon  Injectable 1 milliGRAM(s) IntraMuscular once  heparin   Injectable 7500 Unit(s) SubCutaneous every 8 hours  hydrALAZINE 75 milliGRAM(s) Oral <User Schedule>  insulin regular Infusion 5 Unit(s)/Hr IV Continuous <Continuous>  labetalol 200 milliGRAM(s) Oral <User Schedule>  lacosamide IVPB 200 milliGRAM(s) IV Intermittent every 12 hours  multivitamin 1 Tablet(s) Oral daily  niCARdipine Infusion 5 mG/Hr IV Continuous <Continuous>  pantoprazole  Injectable 40 milliGRAM(s) IV Push daily  polyethylene glycol 3350 17 Gram(s) Oral two times a day  senna 2 Tablet(s) Oral every 12 hours    PRN Inpatient Medications  acetaminophen     Tablet .. 650 milliGRAM(s) Oral every 6 hours PRN  dextrose Oral Gel 15 Gram(s) Oral once PRN  hydrALAZINE Injectable 10 milliGRAM(s) IV Push every 2 hours PRN  HYDROmorphone  Injectable 0.5 milliGRAM(s) IV Push every 4 hours PRN  ondansetron Injectable 4 milliGRAM(s) IV Push every 6 hours PRN          VITALS/PHYSICAL EXAM  --------------------------------------------------------------------------------  T(C): 37.7 (06-17-23 @ 04:00), Max: 38.3 (06-17-23 @ 00:00)  HR: 51 (06-17-23 @ 07:00) (50 - 82)  BP: --  RR: 21 (06-17-23 @ 07:00) (18 - 38)  SpO2: 100% (06-17-23 @ 07:00) (100% - 100%)  Wt(kg): --        06-16-23 @ 07:01  -  06-17-23 @ 07:00  --------------------------------------------------------  IN: 4007.7 mL / OUT: 3780 mL / NET: 227.7 mL      Physical Exam:  	Gen: intubated/ventilated   	Pulm:  B/L babatunde   	CV: S1S2; no rub  	Abd: +distended  	    LABS/STUDIES  --------------------------------------------------------------------------------              6.9    14.83 >-----------<  248      [06-17-23 @ 04:48]              20.7     140  |  101  |  127  ----------------------------<  190      [06-17-23 @ 04:48]  4.0   |  22  |  5.3        Ca     7.6     [06-17-23 @ 04:48]      Mg     2.6     [06-17-23 @ 04:48]      Phos  5.3     [06-17-23 @ 04:48]    TPro  4.9  /  Alb  2.7  /  TBili  0.9  /  DBili  x   /  AST  34  /  ALT  42  /  AlkPhos  139  [06-17-23 @ 04:48]    PT/INR: PT 10.20, INR 0.90       [06-15-23 @ 12:47]  PTT: 30.3       [06-15-23 @ 12:47]          [06-16-23 @ 17:20]    Creatinine Trend:  SCr 5.3 [06-17 @ 04:48]  SCr 4.9 [06-16 @ 16:19]  SCr 4.7 [06-16 @ 05:35]  SCr 4.5 [06-16 @ 00:05]  SCr 4.3 [06-15 @ 04:40]    Urinalysis - [06-13-23 @ 23:55]      Color Light Yellow / Appearance Slightly Turbid / SG 1.010 / pH 6.0      Gluc Negative / Ketone Negative  / Bili Negative / Urobili <2 mg/dL       Blood Large / Protein 30 mg/dL / Leuk Est Large / Nitrite Negative      RBC 9 /  / Hyaline 2 / Gran  / Sq Epi  / Non Sq Epi  / Bacteria Negative      Iron 16, TIBC 162, %sat 10      [06-12-23 @ 11:15]  Ferritin 231      [06-12-23 @ 11:15]  Vitamin D (25OH) 19      [06-09-23 @ 10:34]  HbA1c 8.7      [04-08-19 @ 04:49]  TSH 1.29      [06-06-23 @ 10:10]  Lipid: chol 163, , HDL 26, LDL --      [06-15-23 @ 04:40]

## 2023-06-17 NOTE — PROGRESS NOTE ADULT - CRITICAL CARE ATTENDING COMMENT
LEFT THALAMIC ich WITH ivh IKELY htn IN ETIOLOGY, cta NO VASCULAR MALFORMATION, mri BRAIN PENDING   neuro checks q 2 hr, precedex for comfort   EEG no seizures, on vimpat for epileptiform discharges   SBP goal 100-160 , EF 55-60%   a fib  HTN   SBP goal 100-160 mmhg , wean off nicardipine   on clondine 0.2 mg q 8 hr, hydralzine 75 mg q 8 hr, labetolol 300 mg q 8 hr, add amlodipine 5 mg   left radial al ine, left IJ central line   Pulm: acute respiratory failure due to aspiration pneumonia   Mode: AC/ CMV (Assist Control/ Continuous Mandatory Ventilation), RR (machine): 16, TV (machine): 450, FiO2: 40, PEEP: 8, ITime: 1, MAP: 12, PIP: 28  on zosyn   cwill get a hest xray   GI NG TF at goal vital   AP elavtaed, US abd no cholecystitis   last BM 6/14   PPI while intubated   renal DESI on CKD  s/p CVVHD , on bumex drip 2 mg/ hr, decrease to 1 mg/hr for now and  change from D5 to NS, I/O -450 ml leakage khan, change khan    endo DM, increase to  NPH 18 units    ID cx neg so far  pneumonia on zosyn  hem: hgb dropping, send FOB, send cbc at 4 pm   heparin sc for chemorpohylaxis LEFT THALAMIC ich WITH ivh IKELY htn IN ETIOLOGY, cta NO VASCULAR MALFORMATION, mri BRAIN PENDING   neuro checks q 2 hr, precedex for comfort   EEG no seizures, on vimpat for epileptiform discharges   SBP goal 100-160 , EF 55-60%   a fib  HTN   MAP>60, sbp <160 mmhg , wean off nicardipine   on clondine 0.2 mg q 8 hr, hydralzine 100 mg q 8 hr, labetolol 300 mg q 8 hr, add amlodipine 10 mg   left radial al ine, left IJ central line   Pulm: acute respiratory failure due to aspiration pneumonia   Mode: AC/ CMV (Assist Control/ Continuous Mandatory Ventilation), RR (machine): 16, TV (machine): 450, FiO2: 40, PEEP: 8, ITime: 1, MAP: 12, PIP: 28  on zosyn   PSV as tolerates   cwill get a hest xray   GI NG TF at goal vital   AP elavtaed, US abd no cholecystitis   last BM 6/14   PPI while intubated   renal DESI on CKD  s/p CVVHD , on bumex drip held for now; possible iHD tomorrow   endo- insulin gtt for dysglycemia, adjust nph q 6 this pm and transition off gtt    ID- repeat ua, sputum cx, mrsa swab  pneumonia on zosyn  hem: hgb dropping, s/p 2 units prbc, without evidence of GIB   heparin sc for chemoppx      remainder of plan as above      dispo- nsicu

## 2023-06-17 NOTE — PROGRESS NOTE ADULT - SUBJECTIVE AND OBJECTIVE BOX
SUMMARY: This is a 64 y/o M with hx of Afib on eiquis, CVA BG bleed ,HTN, CHF, Gout, DM  presents as a stroke code after being down in bathroom unknown time LKW was 2300. 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 Pt is lethargic  - Cardene started for blood pressure control.    6/10- patient became increasingly lethargic in afternoon/early evening, febrile, hfnc maximized and patient intubated for hypoxemic respiratory failure with VL, sedated overnight on prop and fent, renal function worsening, pt on broad spectrum IV abx and fully cultured for sepsis due to aspiration pneumonia     OVERNIGHT EVENTS: remains on Bumex gtt; febrile, resolved with Tylenol     ADMISSION SCORES:   GCS 12   ICH 1   NIH 17    REVIEW OF SYSTEMS: Pt unable to participate in ROS due to neurological status      VITALS: [X] Reviewed    IMAGING/DATA: [X] Reviewed    IVF FLUIDS/MEDICATIONS: [X] Reviewed    ALLERGIES: Allergies    No Known Allergies    Intolerances    EXAMINATION:  General: morbidly obese; ill appearing  HENT: nc/at, orally intubated  Eyes: Anicteric sclerae  Cardiac: [id number]  Lungs: diminished air entry b/l, mild crackles  Abdomen: Soft, non-tender, +BS, truncal edema  Extremities: R shoulder - tenderness and crepitance / swelling R shoulder w/ overlying ecchymosis- distal pulses RUE intact, good capillary refill; anasarca  Skin/Incision Site: Clean, dry and intact  Neurologic: sedated on precedex, EO spontaneously,  intermittently  follows simple commands, BHARAT , eyes midline,  Localizing w/ LUE,  + corneals b/l, no cough/occulocephalic, localizing LUE, 0/5 on the right side, left LE 0/5       ICU Vital Signs Last 24 Hrs  T(C): 37.7 (17 Jun 2023 04:00), Max: 38.3 (17 Jun 2023 00:00)  T(F): 99.9 (17 Jun 2023 04:00), Max: 101 (17 Jun 2023 00:00)  HR: 81 (17 Jun 2023 04:12) (47 - 82)  BP: 177/74 (16 Jun 2023 06:00) (177/74 - 180/77)  BP(mean): 107 (16 Jun 2023 06:00) (107 - 111)  ABP: 139/48 (17 Jun 2023 04:00) (58/58 - 195/65)  ABP(mean): 68 (17 Jun 2023 04:00) (28 - 677)  RR: 21 (17 Jun 2023 04:00) (18 - 38)  SpO2: 100% (17 Jun 2023 04:12) (100% - 100%)      06-15-23 @ 07:01  -  06-16-23 @ 07:00  --------------------------------------------------------  IN: 3241.3 mL / OUT: 3700 mL / NET: -458.7 mL    06-16-23 @ 07:01  -  06-17-23 @ 05:27  --------------------------------------------------------  IN: 3506.3 mL / OUT: 2995 mL / NET: 511.3 mL          LABS:  Na: 138 (06-16 @ 16:19), 141 (06-16 @ 05:35), 139 (06-16 @ 00:05), 139 (06-15 @ 04:40), 139 (06-14 @ 23:36), 137 (06-14 @ 19:30)  K: 4.0 (06-16 @ 16:19), 4.1 (06-16 @ 05:35), 4.3 (06-16 @ 00:05), 4.0 (06-15 @ 04:40), 4.1 (06-14 @ 23:36), 3.9 (06-14 @ 19:30)  Cl: 101 (06-16 @ 16:19), 102 (06-16 @ 05:35), 101 (06-16 @ 00:05), 102 (06-15 @ 04:40), 101 (06-14 @ 23:36), 101 (06-14 @ 19:30)  CO2: 22 (06-16 @ 16:19), 23 (06-16 @ 05:35), 24 (06-16 @ 00:05), 23 (06-15 @ 04:40), 22 (06-14 @ 23:36), 22 (06-14 @ 19:30)  BUN: 101 (06-16 @ 16:19), 83 (06-16 @ 05:35), 82 (06-16 @ 00:05), 68 (06-15 @ 04:40), 68 (06-14 @ 23:36), 65 (06-14 @ 19:30)  Cr: 4.9 (06-16 @ 16:19), 4.7 (06-16 @ 05:35), 4.5 (06-16 @ 00:05), 4.3 (06-15 @ 04:40), 4.0 (06-14 @ 23:36), 4.2 (06-14 @ 19:30)  Glu: 358(06-16 @ 16:19), 269(06-16 @ 05:35), 271(06-16 @ 00:05), 244(06-15 @ 04:40), 243(06-14 @ 23:36), 235(06-14 @ 19:30)    Hgb: 7.1 (06-17 @ 01:14), 6.7 (06-16 @ 16:19), 7.0 (06-16 @ 05:35), 7.6 (06-15 @ 04:40)  Hct: 21.8 (06-17 @ 01:14), 20.7 (06-16 @ 16:19), 22.3 (06-16 @ 05:35), 23.4 (06-15 @ 04:40)  WBC: 13.27 (06-17 @ 01:14), 12.31 (06-16 @ 16:19), 11.19 (06-16 @ 05:35), 11.13 (06-15 @ 04:40)  Plt: 247 (06-17 @ 01:14), 230 (06-16 @ 16:19), 219 (06-16 @ 05:35), 220 (06-15 @ 04:40)    INR: 0.90 06-15-23 @ 12:47  PTT: 30.3 06-15-23 @ 12:47          LIVER FUNCTIONS - ( 16 Jun 2023 05:35 )  Alb: 3.0 g/dL / Pro: 5.1 g/dL / ALK PHOS: 159 U/L / ALT: 38 U/L / AST: 27 U/L / GGT: x           ABG - ( 17 Jun 2023 03:42 )  pH, Arterial: 7.37  pH, Blood: x     /  pCO2: 38    /  pO2: 136   / HCO3: 22    / Base Excess: -3.0  /  SaO2: 97.9              Mode: AC/ CMV (Assist Control/ Continuous Mandatory Ventilation), RR (machine): 16, TV (machine): 450, FiO2: 40, PEEP: 7, ITime: 1, MAP: 11, PIP: 18    MEDICATIONS  (STANDING):  acetylcysteine 10%  Inhalation 4 milliLiter(s) Inhalation every 4 hours  albuterol    0.083% 2.5 milliGRAM(s) Nebulizer every 4 hours  amLODIPine   Tablet 10 milliGRAM(s) Oral daily  bacitracin   Ointment 1 Application(s) Topical every 12 hours  buMETAnide Infusion 1 mG/Hr (5 mL/Hr) IV Continuous <Continuous>  chlorhexidine 0.12% Liquid 15 milliLiter(s) Oral Mucosa every 12 hours  chlorhexidine 2% Cloths 1 Application(s) Topical <User Schedule>  cloNIDine 0.2 milliGRAM(s) Oral every 8 hours  dexMEDEtomidine Infusion 0.2 MICROgram(s)/kG/Hr (6.36 mL/Hr) IV Continuous <Continuous>  dextrose 5%. 1000 milliLiter(s) (100 mL/Hr) IV Continuous <Continuous>  dextrose 5%. 1000 milliLiter(s) (50 mL/Hr) IV Continuous <Continuous>  dextrose 50% Injectable 25 Gram(s) IV Push once  dextrose 50% Injectable 12.5 Gram(s) IV Push once  dextrose 50% Injectable 25 Gram(s) IV Push once  doxazosin 2 milliGRAM(s) Oral at bedtime  ergocalciferol 00393 Unit(s) Oral every week  fentaNYL   Infusion. 0.5 MICROgram(s)/kG/Hr (6.36 mL/Hr) IV Continuous <Continuous>  glucagon  Injectable 1 milliGRAM(s) IntraMuscular once  heparin   Injectable 7500 Unit(s) SubCutaneous every 8 hours  hydrALAZINE 75 milliGRAM(s) Oral <User Schedule>  insulin regular Infusion 5 Unit(s)/Hr (5 mL/Hr) IV Continuous <Continuous>  labetalol 200 milliGRAM(s) Oral <User Schedule>  lacosamide IVPB 200 milliGRAM(s) IV Intermittent every 12 hours  multivitamin 1 Tablet(s) Oral daily  niCARdipine Infusion 5 mG/Hr (25 mL/Hr) IV Continuous <Continuous>  pantoprazole  Injectable 40 milliGRAM(s) IV Push daily  polyethylene glycol 3350 17 Gram(s) Oral two times a day  senna 2 Tablet(s) Oral every 12 hours    MEDICATIONS  (PRN):  acetaminophen     Tablet .. 650 milliGRAM(s) Oral every 6 hours PRN Temp greater or equal to 38C (100.4F), Mild Pain (1 - 3)  dextrose Oral Gel 15 Gram(s) Oral once PRN Blood Glucose LESS THAN 70 milliGRAM(s)/deciliter  hydrALAZINE Injectable 10 milliGRAM(s) IV Push every 2 hours PRN SBP > 150  HYDROmorphone  Injectable 0.5 milliGRAM(s) IV Push every 4 hours PRN Severe Pain (7 - 10) / Vent synchrony / Ween of fent  ondansetron Injectable 4 milliGRAM(s) IV Push every 6 hours PRN Nausea and/or Vomiting   SUMMARY: This is a 64 y/o M with hx of Afib on eiquis, CVA BG bleed ,HTN, CHF, Gout, DM  presents as a stroke code after being down in bathroom unknown time LKW was 2300. 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 Pt is lethargic  - Cardene started for blood pressure control.    6/10- patient became increasingly lethargic in afternoon/early evening, febrile, hfnc maximized and patient intubated for hypoxemic respiratory failure with VL, sedated overnight on prop and fent, renal function worsening, pt on broad spectrum IV abx and fully cultured for sepsis due to aspiration pneumonia     OVERNIGHT EVENTS: remains on Bumex 1mg gtt; febrile, resolved with Tylenol     ADMISSION SCORES:   GCS 12   ICH 1   NIH 17    REVIEW OF SYSTEMS: Pt unable to participate in ROS due to neurological status      VITALS: [X] Reviewed    IMAGING/DATA: [X] Reviewed    IVF FLUIDS/MEDICATIONS: [X] Reviewed    ALLERGIES: Allergies    No Known Allergies    Intolerances    EXAMINATION:  General: morbidly obese; ill appearing  HENT: nc/at, orally intubated  Eyes: Anicteric sclerae  Cardiac: [id number]  Lungs: diminished air entry b/l, mild crackles  Abdomen: Soft, non-tender, +BS, truncal edema  Extremities: R shoulder - tenderness and crepitance / swelling R shoulder w/ overlying ecchymosis- distal pulses RUE intact, good capillary refill; anasarca  Skin/Incision Site: Clean, dry and intact  Neurologic:  awake alert aphasic, ?intermittently  follows simple midline commands, BHARAT , eyes midline,  Localizing w/ LUE,  + corneals b/l, no cough/occulocephalic, localizing LUE, 0/5 on the right side, left LE 0/5       ICU Vital Signs Last 24 Hrs  T(C): 37.7 (17 Jun 2023 04:00), Max: 38.3 (17 Jun 2023 00:00)  T(F): 99.9 (17 Jun 2023 04:00), Max: 101 (17 Jun 2023 00:00)  HR: 81 (17 Jun 2023 04:12) (47 - 82)  BP: 177/74 (16 Jun 2023 06:00) (177/74 - 180/77)  BP(mean): 107 (16 Jun 2023 06:00) (107 - 111)  ABP: 139/48 (17 Jun 2023 04:00) (58/58 - 195/65)  ABP(mean): 68 (17 Jun 2023 04:00) (28 - 677)  RR: 21 (17 Jun 2023 04:00) (18 - 38)  SpO2: 100% (17 Jun 2023 04:12) (100% - 100%)      06-15-23 @ 07:01  -  06-16-23 @ 07:00  --------------------------------------------------------  IN: 3241.3 mL / OUT: 3700 mL / NET: -458.7 mL    06-16-23 @ 07:01  -  06-17-23 @ 05:27  --------------------------------------------------------  IN: 3506.3 mL / OUT: 2995 mL / NET: 511.3 mL          LABS:  Na: 138 (06-16 @ 16:19), 141 (06-16 @ 05:35), 139 (06-16 @ 00:05), 139 (06-15 @ 04:40), 139 (06-14 @ 23:36), 137 (06-14 @ 19:30)  K: 4.0 (06-16 @ 16:19), 4.1 (06-16 @ 05:35), 4.3 (06-16 @ 00:05), 4.0 (06-15 @ 04:40), 4.1 (06-14 @ 23:36), 3.9 (06-14 @ 19:30)  Cl: 101 (06-16 @ 16:19), 102 (06-16 @ 05:35), 101 (06-16 @ 00:05), 102 (06-15 @ 04:40), 101 (06-14 @ 23:36), 101 (06-14 @ 19:30)  CO2: 22 (06-16 @ 16:19), 23 (06-16 @ 05:35), 24 (06-16 @ 00:05), 23 (06-15 @ 04:40), 22 (06-14 @ 23:36), 22 (06-14 @ 19:30)  BUN: 101 (06-16 @ 16:19), 83 (06-16 @ 05:35), 82 (06-16 @ 00:05), 68 (06-15 @ 04:40), 68 (06-14 @ 23:36), 65 (06-14 @ 19:30)  Cr: 4.9 (06-16 @ 16:19), 4.7 (06-16 @ 05:35), 4.5 (06-16 @ 00:05), 4.3 (06-15 @ 04:40), 4.0 (06-14 @ 23:36), 4.2 (06-14 @ 19:30)  Glu: 358(06-16 @ 16:19), 269(06-16 @ 05:35), 271(06-16 @ 00:05), 244(06-15 @ 04:40), 243(06-14 @ 23:36), 235(06-14 @ 19:30)    Hgb: 7.1 (06-17 @ 01:14), 6.7 (06-16 @ 16:19), 7.0 (06-16 @ 05:35), 7.6 (06-15 @ 04:40)  Hct: 21.8 (06-17 @ 01:14), 20.7 (06-16 @ 16:19), 22.3 (06-16 @ 05:35), 23.4 (06-15 @ 04:40)  WBC: 13.27 (06-17 @ 01:14), 12.31 (06-16 @ 16:19), 11.19 (06-16 @ 05:35), 11.13 (06-15 @ 04:40)  Plt: 247 (06-17 @ 01:14), 230 (06-16 @ 16:19), 219 (06-16 @ 05:35), 220 (06-15 @ 04:40)    INR: 0.90 06-15-23 @ 12:47  PTT: 30.3 06-15-23 @ 12:47          LIVER FUNCTIONS - ( 16 Jun 2023 05:35 )  Alb: 3.0 g/dL / Pro: 5.1 g/dL / ALK PHOS: 159 U/L / ALT: 38 U/L / AST: 27 U/L / GGT: x           ABG - ( 17 Jun 2023 03:42 )  pH, Arterial: 7.37  pH, Blood: x     /  pCO2: 38    /  pO2: 136   / HCO3: 22    / Base Excess: -3.0  /  SaO2: 97.9              Mode: AC/ CMV (Assist Control/ Continuous Mandatory Ventilation), RR (machine): 16, TV (machine): 450, FiO2: 40, PEEP: 7, ITime: 1, MAP: 11, PIP: 18    MEDICATIONS  (STANDING):  acetylcysteine 10%  Inhalation 4 milliLiter(s) Inhalation every 4 hours  albuterol    0.083% 2.5 milliGRAM(s) Nebulizer every 4 hours  amLODIPine   Tablet 10 milliGRAM(s) Oral daily  bacitracin   Ointment 1 Application(s) Topical every 12 hours  buMETAnide Infusion 1 mG/Hr (5 mL/Hr) IV Continuous <Continuous>  chlorhexidine 0.12% Liquid 15 milliLiter(s) Oral Mucosa every 12 hours  chlorhexidine 2% Cloths 1 Application(s) Topical <User Schedule>  cloNIDine 0.2 milliGRAM(s) Oral every 8 hours  dexMEDEtomidine Infusion 0.2 MICROgram(s)/kG/Hr (6.36 mL/Hr) IV Continuous <Continuous>  dextrose 5%. 1000 milliLiter(s) (100 mL/Hr) IV Continuous <Continuous>  dextrose 5%. 1000 milliLiter(s) (50 mL/Hr) IV Continuous <Continuous>  dextrose 50% Injectable 25 Gram(s) IV Push once  dextrose 50% Injectable 12.5 Gram(s) IV Push once  dextrose 50% Injectable 25 Gram(s) IV Push once  doxazosin 2 milliGRAM(s) Oral at bedtime  ergocalciferol 90837 Unit(s) Oral every week  fentaNYL   Infusion. 0.5 MICROgram(s)/kG/Hr (6.36 mL/Hr) IV Continuous <Continuous>  glucagon  Injectable 1 milliGRAM(s) IntraMuscular once  heparin   Injectable 7500 Unit(s) SubCutaneous every 8 hours  hydrALAZINE 75 milliGRAM(s) Oral <User Schedule>  insulin regular Infusion 5 Unit(s)/Hr (5 mL/Hr) IV Continuous <Continuous>  labetalol 200 milliGRAM(s) Oral <User Schedule>  lacosamide IVPB 200 milliGRAM(s) IV Intermittent every 12 hours  multivitamin 1 Tablet(s) Oral daily  niCARdipine Infusion 5 mG/Hr (25 mL/Hr) IV Continuous <Continuous>  pantoprazole  Injectable 40 milliGRAM(s) IV Push daily  polyethylene glycol 3350 17 Gram(s) Oral two times a day  senna 2 Tablet(s) Oral every 12 hours    MEDICATIONS  (PRN):  acetaminophen     Tablet .. 650 milliGRAM(s) Oral every 6 hours PRN Temp greater or equal to 38C (100.4F), Mild Pain (1 - 3)  dextrose Oral Gel 15 Gram(s) Oral once PRN Blood Glucose LESS THAN 70 milliGRAM(s)/deciliter  hydrALAZINE Injectable 10 milliGRAM(s) IV Push every 2 hours PRN SBP > 150  HYDROmorphone  Injectable 0.5 milliGRAM(s) IV Push every 4 hours PRN Severe Pain (7 - 10) / Vent synchrony / Ween of fent  ondansetron Injectable 4 milliGRAM(s) IV Push every 6 hours PRN Nausea and/or Vomiting

## 2023-06-17 NOTE — PROGRESS NOTE ADULT - ASSESSMENT
IMP:73 y/o M with hx of CVA, DM ,HTN, Afib on eliquis, CHF, Gout presents as [place] Code/Code ICH with NIH 17, GCS11, ICH 1 with Left thalamic hemorrhage  PBD 10     sICH/IVH  hypertensive emergency   acute hypoxemic respiratory   DESI on CKD   encephalopathy  R humerus fracture      Plan:   Neuro coma checks q 2  On Precedex, fentanyl- wean to RASS 0 to -2 as tolerates  Wean fentanyl, PRN Dilaudid for vent synchrony  MRI brain pending   vEEG with significant sharp wave burden, on vimpat 200 q12h            Stroke labs - ICH score   multifactorial encephalopathy- neurologic, metabolic/uremic   PT/OT     CV: htn, hx of afib on DOAC s/p reversal  MAP>60, SBP < 160 on cardene- wean as tolerates  on multiple antihypertensive at home -on bumex gtt  - resume labetalol, hydralazine           afib- off doac s/p reversal for ICH; intermittent RVR, IV lopressor PRN            Echo- Noted, LV EF 55-60%, grade I diastolic dysfunction            Daily weights    Pulmonary:- AHRF due to aspiration pneumonitis  lung protective vent support  agree pulmonary toileting- nebulizers/mucomyst q 4 prn, chest PT q2  HOB >45, aspiration precautions    GI: EMILY feeding tube in place  TF peptamen intense VHP        gi ppx-   Bowel regimen- senna, add miralax    Renal: DESI/ CRI - baseline  Cr 3.0   on bumex drip   UOP/BUN improving- hold CVVHD, on bumex gtt  Nephrology following  khan catheter             Daily weights monitor stricy I's and O's               Endocrine: Fs q 6H                         ISS q 6H- sglu - 140 -180                        NPH 18u q6h                          Stroke Core measures as above   a1c 5.9    Heme-  DVT PPx- SCD, SQH  repeat cbc tonight     ID:  sepsis due to aspiration pna  f/u culture data- sputum cx 6/11 (+) mod haemophilus influenzae  c/w IV zosyn through 6/17  CT chest with RLL PNA    ortho- XRAY R Shoulder- Redemonstrated is an acute surgical neck fracture of the proximal humerus with displacement and mild impaction; conservative management per ortho       a-line 6/6  LIJ CVC 6/10  ETT 6/10  Khan 6/14    dispo- nsicu    family updated at bedside  IMP:75 y/o M with hx of CVA, DM ,HTN, Afib on eliquis, CHF, Gout presents as [place] Code/Code ICH with NIH 17, GCS11, ICH 1 with Left thalamic hemorrhage  PBD 10     sICH/IVH  hypertensive emergency   acute hypoxemic respiratory   DESI on CKD   encephalopathy  R humerus fracture      Plan:   Neuro coma checks q 4  On Precedex, prn dilaudid IVP RASS 0 to -2 as tolerates  MRI brain pending   vEEG with significant sharp wave burden-- on hold for scalp rest, on vimpat 200 q12h            Stroke labs - ICH score   multifactorial encephalopathy- neurologic, metabolic/uremic   PT/OT     CV: htn, hx of afib on DOAC s/p reversal  MAP>60, SBP < 160 on cardene- wean as tolerates  BP regimen- hydralazine 100 q 8, labetalol 200 q 8, norvasc 10 mg, clonidine 0.2 q8  afib- off doac s/p reversal for ICH; intermittent RVR  Echo- Noted, LV EF 55-60%, grade I diastolic dysfunction  Daily weights    Pulmonary:- AHRF due to aspiration pneumonitis  lung protective vent support  agree pulmonary toileting- nebulizers/mucomyst q 4 prn, chest PT q2  HOB >45, aspiration precautions  PSV as tolerates     GI: EMILY feeding tube in place  TF peptamen intense VHP        gi ppx- ppi  Bowel regimen- senna, miralax, enema prn  LBM 6/14    Renal: DESI/ CRI - baseline  Cr 3.0   UOP/BUN improving- hold CVVHD, hold bumex and monitor-- possible iHD tomorrow   Nephrology following  khan catheter   Daily weights monitor stricy I's and O's   cardura q hs              Endocrine: Fs q 6H                         ISS q 6H- sglu - 140 -180                       insulin gtt; will transition to nph today                          Stroke Core measures as above; a1c 5.9    Heme-  DVT PPx- SCD, SQH  s/p 2 units prbc with nonsustained response  pt without evidence of GI bleed; anemia likely related to sepsis and worsening renal failure  will consider ct a/p to r/o RP bleed    ID:  repeat sepsis w/u today with UA (sterile pyuria), endotracheal Cx, PCT, MRSA swab  sepsis due to aspiration pna  f/u culture data- sputum cx 6/11 (+) mod haemophilus influenzae  c/w IV zosyn through 6/17  CT chest with RLL PNA    ortho- XRAY R Shoulder- Redemonstrated is an acute surgical neck fracture of the proximal humerus with displacement and mild impaction; conservative management per ortho       a-line 6/16  LIJ CVC 6/10  ETT 6/10  Khan 6/16 (replaced)    dispo- nsicu    family updated at bedside

## 2023-06-17 NOTE — PROGRESS NOTE ADULT - ASSESSMENT
The patient is a 63-year-old male with a past history of Afib on Eliquis, prior basal ganglia hemorrhage, HTN, Gout, and DM, CKD 3b- 4  who presented as a stroke.  # DESI on [id number]/ ATN vs EV   # oliguria,  # HTN uncontrolled   # Thalamic CVA hemorrhagic   - non oliguric  - no need for HD today   - cr noted trending up   - d/c bumex drip   - ph noted at goal  -  ? blood tx   - ph noted / no binders   - followed by neurology  will follow

## 2023-06-18 NOTE — CONSULT NOTE ADULT - ASSESSMENT
64 y/o M with hx of Afib on eiquis, CVA BG bleed ,HTN, CHF, Gout, DM  presents as a stroke code after being down in bathroom unknown time. Head CT shows a thalamic bleed ICH. pt is being monitored in NICU for ICH. Intubated for hypoxic respiratory failure and change in mental status. NICU reported multiple episodes of melena in am and reported shock requiring pressors. off note patient was receiving multiple antihypertension medications to control BP. GI consulted for melena.     # Melena R/O UGIB 2/2 bleeding ulcer or other vascular lesions.   Hemoglobin 7.2 > 5.3 > 10.8 s/p 6 PRBC  On pressors. Doubt hemorrhagic shock as patient responded to PRBCs transfusion.   uremia in setting of renal failure   Eliquis on hold     PLAN:   NPO   PPI Infusion   Transfuse to keep hemoglobin > 8   Monitor CBC   Will plan for EGD after resuscitation   R/O other causes of shock   Will Follow closely    64yo M with hx of Afib on eliquis, CVA BG bleed, HTN, CHF, Gout, DM  presents as a stroke code after being down in bathroom unknown time. Head CT shows a thalamic bleed ICH being monitored in NICU requiring intubation for hypoxic respiratory failure and change in mental status. NICU reported multiple episodes of melena in am and reported shock requiring pressors. Pt was receiving multiple antihypertension medications to control BP and being treated for aspiration pneumonia. GI consulted for melena.     # Melena R/O UGIB 2/2 bleeding ulcer, esophagitis or other vascular lesions.   Hemoglobin 7.2 > 5.3 > 10.8 s/p 6 PRBC  On pressors. Suspect component of hemorrhagic and septic shock, appears to have been responding to prbc transfusions and melena lessened  Uremia in setting of renal failure   Aspiration pneumonia  Uncontrolled BP  Eliquis on hold     PLAN:   NPO   PPI Infusion   Transfuse to keep hemoglobin > 8   Monitor CBC   Correct coagulopathy  Monitor and correct electrolytes  Avoid hypotension  Will plan for EGD when adequately resuscitated and based on goals of care  Will Follow closely

## 2023-06-18 NOTE — PROGRESS NOTE ADULT - NS MD NEURO CONDITIONS_RENAL
DESI/CKD Stage 3
DESI
DESI/CKD Stage 3
DESI/CKD Stage 3

## 2023-06-18 NOTE — CONSULT NOTE ADULT - CONSULT REQUESTED DATE/TIME
07-Jun-2023 17:00
06-Jun-2023 13:30
13-Jun-2023 14:44
06-Jun-2023 03:51
06-Jun-2023 07:18
18-Jun-2023 10:26
18-Jun-2023 17:40
07-Jun-2023 09:11
06-Jun-2023 04:23
18-Jun-2023 12:00

## 2023-06-18 NOTE — PROCEDURE NOTE - NSPOSTCAREGUIDE_GEN_A_CORE
Care for catheter as per unit/ICU protocols
Verbal/written post procedure instructions were given to patient/caregiver/Instructed patient/caregiver to follow-up with primary care physician/Keep the cast/splint/dressing clean and dry
Care for catheter as per unit/ICU protocols

## 2023-06-18 NOTE — PROCEDURAL SAFETY CHECKLIST WITH OR WITHOUT SEDATION - NSBLDPRODCTAVAIL_GEN_ALL_CORE
Seven Mcneil is here for a checkup. Patient says that she has been suffering from menopausal symptom for past 7 years. She has been having severe hot flashes, insomnia, vaginal dryness. Patient is 24-year-old -American female  4 para 4-0-0-4.
Recommendation: last mammogram done 05/2018 at [place]   '  Past Medical History     Past Medical History:   Diagnosis Date   • Diverticulitis    • Family history of breast cancer     with pt's mom and sister   • Postmenopause 2013    pot shira at age 46
done
status: Not on file      Intimate partner violence:        Fear of current or ex partner: Not on file        Emotionally abused: Not on file        Physically abused: Not on file        Forced sexual activity: Not on file    Other Topics      Concerns:
Adnexa: non tender, no masses, normal size          Rectal: Not performed  EXTREMITIES:  non tender without edema    1. Menopause syndrome    2. Women's annual routine gynecological examination    3. Family history of breast cancer    4.  Sc
done
not applicable
done
done

## 2023-06-18 NOTE — CHART NOTE - NSCHARTNOTEFT_GEN_A_CORE
Overnight events: (+) melena with hypotension and tachycardia, s/p 2L LR bolus, Nicardipine gtt and antihypertensive medications discontinued, then required vasoactive medication, Levophed and Vasopressin. 2 units PRBC ordered emergently. GI and surgical consult placed, started on Protonix gtt. Sodium Bicarbonate 3 amps given for worsening acidosis 2/2 uptrending lactate and worsening uremia in the setting of DESI on CKD. . Emergent udall placed for CVVHD.       Patient now requiring multiple blood transfusions, started Carl-Synephrine, and multiple IV bolus of sodium bicarbonate and calcium chloride.       Patient's condition was discussed with family by [facility] Attending Dr. Schwab Overnight events: (+) melena with hypotension and tachycardia, sepsis versus hemorrhagic shock, s/p 2L LR bolus, Nicardipine gtt and antihypertensive medications discontinued, then required vasoactive medication, Levophed and Vasopressin. 2 units PRBC ordered emergently. GI and surgical consult placed, started on Protonix gtt. Sodium Bicarbonate 3 amps given for worsening acidosis 2/2 uptrending lactate and worsening uremia in the setting of DESI on CKD. Emergent udall placed for CVVHD, started with goal even balance.     Patient now requiring multiple blood transfusions, started Carl-Synephrine, and multiple IV bolus of sodium bicarbonate and calcium chloride. Pulse dose steroids started    HR 160s with hypotension, afib w/ RVR, s/p Amiodarone 150mg IV x2, converted to ST.       Patient's condition was discussed with family by [facility] Attending Dr. Schwab  [phone] Overnight events: (+) melena with hypotension and tachycardia, sepsis with intraabdominal source versus mesenteric ischemia versus hemorrhagic shock, s/p 2L LR bolus, Nicardipine gtt and antihypertensive medications discontinued, then required vasoactive medication, Levophed and Vasopressin. 2 units PRBC ordered emergently. GI and surgical consult placed, started on Protonix gtt. Sodium Bicarbonate 3 amps given for worsening acidosis 2/2 uptrending lactate and worsening uremia in the setting of DESI on CKD. Emergent udall placed for CVVHD, started with goal even balance. Blood cultures drawn and sent.     Patient now requiring multiple blood transfusions, started Carl-Synephrine, and multiple IV bolus of sodium bicarbonate and calcium chloride. Pulse dose steroids started    HR 160s with hypotension, afib w/ RVR, s/p Amiodarone 150mg IV x2, converted to ST.       Patient's condition was discussed with family by [facility] Attending Dr. Schwab  [phone] Interval events: (+) melena with hypotension and tachycardia in early AM, differential diagnosis include sepsis with potential intraabdominal source versus mesenteric ischemia versus hemorrhagic shock, s/p 2L LR bolus, Nicardipine gtt and antihypertensive medications discontinued, then required vasoactive medication, Levophed and Vasopressin. 2 units PRBC ordered emergently. GI and surgical consult completed, started on Protonix gtt. Sodium Bicarbonate 3 amps given for worsening acidosis 2/2 up-trending lactate and worsening uremia in the setting of DESI on CKD. Emergent udall placed for CVVHD, started with goal even balance. Blood cultures drawn and sent for fevers and sepsis; given Vancomycin x1    Patient now requiring multiple blood transfusions [6 units total], started Carl-Synephrine, and multiple IV bolus of sodium bicarbonate, calcium chloride, and Neosticks. Pulse dose steroids initiated.      HR 160s with hypotension, afib w/ RVR, s/p Amiodarone 150mg IV x2, converted to sinus tachycardia, .        Patient's condition was discussed with family by [facility] Attending Dr. Schwab  [phone] From early this AM into the daytime shift-- The patient develop large volume of melanotic stool with hypotension and tachycardia; all BP medications were discontinued; the pt was type/crossed and transfused pRBCs, initiated on IV norepi gtt for concern for hemorrhagic shock. Pt was given PPI bolus and then continued maintenance; DDAVP x1 for concern for uremic bleeding GI was consulted; cultures sent, and the pt antimicrobial coverage was expanded further. bedside POCUS revealed hyperdynamic biventricular function and underfilled ventricular lumen. HD catheter was placed and CVVHD orders placed by nephrology team. Pt pressor requirement continued to increase requiring multiple transfusions, increased pressor requirement source versus mesenteric ischemia versus hemorrhagic shock, s/p 2L LR bolus, Nicardipine gtt and antihypertensive medications discontinued, then required vasoactive medication, Levophed and Vasopressin. 2 units PRBC ordered emergently. GI and surgical consult completed, started on Protonix gtt. Sodium Bicarbonate 3 amps given for worsening acidosis 2/2 up-trending lactate and worsening uremia in the setting of DESI on CKD. Emergent udall placed for CVVHD, started with goal even balance. Blood cultures drawn and sent for fevers and sepsis; given Vancomycin x1    Patient now requiring multiple blood transfusions [6 units total], started Carl-Synephrine, and multiple IV bolus of sodium bicarbonate, calcium chloride, and Neosticks. Pulse dose steroids initiated.      HR 160s with hypotension, afib w/ RVR, s/p Amiodarone 150mg IV x2, converted to sinus tachycardia, .        Patient's condition was discussed with family by [facility] Attending Dr. Schwab  [phone] From early this AM into the daytime shift-- The patient develop large volume of melanotic stool with hypotension and tachycardia; all BP medications were discontinued; the pt was type/crossed and transfused pRBCs, initiated on IV norepi gtt for concern for hemorrhagic shock. Pt was given PPI bolus and then continued maintenance; DDAVP x1 for concern for uremic bleeding GI was consulted; cultures sent, and the pt antimicrobial coverage was expanded further. Bedside POCUS revealed hyperdynamic biventricular function and underfilled ventricular lumen (as pt on high dose norepi gtt and in hypovolemic/hemorrhagic shock getting transfused). HD catheter was placed and CVVHD orders placed by nephrology team. Pt pressor requirement continued to increase requiring multiple transfusions; Pt had 2 episodes of Afib RVR necessitating amiodarone boluses with return to sinus rhythm. Pt remained too unstable for any imaging, GI or surgical intervention. With significant acidosis, the pt received multiple pushes of NaHCO3. The pt was on CVVHD for uremic bleeding and worsening acidemia. Labs were significant for worsening leukocytosis, DIC, persistent lactic acidosis despite adequate resuscitation (including with blood transfusion). Given the significant and persistent hyperlactemia, there was concern for mesenteric ischemia (and surgery had already been consulted)- but the pt was too unstable for any surgical intervention or imaging. FAST Ultrasound of abdomen at bedside was not positive at the time.  The patient was already on IV merrem for concern for concomitant intraabdominal sepsis. Pt CVVHD intermittently clotted, as the pt was now appearing to go into DIC.   Pt began to desaturate, given worsening hypervolemia (due to significant resuscitative efforts with multiple pRBCs- likely causing TACO). PEEP increased with moderate response, and the pt already remained sedated and compliant with the ventilator    The NSICU team discussed the high morbidity and significant risk of mortality for the patient- given his multiple organ failure- and the poor prognosis. The patient's wife agreed for DNR.     All questions and concerns were addressed From early this AM into the daytime shift-- The patient develop large volume of melanotic stool with hypotension and tachycardia; all BP medications were discontinued; the pt was type/crossed and transfused pRBCs, initiated on IV norepi gtt for concern for hemorrhagic shock. Pt was given PPI bolus and then continued maintenance; DDAVP x1 for concern for uremic bleeding GI was consulted; cultures sent, and the pt antimicrobial coverage was expanded further. Bedside POCUS revealed hyperdynamic biventricular function and underfilled ventricular lumen (as pt on high dose norepi gtt and in hypovolemic/hemorrhagic shock getting transfused). HD catheter was placed and CVVHD orders placed by nephrology team. Pt pressor requirement continued to increase requiring multiple transfusions; Pt had 2 episodes of Afib RVR necessitating amiodarone boluses with return to sinus rhythm. Pt remained too unstable for any imaging, GI or surgical intervention. With significant acidosis, the pt received multiple pushes of NaHCO3. The pt was on CVVHD for uremic bleeding and worsening acidemia. Labs were significant for worsening leukocytosis, DIC, persistent lactic acidosis despite adequate resuscitation (including with blood transfusion). Given the significant and persistent hyperlactemia, there was concern for mesenteric ischemia (and surgery had already been consulted)- but the pt was too unstable for any surgical intervention or imaging.  The patient was already on IV merrem for concern for concomitant intraabdominal sepsis. Pt CVVHD intermittently clotted, as the pt was now appearing to go into DIC.   Pt began to desaturate, given worsening hypervolemia (due to significant resuscitative efforts with multiple pRBCs- likely causing TACO). PEEP increased with moderate response, and the pt already remained sedated and compliant with the ventilator    The NSICU team discussed the high morbidity and significant risk of mortality for the patient- given his multiple organ failure- and the poor prognosis. The patient's wife agreed for DNR.     All questions and concerns were addressed

## 2023-06-18 NOTE — PROGRESS NOTE ADULT - ASSESSMENT
The patient is a 63-year-old male with a past history of Afib on Eliquis, prior basal ganglia hemorrhage, HTN, Gout, and DM, CKD 3b- 4  who presented as a stroke.  # DESI on [id number]-4/ ATN vs EV   # oliguria,  # HTN uncontrolled   # Thalamic CVA hemorrhagic   - sp GIB / acidotic / on 2 pressor snow   - will resume CVVHD keep in even balance  - for transfusion / GI called for follow up   - cr noted trending up   - ph noted at goal/ no binders in the context of GIB for now   -  ? blood tx   - followed by neurology  will follow

## 2023-06-18 NOTE — PROCEDURE NOTE - ADDITIONAL PROCEDURE DETAILS
Initial catheter visualized in r subclavian vein. Exchanged over wire. 2nd CXR shows catheter in r subclavian vein a 2nd time. Catheter exchanged over wire a 2nd time. 3rd CXR shows catheter in cavoatrial junction.

## 2023-06-18 NOTE — PROCEDURAL SAFETY CHECKLIST WITH OR WITHOUT SEDATION - NSPREPROCFT_GEN_ALL_CORE
Left IJ TLC
LEFT FEM UDALL
BRIANNA Tenorio
Right IJ [place] Placement
Right IJ [place] Placement
Left Radial A-line
Radial Arterial Line Insertion

## 2023-06-18 NOTE — PROCEDURAL SAFETY CHECKLIST WITH OR WITHOUT SEDATION - NSTEAMNURSEFT_GEN_ALL_CORE
Kait Jamil
Rylie Kaur
Florence Quiroz/ Nely Rodriguez
YOLIS GONZALEZ
Jean Peterson
Christina Hill
Kait Jamil

## 2023-06-18 NOTE — PROCEDURAL SAFETY CHECKLIST WITH OR WITHOUT SEDATION - NSPREANESCONSENT_GEN_ALL_CORE
n/a
Present, accurate, and signed
n/a
Present, accurate, and signed
Eye Protection Verbiage: Before proceeding with the stage, a plastic scleral shield was inserted. The globe was anesthetized with a few drops of 1% lidocaine with 1:100,000 epinephrine. Then, an appropriate sized scleral shield was chosen and coated with lacrilube ointment. The shield was gently inserted and left in place for the duration of each stage. After the stage was completed, the shield was gently removed.

## 2023-06-18 NOTE — CONSULT NOTE ADULT - REASON FOR ADMISSION
Stroke Code

## 2023-06-18 NOTE — CONSULT NOTE ADULT - SUBJECTIVE AND OBJECTIVE BOX
SURGERY CONSULT NOTE    Patient: GRIS TIDWELL , 63y (56)Male   MRN: [id number]  Location: [address]  Visit: 23 Inpatient  Date: 23 @ 01:35    "HPI:      This is a 64 y/o M with hx of Afib on eiquis, CVA BG bleed ,HTN, CHF, Gout, DM  presents as a stroke code after being down in bathroom unknown time LKW was 2300. 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 Pt is lethargic  - Cardene started for blood pressure control.      Admission Scores  GCS 12   ICH 1   NIH 17,    ACC: [id number] EXAM:  CT ANGIO BRAIN STROKE PROTC IC   ORDERED BY: MARIELA MARES     ACC: [id number] EXAM:  CT ANGIO NECK STROKE PROTCL IC   ORDERED BY: MARIELA MARES     PROCEDURE DATE:  2023          INTERPRETATION:  CLINICAL INDICATION: Code stroke. Right-sided weakness   and altered mental status.    TECHNIQUE: CTA of the head and neck was performed after the intravenous   administration of of contrast. 3-D reconstructions were performed under   physician supervision on a separate workstation and reviewed. A total of   70 mL Omnipaque 350 nonionic IV contrast was administered, 30 cc   discarded.    NASCET CRITERIA FOR CAROTID STENOSIS:  Mild: 0% to 49%, Moderate: 50% to 69%, Severe: 70% to 99%, Complete   Occlusion.    COMPARISON: Noncontrast head CT performed earlier the same day. MRA brain   2019.      FINDINGS:  AORTIC ARCH: Normal three-vessel arch. There is calcification of the   brachiocephalic artery without significant flow-limiting stenosis    RIGHT ANTERIOR CIRCULATION:  The common carotid artery (CCA) takes a medialized course remaining   patent up to the bulb without stenosis. There is calcification at the   carotid bulb without significant flow-limiting stenosis. The external   carotid artery (ECA) and its proximal branches are patent without   stenosis. The cervical internal carotid artery (ICA) is patent without   stenosis. The intracranial internal carotid artery is patent without   stenosis.    The middle cerebral artery (MCA) is patent without stenosis. The anterior   cerebral artery, A1 segment is hypoplastic. The ALVAREZ A2 segment is   supplied via the anterior communicating artery, variant anatomy.    LEFT ANTERIOR CIRCULATION:  The CCA is patent up to the bulb without stenosis. There is calcification   at the carotid bulb without significant flow-limiting stenosis The ECA   and its proximal branches are patent without stenosis. The cervical ICA   is patent without stenosis. The intracranial ICA is patent without   stenosis.    The anterior and middle cerebral arteries are patent without stenosis.    There is a 5 mm focus of contrast enhancement along the lateral aspect of   the known intraparenchymal hematoma in the left thalamus discontinuous   vessels, best seen on 4:287, compatible with "spot sign."    POSTERIOR CIRCULATION:  The vertebral arteries are patent without stenosis bilaterally. There is   tiny atherosclerotic calcifications at the origin of the right vertebral   artery and involving the V2 segments bilaterally. The basilar artery is   patent without stenosis. The proximal branch vasculature of the posterior   circulation are within normal limits.    The posterior cerebral arteries are patent without stenosis.    There is no evidence for saccular aneurysm, vascular malformation, or   large vessel occlusion.      OTHERS:  Please see separately dictated CT head for the intracranial findings.  Partially imaged coronary artery calcifications.  Ectatic main pulmonary artery to 3.8 cm suggestive of pulmonary arterial   hypertension      IMPRESSION:  1.  No large vessel occlusion, high-grade stenosis, aneurysm, or vascular   malformation.  2.  A 5 mm focus of contrast enhancement within lateral aspect of acute   left thalamic intraparenchymal hemorrhage("CTA Spot sign"), which has   been associated with hematoma growth.  3.  Please see separately dictated CT head for nonvascular intracranial   findings.  MPRESSION:    1.  Acute left thalamic intraparenchymal hemorrhage extending to the left   corona radiata and possibly the left caudate nucleus with associated   vasogenic edema.  2.  Moderate chronic microvascular ischemic changes and chronic infarct   as above.      Preliminary findings with CHANG French at 2023 3:1 (2023 03:51)  "    Reason for Surgical Consult: Lower GI Bleed.      PAST MEDICAL & SURGICAL HISTORY:  Diabetes mellitus      Hypertension      Gout      Morbidly obese      Gout      S/P tonsillectomy          Home Medications:  alfuzosin 10 mg oral tablet, extended release: 1 orally once a day (2023 15:38)  atorvastatin 40 mg oral tablet: 1 orally once a day (2023 15:38)  cholecalciferol 50 mcg (2000 intl units) oral tablet, chewable: 1 orally once a day (2023 15:42)  cyanocobalamin 1000 mcg oral tablet: 1 orally once a day (2023 15:42)  ezetimibe 10 mg oral tablet: 1 orally once a day (2023 15:42)  hydrALAZINE 100 mg oral tablet: 1 orally 2 times a day (2023 15:42)  Maninder Handy SoloStar 300 units/mL subcutaneous solution: 50 subcutaneous once a day (2023 15:37)  Trulicity Pen 1.5 mg/0.5 mL subcutaneous solution: 1.5 subcutaneously once a week (2023 15:42)        VITALS:  T(F): 95.9 (23 @ 00:00), Max: 100.4 (23 @ 03:30)  HR: 93 (23 @ 01:15) (55 - 153)  BP: 84/35 (23 @ 04:45) (77/32 - 110/53)  RR: 32 (23 @ 01:15) (1 - 42)  SpO2: 94% (23 @ 01:15) (67% - 100%)    PHYSICAL EXAM:  General: Intubated, sedated  Cardiac: RR  Respiratory: Vented  Abdomen: Soft, non-distende  Skin: Warm/dry, normal color, no jaundice      MEDICATIONS  (STANDING):  acetylcysteine 10%  Inhalation 4 milliLiter(s) Inhalation every 4 hours  albuterol    0.083% 2.5 milliGRAM(s) Nebulizer every 4 hours  bacitracin   Ointment 1 Application(s) Topical every 12 hours  chlorhexidine 0.12% Liquid 15 milliLiter(s) Oral Mucosa every 12 hours  chlorhexidine 2% Cloths 1 Application(s) Topical <User Schedule>  CRRT Treatment    <Continuous>  dextrose 10%. 1000 milliLiter(s) (20 mL/Hr) IV Continuous <Continuous>  dextrose 5%. 1000 milliLiter(s) (100 mL/Hr) IV Continuous <Continuous>  dextrose 5%. 1000 milliLiter(s) (50 mL/Hr) IV Continuous <Continuous>  dextrose 50% Injectable 25 Gram(s) IV Push once  dextrose 50% Injectable 12.5 Gram(s) IV Push once  dextrose 50% Injectable 25 Gram(s) IV Push once  ergocalciferol 89538 Unit(s) Oral every week  fentaNYL   Infusion. 1 MICROgram(s)/kG/Hr (12.7 mL/Hr) IV Continuous <Continuous>  glucagon  Injectable 1 milliGRAM(s) IntraMuscular once  hydrocortisone sodium succinate Injectable 100 milliGRAM(s) IV Push every 8 hours  insulin regular Infusion 2 Unit(s)/Hr (2 mL/Hr) IV Continuous <Continuous>  lacosamide IVPB 200 milliGRAM(s) IV Intermittent every 12 hours  meropenem  IVPB 500 milliGRAM(s) IV Intermittent every 12 hours  multivitamin 1 Tablet(s) Oral daily  norepinephrine Infusion 0.05 MICROgram(s)/kG/Min (5.96 mL/Hr) IV Continuous <Continuous>  pantoprazole  Injectable 40 milliGRAM(s) IV Push every 12 hours  phenylephrine    Infusion 0.1 MICROgram(s)/kG/Min (2.38 mL/Hr) IV Continuous <Continuous>  phenylephrine   100 mCg/mL NaCl 0.9% Injectable 1000 MICROGram(s) IV Push <User Schedule>  phenylephrine   100 mCg/mL NaCl 0.9% Injectable 1000 MICROGram(s) IV Push <User Schedule>  PureFlow Dialysate RFP-400 (K 2 / Ca 3) 5000 milliLiter(s) (2000 mL/Hr) CRRT <Continuous>  vasopressin Infusion 0.04 Unit(s)/Min (6 mL/Hr) IV Continuous <Continuous>    MEDICATIONS  (PRN):  acetaminophen     Tablet .. 650 milliGRAM(s) Oral every 6 hours PRN Temp greater or equal to 38C (100.4F), Mild Pain (1 - 3)  bisacodyl Suppository 10 milliGRAM(s) Rectal daily PRN Constipation  dextrose Oral Gel 15 Gram(s) Oral once PRN Blood Glucose LESS THAN 70 milliGRAM(s)/deciliter  fentaNYL    Injectable 50 MICROGram(s) IV Push every 2 hours PRN Severe Pain (7 - 10)/vent dyssynchrony  ondansetron Injectable 4 milliGRAM(s) IV Push every 6 hours PRN Nausea and/or Vomiting      LAB/STUDIES:                        8.9    33.82 )-----------( 71       ( 2023 23:19 )             26.7     06-18    152<H>  |  113<H>  |  100<HH>  ----------------------------<  63<L>  6.1<HH>   |  15<L>  |  4.5<HH>    Ca    7.2<L>      2023 23:19  Phos  8.1       Mg     2.4         TPro  3.0<L>  /  Alb  1.8<L>  /  TBili  2.1<H>  /  DBili  x   /  AST  >7000  /  ALT  >7000<H>  /  AlkPhos  95  18    PT/INR - ( 2023 23:19 )   PT: >40.00 sec;   INR: 3.76 ratio         PTT - ( 2023 23:19 )  PTT:46.5 sec  LIVER FUNCTIONS - ( 2023 23:19 )  Alb: 1.8 g/dL / Pro: 3.0 g/dL / ALK PHOS: 95 U/L / ALT: >7000 U/L / AST: >7000 U/L / GGT: x           Urinalysis Basic - ( 2023 09:31 )    Color: Light Yellow / Appearance: Slightly Turbid / S.012 / pH: x  Gluc: x / Ketone: Negative  / Bili: Negative / Urobili: <2 mg/dL   Blood: x / Protein: Trace / Nitrite: Negative   Leuk Esterase: Large / RBC: 10 /HPF / WBC 21 /HPF   Sq Epi: x / Non Sq Epi: x / Bacteria: Negative              ABG - ( 2023 21:03 )  pH, Arterial: 7.20  pH, Blood: x     /  pCO2: 36    /  pO2: 63    / HCO3: 14    / Base Excess: -13.0 /  SaO2: 90.2                Culture - Sputum (collected 2023 09:40)  Source: ET Tube ET Tube  Gram Stain (2023 23:37):    Moderate polymorphonuclear leukocytes per low power field    Rare Squamous epithelial cells per low power field    Rare Gram Negative Rods per oil power field  Preliminary Report (2023 17:25):    Normal Respiratory Natali present

## 2023-06-18 NOTE — PROGRESS NOTE ADULT - ASSESSMENT
IMP:75 y/o M with hx of CVA, DM ,HTN, Afib on eliquis, CHF, Gout presents as [place] Code/Code ICH with NIH 17, GCS11, ICH 1 with Left thalamic hemorrhage  PBD 10     sICH/IVH  hypertensive emergency   acute hypoxemic respiratory   DESI on CKD   encephalopathy  R humerus fracture      Plan:   Neuro coma checks q 4  On Precedex, prn dilaudid IVP RASS 0 to -2 as tolerates  MRI brain pending   vEEG with significant sharp wave burden-- on hold for scalp rest, on vimpat 200 q12h            Stroke labs - ICH score   multifactorial encephalopathy- neurologic, metabolic/uremic   PT/OT     CV: htn, hx of afib on DOAC s/p reversal  MAP>60, SBP < 160 on cardene- wean as tolerates  BP regimen- hydralazine 100 q 8, labetalol 200 q 8, norvasc 10 mg, clonidine 0.2 q8  afib- off doac s/p reversal for ICH; intermittent RVR  Echo- Noted, LV EF 55-60%, grade I diastolic dysfunction  Daily weights    Pulmonary:- AHRF due to aspiration pneumonitis  lung protective vent support  agree pulmonary toileting- nebulizers/mucomyst q 4 prn, chest PT q2  HOB >45, aspiration precautions  PSV as tolerates     GI: EMILY feeding tube in place  TF peptamen intense VHP        gi ppx- ppi  Bowel regimen- senna, miralax, enema prn  LBM 6/14    Renal: DESI/ CRI - baseline  Cr 3.0   UOP/BUN improving- hold CVVHD, hold bumex and monitor-- possible iHD tomorrow   Nephrology following  khan catheter   Daily weights monitor stricy I's and O's   cardura q hs              Endocrine: Fs q 6H                         ISS q 6H- sglu - 140 -180                       insulin gtt; will transition to nph today                          Stroke Core measures as above; a1c 5.9    Heme-  DVT PPx- SCD, SQH  s/p 2 units prbc with nonsustained response  pt without evidence of GI bleed; anemia likely related to sepsis and worsening renal failure  will consider ct a/p to r/o RP bleed    ID:  repeat sepsis w/u today with UA (sterile pyuria), endotracheal Cx, PCT, MRSA swab  sepsis due to aspiration pna  f/u culture data- sputum cx 6/11 (+) mod haemophilus influenzae  c/w IV zosyn through 6/17  CT chest with RLL PNA    ortho- XRAY R Shoulder- Redemonstrated is an acute surgical neck fracture of the proximal humerus with displacement and mild impaction; conservative management per ortho       a-line 6/16  LIJ CVC 6/10  ETT 6/10  Khan 6/16 (replaced)    dispo- nsicu    family updated at bedside  IMP:75 y/o M with hx of CVA, DM ,HTN, Afib on eliquis, CHF, Gout presents as [place] Code/Code ICH with NIH 17, GCS11, ICH 1 with Left thalamic hemorrhage  PBD 12    sICH/IVH  shock likely due to GIB/hemorrhage   uremic encephalopathy/bleeding/acidosis  lactic acidosis  sepsis due to PNA, and possible concomitant intraabdominal source (?mesenteric ischemia)  s/p hypertensive emergency   acute hypoxemic respiratory failure   BLAINE on CKD   encephalopathy  R humerus fracture      Plan:   Neuro coma checks q 4  fentanyl gtt  RASS -3 to -4 given clinical worsening; will reassess pending pt stabilization  MRI brain pending when stable   reconnected vEEG without seizure but mild L hemispheric interictal activity; c/w vimpat 200 mg q 12; s/p prior scalp rest  multifactorial encephalopathy- neurologic, metabolic/uremic   PT/OT   bedrest    CV:  shock likely hemorrhagic due to GIB with concomitant sepsis;  hx of htn, hx of afib on DOAC s/p reversal  MAP>65 on norepi gtt and vaso gtt;   trend lactic acid  noninvasive hemodynamic monitoring  transfuse for GIB/hemorrhagic shock; s/p 3 units prbc   home BP meds- hydralazine, labetalol, norvasc, clonidine ( all discontinued in setting of hypotension)  afib- off doac s/p reversal for ICH; intermittent RVR  Echo- Noted, LV EF 55-60%, grade I diastolic dysfunction  Daily weights    Pulmonary:- AHRF due to aspiration pneumonitis  lung protective vent support  pt tolerated PSV for half the day on 6/16 and 6/17; will not pursue PSV today pending pt hemodynamic stabilization  agree pulmonary toileting- nebulizers/mucomyst q 4 prn, chest PT q2  HOB >45, aspiration precautions  obtain cxr; serial ABG    GI: NPO   GIB/hemorrhagic shock concern for upper GIB; protonix gtt  likely etiology is uremic gastritis  ?ischemic bowel   GI consulted   surgery consulted   pt too unstable for CTA a/P or surgical intervention   Bowel regimen- senna, miralax, enema prn  melanotic stools    Renal: BLAINE/ CRI - baseline  Cr 3.0   Blaine on CKD now oliguric/anuric- HD cath placement and plan for CVVHD  khan in palce  Nephrology following   Daily weights monitor stricy I's and O's   cardura q hs              Endocrine:                   insulin gtt for fs 140-180                         Stroke Core measures as above; a1c 5.9  BHB negative    Heme-  SCDs  venous dopplers  GIB/hemorrhagic shock; serial CBC and continue to transfuse in setting of active hemorrhage  pt without evidence of GI bleed; anemia likely related to sepsis and worsening renal failure  will consider ct a/p to r/o RP bleed    ID:  sepsis due to pna  fever curve and wbc increasing  expanded to vanco/merrem IV   f/u culture data from 6/17-18  f/u culture data- sputum cx 6/11 (+) mod haemophilus influenzae  c/w IV zosyn through 6/17    ortho- XRAY R Shoulder- Redemonstrated is an acute surgical neck fracture of the proximal humerus with displacement and mild impaction; conservative management per ortho       HD cath 6/18  a-line 6/16  LIJ CVC 6/10  ETT 6/10  Khan 6/16 (replaced)    dispo- nsicu    family updated at bedside  IMP:73 y/o M with hx of CVA, DM ,HTN, Afib on eliquis, CHF, Gout presents as [place] Code/Code ICH with NIH 17, GCS11, ICH 1 with Left thalamic hemorrhage  PBD 12    sICH/IVH  shock likely due to GIB/hemorrhage   uremic encephalopathy/bleeding/acidosis  lactic acidosis  sepsis due to PNA, and possible concomitant intraabdominal source (?mesenteric ischemia)  s/p hypertensive emergency   acute hypoxemic respiratory failure   BLAINE on CKD   encephalopathy  R humerus fracture      Plan:   Neuro coma checks q 4  fentanyl gtt  RASS -3 to -4 given clinical worsening; will reassess pending pt stabilization  MRI brain pending when stable   reconnected vEEG without seizure but mild L hemispheric interictal activity; c/w vimpat 200 mg q 12; s/p prior scalp rest  multifactorial encephalopathy- neurologic, metabolic/uremic   PT/OT   bedrest    CV:  shock likely hemorrhagic due to GIB with concomitant sepsis;  hx of htn, hx of afib on DOAC s/p reversal  MAP>65 on norepi gtt and vaso gtt;   trend lactic acid  noninvasive hemodynamic monitoring  transfuse for GIB/hemorrhagic shock; s/p 3 units prbc   home BP meds- hydralazine, labetalol, norvasc, clonidine ( all discontinued in setting of hypotension)  afib- off doac s/p reversal for ICH; intermittent RVR  Echo- Noted, LV EF 55-60%, grade I diastolic dysfunction  Daily weights    Pulmonary:- AHRF due to aspiration pneumonitis  lung protective vent support  pt tolerated PSV for half the day on 6/16 and 6/17; will not pursue PSV today pending pt hemodynamic stabilization  agree pulmonary toileting- nebulizers/mucomyst q 4 prn, chest PT q2  HOB >45, aspiration precautions  obtain cxr; serial ABG    GI: NPO   GIB/hemorrhagic shock concern for upper GIB; protonix gtt  likely etiology is uremic gastritis  ?ischemic bowel   GI consulted   surgery consulted   pt too unstable for CTA a/P or surgical intervention   Bowel regimen- senna, miralax, enema prn  melanotic stools    Renal: BLAINE/ CRI - baseline  Cr 3.0   Blaine on CKD now oliguric/anuric- HD cath placement and plan for CVVHD  khan in palce  Nephrology following   Daily weights monitor stricy I's and O's   cardura q hs              Endocrine:                   insulin gtt for fs 140-180                         Stroke Core measures as above; a1c 5.9  BHB negative    Heme-  SCDs  venous dopplers  GIB/hemorrhagic shock; serial CBC and continue to transfuse in setting of active hemorrhage  ct a/p on 6/17 without acuity    ID:  sepsis due to pna; ?suspect possible gut ischemic with potential GN intraabdominal sepsis (but pt too unstable for surgical intervention)  fever curve and wbc increasing  expanded to vanco/merrem IV   f/u repeat culture data   f/u culture data- sputum cx 6/11 (+) mod haemophilus influenzae  c/w IV zosyn through 6/17-->expanded to IV merrem     ortho- XRAY R Shoulder- Redemonstrated is an acute surgical neck fracture of the proximal humerus with displacement and mild impaction; conservative management per ortho       HD cath 6/18  a-line 6/16  LIJ CVC 6/10  ETT 6/10  Khan 6/16 (replaced)    dispo- nsicu    family updated at bedside

## 2023-06-18 NOTE — CONSULT NOTE ADULT - PROVIDER SPECIALTY LIST ADULT
Trauma Surgery
Orthopedics
Surgery
Nephrology
Neurology
Vascular Surgery
Neurosurgery
Palliative Care
Pulmonology
Gastroenterology

## 2023-06-18 NOTE — PROCEDURE NOTE - NSPOSTPRCRAD_GEN_A_CORE
central line located in the superior vena cava/no pneumothorax/post-procedure radiography performed
central line located in the/central line located in the superior vena cava/depth of insertion
central line located in the superior vena cava/no pneumothorax/post-procedure radiography performed
post procedure radiography not performed

## 2023-06-18 NOTE — CONSULT NOTE ADULT - SUBJECTIVE AND OBJECTIVE BOX
Gastroenterology Consultation:    Patient is a 63y old  Male who presents with a chief complaint of Stroke Code (18 Jun 2023 10:26)    Admitted on: 06-06-23      HPI:      This is a 62 y/o M with hx of Afib on eiquis, CVA BG bleed ,HTN, CHF, Gout, DM  presents as a stroke code after being down in bathroom unknown time. Upon arrival [facility] Dysarthria, facial RUE weakness witth  R negect and sensory deficit  SBP  in the 200's, Head CT shows a thalamic bleed ICH. pt is being monitored in NICU for ICH?. Intubated for hypoxic respiratory failure and change in mental status. NICU reported multiple episodes of melena in am and reported shock requiring pressors. off note patient was receiving multiple antihypertension medications to control BP. GI consulted for melena.     Prior EGD/ Colonoscopy:  no records available     PAST MEDICAL & SURGICAL HISTORY:  Diabetes mellitus      Hypertension      Gout      Morbidly obese      Gout      S/P tonsillectomy            FAMILY HISTORY:  FH: stroke  no fh of gi cancers   FH: CABG (coronary artery bypass surgery)        Social History:  Tobacco: none   Alcohol: none   Drugs: none    Home Medications:  alfuzosin 10 mg oral tablet, extended release: 1 orally once a day (07 Jun 2023 15:38)  atorvastatin 40 mg oral tablet: 1 orally once a day (07 Jun 2023 15:38)  cholecalciferol 50 mcg (2000 intl units) oral tablet, chewable: 1 orally once a day (07 Jun 2023 15:42)  cyanocobalamin 1000 mcg oral tablet: 1 orally once a day (07 Jun 2023 15:42)  ezetimibe 10 mg oral tablet: 1 orally once a day (07 Jun 2023 15:42)  hydrALAZINE 100 mg oral tablet: 1 orally 2 times a day (07 Jun 2023 15:42)  Toujeo Max SoloStar 300 units/mL subcutaneous solution: 50 subcutaneous once a day (07 Jun 2023 15:37)  Trulicity Pen 1.5 mg/0.5 mL subcutaneous solution: 1.5 subcutaneously once a week (07 Jun 2023 15:42)        MEDICATIONS  (STANDING):  acetylcysteine 10%  Inhalation 4 milliLiter(s) Inhalation every 4 hours  albuterol    0.083% 2.5 milliGRAM(s) Nebulizer every 4 hours  bacitracin   Ointment 1 Application(s) Topical every 12 hours  chlorhexidine 0.12% Liquid 15 milliLiter(s) Oral Mucosa every 12 hours  chlorhexidine 2% Cloths 1 Application(s) Topical <User Schedule>  CRRT Treatment    <Continuous>  dextrose 10%. 1000 milliLiter(s) (10 mL/Hr) IV Continuous <Continuous>  dextrose 5%. 1000 milliLiter(s) (50 mL/Hr) IV Continuous <Continuous>  dextrose 5%. 1000 milliLiter(s) (100 mL/Hr) IV Continuous <Continuous>  dextrose 50% Injectable 25 Gram(s) IV Push once  dextrose 50% Injectable 12.5 Gram(s) IV Push once  dextrose 50% Injectable 25 Gram(s) IV Push once  ergocalciferol 59158 Unit(s) Oral every week  fentaNYL   Infusion. 1 MICROgram(s)/kG/Hr (12.7 mL/Hr) IV Continuous <Continuous>  glucagon  Injectable 1 milliGRAM(s) IntraMuscular once  hydrocortisone sodium succinate Injectable 100 milliGRAM(s) IV Push every 8 hours  insulin regular Infusion 2 Unit(s)/Hr (2 mL/Hr) IV Continuous <Continuous>  lacosamide IVPB 200 milliGRAM(s) IV Intermittent every 12 hours  meropenem  IVPB 500 milliGRAM(s) IV Intermittent every 12 hours  multivitamin 1 Tablet(s) Oral daily  norepinephrine Infusion 0.05 MICROgram(s)/kG/Min (5.96 mL/Hr) IV Continuous <Continuous>  pantoprazole Infusion 8 mG/Hr (10 mL/Hr) IV Continuous <Continuous>  phenylephrine    Infusion 0.1 MICROgram(s)/kG/Min (2.38 mL/Hr) IV Continuous <Continuous>  PureFlow Dialysate RFP-400 (K 2 / Ca 3) 5000 milliLiter(s) (2000 mL/Hr) CRRT <Continuous>  vasopressin Infusion 0.04 Unit(s)/Min (6 mL/Hr) IV Continuous <Continuous>    MEDICATIONS  (PRN):  acetaminophen     Tablet .. 650 milliGRAM(s) Oral every 6 hours PRN Temp greater or equal to 38C (100.4F), Mild Pain (1 - 3)  bisacodyl Suppository 10 milliGRAM(s) Rectal daily PRN Constipation  dextrose Oral Gel 15 Gram(s) Oral once PRN Blood Glucose LESS THAN 70 milliGRAM(s)/deciliter  fentaNYL    Injectable 50 MICROGram(s) IV Push every 2 hours PRN Severe Pain (7 - 10)/vent dyssynchrony  ondansetron Injectable 4 milliGRAM(s) IV Push every 6 hours PRN Nausea and/or Vomiting      Allergies  No Known Allergies      Review of Systems:   unable to obtain     Physical Examination:  T(C): 36.4 (06-18-23 @ 16:00), Max: 38 (06-18-23 @ 03:30)  HR: 114 (06-18-23 @ 16:00) (50 - 146)  BP: 84/35 (06-18-23 @ 04:45) (77/32 - 110/53)  RR: 30 (06-18-23 @ 16:00) (15 - 42)  SpO2: 97% (06-18-23 @ 16:00) (91% - 100%)      06-16-23 @ 07:01  -  06-17-23 @ 07:00  --------------------------------------------------------  IN: 4007.7 mL / OUT: 3780 mL / NET: 227.7 mL    06-17-23 @ 07:01  -  06-18-23 @ 07:00  --------------------------------------------------------  IN: 3960 mL / OUT: 2065 mL / NET: 1895 mL    06-18-23 @ 07:01  -  06-18-23 @ 17:41  --------------------------------------------------------  IN: 4622.7 mL / OUT: 73 mL / NET: 4549.7 mL          GENERAL: intubated and sedated  HEAD:  Atraumatic, Normocephalic  EYES: conjunctiva and sclera clear  NECK: Supple, no JVD or thyromegaly  CHEST/LUNG: Clear to auscultation bilaterally  HEART: Regular rate and rhythm; normal S1, S2, No murmurs.  ABDOMEN: Soft, nontender, nondistended  NEUROLOGY: No asterixis or tremor.   SKIN: Intact, no jaundice        Data:                        10.8   45.10 )-----------( x        ( 18 Jun 2023 16:38 )             30.9     Hgb Trend:  10.8  06-18-23 @ 16:38  8.9  06-18-23 @ 11:56  5.9  06-18-23 @ 09:24  5.3  06-18-23 @ 04:20  7.2  06-17-23 @ 12:20  6.9  06-17-23 @ 04:48  7.1  06-17-23 @ 01:14  6.7  06-16-23 @ 16:19  7.0  06-16-23 @ 05:35      06-16-23 @ 07:01  -  06-17-23 @ 07:00  --------------------------------------------------------  IN: 316 mL    06-17-23 @ 07:01  -  06-18-23 @ 07:00  --------------------------------------------------------  IN: 650 mL    06-18-23 @ 07:01  -  06-18-23 @ 17:41  --------------------------------------------------------  IN: 1338 mL      06-18    155<H>  |  112<H>  |  x   ----------------------------<  113<H>  4.7   |  20  |  x     Ca    7.0<L>      18 Jun 2023 16:38  Phos  8.1     06-18  Mg     2.4     06-18    TPro  3.2<L>  /  Alb  1.8<L>  /  TBili  1.4<H>  /  DBili  x   /  AST  5   /  ALT  16  /  AlkPhos  73  06-18    Liver panel trend:  TBili 1.4   /   AST 5   /   ALT 16   /   AlkP 73   /   Tptn 3.2   /   Alb 1.8    /   DBili --      06-18  TBili 0.8   /      /      /   AlkP 77   /   Tptn 3.1   /   Alb 1.6    /   DBili --      06-18  TBili 0.8   /   AST 30   /   ALT 40   /   AlkP 100   /   Tptn 4.3   /   Alb 2.5    /   DBili --      06-18  TBili 0.9   /   AST 34   /   ALT 42   /   AlkP 139   /   Tptn 4.9   /   Alb 2.7    /   DBili --      06-17  TBili 1.1   /   AST 27   /   ALT 38   /   AlkP 159   /   Tptn 5.1   /   Alb 3.0    /   DBili --      06-16  TBili 1.2   /   AST 34   /   ALT 39   /   AlkP 202   /   Tptn 5.1   /   Alb 2.7    /   DBili --      06-15  TBili 1.2   /   AST 35   /   ALT 37   /   AlkP 209   /   Tptn 4.9   /   Alb 2.6    /   DBili --      06-14  TBili 1.4   /   AST 24   /   ALT 29   /   AlkP 188   /   Tptn 4.7   /   Alb 2.4    /   DBili --      06-14  TBili 0.9   /   AST 23   /   ALT 27   /   AlkP 82   /   Tptn 5.0   /   Alb 2.7    /   DBili --      06-13  TBili 0.5   /   AST 24   /   ALT 24   /   AlkP 55   /   Tptn 4.4   /   Alb 2.4    /   DBili --      06-12  TBili 0.3   /   AST 15   /   ALT 21   /   AlkP 46   /   Tptn 4.6   /   Alb 2.6    /   DBili --      06-11  TBili 0.4   /   AST 19   /   ALT 25   /   AlkP 50   /   Tptn 5.3   /   Alb 3.1    /   DBili --      06-10  TBili 0.4   /   AST 19   /   ALT 25   /   AlkP 47   /   Tptn 5.3   /   Alb 3.2    /   DBili --      06-10  TBili 0.4   /   AST 20   /   ALT 24   /   AlkP 47   /   Tptn 5.1   /   Alb 3.1    /   DBili --      06-09      PT/INR - ( 18 Jun 2023 16:38 )   PT: 27.00 sec;   INR: 2.31 ratio         PTT - ( 18 Jun 2023 16:38 )  PTT:37.8 sec    Culture - Sputum (collected 17 Jun 2023 09:40)  Source: ET Tube ET Tube  Gram Stain (17 Jun 2023 23:37):    Moderate polymorphonuclear leukocytes per low power field    Rare Squamous epithelial cells per low power field    Rare Gram Negative Rods per oil power field  Preliminary Report (18 Jun 2023 17:25):    Normal Respiratory Natali present          Radiology:    < from: CT Abdomen and Pelvis No Cont (06.17.23 @ 16:06) >  ACC: [id number] EXAM:  CT ABDOMEN AND PELVIS   ORDERED BY: NUHA SCHMITT     ACC: [id number] EXAM:  CT CHEST   ORDERED BY: NUHA SCHMITT     PROCEDURE DATE:  06/17/2023          INTERPRETATION:  CLINICAL HISTORY / REASON FOR EXAM: Acute anemia    TECHNIQUE: Contiguous axial CT images were obtained from the thoracic   inlet to the pubic symphysis without intravenous contrast. Oral contrast   was not administered. Coronal, sagittal and 3D/MIP reformatted images are   also submitted.    COMPARISON CT: None.    OTHER STUDIES USED FOR CORRELATION: CT chest from [place], 2023      FINDINGS:    CHEST:    TUBES/LINES: Endotracheal tube within the trachea. Enteric tube with   distal tip in the gastric lumen. Left internal jugular central venous   catheter with distal tip in the superior vena cava.    LUNGS, PLEURA, AND AIRWAYS: Respiratory motion, limiting sensitivity for   small nodules. Scattered consolidative opacities within the right upper   and bilateral lower lobes. Bilateral lower lobe atelectasis. No   pneumothorax.    MEDIASTINUM/THORACIC NODES: No mediastinal or axillary lymphadenopathy.    HEART/GREAT VESSELS: No pericardial effusion. Heart size unremarkable.   Scattered coronary artery calcifications. No aneurysmal dilation of the   thoracic aorta.      ABDOMEN/PELVIS:    HEPATOBILIARY: Unremarkable.    SPLEEN: Unremarkable.    PANCREAS: Unremarkable.    ADRENAL GLANDS: Unremarkable.    KIDNEYS: No evidence of hydronephrosis.    ABDOMINOPELVIC NODES: Unremarkable.    PELVIC ORGANS: [place] balloon within decompressed urinary bladder.    PERITONEUM/MESENTERY/BOWEL: No bowel obstruction, ascites or   intraperitoneal free air. Normal caliber appendix.    BONES/SOFT TISSUES: Diffuse osteopenia. Degenerative changes of the   thoracolumbar spine. Anasarca.    VASCULAR: Normal caliber aorta.      IMPRESSION:    CHEST:    Scattered consolidative opacities within the right upper and bilateral   lower lobes. Bilateral lobe atelectasis.    No large hemothorax or pleural effusion.    ABDOMEN/PELVIS:    No CT evidence of acute intra-abdominal pathology. No retroperitoneal   hematoma.    --- End of Report ---            ZACARIAS TRIMBLE MD; Attending Radiologist  This document has been electronically signed. Jun 17 2023  4:48PM    < end of copied text >     Gastroenterology Consultation:    Patient is a 63y old  Male who presents with a chief complaint of Stroke Code (18 Jun 2023 10:26)    Admitted on: 06-06-23      HPI:      This is a 62 y/o M with hx of Afib on eiquis, CVA BG bleed ,HTN, CHF, Gout, DM  presents as a stroke code after being down in bathroom unknown time. Upon arrival [facility] Dysarthria, facial RUE weakness witth  R negect and sensory deficit  SBP  in the 200's, Head CT shows a thalamic bleed ICH. pt is being monitored in NICU for ICH?. Intubated for hypoxic respiratory failure and change in mental status. NICU reported multiple episodes of melena in am and reported shock requiring pressors. off note patient was receiving multiple antihypertension medications to control BP and being treated for aspiration pneumonia/sepsis. GI consulted for melena. Pt receiving CVVH.    Prior EGD/ Colonoscopy:  no records available     PAST MEDICAL & SURGICAL HISTORY:  Diabetes mellitus      Hypertension      Gout      Morbidly obese      Gout      S/P tonsillectomy            FAMILY HISTORY:  FH: stroke  no fh of gi cancers   FH: CABG (coronary artery bypass surgery)        Social History:  Tobacco: none   Alcohol: none   Drugs: none    Home Medications:  alfuzosin 10 mg oral tablet, extended release: 1 orally once a day (07 Jun 2023 15:38)  atorvastatin 40 mg oral tablet: 1 orally once a day (07 Jun 2023 15:38)  cholecalciferol 50 mcg (2000 intl units) oral tablet, chewable: 1 orally once a day (07 Jun 2023 15:42)  cyanocobalamin 1000 mcg oral tablet: 1 orally once a day (07 Jun 2023 15:42)  ezetimibe 10 mg oral tablet: 1 orally once a day (07 Jun 2023 15:42)  hydrALAZINE 100 mg oral tablet: 1 orally 2 times a day (07 Jun 2023 15:42)  Toujeo Max SoloStar 300 units/mL subcutaneous solution: 50 subcutaneous once a day (07 Jun 2023 15:37)  Trulicity Pen 1.5 mg/0.5 mL subcutaneous solution: 1.5 subcutaneously once a week (07 Jun 2023 15:42)        MEDICATIONS  (STANDING):  acetylcysteine 10%  Inhalation 4 milliLiter(s) Inhalation every 4 hours  albuterol    0.083% 2.5 milliGRAM(s) Nebulizer every 4 hours  bacitracin   Ointment 1 Application(s) Topical every 12 hours  chlorhexidine 0.12% Liquid 15 milliLiter(s) Oral Mucosa every 12 hours  chlorhexidine 2% Cloths 1 Application(s) Topical <User Schedule>  CRRT Treatment    <Continuous>  dextrose 10%. 1000 milliLiter(s) (10 mL/Hr) IV Continuous <Continuous>  dextrose 5%. 1000 milliLiter(s) (50 mL/Hr) IV Continuous <Continuous>  dextrose 5%. 1000 milliLiter(s) (100 mL/Hr) IV Continuous <Continuous>  dextrose 50% Injectable 25 Gram(s) IV Push once  dextrose 50% Injectable 12.5 Gram(s) IV Push once  dextrose 50% Injectable 25 Gram(s) IV Push once  ergocalciferol 40058 Unit(s) Oral every week  fentaNYL   Infusion. 1 MICROgram(s)/kG/Hr (12.7 mL/Hr) IV Continuous <Continuous>  glucagon  Injectable 1 milliGRAM(s) IntraMuscular once  hydrocortisone sodium succinate Injectable 100 milliGRAM(s) IV Push every 8 hours  insulin regular Infusion 2 Unit(s)/Hr (2 mL/Hr) IV Continuous <Continuous>  lacosamide IVPB 200 milliGRAM(s) IV Intermittent every 12 hours  meropenem  IVPB 500 milliGRAM(s) IV Intermittent every 12 hours  multivitamin 1 Tablet(s) Oral daily  norepinephrine Infusion 0.05 MICROgram(s)/kG/Min (5.96 mL/Hr) IV Continuous <Continuous>  pantoprazole Infusion 8 mG/Hr (10 mL/Hr) IV Continuous <Continuous>  phenylephrine    Infusion 0.1 MICROgram(s)/kG/Min (2.38 mL/Hr) IV Continuous <Continuous>  PureFlow Dialysate RFP-400 (K 2 / Ca 3) 5000 milliLiter(s) (2000 mL/Hr) CRRT <Continuous>  vasopressin Infusion 0.04 Unit(s)/Min (6 mL/Hr) IV Continuous <Continuous>    MEDICATIONS  (PRN):  acetaminophen     Tablet .. 650 milliGRAM(s) Oral every 6 hours PRN Temp greater or equal to 38C (100.4F), Mild Pain (1 - 3)  bisacodyl Suppository 10 milliGRAM(s) Rectal daily PRN Constipation  dextrose Oral Gel 15 Gram(s) Oral once PRN Blood Glucose LESS THAN 70 milliGRAM(s)/deciliter  fentaNYL    Injectable 50 MICROGram(s) IV Push every 2 hours PRN Severe Pain (7 - 10)/vent dyssynchrony  ondansetron Injectable 4 milliGRAM(s) IV Push every 6 hours PRN Nausea and/or Vomiting      Allergies  No Known Allergies      Review of Systems:   unable to obtain     Physical Examination:  T(C): 36.4 (06-18-23 @ 16:00), Max: 38 (06-18-23 @ 03:30)  HR: 114 (06-18-23 @ 16:00) (50 - 146)  BP: 84/35 (06-18-23 @ 04:45) (77/32 - 110/53)  RR: 30 (06-18-23 @ 16:00) (15 - 42)  SpO2: 97% (06-18-23 @ 16:00) (91% - 100%)      06-16-23 @ 07:01  -  06-17-23 @ 07:00  --------------------------------------------------------  IN: 4007.7 mL / OUT: 3780 mL / NET: 227.7 mL    06-17-23 @ 07:01  -  06-18-23 @ 07:00  --------------------------------------------------------  IN: 3960 mL / OUT: 2065 mL / NET: 1895 mL    06-18-23 @ 07:01  -  06-18-23 @ 17:41  --------------------------------------------------------  IN: 4622.7 mL / OUT: 73 mL / NET: 4549.7 mL          GENERAL: intubated and sedated  HEAD:  Atraumatic, Normocephalic  EYES: conjunctiva and sclera clear  NECK: Supple, no JVD or thyromegaly  CHEST/LUNG: Clear to auscultation bilaterally  HEART: Regular rate and rhythm; normal S1, S2, No murmurs.  ABDOMEN: Soft, nontender, nondistended, melenic stool  SKIN: Intact, no jaundice        Data:                        10.8   45.10 )-----------( x        ( 18 Jun 2023 16:38 )             30.9     Hgb Trend:  10.8  06-18-23 @ 16:38  8.9  06-18-23 @ 11:56  5.9  06-18-23 @ 09:24  5.3  06-18-23 @ 04:20  7.2  06-17-23 @ 12:20  6.9  06-17-23 @ 04:48  7.1  06-17-23 @ 01:14  6.7  06-16-23 @ 16:19  7.0  06-16-23 @ 05:35      06-16-23 @ 07:01  -  06-17-23 @ 07:00  --------------------------------------------------------  IN: 316 mL    06-17-23 @ 07:01  -  06-18-23 @ 07:00  --------------------------------------------------------  IN: 650 mL    06-18-23 @ 07:01  -  06-18-23 @ 17:41  --------------------------------------------------------  IN: 1338 mL      06-18    155<H>  |  112<H>  |  x   ----------------------------<  113<H>  4.7   |  20  |  x     Ca    7.0<L>      18 Jun 2023 16:38  Phos  8.1     06-18  Mg     2.4     06-18    TPro  3.2<L>  /  Alb  1.8<L>  /  TBili  1.4<H>  /  DBili  x   /  AST  5   /  ALT  16  /  AlkPhos  73  06-18    Liver panel trend:  TBili 1.4   /   AST 5   /   ALT 16   /   AlkP 73   /   Tptn 3.2   /   Alb 1.8    /   DBili --      06-18  TBili 0.8   /      /      /   AlkP 77   /   Tptn 3.1   /   Alb 1.6    /   DBili --      06-18  TBili 0.8   /   AST 30   /   ALT 40   /   AlkP 100   /   Tptn 4.3   /   Alb 2.5    /   DBili --      06-18  TBili 0.9   /   AST 34   /   ALT 42   /   AlkP 139   /   Tptn 4.9   /   Alb 2.7    /   DBili --      06-17  TBili 1.1   /   AST 27   /   ALT 38   /   AlkP 159   /   Tptn 5.1   /   Alb 3.0    /   DBili --      06-16  TBili 1.2   /   AST 34   /   ALT 39   /   AlkP 202   /   Tptn 5.1   /   Alb 2.7    /   DBili --      06-15  TBili 1.2   /   AST 35   /   ALT 37   /   AlkP 209   /   Tptn 4.9   /   Alb 2.6    /   DBili --      06-14  TBili 1.4   /   AST 24   /   ALT 29   /   AlkP 188   /   Tptn 4.7   /   Alb 2.4    /   DBili --      06-14  TBili 0.9   /   AST 23   /   ALT 27   /   AlkP 82   /   Tptn 5.0   /   Alb 2.7    /   DBili --      06-13  TBili 0.5   /   AST 24   /   ALT 24   /   AlkP 55   /   Tptn 4.4   /   Alb 2.4    /   DBili --      06-12  TBili 0.3   /   AST 15   /   ALT 21   /   AlkP 46   /   Tptn 4.6   /   Alb 2.6    /   DBili --      06-11  TBili 0.4   /   AST 19   /   ALT 25   /   AlkP 50   /   Tptn 5.3   /   Alb 3.1    /   DBili --      06-10  TBili 0.4   /   AST 19   /   ALT 25   /   AlkP 47   /   Tptn 5.3   /   Alb 3.2    /   DBili --      06-10  TBili 0.4   /   AST 20   /   ALT 24   /   AlkP 47   /   Tptn 5.1   /   Alb 3.1    /   DBili --      06-09      PT/INR - ( 18 Jun 2023 16:38 )   PT: 27.00 sec;   INR: 2.31 ratio         PTT - ( 18 Jun 2023 16:38 )  PTT:37.8 sec    Culture - Sputum (collected 17 Jun 2023 09:40)  Source: ET Tube ET Tube  Gram Stain (17 Jun 2023 23:37):    Moderate polymorphonuclear leukocytes per low power field    Rare Squamous epithelial cells per low power field    Rare Gram Negative Rods per oil power field  Preliminary Report (18 Jun 2023 17:25):    Normal Respiratory Natali present          Radiology:    < from: CT Abdomen and Pelvis No Cont (06.17.23 @ 16:06) >  ACC: [id number] EXAM:  CT ABDOMEN AND PELVIS   ORDERED BY: NUHA SCHMITT     ACC: [id number] EXAM:  CT CHEST   ORDERED BY: NUHA SCHMITT     PROCEDURE DATE:  06/17/2023          INTERPRETATION:  CLINICAL HISTORY / REASON FOR EXAM: Acute anemia    TECHNIQUE: Contiguous axial CT images were obtained from the thoracic   inlet to the pubic symphysis without intravenous contrast. Oral contrast   was not administered. Coronal, sagittal and 3D/MIP reformatted images are   also submitted.    COMPARISON CT: None.    OTHER STUDIES USED FOR CORRELATION: CT chest from [place], 2023      FINDINGS:    CHEST:    TUBES/LINES: Endotracheal tube within the trachea. Enteric tube with   distal tip in the gastric lumen. Left internal jugular central venous   catheter with distal tip in the superior vena cava.    LUNGS, PLEURA, AND AIRWAYS: Respiratory motion, limiting sensitivity for   small nodules. Scattered consolidative opacities within the right upper   and bilateral lower lobes. Bilateral lower lobe atelectasis. No   pneumothorax.    MEDIASTINUM/THORACIC NODES: No mediastinal or axillary lymphadenopathy.    HEART/GREAT VESSELS: No pericardial effusion. Heart size unremarkable.   Scattered coronary artery calcifications. No aneurysmal dilation of the   thoracic aorta.      ABDOMEN/PELVIS:    HEPATOBILIARY: Unremarkable.    SPLEEN: Unremarkable.    PANCREAS: Unremarkable.    ADRENAL GLANDS: Unremarkable.    KIDNEYS: No evidence of hydronephrosis.    ABDOMINOPELVIC NODES: Unremarkable.    PELVIC ORGANS: [place] balloon within decompressed urinary bladder.    PERITONEUM/MESENTERY/BOWEL: No bowel obstruction, ascites or   intraperitoneal free air. Normal caliber appendix.    BONES/SOFT TISSUES: Diffuse osteopenia. Degenerative changes of the   thoracolumbar spine. Anasarca.    VASCULAR: Normal caliber aorta.      IMPRESSION:    CHEST:    Scattered consolidative opacities within the right upper and bilateral   lower lobes. Bilateral lobe atelectasis.    No large hemothorax or pleural effusion.    ABDOMEN/PELVIS:    No CT evidence of acute intra-abdominal pathology. No retroperitoneal   hematoma.    --- End of Report ---            ZACARIAS TRIMBLE MD; Attending Radiologist  This document has been electronically signed. Jun 17 2023  4:48PM    < end of copied text >

## 2023-06-18 NOTE — PROCEDURAL SAFETY CHECKLIST WITH OR WITHOUT SEDATION - NSPX2BRECORDED_GEN_ALL_CORE
Left Radial A-line
Radial Arterial Line Insertion
Right IJ [place] Placement
Left IJ TLC
Right IJ [place] Placement
BRIANNA Tenorio
LEFT FEM UDALL

## 2023-06-18 NOTE — PROCEDURAL SAFETY CHECKLIST WITH OR WITHOUT SEDATION - NSTIMEOUTDATE_GEN_ALL_CORE
18-Jun-2023 09:25
06-Jun-2023 03:52
12-Jun-2023 01:55
16-Jun-2023 00:37
12-Jun-2023 14:35
18-Jun-2023 08:25
10-Alan-2023 20:38

## 2023-06-18 NOTE — PROCEDURAL SAFETY CHECKLIST WITH OR WITHOUT SEDATION - NSPOSTCOMMENTFT_GEN_ALL_CORE
As per Chest x-ray: [place] placement was unsuccessful. MD Davis to attempt [place] placement again.

## 2023-06-18 NOTE — CHART NOTE - NSCHARTNOTESELECT_GEN_ALL_CORE
Dialysis Catheter Removal/Event Note
Neuroendovascular
trauma/Event Note
DNR
Event Note
Follow-up
Follow-up
Neuroendovascular/Event Note
TF recs

## 2023-06-18 NOTE — CONSULT NOTE ADULT - SUBJECTIVE AND OBJECTIVE BOX
GENERAL SURGERY CONSULT NOTE    Patient: GRIS TIDWELL , 63y (56)Male   MRN: [id number]  Location: [address]  Visit: 23 Inpatient  Date: 23 @ 10:27    HPI:      This is a 64 y/o M with hx of Afib on eiquis, CVA BG bleed ,HTN, CHF, Gout, DM  presents as a stroke code after being down in bathroom unknown time LKW was 2300. 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 Pt is lethargic  - Cardene started for blood pressure control.      Admission Scores  GCS 12   ICH 1   NIH 17,    ACC: [id number] EXAM:  CT ANGIO BRAIN STROKE PROTC IC   ORDERED BY: MARIELA MARES     ACC: [id number] EXAM:  CT ANGIO NECK STROKE PROTCL IC   ORDERED BY: MARIELA MARES     PROCEDURE DATE:  2023          INTERPRETATION:  CLINICAL INDICATION: Code stroke. Right-sided weakness   and altered mental status.    TECHNIQUE: CTA of the head and neck was performed after the intravenous   administration of of contrast. 3-D reconstructions were performed under   physician supervision on a separate workstation and reviewed. A total of   70 mL Omnipaque 350 nonionic IV contrast was administered, 30 cc   discarded.    NASCET CRITERIA FOR CAROTID STENOSIS:  Mild: 0% to 49%, Moderate: 50% to 69%, Severe: 70% to 99%, Complete   Occlusion.    COMPARISON: Noncontrast head CT performed earlier the same day. MRA brain   2019.      FINDINGS:  AORTIC ARCH: Normal three-vessel arch. There is calcification of the   brachiocephalic artery without significant flow-limiting stenosis    RIGHT ANTERIOR CIRCULATION:  The common carotid artery (CCA) takes a medialized course remaining   patent up to the bulb without stenosis. There is calcification at the   carotid bulb without significant flow-limiting stenosis. The external   carotid artery (ECA) and its proximal branches are patent without   stenosis. The cervical internal carotid artery (ICA) is patent without   stenosis. The intracranial internal carotid artery is patent without   stenosis.    The middle cerebral artery (MCA) is patent without stenosis. The anterior   cerebral artery, A1 segment is hypoplastic. The ALVAREZ A2 segment is   supplied via the anterior communicating artery, variant anatomy.    LEFT ANTERIOR CIRCULATION:  The CCA is patent up to the bulb without stenosis. There is calcification   at the carotid bulb without significant flow-limiting stenosis The ECA   and its proximal branches are patent without stenosis. The cervical ICA   is patent without stenosis. The intracranial ICA is patent without   stenosis.    The anterior and middle cerebral arteries are patent without stenosis.    There is a 5 mm focus of contrast enhancement along the lateral aspect of   the known intraparenchymal hematoma in the left thalamus discontinuous   vessels, best seen on 4:287, compatible with "spot sign."    POSTERIOR CIRCULATION:  The vertebral arteries are patent without stenosis bilaterally. There is   tiny atherosclerotic calcifications at the origin of the right vertebral   artery and involving the V2 segments bilaterally. The basilar artery is   patent without stenosis. The proximal branch vasculature of the posterior   circulation are within normal limits.    The posterior cerebral arteries are patent without stenosis.    There is no evidence for saccular aneurysm, vascular malformation, or   large vessel occlusion.      OTHERS:  Please see separately dictated CT head for the intracranial findings.  Partially imaged coronary artery calcifications.  Ectatic main pulmonary artery to 3.8 cm suggestive of pulmonary arterial   hypertension      IMPRESSION:  1.  No large vessel occlusion, high-grade stenosis, aneurysm, or vascular   malformation.  2.  A 5 mm focus of contrast enhancement within lateral aspect of acute   left thalamic intraparenchymal hemorrhage("CTA Spot sign"), which has   been associated with hematoma growth.  3.  Please see separately dictated CT head for nonvascular intracranial   findings.  MPRESSION:    1.  Acute left thalamic intraparenchymal hemorrhage extending to the left   corona radiata and possibly the left caudate nucleus with associated   vasogenic edema.  2.  Moderate chronic microvascular ischemic changes and chronic infarct   as above.      Preliminary findings with CHANG French at 2023 3:1 (2023 03:51)    Vascular surgery consulted for insertion of temporary hemodialysis catheter. Nephrology plans to start patient on CVVH for worsening acidosis and oliguria.      PAST MEDICAL & SURGICAL HISTORY:  Diabetes mellitus      Hypertension      Gout      Morbidly obese      Gout      S/P tonsillectomy          Home Medications:  alfuzosin 10 mg oral tablet, extended release: 1 orally once a day (2023 15:38)  atorvastatin 40 mg oral tablet: 1 orally once a day (2023 15:38)  cholecalciferol 50 mcg (2000 intl units) oral tablet, chewable: 1 orally once a day (2023 15:42)  cyanocobalamin 1000 mcg oral tablet: 1 orally once a day (2023 15:42)  ezetimibe 10 mg oral tablet: 1 orally once a day (2023 15:42)  hydrALAZINE 100 mg oral tablet: 1 orally 2 times a day (2023 15:42)  Maninder Handy SoloStar 300 units/mL subcutaneous solution: 50 subcutaneous once a day (2023 15:37)  Trulicity Pen 1.5 mg/0.5 mL subcutaneous solution: 1.5 subcutaneously once a week (2023 15:42)        VITALS:  T(F): 100.4 (23 @ 10:00), Max: 100.4 (23 @ 03:30)  HR: 114 (23 @ 10:15) (50 - 114)  BP: 84/35 (23 @ 04:45) (77/32 - 110/53)  RR: 28 (23 @ 10:15) (16 - 42)  SpO2: 99% (23 @ 10:15) (91% - 100%)    PHYSICAL EXAM:  General: Intubated. Sedated.  Cardiac: RRR.  Respiratory: On mech vent via ETT. Bilateral chest rise.  Abdomen: Soft, non-distended, non-tender, no rebound, no guarding.   Skin: Warm/dry, normal color, no jaundice.      MEDICATIONS  (STANDING):  acetylcysteine 10%  Inhalation 4 milliLiter(s) Inhalation every 4 hours  albuterol    0.083% 2.5 milliGRAM(s) Nebulizer every 4 hours  bacitracin   Ointment 1 Application(s) Topical every 12 hours  chlorhexidine 0.12% Liquid 15 milliLiter(s) Oral Mucosa every 12 hours  chlorhexidine 2% Cloths 1 Application(s) Topical <User Schedule>  CRRT Treatment    <Continuous>  dexMEDEtomidine Infusion 0.2 MICROgram(s)/kG/Hr (6.36 mL/Hr) IV Continuous <Continuous>  dextrose 5%. 1000 milliLiter(s) (100 mL/Hr) IV Continuous <Continuous>  dextrose 5%. 1000 milliLiter(s) (50 mL/Hr) IV Continuous <Continuous>  dextrose 50% Injectable 25 Gram(s) IV Push once  dextrose 50% Injectable 12.5 Gram(s) IV Push once  dextrose 50% Injectable 25 Gram(s) IV Push once  ergocalciferol 91878 Unit(s) Oral every week  fentaNYL   Infusion. 1 MICROgram(s)/kG/Hr (12.7 mL/Hr) IV Continuous <Continuous>  fentaNYL   Infusion. 0.5 MICROgram(s)/kG/Hr (6.36 mL/Hr) IV Continuous <Continuous>  glucagon  Injectable 1 milliGRAM(s) IntraMuscular once  insulin regular Infusion 2 Unit(s)/Hr (2 mL/Hr) IV Continuous <Continuous>  lacosamide IVPB 200 milliGRAM(s) IV Intermittent every 12 hours  meropenem  IVPB 500 milliGRAM(s) IV Intermittent every 12 hours  multivitamin 1 Tablet(s) Oral daily  norepinephrine Infusion 0.05 MICROgram(s)/kG/Min (5.96 mL/Hr) IV Continuous <Continuous>  pantoprazole Infusion 8 mG/Hr (10 mL/Hr) IV Continuous <Continuous>  phenylephrine    Infusion 0.1 MICROgram(s)/kG/Min (2.38 mL/Hr) IV Continuous <Continuous>  PureFlow Dialysate RFP-400 (K 2 / Ca 3) 5000 milliLiter(s) (2000 mL/Hr) CRRT <Continuous>  sodium bicarbonate  Injectable 50 milliEquivalent(s) IV Push every 5 minutes  vasopressin Infusion 0.04 Unit(s)/Min (6 mL/Hr) IV Continuous <Continuous>    MEDICATIONS  (PRN):  acetaminophen     Tablet .. 650 milliGRAM(s) Oral every 6 hours PRN Temp greater or equal to 38C (100.4F), Mild Pain (1 - 3)  bisacodyl Suppository 10 milliGRAM(s) Rectal daily PRN Constipation  dextrose Oral Gel 15 Gram(s) Oral once PRN Blood Glucose LESS THAN 70 milliGRAM(s)/deciliter  fentaNYL    Injectable 50 MICROGram(s) IV Push every 2 hours PRN Severe Pain (7 - 10)/vent dyssynchrony  ondansetron Injectable 4 milliGRAM(s) IV Push every 6 hours PRN Nausea and/or Vomiting      LAB/STUDIES:                        5.9    26.80 )-----------( 282      ( 2023 09:24 )             17.3     06-18    141  |  105  |  157<HH>  ----------------------------<  269<H>  4.5   |  18  |  5.9<HH>    Ca    7.5<L>      2023 04:20  Phos  6.4       Mg     2.7     18    TPro  4.3<L>  /  Alb  2.5<L>  /  TBili  0.8  /  DBili  x   /  AST  30  /  ALT  40  /  AlkPhos  100  06-18    PT/INR - ( 2023 09:24 )   PT: 14.70 sec;   INR: 1.28 ratio         PTT - ( 2023 09:24 )  PTT:23.9 sec  LIVER FUNCTIONS - ( 2023 04:20 )  Alb: 2.5 g/dL / Pro: 4.3 g/dL / ALK PHOS: 100 U/L / ALT: 40 U/L / AST: 30 U/L / GGT: x           Urinalysis Basic - ( 2023 09:31 )    Color: Light Yellow / Appearance: Slightly Turbid / S.012 / pH: x  Gluc: x / Ketone: Negative  / Bili: Negative / Urobili: <2 mg/dL   Blood: x / Protein: Trace / Nitrite: Negative   Leuk Esterase: Large / RBC: 10 /HPF / WBC 21 /HPF   Sq Epi: x / Non Sq Epi: x / Bacteria: Negative              ABG - ( 2023 10:23 )  pH, Arterial: 7.35  pH, Blood: x     /  pCO2: 36    /  pO2: 80    / HCO3: 20    / Base Excess: -5.2  /  SaO2: 97.7                Culture - Sputum (collected 2023 09:40)  Source: ET Tube ET Tube  Gram Stain (2023 23:37):    Moderate polymorphonuclear leukocytes per low power field    Rare Squamous epithelial cells per low power field    Rare Gram Negative Rods per oil power field      IMAGING:  < from: CT Abdomen and Pelvis No Cont (23 @ 16:06) >    No CT evidence of acute intra-abdominal pathology. No retroperitoneal   hematoma.    < end of copied text >      < from: CT Head No Cont (23 @ 15:58) >  IMPRESSION:    Stable 5.8 cm left thalamic intraparenchymal hematoma with surrounding   edema. Redemonstrated layering hemorrhages in the bilateral occipital   horns of the lateral ventricles.    Stable mild ventriculomegaly.    < end of copied text >      < from: Xray Chest 1 View-PORTABLE IMMEDIATE (Xray Chest 1 View-PORTABLE IMMEDIATE .) (23 @ 13:20) >  Impression:    Interval decrease in bilateral opacities. Low lung volumes.    < end of copied text >

## 2023-06-18 NOTE — PROGRESS NOTE ADULT - NS MD NEURO CONDITIONS_ENCEPHAL
Metabolic/Neurological

## 2023-06-18 NOTE — CONSULT NOTE ADULT - ASSESSMENT
ASSESSMENT:   64 y/o M with hx of Afib on eiquis, CVA BG bleed ,HTN, CHF, Gout, DM w/ worsening renal function necessitating CVVH. Physical exam findings, imaging, and labs as documented above.     PLAN:  - Insert uldall  - Consent in chart  - Coags reviewed - within acceptable parameters    - Remaining care per primary    Above plan discussed with Attending Surgeon Dr. Ochoa, patient, patient family, and Primary team  06-18-23 @ 10:27    Vascular Surgery  Spectra [phone]

## 2023-06-18 NOTE — PROCEDURAL SAFETY CHECKLIST WITH OR WITHOUT SEDATION - NSPREPROCEDSEDAT_GEN_ALL_CORE
with sedation
Fentanyl gtt/with sedation
with sedation
Fentanyl gtt/with sedation
with sedation
with sedation
without sedation

## 2023-06-18 NOTE — PROCEDURE NOTE - NSSITEPREP_SKIN_A_CORE
chlorhexidine/Adherence to aseptic technique: hand hygiene prior to donning barriers (gown, gloves), don cap and mask, sterile drape over patient
chlorhexidine
chlorhexidine/Adherence to aseptic technique: hand hygiene prior to donning barriers (gown, gloves), don cap and mask, sterile drape over patient

## 2023-06-18 NOTE — PROCEDURE NOTE - NSPROCDETAILS_GEN_ALL_CORE
guidewire recovered/lumen(s) aspirated and flushed/sterile dressing applied/sterile technique, catheter placed/ultrasound guidance with use of sterile gel and probe cove
guidewire recovered/lumen(s) aspirated and flushed/sterile dressing applied/sterile technique, catheter placed/ultrasound guidance with use of sterile gel and probe cove
location identified, draped/prepped, sterile technique used, needle inserted/introduced/positive blood return obtained via catheter/connected to a pressurized flush line/sutured in place/hemostasis with direct pressure, dressing applied/all materials/supplies accounted for at end of procedure
guidewire recovered/lumen(s) aspirated and flushed/sterile dressing applied/sterile technique, catheter placed/ultrasound guidance with use of sterile gel and probe cove
guidewire recovered/lumen(s) aspirated and flushed/sterile dressing applied/sterile technique, catheter placed/ultrasound guidance with use of sterile gel and probe cove

## 2023-06-18 NOTE — PROCEDURAL SAFETY CHECKLIST WITH OR WITHOUT SEDATION - NSPREPROCDATE6_GEN_ALL_CORE
16-Jun-2023 00:35
10-Alan-2023 19:40
06-Jun-2023 03:48
18-Jun-2023 09:20
12-Jun-2023 02:00
12-Jun-2023 00:30
18-Jun-2023 08:20

## 2023-06-18 NOTE — CHART NOTE - NSCHARTNOTEFT_GEN_A_CORE
Given patient's deteriorating clinical condition and poor prognosis, Dr. Schwab discussed GOC with wife, Colleen Cannon, and family at bedside. Patient was made a DNR.       [phone]

## 2023-06-18 NOTE — PROCEDURAL SAFETY CHECKLIST WITH OR WITHOUT SEDATION - NSTEAMPROVIDERFT_GEN_ALL_CORE
MD Davis
KINZA Maradiaga
Rashid / Marcus
Anjel MAHONEY
KINZA Linn MD Schwab
MD Davis, MD Jones
Shayy DOWNING

## 2023-06-18 NOTE — CONSULT NOTE ADULT - CONSULT REASON
GI Bleed
ZE
CKD
GOC
s/p fall from bed ?HT -LOC +AC
left basal ganglia bleed
Consideration for diagnostic cerebral angiogram
Jeny
Right proximal humerus fracture
Temporary Hemodialysis Catheter Insertion

## 2023-06-18 NOTE — PROGRESS NOTE ADULT - SUBJECTIVE AND OBJECTIVE BOX
Nephrology progress note    THIS IS AN INCOMPLETE NOTE . FULL NOTE TO FOLLOW SHORTLY    Patient is seen and examined, events over the last 24 h noted .    Allergies:  No Known Allergies    Hospital Medications:   MEDICATIONS  (STANDING):  acetylcysteine 10%  Inhalation 4 milliLiter(s) Inhalation every 4 hours  albuterol    0.083% 2.5 milliGRAM(s) Nebulizer every 4 hours  bacitracin   Ointment 1 Application(s) Topical every 12 hours  calcium gluconate IVPB 2 Gram(s) IV Intermittent once  chlorhexidine 0.12% Liquid 15 milliLiter(s) Oral Mucosa every 12 hours  chlorhexidine 2% Cloths 1 Application(s) Topical <User Schedule>  desmopressin IVPB 40 MICROGram(s) IV Intermittent once  dexMEDEtomidine Infusion 0.2 MICROgram(s)/kG/Hr (6.36 mL/Hr) IV Continuous <Continuous>  dextrose 5%. 1000 milliLiter(s) (100 mL/Hr) IV Continuous <Continuous>  dextrose 5%. 1000 milliLiter(s) (50 mL/Hr) IV Continuous <Continuous>  dextrose 50% Injectable 25 Gram(s) IV Push once  dextrose 50% Injectable 12.5 Gram(s) IV Push once  dextrose 50% Injectable 25 Gram(s) IV Push once  ergocalciferol 58969 Unit(s) Oral every week  fentaNYL    Injectable 100 MICROGram(s) IV Push once  fentaNYL   Infusion. 0.5 MICROgram(s)/kG/Hr (6.36 mL/Hr) IV Continuous <Continuous>  glucagon  Injectable 1 milliGRAM(s) IntraMuscular once  insulin lispro (ADMELOG) corrective regimen sliding scale   SubCutaneous every 6 hours  insulin NPH human recombinant 12 Unit(s) SubCutaneous every 6 hours  lacosamide IVPB 200 milliGRAM(s) IV Intermittent every 12 hours  lactated ringers Bolus 500 milliLiter(s) IV Bolus once  meropenem  IVPB 500 milliGRAM(s) IV Intermittent every 12 hours  multivitamin 1 Tablet(s) Oral daily  norepinephrine Infusion 0.05 MICROgram(s)/kG/Min (5.96 mL/Hr) IV Continuous <Continuous>  pantoprazole Infusion 8 mG/Hr (10 mL/Hr) IV Continuous <Continuous>  sodium bicarbonate  Injectable 100 milliEquivalent(s) IV Push once  vasopressin Infusion 0.04 Unit(s)/Min (6 mL/Hr) IV Continuous <Continuous>        VITALS:  T(F): 100.2 (23 @ 04:00), Max: 100.5 (23 @ 08:00)  HR: 84 (23 @ 06:45)  BP: 84/35 (23 @ 04:45)  RR: 42 (23 @ 06:45)  SpO2: 100% (23 @ 06:45)  Wt(kg): --     @ 07:01  -   07:00  --------------------------------------------------------  IN: 4007.7 mL / OUT: 3780 mL / NET: 227.7 mL     @ 07:01  -   @ 07:00  --------------------------------------------------------  IN: 2557.9 mL / OUT: 2060 mL / NET: 497.9 mL          PHYSICAL EXAM:  Constitutional: NAD  HEENT: anicteric sclera, oropharynx clear, MMM  Neck: No JVD  Respiratory: CTAB, no wheezes, rales or rhonchi  Cardiovascular: S1, S2, RRR  Gastrointestinal: BS+, soft, NT/ND  Extremities: No cyanosis or clubbing. No peripheral edema  :  No khan.   Skin: No rashes    LABS:      141  |  105  |  157<HH>  ----------------------------<  269<H>  4.5   |  18  |  5.9<HH>    Ca    7.5<L>      2023 04:20  Phos  6.4       Mg     2.7         TPro  4.3<L>  /  Alb  2.5<L>  /  TBili  0.8  /  DBili      /  AST  30  /  ALT  40  /  AlkPhos  100                            5.3    19.22 )-----------( 394      ( 2023 04:20 )             16.0       Urine Studies:  Urinalysis Basic - ( 2023 09:31 )    Color: Light Yellow / Appearance: Slightly Turbid / S.012 / pH:   Gluc:  / Ketone: Negative  / Bili: Negative / Urobili: <2 mg/dL   Blood:  / Protein: Trace / Nitrite: Negative   Leuk Esterase: Large / RBC: 10 /HPF / WBC 21 /HPF   Sq Epi:  / Non Sq Epi:  / Bacteria: Negative          Iron 16, TIBC 162, %sat 10      [23 @ 11:15]  Ferritin 231      [23 @ 11:15]  Vitamin D (25OH) 19      [23 @ 10:34]  HbA1c 8.7      [19 @ 04:49]  TSH 1.29      [23 @ 10:10]  Lipid: chol 163, , HDL 26, LDL --      [06-15-23 @ 04:40]    HBsAg Nonreact      [19 @ 06:38]  HCV 0.08, Nonreact      [19 @ 06:38]    RAMO: titer Negative, pattern --      [19 @ 06:38]  C3 Complement 177      [19 @ 06:38]  C4 Complement 46      [19 @ 06:38]  ANCA: cANCA Negative, pANCA Negative, atypical ANCA Negative      [19 @ 06:38]  Immunofixation Serum:   No Monoclonal Band Identified    Reference Range: None Detected      [19 @ 04:00]  SPEP Interpretation: Normal Electrophoresis Pattern      [19 @ 04:00]  Immunofixation Urine: Reference Range: None Detected      [19 @ 12:39]  UPEP Interpretation: Mild Selective (Glomerular) Proteinuria      [19 @ 12:39]      RADIOLOGY & ADDITIONAL STUDIES:   Nephrology progress note  Patient is seen and examined, events over the last 24 h noted .  sp lower GIB anemia acute   on 2 pressors now  BUN high     Allergies:  No Known Allergies    Hospital Medications:   MEDICATIONS  (STANDING):    acetylcysteine 10%  Inhalation 4 milliLiter(s) Inhalation every 4 hours  albuterol    0.083% 2.5 milliGRAM(s) Nebulizer every 4 hours  bacitracin   Ointment 1 Application(s) Topical every 12 hours  calcium gluconate IVPB 2 Gram(s) IV Intermittent once  desmopressin IVPB 40 MICROGram(s) IV Intermittent once  dexMEDEtomidine Infusion 0.2 MICROgram(s)/kG/Hr (6.36 mL/Hr) IV Continuous <Continuous>  ergocalciferol 21808 Unit(s) Oral every week  fentaNYL    Injectable 100 MICROGram(s) IV Push once  fentaNYL   Infusion. 0.5 MICROgram(s)/kG/Hr (6.36 mL/Hr) IV Continuous <Continuous>  glucagon  Injectable 1 milliGRAM(s) IntraMuscular once  insulin lispro (ADMELOG) corrective regimen sliding scale   SubCutaneous every 6 hours  insulin NPH human recombinant 12 Unit(s) SubCutaneous every 6 hours  lacosamide IVPB 200 milliGRAM(s) IV Intermittent every 12 hours  lactated ringers Bolus 500 milliLiter(s) IV Bolus once  meropenem  IVPB 500 milliGRAM(s) IV Intermittent every 12 hours  multivitamin 1 Tablet(s) Oral daily  norepinephrine Infusion 0.05 MICROgram(s)/kG/Min (5.96 mL/Hr) IV Continuous <Continuous>  pantoprazole Infusion 8 mG/Hr (10 mL/Hr) IV Continuous <Continuous>  sodium bicarbonate  Injectable 100 milliEquivalent(s) IV Push once  vasopressin Infusion 0.04 Unit(s)/Min (6 mL/Hr) IV Continuous <Continuous>        VITALS:  T(F): 100.2 (23 @ 04:00), Max: 100.5 (23 @ 08:00)  HR: 84 (23 @ 06:45)  BP: 84/35 (23 @ 04:45)  RR: 42 (23 @ 06:45)  SpO2: 100% (23 @ 06:45)      16 @ 07:01  -   @ 07:00  --------------------------------------------------------  IN: 4007.7 mL / OUT: 3780 mL / NET: 227.7 mL     @ 07:01  -   @ 07:00  --------------------------------------------------------  IN: 2557.9 mL / OUT: 2060 mL / NET: 497.9 mL          PHYSICAL EXAM:  Constitutional: intubated on MV   Respiratory: CTAB  Cardiovascular: S1, S2, RRR  Gastrointestinal: BS+, soft, NT/ND  Extremities: No cyanosis or clubbing. No peripheral edema  :  No khan.   Skin: No rashes    LABS:      141  |  105  |  157<HH>  ----------------------------<  269<H>  4.5   |  18  |  5.9<HH>    Ca    7.5<L>      2023 04:20  Phos  6.4       Mg     2.7         TPro  4.3<L>  /  Alb  2.5<L>  /  TBili  0.8  /  DBili      /  AST  30  /  ALT  40  /  AlkPhos  100                            5.3    19.22 )-----------( 394      ( 2023 04:20 )             16.0     Hemoglobin: 5.3 g/dL ( @ 04:20)  Hemoglobin: 7.2 g/dL ( @ 12:20)  Hemoglobin: 6.9 g/dL ( @ 04:48)  Hemoglobin: 7.1 g/dL ( @ 01:14)  Hemoglobin: 6.7 g/dL ( @ 16:19)    Urine Studies:  Urinalysis Basic - ( 2023 09:31 )    Color: Light Yellow / Appearance: Slightly Turbid / S.012 / pH:   Gluc:  / Ketone: Negative  / Bili: Negative / Urobili: <2 mg/dL   Blood:  / Protein: Trace / Nitrite: Negative   Leuk Esterase: Large / RBC: 10 /HPF / WBC 21 /HPF   Sq Epi:  / Non Sq Epi:  / Bacteria: Negative          Iron 16, TIBC 162, %sat 10      [23 @ 11:15]  Ferritin 231      [23 @ 11:15]  Vitamin D (25OH) 19      [23 @ 10:34]  HbA1c 8.7      [19 @ 04:49]  TSH 1.29      [23 @ 10:10]  Lipid: chol 163, , HDL 26, LDL --      [06-15-23 @ 04:40]    HBsAg Nonreact      [19 @ 06:38]  HCV 0.08, Nonreact      [19 @ 06:38]    RAMO: titer Negative, pattern --      [19 @ 06:38]  C3 Complement 177      [19 @ 06:38]  C4 Complement 46      [19 @ 06:38]  ANCA: cANCA Negative, pANCA Negative, atypical ANCA Negative      [19 @ 06:38]  Immunofixation Serum:   No Monoclonal Band Identified    Reference Range: None Detected      [19 @ 04:00]  SPEP Interpretation: Normal Electrophoresis Pattern      [19 @ 04:00]  Immunofixation Urine: Reference Range: None Detected      [19 @ 12:39]  UPEP Interpretation: Mild Selective (Glomerular) Proteinuria      [19 @ 12:39]      RADIOLOGY & ADDITIONAL STUDIES:

## 2023-06-18 NOTE — PROCEDURAL SAFETY CHECKLIST WITH OR WITHOUT SEDATION - NSPROCEDPERFORMDFREE_GEN_ALL_CORE
Right IJ [place] Placement
Radial Arterial Line Instertion
LEFT FEM UDALL INSERTION
Left Radial A-line
Right IJ [place] Placement
Left IJ TLC
BRIANNA Bryant

## 2023-06-18 NOTE — PROCEDURAL SAFETY CHECKLIST WITH OR WITHOUT SEDATION - NSPOSTDEBRIEFDT_GEN_ALL_CORE
12-Jun-2023 02:10
12-Jun-2023 15:00
18-Jun-2023 08:40
10-Alan-2023 21:31
18-Jun-2023 10:00
16-Jun-2023 00:51

## 2023-06-18 NOTE — PROCEDURAL SAFETY CHECKLIST WITH OR WITHOUT SEDATION - NSPRESURGSED_GEN_ALL_CORE
Present, accurate, and signed
Present, accurate, and signed
n/a
Present, accurate, and signed

## 2023-06-18 NOTE — PROGRESS NOTE ADULT - CRITICAL CARE ATTENDING COMMENT
LEFT THALAMIC ich WITH ivh IKELY htn IN ETIOLOGY, cta NO VASCULAR MALFORMATION, mri BRAIN PENDING   neuro checks q 2 hr, precedex for comfort   EEG no seizures, on vimpat for epileptiform discharges   SBP goal 100-160 , EF 55-60%   a fib  HTN   MAP>60, sbp <160 mmhg , wean off nicardipine   on clondine 0.2 mg q 8 hr, hydralzine 100 mg q 8 hr, labetolol 300 mg q 8 hr, add amlodipine 10 mg   left radial al ine, left IJ central line   Pulm: acute respiratory failure due to aspiration pneumonia   Mode: AC/ CMV (Assist Control/ Continuous Mandatory Ventilation), RR (machine): 16, TV (machine): 450, FiO2: 40, PEEP: 8, ITime: 1, MAP: 12, PIP: 28  on zosyn   PSV as tolerates   cwill get a hest xray   GI NG TF at goal vital   AP elavtaed, US abd no cholecystitis   last BM 6/14   PPI while intubated   renal DESI on CKD  s/p CVVHD , on bumex drip held for now; possible iHD tomorrow   endo- insulin gtt for dysglycemia, adjust nph q 6 this pm and transition off gtt    ID- repeat ua, sputum cx, mrsa swab  pneumonia on zosyn  hem: hgb dropping, s/p 2 units prbc, without evidence of GIB   heparin sc for chemoppx      remainder of plan as above      dispo- nsicu plan reviewed and pursue as above    family updated     prognosis guarded      dispo- nsicu

## 2023-06-18 NOTE — CONSULT NOTE ADULT - ASSESSMENT
63M w/ PMH/PSH as above, admitted to NICU 2/2 ICH, developed multiple episodes of melena concerning for GI bleed, became hemodynamically unstable, started on multiple pressors, uremic and placed on CVVHD, found to have acute anemia with Hb downtrending to 5.3 requiring multiple transfusions of blood products.  GI consulted for emergent endoscopic intervention, surgery consulted to evaluate for surgical options. Patient reported to be too unstable to transport for further imaging.  GI with plans for possible endoscopic interventions pending adequate resuscitation.  No surgical intervention warranted at this time.  Case discussed bedside with NSICU team and attending, GI fellow and attending, and surgical attending.

## 2023-06-18 NOTE — PROCEDURE NOTE - NSINDICATIONS_GEN_A_CORE
arterial puncture to obtain ABG's/blood sampling/critical patient/monitoring purposes
critical illness/venous access
dialysis/CRRT
critical illness/dialysis/CRRT/emergency venous access
dialysis/CRRT

## 2023-06-18 NOTE — PROGRESS NOTE ADULT - SUBJECTIVE AND OBJECTIVE BOX
SUMMARY: This is a 62 y/o M with hx of Afib on eiquis, CVA BG bleed ,HTN, CHF, Gout, DM  presents as a stroke code after being down in bathroom unknown time LKW was 2300. 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 Pt is lethargic  - Cardene started for blood pressure control.    6/10- patient became increasingly lethargic in afternoon/early evening, febrile, hfnc maximized and patient intubated for hypoxemic respiratory failure with VL, sedated overnight on prop and fent, renal function worsening, pt on broad spectrum IV abx and fully cultured for sepsis due to aspiration pneumonia     OVERNIGHT EVENTS:     ADMISSION SCORES:   GCS 12   ICH 1   NIH 17    REVIEW OF SYSTEMS: Pt unable to participate in ROS due to neurological status      VITALS: [X] Reviewed    IMAGING/DATA: [X] Reviewed    IVF FLUIDS/MEDICATIONS: [X] Reviewed    ALLERGIES: Allergies    No Known Allergies    Intolerances    EXAMINATION:  General: morbidly obese; ill appearing  HENT: nc/at, orally intubated  Eyes: Anicteric sclerae  Cardiac: [id number]  Lungs: diminished air entry b/l, mild crackles  Abdomen: Soft, non-tender, +BS, truncal edema  Extremities: R shoulder - tenderness and crepitance / swelling R shoulder w/ overlying ecchymosis- distal pulses RUE intact, good capillary refill; anasarca  Skin/Incision Site: Clean, dry and intact  Neurologic:  awake alert aphasic, ?intermittently  follows simple midline commands, BHARAT , eyes midline,  Localizing w/ LUE,  + corneals b/l, no cough/occulocephalic, localizing LUE, 0/5 on the right side, left LE 0/5     ICU Vital Signs Last 24 Hrs  T(C): 37.9 (18 Jun 2023 04:00), Max: 38.1 (17 Jun 2023 08:00)  T(F): 100.2 (18 Jun 2023 04:00), Max: 100.5 (17 Jun 2023 08:00)  HR: 107 (18 Jun 2023 07:21) (50 - 107)  BP: 84/35 (18 Jun 2023 04:45) (77/32 - 110/53)  BP(mean): 51 (18 Jun 2023 04:45) (47 - 76)  ABP: 119/52 (18 Jun 2023 06:45) (93/40 - 182/65)  ABP(mean): 68 (18 Jun 2023 06:45) (51 - 98)  RR: 42 (18 Jun 2023 06:45) (16 - 53)  SpO2: 100% (18 Jun 2023 07:21) (95% - 100%)      06-17-23 @ 07:01  -  06-18-23 @ 07:00  --------------------------------------------------------  IN: 2557.9 mL / OUT: 2060 mL / NET: 497.9 mL        Mode: AC/ CMV (Assist Control/ Continuous Mandatory Ventilation), RR (machine): 24, TV (machine): 450, FiO2: 100, PEEP: 7, ITime: 0.9, MAP: 12, PIP: 22    acetaminophen     Tablet .. 650 milliGRAM(s) Oral every 6 hours PRN  acetylcysteine 10%  Inhalation 4 milliLiter(s) Inhalation every 4 hours  albuterol    0.083% 2.5 milliGRAM(s) Nebulizer every 4 hours  bacitracin   Ointment 1 Application(s) Topical every 12 hours  bisacodyl Suppository 10 milliGRAM(s) Rectal daily PRN  calcium gluconate IVPB 2 Gram(s) IV Intermittent once  chlorhexidine 0.12% Liquid 15 milliLiter(s) Oral Mucosa every 12 hours  chlorhexidine 2% Cloths 1 Application(s) Topical <User Schedule>  CRRT Treatment    <Continuous>  dexMEDEtomidine Infusion 0.2 MICROgram(s)/kG/Hr (6.36 mL/Hr) IV Continuous <Continuous>  dextrose 5%. 1000 milliLiter(s) (100 mL/Hr) IV Continuous <Continuous>  dextrose 5%. 1000 milliLiter(s) (50 mL/Hr) IV Continuous <Continuous>  dextrose 50% Injectable 25 Gram(s) IV Push once  dextrose 50% Injectable 12.5 Gram(s) IV Push once  dextrose 50% Injectable 25 Gram(s) IV Push once  dextrose Oral Gel 15 Gram(s) Oral once PRN  ergocalciferol 45713 Unit(s) Oral every week  fentaNYL    Injectable 50 MICROGram(s) IV Push once  fentaNYL    Injectable 50 MICROGram(s) IV Push every 2 hours PRN  fentaNYL   Infusion. 1 MICROgram(s)/kG/Hr (12.7 mL/Hr) IV Continuous <Continuous>  fentaNYL   Infusion. 0.5 MICROgram(s)/kG/Hr (6.36 mL/Hr) IV Continuous <Continuous>  glucagon  Injectable 1 milliGRAM(s) IntraMuscular once  hydrALAZINE Injectable 10 milliGRAM(s) IV Push every 2 hours PRN  insulin lispro (ADMELOG) corrective regimen sliding scale   SubCutaneous every 6 hours  insulin NPH human recombinant 12 Unit(s) SubCutaneous every 6 hours  lacosamide IVPB 200 milliGRAM(s) IV Intermittent every 12 hours  meropenem  IVPB 500 milliGRAM(s) IV Intermittent every 12 hours  multivitamin 1 Tablet(s) Oral daily  norepinephrine Infusion 0.05 MICROgram(s)/kG/Min (5.96 mL/Hr) IV Continuous <Continuous>  ondansetron Injectable 4 milliGRAM(s) IV Push every 6 hours PRN  pantoprazole Infusion 8 mG/Hr (10 mL/Hr) IV Continuous <Continuous>  PureFlow Dialysate RFP-400 (K 2 / Ca 3) 5000 milliLiter(s) (2000 mL/Hr) CRRT <Continuous>  sodium bicarbonate  Injectable 100 milliEquivalent(s) IV Push once  vasopressin Infusion 0.04 Unit(s)/Min (6 mL/Hr) IV Continuous <Continuous>      LABS:  Na: 141 (06-18 @ 04:20), 140 (06-17 @ 04:48), 138 (06-16 @ 16:19), 141 (06-16 @ 05:35), 139 (06-16 @ 00:05)  K: 4.5 (06-18 @ 04:20), 4.0 (06-17 @ 04:48), 4.0 (06-16 @ 16:19), 4.1 (06-16 @ 05:35), 4.3 (06-16 @ 00:05)  Cl: 105 (06-18 @ 04:20), 101 (06-17 @ 04:48), 101 (06-16 @ 16:19), 102 (06-16 @ 05:35), 101 (06-16 @ 00:05)  CO2: 18 (06-18 @ 04:20), 22 (06-17 @ 04:48), 22 (06-16 @ 16:19), 23 (06-16 @ 05:35), 24 (06-16 @ 00:05)  BUN: 157 (06-18 @ 04:20), 127 (06-17 @ 04:48), 101 (06-16 @ 16:19), 83 (06-16 @ 05:35), 82 (06-16 @ 00:05)  Cr: 5.9 (06-18 @ 04:20), 5.3 (06-17 @ 04:48), 4.9 (06-16 @ 16:19), 4.7 (06-16 @ 05:35), 4.5 (06-16 @ 00:05)  Glu: 269(06-18 @ 04:20), 190(06-17 @ 04:48), 358(06-16 @ 16:19), 269(06-16 @ 05:35), 271(06-16 @ 00:05)    Hgb: 5.3 (06-18 @ 04:20), 7.2 (06-17 @ 12:20), 6.9 (06-17 @ 04:48), 7.1 (06-17 @ 01:14), 6.7 (06-16 @ 16:19), 7.0 (06-16 @ 05:35)  Hct: 16.0 (06-18 @ 04:20), 21.7 (06-17 @ 12:20), 20.7 (06-17 @ 04:48), 21.8 (06-17 @ 01:14), 20.7 (06-16 @ 16:19), 22.3 (06-16 @ 05:35)  WBC: 19.22 (06-18 @ 04:20), 17.06 (06-17 @ 12:20), 14.83 (06-17 @ 04:48), 13.27 (06-17 @ 01:14), 12.31 (06-16 @ 16:19), 11.19 (06-16 @ 05:35)  Plt: 394 (06-18 @ 04:20), 302 (06-17 @ 12:20), 248 (06-17 @ 04:48), 247 (06-17 @ 01:14), 230 (06-16 @ 16:19), 219 (06-16 @ 05:35)    INR: 0.99 06-18-23 @ 04:20, 0.90 06-15-23 @ 12:47  PTT: 26.0 06-18-23 @ 04:20, 30.3 06-15-23 @ 12:47          LIVER FUNCTIONS - ( 18 Jun 2023 04:20 )  Alb: 2.5 g/dL / Pro: 4.3 g/dL / ALK PHOS: 100 U/L / ALT: 40 U/L / AST: 30 U/L / GGT: x           ABG - ( 18 Jun 2023 07:26 )  pH, Arterial: 7.38  pH, Blood: x     /  pCO2: 37    /  pO2: 177   / HCO3: 22    / Base Excess: -3.0  /  SaO2: 98.7                 SUMMARY: This is a 62 y/o M with hx of Afib on eiquis, CVA BG bleed ,HTN, CHF, Gout, DM  presents as a stroke code after being down in bathroom unknown time LKW was 2300. 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 Pt is lethargic  - Cardene started for blood pressure control.    6/10- patient became increasingly lethargic in afternoon/early evening, febrile, hfnc maximized and patient intubated for hypoxemic respiratory failure with VL, sedated overnight on prop and fent, renal function worsening, pt on broad spectrum IV abx and fully cultured for sepsis due to aspiration pneumonia     OVERNIGHT EVENTS: multiple episodes of melena, with hypotension, initiated on pressors, transfused pRBC, worsening uremia noted, and HD catheter placement pursued for initiation of urgent CVVHD    ADMISSION SCORES:   GCS 12   ICH 1   NIH 17    REVIEW OF SYSTEMS: Pt unable to participate in ROS due to neurological status      VITALS: [X] Reviewed    IMAGING/DATA: [X] Reviewed    IVF FLUIDS/MEDICATIONS: [X] Reviewed    ALLERGIES: Allergies    No Known Allergies    Intolerances    EXAMINATION:  General: morbidly obese; ill appearing  HENT: nc/at, orally intubated  Eyes: Anicteric sclerae  Cardiac: [id number]  Lungs: diminished air entry b/l, mild crackles  Abdomen: Soft, non-tender, +BS, truncal edema  Extremities: R shoulder - tenderness and crepitance / swelling R shoulder w/ overlying ecchymosis- distal pulses RUE intact, good capillary refill; anasarca  Skin/Incision Site: Clean, dry and intact  Neurologic:  sedated on fentanyl gtt; intermittent EO, L 2mm  reactive, R 2 mm and sluggish  , eyes midline,  + corneals b/l/cough/gag, WD on UEs minimally, b/l LE 0/5         ICU Vital Signs Last 24 Hrs  T(C): 37.9 (18 Jun 2023 04:00), Max: 38 (17 Jun 2023 09:30)  T(F): 100.2 (18 Jun 2023 04:00), Max: 100.4 (17 Jun 2023 09:30)  HR: 107 (18 Jun 2023 07:21) (50 - 107)  BP: 84/35 (18 Jun 2023 04:45) (77/32 - 110/53)  BP(mean): 51 (18 Jun 2023 04:45) (47 - 76)  ABP: 91/47 (18 Jun 2023 07:00) (91/47 - 182/65)  ABP(mean): 58 (18 Jun 2023 07:00) (51 - 98)  RR: 32 (18 Jun 2023 07:00) (16 - 42)  SpO2: 100% (18 Jun 2023 07:21) (95% - 100%)      06-17-23 @ 07:01  -  06-18-23 @ 07:00  --------------------------------------------------------  IN: 3960 mL / OUT: 2065 mL / NET: 1895 mL        Mode: AC/ CMV (Assist Control/ Continuous Mandatory Ventilation), RR (machine): 24, TV (machine): 450, FiO2: 100, PEEP: 7, ITime: 0.9, MAP: 12, PIP: 22    acetaminophen     Tablet .. 650 milliGRAM(s) Oral every 6 hours PRN  acetylcysteine 10%  Inhalation 4 milliLiter(s) Inhalation every 4 hours  albuterol    0.083% 2.5 milliGRAM(s) Nebulizer every 4 hours  bacitracin   Ointment 1 Application(s) Topical every 12 hours  bisacodyl Suppository 10 milliGRAM(s) Rectal daily PRN  chlorhexidine 0.12% Liquid 15 milliLiter(s) Oral Mucosa every 12 hours  chlorhexidine 2% Cloths 1 Application(s) Topical <User Schedule>  CRRT Treatment    <Continuous>  dexMEDEtomidine Infusion 0.2 MICROgram(s)/kG/Hr (6.36 mL/Hr) IV Continuous <Continuous>  dextrose 5%. 1000 milliLiter(s) (100 mL/Hr) IV Continuous <Continuous>  dextrose 5%. 1000 milliLiter(s) (50 mL/Hr) IV Continuous <Continuous>  dextrose 50% Injectable 25 Gram(s) IV Push once  dextrose 50% Injectable 12.5 Gram(s) IV Push once  dextrose 50% Injectable 25 Gram(s) IV Push once  dextrose Oral Gel 15 Gram(s) Oral once PRN  ergocalciferol 03878 Unit(s) Oral every week  fentaNYL    Injectable 50 MICROGram(s) IV Push every 2 hours PRN  fentaNYL   Infusion. 1 MICROgram(s)/kG/Hr (12.7 mL/Hr) IV Continuous <Continuous>  fentaNYL   Infusion. 0.5 MICROgram(s)/kG/Hr (6.36 mL/Hr) IV Continuous <Continuous>  glucagon  Injectable 1 milliGRAM(s) IntraMuscular once  hydrALAZINE Injectable 10 milliGRAM(s) IV Push every 2 hours PRN  insulin regular Infusion 2 Unit(s)/Hr (2 mL/Hr) IV Continuous <Continuous>  lacosamide IVPB 200 milliGRAM(s) IV Intermittent every 12 hours  meropenem  IVPB 500 milliGRAM(s) IV Intermittent every 12 hours  multivitamin 1 Tablet(s) Oral daily  norepinephrine Infusion 0.05 MICROgram(s)/kG/Min (5.96 mL/Hr) IV Continuous <Continuous>  ondansetron Injectable 4 milliGRAM(s) IV Push every 6 hours PRN  pantoprazole Infusion 8 mG/Hr (10 mL/Hr) IV Continuous <Continuous>  PureFlow Dialysate RFP-400 (K 2 / Ca 3) 5000 milliLiter(s) (2000 mL/Hr) CRRT <Continuous>  vasopressin Infusion 0.04 Unit(s)/Min (6 mL/Hr) IV Continuous <Continuous>      LABS:  Na: 141 (06-18 @ 04:20), 140 (06-17 @ 04:48), 138 (06-16 @ 16:19), 141 (06-16 @ 05:35), 139 (06-16 @ 00:05)  K: 4.5 (06-18 @ 04:20), 4.0 (06-17 @ 04:48), 4.0 (06-16 @ 16:19), 4.1 (06-16 @ 05:35), 4.3 (06-16 @ 00:05)  Cl: 105 (06-18 @ 04:20), 101 (06-17 @ 04:48), 101 (06-16 @ 16:19), 102 (06-16 @ 05:35), 101 (06-16 @ 00:05)  CO2: 18 (06-18 @ 04:20), 22 (06-17 @ 04:48), 22 (06-16 @ 16:19), 23 (06-16 @ 05:35), 24 (06-16 @ 00:05)  BUN: 157 (06-18 @ 04:20), 127 (06-17 @ 04:48), 101 (06-16 @ 16:19), 83 (06-16 @ 05:35), 82 (06-16 @ 00:05)  Cr: 5.9 (06-18 @ 04:20), 5.3 (06-17 @ 04:48), 4.9 (06-16 @ 16:19), 4.7 (06-16 @ 05:35), 4.5 (06-16 @ 00:05)  Glu: 269(06-18 @ 04:20), 190(06-17 @ 04:48), 358(06-16 @ 16:19), 269(06-16 @ 05:35), 271(06-16 @ 00:05)    Hgb: 5.3 (06-18 @ 04:20), 7.2 (06-17 @ 12:20), 6.9 (06-17 @ 04:48), 7.1 (06-17 @ 01:14), 6.7 (06-16 @ 16:19), 7.0 (06-16 @ 05:35)  Hct: 16.0 (06-18 @ 04:20), 21.7 (06-17 @ 12:20), 20.7 (06-17 @ 04:48), 21.8 (06-17 @ 01:14), 20.7 (06-16 @ 16:19), 22.3 (06-16 @ 05:35)  WBC: 19.22 (06-18 @ 04:20), 17.06 (06-17 @ 12:20), 14.83 (06-17 @ 04:48), 13.27 (06-17 @ 01:14), 12.31 (06-16 @ 16:19), 11.19 (06-16 @ 05:35)  Plt: 394 (06-18 @ 04:20), 302 (06-17 @ 12:20), 248 (06-17 @ 04:48), 247 (06-17 @ 01:14), 230 (06-16 @ 16:19), 219 (06-16 @ 05:35)    INR: 0.99 06-18-23 @ 04:20, 0.90 06-15-23 @ 12:47  PTT: 26.0 06-18-23 @ 04:20, 30.3 06-15-23 @ 12:47          LIVER FUNCTIONS - ( 18 Jun 2023 04:20 )  Alb: 2.5 g/dL / Pro: 4.3 g/dL / ALK PHOS: 100 U/L / ALT: 40 U/L / AST: 30 U/L / GGT: x           ABG - ( 18 Jun 2023 07:26 )  pH, Arterial: 7.38  pH, Blood: x     /  pCO2: 37    /  pO2: 177   / HCO3: 22    / Base Excess: -3.0  /  SaO2: 98.7                 SUMMARY: This is a 62 y/o M with hx of Afib on eiquis, CVA BG bleed ,HTN, CHF, Gout, DM  presents as a stroke code after being down in bathroom unknown time LKW was 2300. 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 Pt is lethargic  - Cardene started for blood pressure control.    6/10- patient became increasingly lethargic in afternoon/early evening, febrile, hfnc maximized and patient intubated for hypoxemic respiratory failure with VL, sedated overnight on prop and fent, renal function worsening, pt on broad spectrum IV abx and fully cultured for sepsis due to aspiration pneumonia     OVERNIGHT EVENTS: multiple episodes of melena, with hypotension, initiated on pressors, transfused pRBC, worsening uremia noted, and HD catheter placement pursued for initiation of urgent CVVHD    ADMISSION SCORES:   GCS 12   ICH 1   NIH 17    REVIEW OF SYSTEMS: Pt unable to participate in ROS due to neurological status      VITALS: [X] Reviewed    IMAGING/DATA: [X] Reviewed    IVF FLUIDS/MEDICATIONS: [X] Reviewed    ALLERGIES: Allergies    No Known Allergies    Intolerances    EXAMINATION:  General: morbidly obese; critically ill appearing  HENT: nc/at, orally intubated  Eyes: Anicteric sclerae  Cardiac: [id number]  Lungs: diminished air entry b/l, mild crackles  Abdomen: Soft, non-tender, distended, +BS, truncal edema  Extremities: R shoulder - tenderness and crepitance / swelling R shoulder w/ overlying ecchymosis- distal pulses RUE intact, good capillary refill; anasarca  Skin/Incision Site: Clean, dry and intact  Neurologic:  sedated on fentanyl gtt; intermittent EO, L 2mm  reactive, R 2 mm and sluggish  , eyes midline,  + corneals b/l/cough/gag, WD on UEs minimally, b/l LE 0/5         ICU Vital Signs Last 24 Hrs  T(C): 37.9 (18 Jun 2023 04:00), Max: 38 (17 Jun 2023 09:30)  T(F): 100.2 (18 Jun 2023 04:00), Max: 100.4 (17 Jun 2023 09:30)  HR: 107 (18 Jun 2023 07:21) (50 - 107)  BP: 84/35 (18 Jun 2023 04:45) (77/32 - 110/53)  BP(mean): 51 (18 Jun 2023 04:45) (47 - 76)  ABP: 91/47 (18 Jun 2023 07:00) (91/47 - 182/65)  ABP(mean): 58 (18 Jun 2023 07:00) (51 - 98)  RR: 32 (18 Jun 2023 07:00) (16 - 42)  SpO2: 100% (18 Jun 2023 07:21) (95% - 100%)      06-17-23 @ 07:01  -  06-18-23 @ 07:00  --------------------------------------------------------  IN: 3960 mL / OUT: 2065 mL / NET: 1895 mL        Mode: AC/ CMV (Assist Control/ Continuous Mandatory Ventilation), RR (machine): 24, TV (machine): 450, FiO2: 100, PEEP: 7, ITime: 0.9, MAP: 12, PIP: 22    acetaminophen     Tablet .. 650 milliGRAM(s) Oral every 6 hours PRN  acetylcysteine 10%  Inhalation 4 milliLiter(s) Inhalation every 4 hours  albuterol    0.083% 2.5 milliGRAM(s) Nebulizer every 4 hours  bacitracin   Ointment 1 Application(s) Topical every 12 hours  bisacodyl Suppository 10 milliGRAM(s) Rectal daily PRN  chlorhexidine 0.12% Liquid 15 milliLiter(s) Oral Mucosa every 12 hours  chlorhexidine 2% Cloths 1 Application(s) Topical <User Schedule>  CRRT Treatment    <Continuous>  dexMEDEtomidine Infusion 0.2 MICROgram(s)/kG/Hr (6.36 mL/Hr) IV Continuous <Continuous>  dextrose 5%. 1000 milliLiter(s) (100 mL/Hr) IV Continuous <Continuous>  dextrose 5%. 1000 milliLiter(s) (50 mL/Hr) IV Continuous <Continuous>  dextrose 50% Injectable 25 Gram(s) IV Push once  dextrose 50% Injectable 12.5 Gram(s) IV Push once  dextrose 50% Injectable 25 Gram(s) IV Push once  dextrose Oral Gel 15 Gram(s) Oral once PRN  ergocalciferol 33078 Unit(s) Oral every week  fentaNYL    Injectable 50 MICROGram(s) IV Push every 2 hours PRN  fentaNYL   Infusion. 1 MICROgram(s)/kG/Hr (12.7 mL/Hr) IV Continuous <Continuous>  fentaNYL   Infusion. 0.5 MICROgram(s)/kG/Hr (6.36 mL/Hr) IV Continuous <Continuous>  glucagon  Injectable 1 milliGRAM(s) IntraMuscular once  hydrALAZINE Injectable 10 milliGRAM(s) IV Push every 2 hours PRN  insulin regular Infusion 2 Unit(s)/Hr (2 mL/Hr) IV Continuous <Continuous>  lacosamide IVPB 200 milliGRAM(s) IV Intermittent every 12 hours  meropenem  IVPB 500 milliGRAM(s) IV Intermittent every 12 hours  multivitamin 1 Tablet(s) Oral daily  norepinephrine Infusion 0.05 MICROgram(s)/kG/Min (5.96 mL/Hr) IV Continuous <Continuous>  ondansetron Injectable 4 milliGRAM(s) IV Push every 6 hours PRN  pantoprazole Infusion 8 mG/Hr (10 mL/Hr) IV Continuous <Continuous>  PureFlow Dialysate RFP-400 (K 2 / Ca 3) 5000 milliLiter(s) (2000 mL/Hr) CRRT <Continuous>  vasopressin Infusion 0.04 Unit(s)/Min (6 mL/Hr) IV Continuous <Continuous>      LABS:  Na: 141 (06-18 @ 04:20), 140 (06-17 @ 04:48), 138 (06-16 @ 16:19), 141 (06-16 @ 05:35), 139 (06-16 @ 00:05)  K: 4.5 (06-18 @ 04:20), 4.0 (06-17 @ 04:48), 4.0 (06-16 @ 16:19), 4.1 (06-16 @ 05:35), 4.3 (06-16 @ 00:05)  Cl: 105 (06-18 @ 04:20), 101 (06-17 @ 04:48), 101 (06-16 @ 16:19), 102 (06-16 @ 05:35), 101 (06-16 @ 00:05)  CO2: 18 (06-18 @ 04:20), 22 (06-17 @ 04:48), 22 (06-16 @ 16:19), 23 (06-16 @ 05:35), 24 (06-16 @ 00:05)  BUN: 157 (06-18 @ 04:20), 127 (06-17 @ 04:48), 101 (06-16 @ 16:19), 83 (06-16 @ 05:35), 82 (06-16 @ 00:05)  Cr: 5.9 (06-18 @ 04:20), 5.3 (06-17 @ 04:48), 4.9 (06-16 @ 16:19), 4.7 (06-16 @ 05:35), 4.5 (06-16 @ 00:05)  Glu: 269(06-18 @ 04:20), 190(06-17 @ 04:48), 358(06-16 @ 16:19), 269(06-16 @ 05:35), 271(06-16 @ 00:05)    Hgb: 5.3 (06-18 @ 04:20), 7.2 (06-17 @ 12:20), 6.9 (06-17 @ 04:48), 7.1 (06-17 @ 01:14), 6.7 (06-16 @ 16:19), 7.0 (06-16 @ 05:35)  Hct: 16.0 (06-18 @ 04:20), 21.7 (06-17 @ 12:20), 20.7 (06-17 @ 04:48), 21.8 (06-17 @ 01:14), 20.7 (06-16 @ 16:19), 22.3 (06-16 @ 05:35)  WBC: 19.22 (06-18 @ 04:20), 17.06 (06-17 @ 12:20), 14.83 (06-17 @ 04:48), 13.27 (06-17 @ 01:14), 12.31 (06-16 @ 16:19), 11.19 (06-16 @ 05:35)  Plt: 394 (06-18 @ 04:20), 302 (06-17 @ 12:20), 248 (06-17 @ 04:48), 247 (06-17 @ 01:14), 230 (06-16 @ 16:19), 219 (06-16 @ 05:35)    INR: 0.99 06-18-23 @ 04:20, 0.90 06-15-23 @ 12:47  PTT: 26.0 06-18-23 @ 04:20, 30.3 06-15-23 @ 12:47          LIVER FUNCTIONS - ( 18 Jun 2023 04:20 )  Alb: 2.5 g/dL / Pro: 4.3 g/dL / ALK PHOS: 100 U/L / ALT: 40 U/L / AST: 30 U/L / GGT: x           ABG - ( 18 Jun 2023 07:26 )  pH, Arterial: 7.38  pH, Blood: x     /  pCO2: 37    /  pO2: 177   / HCO3: 22    / Base Excess: -3.0  /  SaO2: 98.7                 Where Do You Want The Question To Include Opioid Counseling Located?: Case Summary Tab

## 2023-06-19 NOTE — PROGRESS NOTE ADULT - PROVIDER SPECIALTY LIST ADULT
NSICU
NSICU
Nephrology
Neurosurgery
NSICU
Nephrology
Neurology
NSICU
NSICU
Nephrology
Nephrology
Neurosurgery
Surgery
NSICU

## 2023-06-19 NOTE — PROGRESS NOTE ADULT - ASSESSMENT
63M w/ PMH/PSH as above, admitted to NICU 2/2 ICH, developed multiple episodes of melena concerning for GI bleed, became hemodynamically unstable, started on multiple pressors, uremic and placed on CVVHD, found to have acute anemia with Hb downtrending to 5.3 requiring multiple transfusions of blood products.  GI consulted for emergent endoscopic intervention, surgery consulted to evaluate for surgical options.    PLAN:  -care as per MICU  -wean off pressors as tolerated  -wean off vent as tolerated  -monitor for bloody BM  -trend H&H  -transfuse as needed  -active type and screen  -no acute surgical intervention    spectra [phone]

## 2023-06-19 NOTE — DISCHARGE NOTE FOR THE EXPIRED PATIENT - HOSPITAL COURSE
This is a 62 y/o M with hx of Afib on eiquis, CVA BG bleed ,HTN, CHF, Gout, DM  presents as a stroke code after being down in bathroom unknown time LKW was 2300. 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 Pt is lethargic  - Cardene started for blood pressure control. Patient admitted to NSICU for care and management of his ICH. His hospital course was complicated by worsening mental status and hypoxemia requiring emergent intubation, with further pulmonary decompensation requiring intermittent paralytics, high dose sedation, and max MV settings as well as treatment for PNA. His DESI also worsened and the patient was treated with CVVHD which eventually needed to be discontinued as his vasopressor requirements increased and patient could no longer hemodynamically tolerate the CVVH. On 6/17, patient had massive melena requiring multiple blood products, vasopressors, bicarbinate IVP. Over the last 24 hours patient MOF, Lactic acidosis, and liver failure worsened. The patient had been previously made DNR after extensive discussion with the family and the patient passed away at 0802 this morning with wife at the bedside.

## 2023-06-19 NOTE — PROGRESS NOTE ADULT - SUBJECTIVE AND OBJECTIVE BOX
Nephrology progress note    THIS IS AN INCOMPLETE NOTE . FULL NOTE TO FOLLOW SHORTLY    Patient is seen and examined, events over the last 24 h noted .    Allergies:  No Known Allergies    Hospital Medications:   MEDICATIONS  (STANDING):  acetylcysteine 10%  Inhalation 4 milliLiter(s) Inhalation every 4 hours  albuterol    0.083% 2.5 milliGRAM(s) Nebulizer every 4 hours  bacitracin   Ointment 1 Application(s) Topical every 12 hours  chlorhexidine 0.12% Liquid 15 milliLiter(s) Oral Mucosa every 12 hours  chlorhexidine 2% Cloths 1 Application(s) Topical <User Schedule>  CRRT Treatment    <Continuous>  dextrose 10%. 1000 milliLiter(s) (30 mL/Hr) IV Continuous <Continuous>  dextrose 5%. 1000 milliLiter(s) (100 mL/Hr) IV Continuous <Continuous>  dextrose 5%. 1000 milliLiter(s) (50 mL/Hr) IV Continuous <Continuous>  dextrose 50% Injectable 25 Gram(s) IV Push once  dextrose 50% Injectable 12.5 Gram(s) IV Push once  dextrose 50% Injectable 25 Gram(s) IV Push once  ergocalciferol 56031 Unit(s) Oral every week  fentaNYL   Infusion. 1 MICROgram(s)/kG/Hr (12.7 mL/Hr) IV Continuous <Continuous>  glucagon  Injectable 1 milliGRAM(s) IntraMuscular once  hydrocortisone sodium succinate Injectable 100 milliGRAM(s) IV Push every 8 hours  lacosamide IVPB 200 milliGRAM(s) IV Intermittent every 12 hours  meropenem  IVPB 500 milliGRAM(s) IV Intermittent every 12 hours  multivitamin 1 Tablet(s) Oral daily  norepinephrine Infusion 0.05 MICROgram(s)/kG/Min (5.96 mL/Hr) IV Continuous <Continuous>  pantoprazole  Injectable 40 milliGRAM(s) IV Push every 12 hours  phenylephrine    Infusion 0.1 MICROgram(s)/kG/Min (2.38 mL/Hr) IV Continuous <Continuous>  phenylephrine   100 mCg/mL NaCl 0.9% Injectable 1000 MICROGram(s) IV Push <User Schedule>  phenylephrine   100 mCg/mL NaCl 0.9% Injectable 1000 MICROGram(s) IV Push <User Schedule>  PureFlow Dialysate RFP-400 (K 2 / Ca 3) 5000 milliLiter(s) (2000 mL/Hr) CRRT <Continuous>  vasopressin Infusion 0.04 Unit(s)/Min (6 mL/Hr) IV Continuous <Continuous>        VITALS:  T(F): 92.6 (23 @ 07:30), Max: 100.4 (23 @ 10:00)  HR: 0 (23 @ 08:02)  BP: --  RR: 32 (23 @ 08:02)  SpO2: 71% (23 @ 08:02)  Wt(kg): --     @ 07:01  -   @ 07:00  --------------------------------------------------------  IN: 3960 mL / OUT: 2065 mL / NET: 1895 mL     @ 07:01  -   @ 07:00  --------------------------------------------------------  IN: 48400.3 mL / OUT: 463 mL / NET: 74853.3 mL          PHYSICAL EXAM:  Constitutional: NAD  HEENT: anicteric sclera, oropharynx clear, MMM  Neck: No JVD  Respiratory: CTAB, no wheezes, rales or rhonchi  Cardiovascular: S1, S2, RRR  Gastrointestinal: BS+, soft, NT/ND  Extremities: No cyanosis or clubbing. No peripheral edema  :  No khan.   Skin: No rashes    LABS:      152<H>  |  115<H>  |  108<HH>  ----------------------------<  30<LL>  7.1<HH>   |  8<LL>  |  5.2<HH>    Ca    7.3<L>      2023 04:45  Phos  12.8       Mg     2.4         TPro  3.1<L>  /  Alb  1.7<L>  /  TBili  2.0<H>  /  DBili      /  AST      /  ALT      /  AlkPhos  165<H>                            6.9    31.62 )-----------( 91       ( 2023 04:45 )             21.2       Urine Studies:  Urinalysis Basic - ( 2023 09:31 )    Color: Light Yellow / Appearance: Slightly Turbid / S.012 / pH:   Gluc:  / Ketone: Negative  / Bili: Negative / Urobili: <2 mg/dL   Blood:  / Protein: Trace / Nitrite: Negative   Leuk Esterase: Large / RBC: 10 /HPF / WBC 21 /HPF   Sq Epi:  / Non Sq Epi:  / Bacteria: Negative          Iron 16, TIBC 162, %sat 10      [23 @ 11:15]  Ferritin 231      [23 @ 11:15]  Vitamin D (25OH) 19      [23 @ 10:34]  HbA1c 8.7      [19 @ 04:49]  TSH 1.29      [23 @ 10:10]  Lipid: chol 163, , HDL 26, LDL --      [06-15-23 @ 04:40]    HBsAg Nonreact      [19 @ 06:38]  HCV 0.08, Nonreact      [19 @ 06:38]    RAMO: titer Negative, pattern --      [19 @ 06:38]  C3 Complement 177      [19 @ 06:38]  C4 Complement 46      [19 @ 06:38]  ANCA: cANCA Negative, pANCA Negative, atypical ANCA Negative      [19 @ 06:38]  Immunofixation Serum:   No Monoclonal Band Identified    Reference Range: None Detected      [19 @ 04:00]  SPEP Interpretation: Normal Electrophoresis Pattern      [19 @ 04:00]  Immunofixation Urine: Reference Range: None Detected      [19 @ 12:39]  UPEP Interpretation: Mild Selective (Glomerular) Proteinuria      [19 @ 12:39]      RADIOLOGY & ADDITIONAL STUDIES:   Nephrology progress note    Patient  by the time he was seen

## 2023-06-19 NOTE — PROGRESS NOTE ADULT - REASON FOR ADMISSION
Stroke Code

## 2023-06-19 NOTE — PROGRESS NOTE ADULT - SUBJECTIVE AND OBJECTIVE BOX
GENERAL SURGERY PROGRESS NOTE    Patient: GRIS TIDWELL , 63y (56)Male   MRN: [id number]  Location: [address]  Visit: 23 Inpatient  Date: 23 @ 08:16      Procedure/Dx/Injuries: Gi bleed    Events of past 24 hours: s/p blood transfusion, on max pressures, patient made DNR    PAST MEDICAL & SURGICAL HISTORY:  Diabetes mellitus      Hypertension      Gout      Morbidly obese      Gout      S/P tonsillectomy          Vitals:   T(F): 92.6 (23 @ 07:30), Max: 100.4 (23 @ 10:00)  HR: 0 (23 @ 08:02)  BP: --  RR: 32 (23 @ 08:02)  SpO2: 71% (23 @ 08:02)  Mode: AC/ CMV (Assist Control/ Continuous Mandatory Ventilation), RR (machine): 32, TV (machine): 420, FiO2: 100, PEEP: 16, ITime: 0.78, MAP: 20, PIP: 31    Diet, NPO:   Except Medications      Fluids:     I & O's:    23 @ 07:01  -  23 @ 07:00  --------------------------------------------------------  IN:    dextrose 10%: 300 mL    FentaNYL: 458.3 mL    Insulin: 31 mL    IV PiggyBack: 650 mL    Norepinephrine: 67005 mL    Pantoprazole: 110 mL    Phenylephrine: 4909 mL    PRBCs (Packed Red Blood Cells): 1338 mL    Vasopressin: 144 mL  Total IN: 69201.3 mL    OUT:    Dexmedetomidine: 0 mL    Indwelling Catheter - Urethral (mL): 8 mL    Other (mL): 455 mL  Total OUT: 463 mL    Total NET: 84964.3 mL          PHYSICAL EXAM:    GENERAL: intubated  CHEST/LUNG: CTAB  HEART: Regular rate and rhythm.   ABDOMEN: Soft, non-tender, non-distended  EXTREMITIES: No clubbing, cyanosis, or edema      MEDICATIONS  (STANDING):  acetylcysteine 10%  Inhalation 4 milliLiter(s) Inhalation every 4 hours  albuterol    0.083% 2.5 milliGRAM(s) Nebulizer every 4 hours  bacitracin   Ointment 1 Application(s) Topical every 12 hours  chlorhexidine 0.12% Liquid 15 milliLiter(s) Oral Mucosa every 12 hours  chlorhexidine 2% Cloths 1 Application(s) Topical <User Schedule>  CRRT Treatment    <Continuous>  dextrose 10%. 1000 milliLiter(s) (30 mL/Hr) IV Continuous <Continuous>  dextrose 5%. 1000 milliLiter(s) (50 mL/Hr) IV Continuous <Continuous>  dextrose 5%. 1000 milliLiter(s) (100 mL/Hr) IV Continuous <Continuous>  dextrose 50% Injectable 25 Gram(s) IV Push once  dextrose 50% Injectable 12.5 Gram(s) IV Push once  dextrose 50% Injectable 25 Gram(s) IV Push once  ergocalciferol 08444 Unit(s) Oral every week  fentaNYL   Infusion. 1 MICROgram(s)/kG/Hr (12.7 mL/Hr) IV Continuous <Continuous>  glucagon  Injectable 1 milliGRAM(s) IntraMuscular once  hydrocortisone sodium succinate Injectable 100 milliGRAM(s) IV Push every 8 hours  lacosamide IVPB 200 milliGRAM(s) IV Intermittent every 12 hours  meropenem  IVPB 500 milliGRAM(s) IV Intermittent every 12 hours  multivitamin 1 Tablet(s) Oral daily  norepinephrine Infusion 0.05 MICROgram(s)/kG/Min (5.96 mL/Hr) IV Continuous <Continuous>  pantoprazole  Injectable 40 milliGRAM(s) IV Push every 12 hours  phenylephrine    Infusion 0.1 MICROgram(s)/kG/Min (2.38 mL/Hr) IV Continuous <Continuous>  phenylephrine   100 mCg/mL NaCl 0.9% Injectable 1000 MICROGram(s) IV Push <User Schedule>  phenylephrine   100 mCg/mL NaCl 0.9% Injectable 1000 MICROGram(s) IV Push <User Schedule>  PureFlow Dialysate RFP-400 (K 2 / Ca 3) 5000 milliLiter(s) (2000 mL/Hr) CRRT <Continuous>  sodium bicarbonate  Injectable 50 milliEquivalent(s) IV Push once  vasopressin Infusion 0.04 Unit(s)/Min (6 mL/Hr) IV Continuous <Continuous>    MEDICATIONS  (PRN):  acetaminophen     Tablet .. 650 milliGRAM(s) Oral every 6 hours PRN Temp greater or equal to 38C (100.4F), Mild Pain (1 - 3)  bisacodyl Suppository 10 milliGRAM(s) Rectal daily PRN Constipation  dextrose Oral Gel 15 Gram(s) Oral once PRN Blood Glucose LESS THAN 70 milliGRAM(s)/deciliter  fentaNYL    Injectable 50 MICROGram(s) IV Push every 2 hours PRN Severe Pain (7 - 10)/vent dyssynchrony  ondansetron Injectable 4 milliGRAM(s) IV Push every 6 hours PRN Nausea and/or Vomiting      DVT PROPHYLAXIS:   GI PROPHYLAXIS: pantoprazole  Injectable 40 milliGRAM(s) IV Push every 12 hours    ANTICOAGULATION:   ANTIBIOTICS:  meropenem  IVPB 500 milliGRAM(s)            LAB/STUDIES:  Labs:  CAPILLARY BLOOD GLUCOSE      POCT Blood Glucose.: 75 mg/dL (2023 07:26)  POCT Blood Glucose.: 25 mg/dL (2023 06:12)  POCT Blood Glucose.: 23 mg/dL (2023 06:10)  POCT Blood Glucose.: 85 mg/dL (2023 20:29)  POCT Blood Glucose.: 83 mg/dL (2023 19:02)  POCT Blood Glucose.: 84 mg/dL (2023 18:03)  POCT Blood Glucose.: 97 mg/dL (2023 16:58)  POCT Blood Glucose.: 110 mg/dL (2023 16:01)  POCT Blood Glucose.: 58 mg/dL (2023 15:19)  POCT Blood Glucose.: 93 mg/dL (2023 13:53)  POCT Blood Glucose.: 119 mg/dL (2023 12:59)  POCT Blood Glucose.: 179 mg/dL (2023 11:58)  POCT Blood Glucose.: 219 mg/dL (2023 11:10)  POCT Blood Glucose.: 234 mg/dL (2023 10:20)  POCT Blood Glucose.: 288 mg/dL (2023 08:54)                          6.9    31.62 )-----------( 91       ( 2023 04:45 )             21.2       Auto Neutrophil %: 71.6 % (23 @ 04:45)  Auto Immature Granulocyte %: 11.6 % (23 @ 04:45)  Auto Neutrophil %: 81.7 % (23 @ 16:38)  Band Neutrophils %: 1.7 % (23 @ 16:38)  Auto Neutrophil %: 79.5 % (23 @ 11:56)  Auto Immature Granulocyte %: 6.1 % (23 @ 11:56)  Auto Neutrophil %: 81.4 % (23 @ 09:24)  Auto Immature Granulocyte %: 6.8 % (23 @ 09:24)        152<H>  |  115<H>  |  108<HH>  ----------------------------<  30<LL>  7.1<HH>   |  8<LL>  |  5.2<HH>      Calcium, Total Serum: 7.3 mg/dL (23 @ 04:45)      LFTs:             3.1  | 2.0  | x        ------------------[165     ( 2023 04:45 )  1.7  | x    | x           Lipase:x      Amylase:x         Blood Gas Arterial, Lactate: 15.00 mmol/L (23 @ 03:25)  Blood Gas Arterial, Lactate: 10.30 mmol/L (23 @ 21:03)  Blood Gas Arterial, Lactate: 6.20 mmol/L (23 @ 16:22)  Blood Gas Arterial, Lactate: 5.60 mmol/L (23 @ 14:03)  Blood Gas Arterial, Lactate: 5.90 mmol/L (23 @ 12:15)  Blood Gas Arterial, Lactate: 5.50 mmol/L (23 @ 11:18)  Blood Gas Arterial, Lactate: 4.50 mmol/L (23 @ 10:23)  Blood Gas Arterial, Lactate: 3.90 mmol/L (23 @ 09:12)  Blood Gas Arterial, Lactate: 5.70 mmol/L (23 @ 07:26)  Blood Gas Arterial, Lactate: 3.90 mmol/L (23 @ 06:35)  Blood Gas Arterial, Lactate: 1.00 mmol/L (23 @ 02:59)  Blood Gas Arterial, Lactate: 0.70 mmol/L (23 @ 18:15)  Blood Gas Arterial, Lactate: 0.50 mmol/L (23 @ 14:08)  Blood Gas Arterial, Lactate: 0.60 mmol/L (23 @ 03:42)  Blood Gas Arterial, Lactate: 0.70 mmol/L (23 @ 00:06)  Blood Gas Arterial, Lactate: 0.90 mmol/L (23 @ 16:02)    ABG - ( 2023 03:25 )  pH: 7.07  /  pCO2: 31    /  pO2: 87    / HCO3: 9     / Base Excess: -19.4 /  SaO2: 99.0            ABG - ( 2023 21:03 )  pH: 7.20  /  pCO2: 36    /  pO2: 63    / HCO3: 14    / Base Excess: -13.0 /  SaO2: 90.2            ABG - ( 2023 16:22 )  pH: 7.34  /  pCO2: 39    /  pO2: 63    / HCO3: 21    / Base Excess: -4.4  /  SaO2: 93.4              Coags:     >40.00  ----< 3.76    ( 2023 23:19 )     46.5                Urinalysis Basic - ( 2023 09:31 )    Color: Light Yellow / Appearance: Slightly Turbid / S.012 / pH: x  Gluc: x / Ketone: Negative  / Bili: Negative / Urobili: <2 mg/dL   Blood: x / Protein: Trace / Nitrite: Negative   Leuk Esterase: Large / RBC: 10 /HPF / WBC 21 /HPF   Sq Epi: x / Non Sq Epi: x / Bacteria: Negative        Culture - Sputum (collected 2023 09:40)  Source: ET Tube ET Tube  Gram Stain (2023 23:37):    Moderate polymorphonuclear leukocytes per low power field    Rare Squamous epithelial cells per low power field    Rare Gram Negative Rods per oil power field  Preliminary Report (2023 17:25):    Normal Respiratory Natali present              IMAGING:      ACCESS/ DEVICES:  [ x] Peripheral IV  [ ] Central Venous Line	[ ] R	[ ] L	[ ] IJ	[ ] Fem	[ ] SC	Placed:   [ ] Arterial Line		[ ] R	[ ] L	[ ] Fem	[ ] Rad	[ ] Ax	Placed:   [ ] PICC:					[ ] Mediport  [ ] Urinary Catheter,  Date Placed:   [ ] Chest tube: [ ] Right, [ ] Left  [ ] ATIF/Yusef Drains

## 2023-06-20 LAB — MANUAL DIF COMMENT BLD-IMP: SIGNIFICANT CHANGE UP

## 2023-06-22 LAB — FIBRINOGEN AG PPP IA-MCNC: 131 MG/DL — LOW (ref 233–496)

## 2023-06-23 LAB
CULTURE RESULTS: SIGNIFICANT CHANGE UP
SPECIMEN SOURCE: SIGNIFICANT CHANGE UP

## 2023-06-24 DIAGNOSIS — Z79.82 LONG TERM (CURRENT) USE OF ASPIRIN: ICD-10-CM

## 2023-06-24 DIAGNOSIS — E11.22 TYPE 2 DIABETES MELLITUS WITH DIABETIC CHRONIC KIDNEY DISEASE: ICD-10-CM

## 2023-06-24 DIAGNOSIS — A41.9 SEPSIS, UNSPECIFIED ORGANISM: ICD-10-CM
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
## 2023-06-24 DIAGNOSIS — J69.0 PNEUMONITIS DUE TO INHALATION OF FOOD AND VOMIT: ICD-10-CM

## 2023-06-24 DIAGNOSIS — J96.01 ACUTE RESPIRATORY FAILURE WITH HYPOXIA: ICD-10-CM

## 2023-06-24 DIAGNOSIS — I50.9 HEART FAILURE, UNSPECIFIED: ICD-10-CM

## 2023-06-24 DIAGNOSIS — Z99.89 DEPENDENCE ON OTHER ENABLING MACHINES AND DEVICES: ICD-10-CM

## 2023-06-24 DIAGNOSIS — R53.83 OTHER FATIGUE: ICD-10-CM

## 2023-06-24 DIAGNOSIS — J18.9 PNEUMONIA, UNSPECIFIED ORGANISM: ICD-10-CM

## 2023-06-24 DIAGNOSIS — R57.8 OTHER SHOCK: ICD-10-CM

## 2023-06-24 DIAGNOSIS — G93.41 METABOLIC ENCEPHALOPATHY: ICD-10-CM

## 2023-06-24 DIAGNOSIS — I61.5 NONTRAUMATIC INTRACEREBRAL HEMORRHAGE, INTRAVENTRICULAR: ICD-10-CM

## 2023-06-24 DIAGNOSIS — N14.11 CONTRAST-INDUCED NEPHROPATHY: ICD-10-CM

## 2023-06-24 DIAGNOSIS — N18.4 CHRONIC KIDNEY DISEASE, STAGE 4 (SEVERE): ICD-10-CM

## 2023-06-24 DIAGNOSIS — I13.0 HYPERTENSIVE HEART AND CHRONIC KIDNEY DISEASE WITH HEART FAILURE AND STAGE 1 THROUGH STAGE 4 CHRONIC KIDNEY DISEASE, OR UNSPECIFIED CHRONIC KIDNEY DISEASE: ICD-10-CM
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
## 2023-06-24 DIAGNOSIS — Z79.4 LONG TERM (CURRENT) USE OF INSULIN: ICD-10-CM

## 2023-06-24 DIAGNOSIS — D63.1 ANEMIA IN CHRONIC KIDNEY DISEASE: ICD-10-CM

## 2023-06-24 DIAGNOSIS — G93.5 COMPRESSION OF BRAIN: ICD-10-CM

## 2023-06-24 DIAGNOSIS — G93.6 CEREBRAL EDEMA: ICD-10-CM

## 2023-06-24 DIAGNOSIS — Z86.73 PERSONAL HISTORY OF TRANSIENT ISCHEMIC ATTACK (TIA), AND CEREBRAL INFARCTION WITHOUT RESIDUAL DEFICITS: ICD-10-CM

## 2023-06-24 DIAGNOSIS — R47.1 DYSARTHRIA AND ANARTHRIA: ICD-10-CM

## 2023-06-24 DIAGNOSIS — Z79.01 LONG TERM (CURRENT) USE OF ANTICOAGULANTS: ICD-10-CM

## 2023-06-24 DIAGNOSIS — D65 DISSEMINATED INTRAVASCULAR COAGULATION [DEFIBRINATION SYNDROME]: ICD-10-CM

## 2023-06-24 DIAGNOSIS — Z66 DO NOT RESUSCITATE: ICD-10-CM

## 2023-06-24 DIAGNOSIS — R65.20 SEVERE SEPSIS WITHOUT SEPTIC SHOCK: ICD-10-CM

## 2023-06-24 DIAGNOSIS — N17.0 ACUTE KIDNEY FAILURE WITH TUBULAR NECROSIS: ICD-10-CM

## 2023-06-24 DIAGNOSIS — I48.91 UNSPECIFIED ATRIAL FIBRILLATION: ICD-10-CM
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
## 2023-06-24 DIAGNOSIS — K72.90 HEPATIC FAILURE, UNSPECIFIED WITHOUT COMA: ICD-10-CM

## 2023-06-24 DIAGNOSIS — K92.1 MELENA: ICD-10-CM

## 2023-06-24 DIAGNOSIS — T50.8X5A ADVERSE EFFECT OF DIAGNOSTIC AGENTS, INITIAL ENCOUNTER: ICD-10-CM

## 2023-06-24 DIAGNOSIS — R29.810 FACIAL WEAKNESS: ICD-10-CM
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
## 2023-06-24 DIAGNOSIS — E87.20 ACIDOSIS, UNSPECIFIED: ICD-10-CM

## 2023-06-24 DIAGNOSIS — Z86.718 PERSONAL HISTORY OF OTHER VENOUS THROMBOSIS AND EMBOLISM: ICD-10-CM

## 2023-06-24 DIAGNOSIS — M10.9 GOUT, UNSPECIFIED: ICD-10-CM

## 2023-06-24 DIAGNOSIS — S42.211A UNSPECIFIED DISPLACED FRACTURE OF SURGICAL NECK OF RIGHT HUMERUS, INITIAL ENCOUNTER FOR CLOSED FRACTURE: ICD-10-CM

## 2023-06-24 DIAGNOSIS — I16.1 HYPERTENSIVE EMERGENCY: ICD-10-CM

## 2023-06-24 DIAGNOSIS — G47.33 OBSTRUCTIVE SLEEP APNEA (ADULT) (PEDIATRIC): ICD-10-CM

## 2023-07-13 NOTE — DIETITIAN INITIAL EVALUATION ADULT - HEIGHT FOR BMI (FEET)
Bedroom    Make sure you use a nightlight and that the area around your bed is clear of potential obstacles. Be careful with electric blankets and never go to sleep with a heating pad, which can cause serious burns even if on a low setting. Use fire-resistant mattress covers and pillows, and NEVER smoke in bed. Keep a phone next to the bed that is programmed to dial 911 at the push of a button. If you have a chronic condition, you may want to sign on with an automatic call-in service. Typically the system includes a small pendant that connects directly to an emergency medical voice-response system. You should also make arrangements to stay in contact with someonefriend, neighbor, family memberon a regular schedule. Fire Prevention   According to the [employer] (Smoke Alarms for Every) [facility], senior citizens are one of the two highest risk groups for death and serious injuries due to residential fires. When cooking, wear short-sleeved items, never a bulky long-sleeved robe. The [facility]'s Safety Checklist for Older Consumers emphasizes the importance of checking basements, garages, workshops and storage areas for fire hazards, such as volatile liquids, piles of old rags or clothing and overloaded circuits. Never smoke in bed or when lying down on a couch or recliner chair. Small portable electric or kerosene heaters are responsible for many home fires and should be used cautiously if at all. If you do use one, be sure to keep them away from flammable materials. In case of fire, make sure you have a pre-established emergency exit plan. Have a professional check your fireplace and other fuel-burning appliances yearly. Helping Hands   Baby boomers entering the butt years will continue to see the development of new products to help older adults live safely and independently in spite of age-related changes.   Making Life More Livable  , by Vidhi Rea, lists over 1,000
5

## 2023-10-24 NOTE — PROGRESS NOTE ADULT - SUBJECTIVE AND OBJECTIVE BOX
Detail Level: Generalized Called by housestaff in CCU regarding venous doppler just completed showing bilateral DVT. CCU would like to start Heparin gtt to fully anticoagulate pt. Spoke to Dr Ruth and the benefits outweigh the risks in this situation. If a PE develops pt will possibly arrest. I discussed this with the Medical Residents in CCU also. Under the circumstances pt may be started on the Heparin infusion. Dr Ruth aware and agrees with plan. Detail Level: Zone Patient Specific Counseling (Will Not Stick From Patient To Patient): -\\nDK discusses tx options including retinoids or electrodessication. Detail Level: Detailed Patient Specific Counseling (Will Not Stick From Patient To Patient): -\\nDK discusses tx options including I&D or sx. DK says it only needs removed if it bothers the Pt.

## 2023-11-06 ENCOUNTER — APPOINTMENT (OUTPATIENT)
Dept: NEUROLOGY | Facility: CLINIC | Age: 63
End: 2023-11-06

## 2023-12-27 NOTE — SWALLOW BEDSIDE ASSESSMENT ADULT - SWALLOW EVAL: RECOMMENDED FEEDING/EATING TECHNIQUES
Your Personalized Prevention Plan Services (PPPS)    Preventive Services Checklist (Assumes Average Risk Unless Otherwise Noted):    Health Maintenance Topics with due status: Overdue       Topic Date Due    COVID-19 Vaccine Never done    Medicare Annual Wellness Visit Never done    Hepatitis C Screening Never done    DTaP, Tdap, and Td Vaccines Never done    Zoster Vaccine Never done    Falls Risk Screening Never done    Pneumococcal (65 years and older) 10/23/2020    Influenza Vaccine 08/01/2023    DEXA Scan 12/22/2023     Health Maintenance Topics with due status: Not Due       Topic Last Completion Date    Colorectal Cancer Screening 07/02/2020    Breast Cancer Screening 03/06/2023    Depression Screening 08/23/2023     Health Maintenance Topics with due status: Aged Out       Topic Date Due    Meningococcal ACWY Aged Out    HIB Vaccines Aged Out    Hepatitis B Vaccines Aged Out    IPV Vaccines Aged Out    HPV Vaccines Aged Out       You May Be Eligible for These Additional Preventive Services   (Assumes Average Risk Unless Otherwise Noted)  Diabetes Screening Any 1 risk factor: hypertension, dyslipidemia, obesity, high glucose; or Any 2 risk factors: >=64yo, overweight, family history diabetes (covered every 6 months)   Hepatitis C Screening Any 1 risk factor: 1) blood transfusion before 1992,   2) current or past injection drug use (annually for high risk; if born between 9861-8280, see above for status).   Vaccine: Hepatitis B As necessary if at-risk: hemophilia, ESRD, diabetes, living with individual infected with hep B, healthcare worker with frequent contact with blood/bodily fluids (series covered once)   Sexually Transmitted Diseases (STDs) As necessary chlamydia, gonorrhea, syphilis, hepatitis B (covered annually)  HIV if any 1 risk factor present: 1) <14yo or >64yo and at increased risk or 2) 15-64yo and ask for it (covered annually)   Lung Cancer Screening Low dose chest CT if 
all three risk factors: 1) 50-76yo, 2) smoker or quit within last 15y, 3) >=20 pack years (covered annually).  No results found for this or any previous visit.       Cholesterol Screening Both risk factors: 1) >=21yo and 2)  increased risk coronary artery disease (covered every 5 years).     Breast Cancer Screening Covered once 35-38yo, annually >=39yo (if >=49yo, see above for status).         Health Risk Factors with Personalized Education:  ----------------------------------------------------------------------------------------------------------------------  Stress management:  Understanding Your Stress  · Think about how you know when you’re stressed.  · Think about how your thoughts or behaviors are different when you’re stressed.  · Think about what triggers stress for you.  · Think about how you handle stress, and whether you’re making unhealthy choices in response to stress (like smoking, drinking alcohol or overeating).  Managing Stress  · Take a break from the stressful situation.  · Reduce your stress levels by exercising, talking with family/friends, ensuring adequate sleep.  · Consider meditation or yoga.  · Make sure you plan time to do things you enjoy.  · Let your PCP know if you’re having problems limiting your stress.  ----------------------------------------------------------------------------------------------------------------------  Controlling Your Cholesterol  · Reduce the amount of saturated and trans fat in your diet.  Limit intake of red meat.  Consume only low-fat or non-fat/skim dairy.  Limit fried food.  Cook with vegetable oils.  · Reduce your intake of sugary foods, sugary drinks and alcohol.  · Eat a diet high in fruit, vegetables and whole grains.  · Get protein from fish, poultry and a small portion of nuts.  · Stay active.  Try to get at least 90 to 150 minutes of exercise per week.  Try brisk walking, swimming, bicycling or dancing.  · Maintain a healthy weight by balancing your 
diet and exercise.  · If you have been prescribed medication, take it regularly and exactly as prescribed. Let your PCP know if you have any problems or questions about your medication.  · It’s important to know your cholesterol numbers.  When recommended by your PCP, get the cholesterol blood test.  ----------------------------------------------------------------------------------------------------------------------  Maintaining Strong Bones  · Try to get at least 90 to 150 minutes of weight-bearing exercise per week.  · Ensure intake of at least 1200mg of calcium per day.  Eat foods high in calcium like milk and other dairy, green vegetables, fruit, canned fish with soft and edible bones, nuts, calcium-set tofu.  Some foods are calcium-fortified, like bread, cereal, fruit juices and mineral water.  · Help your body make vitamin D by getting 10-15 minutes per day of sunlight.    · Ensure intake of at least 600IU of vitamin D per day.  Eat foods high in vitamin D like oily fish (salmon, sardines, mackerel) and eggs.  Some foods are fortified with vitamin D, like dairy and cereals.  · Avoid high amounts of caffeine and salt, since they can cause the body to loose calcium.  · Limit alcohol intake, since it is associated with weaker bones and is associated with falls and fractures.  · Limit intake of fizzy drinks.  ----------------------------------------------------------------------------------------------------------------------  Reducing Your Risk of Falls  · Tell your PCP if any of your medications make you feel tired, dizzy, lightheaded or off-balance.  · Maintain coordination, flexibility and balance by ensuring regular physical activity.  · Limit alcohol intake to 1 drink per day.  Consider avoiding all alcohol intake.  · Ensure good vision.  Visit an ophthalmologist or optometrist regularly for vision screening or to make sure your glasses / contact lens prescription is correct.  If you need glasses or 
contacts, wear them.  When you get new glasses or contacts, take time to get used to them.  Do not wear sunglasses or tinted lenses when indoors.  · Ensure good hearing.  Have your hearing checked if you are having trouble hearing, or family and friends think you cannot hear them.  If you need a hearing aid, be sure it fits well and wear it.  · Get enough rest.  Ensure about 7-9 hours of sleep every day.  · Get up slowly from your bed or chairs.  Do not start walking until you are sure you feel steady.  · Wear non-skid, rubber-soled, low-heeled shoes.  Do not walk in socks, or in shoes and slippers with smooth soles.  · If your PCP or therapist recommends using a cane or walker, use it regularly.  · Make your home safer.  Increase lighting throughout the house, especially at the top and bottom of stairs.  Ensure lighting is easily turned on when getting up in the middle of the night.  Make sure there are two secure rails on all stairs.  Install grab bars in the bathtub / shower and near the toilet.  Consider using a shower chair and / or a hand-held shower.  · Spread sand or salt on icy surfaces.  Beware of wet surfaces, which can be icy.  · Tell your PCP if you have fallen.  
position upright (90 degrees)/small sips/bites
allow for swallow between intakes/small sips/bites/alternate food with liquid/maintain upright posture during/after eating for 30 mins/oral hygiene/crush medication (when feasible)/double swallow after each bite/sip/position upright (90 degrees)
alternate food with liquid/small sips/bites/double swallow after each sip/bite/position upright (90 degrees)/maintain upright posture during/after eating for 30 mins/oral hygiene
oral hygiene/double swallow after each sip/bite/position upright (90 degrees)/small sips/bites/maintain upright posture during/after eating for 30 mins/alternate food with liquid
small sips/bites/position upright (90 degrees)
small sips/bites/position upright (90 degrees)
double swallow after each bite/sip/crush medication (when feasible)/maintain upright posture during/after eating for 30 mins/alternate food with liquid/allow for swallow between intakes/oral hygiene/position upright (90 degrees)/small sips/bites

## 2024-01-29 NOTE — CONSULT NOTE ADULT - SUBJECTIVE AND OBJECTIVE BOX
Detail Level: Detailed HPI: This is a 60 y/o M with hx of HTN, DM, CAD, Sleep Apnea, last seen Normal at 3pm, was found at 7pm after wife heard a loud bang upstairs. At this time pt was unresponsive describe as Aphasic and had some right side weakness. EMS was called BS at that time was 224. Upon arrival to ED pt was HTN with SBP in the 200's, with Non rebreather on. Aphasic and not following commands.  NIH 15. HCT shows Hemmorhagic 2.5X3 component within  Left BG/Ext capsule with 2-3mm shift.    MEDICATIONS  (STANDING):  desmopressin Injectable 36 MICROGram(s) IV Push Once      PMHx:   HTN    Allergies    No Known Allergies    Intolerances        Vital Signs Last 24 Hrs  T(C): --  T(F): --  HR: 64 (02 Apr 2019 21:05) (60 - 75)  BP: 167/85 (02 Apr 2019 20:42) (154/82 - 224/107)  BP(mean): --  RR: 23 (02 Apr 2019 21:05) (18 - 26)  SpO2: 95% (02 Apr 2019 21:05) (95% - 99%)    PHYSICAL EXAM:    GENERAL: Obese, Aphasic, slight Left sided gaze preference  HEAD:  Atraumatic, Normocephalic  EYES: EOMI, PERRLA, conjunctiva and sclera clear  NERVOUS SYSTEM:  Awake  alert oriented to self, place situation   Fund of knowledge, recent and remote memory are intact   Mood; normal affect   Motor: Right UE no mvmt, no withdrawl, Right lower extremity withdraws to pain, Left UE and LE motor wnl  Gait is not tested      EXTREMITIES:  2+ Peripheral Pulses, No clubbing, cyanosis, or edema      LABS:                        13.5   7.02  )-----------( 223      ( 02 Apr 2019 19:45 )             42.7     04-02    143  |  105  |  46<H>  ----------------------------<  200<H>  4.2   |  26  |  2.5<H>    Ca    8.3<L>      02 Apr 2019 19:45    TPro  6.8  /  Alb  3.9  /  TBili  <0.2  /  DBili  x   /  AST  13  /  ALT  13  /  AlkPhos  94  04-02    PT/INR - ( 02 Apr 2019 19:45 )   PT: 10.10 sec;   INR: 0.88 ratio         PTT - ( 02 Apr 2019 19:45 )  PTT:31.8 sec      RADIOLOGY & ADDITIONAL TESTS:  < from: CT Brain Stroke Protocol (04.02.19 @ 20:01) >    EXAM:  CT BRAIN STROKE PROTOCOL            PROCEDURE DATE:  04/02/2019            INTERPRETATION:  Clinical History / Reason for exam:  Stroke Code.   Aphasic and right-sided weakness.    Technique:  Multiple contiguous axial CT images of the brainwere   obtained from base to vertex without administration of intravenous   contrast.      Comparison:  No comparisons are available.    Findings:      Ventricular system: Age-appropriate.    Brain parenchyma: Acute left frontotemporal intraparenchymal hemorrhage   with associated effacement of the left lateral ventricle.    Mass effect/Midline shift: 2 mm right midline shift.     Paranasal sinuses and mastoid air cells: Unremarkable.    Osseous structures: Unremarkable.    Impression:      Acuteleft frontotemporal intraparenchymal hemorrhage with associated   effacement of the left lateral ventricle and 2 mm right midline shift.        Dr. Matt Sandoval discussed preliminary findings with JANA CHÁVEZ PA   on 4/2/2019 8:12 PM with readback.    < end of copied text > HPI: This is a 60 y/o M with hx of HTN, DM, CAD, Sleep Apnea, last seen Normal at 3pm, was found at 7pm after wife heard a loud bang upstairs. At this time pt was unresponsive describe as Aphasic and had some right side weakness. EMS was called BS at that time was 224. Upon arrival to ED pt was HTN with SBP in the 200's, with Non rebreather on. Aphasic and not following commands.  NIH 15. HCT shows Hemmorhagic 2.5X3 component within  Left BG/Ext capsule with 2-3mm shift.    MEDICATIONS  (STANDING):  desmopressin Injectable 36 MICROGram(s) IV Push Once      PMHx:   HTN  DM  CAD    Allergies    No Known Allergies    Intolerances        Vital Signs Last 24 Hrs  T(C): --  T(F): --  HR: 64 (02 Apr 2019 21:05) (60 - 75)  BP: 167/85 (02 Apr 2019 20:42) (154/82 - 224/107)  BP(mean): --  RR: 23 (02 Apr 2019 21:05) (18 - 26)  SpO2: 95% (02 Apr 2019 21:05) (95% - 99%)    PHYSICAL EXAM:    GENERAL: Obese, Aphasic, slight Left sided gaze preference  HEAD:  Atraumatic, Normocephalic  EYES: EOMI, PERRLA, conjunctiva and sclera clear  NERVOUS SYSTEM:  Awake  alert oriented to self, place situation   Fund of knowledge, recent and remote memory are intact   Mood; normal affect   Motor: Right UE no mvmt, no withdrawl, Right lower extremity withdraws to pain, Left UE and LE motor wnl  Gait is not tested      EXTREMITIES:  2+ Peripheral Pulses, No clubbing, cyanosis, or edema      LABS:                        13.5   7.02  )-----------( 223      ( 02 Apr 2019 19:45 )             42.7     04-02    143  |  105  |  46<H>  ----------------------------<  200<H>  4.2   |  26  |  2.5<H>    Ca    8.3<L>      02 Apr 2019 19:45    TPro  6.8  /  Alb  3.9  /  TBili  <0.2  /  DBili  x   /  AST  13  /  ALT  13  /  AlkPhos  94  04-02    PT/INR - ( 02 Apr 2019 19:45 )   PT: 10.10 sec;   INR: 0.88 ratio         PTT - ( 02 Apr 2019 19:45 )  PTT:31.8 sec      RADIOLOGY & ADDITIONAL TESTS:  < from: CT Brain Stroke Protocol (04.02.19 @ 20:01) >    EXAM:  CT BRAIN STROKE PROTOCOL            PROCEDURE DATE:  04/02/2019            INTERPRETATION:  Clinical History / Reason for exam:  Stroke Code.   Aphasic and right-sided weakness.    Technique:  Multiple contiguous axial CT images of the brainwere   obtained from base to vertex without administration of intravenous   contrast.      Comparison:  No comparisons are available.    Findings:      Ventricular system: Age-appropriate.    Brain parenchyma: Acute left frontotemporal intraparenchymal hemorrhage   with associated effacement of the left lateral ventricle.    Mass effect/Midline shift: 2 mm right midline shift.     Paranasal sinuses and mastoid air cells: Unremarkable.    Osseous structures: Unremarkable.    Impression:      Acuteleft frontotemporal intraparenchymal hemorrhage with associated   effacement of the left lateral ventricle and 2 mm right midline shift.        Dr. Matt Sandoval discussed preliminary findings with JANA CHÁVEZ PA   on 4/2/2019 8:12 PM with readback.    < end of copied text >

## 2024-10-25 NOTE — ED PROVIDER NOTE - ATTESTATION, MLM
[facility] EMERGENCY DEPARTMENT  EMERGENCY DEPARTMENT ENCOUNTER        Pt Name: Susanna Arriola  MRN: [id number]  Birthdate 1955  Date of evaluation: 10/25/2024  Provider: Shu Sal MD  PCP: Lay Hunter MD  Note Started: 5:50 PM EDT 10/25/24    CHIEF COMPLAINT       Chief Complaint   Patient presents with    Fall     Witness fall one week ago. Also fell again today and hit her head. Generalized bruising all over. Increased fatigue. -LOC. Hx brain aneurysm        HISTORY OF PRESENT ILLNESS: 1 or more Elements   History From: Patient and family member    Limitations to history : None    Susanna Arriola is a 69 y.o. female who presents for fall about 1 week ago.  The patient had slipped and fell into a side table that broke.  She did fall onto the floor however did not lose consciousness.  This was witnessed by the patient's  and other family member.  The patient was not evaluated at that time for the fall.  Family member who is present with the patient at this time states that she has had intermittent nausea and dizziness over the past week.  She states this morning she seemed fine and was able to ambulate to the bathroom on her own however this afternoon she seemed to not be feeling well.  The patient is on Coumadin for atrial fibrillation.  Denies any specific pain.  At this time she denies dizziness but does feel nauseous.    Nursing Notes were all reviewed and agreed with or any disagreements were addressed in the HPI.        REVIEW OF EXTERNAL NOTE :       Patient was last seen on 7/4/2024 for atrial fibrillation, acute hypoxic respiratory failure    Echo on 7/5/2024    Left Ventricle: The EF by visual approximation is 50 - 55%.    Right Ventricle: Right ventricle size is normal. Normal systolic function.    Aortic Valve: Mild regurgitation.    Mitral Valve: Mild regurgitation.    Tricuspid Valve: Mild regurgitation.    Pulmonic Valve: Trace  is a poor historian due to dementia.    My findings: Susanna Arriola is a 69 y.o. female whom is in no distress. Physical exam reveals patient to be sitting in chair comfortably in no distress.  Heart regular rate and rhythm.  Lungs clear to auscultation.  No ecchymosis over the left chest wall however there is some mild tenderness.  No crepitance.  Some bruising over the right elbow.  No bony crepitance.  Abdomen is soft nontender.  Bilateral TMs and ear canals unremarkable.  No signs of infection.  Family was concerned about this. Sensation grossly intact. No focal neurological deficits. No ataxia with finger-to-nose.     My plan: Symptomatic and supportive care.  Appropriate labs and imaging    Electronically signed by Stewart Muñiz DO on 10/25/24 at 6:07 PM EDT       [MS]   1858 EKG shows sinus bradycardia at a rate of 48, normal W[place] interval, normal QRS, QTc 473, T wave inversion in aVL, no significant ST elevations or depressions, does not meet STEMI criteria [KM]   2129 Signed out to me by Dr. Sal. CXR unremarkable.   Cts pending, plan to ambulate pt and d/c if otherwise doing well. [AO]   2216 Looking at urinalysis, will treat due to trace bacteria and hx of mild dizziness and falls. Possibly contributes to her baseline dementia confusion. Family has expressed desire to treat at home instead of stay for hospital admission, pending CTs [AO]   2228 CT neck/head unremarkable   [AO]   Sat Oct 26, 2024   0015 Patient ambulated well, wishes to go home. Will send home on augmentin for UTI [AO]      ED Course User Index  [AO] Talib Hall MD  [KM] Shu Sal MD  [MS] Stewart Muñiz DO      She is a 69-year-old female with a history of atrial fibrillation on Coumadin, HLD presenting after fall 1 week ago where she was not evaluated.  She has had intermittent nausea and dizziness over the past week.  At this time she reports nausea but no dizziness.  Differentials include but not limited to  I have reviewed and agree with the initial nursing note, except as documented in my note.